# Patient Record
Sex: FEMALE | Race: WHITE | NOT HISPANIC OR LATINO | Employment: OTHER | URBAN - METROPOLITAN AREA
[De-identification: names, ages, dates, MRNs, and addresses within clinical notes are randomized per-mention and may not be internally consistent; named-entity substitution may affect disease eponyms.]

---

## 2017-02-06 ENCOUNTER — TELEPHONE (OUTPATIENT)
Dept: FAMILY MEDICINE | Facility: CLINIC | Age: 72
End: 2017-02-06

## 2017-02-06 NOTE — TELEPHONE ENCOUNTER
Reason for call:  Patient reporting a symptom    Symptom or request: Pt fell on her right side in [place] 3 days ago. She says it hurts and has chest pain when she takes a deep breath. This has been present since the fall.  I offered UC tonight but she wants to wait until tomorrow. Apt schedule for 12:40 tomorrow with Michelle Minor    Duration (how long have symptoms been present): 3 days    Additional comments: I told her I will have the RN call her to see if she has any concerns for waiting.    Phone Number patient can be reached at:  Home number on file [phone] (home)    Best Time:  anytime    Can we leave a detailed message on this number:  YES    Call taken on 2/6/2017 at 3:53 PM by Fiona Baird

## 2017-02-06 NOTE — TELEPHONE ENCOUNTER
When I called Ingrid was taking a shower and her  asked her if she thought OK to wait until tomorrow to be seen.    Harsha said the she said she is OK to wait  Irma Ivey RN

## 2017-02-07 ENCOUNTER — RADIANT APPOINTMENT (OUTPATIENT)
Dept: GENERAL RADIOLOGY | Facility: CLINIC | Age: 72
End: 2017-02-07
Attending: NURSE PRACTITIONER
Payer: COMMERCIAL

## 2017-02-07 ENCOUNTER — OFFICE VISIT (OUTPATIENT)
Dept: FAMILY MEDICINE | Facility: CLINIC | Age: 72
End: 2017-02-07
Payer: COMMERCIAL

## 2017-02-07 VITALS
BODY MASS INDEX: 22.86 KG/M2 | HEART RATE: 68 BPM | SYSTOLIC BLOOD PRESSURE: 138 MMHG | DIASTOLIC BLOOD PRESSURE: 80 MMHG | TEMPERATURE: 97.8 F | WEIGHT: 129 LBS

## 2017-02-07 DIAGNOSIS — R07.81 RIB PAIN ON RIGHT SIDE: ICD-10-CM

## 2017-02-07 DIAGNOSIS — R07.81 RIB PAIN ON RIGHT SIDE: Primary | ICD-10-CM

## 2017-02-07 PROCEDURE — 99213 OFFICE O/P EST LOW 20 MIN: CPT | Performed by: NURSE PRACTITIONER

## 2017-02-07 PROCEDURE — 71101 X-RAY EXAM UNILAT RIBS/CHEST: CPT | Mod: RT

## 2017-02-07 RX ORDER — FLUTICASONE PROPIONATE 50 MCG
1 SPRAY, SUSPENSION (ML) NASAL 2 TIMES DAILY
Qty: 16 G | Refills: 7 | Status: CANCELLED | OUTPATIENT
Start: 2017-02-07

## 2017-02-07 NOTE — PATIENT INSTRUCTIONS
Rib Contusion or Minor Fracture    A rib contusion is a bruise to one or more rib bones. It may cause pain, tenderness, swelling, and a purplish tint to the skin. There may be a sharp pain with each breath. A rib contusion takes anywhere from a few days to a few weeks to heal. A minor rib fracture or break may cause the same symptoms as a rib contusion. The small crack may not be seen on a regular chest X-ray. Treatment for both problems is the same.  Home care    You may use over-the-counter pain medicine to control pain, unless another pain medicine was prescribed. If you have chronic liver or kidney disease or ever had a stomach ulcer or GI bleeding, talk with your healthcare provider before using these medicines.    Rest. Do not lift anything heavy or do any activity that causes pain.    Apply an ice pack over the injured area for 15 to 20 minutes every 1 to 2 hours. You should do this for the first 24 to 48 hours. You can make an ice pack by filling a plastic bag that seals at the top with ice cubes and then wrapping it with a thin towel. Continue with ice packs as needed for the relief of pain and swelling.    The first 3 to 4 weeks of healing will be the most painful. If your pain is not under control with the treatment given, call your healthcare provider. Sometimes a stronger pain medicine may be needed. A nerve block can be done in case of severe pain. It will numb the nerve between the ribs.  Follow-up care  Follow up with your healthcare provider, or as advised.  If X-rays were taken, you will be told of any new findings that may affect your care.  Call 911   Call 911 if you have:    Dizziness, weakness or fainting    Shortness of breath with or without chest discomfort    New or worsening pain  When to seek medical advice  Call your healthcare provider right away if any of these occur:    Fever of 100.4 F (38 C) or above lasting for 24 to 48 hours    Stomach pain    5498-5329 [employer] [address] [place] [address]. All rights reserved. This information is not intended as a substitute for professional medical care. Always follow your healthcare professional's instructions.

## 2017-02-07 NOTE — MR AVS SNAPSHOT
After Visit Summary   2/7/2017    Ingrid Amaral    MRN: [id number]           Patient Information     Date Of Birth          1945        Visit Information        Provider Department      2/7/2017 12:40 PM Sheryl Minor APRN [facility]        Today's Diagnoses     Rib pain on right side    -  1       Care Instructions      Rib Contusion or Minor Fracture    A rib contusion is a bruise to one or more rib bones. It may cause pain, tenderness, swelling, and a purplish tint to the skin. There may be a sharp pain with each breath. A rib contusion takes anywhere from a few days to a few weeks to heal. A minor rib fracture or break may cause the same symptoms as a rib contusion. The small crack may not be seen on a regular chest X-ray. Treatment for both problems is the same.  Home care    You may use over-the-counter pain medicine to control pain, unless another pain medicine was prescribed. If you have chronic liver or kidney disease or ever had a stomach ulcer or GI bleeding, talk with your healthcare provider before using these medicines.    Rest. Do not lift anything heavy or do any activity that causes pain.    Apply an ice pack over the injured area for 15 to 20 minutes every 1 to 2 hours. You should do this for the first 24 to 48 hours. You can make an ice pack by filling a plastic bag that seals at the top with ice cubes and then wrapping it with a thin towel. Continue with ice packs as needed for the relief of pain and swelling.    The first 3 to 4 weeks of healing will be the most painful. If your pain is not under control with the treatment given, call your healthcare provider. Sometimes a stronger pain medicine may be needed. A nerve block can be done in case of severe pain. It will numb the nerve between the ribs.  Follow-up care  Follow up with your healthcare provider, or as advised.  If X-rays were taken, you will be told of any new findings that may  affect your care.  Call 911   Call 911 if you have:    Dizziness, weakness or fainting    Shortness of breath with or without chest discomfort    New or worsening pain  When to seek medical advice  Call your healthcare provider right away if any of these occur:    Fever of 100.4 F (38 C) or above lasting for 24 to 48 hours    Stomach pain    4466-5795 [employer]. [address] [place] [address]. All rights reserved. This information is not intended as a substitute for professional medical care. Always follow your healthcare professional's instructions.              Follow-ups after your visit        Who to contact     If you have questions or need follow up information about today's clinic visit or your schedule please contact [facility] directly at [phone].  Normal or non-critical lab and imaging results will be communicated to you by MyChart, letter or phone within 4 business days after the clinic has received the results. If you do not hear from us within 7 days, please contact the clinic through [employer] or phone. If you have a critical or abnormal lab result, we will notify you by phone as soon as possible.  Submit refill requests through [employer] or call your pharmacy and they will forward the refill request to us. Please allow 3 business days for your refill to be completed.          Additional Information About Your Visit        [employer] Information     [employer] gives you secure access to your electronic health record. If you see a primary care provider, you can also send messages to your care team and make appointments. If you have questions, please call your primary care clinic.  If you do not have a primary care provider, please call [phone] and they will assist you.        Care EveryWhere ID     This is your Care EveryWhere ID. This could be used by other organizations to access your [facility] medical records  [id number]        Your Vitals Were     Pulse  Temperature                68 97.8  F (36.6  C) (Tympanic)           Blood Pressure from Last 3 Encounters:   02/07/17 156/94   12/12/16 128/80   12/01/16 126/78    Weight from Last 3 Encounters:   02/07/17 129 lb (58.514 kg)   12/12/16 128 lb (58.06 kg)   12/01/16 130 lb 9.6 oz (59.24 kg)                 Today's Medication Changes          These changes are accurate as of: 2/7/17  1:43 PM.  If you have any questions, ask your nurse or doctor.               Stop taking these medicines if you haven't already. Please contact your care team if you have questions.     flunisolide 25 MCG/ACT (0.025%) Soln spray   Commonly known as:  NASALIDE   Stopped by:  Sheryl Minor APRN CNP                    Primary Care Provider Office Phone # Fax #    Leesa Muñiz PA-C [phone]       [address] [address]        Thank you!     Thank you for choosing [facility]  for your care. Our goal is always to provide you with excellent care. Hearing back from our patients is one way we can continue to improve our services. Please take a few minutes to complete the written survey that you may receive in the mail after your visit with us. Thank you!             Your Updated Medication List - Protect others around you: Learn how to safely use, store and throw away your medicines at www.disposemymeds.org.          This list is accurate as of: 2/7/17  1:43 PM.  Always use your most recent med list.                   Brand Name Dispense Instructions for use    * albuterol (5 MG/ML) 0.5% neb solution    PROVENTIL    30 vial    Take 0.5 mLs (2.5 mg) by nebulization every 4 hours as needed for wheezing or shortness of breath / dyspnea       * albuterol 108 (90 BASE) MCG/ACT Inhaler    PROAIR HFA/PROVENTIL HFA/VENTOLIN HFA    1 Inhaler    Inhale 2 puffs into the lungs every 4 hours as needed for shortness of breath / dyspnea       ASPIRIN PO      Take 81 mg by  mouth Takes every other day       BENEFIBER PO      2 tsp daily       calcipotriene 0.005 % Oint      Externally apply topically 2 times daily       calcium citrate + Tabs      250mg/400IU - take 2 tablets per day       cetirizine 10 MG tablet    zyrTEC    90 tablet    Take 1 tablet (10 mg) by mouth every evening       clobetasol 0.05 % ointment    TEMOVATE    30 g    Apply to area of irritation twice daily for 2 weeks       desonide 0.05 % lotion    DESOWEN    59 mL    Apply  topically 2 times daily.       estradiol 0.1 MG/GM cream    ESTRACE    85 g    Use 1 gm every day and spread a small amount around the clitoral dodd       FLAXSEED      on food as needed       fluticasone-salmeterol 250-50 MCG/DOSE diskus inhaler    ADVAIR    1 Inhaler    Inhale 1 puff into the lungs 2 times daily       guaiFENesin 600 MG 12 hr tablet    MUCINEX    40 tablet    Take 2 tablets (1,200 mg) by mouth 2 times daily as needed To see if helps phlegm.       hydrocortisone 1 % cream    CORTAID     Apply topically as needed       lactase 9000 UNITS Tabs tablet    LACTAID     Take 9,000 Units by mouth as needed       montelukast 10 MG tablet    SINGULAIR    30 tablet    Take 1 tablet (10 mg) by mouth daily       omeprazole 20 MG CR capsule    priLOSEC    180 capsule    Take 1 capsule (20 mg) by mouth 2 times daily For heartburn       order for DME     1 Device    Equipment being ordered: Nebulizer and tubing/filter       predniSONE 20 MG tablet    DELTASONE    10 tablet    Take 1 tablet (20 mg) by mouth 2 times daily For Yellow or Red zone on Asthma Action Plan.       PROBIOTIC DAILY PO      Take 1 chew tab by mouth 2 times daily       sertraline 100 MG tablet    ZOLOFT    90 tablet    Take 1.5 tablets (150 mg) by mouth daily       SKIN OILS EX      Lavender-headache, skin, peppermint-skin, upset stomach mix of oils, asthma mix of oils, eucalyptus. Tea tree oil-skin, vapor rub- chest tightness, sleepy time (vetiver, lavendaer, deborah,  mandarin, ylang)- sleep, constipation, eczema (sweet almond oil). Also mixes with carriers: Coconut oil, [place] oil, Extra virgin oil. Frankincense- cough. Digize- oil. Purification- oil, lemon- oil. Essentialyme- pill.  Inhales a mix with olive oil almond and coconut oil with peppermint, and eucalyptus- sinus, allergies. Updated 12/1/2015.  Updated 4/26/2016: Lemon, Cinnamon bark, panaway, Thieves, Orange, Wintergreen, Copaiba       sodium chloride 0.65 % nasal spray    OCEAN     Spray 1 spray in nostril daily as needed       tiZANidine 2 MG tablet    ZANAFLEX    90 tablet    Take 1 tablet (2 mg) by mouth 3 times daily as needed for muscle spasms       triamcinolone acetonide 0.05 % Oint      Externally apply  topically.       VITAMIN D (CHOLECALCIFEROL) PO      Take 2,400 Units by mouth daily       * Notice:  This list has 2 medication(s) that are the same as other medications prescribed for you. Read the directions carefully, and ask your doctor or other care provider to review them with you.

## 2017-02-07 NOTE — NURSING NOTE
"Chief Complaint   Patient presents with     Fall     2/02/2017 rt side pain and short of breath       Initial /94 mmHg  Pulse 68  Temp(Src) 97.8  F (36.6  C) (Tympanic)  Wt 129 lb (58.514 kg) Estimated body mass index is 22.86 kg/(m^2) as calculated from the following:    Height as of 11/7/16: 5' 3\" (1.6 m).    Weight as of this encounter: 129 lb (58.514 kg).  Medication Reconciliation: complete    "

## 2017-02-07 NOTE — PROGRESS NOTES
SUBJECTIVE:                                                    Ingrid Amaral is a 71 year old female who presents to clinic today for the following health issues:      Fall on 2/02 Rt side pain      Duration: 4 days    Description (location/character/radiation): under rt arms and wraps around rt breast causing breathing issues    Intensity:  moderate    Accompanying signs and symptoms: none    History (similar episodes/previous evaluation): None    Precipitating or alleviating factors: None    Therapies tried and outcome: None     [place] on right [place].  Pain now for 4 days without improvement.    Problem list and histories reviewed & adjusted, as indicated.  Additional history: as documented    Patient Active Problem List   Diagnosis     Diarrhea     Pure hypercholesterolemia     Allergic rhinitis     Panic disorder without agoraphobia     Advanced directives, counseling/discussion     Liver lesion     Generalized anxiety disorder     Mild major depression (H)     Vulvitis     Vaginal wall prolapse     Chronic constipation     Moderate persistent asthma     Dysphagia     RBD (REM behavioral disorder)     FH: colon cancer     Past Surgical History   Procedure Laterality Date     Hysterectomy, pap no longer indicated  1991     Tonsillectomy       Hernia repair       umbilcal     Ankle surgery       bilateral     Colonoscopy       Upper gi endoscopy       Hc esoph/gas reflux test w nasal imped >1 hr  4/19/2012     Procedure:ESOPHAGEAL IMPEDENCE FUNCTION TEST WITH 24 HOUR PH GREATER THAN 1 HOUR; Surgeon:VALDO UMANZOR; Location: GI     Colonoscopy N/A 3/20/2015     Procedure: COLONOSCOPY;  Surgeon: Britney Martinez MD;  Location: [facility]       Social History   Substance Use Topics     Smoking status: Former Smoker -- 0.50 packs/day for 3 years     Types: Cigarettes     Smokeless tobacco: Never Used      Comment: smoked for 3 years when she was around 19     Alcohol Use: Yes       Comment: RARELY     Family History   Problem Relation Age of Onset     HEART DISEASE Mother      valve problem     Hypertension Mother      DIABETES Mother      type 2     CEREBROVASCULAR DISEASE Mother      Allergies Mother      Eye Disorder Mother      Macular degeneration     OSTEOPOROSIS Mother      Respiratory Mother      Cardiovascular Father      MI at age 80     Cancer - colorectal Father      DIABETES Father      Hypertension Father      type 2     Eye Disorder Father      macular degeneration     Lipids Father      C.A.D. Maternal Grandmother      DIABETES Paternal Grandmother      type 2     Cardiovascular Paternal Grandmother      MI     DIABETES Sister      type 2     Allergies Sister      GASTROINTESTINAL DISEASE Sister      GASTROINTESTINAL DISEASE Daughter      GASTROINTESTINAL DISEASE Daughter          Current Outpatient Prescriptions   Medication Sig Dispense Refill     estradiol (ESTRACE) 0.1 MG/GM vaginal cream Use 1 gm every day and spread a small amount around the clitoral dodd 85 g 1     fluticasone-salmeterol (ADVAIR) 250-50 MCG/DOSE diskus inhaler Inhale 1 puff into the lungs 2 times daily 1 Inhaler 11     montelukast (SINGULAIR) 10 MG tablet Take 1 tablet (10 mg) by mouth daily 30 tablet 1     guaiFENesin (MUCINEX) 600 MG 12 hr tablet Take 2 tablets (1,200 mg) by mouth 2 times daily as needed To see if helps phlegm. 40 tablet 0     albuterol (PROAIR HFA, PROVENTIL HFA, VENTOLIN HFA) 108 (90 BASE) MCG/ACT inhaler Inhale 2 puffs into the lungs every 4 hours as needed for shortness of breath / dyspnea 1 Inhaler 2     tiZANidine (ZANAFLEX) 2 MG tablet Take 1 tablet (2 mg) by mouth 3 times daily as needed for muscle spasms 90 tablet 1     sertraline (ZOLOFT) 100 MG tablet Take 1.5 tablets (150 mg) by mouth daily 90 tablet 3     cetirizine (ZYRTEC) 10 MG tablet Take 1 tablet (10 mg) by mouth every evening 90 tablet 3     omeprazole (PRILOSEC) 20 MG capsule Take 1 capsule (20 mg) by mouth 2  times daily For heartburn 180 capsule 3     Multiple Minerals-Vitamins (CALCIUM CITRATE +) TABS 250mg/400IU - take 2 tablets per day       SKIN OILS EX Lavender-headache, skin, peppermint-skin, upset stomach mix of oils, asthma mix of oils, eucalyptus. Tea tree oil-skin, vapor rub- chest tightness, sleepy time (vetiver, lavendaer, mrjoram, mandarin, ylang)- sleep, constipation, eczema (sweet almond oil). Also mixes with carriers: Coconut oil, [place] oil, Extra virgin oil. Frankincense- cough. Digize- oil. Purification- oil, lemon- oil. Essentialyme- pill.  Inhales a mix with olive oil almond and coconut oil with peppermint, and eucalyptus- sinus, allergies. Updated 12/1/2015.   Updated 4/26/2016: Lemon, Cinnamon bark, panaway, Thieves, Orange, Wintergreen, Copaiba       ASPIRIN PO Take 81 mg by mouth Takes every other day       predniSONE (DELTASONE) 20 MG tablet Take 1 tablet (20 mg) by mouth 2 times daily For Yellow or Red zone on Asthma Action Plan. 10 tablet 0     ORDER FOR DME Equipment being ordered: Nebulizer and tubing/filter 1 Device 0     albuterol (PROVENTIL) (5 MG/ML) 0.5% nebulizer solution Take 0.5 mLs (2.5 mg) by nebulization every 4 hours as needed for wheezing or shortness of breath / dyspnea 30 vial 1     lactase (LACTAID) 9000 UNITS TABS Take 9,000 Units by mouth as needed       sodium chloride (SODIUM CHLORIDE) 0.65 % nasal spray Spray 1 spray in nostril daily as needed       VITAMIN D, CHOLECALCIFEROL, PO Take 2,400 Units by mouth daily       hydrocortisone 1 % cream Apply topically as needed       calcipotriene 0.005 % OINT Externally apply topically 2 times daily       Probiotic Product (PROBIOTIC DAILY PO) Take 1 chew tab by mouth 2 times daily        clobetasol (TEMOVATE) 0.05 % ointment Apply to area of irritation twice daily for 2 weeks 30 g 0     desonide (DESOWEN) 0.05 % lotion Apply  topically 2 times daily. 59 mL 0     Triamcinolone Acetonide 0.05 % OINT Externally apply  topically.        BENEFIBER OR 2 tsp daily       FLAXSEED on food as needed       Allergies   Allergen Reactions     Sulfa Drugs Other (See Comments)     Reaction unknown as a child     Problem list, Medication list, Allergies, and Medical/Social/Surgical histories reviewed in [facility] and updated as appropriate.    ROS:  Constitutional, HEENT, cardiovascular, pulmonary, gi and gu systems are negative, except as otherwise noted.    OBJECTIVE:                                                    /80 mmHg  Pulse 68  Temp(Src) 97.8  F (36.6  C) (Tympanic)  Wt 129 lb (58.514 kg)  Body mass index is 22.86 kg/(m^2).  GENERAL: healthy, alert and no distress  RESP: lungs clear to auscultation - no rales, rhonchi or wheezes  CV: regular rate and rhythm, normal S1 S2, no S3 or S4, no murmur, click or rub  MS: tenderness to palpation at right anterior lateral ribs 4-6  PSYCH: mentation appears normal, affect normal/bright    Diagnostic Test Results:  Xray -    XR RIBS & CHEST RT 3VW 2/7/2017 1:32 PM    COMPARISON: 12/23/2011    HISTORY: Pleurodynia                  Impression             IMPRESSION: Cardiac silhouette and pulmonary vasculature are within  normal limits. Small hiatal hernia is again seen. No focal airspace  disease, pleural effusion or pneumothorax. No displaced rib fractures  are seen.    RODGER ANSARI                  ASSESSMENT/PLAN:                                                      1. Rib pain on right side  No fracture on x ray.  No respiratory distress.  Symptomatic care and follow up discussed  - XR Ribs & Chest Right G/E 3 Views; Future    Home care instructions were reviewed with the patient. The risks, benefits and treatment options of prescribed medications or other treatments have been discussed with the patient. The patient verbalized their understanding and should call or follow up if no improvement or if they develop further problems.      Patient Instructions     Rib Contusion or Minor Fracture    A rib  contusion is a bruise to one or more rib bones. It may cause pain, tenderness, swelling, and a purplish tint to the skin. There may be a sharp pain with each breath. A rib contusion takes anywhere from a few days to a few weeks to heal. A minor rib fracture or break may cause the same symptoms as a rib contusion. The small crack may not be seen on a regular chest X-ray. Treatment for both problems is the same.  Home care    You may use over-the-counter pain medicine to control pain, unless another pain medicine was prescribed. If you have chronic liver or kidney disease or ever had a stomach ulcer or GI bleeding, talk with your healthcare provider before using these medicines.    Rest. Do not lift anything heavy or do any activity that causes pain.    Apply an ice pack over the injured area for 15 to 20 minutes every 1 to 2 hours. You should do this for the first 24 to 48 hours. You can make an ice pack by filling a plastic bag that seals at the top with ice cubes and then wrapping it with a thin towel. Continue with ice packs as needed for the relief of pain and swelling.    The first 3 to 4 weeks of healing will be the most painful. If your pain is not under control with the treatment given, call your healthcare provider. Sometimes a stronger pain medicine may be needed. A nerve block can be done in case of severe pain. It will numb the nerve between the ribs.  Follow-up care  Follow up with your healthcare provider, or as advised.  If X-rays were taken, you will be told of any new findings that may affect your care.  Call 911   Call 911 if you have:    Dizziness, weakness or fainting    Shortness of breath with or without chest discomfort    New or worsening pain  When to seek medical advice  Call your healthcare provider right away if any of these occur:    Fever of 100.4 F (38 C) or above lasting for 24 to 48 hours    Stomach pain    5258-1865 [employer]. [address] [place] [address], [place]  [address]. All rights reserved. This information is not intended as a substitute for professional medical care. Always follow your healthcare professional's instructions.              MARVIN Sesay [facility]

## 2017-03-01 DIAGNOSIS — M54.2 NECK PAIN: ICD-10-CM

## 2017-03-01 DIAGNOSIS — M79.622 PAIN OF LEFT UPPER ARM: ICD-10-CM

## 2017-03-01 DIAGNOSIS — M54.50 BILATERAL LOW BACK PAIN WITHOUT SCIATICA, UNSPECIFIED CHRONICITY: ICD-10-CM

## 2017-03-01 RX ORDER — TIZANIDINE 2 MG/1
2 TABLET ORAL 3 TIMES DAILY PRN
Qty: 90 TABLET | Refills: 0 | Status: SHIPPED | OUTPATIENT
Start: 2017-03-01 | End: 2017-03-09

## 2017-03-01 RX ORDER — TIZANIDINE 2 MG/1
2 TABLET ORAL 3 TIMES DAILY PRN
Qty: 90 TABLET | Refills: 0 | Status: SHIPPED | OUTPATIENT
Start: 2017-03-01 | End: 2017-03-01

## 2017-03-01 NOTE — TELEPHONE ENCOUNTER
Tizanidine 2      Last Written Prescription Date:  06/15/16  Last Fill Quantity: 90,   # refills: 1  Last Office Visit with [facility] prescribing provider: 02/07/17  Future Office visit:       Routing refill request to provider for review/approval because:  Drug not on the [facility] refill protocol or controlled substance    Thank You!  Candace Lott  [facility]  P: [phone] F:[phone]

## 2017-03-02 ENCOUNTER — TELEPHONE (OUTPATIENT)
Dept: PHARMACY | Facility: CLINIC | Age: 72
End: 2017-03-02

## 2017-03-02 NOTE — TELEPHONE ENCOUNTER
This patient is due for MTM follow-up. I called the patient to schedule an appointment. Appointment scheduled for 3/9/17.    Pepe Mo PharmD  Medication Therapy Management Provider  Pager (voicemail): [phone]

## 2017-03-09 ENCOUNTER — OFFICE VISIT (OUTPATIENT)
Dept: FAMILY MEDICINE | Facility: CLINIC | Age: 72
End: 2017-03-09
Payer: COMMERCIAL

## 2017-03-09 ENCOUNTER — OFFICE VISIT (OUTPATIENT)
Dept: PHARMACY | Facility: CLINIC | Age: 72
End: 2017-03-09
Payer: COMMERCIAL

## 2017-03-09 VITALS — BODY MASS INDEX: 23.38 KG/M2 | WEIGHT: 132 LBS

## 2017-03-09 VITALS
DIASTOLIC BLOOD PRESSURE: 98 MMHG | HEIGHT: 63 IN | WEIGHT: 132 LBS | SYSTOLIC BLOOD PRESSURE: 160 MMHG | TEMPERATURE: 97.7 F | HEART RATE: 66 BPM | BODY MASS INDEX: 23.39 KG/M2

## 2017-03-09 DIAGNOSIS — M54.42 BILATERAL LOW BACK PAIN WITH LEFT-SIDED SCIATICA, UNSPECIFIED CHRONICITY: ICD-10-CM

## 2017-03-09 DIAGNOSIS — R21 RASH: ICD-10-CM

## 2017-03-09 DIAGNOSIS — K59.09 CHRONIC CONSTIPATION: ICD-10-CM

## 2017-03-09 DIAGNOSIS — Z13.6 CARDIOVASCULAR SCREENING; LDL GOAL LESS THAN 130: ICD-10-CM

## 2017-03-09 DIAGNOSIS — G47.52 RBD (REM BEHAVIORAL DISORDER): ICD-10-CM

## 2017-03-09 DIAGNOSIS — M54.41 CHRONIC RIGHT-SIDED LOW BACK PAIN WITH RIGHT-SIDED SCIATICA: Primary | ICD-10-CM

## 2017-03-09 DIAGNOSIS — Z79.82 LONG TERM (CURRENT) USE OF ASPIRIN: ICD-10-CM

## 2017-03-09 DIAGNOSIS — G89.29 CHRONIC RIGHT-SIDED LOW BACK PAIN WITH RIGHT-SIDED SCIATICA: Primary | ICD-10-CM

## 2017-03-09 DIAGNOSIS — F33.0 MAJOR DEPRESSIVE DISORDER, RECURRENT EPISODE, MILD (H): ICD-10-CM

## 2017-03-09 DIAGNOSIS — J45.40 MODERATE PERSISTENT ASTHMA WITHOUT COMPLICATION: Primary | ICD-10-CM

## 2017-03-09 DIAGNOSIS — J30.9 ALLERGIC RHINITIS, UNSPECIFIED ALLERGIC RHINITIS TRIGGER, UNSPECIFIED RHINITIS SEASONALITY: ICD-10-CM

## 2017-03-09 DIAGNOSIS — K21.9 GASTROESOPHAGEAL REFLUX DISEASE, ESOPHAGITIS PRESENCE NOT SPECIFIED: ICD-10-CM

## 2017-03-09 DIAGNOSIS — E63.9 NUTRITIONAL DEFICIENCY: ICD-10-CM

## 2017-03-09 LAB
CHOLEST SERPL-MCNC: 273 MG/DL
HDLC SERPL-MCNC: 71 MG/DL
LDLC SERPL CALC-MCNC: 153 MG/DL
NONHDLC SERPL-MCNC: 202 MG/DL
TRIGL SERPL-MCNC: 244 MG/DL

## 2017-03-09 PROCEDURE — 99214 OFFICE O/P EST MOD 30 MIN: CPT | Performed by: NURSE PRACTITIONER

## 2017-03-09 PROCEDURE — 36415 COLL VENOUS BLD VENIPUNCTURE: CPT | Performed by: NURSE PRACTITIONER

## 2017-03-09 PROCEDURE — 80061 LIPID PANEL: CPT | Performed by: NURSE PRACTITIONER

## 2017-03-09 PROCEDURE — 99605 MTMS BY PHARM NP 15 MIN: CPT | Performed by: PHARMACIST

## 2017-03-09 PROCEDURE — 99607 MTMS BY PHARM ADDL 15 MIN: CPT | Performed by: PHARMACIST

## 2017-03-09 RX ORDER — CYCLOBENZAPRINE HCL 10 MG
5-10 TABLET ORAL 3 TIMES DAILY PRN
Qty: 30 TABLET | Refills: 1 | Status: SHIPPED | OUTPATIENT
Start: 2017-03-09 | End: 2017-04-03

## 2017-03-09 RX ORDER — FLUTICASONE PROPIONATE 50 MCG
1 SPRAY, SUSPENSION (ML) NASAL DAILY
COMMUNITY
End: 2017-05-23

## 2017-03-09 NOTE — PATIENT INSTRUCTIONS
Low back/sciatic pain    Heat area on/off through out the day     Take no more than 600 mg of ibuprofen three times daily - can take an extra tablet at bedtime     Can take Flexeril 1-2 tabs up to 3 times daily     Sleeping with a pillow between knees may help    Start stretches and exercises for your low back     Make an appointment with physical therapy     Will get cholesterol level today and notify you of results    Blood pressure slightly elevated in clinic today - make a nurse only appointment in 2 weeks if you don't see me for a blood pressure check     Follow up in 2 weeks if symptoms are not improved       Exercises To Strengthen Your Lower Back  Strong lower-back and abdominal muscles work together to support your spine. The exercises below will help strengthen the muscles of the lower back. It is important that you begin exercising slowly and increase levels gradually.  Always begin any exercise program with stretching. If you feel pain while doing any of these exercises, stop and talk to your doctor about a more specific exercise program that better suits your condition.   Low back stretch  The point of stretching is to make you more flexible and increase your range of motion. Stretch only as much as you are able. Stretch slowly. Do not push your stretch to the limit. If at any point you feel pain while stretching, this is your (temporary) limit.    Lie on your back with your knees bent and both feet on the ground.    Slowly raise your left knee to your chest as you flatten your lower back against the floor. Hold for 5 seconds.    Relax and repeat the exercise with your right knee.    Do 10 of these exercises for each leg.    Repeat hugging both knees to your chest at the same time.  Building lower back strength  Start your exercise routine with 10 to 30 minutes a day, 1 to 3 times a day.  Initial exercises  Lying on your back:  1. Ankle pumps: Move your foot up and down, towards your head, and then  away. Repeat 10 times with each foot.  2. Heel slides: Slowly bend your knee, drawing the heel of your foot towards you. Then slide your heel/foot from you, straightening your knee. Do not lift your foot off the floor (this is not a leg lift).  3. Abdominal contraction: Bend your knees and put your hands on your stomach. Tighten your stomach muscles. Hold for 5 seconds, then relax. Repeat 10 times.  4. Straight leg raise: Bend one leg at the knee and keep the other leg straight. Tighten your stomach muscles. Slowly lift your straight leg 6 to 12 inches off the floor and hold for up to 5 seconds. Repeat 10 times on each side.  Standin. Wall squats: Stand with your back against the wall. Move your feet about 12 inches away from the wall. Tighten your stomach muscles, and slowly bend your knees until they are at about a 45 degree angle. Do not go down too far. Hold about 5 seconds. Then slowly return to your starting position. Repeat 10 times.  2. Heel raises: Stand facing the wall. Slowly raise the heels of your feet up and down, while keeping your toes on the floor. If you have trouble balancing, you can touch the wall with your hands. Repeat 10 times.  More advanced exercises  When you feel comfortable enough, try these exercises.  1. Kneeling lumbar extension: Begin on your hands and knees. At the same time, raise and straighten your right arm and left leg until they are parallel to the ground. Hold for 2 seconds and come back slowly to a starting position. Repeat with left arm and right leg, alternating 10 times.  2. Prone lumbar extension: Lie face down, arms extended overhead, palms on the floor. At the same time, raise your right arm and left leg as high as comfortably possible. Hold for 10 seconds and slowly return to start. Repeat with left arm and right leg, alternating 10 times. Gradually build up to 20 times. (Advanced: Repeat this exercise raising both arms and both legs a few inches off the floor at  the same time. Hold for 5 seconds and release.)  3. Pelvic tilt: Lie on the floor on your back with your knees bent at 90 degrees. Your feet should be flat on the floor. Inhale, exhale, then slowly contract your abdominal muscles bringing your navel toward your spine. Let your pelvis rock back until your lower back is flat on the floor. Hold for 10 seconds while breathing smoothly.  4. Abdominal crunch: Perform a pelvic tilt (above) flattening your lower back against the floor. Holding the tension in your abdominal muscles, take another breath and raise your shoulder blades off the ground (this is not a full sit-up). Keep your head in line with your body (don t bend your neck forward). Hold for 2 seconds, then slowly lower.    3446-7878 The [employer]. [address] [place] [address]. All rights reserved. This information is not intended as a substitute for professional medical care. Always follow your healthcare professional's instructions.        Back Exercises: Bridge  The bridge exercise strengthens your abdominal, buttock, and hamstring muscles. This helps keep your back stable and aligned when you walk.    Lie on the floor with your back and palms flat. Bend your knees. Keep your feet flat on the floor.    Contract your abdominal and buttock muscles. Slowly lift your buttocks off the floor until there is a straight line from your knees to your shoulders.    Hold for 5 to 15  seconds. Repeat 5 to10 times.      0355-4100 [employer]. [address] [place] [address]. All rights reserved. This information is not intended as a substitute for professional medical care. Always follow your healthcare professional's instructions.        Back Exercises: Back Release  Do this exercise on your hands and knees. Keep your knees under your hips and your hands under your shoulders.    Relax your abdominal and buttocks muscles, lift your head, and let your back sag. Be sure to keep  your weight evenly distributed. Don t sit back on your hips.     Hold for 5 seconds.    Return to starting position.    Tuck your head and lift (arch) your back.    Hold for 5 seconds    Return to starting position.    Repeat 5 times.    0782-6660 [employer]. [address] [place] [address]. All rights reserved. This information is not intended as a substitute for professional medical care. Always follow your healthcare professional's instructions.        Back Exercises: Lower Back Stretch                        To start, sit in a chair with your feet flat on the floor. Shift your weight slightly forward. Relax, and keep your ears, shoulders, and hips aligned.    Sit with your feet well apart.    Bend forward and touch the floor with the backs of your hands. Relax and let your body drop.    Hold for 20 seconds. Return to starting position.    Repeat 2 times.     5186-9922 [employer]. [address] [place] [address]. All rights reserved. This information is not intended as a substitute for professional medical care. Always follow your healthcare professional's instructions.

## 2017-03-09 NOTE — MR AVS SNAPSHOT
After Visit Summary   3/9/2017    Ingrid Amaral    MRN: [id number]           Patient Information     Date Of Birth          1945        Visit Information        Provider Department      3/9/2017 1:00 PM Jan Mo, [facility] Instructions    Recommendations from today's MTM visit:                                                    MTM (medication therapy management) is a service provided by a clinical pharmacist designed to help you get the most of out of your medicines.   Today we reviewed what your medicines are for, how to know if they are working, that your medicines are safe and how to make your medicine regimen as easy as possible.      1. Now that you are off the clonazepam you could potentially try a supplement called melatonin to see if this helps with you REM disorder.  Usually just recommended 1-3 mg about ONE HOUR before bedtime to start.      2. You may get some benefit from an acid reflux drug called ranitidine that may help with some of your skin reactions.  Could consider trying this in place of your omeprazole in the future.    3. Cut back your supplements to just the calcium-vitamin D3 twice daily.  The magnesium can sometimes contribute to diarrhea issues.    Next MTM visit: 6 month or sooner if needed    To schedule another MTM appointment, please call the clinic directly or you may call the MTM scheduling line at [phone] or toll-free at [phone].     My Clinical Pharmacist's contact information:                                                      It was a pleasure seeing you today!  Please feel free to contact me with any questions or concerns you have.      Pepe Mo, Sirisha  Medication Therapy Management Provider  Pager (voicemail): [phone]    You may receive a survey about the MTM services you received.  I would appreciate your feedback to help me serve you better in the future. Please fill it out and return it  when you can. Your comments will be anonymous.                Follow-ups after your visit        Your next 10 appointments already scheduled     Mar 09, 2017  2:20 PM CST   SHORT with MARVIN Diaz CNP   [facility] ([facility])    [address] [place] [id number]   [phone]              Who to contact     If you have questions or need follow up information about today's clinic visit or your schedule please contact [facility] directly at [phone].  Normal or non-critical lab and imaging results will be communicated to you by [employer], letter or phone within 4 business days after the clinic has received the results. If you do not hear from us within 7 days, please contact the clinic through [employer] or phone. If you have a critical or abnormal lab result, we will notify you by phone as soon as possible.  Submit refill requests through [employer] or call your pharmacy and they will forward the refill request to us. Please allow 3 business days for your refill to be completed.          Additional Information About Your Visit        [employer] Information     [employer] gives you secure access to your electronic health record. If you see a primary care provider, you can also send messages to your care team and make appointments. If you have questions, please call your primary care clinic.  If you do not have a primary care provider, please call [phone] and they will assist you.        Care EveryWhere ID     This is your Care EveryWhere ID. This could be used by other organizations to access your [facility] medical records  [id number]        Your Vitals Were     BMI (Body Mass Index)                   23.38 kg/m2            Blood Pressure from Last 3 Encounters:   02/07/17 138/80   12/12/16 128/80   12/01/16 126/78    Weight from Last 3 Encounters:   03/09/17 132 lb (59.9 kg)   02/07/17 129 lb (58.5 kg)   12/12/16 128 lb (58.1 kg)               Today, you had the following     No orders found for display       Primary Care Provider Office Phone # Fax #    Leesa Muñiz PA-C [phone]       [facility] [address] [facility] [address]        Thank you!     Thank you for choosing [facility]  for your care. Our goal is always to provide you with excellent care. Hearing back from our patients is one way we can continue to improve our services. Please take a few minutes to complete the written survey that you may receive in the mail after your visit with us. Thank you!             Your Updated Medication List - Protect others around you: Learn how to safely use, store and throw away your medicines at www.disposemymeds.org.          This list is accurate as of: 3/9/17  1:33 PM.  Always use your most recent med list.                   Brand Name Dispense Instructions for use    * albuterol (5 MG/ML) 0.5% neb solution    PROVENTIL    30 vial    Take 0.5 mLs (2.5 mg) by nebulization every 4 hours as needed for wheezing or shortness of breath / dyspnea       * albuterol 108 (90 BASE) MCG/ACT Inhaler    PROAIR HFA/PROVENTIL HFA/VENTOLIN HFA    1 Inhaler    Inhale 2 puffs into the lungs every 4 hours as needed for shortness of breath / dyspnea       ASPIRIN PO      Take 81 mg by mouth Takes every other day       BENEFIBER PO      2 tsp daily       calcipotriene 0.005 % Oint      Externally apply topically 2 times daily       calcium citrate + Tabs      250mg/400IU - take 2 tablets per day       cetirizine 10 MG tablet    zyrTEC    90 tablet    Take 1 tablet (10 mg) by mouth every evening       clobetasol 0.05 % ointment    TEMOVATE    30 g    Apply to area of irritation twice daily for 2 weeks       desonide 0.05 % lotion    DESOWEN    59 mL    Apply  topically 2 times daily.       estradiol 0.1 MG/GM cream    ESTRACE    85 g    Use 1 gm every day and spread a small amount around the clitoral dodd        FLAXSEED      on food as needed       fluticasone 50 MCG/ACT spray    FLONASE     Spray 1 spray into both nostrils daily       fluticasone-salmeterol 250-50 MCG/DOSE diskus inhaler    ADVAIR    1 Inhaler    Inhale 1 puff into the lungs 2 times daily       guaiFENesin 600 MG 12 hr tablet    MUCINEX    40 tablet    Take 2 tablets (1,200 mg) by mouth 2 times daily as needed To see if helps phlegm.       hydrocortisone 1 % cream    CORTAID     Apply topically as needed       lactase 9000 UNITS Tabs tablet    LACTAID     Take 9,000 Units by mouth as needed       montelukast 10 MG tablet    SINGULAIR    30 tablet    Take 1 tablet (10 mg) by mouth daily       omeprazole 20 MG CR capsule    priLOSEC    180 capsule    Take 1 capsule (20 mg) by mouth 2 times daily For heartburn       order for DME     1 Device    Equipment being ordered: Nebulizer and tubing/filter       predniSONE 20 MG tablet    DELTASONE    10 tablet    Take 1 tablet (20 mg) by mouth 2 times daily For Yellow or Red zone on Asthma Action Plan.       PROBIOTIC DAILY PO      Take 1 chew tab by mouth 2 times daily       sertraline 100 MG tablet    ZOLOFT    90 tablet    Take 1.5 tablets (150 mg) by mouth daily       SKIN OILS EX      Lavender-headache, skin, peppermint-skin, upset stomach mix of oils, asthma mix of oils, eucalyptus. Tea tree oil-skin, vapor rub- chest tightness, sleepy time (vetiver, lavendaer, mrjoram, mandarin, ylang)- sleep, constipation, eczema (sweet almond oil). Also mixes with carriers: Coconut oil, [place] oil, Extra virgin oil. Frankincense- cough. Digize- oil. Purification- oil, lemon- oil. Essentialyme- pill.  Inhales a mix with olive oil almond and coconut oil with peppermint, and eucalyptus- sinus, allergies. Updated 12/1/2015.  Updated 4/26/2016: Lemon, Cinnamon bark, panaway, Thieves, Orange, Wintergreen, Copaiba       sodium chloride 0.65 % nasal spray    OCEAN     Spray 1 spray in nostril daily as needed       tiZANidine 2 MG  tablet    ZANAFLEX    90 tablet    Take 1 tablet (2 mg) by mouth 3 times daily as needed for muscle spasms       triamcinolone acetonide 0.05 % Oint      Externally apply  topically.       VITAMIN D (CHOLECALCIFEROL) PO      Take 2,400 Units by mouth daily       * Notice:  This list has 2 medication(s) that are the same as other medications prescribed for you. Read the directions carefully, and ask your doctor or other care provider to review them with you.

## 2017-03-09 NOTE — PROGRESS NOTES
SUBJECTIVE:                                                    Ingrid Amaral is a 71 year old female who presents to clinic today for the following health issues:      Musculoskeletal problem/pain      Duration: 1.5 weeks    Description  Location: right low back into leg    Intensity:  moderate    Accompanying signs and symptoms: radiation of pain to leg    History  Previous similar problem: YES- has had pain from her sciatic nerve before  Previous evaluation:  none    Precipitating or alleviating factors:  Trauma or overuse: no   Aggravating factors include: sitting, standing and walking    Therapies tried and outcome: ibuprofen, heat, ice     Right low back into leg to knee and a little beyond the knee at times  Has had on and off for years  Heat helps the most    Last took ibuprofen at noon   Has been taking over the amount prescribed of the Tizanidine   No precipitating injuries  Denies any numbness or tingling     Flatfooted  - had surgery - seemed to make things worse        Problem list and histories reviewed & adjusted, as indicated.  Additional history: as documented    Patient Active Problem List   Diagnosis     Diarrhea     Pure hypercholesterolemia     Allergic rhinitis     Panic disorder without agoraphobia     Advanced directives, counseling/discussion     Liver lesion     Generalized anxiety disorder     Mild major depression (H)     Vulvitis     Vaginal wall prolapse     Chronic constipation     Moderate persistent asthma     Dysphagia     RBD (REM behavioral disorder)     FH: colon cancer     Past Surgical History   Procedure Laterality Date     Hysterectomy, pap no longer indicated  1991     Tonsillectomy       Hernia repair       umbilcal     Ankle surgery       bilateral     Colonoscopy       Upper gi endoscopy       Hc esoph/gas reflux test w nasal imped >1 hr  4/19/2012     Procedure:ESOPHAGEAL IMPEDENCE FUNCTION TEST WITH 24 HOUR PH GREATER THAN 1 HOUR; Surgeon:VALDO UMANZOR;  Location: GI     Colonoscopy N/A 3/20/2015     Procedure: COLONOSCOPY;  Surgeon: Britney Martinez MD;  Location: [facility]       Social History   Substance Use Topics     Smoking status: Former Smoker     Packs/day: 0.50     Years: 3.00     Types: Cigarettes     Smokeless tobacco: Never Used      Comment: smoked for 3 years when she was around 19     Alcohol use Yes      Comment: RARELY     Family History   Problem Relation Age of Onset     HEART DISEASE Mother      valve problem     Hypertension Mother      DIABETES Mother      type 2     CEREBROVASCULAR DISEASE Mother      Allergies Mother      Eye Disorder Mother      Macular degeneration     OSTEOPOROSIS Mother      Respiratory Mother      Cardiovascular Father      MI at age 80     Cancer - colorectal Father      DIABETES Father      Hypertension Father      type 2     Eye Disorder Father      macular degeneration     Lipids Father      C.A.D. Maternal Grandmother      DIABETES Paternal Grandmother      type 2     Cardiovascular Paternal Grandmother      MI     DIABETES Sister      type 2     Allergies Sister      GASTROINTESTINAL DISEASE Sister      GASTROINTESTINAL DISEASE Daughter      GASTROINTESTINAL DISEASE Daughter          Current Outpatient Prescriptions   Medication Sig Dispense Refill     fluticasone (FLONASE) 50 MCG/ACT spray Spray 1 spray into both nostrils daily       cyclobenzaprine (FLEXERIL) 10 MG tablet Take 0.5-1 tablets (5-10 mg) by mouth 3 times daily as needed for muscle spasms 30 tablet 1     estradiol (ESTRACE) 0.1 MG/GM vaginal cream Use 1 gm every day and spread a small amount around the clitoral dodd 85 g 1     fluticasone-salmeterol (ADVAIR) 250-50 MCG/DOSE diskus inhaler Inhale 1 puff into the lungs 2 times daily 1 Inhaler 11     montelukast (SINGULAIR) 10 MG tablet Take 1 tablet (10 mg) by mouth daily 30 tablet 1     guaiFENesin (MUCINEX) 600 MG 12 hr tablet Take 2 tablets (1,200 mg) by mouth 2 times daily as needed To see  if helps phlegm. 40 tablet 0     albuterol (PROAIR HFA, PROVENTIL HFA, VENTOLIN HFA) 108 (90 BASE) MCG/ACT inhaler Inhale 2 puffs into the lungs every 4 hours as needed for shortness of breath / dyspnea 1 Inhaler 2     sertraline (ZOLOFT) 100 MG tablet Take 1.5 tablets (150 mg) by mouth daily 90 tablet 3     cetirizine (ZYRTEC) 10 MG tablet Take 1 tablet (10 mg) by mouth every evening 90 tablet 3     omeprazole (PRILOSEC) 20 MG capsule Take 1 capsule (20 mg) by mouth 2 times daily For heartburn 180 capsule 3     Multiple Minerals-Vitamins (CALCIUM CITRATE +) TABS 250mg/400IU - take 2 tablets per day       SKIN OILS EX Lavender-headache, skin, peppermint-skin, upset stomach mix of oils, asthma mix of oils, eucalyptus. Tea tree oil-skin, vapor rub- chest tightness, sleepy time (vetiver, lavendaer, mrjoram, mandarin, ylang)- sleep, constipation, eczema (sweet almond oil). Also mixes with carriers: Coconut oil, [place] oil, Extra virgin oil. Frankincense- cough. Digize- oil. Purification- oil, lemon- oil. Essentialyme- pill.  Inhales a mix with olive oil almond and coconut oil with peppermint, and eucalyptus- sinus, allergies. Updated 12/1/2015.   Updated 4/26/2016: Lemon, Cinnamon bark, panaway, Thieves, Orange, Wintergreen, Copaiba       ASPIRIN PO Take 81 mg by mouth Takes every other day       ORDER FOR DME Equipment being ordered: Nebulizer and tubing/filter 1 Device 0     albuterol (PROVENTIL) (5 MG/ML) 0.5% nebulizer solution Take 0.5 mLs (2.5 mg) by nebulization every 4 hours as needed for wheezing or shortness of breath / dyspnea 30 vial 1     lactase (LACTAID) 9000 UNITS TABS Take 9,000 Units by mouth as needed       sodium chloride (SODIUM CHLORIDE) 0.65 % nasal spray Spray 1 spray in nostril daily as needed       VITAMIN D, CHOLECALCIFEROL, PO Take 2,400 Units by mouth daily       hydrocortisone 1 % cream Apply topically as needed       calcipotriene 0.005 % OINT Externally apply topically 2 times daily        "Probiotic Product (PROBIOTIC DAILY PO) Take 1 chew tab by mouth 2 times daily        clobetasol (TEMOVATE) 0.05 % ointment Apply to area of irritation twice daily for 2 weeks 30 g 0     desonide (DESOWEN) 0.05 % lotion Apply  topically 2 times daily. 59 mL 0     Triamcinolone Acetonide 0.05 % OINT Externally apply  topically.       BENEFIBER OR 2 tsp daily       FLAXSEED on food as needed       predniSONE (DELTASONE) 20 MG tablet Take 1 tablet (20 mg) by mouth 2 times daily For Yellow or Red zone on Asthma Action Plan. (Patient not taking: Reported on 3/9/2017) 10 tablet 0     Allergies   Allergen Reactions     Sulfa Drugs Other (See Comments)     Reaction unknown as a child       Reviewed and updated as needed this visit by clinical staff  Tobacco  Allergies  Meds  Problems  Med Hx  Surg Hx  Fam Hx  Soc Hx        Reviewed and updated as needed this visit by Provider  Allergies  Meds  Problems         ROS:  Constitutional, HEENT, cardiovascular, pulmonary, gi and gu systems are negative, except as otherwise noted.    OBJECTIVE:                                                    BP (!) 160/98  Pulse 66  Temp 97.7  F (36.5  C) (Tympanic)  Ht 5' 3\" (1.6 m)  Wt 132 lb (59.9 kg)  BMI 23.38 kg/m2  Body mass index is 23.38 kg/(m^2).  GENERAL APPEARANCE: healthy, alert and mild distress  MS: extremities normal- no gross deformities noted, peripheral pulses normal and range of motion through out lumbar normal with pain with right lateral bending and coming up from forward flexion, right sciatic notch tenderness     Diagnostic Test Results:  none      ASSESSMENT/PLAN:                                                      1. Chronic right-sided low back pain with right-sided sciatica  Has had on/off for years. Recent flare without any precipitating factors. Taking Zanaflex and Tylenol without relief. No parasthesias  - cyclobenzaprine (FLEXERIL) 10 MG tablet; Take 0.5-1 tablets (5-10 mg) by mouth 3 times daily as " needed for muscle spasms  Dispense: 30 tablet; Refill: 1  - PHYSICAL THERAPY REFERRAL    2. CARDIOVASCULAR SCREENING; LDL GOAL LESS THAN 130  Patient reports elevated lipids this last summer, otherwise not done since 2006 per our records will do a repeat   - Lipid Profile with reflex to direct LDL    See Patient Instructions    MARVIN Mas [facility]

## 2017-03-09 NOTE — LETTER
"     [facility]     Date: 3/10/2017    Ingrid RUEDA Amaral  [address] [address]    Dear Ms. Amaral,    Thank you for talking with me on 3/9/2017 about your health and medications. Medicare s MTM (Medication Therapy Management) program helps you understand your medications and use them safely.      This letter includes an action plan (Medication Action Plan) and medication list (Personal Medication List). The action plan has steps you should take to help you get the best results from your medications. The medication list will help you keep track of your medications and how to use them the right way.       Have your action plan and medication list with you when you talk with your doctors, pharmacists, and other healthcare providers in your care team.     Ask your doctors, pharmacists, and other healthcare providers to update the action plan and medication list at every visit.     Take your medication list with you if you go to the hospital or emergency room.     Give a copy of the action plan and medication list to your family or caregivers.     If you want to talk about this letter or any of the papers with it, please call   [phone].   We look forward to working with you, your doctors, and other healthcare providers to help you stay healthy.     Sincerely,    Jan Mo      Enclosed: Medication Action Plan and Personal Medication List    MEDICATION ACTION PLAN FOR Ingrid Amaral,  1945     This action plan will help you get the best results from your medications if you:   1. Read \"What we talked about.\"   2. Take the steps listed in the \"What I need to do\" boxes.   3. Fill in \"What I did and when I did it.\"   4. Fill in \"My follow-up plan\" and \"Questions I want to ask.\"     Have this action plan with you when you talk with your doctors, pharmacists, and other healthcare providers in your care team. Share this with your family or caregivers too.  DATE " PREPARED: 3/10/2017  What we talked about: You are currently not taking anything for your REM behavior disorder.                                                  What I need to do: You may benefit from trying melatonin 5 mg about ONE HOUR before bedtime.   What I did and when I did it:                                              What we talked about: The magnesium in your calcium supplement may contribute to some of your diarrhea.                                                  What I need to do: You should try to take a calcium with vitamin D supplement that does not have magnesium.   What I did and when I did it:                                               My follow-up plan:                 Questions I want to ask:              If you have any questions about your action plan, call Jan Alcantaranattyella, Phone: [phone] , Monday-Friday 8-4:30pm.           MEDICATION LIST FOR Ingrid AmaralJAIME 1945     This medication list was made for you after we talked. We also used information from your doctor's chart.      Use blank rows to add new medications. Then fill in the dates you started using them.    Cross out medications when you no longer use them. Then write the date and why you stopped using them.    Ask your doctors, pharmacists, and other healthcare providers to update this list at every visit. Keep this list up-to-date with:       Prescription medications    Over the counter drugs     Herbals    Vitamins    Minerals      If you go to the hospital or emergency room, take this list with you. Share this with your family or caregivers too.     DATE PREPARED: 3/10/2017  Allergies or side effects: Sulfa drugs     Medication:  ALBUTEROL SULFATE (5 MG/ML) 0.5% IN NEBU      How I use it:  Take 0.5 mLs (2.5 mg) by nebulization every 4 hours as needed for wheezing or shortness of breath / dyspnea      Why I use it: Moderate persistent asthma    Prescriber:  MARVIN Gaming CNP      Date I  started using it:       Date I stopped using it:         Why I stopped using it:            Medication:  ALBUTEROL SULFATE  (90 BASE) MCG/ACT IN AERS      How I use it:  Inhale 2 puffs into the lungs every 4 hours as needed for shortness of breath / dyspnea      Why I use it: Moderate persistent asthma     Prescriber:  Leesa Muñiz PA-C      Date I started using it:       Date I stopped using it:         Why I stopped using it:            Medication:  ASPIRIN PO      How I use it:  Take 81 mg by mouth Takes every other day      Why I use it:  Stroke prevention    Prescriber:  Patient Reported      Date I started using it:       Date I stopped using it:         Why I stopped using it:            Medication:  BENEFIBER OR      How I use it:  Take 2 teaspoons daily      Why I use it:  Constipation    Prescriber:  Patient Reported      Date I started using it:       Date I stopped using it:         Why I stopped using it:            Medication:  CALCIPOTRIENE 0.005 % EX OINT      How I use it:  Externally apply topically 2 times daily      Why I use it:  Rash    Prescriber:  Patient Reported      Date I started using it:       Date I stopped using it:         Why I stopped using it:            Medication:  CALCIUM CITRATE + PO TABS      How I use it:  250mg/400IU - take 2 tablets per day      Why I use it:  Bone Health    Prescriber:  Patient Reported      Date I started using it:       Date I stopped using it:         Why I stopped using it:            Medication:  CETIRIZINE HCL 10 MG PO TABS      How I use it:  Take 1 tablet (10 mg) by mouth every evening      Why I use it: Chronic rhinitis    Prescriber:  Enid Ugarte MD      Date I started using it:       Date I stopped using it:         Why I stopped using it:            Medication:  CLOBETASOL PROPIONATE 0.05 % EX OINT      How I use it:  Apply to area of irritation twice daily for 2 weeks      Why I use it: Vulvitis    Prescriber:  Sheryl  Jules Whitaker MD      Date I started using it:       Date I stopped using it:         Why I stopped using it:            Medication:  CYCLOBENZAPRINE HCL 10 MG PO TABS      How I use it:  Take 0.5-1 tablets (5-10 mg) by mouth 3 times daily as needed for muscle spasms      Why I use it: Chronic right-sided low back pain    Prescriber:  MARVIN Diaz CNP      Date I started using it:       Date I stopped using it:         Why I stopped using it:            Medication:  DESONIDE 0.05 % EX LOTN      How I use it:  Apply  topically 2 times daily.      Why I use it:  Rash    Prescriber:  Enid Ugarte MD      Date I started using it:       Date I stopped using it:         Why I stopped using it:            Medication:  ESTRADIOL 0.1 MG/GM VA CREA      How I use it:  Use 1 gm every day and spread a small amount around the clitoral dodd      Why I use it: Vaginal atrophy    Prescriber:  Leesa Muñiz PA-C      Date I started using it:       Date I stopped using it:         Why I stopped using it:            Medication:  FLAXSEED      How I use it:  on food as needed      Why I use it: Nutritional deficiency    Prescriber:  Patient Reported      Date I started using it:       Date I stopped using it:         Why I stopped using it:            Medication:  FLUTICASONE PROPIONATE 50 MCG/ACT NA SUSP      How I use it:  Spray 1 spray into both nostrils daily      Why I use it: Allergic Rhinitis    Prescriber:  Patient Reported      Date I started using it:       Date I stopped using it:         Why I stopped using it:            Medication:  FLUTICASONE-SALMETEROL 250-50 MCG/DOSE IN AEPB      How I use it:  Inhale 1 puff into the lungs 2 times daily      Why I use it: Moderate persistent asthma     Prescriber:  Leesa Muñiz PA-C      Date I started using it:       Date I stopped using it:         Why I stopped using it:            Medication:  GUAIFENESIN  MG PO TB12      How I use  it:  Take 2 tablets (1,200 mg) by mouth 2 times daily as needed To see if helps phlegm.      Why I use it: Phlegm in throat    Prescriber:  Leesa Muñiz PA-C      Date I started using it:       Date I stopped using it:         Why I stopped using it:            Medication:  HYDROCORTISONE 1 % EX CREA      How I use it:  Apply topically as needed      Why I use it:  Rash    Prescriber:  Patient Reported      Date I started using it:       Date I stopped using it:         Why I stopped using it:            Medication:  LACTASE 9000 UNITS PO TABS      How I use it:  Take 9,000 Units by mouth as needed      Why I use it:  Lactose intolerance    Prescriber:  Patient Reported      Date I started using it:       Date I stopped using it:         Why I stopped using it:            Medication:  MONTELUKAST SODIUM 10 MG PO TABS      How I use it:  Take 1 tablet (10 mg) by mouth daily      Why I use it: Moderate persistent asthma     Prescriber:  Leesa Muñiz PA-C      Date I started using it:       Date I stopped using it:         Why I stopped using it:            Medication:  OMEPRAZOLE 20 MG PO CPDR      How I use it:  Take 1 capsule (20 mg) by mouth 2 times daily For heartburn      Why I use it: Gastroesophageal reflux disease    Prescriber:  Enid Ugarte MD      Date I started using it:       Date I stopped using it:         Why I stopped using it:            Medication:  PREDNISONE 20 MG PO TABS      How I use it:  Take 1 tablet (20 mg) by mouth 2 times daily For Yellow or Red zone on Asthma Action Plan.      Why I use it: Moderate persistent asthma    Prescriber:  MARVIN Gaming CNP      Date I started using it:       Date I stopped using it:         Why I stopped using it:            Medication:  PROBIOTIC DAILY PO      How I use it:  Take 1 chew tab by mouth 2 times daily       Why I use it: Nutritional deficiency    Prescriber:  Patient Reported      Date I started using it:        Date I stopped using it:         Why I stopped using it:            Medication:  SALINE 0.65 % NA SOLN      How I use it:  Spray 1 spray in nostril daily as needed      Why I use it: Dry nose    Prescriber:  Patient Reported      Date I started using it:       Date I stopped using it:         Why I stopped using it:            Medication:  SERTRALINE  MG PO TABS      How I use it:  Take 1.5 tablets (150 mg) by mouth daily      Why I use it: Panic disorder without agoraphobia    Prescriber:  Enid Ugarte MD      Date I started using it:       Date I stopped using it:         Why I stopped using it:            Medication:  SIMVASTATIN 40 MG PO TABS      How I use it:  Take 1 tablet (40 mg) by mouth At Bedtime      Why I use it: Elevated cholesterol    Prescriber:  MARVIN Diaz CNP      Date I started using it:       Date I stopped using it:         Why I stopped using it:            Medication:  SKIN OILS EX      How I use it:  Lavender-headache, skin, peppermint-skin, upset stomach mix of oils, asthma mix of oils, eucalyptus. Tea tree oil-skin, vapor rub- chest tightness, sleepy time (vetiver, lavendaer, mrjoram, mandarin, ylang)- sleep, constipation, eczema (sweet almond oil). Also mixes with carriers: Coconut oil, [place] oil, Extra virgin oil. Frankincense- cough. Digize- oil. Purification- oil, lemon- oil. Essentialyme- pill.  Inhales a mix with olive oil almond and coconut oil with peppermint, and eucalyptus- sinus, allergies. Updated 12/1/2015.   Updated 4/26/2016: Lemon, Cinnamon bark, panaway, Thieves, Orange, [place], [place]      Why I use it:  General health    Prescriber:  Patient Reported      Date I started using it:       Date I stopped using it:         Why I stopped using it:            Medication:  TRIAMCINOLONE ACETONIDE 0.05 % EX OINT      How I use it:  Externally apply  topically.      Why I use it:  Rash    Prescriber:  Enid Ugarte MD      Date I started using  it:       Date I stopped using it:         Why I stopped using it:            Medication:  VITAMIN D (CHOLECALCIFEROL) PO      How I use it:  Take 2,400 Units by mouth daily      Why I use it:  Nutritional Deficiency    Prescriber:  Patient Reported      Date I started using it:       Date I stopped using it:         Why I stopped using it:            Medication:         How I use it:         Why I use it:      Prescriber:         Date I started using it:       Date I stopped using it:         Why I stopped using it:            Medication:         How I use it:         Why I use it:      Prescriber:         Date I started using it:       Date I stopped using it:         Why I stopped using it:            Medication:         How I use it:         Why I use it:      Prescriber:         Date I started using it:       Date I stopped using it:         Why I stopped using it:              Other Information:     If you have any questions about your action plan, call [phone].    According to the Paperwork Reduction Act of 1995, no persons are required to respond to a collection of information unless it displays a valid OMB control number. The valid OMB number for this information collection is [id number]. The time required to complete this information collection is estimated to average 40 minutes per response, including the time to review instructions, searching existing data resources, gather the data needed, and complete and review the information collection. If you have any comments concerning the accuracy of the time estimate(s) or suggestions for improving this form, please write to: CMS, Attn: LEILANI Reports Clearance Officer, [address] [place] [id number].

## 2017-03-09 NOTE — NURSING NOTE
"Chief Complaint   Patient presents with     Musculoskeletal Problem       Initial /81 (BP Location: Right arm, Patient Position: Chair, Cuff Size: Adult Large)  Pulse 66  Temp 97.7  F (36.5  C) (Tympanic)  Ht 5' 3\" (1.6 m)  Wt 132 lb (59.9 kg)  BMI 23.38 kg/m2 Estimated body mass index is 23.38 kg/(m^2) as calculated from the following:    Height as of this encounter: 5' 3\" (1.6 m).    Weight as of this encounter: 132 lb (59.9 kg).  Medication Reconciliation: complete    Health Maintenance that is potentially due pending provider review:  NONE    Hien LEWIS MA        "

## 2017-03-09 NOTE — PATIENT INSTRUCTIONS
Recommendations from today's MTM visit:                                                    MTM (medication therapy management) is a service provided by a clinical pharmacist designed to help you get the most of out of your medicines.   Today we reviewed what your medicines are for, how to know if they are working, that your medicines are safe and how to make your medicine regimen as easy as possible.      1. Now that you are off the clonazepam you could potentially try a supplement called melatonin to see if this helps with you REM disorder.  Usually just recommended 1-3 mg about ONE HOUR before bedtime to start.      2. You may get some benefit from an acid reflux drug called ranitidine that may help with some of your skin reactions.  Could consider trying this in place of your omeprazole in the future.    3. Cut back your supplements to just the calcium-vitamin D3 twice daily.  The magnesium can sometimes contribute to diarrhea issues.    Next MTM visit: 6 month or sooner if needed    To schedule another MTM appointment, please call the clinic directly or you may call the MTM scheduling line at [phone] or toll-free at [phone].     My Clinical Pharmacist's contact information:                                                      It was a pleasure seeing you today!  Please feel free to contact me with any questions or concerns you have.      Pepe Mo, Sirisha  Medication Therapy Management Provider  Pager (voicemail): [phone]    You may receive a survey about the MTM services you received.  I would appreciate your feedback to help me serve you better in the future. Please fill it out and return it when you can. Your comments will be anonymous.

## 2017-03-09 NOTE — PROGRESS NOTES
SUBJECTIVE/OBJECTIVE:                                                    Ingrid Amaral is a 71 year old female coming in for a follow-up visit for Medication Therapy Management.  She was referred to me from her [employer] insurance. Current PCP is Leesa Muñiz PA-C. Joined today by , Harsha.     Chief Complaint: Follow up from visit on 3/22/16 with Amaya Wylie, PharmD, BCACP.  Comprehensive Medication Review  Personal Healthcare Goals: get pain under control  Tobacco: History of tobacco dependence - quit 50-60 years ago   Alcohol: Less than 1 beverages / week    Medication Adherence: once or twice a week has to double check     Asthma:  Current asthma medications: Albuterol MDI and Nebs and Fluticasone+Salmeterol (Advair) twice daily. Does have prednisone if in Red Zone. Pt rinses their mouth after using steroid inhaler. Asthma triggers include: exercise or sports.  Pt reports the following symptoms: SOA with exertion (stairs, dancing)  Last ACT Score: 22 on 3/9/17    Allergic rhinitis: Current medications include cetirizine 10 mg once daily, fluticasone nasal spray  twice daily, saline nasal spray PRN - rarely and montelukast daily. Primary symptoms are nasal congestion and runny nose. Pt feels that current therapy is effective.     GERD: Current medications include: Prilosec (omeprazole) 20mg daily. Pt c/o no current symptoms. Pt states has history of hiatal hernia.   The patient does not have a history of GI bleed.  The patient does notice symptoms if they miss a dose.  Patient has not tried a trial off of therapy and is not interested in doing so. Has history of difficulty clearing throat - thinks this may have helped. Patient feels that current regimen is effective.    Rash: Pt is very reactive to different topical products/chemicals that dry out skin and cause rash.  She uses several oinments/creams including calcipotriene, triamcinolone, desonide, clobetasol, and hydrocortisone depending  on how severe it is. Denies any issues.    Depression/REM Behavior Disorder:  Current medications include: Sertraline 150mg once daily. Pt reports that depression symptoms are well controlled. She recently stopped clonazepam about 6 months ago and since then her nighttime movements (sometimes hits spouse in sleep), acting out of dreams, talking, and screams have been a little better.    PHQ-9 SCORE 10/27/2015 3/22/2016 11/7/2016   Total Score - - -   Total Score 4 8 8     Constipation: Pt is taking Benefiber PRN and finds it is working well.     Pain:  Struggling with sciatica - in lots of pain if seated for extended periods of time. She is scheduled to see provider immediately following today's visit. Pt has been taking tizanidine multiple times per day in addition to APAP PRN.  Feeling drowsy due to taking more than prescribed of tizanidine.  Not much relief.     CV prevention: Pt is taking aspirin 81mg every OTHER day. She states that she does not want to take daily since she has a family history of bleeding issues and she had problems with hemorrhage when she had her children. She denies current bleeding/bruising issues. She has possible past history of TIA per previous neurology note.    Supplements: Pt uses multiple essential oils, flaxseed, Lactaid PRN, a daily probiotic, calcium [id number]/Vit D 400IU/Mg40mg, and vitamin D3. Denies any issues.    Current labs include:  BP Readings from Last 3 Encounters:   03/09/17 (!) 160/98   02/07/17 138/80   12/12/16 128/80     Today's Vitals: Wt 132 lb (59.9 kg)  BMI 23.38 kg/m2 - vitals taken during provider visit  No results found for: A1C.  Lab Results   Component Value Date    CHOL 224 08/10/2011     Lab Results   Component Value Date    TRIG 172 08/10/2011     Lab Results   Component Value Date    HDL 54 08/10/2011     Lab Results   Component Value Date     08/10/2011       Liver Function Studies -   Recent Labs   Lab Test  10/27/15   1524   PROTTOTAL  7.0    ALBUMIN  3.9   BILITOTAL  0.2   ALKPHOS  73   AST  15   ALT  25       No results found for: UCRR, MICROL, UMALCR    Last Basic Metabolic Panel:  Lab Results   Component Value Date     10/27/2015      Lab Results   Component Value Date    POTASSIUM 3.9 10/27/2015     Lab Results   Component Value Date    CHLORIDE 107 10/27/2015     Lab Results   Component Value Date    BUN 12 10/27/2015     Lab Results   Component Value Date    CR 0.69 10/27/2015     GFR Estimate   Date Value Ref Range Status   04/04/2016 82 >60 mL/min/1.7m2 Final   10/27/2015 84 >60 mL/min/1.7m2 Final     Comment:     Non  GFR Calc   09/29/2015 89 >60 mL/min/1.7m2 Final     Comment:     Non  GFR Calc     TSH   Date Value Ref Range Status   09/29/2015 3.15 0.40 - 4.00 mU/L Final     Most Recent Immunizations   Administered Date(s) Administered     Influenza (H1N1) 01/19/2010     Influenza (High Dose) 3 valent vaccine 11/07/2016     Influenza (IIV3) 10/22/2012     Pneumococcal (PCV 13) 04/26/2016     Pneumococcal 23 valent 01/19/2010     TD (ADULT, 7+) 01/01/2002     TDAP (ADACEL AGES 11-64) 12/23/2011     Zoster vaccine, live 11/04/2014     ASSESSMENT:                                                    Current medications were reviewed today as discussed above.  Medicare Part D topics discussed:OTC products and Medication changes    Medication Adherence: no issues identified    Asthma:  Stable. Pt is meeting ACT goal >20.     Allergic rhinitis: Stable.     GERD: Stable. Pt may benefit from histamine blocking effect of ranitidine given skin issues if they do not improve.     Rash: Needs Improvement. See 'GERD' above.     Depression/REM Behavior Disorder:  Stable. Mood stable per patient. Given that REM behavior disorder continue, may benefit from trying melatonin to see if this reduces issue - may be safer than clonazepam as well.     Constipation: Stable.     Pain:  Needs Improvement. Plan in place to be seen  by provider. May benefit from therapy for further evaluation.    CV prevention: Stable.    Supplements: Needs Improvement. Pt may benefit from switching to formulation of calcium without magnesium to help reduce potential contribution to diarrhea.    PLAN:                                                      1. Pt to start melatonin 5 mg daily an hour before bedtime.  2. Pt to switch to calcium/vitamin D formulation twice daily.     I spent 45 minutes with this patient today (an extra 15 minutes was spent creating the Medication Action Plan). A copy of the visit note was provided to the patient's primary care provider.     Will follow up in 6 months or sooner if needed.    The patient was given a summary of these recommendations as an after visit summary.    Pepe Mo, StuartD  Medication Therapy Management Provider  Pager (voicemail): [phone]

## 2017-03-09 NOTE — MR AVS SNAPSHOT
After Visit Summary   3/9/2017    Ingrid Amaral    MRN: [id number]           Patient Information     Date Of Birth          1945        Visit Information        Provider Department      3/9/2017 2:20 PM Soha Sanchez APRN [facility]        Today's Diagnoses     Chronic right-sided low back pain with right-sided sciatica    -  1    CARDIOVASCULAR SCREENING; LDL GOAL LESS THAN 130          Care Instructions    Low back/sciatic pain    Heat area on/off through out the day     Take no more than 600 mg of ibuprofen three times daily - can take an extra tablet at bedtime     Can take Flexeril 1-2 tabs up to 3 times daily     Sleeping with a pillow between knees may help    Start stretches and exercises for your low back     Make an appointment with physical therapy     Will get cholesterol level today and notify you of results    Blood pressure slightly elevated in clinic today - make a nurse only appointment in 2 weeks if you don't see me for a blood pressure check     Follow up in 2 weeks if symptoms are not improved       Exercises To Strengthen Your Lower Back  Strong lower-back and abdominal muscles work together to support your spine. The exercises below will help strengthen the muscles of the lower back. It is important that you begin exercising slowly and increase levels gradually.  Always begin any exercise program with stretching. If you feel pain while doing any of these exercises, stop and talk to your doctor about a more specific exercise program that better suits your condition.   Low back stretch  The point of stretching is to make you more flexible and increase your range of motion. Stretch only as much as you are able. Stretch slowly. Do not push your stretch to the limit. If at any point you feel pain while stretching, this is your (temporary) limit.    Lie on your back with your knees bent and both feet on the ground.    Slowly raise your left knee to  your chest as you flatten your lower back against the floor. Hold for 5 seconds.    Relax and repeat the exercise with your right knee.    Do 10 of these exercises for each leg.    Repeat hugging both knees to your chest at the same time.  Building lower back strength  Start your exercise routine with 10 to 30 minutes a day, 1 to 3 times a day.  Initial exercises  Lying on your back:  1. Ankle pumps: Move your foot up and down, towards your head, and then away. Repeat 10 times with each foot.  2. Heel slides: Slowly bend your knee, drawing the heel of your foot towards you. Then slide your heel/foot from you, straightening your knee. Do not lift your foot off the floor (this is not a leg lift).  3. Abdominal contraction: Bend your knees and put your hands on your stomach. Tighten your stomach muscles. Hold for 5 seconds, then relax. Repeat 10 times.  4. Straight leg raise: Bend one leg at the knee and keep the other leg straight. Tighten your stomach muscles. Slowly lift your straight leg 6 to 12 inches off the floor and hold for up to 5 seconds. Repeat 10 times on each side.  Standin. Wall squats: Stand with your back against the wall. Move your feet about 12 inches away from the wall. Tighten your stomach muscles, and slowly bend your knees until they are at about a 45 degree angle. Do not go down too far. Hold about 5 seconds. Then slowly return to your starting position. Repeat 10 times.  2. Heel raises: Stand facing the wall. Slowly raise the heels of your feet up and down, while keeping your toes on the floor. If you have trouble balancing, you can touch the wall with your hands. Repeat 10 times.  More advanced exercises  When you feel comfortable enough, try these exercises.  1. Kneeling lumbar extension: Begin on your hands and knees. At the same time, raise and straighten your right arm and left leg until they are parallel to the ground. Hold for 2 seconds and come back slowly to a starting position.  Repeat with left arm and right leg, alternating 10 times.  2. Prone lumbar extension: Lie face down, arms extended overhead, palms on the floor. At the same time, raise your right arm and left leg as high as comfortably possible. Hold for 10 seconds and slowly return to start. Repeat with left arm and right leg, alternating 10 times. Gradually build up to 20 times. (Advanced: Repeat this exercise raising both arms and both legs a few inches off the floor at the same time. Hold for 5 seconds and release.)  3. Pelvic tilt: Lie on the floor on your back with your knees bent at 90 degrees. Your feet should be flat on the floor. Inhale, exhale, then slowly contract your abdominal muscles bringing your navel toward your spine. Let your pelvis rock back until your lower back is flat on the floor. Hold for 10 seconds while breathing smoothly.  4. Abdominal crunch: Perform a pelvic tilt (above) flattening your lower back against the floor. Holding the tension in your abdominal muscles, take another breath and raise your shoulder blades off the ground (this is not a full sit-up). Keep your head in line with your body (don t bend your neck forward). Hold for 2 seconds, then slowly lower.    6411-1048 [employer]. [address] [place] [address], [place] [address]. All rights reserved. This information is not intended as a substitute for professional medical care. Always follow your healthcare professional's instructions.        Back Exercises: Bridge  The bridge exercise strengthens your abdominal, buttock, and hamstring muscles. This helps keep your back stable and aligned when you walk.    Lie on the floor with your back and palms flat. Bend your knees. Keep your feet flat on the floor.    Contract your abdominal and buttock muscles. Slowly lift your buttocks off the floor until there is a straight line from your knees to your shoulders.    Hold for 5 to 15  seconds. Repeat 5 to10 times.      3805-2814 [employer]  [employer]. [address] [place] [address]. All rights reserved. This information is not intended as a substitute for professional medical care. Always follow your healthcare professional's instructions.        Back Exercises: Back Release  Do this exercise on your hands and knees. Keep your knees under your hips and your hands under your shoulders.    Relax your abdominal and buttocks muscles, lift your head, and let your back sag. Be sure to keep your weight evenly distributed. Don t sit back on your hips.     Hold for 5 seconds.    Return to starting position.    Tuck your head and lift (arch) your back.    Hold for 5 seconds    Return to starting position.    Repeat 5 times.    7646-2402 [employer]. [address] [place] [address]. All rights reserved. This information is not intended as a substitute for professional medical care. Always follow your healthcare professional's instructions.        Back Exercises: Lower Back Stretch                        To start, sit in a chair with your feet flat on the floor. Shift your weight slightly forward. Relax, and keep your ears, shoulders, and hips aligned.    Sit with your feet well apart.    Bend forward and touch the floor with the backs of your hands. Relax and let your body drop.    Hold for 20 seconds. Return to starting position.    Repeat 2 times.     4259-0056 [employer]. [address] [place] [address]. All rights reserved. This information is not intended as a substitute for professional medical care. Always follow your healthcare professional's instructions.              Follow-ups after your visit        Additional Services     PHYSICAL THERAPY REFERRAL       *This therapy referral will be filtered to a centralized scheduling office at [facility] and the patient will receive a call to schedule an appointment at a [facility] location most convenient for them. *     [facility] provides Physical Therapy evaluation and treatment and many specialty services across the [facility] system.  If requesting a specialty program, please choose from the list below.    If you have not heard from the scheduling office within 2 business days, please call [phone] for all locations, with the exception of [place], please call [phone].  Treatment: Evaluation & Treatment  Special Instructions/Modalities: Sciatic pain   Special Programs: None    Please be aware that coverage of these services is subject to the terms and limitations of your health insurance plan.  Call member services at your health plan with any benefit or coverage questions.      **Note to Provider:  If you are referring outside of [facility] for the therapy appointment, please list the name of the location in the  special instructions  above, print the referral and give to the patient to schedule the appointment.                  Future tests that were ordered for you today     Open Future Orders        Priority Expected Expires Ordered    Lipid Profile with reflex to direct LDL Routine  3/9/2018 3/9/2017            Who to contact     If you have questions or need follow up information about today's clinic visit or your schedule please contact [facility] directly at [phone].  Normal or non-critical lab and imaging results will be communicated to you by [employer], letter or phone within 4 business days after the clinic has received the results. If you do not hear from us within 7 days, please contact the clinic through [employer] or phone. If you have a critical or abnormal lab result, we will notify you by phone as soon as possible.  Submit refill requests through [employer] or call your pharmacy and they will forward the refill request to us. Please allow 3 business days for your refill to be completed.          Additional Information About Your Visit        [employer] Information     [employer] gives  "you secure access to your electronic health record. If you see a primary care provider, you can also send messages to your care team and make appointments. If you have questions, please call your primary care clinic.  If you do not have a primary care provider, please call [phone] and they will assist you.        Care EveryWhere ID     This is your Care EveryWhere ID. This could be used by other organizations to access your [facility] medical records  [id number]        Your Vitals Were     Pulse Temperature Height BMI (Body Mass Index)          66 97.7  F (36.5  C) (Tympanic) 5' 3\" (1.6 m) 23.38 kg/m2         Blood Pressure from Last 3 Encounters:   03/09/17 (!) 160/98   02/07/17 138/80   12/12/16 128/80    Weight from Last 3 Encounters:   03/09/17 132 lb (59.9 kg)   03/09/17 132 lb (59.9 kg)   02/07/17 129 lb (58.5 kg)              We Performed the Following     PHYSICAL THERAPY REFERRAL          Today's Medication Changes          These changes are accurate as of: 3/9/17  3:03 PM.  If you have any questions, ask your nurse or doctor.               Start taking these medicines.        Dose/Directions    cyclobenzaprine 10 MG tablet   Commonly known as:  FLEXERIL   Used for:  Chronic right-sided low back pain with right-sided sciatica   Started by:  Soha Sanchez APRN CNP        Dose:  5-10 mg   Take 0.5-1 tablets (5-10 mg) by mouth 3 times daily as needed for muscle spasms   Quantity:  30 tablet   Refills:  1         Stop taking these medicines if you haven't already. Please contact your care team if you have questions.     tiZANidine 2 MG tablet   Commonly known as:  ZANAFLEX   Stopped by:  Soha Sanchez APRN CNP                Where to get your medicines      Call your pharmacy to confirm that your medication is ready for pickup. It may take up to 24 hours for them to receive the prescription. If the prescription is not ready within 3 business days, please contact your clinic or your provider.  "    We will let you know when these medications are ready. If you don't hear back within 3 business days, please contact us.     cyclobenzaprine 10 MG tablet                Primary Care Provider Office Phone # Fax #    Leesa Muñiz PA-C [phone]       [facility] [address] [facility] [address]        Thank you!     Thank you for choosing [facility]  for your care. Our goal is always to provide you with excellent care. Hearing back from our patients is one way we can continue to improve our services. Please take a few minutes to complete the written survey that you may receive in the mail after your visit with us. Thank you!             Your Updated Medication List - Protect others around you: Learn how to safely use, store and throw away your medicines at www.disposemymeds.org.          This list is accurate as of: 3/9/17  3:03 PM.  Always use your most recent med list.                   Brand Name Dispense Instructions for use    * albuterol (5 MG/ML) 0.5% neb solution    PROVENTIL    30 vial    Take 0.5 mLs (2.5 mg) by nebulization every 4 hours as needed for wheezing or shortness of breath / dyspnea       * albuterol 108 (90 BASE) MCG/ACT Inhaler    PROAIR HFA/PROVENTIL HFA/VENTOLIN HFA    1 Inhaler    Inhale 2 puffs into the lungs every 4 hours as needed for shortness of breath / dyspnea       ASPIRIN PO      Take 81 mg by mouth Takes every other day       BENEFIBER PO      2 tsp daily       calcipotriene 0.005 % Oint      Externally apply topically 2 times daily       calcium citrate + Tabs      250mg/400IU - take 2 tablets per day       cetirizine 10 MG tablet    zyrTEC    90 tablet    Take 1 tablet (10 mg) by mouth every evening       clobetasol 0.05 % ointment    TEMOVATE    30 g    Apply to area of irritation twice daily for 2 weeks       cyclobenzaprine 10 MG tablet    FLEXERIL    30 tablet    Take 0.5-1 tablets (5-10 mg) by mouth 3 times daily  as needed for muscle spasms       desonide 0.05 % lotion    DESOWEN    59 mL    Apply  topically 2 times daily.       estradiol 0.1 MG/GM cream    ESTRACE    85 g    Use 1 gm every day and spread a small amount around the clitoral dodd       FLAXSEED      on food as needed       fluticasone 50 MCG/ACT spray    FLONASE     Spray 1 spray into both nostrils daily       fluticasone-salmeterol 250-50 MCG/DOSE diskus inhaler    ADVAIR    1 Inhaler    Inhale 1 puff into the lungs 2 times daily       guaiFENesin 600 MG 12 hr tablet    MUCINEX    40 tablet    Take 2 tablets (1,200 mg) by mouth 2 times daily as needed To see if helps phlegm.       hydrocortisone 1 % cream    CORTAID     Apply topically as needed       lactase 9000 UNITS Tabs tablet    LACTAID     Take 9,000 Units by mouth as needed       montelukast 10 MG tablet    SINGULAIR    30 tablet    Take 1 tablet (10 mg) by mouth daily       omeprazole 20 MG CR capsule    priLOSEC    180 capsule    Take 1 capsule (20 mg) by mouth 2 times daily For heartburn       order for DME     1 Device    Equipment being ordered: Nebulizer and tubing/filter       predniSONE 20 MG tablet    DELTASONE    10 tablet    Take 1 tablet (20 mg) by mouth 2 times daily For Yellow or Red zone on Asthma Action Plan.       PROBIOTIC DAILY PO      Take 1 chew tab by mouth 2 times daily       sertraline 100 MG tablet    ZOLOFT    90 tablet    Take 1.5 tablets (150 mg) by mouth daily       SKIN OILS EX      Lavender-headache, skin, peppermint-skin, upset stomach mix of oils, asthma mix of oils, eucalyptus. Tea tree oil-skin, vapor rub- chest tightness, sleepy time (vetiver, lavendaer, mrjoram, mandarin, ylang)- sleep, constipation, eczema (sweet almond oil). Also mixes with carriers: Coconut oil, [place] oil, Extra virgin oil. Frankincense- cough. Digize- oil. Purification- oil, lemon- oil. Essentialyme- pill.  Inhales a mix with olive oil almond and coconut oil with peppermint, and eucalyptus-  sinus, allergies. Updated 12/1/2015.  Updated 4/26/2016: Lemon, Cinnamon bark, panaway, Thieves, Orange, [place], Copaiba       sodium chloride 0.65 % nasal spray    OCEAN     Spray 1 spray in nostril daily as needed       triamcinolone acetonide 0.05 % Oint      Externally apply  topically.       VITAMIN D (CHOLECALCIFEROL) PO      Take 2,400 Units by mouth daily       * Notice:  This list has 2 medication(s) that are the same as other medications prescribed for you. Read the directions carefully, and ask your doctor or other care provider to review them with you.

## 2017-03-10 ENCOUNTER — HOSPITAL ENCOUNTER (OUTPATIENT)
Dept: PHYSICAL THERAPY | Facility: CLINIC | Age: 72
Setting detail: THERAPIES SERIES
End: 2017-03-10
Attending: NURSE PRACTITIONER
Payer: MEDICARE

## 2017-03-10 DIAGNOSIS — E78.2 ELEVATED CHOLESTEROL WITH ELEVATED TRIGLYCERIDES: Primary | ICD-10-CM

## 2017-03-10 PROCEDURE — 97161 PT EVAL LOW COMPLEX 20 MIN: CPT | Mod: GP | Performed by: PHYSICAL THERAPIST

## 2017-03-10 PROCEDURE — 40000718 ZZHC STATISTIC PT DEPARTMENT ORTHO VISIT: Performed by: PHYSICAL THERAPIST

## 2017-03-10 PROCEDURE — 97140 MANUAL THERAPY 1/> REGIONS: CPT | Mod: GP | Performed by: PHYSICAL THERAPIST

## 2017-03-10 PROCEDURE — G8979 MOBILITY GOAL STATUS: HCPCS | Mod: GP,CI | Performed by: PHYSICAL THERAPIST

## 2017-03-10 PROCEDURE — 97110 THERAPEUTIC EXERCISES: CPT | Mod: GP | Performed by: PHYSICAL THERAPIST

## 2017-03-10 PROCEDURE — G8978 MOBILITY CURRENT STATUS: HCPCS | Mod: GP,CJ | Performed by: PHYSICAL THERAPIST

## 2017-03-10 RX ORDER — SIMVASTATIN 40 MG
40 TABLET ORAL AT BEDTIME
Qty: 90 TABLET | Refills: 1 | Status: SHIPPED | OUTPATIENT
Start: 2017-03-10 | End: 2017-08-15

## 2017-03-10 ASSESSMENT — ASTHMA QUESTIONNAIRES: ACT_TOTALSCORE: 22

## 2017-03-10 NOTE — PROGRESS NOTES
[facility]          OUTPATIENT PHYSICAL THERAPY ORTHOPEDIC EVALUATION  PLAN OF TREATMENT FOR OUTPATIENT REHABILITATION  (COMPLETE FOR INITIAL CLAIMS ONLY)  Patient's Last Name, First Name, M.I.  YOB: 1945  Ingrid Amaral    Paulo s Name:  [facility]   Medical Record No.  [id number]   Start of Care Date:  03/10/17   Onset Date:      Type:     _X__PT   ___OT   ___SLP Medical Diagnosis:  Chronic right-sided low back pain with right-sided sciatica M54.41, G89.29  - Primary     PT Diagnosis:  decreased ROM and pain in LBP and R leg associated with mechanical dysfunction   Visits from SOC:  1      _________________________________________________________________________________  Plan of Treatment/Functional Goals:  balance training, joint mobilization, manual therapy, neuromuscular re-education, ROM, strengthening, stretching     Cryotherapy, Electrical stimulation, Hot packs, TENS, Traction     Goals  Goal Identifier: ROM  Goal Description: Pt will be able to demonstrate full lumbar ROM in order to be able to  object off of the ground without pain or radicular symptoms.  Target Date: 03/31/17    Goal Identifier: hip abd  Goal Description: Pt will demonstrate 5/5 bi hip abd strength in order to demonstrate improved strength to stabilize pelvis and improve gait mechanics   Target Date: 04/21/17    Goal Identifier: KAELA  Goal Description: Pt will report <10% disability on KAELA to demonstrate improved ability to complete daily activities and demonstrate significant clinical improvement.   Target Date: 04/21/17    Goal Identifier: sitting  Goal Description: Pt will be able to sit for 30+ min with less than 1/10 back pain in order to be able to sit and eat dinner with family  Target Date: 03/31/17                                                Therapy Frequency:  2 times/Week  Predicted Duration of Therapy Intervention:  6 weeks    Lainey Navas, PT                  I CERTIFY THE NEED FOR THESE SERVICES FURNISHED UNDER        THIS PLAN OF TREATMENT AND WHILE UNDER MY CARE     (Physician co-signature of this document indicates review and certification of the therapy plan).                       Certification Date From:  03/10/17   Certification Date To:  04/21/17    Referring Provider:  Soha Sanchez APRN CNP     Initial Assessment        See Epic Evaluation Start of Care Date: 03/10/17

## 2017-03-10 NOTE — TELEPHONE ENCOUNTER
"Per recent lab results Soha Foley CNP would like her to start Simvastatin 40 mg daily with a blood test recheck in 6 months.  Lab futured.  Medication pended and pharmacy selected ([facility]).   Dillon Posadas M.A.      Lab note (3/9/2017):     \"WOULD RECOMMEND starting a medication to help normalize these levels. I would recommend starting Simvastatin 40 mg daily. Also, please follow the recommendations below and will recheck the levels again in 6 months. \"    Soha Foley CNP note to patient.       "

## 2017-03-10 NOTE — PROGRESS NOTES
03/10/17 1400   General Information   Type of Visit Initial OP Ortho PT Evaluation   Start of Care Date 03/10/17   Referring Physician Soha Sanchez APRN CNP    Patient/Family Goals Statement reduce LBP    Orders Evaluate and Treat   Orders Comment Special Instructions/Modalities: Sciatic pain   Date of Order 03/09/17   Insurance Type Medicare   Medical Diagnosis Chronic right-sided low back pain with right-sided sciatica M54.41, G89.29  - Primary   Surgical/Medical history reviewed Yes   Precautions/Limitations no known precautions/limitations   Body Part(s)   Body Part(s) Lumbar Spine/SI   Presentation and Etiology   Pertinent history of current problem (include personal factors and/or comorbidities that impact the POC) Pt reports she has insidious onset on sciatic pain for about 1-2 weeks. Pt reports she is getting massages to help. Pt reports pain is always down the R leg. Overall she thinks she is doing better. Pt also c/o of chronic neck pain and will f/u with MD in 2 weeks. Pt reports she has IBS however denies incontinence. PMH: denies back surgery and cortisone injection, anemia, asthma, depression, osteoporosis, hx of sciatic nerve pain (was in PT last summer)   Impairments A. Pain;D. Decreased ROM;E. Decreased flexibility;F. Decreased strength and endurance;H. Impaired gait;G. Impaired balance;I. Impaired skin integrity;P. Bowel or bladder problems;Q. Dizziness   Functional Limitations perform activities of daily living   Symptom Location LBP   How/Where did it occur From insidious onset   Chronicity Chronic   Pain rating (0-10 point scale) Best (/10);Worst (/10)   Best (/10) 4   Worst (/10) 10   Pain quality A. Sharp;E. Shooting;F. Stabbing   Frequency of pain/symptoms A. Constant   Pain/symptoms exacerbated by A. Sitting;B. Walking;D. Carrying;C. Lifting;G. Certain positions;H. Overhead reach;I. Bending;J. ADL;K. Home tasks   Pain/symptoms eased by A. Sitting;B. Walking;F. Certain positions;H.  Cold;K. Other   Pain eased by comment ibprofin   Progression of symptoms since onset: Improved   Current Level of Function   Current Community Support Family/friend caregiver   Patient role/employment history F. Retired   [facility]   Fall Risk Screen   Fall screen completed by PT   Per patient - Fall 2 or more times in past year? Yes   Per patient - Fall with injury in past year? No   Timed Up and Go score (seconds) 10.53 secs    Is patient a fall risk? No   System Outcome Measures   Outcome Measures Low Back Pain (see Oswestry and Aj)   Lumbar Spine/SI Objective Findings   Gait/Locomotion R foot turned out to side, lacking TKE   Balance/Proprioception (Single Leg Stance) R:2 secs L: 4 secs   Flexion ROM 6 in from floor   Extension %    Right Side Bending ROM to knee   Left Side Bending ROM to knees   Pelvic Screen neutral pelvis   Hip Flexion (L2) Strength 5/5   Hip Abduction Strength R: 3/5 w pain  L:3/5    Knee Flexion Strength 5/5   Knee Extension (L3) Strength 5/5   Ankle Dorsiflexion (L4) Strength 5/5   Great Toe Extension (L5) Strength 5/5   Ankle Plantar Flexion (S1) Strength 5/5   SLR R:60  L: 60 w pain    Slump Test neg   Palpation TTP R piriformis, R TFL    Planned Therapy Interventions   Planned Therapy Interventions balance training;joint mobilization;manual therapy;neuromuscular re-education;ROM;strengthening;stretching   Planned Modality Interventions   Planned Modality Interventions Cryotherapy;Electrical stimulation;Hot packs;TENS;Traction   Clinical Impression   Criteria for Skilled Therapeutic Interventions Met yes, treatment indicated   PT Diagnosis decreased ROM and pain in LBP and R leg associated with mechanical dysfunction   Influenced by the following impairments decreased ROM, weakness and pain   Functional limitations due to impairments standing, walking, sitting, lifting   Clinical Presentation Stable/Uncomplicated   Clinical Presentation Rationale Pt is  gloria 71 year old woman in good health. She has very min pain down her leg today however hx of chronic scitic problems does complicate prognosis. Due to these facts pt is considered low complexity    Clinical Decision Making (Complexity) Low complexity   Therapy Frequency 2 times/Week   Predicted Duration of Therapy Intervention (days/wks) 6 weeks   Risk & Benefits of therapy have been explained Yes   Patient, Family & other staff in agreement with plan of care Yes   Education Assessment   Preferred Learning Style Listening;Reading;Demonstration;Pictures/video   Barriers to Learning No barriers   ORTHO GOALS   PT Ortho Eval Goals 1;2;3;4   Ortho Goal 1   Goal Identifier ROM   Goal Description Pt will be able to demonstrate full lumbar ROM in order to be able to  object off of the ground without pain or radicular symptoms.   Target Date 03/31/17   Ortho Goal 2   Goal Identifier hip abd   Goal Description Pt will demonstrate 5/5 bi hip abd strength in order to demonstrate improved strength to stabilize pelvis and improve gait mechanics    Target Date 04/21/17   Ortho Goal 3   Goal Identifier KAELA   Goal Description Pt will report <10% disability on KAELA to demonstrate improved ability to complete daily activities and demonstrate significant clinical improvement.    Target Date 04/21/17   Ortho Goal 4   Goal Identifier sitting   Goal Description Pt will be able to sit for 30+ min with less than 1/10 back pain in order to be able to sit and eat dinner with family   Target Date 03/31/17   Total Evaluation Time   Total Evaluation Time 45 (20 eval, 15 TE, 10 MT)    Therapy Certification   Certification date from 03/10/17   Certification date to 04/21/17   Medical Diagnosis Chronic right-sided low back pain with right-sided sciatica M54.41, G89.29  - Primary     Lainey Navas  Physical Therapist  [address] [address]  [email or web address]   [email or web address]   Office:  [phone] Fax: [phone]

## 2017-03-15 ENCOUNTER — HOSPITAL ENCOUNTER (OUTPATIENT)
Dept: PHYSICAL THERAPY | Facility: CLINIC | Age: 72
Setting detail: THERAPIES SERIES
End: 2017-03-15
Attending: NURSE PRACTITIONER
Payer: MEDICARE

## 2017-03-15 PROCEDURE — 97110 THERAPEUTIC EXERCISES: CPT | Mod: GP | Performed by: PHYSICAL THERAPIST

## 2017-03-15 PROCEDURE — 97140 MANUAL THERAPY 1/> REGIONS: CPT | Mod: GP | Performed by: PHYSICAL THERAPIST

## 2017-03-15 PROCEDURE — 40000718 ZZHC STATISTIC PT DEPARTMENT ORTHO VISIT: Performed by: PHYSICAL THERAPIST

## 2017-03-17 ENCOUNTER — HOSPITAL ENCOUNTER (OUTPATIENT)
Dept: PHYSICAL THERAPY | Facility: CLINIC | Age: 72
Setting detail: THERAPIES SERIES
End: 2017-03-17
Attending: NURSE PRACTITIONER
Payer: MEDICARE

## 2017-03-17 PROCEDURE — 97140 MANUAL THERAPY 1/> REGIONS: CPT | Mod: GP | Performed by: PHYSICAL THERAPIST

## 2017-03-17 PROCEDURE — 97110 THERAPEUTIC EXERCISES: CPT | Mod: GP | Performed by: PHYSICAL THERAPIST

## 2017-03-17 PROCEDURE — 40000718 ZZHC STATISTIC PT DEPARTMENT ORTHO VISIT: Performed by: PHYSICAL THERAPIST

## 2017-03-22 ENCOUNTER — HOSPITAL ENCOUNTER (OUTPATIENT)
Dept: PHYSICAL THERAPY | Facility: CLINIC | Age: 72
Setting detail: THERAPIES SERIES
End: 2017-03-22
Attending: NURSE PRACTITIONER
Payer: MEDICARE

## 2017-03-22 PROCEDURE — 97140 MANUAL THERAPY 1/> REGIONS: CPT | Mod: GP | Performed by: PHYSICAL THERAPIST

## 2017-03-22 PROCEDURE — 97110 THERAPEUTIC EXERCISES: CPT | Mod: GP | Performed by: PHYSICAL THERAPIST

## 2017-03-22 PROCEDURE — 40000718 ZZHC STATISTIC PT DEPARTMENT ORTHO VISIT: Performed by: PHYSICAL THERAPIST

## 2017-03-29 ENCOUNTER — HOSPITAL ENCOUNTER (OUTPATIENT)
Dept: PHYSICAL THERAPY | Facility: CLINIC | Age: 72
Setting detail: THERAPIES SERIES
End: 2017-03-29
Attending: NURSE PRACTITIONER
Payer: MEDICARE

## 2017-03-29 PROCEDURE — 97140 MANUAL THERAPY 1/> REGIONS: CPT | Mod: GP | Performed by: PHYSICAL THERAPIST

## 2017-03-29 PROCEDURE — 40000718 ZZHC STATISTIC PT DEPARTMENT ORTHO VISIT: Performed by: PHYSICAL THERAPIST

## 2017-03-29 PROCEDURE — 97110 THERAPEUTIC EXERCISES: CPT | Mod: GP | Performed by: PHYSICAL THERAPIST

## 2017-04-03 ENCOUNTER — HOSPITAL ENCOUNTER (OUTPATIENT)
Dept: PHYSICAL THERAPY | Facility: CLINIC | Age: 72
Setting detail: THERAPIES SERIES
End: 2017-04-03
Attending: NURSE PRACTITIONER
Payer: MEDICARE

## 2017-04-03 DIAGNOSIS — M54.41 CHRONIC RIGHT-SIDED LOW BACK PAIN WITH RIGHT-SIDED SCIATICA: ICD-10-CM

## 2017-04-03 DIAGNOSIS — G89.29 CHRONIC RIGHT-SIDED LOW BACK PAIN WITH RIGHT-SIDED SCIATICA: ICD-10-CM

## 2017-04-03 PROCEDURE — 97110 THERAPEUTIC EXERCISES: CPT | Mod: GP | Performed by: PHYSICAL THERAPIST

## 2017-04-03 PROCEDURE — 40000718 ZZHC STATISTIC PT DEPARTMENT ORTHO VISIT: Performed by: PHYSICAL THERAPIST

## 2017-04-03 PROCEDURE — 97140 MANUAL THERAPY 1/> REGIONS: CPT | Mod: GP | Performed by: PHYSICAL THERAPIST

## 2017-04-03 RX ORDER — CYCLOBENZAPRINE HCL 10 MG
5-10 TABLET ORAL 3 TIMES DAILY PRN
Qty: 30 TABLET | Refills: 1 | Status: SHIPPED | OUTPATIENT
Start: 2017-04-03 | End: 2017-05-17

## 2017-04-03 NOTE — TELEPHONE ENCOUNTER
Cyclobenzaprine      Last Written Prescription Date:  03/09/17  Last Fill Quantity: 30,   # refills: 1  Last Office Visit with [facility] or [facility] prescribing provider: 03/09/17 (Labine)  Future Office visit:       Routing refill request to provider for review/approval because:  Drug not on the [facility], [facility] or [facility] refill protocol

## 2017-04-03 NOTE — TELEPHONE ENCOUNTER
Patient seeing Physical Therapy.  Refilled today and 1 further refill on prescription.  Likely needs appt before further refills.

## 2017-04-05 ENCOUNTER — ALLIED HEALTH/NURSE VISIT (OUTPATIENT)
Dept: FAMILY MEDICINE | Facility: CLINIC | Age: 72
End: 2017-04-05
Payer: COMMERCIAL

## 2017-04-05 VITALS — RESPIRATION RATE: 14 BRPM | SYSTOLIC BLOOD PRESSURE: 144 MMHG | HEART RATE: 76 BPM | DIASTOLIC BLOOD PRESSURE: 88 MMHG

## 2017-04-05 DIAGNOSIS — E78.2 ELEVATED CHOLESTEROL WITH ELEVATED TRIGLYCERIDES: ICD-10-CM

## 2017-04-05 DIAGNOSIS — E78.00 PURE HYPERCHOLESTEROLEMIA: Primary | ICD-10-CM

## 2017-04-05 DIAGNOSIS — Z01.30 BLOOD PRESSURE CHECK: ICD-10-CM

## 2017-04-05 PROCEDURE — 99207 ZZC NO CHARGE NURSE ONLY: CPT

## 2017-04-05 NOTE — PROGRESS NOTES
S-(situation): patient is here for a blood pressure check    B-(background): had fasting lab done as well.  Says she checks BP at home and thinks it was high.  Did not know what it was.  She appears to be somewhat vague today, not sure of medications and what they are for.  C/O right hip pain.      A-(assessment): 144/88 today ( two readings the same)    R-(recommendations): see AVS.  Irma Ivey RN

## 2017-04-05 NOTE — MR AVS SNAPSHOT
After Visit Summary   4/5/2017    Ingrid Amaral    MRN: [id number]           Patient Information     Date Of Birth          1945        Visit Information        Provider Department      4/5/2017 8:30 AM FL NB RN [facility]        Care Instructions    Get fasting lab done in July/August   Get blood pressure checked in another 2-4 weeks        Follow-ups after your visit        Your next 10 appointments already scheduled     Apr 19, 2017 11:00 AM CDT   Treatment with Lainey Navas PT   [facility] ([facility])    [address] [place] [id number] [phone]              Who to contact     If you have questions or need follow up information about today's clinic visit or your schedule please contact [facility] directly at [phone].  Normal or non-critical lab and imaging results will be communicated to you by MyChart, letter or phone within 4 business days after the clinic has received the results. If you do not hear from us within 7 days, please contact the clinic through MyChart or phone. If you have a critical or abnormal lab result, we will notify you by phone as soon as possible.  Submit refill requests through [employer] or call your pharmacy and they will forward the refill request to us. Please allow 3 business days for your refill to be completed.          Additional Information About Your Visit        MyChart Information     [employer] gives you secure access to your electronic health record. If you see a primary care provider, you can also send messages to your care team and make appointments. If you have questions, please call your primary care clinic.  If you do not have a primary care provider, please call [phone] and they will assist you.        Care EveryWhere ID     This is your Care EveryWhere ID. This could be used by other organizations to access your [facility]  records  [id number]        Your Vitals Were     Pulse Respirations                76 14           Blood Pressure from Last 3 Encounters:   04/05/17 144/88   03/09/17 (!) 160/98   02/07/17 138/80    Weight from Last 3 Encounters:   03/09/17 132 lb (59.9 kg)   03/09/17 132 lb (59.9 kg)   02/07/17 129 lb (58.5 kg)              Today, you had the following     No orders found for display       Primary Care Provider Office Phone # Fax #    Leesa Muñiz PA-C [phone]       [facility] [address] [facility] [address]        Thank you!     Thank you for choosing [facility]  for your care. Our goal is always to provide you with excellent care. Hearing back from our patients is one way we can continue to improve our services. Please take a few minutes to complete the written survey that you may receive in the mail after your visit with us. Thank you!             Your Updated Medication List - Protect others around you: Learn how to safely use, store and throw away your medicines at www.disposemymeds.org.          This list is accurate as of: 4/5/17  8:38 AM.  Always use your most recent med list.                   Brand Name Dispense Instructions for use    * albuterol (5 MG/ML) 0.5% neb solution    PROVENTIL    30 vial    Take 0.5 mLs (2.5 mg) by nebulization every 4 hours as needed for wheezing or shortness of breath / dyspnea       * albuterol 108 (90 BASE) MCG/ACT Inhaler    PROAIR HFA/PROVENTIL HFA/VENTOLIN HFA    1 Inhaler    Inhale 2 puffs into the lungs every 4 hours as needed for shortness of breath / dyspnea       ASPIRIN PO      Take 81 mg by mouth Takes every other day       BENEFIBER PO      2 tsp daily       calcipotriene 0.005 % Oint      Externally apply topically 2 times daily       calcium citrate + Tabs      250mg/400IU - take 2 tablets per day       cetirizine 10 MG tablet    zyrTEC    90 tablet    Take 1 tablet (10 mg) by mouth every evening        clobetasol 0.05 % ointment    TEMOVATE    30 g    Apply to area of irritation twice daily for 2 weeks       cyclobenzaprine 10 MG tablet    FLEXERIL    30 tablet    Take 0.5-1 tablets (5-10 mg) by mouth 3 times daily as needed for muscle spasms       desonide 0.05 % lotion    DESOWEN    59 mL    Apply  topically 2 times daily.       estradiol 0.1 MG/GM cream    ESTRACE    85 g    Use 1 gm every day and spread a small amount around the clitoral dodd       FLAXSEED      on food as needed       fluticasone 50 MCG/ACT spray    FLONASE     Spray 1 spray into both nostrils daily       fluticasone-salmeterol 250-50 MCG/DOSE diskus inhaler    ADVAIR    1 Inhaler    Inhale 1 puff into the lungs 2 times daily       guaiFENesin 600 MG 12 hr tablet    MUCINEX    40 tablet    Take 2 tablets (1,200 mg) by mouth 2 times daily as needed To see if helps phlegm.       hydrocortisone 1 % cream    CORTAID     Apply topically as needed       lactase 9000 UNITS Tabs tablet    LACTAID     Take 9,000 Units by mouth as needed       montelukast 10 MG tablet    SINGULAIR    30 tablet    Take 1 tablet (10 mg) by mouth daily       omeprazole 20 MG CR capsule    priLOSEC    180 capsule    Take 1 capsule (20 mg) by mouth 2 times daily For heartburn       order for DME     1 Device    Equipment being ordered: Nebulizer and tubing/filter       PROBIOTIC DAILY PO      Take 1 chew tab by mouth 2 times daily       sertraline 100 MG tablet    ZOLOFT    90 tablet    Take 1.5 tablets (150 mg) by mouth daily       simvastatin 40 MG tablet    ZOCOR    90 tablet    Take 1 tablet (40 mg) by mouth At Bedtime       SKIN OILS EX      Lavender-headache, skin, peppermint-skin, upset stomach mix of oils, asthma mix of oils, eucalyptus. Tea tree oil-skin, vapor rub- chest tightness, sleepy time (vetiver, lavendaer, mrjoram, mandarin, ylang)- sleep, constipation, eczema (sweet almond oil). Also mixes with carriers: Coconut oil, [place] oil, Extra virgin oil.  Frankincense- cough. Digize- oil. Purification- oil, lemon- oil. Essentialyme- pill.  Inhales a mix with olive oil almond and coconut oil with peppermint, and eucalyptus- sinus, allergies. Updated 12/1/2015.  Updated 4/26/2016: Lemon, Cinnamon bark, panaway, Thieves, Orange, Wintergreen, Copaiba       sodium chloride 0.65 % nasal spray    OCEAN     Spray 1 spray in nostril daily as needed       triamcinolone acetonide 0.05 % Oint      Externally apply  topically.       VITAMIN D (CHOLECALCIFEROL) PO      Take 2,400 Units by mouth daily       * Notice:  This list has 2 medication(s) that are the same as other medications prescribed for you. Read the directions carefully, and ask your doctor or other care provider to review them with you.

## 2017-04-19 ENCOUNTER — HOSPITAL ENCOUNTER (OUTPATIENT)
Dept: PHYSICAL THERAPY | Facility: CLINIC | Age: 72
Setting detail: THERAPIES SERIES
End: 2017-04-19
Attending: NURSE PRACTITIONER
Payer: MEDICARE

## 2017-04-19 PROCEDURE — 97140 MANUAL THERAPY 1/> REGIONS: CPT | Mod: GP | Performed by: PHYSICAL THERAPIST

## 2017-04-19 PROCEDURE — 97110 THERAPEUTIC EXERCISES: CPT | Mod: GP | Performed by: PHYSICAL THERAPIST

## 2017-04-19 PROCEDURE — 40000718 ZZHC STATISTIC PT DEPARTMENT ORTHO VISIT: Performed by: PHYSICAL THERAPIST

## 2017-04-26 ENCOUNTER — HOSPITAL ENCOUNTER (OUTPATIENT)
Dept: PHYSICAL THERAPY | Facility: CLINIC | Age: 72
Setting detail: THERAPIES SERIES
End: 2017-04-26
Attending: NURSE PRACTITIONER
Payer: MEDICARE

## 2017-04-26 PROCEDURE — 97140 MANUAL THERAPY 1/> REGIONS: CPT | Mod: GP | Performed by: PHYSICAL THERAPIST

## 2017-04-26 PROCEDURE — G8978 MOBILITY CURRENT STATUS: HCPCS | Mod: GP,CK | Performed by: PHYSICAL THERAPIST

## 2017-04-26 PROCEDURE — G8979 MOBILITY GOAL STATUS: HCPCS | Mod: GP,CI | Performed by: PHYSICAL THERAPIST

## 2017-04-26 PROCEDURE — 40000718 ZZHC STATISTIC PT DEPARTMENT ORTHO VISIT: Performed by: PHYSICAL THERAPIST

## 2017-04-27 ENCOUNTER — OFFICE VISIT (OUTPATIENT)
Dept: FAMILY MEDICINE | Facility: CLINIC | Age: 72
End: 2017-04-27
Payer: COMMERCIAL

## 2017-04-27 VITALS
DIASTOLIC BLOOD PRESSURE: 96 MMHG | BODY MASS INDEX: 22.92 KG/M2 | HEART RATE: 80 BPM | OXYGEN SATURATION: 100 % | WEIGHT: 129.4 LBS | SYSTOLIC BLOOD PRESSURE: 154 MMHG | TEMPERATURE: 97.9 F

## 2017-04-27 DIAGNOSIS — T16.2XXA FB EAR, LEFT, INITIAL ENCOUNTER: Primary | ICD-10-CM

## 2017-04-27 PROCEDURE — 10120 INC&RMVL FB SUBQ TISS SMPL: CPT | Performed by: NURSE PRACTITIONER

## 2017-04-27 NOTE — NURSING NOTE
"Chief Complaint   Patient presents with     Ear Problem     BP (!) 154/96  Pulse 80  Temp 97.9  F (36.6  C) (Tympanic)  Wt 129 lb 6.4 oz (58.7 kg)  SpO2 100%  BMI 22.92 kg/m2 Estimated body mass index is 22.92 kg/(m^2) as calculated from the following:    Height as of 3/9/17: 5' 3\" (1.6 m).    Weight as of this encounter: 129 lb 6.4 oz (58.7 kg).  bp completed using cuff size: regular      Health Maintenance that is potentially due pending provider review:  Asthma Action Plan         "

## 2017-04-27 NOTE — PROGRESS NOTES
SUBJECTIVE:                                                    Ingrdi Amaral is a 71 year old female who presents to clinic today for the following health issues:      Ear Problem/Back of earring imbedded       Duration: yesterday     Description (location/character/radiation): Left ear, back of earring embedded into back of ear lobe     Intensity:  mild    Accompanying signs and symptoms: redness, swelling, bleeding     History (similar episodes/previous evaluation): None    Precipitating or alleviating factors: None    Therapies tried and outcome: Tried to get it out, witch hazel          Problem list and histories reviewed & adjusted, as indicated.  Additional history: as documented    Patient Active Problem List   Diagnosis     Diarrhea     Pure hypercholesterolemia     Allergic rhinitis     Panic disorder without agoraphobia     Advanced directives, counseling/discussion     Liver lesion     Generalized anxiety disorder     Mild major depression (H)     Vulvitis     Vaginal wall prolapse     Chronic constipation     Moderate persistent asthma     Dysphagia     RBD (REM behavioral disorder)     FH: colon cancer     Past Surgical History:   Procedure Laterality Date     ANKLE SURGERY      bilateral     COLONOSCOPY       COLONOSCOPY N/A 3/20/2015    Procedure: COLONOSCOPY;  Surgeon: Britney Martinez MD;  Location: [facility]     HC ESOPH/GAS REFLUX TEST W NASAL IMPED >1 HR  4/19/2012    Procedure:ESOPHAGEAL IMPEDENCE FUNCTION TEST WITH 24 HOUR PH GREATER THAN 1 HOUR; Surgeon:VALDO UMANZOR; Location:[facility]     HERNIA REPAIR      umbilcal     HYSTERECTOMY, PAP NO LONGER INDICATED  1991     TONSILLECTOMY       UPPER GI ENDOSCOPY         Social History   Substance Use Topics     Smoking status: Former Smoker     Packs/day: 0.50     Years: 3.00     Types: Cigarettes     Smokeless tobacco: Never Used      Comment: smoked for 3 years when she was around 19     Alcohol use Yes      Comment: RARELY      Family History   Problem Relation Age of Onset     HEART DISEASE Mother      valve problem     Hypertension Mother      DIABETES Mother      type 2     CEREBROVASCULAR DISEASE Mother      Allergies Mother      Eye Disorder Mother      Macular degeneration     OSTEOPOROSIS Mother      Respiratory Mother      Cardiovascular Father      MI at age 80     Cancer - colorectal Father      DIABETES Father      Hypertension Father      type 2     Eye Disorder Father      macular degeneration     Lipids Father      C.A.D. Maternal Grandmother      DIABETES Paternal Grandmother      type 2     Cardiovascular Paternal Grandmother      MI     DIABETES Sister      type 2     Allergies Sister      GASTROINTESTINAL DISEASE Sister      GASTROINTESTINAL DISEASE Daughter      GASTROINTESTINAL DISEASE Daughter          Current Outpatient Prescriptions   Medication Sig Dispense Refill     cyclobenzaprine (FLEXERIL) 10 MG tablet Take 0.5-1 tablets (5-10 mg) by mouth 3 times daily as needed for muscle spasms 30 tablet 1     simvastatin (ZOCOR) 40 MG tablet Take 1 tablet (40 mg) by mouth At Bedtime 90 tablet 1     fluticasone (FLONASE) 50 MCG/ACT spray Spray 1 spray into both nostrils daily       estradiol (ESTRACE) 0.1 MG/GM vaginal cream Use 1 gm every day and spread a small amount around the clitoral dodd 85 g 1     fluticasone-salmeterol (ADVAIR) 250-50 MCG/DOSE diskus inhaler Inhale 1 puff into the lungs 2 times daily 1 Inhaler 11     montelukast (SINGULAIR) 10 MG tablet Take 1 tablet (10 mg) by mouth daily 30 tablet 1     guaiFENesin (MUCINEX) 600 MG 12 hr tablet Take 2 tablets (1,200 mg) by mouth 2 times daily as needed To see if helps phlegm. 40 tablet 0     albuterol (PROAIR HFA, PROVENTIL HFA, VENTOLIN HFA) 108 (90 BASE) MCG/ACT inhaler Inhale 2 puffs into the lungs every 4 hours as needed for shortness of breath / dyspnea 1 Inhaler 2     sertraline (ZOLOFT) 100 MG tablet Take 1.5 tablets (150 mg) by mouth daily 90 tablet 3      cetirizine (ZYRTEC) 10 MG tablet Take 1 tablet (10 mg) by mouth every evening 90 tablet 3     omeprazole (PRILOSEC) 20 MG capsule Take 1 capsule (20 mg) by mouth 2 times daily For heartburn 180 capsule 3     Multiple Minerals-Vitamins (CALCIUM CITRATE +) TABS 250mg/400IU - take 2 tablets per day       SKIN OILS EX Lavender-headache, skin, peppermint-skin, upset stomach mix of oils, asthma mix of oils, eucalyptus. Tea tree oil-skin, vapor rub- chest tightness, sleepy time (vetiver, lavendaer, mrjoram, mandarin, ylang)- sleep, constipation, eczema (sweet almond oil). Also mixes with carriers: Coconut oil, Pickton oil, Extra virgin oil. Frankincense- cough. Digize- oil. Purification- oil, lemon- oil. Essentialyme- pill.  Inhales a mix with olive oil almond and coconut oil with peppermint, and eucalyptus- sinus, allergies. Updated 12/1/2015.   Updated 4/26/2016: Lemon, Cinnamon bark, panaway, Thieves, Orange, Wintergreen, Copaiba       ASPIRIN PO Take 81 mg by mouth Takes every other day       ORDER FOR DME Equipment being ordered: Nebulizer and tubing/filter 1 Device 0     albuterol (PROVENTIL) (5 MG/ML) 0.5% nebulizer solution Take 0.5 mLs (2.5 mg) by nebulization every 4 hours as needed for wheezing or shortness of breath / dyspnea 30 vial 1     lactase (LACTAID) 9000 UNITS TABS Take 9,000 Units by mouth as needed       sodium chloride (SODIUM CHLORIDE) 0.65 % nasal spray Spray 1 spray in nostril daily as needed       VITAMIN D, CHOLECALCIFEROL, PO Take 2,400 Units by mouth daily       hydrocortisone 1 % cream Apply topically as needed       calcipotriene 0.005 % OINT Externally apply topically 2 times daily       Probiotic Product (PROBIOTIC DAILY PO) Take 1 chew tab by mouth 2 times daily        clobetasol (TEMOVATE) 0.05 % ointment Apply to area of irritation twice daily for 2 weeks 30 g 0     desonide (DESOWEN) 0.05 % lotion Apply  topically 2 times daily. 59 mL 0     Triamcinolone Acetonide 0.05 % OINT Externally  apply  topically.       BENEFIBER OR 2 tsp daily       FLAXSEED on food as needed       Allergies   Allergen Reactions     Sulfa Drugs Other (See Comments)     Reaction unknown as a child       Reviewed and updated as needed this visit by clinical staff  Tobacco  Allergies  Meds  Med Hx  Surg Hx  Fam Hx  Soc Hx      Reviewed and updated as needed this visit by Provider         ROS:  Constitutional, HEENT, cardiovascular, pulmonary, gi and gu systems are negative, except as otherwise noted.    OBJECTIVE:                                                    BP (!) 154/96  Pulse 80  Temp 97.9  F (36.6  C) (Tympanic)  Wt 129 lb 6.4 oz (58.7 kg)  SpO2 100%  BMI 22.92 kg/m2  Body mass index is 22.92 kg/(m^2).   GENERAL: healthy, alert and no distress  EYES: Eyes grossly normal to inspection, PERRL and conjunctivae and sclerae normal  HENT: ear canals and TM's normal, nose and mouth without ulcers or lesions  HENT:  Left lobe earring back embedded in the ear lobe   NECK: no adenopathy, no asymmetry, masses, or scars and thyroid normal to palpation  RESP: lungs clear to auscultation - no rales, rhonchi or wheezes  CV: regular rate and rhythm, normal S1 S2, no S3 or S4, no murmur, click or rub, no peripheral edema and peripheral pulses strong  MS: no gross musculoskeletal defects noted, no edema  SKIN: no suspicious lesions or rashes    PROCEDURE: the area was prepped with betadine and using an 11 blade, an incision was placed. The earring back was removed.   2: 5-0 interrupted sutures were place and dressed/covered. Pt tolerated procedure well without complications. EBL was minimal.       ASSESSMENT:                                                        ICD-10-CM    1. FB ear, left, initial encounter T16.2XXA          PLAN:                                                      Patient Instructions   After care instructions:  Keep wound clean and dry for the next 24-48 hours  Sutures out in 10 days  Signs of  infection discussed today  Discussed the probability of scarring    Follow-up with your primary care provider next week and as needed.    Indications for emergent return to emergency department discussed with patient, who verbalized good understanding and agreement.  Patient understands the limitations of today's evaluation.         Foreign Object Under The Skin, Not Removed  Very small particles that remain under the skin usually cause no problem and need no further treatment. Occasionally they can cause an infection. Sometimes they work their way to the surface on their own without any problems. If you see this happening, you can remove any particles with a tweezers, but be careful not to dig up the skin and make things worse.  Home care  Wound care    Keep the wound clean and dry.    If there is a dressing or bandage, change it if it gets wet or dirty. Otherwise, leave it on for the first 24 hours, then change it once a day or as often as you were instructed.    If stiches or staples were used, clean the wound every day:    After taking off the dressing, wash the area gently with soap and water    Apply a thin layer of antibiotic ointment to the cut, not a lot. This will keep the wound clean and make it easier to remove the stitches. If it is oozing a lot, you can put a nonstick dressing over it. Then reapply the bandage or dressing as your were instructed.    You can get it wet, just like when you clean it. This means you can shower as usual after the first 24 hours, but do not soak the area in water (no bathing or swimming) until the stitches or staples are taken out.    If a surgical tape or strips were used, keep the area clean and dry. If it becomes wet, blot it dry with a towel.  Medication    You can take acetaminophen or ibuprofen for pain, unless you were given a different pain medicine to use. If you have chronic liver or kidney disease or ever had a stomach ulcer or gastrointestinal bleeding, or are  taking blood thinner medications, talk with your doctor before using these medications.    If you were given antibiotics, take them until they are all gone. It is important to finish the antibiotics even if the wound looks better to make sure the infection clears.  Follow-up care  Follow up with your doctor or clinic as advised.    Watch for any signs of infection, such as increasing pain, redness, swelling or pus drainage. If this happens, do not wait for your scheduled visit, rather see a doctor sooner.    Stiches or staples are usually taken out within 5 to 14 days. This varies depending on what part of your body they are on, and the type of wound. The doctor will tell you how long they should be left in.    If surgical tape or strips were used, it is usually left on for 7 to 10 days. You can remove them after that unless you were told otherwise. If you try to remove it, and it is too difficult, soaking can help. If the edges of the cut pull apart, then stop removing the tape and follow up with the doctor.    If any X-rays were done, a radiologist will review them. You will be notified if there is any change that affects your care.  When to seek medical care  Get prompt medical attention if any of the following occur:    Increasing pain in the wound    Redness, swelling or pus coming from the wound    Fever of 100.4 F (38 C) or higher, or as directed by your health care provider    3376-8176 [employer]. [address] [place] [address]. All rights reserved. This information is not intended as a substitute for professional medical care. Always follow your healthcare professional's instructions.         * LACERATION (All Closures)  A laceration is a cut through the skin. This will usually require stitches (sutures) or staples if it is deep. Minor cuts may be treated with a tape closure ( Steri-Strips ) or Dermabond skin glue.       HOME CARE:  PAIN MEDICINE: You may use acetaminophen  (Tylenol) 650-1000 mg every 6 hours or ibuprofen (Motrin, Advil) 600 mg every 6-8 hours with food to control pain, if you are able to take these medicines. [NOTE: If you have chronic liver or kidney disease or ever had a stomach ulcer or GI bleeding, talk with your doctor before using these medicines.]  EXTREMITY, FACE or TRUNK WOUNDS:    Keep the wound clean and dry. If a bandage was applied and it becomes wet or dirty, replace it. Otherwise, leave it in place for the first 24 hours.    If stitches or staples were used, clean the wound daily. Protect the wound from sunlight and tanning lamps.    After removing the bandage, wash the area with soap and water. Use a wet cotton swab (Q tip) to loosen and remove any blood or crust that forms.    After cleaning, apply a thin layer of Polysporin or Bacitracin ointment. This will keep the wound clean and make it easier to remove the stitches or staples. Reapply a fresh bandage.    You may remove the bandage to shower as usual after the first 24 hours, but do not soak the area in water (no swimming) until the stitches or staples are removed.    If Steri-Strips were used, keep the area clean and dry. If it becomes wet, blot it dry with a towel. It is okay to take a brief shower, but avoid scrubbing the area.    If Dermabond skin adhesive was used, do not scratch, rub or pick at the adhesive film. Do not place tape directly over the film. Do not apply liquid, ointment or creams to the wound while the film is in place. Do not clean the wound with peroxide and do not apply ointments. Avoid activities that cause heavy sweating until the film has fallen off. Protect the wound from prolonged exposure to sunlight or tanning lamps. You may shower as usual but do not soak the wound in water (no baths or swimming). The film will fall off by itself in 5-10 days.  SCALP WOUNDS: During the first two days, you may carefully rinse your hair in the shower to remove blood, glass or dirt  particles. After two days, you may shower and shampoo your hair normally. Do not soak your scalp in the tub or go swimming until the stitches or staples have been removed.  MOUTH WOUNDS: Eat soft foods to reduce pain. If the cut is inside of your mouth, clean by rinsing after each meal and at bedtime with a mixture of equal parts water and Hydrogen Peroxide (do not swallow!). Or, you can use a cotton swab to directly apply Hydrogen Peroxide onto the cut.  After the wound is done healing, use sunscreen over the area whenever exposed for the next 6 minths to avoid a darker scar.     FOLLOW UP: Most skin wounds heal within ten days. Mouth and facial wounds heal within five days. However, even with proper treatment, a wound infection may sometimes occur. Therefore, you should check the wound daily for signs of infection listed below.  Stitches should be removed from the face within five days; stitches and staples should be removed from other parts of the body within 7-10 days. Unless you are told to come back to the emergency room, you may have your doctor or urgent care remove the stitches. If dissolving stitches were used in the mouth, these will fall out or dissolve without the need for removal. If tape closures ( Steri-Strips ) were used, remove them yourself if they have not fallen off after 7 days. If Dermabond skin glue was used, the film will fall off by itself in 5-10 days.      GET PROMPT MEDICAL ATTENTION  if any of the following occur:    Increasing pain in the wound    Redness, swelling or pus coming from the wound    Fever over 101 F (38.3 C) oral    If stitches or staples come apart or fall out or if Steri-Strips fall off before seven days    If the wound edges re-open    Bleeding not controlled by direct pressure    [id number] [facility], [address] [place] [address], [place] [address]. All rights reserved. This information is not intended as a substitute for professional medical care. Always follow your  healthcare professional's instructions.      MARVIN Gonzalez CNP  [facility]

## 2017-04-27 NOTE — MR AVS SNAPSHOT
After Visit Summary   4/27/2017    Ingrid Amaral    MRN: [id number]           Patient Information     Date Of Birth          1945        Visit Information        Provider Department      4/27/2017 1:20 PM Brooklynn Santana APRN [facility]        Today's Diagnoses     FB ear, left, initial encounter    -  1      Care Instructions    After care instructions:  Keep wound clean and dry for the next 24-48 hours  Sutures out in 10 days  Signs of infection discussed today  Discussed the probability of scarring    Follow-up with your primary care provider next week and as needed.    Indications for emergent return to emergency department discussed with patient, who verbalized good understanding and agreement.  Patient understands the limitations of today's evaluation.         Foreign Object Under The Skin, Not Removed  Very small particles that remain under the skin usually cause no problem and need no further treatment. Occasionally they can cause an infection. Sometimes they work their way to the surface on their own without any problems. If you see this happening, you can remove any particles with a tweezers, but be careful not to dig up the skin and make things worse.  Home care  Wound care    Keep the wound clean and dry.    If there is a dressing or bandage, change it if it gets wet or dirty. Otherwise, leave it on for the first 24 hours, then change it once a day or as often as you were instructed.    If stiches or staples were used, clean the wound every day:    After taking off the dressing, wash the area gently with soap and water    Apply a thin layer of antibiotic ointment to the cut, not a lot. This will keep the wound clean and make it easier to remove the stitches. If it is oozing a lot, you can put a nonstick dressing over it. Then reapply the bandage or dressing as your were instructed.    You can get it wet, just like when you clean it. This means you can shower  as usual after the first 24 hours, but do not soak the area in water (no bathing or swimming) until the stitches or staples are taken out.    If a surgical tape or strips were used, keep the area clean and dry. If it becomes wet, blot it dry with a towel.  Medication    You can take acetaminophen or ibuprofen for pain, unless you were given a different pain medicine to use. If you have chronic liver or kidney disease or ever had a stomach ulcer or gastrointestinal bleeding, or are taking blood thinner medications, talk with your doctor before using these medications.    If you were given antibiotics, take them until they are all gone. It is important to finish the antibiotics even if the wound looks better to make sure the infection clears.  Follow-up care  Follow up with your doctor or clinic as advised.    Watch for any signs of infection, such as increasing pain, redness, swelling or pus drainage. If this happens, do not wait for your scheduled visit, rather see a doctor sooner.    Stiches or staples are usually taken out within 5 to 14 days. This varies depending on what part of your body they are on, and the type of wound. The doctor will tell you how long they should be left in.    If surgical tape or strips were used, it is usually left on for 7 to 10 days. You can remove them after that unless you were told otherwise. If you try to remove it, and it is too difficult, soaking can help. If the edges of the cut pull apart, then stop removing the tape and follow up with the doctor.    If any X-rays were done, a radiologist will review them. You will be notified if there is any change that affects your care.  When to seek medical care  Get prompt medical attention if any of the following occur:    Increasing pain in the wound    Redness, swelling or pus coming from the wound    Fever of 100.4 F (38 C) or higher, or as directed by your health care provider    0113-4921 [employer]. [address] [place] [address]  Road, [place] [address]. All rights reserved. This information is not intended as a substitute for professional medical care. Always follow your healthcare professional's instructions.         * LACERATION (All Closures)  A laceration is a cut through the skin. This will usually require stitches (sutures) or staples if it is deep. Minor cuts may be treated with a tape closure ( Steri-Strips ) or Dermabond skin glue.       HOME CARE:  PAIN MEDICINE: You may use acetaminophen (Tylenol) 650-1000 mg every 6 hours or ibuprofen (Motrin, Advil) 600 mg every 6-8 hours with food to control pain, if you are able to take these medicines. [NOTE: If you have chronic liver or kidney disease or ever had a stomach ulcer or GI bleeding, talk with your doctor before using these medicines.]  EXTREMITY, FACE or TRUNK WOUNDS:    Keep the wound clean and dry. If a bandage was applied and it becomes wet or dirty, replace it. Otherwise, leave it in place for the first 24 hours.    If stitches or staples were used, clean the wound daily. Protect the wound from sunlight and tanning lamps.    After removing the bandage, wash the area with soap and water. Use a wet cotton swab (Q tip) to loosen and remove any blood or crust that forms.    After cleaning, apply a thin layer of Polysporin or Bacitracin ointment. This will keep the wound clean and make it easier to remove the stitches or staples. Reapply a fresh bandage.    You may remove the bandage to shower as usual after the first 24 hours, but do not soak the area in water (no swimming) until the stitches or staples are removed.    If Steri-Strips were used, keep the area clean and dry. If it becomes wet, blot it dry with a towel. It is okay to take a brief shower, but avoid scrubbing the area.    If Dermabond skin adhesive was used, do not scratch, rub or pick at the adhesive film. Do not place tape directly over the film. Do not apply liquid, ointment or creams to the wound while the film  is in place. Do not clean the wound with peroxide and do not apply ointments. Avoid activities that cause heavy sweating until the film has fallen off. Protect the wound from prolonged exposure to sunlight or tanning lamps. You may shower as usual but do not soak the wound in water (no baths or swimming). The film will fall off by itself in 5-10 days.  SCALP WOUNDS: During the first two days, you may carefully rinse your hair in the shower to remove blood, glass or dirt particles. After two days, you may shower and shampoo your hair normally. Do not soak your scalp in the tub or go swimming until the stitches or staples have been removed.  MOUTH WOUNDS: Eat soft foods to reduce pain. If the cut is inside of your mouth, clean by rinsing after each meal and at bedtime with a mixture of equal parts water and Hydrogen Peroxide (do not swallow!). Or, you can use a cotton swab to directly apply Hydrogen Peroxide onto the cut.  After the wound is done healing, use sunscreen over the area whenever exposed for the next 6 minths to avoid a darker scar.     FOLLOW UP: Most skin wounds heal within ten days. Mouth and facial wounds heal within five days. However, even with proper treatment, a wound infection may sometimes occur. Therefore, you should check the wound daily for signs of infection listed below.  Stitches should be removed from the face within five days; stitches and staples should be removed from other parts of the body within 7-10 days. Unless you are told to come back to the emergency room, you may have your doctor or urgent care remove the stitches. If dissolving stitches were used in the mouth, these will fall out or dissolve without the need for removal. If tape closures ( Steri-Strips ) were used, remove them yourself if they have not fallen off after 7 days. If Dermabond skin glue was used, the film will fall off by itself in 5-10 days.      GET PROMPT MEDICAL ATTENTION  if any of the following  occur:    Increasing pain in the wound    Redness, swelling or pus coming from the wound    Fever over 101 F (38.3 C) oral    If stitches or staples come apart or fall out or if Steri-Strips fall off before seven days    If the wound edges re-open    Bleeding not controlled by direct pressure    7344-6705 [facility], [address] [place] [address]. All rights reserved. This information is not intended as a substitute for professional medical care. Always follow your healthcare professional's instructions.          Follow-ups after your visit        Follow-up notes from your care team     Return in about 1 week (around 5/4/2017) for RN Blood Pressure Check.      Your next 10 appointments already scheduled     May 02, 2017 12:40 PM CDT   SHORT with MARVIN Diaz CNP   [facility] ([facility])    [address] [place] [id number] [phone]            May 03, 2017 12:30 PM CDT   Treatment with Lainey Navas, PT   [facility] ([facility])    [address] [place] [id number] [phone]              Who to contact     If you have questions or need follow up information about today's clinic visit or your schedule please contact [facility] directly at [phone].  Normal or non-critical lab and imaging results will be communicated to you by MyChart, letter or phone within 4 business days after the clinic has received the results. If you do not hear from us within 7 days, please contact the clinic through MyChart or phone. If you have a critical or abnormal lab result, we will notify you by phone as soon as possible.  Submit refill requests through [employer] or call your pharmacy and they will forward the refill request to us. Please allow 3 business days for your refill to be completed.          Additional Information About Your Visit        MyChart Information      [employer] gives you secure access to your electronic health record. If you see a primary care provider, you can also send messages to your care team and make appointments. If you have questions, please call your primary care clinic.  If you do not have a primary care provider, please call [phone] and they will assist you.        Care EveryWhere ID     This is your Care EveryWhere ID. This could be used by other organizations to access your [facility] medical records  [id number]        Your Vitals Were     Pulse Temperature Pulse Oximetry BMI (Body Mass Index)          80 97.9  F (36.6  C) (Tympanic) 100% 22.92 kg/m2         Blood Pressure from Last 3 Encounters:   04/27/17 (!) 154/96   04/05/17 144/88   03/09/17 (!) 160/98    Weight from Last 3 Encounters:   04/27/17 129 lb 6.4 oz (58.7 kg)   03/09/17 132 lb (59.9 kg)   03/09/17 132 lb (59.9 kg)              Today, you had the following     No orders found for display       Primary Care Provider Office Phone # Fax #    Leesa Muñiz PA-C [phone]       [facility] [address] [place] [id number]        Thank you!     Thank you for choosing [facility]  for your care. Our goal is always to provide you with excellent care. Hearing back from our patients is one way we can continue to improve our services. Please take a few minutes to complete the written survey that you may receive in the mail after your visit with us. Thank you!             Your Updated Medication List - Protect others around you: Learn how to safely use, store and throw away your medicines at www.disposemymeds.org.          This list is accurate as of: 4/27/17  2:02 PM.  Always use your most recent med list.                   Brand Name Dispense Instructions for use    * albuterol (5 MG/ML) 0.5% neb solution    PROVENTIL    30 vial    Take 0.5 mLs (2.5 mg) by nebulization every 4 hours as needed for wheezing or shortness of breath /  dyspnea       * albuterol 108 (90 BASE) MCG/ACT Inhaler    PROAIR HFA/PROVENTIL HFA/VENTOLIN HFA    1 Inhaler    Inhale 2 puffs into the lungs every 4 hours as needed for shortness of breath / dyspnea       ASPIRIN PO      Take 81 mg by mouth Takes every other day       BENEFIBER PO      2 tsp daily       calcipotriene 0.005 % Oint      Externally apply topically 2 times daily       calcium citrate + Tabs      250mg/400IU - take 2 tablets per day       cetirizine 10 MG tablet    zyrTEC    90 tablet    Take 1 tablet (10 mg) by mouth every evening       clobetasol 0.05 % ointment    TEMOVATE    30 g    Apply to area of irritation twice daily for 2 weeks       cyclobenzaprine 10 MG tablet    FLEXERIL    30 tablet    Take 0.5-1 tablets (5-10 mg) by mouth 3 times daily as needed for muscle spasms       desonide 0.05 % lotion    DESOWEN    59 mL    Apply  topically 2 times daily.       estradiol 0.1 MG/GM cream    ESTRACE    85 g    Use 1 gm every day and spread a small amount around the clitoral dodd       FLAXSEED      on food as needed       fluticasone 50 MCG/ACT spray    FLONASE     Spray 1 spray into both nostrils daily       fluticasone-salmeterol 250-50 MCG/DOSE diskus inhaler    ADVAIR    1 Inhaler    Inhale 1 puff into the lungs 2 times daily       guaiFENesin 600 MG 12 hr tablet    MUCINEX    40 tablet    Take 2 tablets (1,200 mg) by mouth 2 times daily as needed To see if helps phlegm.       hydrocortisone 1 % cream    CORTAID     Apply topically as needed       lactase 9000 UNITS Tabs tablet    LACTAID     Take 9,000 Units by mouth as needed       montelukast 10 MG tablet    SINGULAIR    30 tablet    Take 1 tablet (10 mg) by mouth daily       omeprazole 20 MG CR capsule    priLOSEC    180 capsule    Take 1 capsule (20 mg) by mouth 2 times daily For heartburn       order for DME     1 Device    Equipment being ordered: Nebulizer and tubing/filter       PROBIOTIC DAILY PO      Take 1 chew tab by mouth 2 times  daily       sertraline 100 MG tablet    ZOLOFT    90 tablet    Take 1.5 tablets (150 mg) by mouth daily       simvastatin 40 MG tablet    ZOCOR    90 tablet    Take 1 tablet (40 mg) by mouth At Bedtime       SKIN OILS EX      Lavender-headache, skin, peppermint-skin, upset stomach mix of oils, asthma mix of oils, eucalyptus. Tea tree oil-skin, vapor rub- chest tightness, sleepy time (vetiver, lavendaer, mrjoram, mandarin, ylang)- sleep, constipation, eczema (sweet almond oil). Also mixes with carriers: Coconut oil, [place] oil, Extra virgin oil. Frankincense- cough. Digize- oil. Purification- oil, lemon- oil. Essentialyme- pill.  Inhales a mix with olive oil almond and coconut oil with peppermint, and eucalyptus- sinus, allergies. Updated 12/1/2015.  Updated 4/26/2016: Lemon, Cinnamon bark, panaway, Thieves, Orange, Wintergreen, Copaiba       sodium chloride 0.65 % nasal spray    OCEAN     Spray 1 spray in nostril daily as needed       triamcinolone acetonide 0.05 % Oint      Externally apply  topically.       VITAMIN D (CHOLECALCIFEROL) PO      Take 2,400 Units by mouth daily       * Notice:  This list has 2 medication(s) that are the same as other medications prescribed for you. Read the directions carefully, and ask your doctor or other care provider to review them with you.

## 2017-04-27 NOTE — PROGRESS NOTES
RECERTIFICATION    Ingrid Quarlesters  1945    Session Number: 8/10 [facility] since start of care.    Reasons for Continuing Treatment:   Pain down LE, LBP, difficulties with mobility      Frequency/Duration  1 times per week for 6 weeks for a total of 6 visits.    Recertification Period  4/22/17 - 6/8/17    Physician Signature:    Date:    X_______________________________________________________    Physician Name: Soha Sanchez, MARVIN GRUBBS     I certify the need for these services furnished under this plan of treatment and while under my care. Physician co-signature of this document indicates review and certification of the therapy plan.  This signature may be written on paper, or electronically signed within EPIC.

## 2017-04-27 NOTE — PATIENT INSTRUCTIONS
After care instructions:  Keep wound clean and dry for the next 24-48 hours  Sutures out in 10 days  Signs of infection discussed today  Discussed the probability of scarring    Follow-up with your primary care provider next week and as needed.    Indications for emergent return to emergency department discussed with patient, who verbalized good understanding and agreement.  Patient understands the limitations of today's evaluation.         Foreign Object Under The Skin, Not Removed  Very small particles that remain under the skin usually cause no problem and need no further treatment. Occasionally they can cause an infection. Sometimes they work their way to the surface on their own without any problems. If you see this happening, you can remove any particles with a tweezers, but be careful not to dig up the skin and make things worse.  Home care  Wound care    Keep the wound clean and dry.    If there is a dressing or bandage, change it if it gets wet or dirty. Otherwise, leave it on for the first 24 hours, then change it once a day or as often as you were instructed.    If stiches or staples were used, clean the wound every day:    After taking off the dressing, wash the area gently with soap and water    Apply a thin layer of antibiotic ointment to the cut, not a lot. This will keep the wound clean and make it easier to remove the stitches. If it is oozing a lot, you can put a nonstick dressing over it. Then reapply the bandage or dressing as your were instructed.    You can get it wet, just like when you clean it. This means you can shower as usual after the first 24 hours, but do not soak the area in water (no bathing or swimming) until the stitches or staples are taken out.    If a surgical tape or strips were used, keep the area clean and dry. If it becomes wet, blot it dry with a towel.  Medication    You can take acetaminophen or ibuprofen for pain, unless you were given a different pain medicine to use.  If you have chronic liver or kidney disease or ever had a stomach ulcer or gastrointestinal bleeding, or are taking blood thinner medications, talk with your doctor before using these medications.    If you were given antibiotics, take them until they are all gone. It is important to finish the antibiotics even if the wound looks better to make sure the infection clears.  Follow-up care  Follow up with your doctor or clinic as advised.    Watch for any signs of infection, such as increasing pain, redness, swelling or pus drainage. If this happens, do not wait for your scheduled visit, rather see a doctor sooner.    Stiches or staples are usually taken out within 5 to 14 days. This varies depending on what part of your body they are on, and the type of wound. The doctor will tell you how long they should be left in.    If surgical tape or strips were used, it is usually left on for 7 to 10 days. You can remove them after that unless you were told otherwise. If you try to remove it, and it is too difficult, soaking can help. If the edges of the cut pull apart, then stop removing the tape and follow up with the doctor.    If any X-rays were done, a radiologist will review them. You will be notified if there is any change that affects your care.  When to seek medical care  Get prompt medical attention if any of the following occur:    Increasing pain in the wound    Redness, swelling or pus coming from the wound    Fever of 100.4 F (38 C) or higher, or as directed by your health care provider    5393-2548 [employer]. [address] [place] [address], [place] [address]. All rights reserved. This information is not intended as a substitute for professional medical care. Always follow your healthcare professional's instructions.         * LACERATION (All Closures)  A laceration is a cut through the skin. This will usually require stitches (sutures) or staples if it is deep. Minor cuts may be treated with a tape  closure ( Steri-Strips ) or Dermabond skin glue.       HOME CARE:  PAIN MEDICINE: You may use acetaminophen (Tylenol) 650-1000 mg every 6 hours or ibuprofen (Motrin, Advil) 600 mg every 6-8 hours with food to control pain, if you are able to take these medicines. [NOTE: If you have chronic liver or kidney disease or ever had a stomach ulcer or GI bleeding, talk with your doctor before using these medicines.]  EXTREMITY, FACE or TRUNK WOUNDS:    Keep the wound clean and dry. If a bandage was applied and it becomes wet or dirty, replace it. Otherwise, leave it in place for the first 24 hours.    If stitches or staples were used, clean the wound daily. Protect the wound from sunlight and tanning lamps.    After removing the bandage, wash the area with soap and water. Use a wet cotton swab (Q tip) to loosen and remove any blood or crust that forms.    After cleaning, apply a thin layer of Polysporin or Bacitracin ointment. This will keep the wound clean and make it easier to remove the stitches or staples. Reapply a fresh bandage.    You may remove the bandage to shower as usual after the first 24 hours, but do not soak the area in water (no swimming) until the stitches or staples are removed.    If Steri-Strips were used, keep the area clean and dry. If it becomes wet, blot it dry with a towel. It is okay to take a brief shower, but avoid scrubbing the area.    If Dermabond skin adhesive was used, do not scratch, rub or pick at the adhesive film. Do not place tape directly over the film. Do not apply liquid, ointment or creams to the wound while the film is in place. Do not clean the wound with peroxide and do not apply ointments. Avoid activities that cause heavy sweating until the film has fallen off. Protect the wound from prolonged exposure to sunlight or tanning lamps. You may shower as usual but do not soak the wound in water (no baths or swimming). The film will fall off by itself in 5-10 days.  SCALP WOUNDS:  During the first two days, you may carefully rinse your hair in the shower to remove blood, glass or dirt particles. After two days, you may shower and shampoo your hair normally. Do not soak your scalp in the tub or go swimming until the stitches or staples have been removed.  MOUTH WOUNDS: Eat soft foods to reduce pain. If the cut is inside of your mouth, clean by rinsing after each meal and at bedtime with a mixture of equal parts water and Hydrogen Peroxide (do not swallow!). Or, you can use a cotton swab to directly apply Hydrogen Peroxide onto the cut.  After the wound is done healing, use sunscreen over the area whenever exposed for the next 6 minths to avoid a darker scar.     FOLLOW UP: Most skin wounds heal within ten days. Mouth and facial wounds heal within five days. However, even with proper treatment, a wound infection may sometimes occur. Therefore, you should check the wound daily for signs of infection listed below.  Stitches should be removed from the face within five days; stitches and staples should be removed from other parts of the body within 7-10 days. Unless you are told to come back to the emergency room, you may have your doctor or urgent care remove the stitches. If dissolving stitches were used in the mouth, these will fall out or dissolve without the need for removal. If tape closures ( Steri-Strips ) were used, remove them yourself if they have not fallen off after 7 days. If Dermabond skin glue was used, the film will fall off by itself in 5-10 days.      GET PROMPT MEDICAL ATTENTION  if any of the following occur:    Increasing pain in the wound    Redness, swelling or pus coming from the wound    Fever over 101 F (38.3 C) oral    If stitches or staples come apart or fall out or if Steri-Strips fall off before seven days    If the wound edges re-open    Bleeding not controlled by direct pressure    [id number] [employer], [address] [place] [address], [place] [address]. All rights  reserved. This information is not intended as a substitute for professional medical care. Always follow your healthcare professional's instructions.

## 2017-04-27 NOTE — PROGRESS NOTES
Outpatient Physical Therapy Progress Note     Patient: Ingrid Amaral  : 1945    Beginning/End Dates of Reporting Period:  3/10/17 to 2017    Referring Provider: Soha Sanchez APRN CNP     Therapy Diagnosis: decreased ROM and pain in LBP and R leg associated with mechanical dysfunction     Client Self Report: Pt reports overall she is doing better however she continues to have pain down her leg. Pt reports getting in and out of the car is challenging. She also reports she was a little better about completeing HEP this week    Objective Measurements:  Objective Measure: ROM  Details: flex:to floor , ext: 100% w min increased pain     Objective Measure: balance  Details: R:  must use UE L: 3+ secs    Objective Measure: Hip strength   Details: R:3/5 L: 4/5      Objective Measure: MMT  Details: hip flex: R 4/5, L 4/5, knee flex/ext 5/5 bi., ankle PF 5/5,, ankle DF R 5/5, L 4/5           Outcome Measures (most recent score):  Aj STarT Sub-Score (Q5-9): 2  Aj STarT Total Score (all 9): 6  Oswestry Score (%): 52 %    Goals:  Goal Identifier ROM   Goal Description Pt will be able to demonstrate full lumbar ROM in order to be able to  object off of the ground without pain or radicular symptoms.   Target Date 17   Date Met   17   Progress:100% w min increased pain      Goal Identifier hip abd   Goal Description Pt will demonstrate 5/5 bi hip abd strength in order to demonstrate improved strength to stabilize pelvis and improve gait mechanics    Target Date 17   Date Met      Progress:not met     Goal Identifier KAELA   Goal Description Pt will report <10% disability on KAELA to demonstrate improved ability to complete daily activities and demonstrate significant clinical improvement.    Target Date 17   Date Met      Progress:not met     Goal Identifier sitting   Goal Description Pt will be able to sit for 30+ min with less than 1/10 back pain in order to be able to sit and  eat dinner with family   Target Date 03/31/17   Date Met      Progress:not met        Progress Toward Goals:   Progress this reporting period: Pt has been seen for 8 visits thus far in physical therapy. Pt initially was doing well thus reduced to 1x/week and to every other week.  After no therapy for 1 week symptoms increased and no pt is doing worse. Traction does help relieve symptoms however pain comes back a few hours after therapy. Pt is being referred back to MD as well as planning ot continue therapy on a weekly basis to help improve ROM, mobility and allow her to return to [facility].       Plan:  Continue therapy per current plan of care.    Discharge:  No    Lainey Navas  Physical Therapist  [address] [address]  [email or web address]   [email or web address]   Office: [phone] Fax: [phone]

## 2017-05-02 ENCOUNTER — RADIANT APPOINTMENT (OUTPATIENT)
Dept: GENERAL RADIOLOGY | Facility: CLINIC | Age: 72
End: 2017-05-02
Attending: NURSE PRACTITIONER
Payer: COMMERCIAL

## 2017-05-02 ENCOUNTER — OFFICE VISIT (OUTPATIENT)
Dept: FAMILY MEDICINE | Facility: CLINIC | Age: 72
End: 2017-05-02
Payer: COMMERCIAL

## 2017-05-02 VITALS
HEART RATE: 88 BPM | DIASTOLIC BLOOD PRESSURE: 62 MMHG | BODY MASS INDEX: 23.31 KG/M2 | WEIGHT: 131.6 LBS | TEMPERATURE: 98.6 F | SYSTOLIC BLOOD PRESSURE: 148 MMHG

## 2017-05-02 DIAGNOSIS — G89.29 CHRONIC RIGHT-SIDED LOW BACK PAIN WITH RIGHT-SIDED SCIATICA: ICD-10-CM

## 2017-05-02 DIAGNOSIS — R41.3 MEMORY IMPAIRMENT OF GRADUAL ONSET: ICD-10-CM

## 2017-05-02 DIAGNOSIS — M54.41 CHRONIC RIGHT-SIDED LOW BACK PAIN WITH RIGHT-SIDED SCIATICA: ICD-10-CM

## 2017-05-02 DIAGNOSIS — J31.0 CHRONIC RHINITIS: ICD-10-CM

## 2017-05-02 DIAGNOSIS — I10 BENIGN ESSENTIAL HYPERTENSION: Primary | ICD-10-CM

## 2017-05-02 DIAGNOSIS — K21.9 GASTROESOPHAGEAL REFLUX DISEASE WITHOUT ESOPHAGITIS: ICD-10-CM

## 2017-05-02 DIAGNOSIS — J45.40 MODERATE PERSISTENT ASTHMA WITHOUT COMPLICATION: ICD-10-CM

## 2017-05-02 DIAGNOSIS — R19.5 DARK RED STOOL: ICD-10-CM

## 2017-05-02 DIAGNOSIS — F41.0 PANIC DISORDER WITHOUT AGORAPHOBIA: ICD-10-CM

## 2017-05-02 LAB
ALBUMIN SERPL-MCNC: 4.3 G/DL (ref 3.4–5)
ALP SERPL-CCNC: 74 U/L (ref 40–150)
ALT SERPL W P-5'-P-CCNC: 29 U/L (ref 0–50)
ANION GAP SERPL CALCULATED.3IONS-SCNC: 9 MMOL/L (ref 3–14)
AST SERPL W P-5'-P-CCNC: 23 U/L (ref 0–45)
BILIRUB SERPL-MCNC: 0.2 MG/DL (ref 0.2–1.3)
BUN SERPL-MCNC: 12 MG/DL (ref 7–30)
CALCIUM SERPL-MCNC: 9.1 MG/DL (ref 8.5–10.1)
CHLORIDE SERPL-SCNC: 103 MMOL/L (ref 94–109)
CO2 SERPL-SCNC: 27 MMOL/L (ref 20–32)
CREAT SERPL-MCNC: 0.69 MG/DL (ref 0.52–1.04)
GFR SERPL CREATININE-BSD FRML MDRD: 83 ML/MIN/1.7M2
GLUCOSE SERPL-MCNC: 91 MG/DL (ref 70–99)
POTASSIUM SERPL-SCNC: 3.8 MMOL/L (ref 3.4–5.3)
PROT SERPL-MCNC: 7.4 G/DL (ref 6.8–8.8)
SODIUM SERPL-SCNC: 139 MMOL/L (ref 133–144)

## 2017-05-02 PROCEDURE — 80053 COMPREHEN METABOLIC PANEL: CPT | Performed by: NURSE PRACTITIONER

## 2017-05-02 PROCEDURE — 36415 COLL VENOUS BLD VENIPUNCTURE: CPT | Performed by: NURSE PRACTITIONER

## 2017-05-02 PROCEDURE — 99214 OFFICE O/P EST MOD 30 MIN: CPT | Performed by: NURSE PRACTITIONER

## 2017-05-02 PROCEDURE — 72100 X-RAY EXAM L-S SPINE 2/3 VWS: CPT

## 2017-05-02 RX ORDER — AMLODIPINE BESYLATE 5 MG/1
5 TABLET ORAL DAILY
Qty: 30 TABLET | Refills: 1 | Status: SHIPPED | OUTPATIENT
Start: 2017-05-02 | End: 2017-07-12

## 2017-05-02 RX ORDER — ALBUTEROL SULFATE 90 UG/1
2 AEROSOL, METERED RESPIRATORY (INHALATION) EVERY 4 HOURS PRN
Qty: 1 INHALER | Refills: 2 | Status: SHIPPED | OUTPATIENT
Start: 2017-05-02 | End: 2019-09-19

## 2017-05-02 RX ORDER — CETIRIZINE HYDROCHLORIDE 10 MG/1
10 TABLET ORAL EVERY EVENING
Qty: 90 TABLET | Refills: 3 | Status: SHIPPED | OUTPATIENT
Start: 2017-05-02 | End: 2018-05-14

## 2017-05-02 RX ORDER — SERTRALINE HYDROCHLORIDE 100 MG/1
150 TABLET, FILM COATED ORAL DAILY
Qty: 90 TABLET | Refills: 3 | Status: SHIPPED | OUTPATIENT
Start: 2017-05-02 | End: 2018-05-14

## 2017-05-02 RX ORDER — METHYLPREDNISOLONE 4 MG
TABLET, DOSE PACK ORAL
Qty: 21 TABLET | Refills: 0 | Status: SHIPPED | OUTPATIENT
Start: 2017-05-02 | End: 2017-05-17

## 2017-05-02 RX ORDER — MONTELUKAST SODIUM 10 MG/1
10 TABLET ORAL DAILY
Qty: 30 TABLET | Refills: 1 | Status: SHIPPED | OUTPATIENT
Start: 2017-05-02 | End: 2017-08-15

## 2017-05-02 ASSESSMENT — ANXIETY QUESTIONNAIRES
5. BEING SO RESTLESS THAT IT IS HARD TO SIT STILL: MORE THAN HALF THE DAYS
1. FEELING NERVOUS, ANXIOUS, OR ON EDGE: MORE THAN HALF THE DAYS
6. BECOMING EASILY ANNOYED OR IRRITABLE: SEVERAL DAYS
7. FEELING AFRAID AS IF SOMETHING AWFUL MIGHT HAPPEN: NOT AT ALL
2. NOT BEING ABLE TO STOP OR CONTROL WORRYING: SEVERAL DAYS
3. WORRYING TOO MUCH ABOUT DIFFERENT THINGS: SEVERAL DAYS
GAD7 TOTAL SCORE: 8

## 2017-05-02 ASSESSMENT — PATIENT HEALTH QUESTIONNAIRE - PHQ9: 5. POOR APPETITE OR OVEREATING: SEVERAL DAYS

## 2017-05-02 NOTE — PATIENT INSTRUCTIONS
Start a new medication called Norvasc for your blood pressure     Start stool softeners right away when you haven't had a bowel movement     Increase water intake     Continue with as much activity as you tolerate     Have your  or daughter set up medications in the pill boxes for a week    Make appointment with me in 2 weeks for a routine physical  - and will check blood pressure

## 2017-05-02 NOTE — PROGRESS NOTES
SUBJECTIVE:                                                    Ingrid Amaral is a 71 year old female who presents to clinic today by herself and is does not appear to be a very good historian, initially scheduled for follow up on back pain, but didn't remember that is what she was in for.     We discussed the following health issues:    Hypertension Follow-up      Outpatient blood pressures are not being checked.    Low Salt Diet: no added salt     Has had several elevated blood pressure's over the past year  Seen in early March by this provider, patient was currently in pain, was to follow up with a nurse only visit for a blood pressure check and didn't follow through  Blood pressure elevated in office today x 2 with recheck   Patient still having some back discomfort  Denies dizziness, lightheadedness  No headaches  Denies any chest pain, pressure or shortness of breath         Amount of exercise or physical activity: 6-7 days/week for an average of 30-45 minutes    Problems taking medications regularly: No    Medication side effects: none    Diet: regular (no restrictions)    Constipation     Onset: 3 days     Description:   Frequency of bowel movements: 3 days. Varies though, can be daily.   Stool consistency: hard, small caliber. Almost black in color    Progression of Symptoms:  same    Accompanying Signs & Symptoms:  Abdominal pain (cramping?): YES- some when plugged up  Blood in stool: YES- just when wipes.   Rectal pain: YES- with bm  Nausea/vomiting: no   Weight loss or gain: no    History:   History of abdominal surgery: small hernia repair    Precipitating factors:   Recent use of narcotics, anticholinergics, calcium channel blockers, antacids, or iron supplements: no   Chronic Laxative Use: no          Therapies Tried and outcome: stool softeners (hasn't used for a while) and prune juice. Benefiber.     History of IBS  On/off diarrhea/constipation   Stools almost pure black - for approximately 2  weeks  Not sure if she has hemorrhoids   Had bright red blood with wiping, none in stool  Abdominal pain when having a bowel movement, but not otherwise   No fevers or chills   History of GERD - pretty well controlled, takes Prilosec  Good bowel movement this morning         Anxiety Follow-Up    Status since last visit: Worsened, panics and goes right into a panic attack    Other associated symptoms:None    Complicating factors:   Significant life event: No   Current substance abuse: None  Depression symptoms: No  DELMIS-7 SCORE 3/22/2016 11/7/2016 5/2/2017   Total Score - - -   Total Score 7 9 8        GAD7     History of panic attacks   Been going on for years - feels like getting worse   Anything brings it on - when having to find keys, will panic  Happening approximately 3 times a week   Panics when having to do things in front of people  Expressed concern of having no self-confidence  Mother was always so overprotective while she was growing up, she was very sheltered and did not get to do a lot of things  Now as a  adult has to do things her 's way and doesn't feel she gets to do anything her way  This gets her down  Is also getting more forgetful of things and feels this also compounds her panic episodes and anxiety  Forgetful of medications at times, sometimes can't remember when and if she took them, does use pill box  Difficulty concentrating - lose track of what she's talking about    Back pain   Seen early March for low back/sciatic pain that had been going on for years   Patient referred to physical therapy and was initially doing well and then started getting worse again with some increased sciatic pain  No numbness or tingling  Has good range of motion   No saddle paraesthesias       Problem list and histories reviewed & adjusted, as indicated.  Additional history: as documented    Patient Active Problem List   Diagnosis     Diarrhea     Pure hypercholesterolemia     Allergic rhinitis      Panic disorder without agoraphobia     Advanced directives, counseling/discussion     Liver lesion     Generalized anxiety disorder     Mild major depression (H)     Vulvitis     Vaginal wall prolapse     Chronic constipation     Moderate persistent asthma     Dysphagia     RBD (REM behavioral disorder)     FH: colon cancer     Past Surgical History:   Procedure Laterality Date     ANKLE SURGERY      bilateral     COLONOSCOPY       COLONOSCOPY N/A 3/20/2015    Procedure: COLONOSCOPY;  Surgeon: Britney Martinez MD;  Location: [facility]     HC ESOPH/GAS REFLUX TEST W NASAL IMPED >1 HR  4/19/2012    Procedure:ESOPHAGEAL IMPEDENCE FUNCTION TEST WITH 24 HOUR PH GREATER THAN 1 HOUR; Surgeon:VALDO UMANZOR; Location: GI     HERNIA REPAIR      umbilcal     HYSTERECTOMY, PAP NO LONGER INDICATED  1991     TONSILLECTOMY       UPPER GI ENDOSCOPY         Social History   Substance Use Topics     Smoking status: Former Smoker     Packs/day: 0.50     Years: 3.00     Types: Cigarettes     Smokeless tobacco: Never Used      Comment: smoked for 3 years when she was around 19     Alcohol use Yes      Comment: RARELY     Family History   Problem Relation Age of Onset     HEART DISEASE Mother      valve problem     Hypertension Mother      DIABETES Mother      type 2     CEREBROVASCULAR DISEASE Mother      Allergies Mother      Eye Disorder Mother      Macular degeneration     OSTEOPOROSIS Mother      Respiratory Mother      Cardiovascular Father      MI at age 80     Cancer - colorectal Father      DIABETES Father      Hypertension Father      type 2     Eye Disorder Father      macular degeneration     Lipids Father      C.A.D. Maternal Grandmother      DIABETES Paternal Grandmother      type 2     Cardiovascular Paternal Grandmother      MI     DIABETES Sister      type 2     Allergies Sister      GASTROINTESTINAL DISEASE Sister      GASTROINTESTINAL DISEASE Daughter      GASTROINTESTINAL DISEASE Daughter           Current Outpatient Prescriptions   Medication Sig Dispense Refill     montelukast (SINGULAIR) 10 MG tablet Take 1 tablet (10 mg) by mouth daily 30 tablet 1     sertraline (ZOLOFT) 100 MG tablet Take 1.5 tablets (150 mg) by mouth daily 90 tablet 3     cetirizine (ZYRTEC) 10 MG tablet Take 1 tablet (10 mg) by mouth every evening 90 tablet 3     omeprazole (PRILOSEC) 20 MG CR capsule Take 1 capsule (20 mg) by mouth 2 times daily For heartburn 180 capsule 3     albuterol (PROAIR HFA/PROVENTIL HFA/VENTOLIN HFA) 108 (90 BASE) MCG/ACT Inhaler Inhale 2 puffs into the lungs every 4 hours as needed for shortness of breath / dyspnea 1 Inhaler 2     amLODIPine (NORVASC) 5 MG tablet Take 1 tablet (5 mg) by mouth daily 30 tablet 1     methylPREDNISolone (MEDROL DOSEPAK) 4 MG tablet Follow package instructions 21 tablet 0     cyclobenzaprine (FLEXERIL) 10 MG tablet Take 0.5-1 tablets (5-10 mg) by mouth 3 times daily as needed for muscle spasms 30 tablet 1     simvastatin (ZOCOR) 40 MG tablet Take 1 tablet (40 mg) by mouth At Bedtime 90 tablet 1     fluticasone (FLONASE) 50 MCG/ACT spray Spray 1 spray into both nostrils daily       estradiol (ESTRACE) 0.1 MG/GM vaginal cream Use 1 gm every day and spread a small amount around the clitoral dodd 85 g 1     fluticasone-salmeterol (ADVAIR) 250-50 MCG/DOSE diskus inhaler Inhale 1 puff into the lungs 2 times daily 1 Inhaler 11     guaiFENesin (MUCINEX) 600 MG 12 hr tablet Take 2 tablets (1,200 mg) by mouth 2 times daily as needed To see if helps phlegm. 40 tablet 0     Multiple Minerals-Vitamins (CALCIUM CITRATE +) TABS 250mg/400IU - take 2 tablets per day       SKIN OILS EX Lavender-headache, skin, peppermint-skin, upset stomach mix of oils, asthma mix of oils, eucalyptus. Tea tree oil-skin, vapor rub- chest tightness, sleepy time (vetiver, lavendaer, mrjoram, mandarin, ylang)- sleep, constipation, eczema (sweet almond oil). Also mixes with carriers: Coconut oil, Crane oil, Extra  virgin oil. Frankincense- cough. Digize- oil. Purification- oil, lemon- oil. Essentialyme- pill.  Inhales a mix with olive oil almond and coconut oil with peppermint, and eucalyptus- sinus, allergies. Updated 12/1/2015.   Updated 4/26/2016: Lemon, Cinnamon bark, panaway, Thieves, Orange, Wintergreen, Copaiba       ASPIRIN PO Take 81 mg by mouth Takes every other day       ORDER FOR DME Equipment being ordered: Nebulizer and tubing/filter 1 Device 0     albuterol (PROVENTIL) (5 MG/ML) 0.5% nebulizer solution Take 0.5 mLs (2.5 mg) by nebulization every 4 hours as needed for wheezing or shortness of breath / dyspnea 30 vial 1     lactase (LACTAID) 9000 UNITS TABS Take 9,000 Units by mouth as needed       VITAMIN D, CHOLECALCIFEROL, PO Take 2,400 Units by mouth daily       hydrocortisone 1 % cream Apply topically as needed       calcipotriene 0.005 % OINT Externally apply topically 2 times daily       Probiotic Product (PROBIOTIC DAILY PO) Take 1 chew tab by mouth 2 times daily        clobetasol (TEMOVATE) 0.05 % ointment Apply to area of irritation twice daily for 2 weeks 30 g 0     Triamcinolone Acetonide 0.05 % OINT Externally apply  topically.       BENEFIBER OR 2 tsp daily       [DISCONTINUED] montelukast (SINGULAIR) 10 MG tablet Take 1 tablet (10 mg) by mouth daily 30 tablet 1     [DISCONTINUED] albuterol (PROAIR HFA, PROVENTIL HFA, VENTOLIN HFA) 108 (90 BASE) MCG/ACT inhaler Inhale 2 puffs into the lungs every 4 hours as needed for shortness of breath / dyspnea 1 Inhaler 2     [DISCONTINUED] sertraline (ZOLOFT) 100 MG tablet Take 1.5 tablets (150 mg) by mouth daily 90 tablet 3     sodium chloride (SODIUM CHLORIDE) 0.65 % nasal spray Spray 1 spray in nostril daily as needed Reported on 5/2/2017       desonide (DESOWEN) 0.05 % lotion Apply topically 2 times daily Reported on 5/2/2017 59 mL 0     FLAXSEED Reported on 5/2/2017       Allergies   Allergen Reactions     Sulfa Drugs Other (See Comments)     Reaction  unknown as a child       Reviewed and updated as needed this visit by clinical staff  Tobacco  Allergies  Meds  Problems  Med Hx  Surg Hx  Fam Hx  Soc Hx        Reviewed and updated as needed this visit by Provider  Allergies  Meds  Problems         ROS:  Constitutional, HEENT, cardiovascular, pulmonary, gi and gu systems are negative, except as otherwise noted.    OBJECTIVE:                                                    BP (!) (P) 158/98  Pulse 88  Temp 98.6  F (37  C) (Tympanic)  Wt 131 lb 9.6 oz (59.7 kg)  BMI 23.31 kg/m2  Body mass index is 23.31 kg/(m^2).  GENERAL APPEARANCE: healthy, alert and nervous looking with intermittent tearing  RESP: lungs clear to auscultation - no rales, rhonchi or wheezes  CV: regular rates and rhythm, normal S1 S2, no S3 or S4 and no murmur, click or rub  ABDOMEN: soft, nontender, without hepatosplenomegaly or masses and bowel sounds normal  MS: Full range of motion through out lumbar, 5/5 strength through out lower extremities with good peripheral pulses and no edema. DTR's intact in lower extremities, negative straight leg raises. Gait normal.  PSYCH: mentation appears normal, affect normal/bright, slightly anxious and appears worried. Intermittent forgetfulness in conversation.     Diagnostic Test Results:  Comprehensive and lumbar x-ray in process     ASSESSMENT/PLAN:                                                      1. Benign essential hypertension  Blood pressure continues to be elevated despite several readings, will start Norvasc and get labs. Patient scheduled to follow up with me in 2 weeks, appointment made   - amLODIPine (NORVASC) 5 MG tablet; Take 1 tablet (5 mg) by mouth daily  Dispense: 30 tablet; Refill: 1  - Comprehensive metabolic panel (BMP + Alb, Alk Phos, ALT, AST, Total. Bili, TP)    2. Dark red stool  Patient has history of IBS, has on/off diarrhea/constipation. Has been constipated most recently. In the last 2 weeks patient states her  stools have been almost black and some bright red blood with wiping only. Colonoscopy in 2015 negative, father  of colon cancer. In view of abcense of any abdominal pain, nausea/vomiting or other signs and symptoms of acute infection, would like to get a repeat colonoscopy to evaluate further. May get an upper endoscopy at a future date depending on findings.   - GASTROENTEROLOGY ADULT REF PROCEDURE ONLY    3. Gastroesophageal reflux disease without esophagitis  Under good control per patient. Has no current symptoms. Takes Prilosec as ordered.   - omeprazole (PRILOSEC) 20 MG CR capsule; Take 1 capsule (20 mg) by mouth 2 times daily For heartburn  Dispense: 180 capsule; Refill: 3    4. Panic disorder without agoraphobia  Starting to get slightly worse, however feels this might have a lot to do with her forgetfulness and her feelings of inadequate self-confidence. Discussed counseling with patient to help her through some of this, patient hesitant and will think on this.   - sertraline (ZOLOFT) 100 MG tablet; Take 1.5 tablets (150 mg) by mouth daily  Dispense: 90 tablet; Refill: 3    5. Chronic right-sided low back pain with right-sided sciatica  Not improved, feeling in right low back and having sharp pain in through buttock on right to knee most of the time, sometimes going down into lower leg. Had been improving for a short time in physical therapy then more painful. Lumbar x-ray ordered and medrol dose pack started. Patient denies any numbness or tingling.   - methylPREDNISolone (MEDROL DOSEPAK) 4 MG tablet; Follow package instructions  Dispense: 21 tablet; Refill: 0  - XR Lumbar Spine 2/3 Views; Future    6. Memory impairment of gradual onset  Patient has noticed over time getting more forgetful. Is fearful in otherwise normal situations. Verbalizes forgetting to take her medications sometimes or not being able to remember if she took them. Does verbalize she has difficulty with her medications. States her   or daughter could help set up her medications. She does use a twice daily medication pill box. She is forgetful of what the medications are for. Discussed in depth with patient that we need to do further workup on her memory impairment and I would like to see her back in 2 weeks for a complete physical and blood pressure check and will talk more then. Patient is agreeable and CMA helped set up appointment.     7. Moderate persistent asthma without complication  Stable, no concerns. No changes   - montelukast (SINGULAIR) 10 MG tablet; Take 1 tablet (10 mg) by mouth daily  Dispense: 30 tablet; Refill: 1  - albuterol (PROAIR HFA/PROVENTIL HFA/VENTOLIN HFA) 108 (90 BASE) MCG/ACT Inhaler; Inhale 2 puffs into the lungs every 4 hours as needed for shortness of breath / dyspnea  Dispense: 1 Inhaler; Refill: 2    8. Chronic rhinitis  Stable, no concerns. No changes  - cetirizine (ZYRTEC) 10 MG tablet; Take 1 tablet (10 mg) by mouth every evening  Dispense: 90 tablet; Refill: 3        Patient Instructions   Start a new medication called Norvasc for your blood pressure     Start stool softeners right away when you haven't had a bowel movement     Increase water intake     Continue with as much activity as you tolerate     Have your  or daughter set up medications in the pill boxes for a week    Make appointment with me in 2 weeks for a routine physical  - and will check blood pressure         MARVIN Mas [facility]

## 2017-05-02 NOTE — MR AVS SNAPSHOT
After Visit Summary   5/2/2017    Ingrid Amaral    MRN: [id number]           Patient Information     Date Of Birth          1945        Visit Information        Provider Department      5/2/2017 12:40 PM Soha Sanchez APRN [facility]        Today's Diagnoses     Benign essential hypertension    -  1    Moderate persistent asthma without complication        Panic disorder without agoraphobia        Chronic rhinitis        Gastroesophageal reflux disease without esophagitis        Dark red stool        Chronic right-sided low back pain with right-sided sciatica          Care Instructions    Start a new medication called Norvasc for your blood pressure     Start stool softeners right away when you haven't had a bowel movement     Increase water intake     Continue with as much activity as you tolerate     Have your  or daughter set up medications in the pill boxes for a week    Make appointment with me in 2 weeks for a routine physical  - and will check blood pressure           Follow-ups after your visit        Additional Services     GASTROENTEROLOGY ADULT REF PROCEDURE ONLY       Last Lab Result: Creatinine (mg/dL)       Date                     Value                 10/27/2015               0.69             ----------  Body mass index is 23.31 kg/(m^2).     Needed:  No  Language:  English    Patient will be contacted to schedule procedure.     Please be aware that coverage of these services is subject to the terms and limitations of your health insurance plan.  Call member services at your health plan with any benefit or coverage questions.  Any procedures must be performed at a [place] facility OR coordinated by your clinic's referral office.    Please bring the following with you to your appointment:    (1) Any X-Rays, CTs or MRIs which have been performed.  Contact the facility where they were done to arrange for  prior to your  scheduled appointment.    (2) List of current medications   (3) This referral request   (4) Any documents/labs given to you for this referral                  Your next 10 appointments already scheduled     May 03, 2017 12:30 PM CDT   Treatment with Lainey Navas PT   [facility] ([facility])    [address] [place] [id number] [phone]            May 08, 2017 11:00 AM CDT   Nurse Only with TRENT COTTER RN   [facility]    [address] [place] [id number] [phone]              Future tests that were ordered for you today     Open Future Orders        Priority Expected Expires Ordered    XR Lumbar Spine 2/3 Views Routine 5/2/2017 5/2/2018 5/2/2017            Who to contact     If you have questions or need follow up information about today's clinic visit or your schedule please contact [facility] directly at [phone].  Normal or non-critical lab and imaging results will be communicated to you by Embedlyhart, letter or phone within 4 business days after the clinic has received the results. If you do not hear from us within 7 days, please contact the clinic through [employer] or phone. If you have a critical or abnormal lab result, we will notify you by phone as soon as possible.  Submit refill requests through [employer] or call your pharmacy and they will forward the refill request to us. Please allow 3 business days for your refill to be completed.          Additional Information About Your Visit        [employer] Information     [employer] gives you secure access to your electronic health record. If you see a primary care provider, you can also send messages to your care team and make appointments. If you have questions, please call your primary care clinic.  If you do not have a primary care provider, please call [phone] and they will assist you.        Care EveryWhere ID     This  is your Care EveryWhere ID. This could be used by other organizations to access your [place] medical records  [id number]        Your Vitals Were     Pulse Temperature BMI (Body Mass Index)             88 98.6  F (37  C) (Tympanic) 23.31 kg/m2          Blood Pressure from Last 3 Encounters:   05/02/17 148/62   04/27/17 (!) 154/96   04/05/17 144/88    Weight from Last 3 Encounters:   05/02/17 131 lb 9.6 oz (59.7 kg)   04/27/17 129 lb 6.4 oz (58.7 kg)   03/09/17 132 lb (59.9 kg)              We Performed the Following     Comprehensive metabolic panel (BMP + Alb, Alk Phos, ALT, AST, Total. Bili, TP)     GASTROENTEROLOGY ADULT REF PROCEDURE ONLY          Today's Medication Changes          These changes are accurate as of: 5/2/17  1:39 PM.  If you have any questions, ask your nurse or doctor.               Start taking these medicines.        Dose/Directions    amLODIPine 5 MG tablet   Commonly known as:  NORVASC   Used for:  Benign essential hypertension   Started by:  Soha Sanchez APRN CNP        Dose:  5 mg   Take 1 tablet (5 mg) by mouth daily   Quantity:  30 tablet   Refills:  1       methylPREDNISolone 4 MG tablet   Commonly known as:  MEDROL DOSEPAK   Used for:  Chronic right-sided low back pain with right-sided sciatica   Started by:  Soha Sanchez APRN CNP        Follow package instructions   Quantity:  21 tablet   Refills:  0            Where to get your medicines      These medications were sent to [facility] [address] [address]     Phone:  [phone]     albuterol 108 (90 BASE) MCG/ACT Inhaler    amLODIPine 5 MG tablet    cetirizine 10 MG tablet    methylPREDNISolone 4 MG tablet    montelukast 10 MG tablet    omeprazole 20 MG CR capsule    sertraline 100 MG tablet                Primary Care Provider Office Phone # Fax #    Leesa Muñiz PA-C [phone]       [place] [address]  [address] [facility] [address]        Thank you!     Thank you for choosing [facility]  for your care. Our goal is always to provide you with excellent care. Hearing back from our patients is one way we can continue to improve our services. Please take a few minutes to complete the written survey that you may receive in the mail after your visit with us. Thank you!             Your Updated Medication List - Protect others around you: Learn how to safely use, store and throw away your medicines at www.disposemymeds.org.          This list is accurate as of: 5/2/17  1:39 PM.  Always use your most recent med list.                   Brand Name Dispense Instructions for use    * albuterol (5 MG/ML) 0.5% neb solution    PROVENTIL    30 vial    Take 0.5 mLs (2.5 mg) by nebulization every 4 hours as needed for wheezing or shortness of breath / dyspnea       * albuterol 108 (90 BASE) MCG/ACT Inhaler    PROAIR HFA/PROVENTIL HFA/VENTOLIN HFA    1 Inhaler    Inhale 2 puffs into the lungs every 4 hours as needed for shortness of breath / dyspnea       amLODIPine 5 MG tablet    NORVASC    30 tablet    Take 1 tablet (5 mg) by mouth daily       ASPIRIN PO      Take 81 mg by mouth Takes every other day       BENEFIBER PO      2 tsp daily       calcipotriene 0.005 % Oint      Externally apply topically 2 times daily       calcium citrate + Tabs      250mg/400IU - take 2 tablets per day       cetirizine 10 MG tablet    zyrTEC    90 tablet    Take 1 tablet (10 mg) by mouth every evening       clobetasol 0.05 % ointment    TEMOVATE    30 g    Apply to area of irritation twice daily for 2 weeks       cyclobenzaprine 10 MG tablet    FLEXERIL    30 tablet    Take 0.5-1 tablets (5-10 mg) by mouth 3 times daily as needed for muscle spasms       desonide 0.05 % lotion    DESOWEN    59 mL    Apply topically 2 times daily Reported on 5/2/2017       estradiol 0.1 MG/GM cream    ESTRACE    85 g    Use 1 gm every day and spread  a small amount around the clitoral dodd       FLAXSEED      Reported on 5/2/2017       fluticasone 50 MCG/ACT spray    FLONASE     Spray 1 spray into both nostrils daily       fluticasone-salmeterol 250-50 MCG/DOSE diskus inhaler    ADVAIR    1 Inhaler    Inhale 1 puff into the lungs 2 times daily       guaiFENesin 600 MG 12 hr tablet    MUCINEX    40 tablet    Take 2 tablets (1,200 mg) by mouth 2 times daily as needed To see if helps phlegm.       hydrocortisone 1 % cream    CORTAID     Apply topically as needed       lactase 9000 UNITS Tabs tablet    LACTAID     Take 9,000 Units by mouth as needed       methylPREDNISolone 4 MG tablet    MEDROL DOSEPAK    21 tablet    Follow package instructions       montelukast 10 MG tablet    SINGULAIR    30 tablet    Take 1 tablet (10 mg) by mouth daily       omeprazole 20 MG CR capsule    priLOSEC    180 capsule    Take 1 capsule (20 mg) by mouth 2 times daily For heartburn       order for DME     1 Device    Equipment being ordered: Nebulizer and tubing/filter       PROBIOTIC DAILY PO      Take 1 chew tab by mouth 2 times daily       sertraline 100 MG tablet    ZOLOFT    90 tablet    Take 1.5 tablets (150 mg) by mouth daily       simvastatin 40 MG tablet    ZOCOR    90 tablet    Take 1 tablet (40 mg) by mouth At Bedtime       SKIN OILS EX      Lavender-headache, skin, peppermint-skin, upset stomach mix of oils, asthma mix of oils, eucalyptus. Tea tree oil-skin, vapor rub- chest tightness, sleepy time (vetiver, lavendaer, mrjoram, mandarin, ylang)- sleep, constipation, eczema (sweet almond oil). Also mixes with carriers: Coconut oil, [place] oil, Extra virgin oil. Frankincense- cough. Digize- oil. Purification- oil, lemon- oil. Essentialyme- pill.  Inhales a mix with olive oil almond and coconut oil with peppermint, and eucalyptus- sinus, allergies. Updated 12/1/2015.  Updated 4/26/2016: Lemon, Cinnamon bark, panaway, Thieves, Orange, Wintergreen, Copaiba       sodium chloride  0.65 % nasal spray    OCEAN     Spray 1 spray in nostril daily as needed Reported on 5/2/2017       triamcinolone acetonide 0.05 % Oint      Externally apply  topically.       VITAMIN D (CHOLECALCIFEROL) PO      Take 2,400 Units by mouth daily       * Notice:  This list has 2 medication(s) that are the same as other medications prescribed for you. Read the directions carefully, and ask your doctor or other care provider to review them with you.

## 2017-05-02 NOTE — NURSING NOTE
"Chief Complaint   Patient presents with     Hypertension     Abdominal Pain       Initial /62 (BP Location: Right arm, Cuff Size: Adult Regular)  Pulse 88  Temp 98.6  F (37  C) (Tympanic)  Wt 131 lb 9.6 oz (59.7 kg)  BMI 23.31 kg/m2 Estimated body mass index is 23.31 kg/(m^2) as calculated from the following:    Height as of 3/9/17: 5' 3\" (1.6 m).    Weight as of this encounter: 131 lb 9.6 oz (59.7 kg).  Medication Reconciliation: complete    Health Maintenance that is potentially due pending provider review:  PHQ9 and GAD7    Possibly completing today per provider review.  Dillon Posadas M.A.        "

## 2017-05-03 ENCOUNTER — HOSPITAL ENCOUNTER (OUTPATIENT)
Dept: PHYSICAL THERAPY | Facility: CLINIC | Age: 72
Setting detail: THERAPIES SERIES
End: 2017-05-03
Attending: NURSE PRACTITIONER
Payer: MEDICARE

## 2017-05-03 PROCEDURE — 97140 MANUAL THERAPY 1/> REGIONS: CPT | Mod: GP | Performed by: PHYSICAL THERAPIST

## 2017-05-03 PROCEDURE — 40000718 ZZHC STATISTIC PT DEPARTMENT ORTHO VISIT: Performed by: PHYSICAL THERAPIST

## 2017-05-03 ASSESSMENT — ANXIETY QUESTIONNAIRES: GAD7 TOTAL SCORE: 8

## 2017-05-03 ASSESSMENT — PATIENT HEALTH QUESTIONNAIRE - PHQ9: SUM OF ALL RESPONSES TO PHQ QUESTIONS 1-9: 9

## 2017-05-08 ENCOUNTER — ALLIED HEALTH/NURSE VISIT (OUTPATIENT)
Dept: FAMILY MEDICINE | Facility: CLINIC | Age: 72
End: 2017-05-08
Payer: COMMERCIAL

## 2017-05-08 DIAGNOSIS — J45.40 MODERATE PERSISTENT ASTHMA: Primary | ICD-10-CM

## 2017-05-08 DIAGNOSIS — Z48.02 ENCOUNTER FOR REMOVAL OF SUTURES: Primary | ICD-10-CM

## 2017-05-08 PROBLEM — I10 BENIGN ESSENTIAL HYPERTENSION: Status: ACTIVE | Noted: 2017-05-02

## 2017-05-08 PROCEDURE — 99207 ZZC NO CHARGE NURSE ONLY: CPT

## 2017-05-08 NOTE — MR AVS SNAPSHOT
After Visit Summary   5/8/2017    Ingrid Amaral    MRN: [id number]           Patient Information     Date Of Birth          1945        Visit Information        Provider Department      5/8/2017 11:00 AM [place] NB RN [facility]        Today's Diagnoses     Encounter for removal of sutures    -  1       Follow-ups after your visit        Your next 10 appointments already scheduled     May 08, 2017 11:00 AM CDT   Nurse Only with [place] NB RN   [facility]    [address] [place] [id number]   [phone]            May 10, 2017 12:30 PM CDT   Ortho Treatment with Lainey Navas PT   [facility] ([facility])    [address] [place] [address]   [phone]            May 17, 2017  9:00 AM CDT   PHYSICAL with MARVIN Diaz CNP   [facility] ([facility])    [address] [place] [address]   [phone]              Who to contact     If you have questions or need follow up information about today's clinic visit or your schedule please contact [facility] directly at [phone].  Normal or non-critical lab and imaging results will be communicated to you by MyChart, letter or phone within 4 business days after the clinic has received the results. If you do not hear from us within 7 days, please contact the clinic through MyChart or phone. If you have a critical or abnormal lab result, we will notify you by phone as soon as possible.  Submit refill requests through [employer] or call your pharmacy and they will forward the refill request to us. Please allow 3 business days for your refill to be completed.          Additional Information About Your Visit        Mir [employer] Information     [employer] lets you send messages to your doctor, view your test results, renew your prescriptions, schedule  "appointments and more. To sign up, go to [email or web address] . Click on \"Log in\" on the left side of the screen, which will take you to the Welcome page. Then click on \"Sign up Now\" on the right side of the page.     You will be asked to enter the access code listed below, as well as some personal information. Please follow the directions to create your username and password.     Your access code is: [email or web address]  Expires: 2017  9:52 AM     Your access code will  in 90 days. If you need help or a new code, please call your [place] clinic or [phone].        Care EveryWhere ID     This is your Care EveryWhere ID. This could be used by other organizations to access your [place] medical records  [id number]         Blood Pressure from Last 3 Encounters:   17 (!) (P) 158/98   17 (!) 154/96   17 144/88    Weight from Last 3 Encounters:   17 131 lb 9.6 oz (59.7 kg)   17 129 lb 6.4 oz (58.7 kg)   17 132 lb (59.9 kg)              Today, you had the following     No orders found for display       Primary Care Provider Office Phone # Fax #    MARVIN Diaz [facility] [phone]       [place] [id number]        Thank you!     Thank you for choosing [facility]  for your care. Our goal is always to provide you with excellent care. Hearing back from our patients is one way we can continue to improve our services. Please take a few minutes to complete the written survey that you may receive in the mail after your visit with us. Thank you!             Your Updated Medication List - Protect others around you: Learn how to safely use, store and throw away your medicines at www.disposemymeds.org.          This list is accurate as of: 17  9:52 AM.  Always use your most recent med list.                   Brand Name Dispense Instructions for use    * albuterol (5 MG/ML) 0.5% neb solution    " PROVENTIL    30 vial    Take 0.5 mLs (2.5 mg) by nebulization every 4 hours as needed for wheezing or shortness of breath / dyspnea       * albuterol 108 (90 BASE) MCG/ACT Inhaler    PROAIR HFA/PROVENTIL HFA/VENTOLIN HFA    1 Inhaler    Inhale 2 puffs into the lungs every 4 hours as needed for shortness of breath / dyspnea       amLODIPine 5 MG tablet    NORVASC    30 tablet    Take 1 tablet (5 mg) by mouth daily       ASPIRIN PO      Take 81 mg by mouth Takes every other day       BENEFIBER PO      2 tsp daily       calcipotriene 0.005 % Oint      Externally apply topically 2 times daily       calcium citrate + Tabs      250mg/400IU - take 2 tablets per day       cetirizine 10 MG tablet    zyrTEC    90 tablet    Take 1 tablet (10 mg) by mouth every evening       clobetasol 0.05 % ointment    TEMOVATE    30 g    Apply to area of irritation twice daily for 2 weeks       cyclobenzaprine 10 MG tablet    FLEXERIL    30 tablet    Take 0.5-1 tablets (5-10 mg) by mouth 3 times daily as needed for muscle spasms       desonide 0.05 % lotion    DESOWEN    59 mL    Apply topically 2 times daily Reported on 5/2/2017       estradiol 0.1 MG/GM cream    ESTRACE    85 g    Use 1 gm every day and spread a small amount around the clitoral dodd       FLAXSEED      Reported on 5/2/2017       fluticasone 50 MCG/ACT spray    FLONASE     Spray 1 spray into both nostrils daily       fluticasone-salmeterol 250-50 MCG/DOSE diskus inhaler    ADVAIR    1 Inhaler    Inhale 1 puff into the lungs 2 times daily       guaiFENesin 600 MG 12 hr tablet    MUCINEX    40 tablet    Take 2 tablets (1,200 mg) by mouth 2 times daily as needed To see if helps phlegm.       hydrocortisone 1 % cream    CORTAID     Apply topically as needed       lactase 9000 UNITS Tabs tablet    LACTAID     Take 9,000 Units by mouth as needed       methylPREDNISolone 4 MG tablet    MEDROL DOSEPAK    21 tablet    Follow package instructions       montelukast 10 MG tablet     SINGULAIR    30 tablet    Take 1 tablet (10 mg) by mouth daily       omeprazole 20 MG CR capsule    priLOSEC    180 capsule    Take 1 capsule (20 mg) by mouth 2 times daily For heartburn       order for DME     1 Device    Equipment being ordered: Nebulizer and tubing/filter       PROBIOTIC DAILY PO      Take 1 chew tab by mouth 2 times daily       sertraline 100 MG tablet    ZOLOFT    90 tablet    Take 1.5 tablets (150 mg) by mouth daily       simvastatin 40 MG tablet    ZOCOR    90 tablet    Take 1 tablet (40 mg) by mouth At Bedtime       SKIN OILS EX      Lavender-headache, skin, peppermint-skin, upset stomach mix of oils, asthma mix of oils, eucalyptus. Tea tree oil-skin, vapor rub- chest tightness, sleepy time (vetiver, lavendaer, mrjoram, mandarin, ylang)- sleep, constipation, eczema (sweet almond oil). Also mixes with carriers: Coconut oil, [place] oil, Extra virgin oil. Frankincense- cough. Digize- oil. Purification- oil, lemon- oil. Essentialyme- pill.  Inhales a mix with olive oil almond and coconut oil with peppermint, and eucalyptus- sinus, allergies. Updated 12/1/2015.  Updated 4/26/2016: Lemon, Cinnamon bark, panaway, Thieves, Orange, Wintergreen, Copaiba       sodium chloride 0.65 % nasal spray    OCEAN     Spray 1 spray in nostril daily as needed Reported on 5/2/2017       triamcinolone acetonide 0.05 % Oint      Externally apply  topically.       VITAMIN D (CHOLECALCIFEROL) PO      Take 2,400 Units by mouth daily       * Notice:  This list has 2 medication(s) that are the same as other medications prescribed for you. Read the directions carefully, and ask your doctor or other care provider to review them with you.

## 2017-05-08 NOTE — NURSING NOTE
Ingrid presents to the clinic today for  removal of sutures.  The patient has had the sutures in place for 11 days.    There has been no history of infection or drainage.    O: 1 suture are seen located on the left ear lobe.  The wound is healing well with no signs of infection.    Tetanus status is up to date.    A: Suture removal.    P:  All sutures were easily removed today.  Routine wound care discussed.  The patient will follow up as needed.

## 2017-05-10 ENCOUNTER — HOSPITAL ENCOUNTER (OUTPATIENT)
Dept: PHYSICAL THERAPY | Facility: CLINIC | Age: 72
Setting detail: THERAPIES SERIES
End: 2017-05-10
Attending: NURSE PRACTITIONER
Payer: MEDICARE

## 2017-05-10 PROCEDURE — G8979 MOBILITY GOAL STATUS: HCPCS | Mod: GP,CI | Performed by: PHYSICAL THERAPIST

## 2017-05-10 PROCEDURE — 40000718 ZZHC STATISTIC PT DEPARTMENT ORTHO VISIT: Performed by: PHYSICAL THERAPIST

## 2017-05-10 PROCEDURE — G8980 MOBILITY D/C STATUS: HCPCS | Mod: GP,CK | Performed by: PHYSICAL THERAPIST

## 2017-05-10 PROCEDURE — 97140 MANUAL THERAPY 1/> REGIONS: CPT | Mod: GP | Performed by: PHYSICAL THERAPIST

## 2017-05-17 ENCOUNTER — TELEPHONE (OUTPATIENT)
Dept: FAMILY MEDICINE | Facility: CLINIC | Age: 72
End: 2017-05-17

## 2017-05-17 ENCOUNTER — OFFICE VISIT (OUTPATIENT)
Dept: FAMILY MEDICINE | Facility: CLINIC | Age: 72
End: 2017-05-17
Payer: COMMERCIAL

## 2017-05-17 VITALS
SYSTOLIC BLOOD PRESSURE: 132 MMHG | WEIGHT: 128 LBS | TEMPERATURE: 97.9 F | DIASTOLIC BLOOD PRESSURE: 70 MMHG | HEART RATE: 80 BPM | BODY MASS INDEX: 22.67 KG/M2

## 2017-05-17 DIAGNOSIS — I10 HYPERTENSION GOAL BP (BLOOD PRESSURE) < 140/90: ICD-10-CM

## 2017-05-17 DIAGNOSIS — R41.3 MEMORY DIFFICULTIES: ICD-10-CM

## 2017-05-17 DIAGNOSIS — M54.16 LUMBAR RADICULOPATHY: ICD-10-CM

## 2017-05-17 DIAGNOSIS — M54.41 CHRONIC RIGHT-SIDED LOW BACK PAIN WITH RIGHT-SIDED SCIATICA: ICD-10-CM

## 2017-05-17 DIAGNOSIS — Z00.01 ENCOUNTER FOR ROUTINE ADULT HEALTH EXAMINATION WITH ABNORMAL FINDINGS: Primary | ICD-10-CM

## 2017-05-17 DIAGNOSIS — N95.2 VAGINAL ATROPHY: ICD-10-CM

## 2017-05-17 DIAGNOSIS — J45.40 MODERATE PERSISTENT ASTHMA WITHOUT COMPLICATION: ICD-10-CM

## 2017-05-17 DIAGNOSIS — Z71.89 ADVANCED DIRECTIVES, COUNSELING/DISCUSSION: ICD-10-CM

## 2017-05-17 DIAGNOSIS — E78.2 ELEVATED CHOLESTEROL WITH ELEVATED TRIGLYCERIDES: ICD-10-CM

## 2017-05-17 DIAGNOSIS — N90.89 LABIAL AND CLITORAL ADHESIONS, ACQUIRED: ICD-10-CM

## 2017-05-17 DIAGNOSIS — G89.29 CHRONIC RIGHT-SIDED LOW BACK PAIN WITH RIGHT-SIDED SCIATICA: ICD-10-CM

## 2017-05-17 DIAGNOSIS — R07.9 CHEST PAIN, UNSPECIFIED TYPE: ICD-10-CM

## 2017-05-17 LAB — VIT B12 SERPL-MCNC: 610 PG/ML (ref 193–986)

## 2017-05-17 PROCEDURE — 83921 ORGANIC ACID SINGLE QUANT: CPT | Mod: 90 | Performed by: NURSE PRACTITIONER

## 2017-05-17 PROCEDURE — 36415 COLL VENOUS BLD VENIPUNCTURE: CPT | Performed by: NURSE PRACTITIONER

## 2017-05-17 PROCEDURE — 99213 OFFICE O/P EST LOW 20 MIN: CPT | Mod: 25 | Performed by: NURSE PRACTITIONER

## 2017-05-17 PROCEDURE — 99397 PER PM REEVAL EST PAT 65+ YR: CPT | Performed by: NURSE PRACTITIONER

## 2017-05-17 PROCEDURE — 99000 SPECIMEN HANDLING OFFICE-LAB: CPT | Performed by: NURSE PRACTITIONER

## 2017-05-17 PROCEDURE — 93000 ELECTROCARDIOGRAM COMPLETE: CPT | Performed by: NURSE PRACTITIONER

## 2017-05-17 PROCEDURE — 82607 VITAMIN B-12: CPT | Performed by: NURSE PRACTITIONER

## 2017-05-17 RX ORDER — SIMVASTATIN 40 MG
40 TABLET ORAL AT BEDTIME
Qty: 90 TABLET | Refills: 1 | Status: CANCELLED | OUTPATIENT
Start: 2017-05-17

## 2017-05-17 RX ORDER — METHYLPREDNISOLONE 4 MG
TABLET, DOSE PACK ORAL
Qty: 21 TABLET | Refills: 0 | Status: SHIPPED | OUTPATIENT
Start: 2017-05-17 | End: 2017-05-17

## 2017-05-17 RX ORDER — MONTELUKAST SODIUM 10 MG/1
10 TABLET ORAL DAILY
Qty: 30 TABLET | Refills: 1 | Status: CANCELLED | OUTPATIENT
Start: 2017-05-17

## 2017-05-17 RX ORDER — ESTRADIOL 0.1 MG/G
CREAM VAGINAL
Qty: 85 G | Refills: 1 | Status: SHIPPED | OUTPATIENT
Start: 2017-05-17 | End: 2017-10-20

## 2017-05-17 RX ORDER — CYCLOBENZAPRINE HCL 10 MG
5-10 TABLET ORAL 3 TIMES DAILY PRN
Qty: 30 TABLET | Refills: 1 | Status: SHIPPED | OUTPATIENT
Start: 2017-05-17 | End: 2017-06-16

## 2017-05-17 NOTE — NURSING NOTE
"Chief Complaint   Patient presents with     Wellness Visit       Initial /70 (BP Location: Right arm, Cuff Size: Adult Regular)  Pulse 80  Temp 97.9  F (36.6  C) (Tympanic)  Wt 128 lb (58.1 kg)  BMI 22.67 kg/m2 Estimated body mass index is 22.67 kg/(m^2) as calculated from the following:    Height as of 3/9/17: 5' 3\" (1.6 m).    Weight as of this encounter: 128 lb (58.1 kg).  Medication Reconciliation: complete    Health Maintenance that is potentially due pending provider review:  NONE    N/a  Dillon Posadas M.A.          "

## 2017-05-17 NOTE — LETTER
My Depression Action Plan  Name: Ingrid Amaral   Date of Birth 1945  Date: 5/17/2017    My doctor: Soha Sanchez   My clinic: [address] [id number] [phone]          GREEN    ZONE   Good Control    What it looks like:     Things are going generally well. You have normal up s and down s. You may even feel depressed from time to time, but bad moods usually last less than a day.   What you need to do:  1. Continue to care for yourself (see self care plan)  2. Check your depression survival kit and update it as needed  3. Follow your physician s recommendations including any medication.  4. Do not stop taking medication unless you consult with your physician first.           YELLOW         ZONE Getting Worse    What it looks like:     Depression is starting to interfere with your life.     It may be hard to get out of bed; you may be starting to isolate yourself from others.    Symptoms of depression are starting to last most all day and this has happened for several days.     You may have suicidal thoughts but they are not constant.   What you need to do:     1. Call your care team, your response to treatment will improve if you keep your care team informed of your progress. Yellow periods are signs an adjustment may need to be made.     2. Continue your self-care, even if you have to fake it!    3. Talk to someone in your support network    4. Open up your depression survival kit           RED    ZONE Medical Alert - Get Help    What it looks like:     Depression is seriously interfering with your life.     You may experience these or other symptoms: You can t get out of bed most days, can t work or engage in other necessary activities, you have trouble taking care of basic hygiene, or basic responsibilities, thoughts of suicide or death that will not go away, self-injurious behavior.     What you need to do:  1. Call your care  team and request a same-day appointment. If they are not available (weekends or after hours) call your local crisis line, emergency room or 911.      Electronically signed by: Soha Sanchez, May 17, 2017    Depression Self Care Plan / Survival Kit    Self-Care for Depression  Here s the deal. Your body and mind are really not as separate as most people think.  What you do and think affects how you feel and how you feel influences what you do and think. This means if you do things that people who feel good do, it will help you feel better.  Sometimes this is all it takes.  There is also a place for medication and therapy depending on how severe your depression is, so be sure to consult with your medical provider and/ or Behavioral Health Consultant if your symptoms are worsening or not improving.     In order to better manage my stress, I will:    Exercise  Get some form of exercise, every day. This will help reduce pain and release endorphins, the  feel good  chemicals in your brain. This is almost as good as taking antidepressants!  This is not the same as joining a gym and then never going! (they count on that by the way ) It can be as simple as just going for a walk or doing some gardening, anything that will get you moving.      Hygiene   Maintain good hygiene (Get out of bed in the morning, Make your bed, Brush your teeth, Take a shower, and Get dressed like you were going to work, even if you are unemployed).  If your clothes don't fit try to get ones that do.    Diet  I will strive to eat foods that are good for me, drink plenty of water, and avoid excessive sugar, caffeine, alcohol, and other mood-altering substances.  Some foods that are helpful in depression are: complex carbohydrates, B vitamins, flaxseed, fish or fish oil, fresh fruits and vegetables.    Psychotherapy  I agree to participate in Individual Therapy (if recommended).    Medication  If prescribed medications, I agree to take them.  Missing  doses can result in serious side effects.  I understand that drinking alcohol, or other illicit drug use, may cause potential side effects.  I will not stop my medication abruptly without first discussing it with my provider.    Staying Connected With Others  I will stay in touch with my friends, family members, and my primary care provider/team.    Use your imagination  Be creative.  We all have a creative side; it doesn t matter if it s oil painting, sand castles, or mud pies! This will also kick up the endorphins.    Witness Beauty  (AKA stop and smell the roses) Take a look outside, even in mid-winter. Notice colors, textures. Watch the squirrels and birds.     Service to others  Be of service to others.  There is always someone else in need.  By helping others we can  get out of ourselves  and remember the really important things.  This also provides opportunities for practicing all the other parts of the program.    Humor  Laugh and be silly!  Adjust your TV habits for less news and crime-drama and more comedy.    Control your stress  Try breathing deep, massage therapy, biofeedback, and meditation. Find time to relax each day.     My support system    Clinic Contact:  Phone number:    Contact 1:  Phone number:    Contact 2:  Phone number:    Mandaeism/:  Phone number:    Therapist:  Phone number:    Local crisis center:    Phone number:    Other community support:  Phone number:

## 2017-05-17 NOTE — TELEPHONE ENCOUNTER
Reason for Call:  Other     Detailed comments: Patient is wanting a order for a colonoscopy - Patient is wanting info on prep mailed to her    Phone Number Patient can be reached at: Home number on file [phone] (home)    Best Time:     Can we leave a detailed message on this number? YES    Call taken on 5/17/2017 at 1:53 PM by Marilou Martinez

## 2017-05-17 NOTE — PROGRESS NOTES
SUBJECTIVE:                                                            Ingrid Amaral is a 71 year old female who presents for Preventive Visit.    Are you in the first 12 months of your Medicare Part B coverage?  No    Healthy Habits:    Do you get at least three servings of calcium containing foods daily (dairy, green leafy vegetables, etc.)? yes    Amount of exercise or daily activities, outside of work: stretching more with back pain     Problems taking medications regularly No, though sometimes questions later on.     Medication side effects: No    Have you had an eye exam in the past two years? yes    Do you see a dentist twice per year? yes    Do you have sleep apnea, excessive snoring or daytime drowsiness?yes    Cognitive screening not completed as did another questionnaire form as some memory loss is a concern.     Constipation issues  Constipation frequently with intermittent diarrhea   Drinks prune juice and stool softeners - helpful  Still having dark red stools - has not made colonoscopy appointment yet      Back Pain  Still in right low back with sharp pain down leg into foot  Ibuprofen 600 mg two times daily for right sided low back pain with sciatica with tingling in feet  Medrol dose pack did not seem to help   Xray from prior visit negative with the exception of some scoliosis   No falls   Is active      Memory Issues  Still having some difficulty with memory around pills  Answers questions inappropriately - not associated with question   Will lose train of thought mid conversation  No speech difficulty or word finding difficulty   No difficulty swallowing or chewing   Feeling memory is improved since last office visit       Reviewed and updated as needed this visit by clinical staff  Tobacco  Allergies  Meds  Problems  Med Hx  Surg Hx  Fam Hx  Soc Hx          Reviewed and updated as needed this visit by Provider  Allergies  Meds  Problems        Social History   Substance Use Topics      Smoking status: Former Smoker     Packs/day: 0.50     Years: 3.00     Types: Cigarettes     Smokeless tobacco: Never Used      Comment: smoked for 3 years when she was around 19     Alcohol use Yes      Comment: RARELY       The patient does not drink >3 drinks per day nor >7 drinks per week.    Today's PHQ-2 Score:   PHQ-2 ( 1999 [employer]) 5/17/2017 4/15/2016   Q1: Little interest or pleasure in doing things 0 0   Q2: Feeling down, depressed or hopeless 0 0   PHQ-2 Score 0 0       Do you feel safe in your environment - Yes    Do you have a Health Care Directive?: No: Advance care planning reviewed with patient; information given to patient to review.    Current providers sharing in care for this patient include:   Patient Care Team:  Soha Sanchez APRN CNP as PCP - General (Nurse Practitioner - Family)  Ria Izquierdo LSW as Clinic Care Coordinator      Hearing impairment: No    Ability to successfully perform activities of daily living: Yes, no assistance needed     Fall risk:  Fallen 2 or more times in the past year?: No  Any fall with injury in the past year?: No    Home safety:  none identified    The following health maintenance items are reviewed in Epic and correct as of today:  Health Maintenance   Topic Date Due     FALL RISK ASSESSMENT  04/15/2017     MAMMO Q1 YR INBASKET MESSAGE  03/23/2017     INFLUENZA VACCINE (SYSTEM ASSIGNED)  09/01/2017     ASTHMA CONTROL TEST Q6 MOS (NO INBASKET)  09/09/2017     PHQ-9 Q6 MONTHS (NO INBASKET)  11/02/2017     ASTHMA ACTION PLAN Q1 YR (NO INBASKET)  05/08/2018     DEPRESSION ACTION PLAN Q1 YR (NO INBASKET)  05/17/2018     COLONOSCOPY Q5 YR INBASKET MESSAGE  03/20/2020     TETANUS IMMUNIZATION (SYSTEM ASSIGNED)  12/23/2021     LIPID SCREEN Q5 YR FEMALE (SYSTEM ASSIGNED)  03/09/2022     ADVANCE DIRECTIVE PLANNING Q5 YRS (NO INBASKET)  05/17/2022     DEXA SCAN SCREENING (SYSTEM ASSIGNED)  Completed     PNEUMOCOCCAL  Completed         Mammogram Screening:  "Patient over age 50, mutual decision to screen reflected in health maintenance.     ROS:  C: NEGATIVE for fever, chills, change in weight  I: NEGATIVE for worrisome rashes, moles or lesions  E: NEGATIVE for vision changes or irritation  E/M: NEGATIVE for ear, mouth and throat problems  R: NEGATIVE for significant cough or SOB  B: NEGATIVE for masses, tenderness or discharge  CV: NEGATIVE for palpitations or peripheral edema; POSITIVE for intermittent chest pain/chest pressure 2 x/month  GI: NEGATIVE for nausea, abdominal pain, heartburn, or change in bowel habits  : NEGATIVE for frequency, dysuria, or hematuria  MUSCULOSKELETAL: POSITIVE  For right back pain, paresthesias and radicular pain down to foot on right   N: NEGATIVE for weakness or dizziness; POSITIVE for memory problems, paresthesias right foot and radicular pain right leg  E: NEGATIVE for temperature intolerance, skin/hair changes  H: NEGATIVE for bleeding problems  P: NEGATIVE for changes in mood or affect; POSITIVE forimpaired memory    Problem list, Medication list, Allergies, and Medical/Social/Surgical histories reviewed in [facility] and updated as appropriate.  Labs reviewed in EPIC  OBJECTIVE:                                                            /70 (BP Location: Right arm, Cuff Size: Adult Regular)  Pulse 80  Temp 97.9  F (36.6  C) (Tympanic)  Wt 128 lb (58.1 kg)  BMI 22.67 kg/m2 Estimated body mass index is 22.67 kg/(m^2) as calculated from the following:    Height as of 3/9/17: 5' 3\" (1.6 m).    Weight as of this encounter: 128 lb (58.1 kg).  EXAM:   GENERAL APPEARANCE: healthy, alert and no distress  EYES: Eyes grossly normal to inspection, PERRL and conjunctivae and sclerae normal  HENT: ear canals and TM's normal, nose and mouth without ulcers or lesions, oropharynx clear and oral mucous membranes moist  NECK: no adenopathy, no asymmetry, masses, or scars and thyroid normal to palpation  RESP: lungs clear to auscultation - no " rales, rhonchi or wheezes  BREAST: normal without masses, tenderness or nipple discharge and no palpable axillary masses or adenopathy  CV: regular rate and rhythm, normal S1 S2, no S3 or S4, no murmur, click or rub, no peripheral edema and peripheral pulses strong  ABDOMEN: soft, nontender, no hepatosplenomegaly, no masses and bowel sounds normal  MS: no musculoskeletal defects are noted and gait is age appropriate without ataxia, range of motion normal in all extremities, lumbar range of motion normal without pain, peripheral pulses normal, negative straight leg raises  SKIN: no suspicious lesions or rashes  NEURO: Normal strength and tone, sensory exam grossly normal, mentation intact and speech normal, cranial nerves 2-12 intact. Mini mental exam done in office, patient scored 30/30.   PSYCH: mentation appears normal and affect normal/bright and tangential    ASSESSMENT / PLAN:                                                            1. Encounter for routine adult health examination with abnormal findings  Healthy appearing adult exam without any acute complaints. Has been somewhat concerned with memory recently. Is active and healthy otherwise     2. Hypertension goal BP (blood pressure) < 140/90  Started on Amlodipine early May, blood pressure stable in office today. No changes.     3. Chest pain, unspecified type  Intermittent sharp chest pain lasting seconds, not associated with any other symptoms of dizziness, lightheadedness, or shortness of breath. Chest sore after. Gets approximately 2 x/month. No cardiac history, recently started on Zocor. Had a short episode chest pain at the end of the office visit, not associated with any other symptoms. Denied any shortness of breath, dizziness or lightheadedness. EKG normal, pain dissipated by end of visit. Patient to update me if having more episodes and may do a holter monitor to evaluate further.   - EKG 12-lead complete w/read - Clinics  -Stop Ibuprofen    4.  Memory difficulties  Having forgetful episodes more often. Admits to forgetting if she takes her pills or not and losing thoughts mid conversation. Also not always responding to questions with appropriate answers. Fears she may have some Alzheimer's, mother had Alzheimer's. Will get labs, set up care coordination to ensure patient has a good setup at home with medications and if needed may refer to neurology for further assessment. Does seem to be improved since last office visit   - Vitamin B12  - Methylmalonic acid  - CARE COORDINATION REFERRAL    5. Chronic right-sided low back pain with right-sided sciatica  Ongoing, recently treated with Medrol dose pack, patient states didn't help much. Shooting pains down to lower leg. Tingling in foot occassionally. Normal gait and range of motion. Will get MRI   - cyclobenzaprine (FLEXERIL) 10 MG tablet; Take 0.5-1 tablets (5-10 mg) by mouth 3 times daily as needed for muscle spasms  Dispense: 30 tablet; Refill: 1  - MR Lumbar Spine w/o Contrast; Future  - gabapentin (NEURONTIN) 300 MG capsule; Take 1 tablet (300 mg) every night  Dispense: 60 capsule; Refill: 0  - ORTHO  REFERRAL  - MRI shows significant stenosis through out with disc extrusion compression nerve root at S1 - patient needs to see spine surgeon and start Neurontin    6. Lumbar radiculopathy  - MR Lumbar Spine w/o Contrast; Future  - gabapentin (NEURONTIN) 300 MG capsule; Take 1 tablet (300 mg) every night  Dispense: 60 capsule; Refill: 0  - ORTHO  REFERRAL    7. Moderate persistent asthma without complication  Stable, no change in plan of care     8. Elevated cholesterol with elevated triglycerides  Started on Zocor in March, will recheck lipids in 2 months     9. Vaginal atrophy  Stable, uses estrace. No change   - estradiol (ESTRACE) 0.1 MG/GM cream; Use 1 gm every day and spread a small amount around the clitoral dodd  Dispense: 85 g; Refill: 1    10. Labial and clitoral adhesions,  "acquired  Stable, no change in treatment plan   - estradiol (ESTRACE) 0.1 MG/GM cream; Use 1 gm every day and spread a small amount around the clitoral dodd  Dispense: 85 g; Refill: 1    11. Advanced directives, counseling/discussion  Patient and  have documents at home and have not finished filling them out. Encouraged her to finish these with  and let the clinic know if they need any assistance.       End of Life Planning:  Patient currently has an advanced directive: No.  I have verified the patient's ablity to prepare an advanced directive/make health care decisions.Patient has information at home for preparing an advanced directive.    COUNSELING:  Reviewed preventive health counseling, as reflected in patient instructions       Regular exercise       Healthy diet/nutrition       Colon cancer screening       Advanced Planning         Estimated body mass index is 22.67 kg/(m^2) as calculated from the following:    Height as of 3/9/17: 5' 3\" (1.6 m).    Weight as of this encounter: 128 lb (58.1 kg).     reports that she has quit smoking. Her smoking use included Cigarettes. She has a 1.50 pack-year smoking history. She has never used smokeless tobacco.      Appropriate preventive services were discussed with this patient, including applicable screening as appropriate for cardiovascular disease, diabetes, osteopenia/osteoporosis, and glaucoma.  As appropriate for age/gender, discussed screening for colorectal cancer, prostate cancer, breast cancer, and cervical cancer. Checklist reviewing preventive services available has been given to the patient.    Reviewed patients plan of care and provided an AVS. The Basic Care Plan (routine screening as documented in Health Maintenance) for Ingrid meets the Care Plan requirement. This Care Plan has been established and reviewed with the Patient.    Counseling Resources:  ATP IV Guidelines  Pooled Cohorts Equation Calculator  Breast Cancer Risk Calculator  FRAX " Risk Assessment  ICSI Preventive Guidelines  Dietary Guidelines for Americans, 2010  USDA's MyPlate  ASA Prophylaxis  Lung CA Screening    MARVIN Mas CNP  [facility]

## 2017-05-17 NOTE — PATIENT INSTRUCTIONS
Increase water intake and fiber in diet to help with constipation    Constipation can increase back pain - keep stools soft      Care Coordination should be contacting you to set up a time for someone to come out and evaluate your medication set up and ensure you have a good set up for taking these    Schedule MRI of back -try to get in before you go on your trip - will call you with results     START Steroid pack for your sciatic pain - follow package direction exactly on how to take    Lab work today and will notify you of results     Depending on lab work - may refer to neurology for an in depth evaluation of memory    Schedule Colonoscopy for when you get back from your trip    Schedule a mammogram for when you get back as well     EKG today looks normal - I want you to make sure that you're taking your Omeprazole and return to ER if having chest pain     Return to clinic in 2 months       Preventive Health Recommendations    Female Ages 65 +    Yearly exam:     See your health care provider every year in order to  o Review health changes.   o Discuss preventive care.    o Review your medicines if your doctor has prescribed any.      You no longer need a yearly Pap test unless you've had an abnormal Pap test in the past 10 years. If you have vaginal symptoms, such as bleeding or discharge, be sure to talk with your provider about a Pap test.      Every 1 to 2 years, have a mammogram.  If you are over 69, talk with your health care provider about whether or not you want to continue having screening mammograms.      Every 10 years, have a colonoscopy. Or, have a yearly FIT test (stool test). These exams will check for colon cancer.       Have a cholesterol test every 5 years, or more often if your doctor advises it.       Have a diabetes test (fasting glucose) every three years. If you are at risk for diabetes, you should have this test more often.       At age 65, have a bone density scan (DEXA) to check for  osteoporosis (brittle bone disease).    Shots:    Get a flu shot each year.    Get a tetanus shot every 10 years.    Talk to your doctor about your pneumonia vaccines. There are now two you should receive - Pneumovax (PPSV 23) and Prevnar (PCV 13).    Talk to your doctor about the shingles vaccine.    Talk to your doctor about the hepatitis B vaccine.    Nutrition:     Eat at least 5 servings of fruits and vegetables each day.      Eat whole-grain bread, whole-wheat pasta and brown rice instead of white grains and rice.      Talk to your provider about Calcium and Vitamin D.     Lifestyle    Exercise at least 150 minutes a week (30 minutes a day, 5 days a week). This will help you control your weight and prevent disease.      Limit alcohol to one drink per day.      No smoking.       Wear sunscreen to prevent skin cancer.       See your dentist twice a year for an exam and cleaning.      See your eye doctor every 1 to 2 years to screen for conditions such as glaucoma, macular degeneration and cataracts.      [facility] Mammo Schedule  [facility] ~ [phone]  One Day Weekly- Alternating Days    [place] ~ [phone]  Every other Monday or Wednesday   & one Saturday morning a month    [place] ~ [phone]  Every Other Monday Morning    [place] ~ [phone]  Every Other Monday Afternoon    [place] ~ [phone]  Every Monday morning  Every Tuesday afternoon       Wed, Thurs, Friday morning & afternoon    High Fiber Diet  Fiber is present in fruits, vegetables, cereals, and grains. Fiber passes through the body undigested. A high fiber diet helps food move through the intestinal tract. The added bulk is helpful in preventing constipation. In people with diverticulosis it helps clean out the pouches along the colon wall and prevents new ones from forming. A high fiber diet also reduces the risk of colon cancer, decreases blood cholesterol, and prevents high blood sugar in people with diabetes.    The  foods listed below are high in fiber and should be included in your diet. If you are not used to high fiber foods, start with 1 or 2 foods from this list. Every 3 to 4 days add a new one to your diet until you are eating 4 high fiber foods per day. This should give you 20 to 35 grams of fiber/day. It is also important to drink a lot of water when you are on this diet (6 to 8 glasses a day). Water causes the fiber to swell and increases the benefit.  Foods high in dietary fiber  The following foods are high in dietary fiber:    Breads. Breads made with 100% whole wheat flour; tahira, wheat, or rye crackers; whole grain tortillas, bran muffins.    Cereals. Whole grain and bran cereals with bran (shredded wheat, wheat flakes, raisin bran, corn bran), oatmeal, rolled oats, granola, and brown rice.    Nuts. Any nuts and seeds.    Fruits. Fresh fruits along with edible skins (bananas, citrus fruit, mangoes, pears, prunes, raisins, berries, apples, pineapple, and apricots) and prune juice.    Vegetables. All types, preferably raw or lightly cooked: especially green peas, celery, eggplant, potatoes, spinach, broccoli, brussels sprouts, winter squash, carrots, cauliflower, soybeans, lentils, fresh and dried beans of all kinds.    Other. Popcorn, any spices.    1479-2221 [employer]. [address] [place] [address], [place] [address]. All rights reserved. This information is not intended as a substitute for professional medical care. Always follow your healthcare professional's instructions.

## 2017-05-17 NOTE — MR AVS SNAPSHOT
After Visit Summary   5/17/2017    Ingrid Amaral    MRN: [id number]           Patient Information     Date Of Birth          1945        Visit Information        Provider Department      5/17/2017 9:00 AM Soha Sanchez APRN [facility]        Today's Diagnoses     Memory difficulties    -  1    Chronic right-sided low back pain with right-sided sciatica        Moderate persistent asthma without complication        Elevated cholesterol with elevated triglycerides        Vaginal atrophy        Labial and clitoral adhesions, acquired        Lumbar radiculopathy        Chest pain, unspecified type          Care Instructions    Increase water intake and fiber in diet to help with constipation    Constipation can increase back pain - keep stools soft      Care Coordination should be contacting you to set up a time for someone to come out and evaluate your medication set up and ensure you have a good set up for taking these    Schedule MRI of back -try to get in before you go on your trip - will call you with results     START Steroid pack for your sciatic pain - follow package direction exactly on how to take    Lab work today and will notify you of results     Depending on lab work - may refer to neurology for an in depth evaluation of memory    Schedule Colonoscopy for when you get back from your trip    Schedule a mammogram for when you get back as well     EKG today looks normal - I want you to make sure that you're taking your Omeprazole and return to ER if having chest pain     Return to clinic in 2 months       Preventive Health Recommendations    Female Ages 65 +    Yearly exam:     See your health care provider every year in order to  o Review health changes.   o Discuss preventive care.    o Review your medicines if your doctor has prescribed any.      You no longer need a yearly Pap test unless you've had an abnormal Pap test in the past 10 years. If you have  vaginal symptoms, such as bleeding or discharge, be sure to talk with your provider about a Pap test.      Every 1 to 2 years, have a mammogram.  If you are over 69, talk with your health care provider about whether or not you want to continue having screening mammograms.      Every 10 years, have a colonoscopy. Or, have a yearly FIT test (stool test). These exams will check for colon cancer.       Have a cholesterol test every 5 years, or more often if your doctor advises it.       Have a diabetes test (fasting glucose) every three years. If you are at risk for diabetes, you should have this test more often.       At age 65, have a bone density scan (DEXA) to check for osteoporosis (brittle bone disease).    Shots:    Get a flu shot each year.    Get a tetanus shot every 10 years.    Talk to your doctor about your pneumonia vaccines. There are now two you should receive - Pneumovax (PPSV 23) and Prevnar (PCV 13).    Talk to your doctor about the shingles vaccine.    Talk to your doctor about the hepatitis B vaccine.    Nutrition:     Eat at least 5 servings of fruits and vegetables each day.      Eat whole-grain bread, whole-wheat pasta and brown rice instead of white grains and rice.      Talk to your provider about Calcium and Vitamin D.     Lifestyle    Exercise at least 150 minutes a week (30 minutes a day, 5 days a week). This will help you control your weight and prevent disease.      Limit alcohol to one drink per day.      No smoking.       Wear sunscreen to prevent skin cancer.       See your dentist twice a year for an exam and cleaning.      See your eye doctor every 1 to 2 years to screen for conditions such as glaucoma, macular degeneration and cataracts.      [facility] Mammo Schedule  [facility] ~ [phone]  One Day Weekly- Alternating Days    [place] ~ [phone]  Every other Monday or Wednesday   & one Saturday morning a month    [place] ~ [phone]  Every Other Monday  Morning    [place] ~ [phone]  Every Other Monday Afternoon    [place] ~ [phone]  Every Monday morning  Every Tuesday afternoon       Wed, Thurs, Friday morning & afternoon    High Fiber Diet  Fiber is present in fruits, vegetables, cereals, and grains. Fiber passes through the body undigested. A high fiber diet helps food move through the intestinal tract. The added bulk is helpful in preventing constipation. In people with diverticulosis it helps clean out the pouches along the colon wall and prevents new ones from forming. A high fiber diet also reduces the risk of colon cancer, decreases blood cholesterol, and prevents high blood sugar in people with diabetes.    The foods listed below are high in fiber and should be included in your diet. If you are not used to high fiber foods, start with 1 or 2 foods from this list. Every 3 to 4 days add a new one to your diet until you are eating 4 high fiber foods per day. This should give you 20 to 35 grams of fiber/day. It is also important to drink a lot of water when you are on this diet (6 to 8 glasses a day). Water causes the fiber to swell and increases the benefit.  Foods high in dietary fiber  The following foods are high in dietary fiber:    Breads. Breads made with 100% whole wheat flour; tahira, wheat, or rye crackers; whole grain tortillas, bran muffins.    Cereals. Whole grain and bran cereals with bran (shredded wheat, wheat flakes, raisin bran, corn bran), oatmeal, rolled oats, granola, and brown rice.    Nuts. Any nuts and seeds.    Fruits. Fresh fruits along with edible skins (bananas, citrus fruit, mangoes, pears, prunes, raisins, berries, apples, pineapple, and apricots) and prune juice.    Vegetables. All types, preferably raw or lightly cooked: especially green peas, celery, eggplant, potatoes, spinach, broccoli, brussels sprouts, winter squash, carrots, cauliflower, soybeans, lentils, fresh and dried beans of all kinds.    Other. Popcorn,  any spices.    6386-9959 [employer]. [address] [place] [address], [place] [address]. All rights reserved. This information is not intended as a substitute for professional medical care. Always follow your healthcare professional's instructions.              Follow-ups after your visit        Additional Services     CARE COORDINATION REFERRAL       Services are provided by a Care Coordinator for people with complex needs such as: medical, social, or financial troubles.  The Care Coordinator works with the patient and their Primary Care Provider to determine health goals, obtain resources, achieve outcomes, and develop care plans that help coordinate the patient's care.     Reason for Referral: Medication/Treatment Adherence Issues    Provide additional details for Care Coordination to best meet the patient's current needs: Patient is having some memory difficulties and shows some signs of confusion with medications. I want to make sure that patient has a good setup or someone to set up meds at home to ensure she is taking correctly.    Clinical Staff have discussed the Care Coordination Referral with the patient and/or caregiver: yes                  Future tests that were ordered for you today     Open Future Orders        Priority Expected Expires Ordered    MR Lumbar Spine w/o Contrast Routine  5/17/2018 5/17/2017            Who to contact     If you have questions or need follow up information about today's clinic visit or your schedule please contact [facility] directly at [phone].  Normal or non-critical lab and imaging results will be communicated to you by MyChart, letter or phone within 4 business days after the clinic has received the results. If you do not hear from us within 7 days, please contact the clinic through [employer] or phone. If you have a critical or abnormal lab result, we will notify you by phone as soon as possible.  Submit refill requests through "[employer]t or  "call your pharmacy and they will forward the refill request to us. Please allow 3 business days for your refill to be completed.          Additional Information About Your Visit        MyChart Information     [employer] lets you send messages to your doctor, view your test results, renew your prescriptions, schedule appointments and more. To sign up, go to [email or web address] . Click on \"Log in\" on the left side of the screen, which will take you to the Welcome page. Then click on \"Sign up Now\" on the right side of the page.     You will be asked to enter the access code listed below, as well as some personal information. Please follow the directions to create your username and password.     Your access code is: [email or web address]  Expires: 2017  9:52 AM     Your access code will  in 90 days. If you need help or a new code, please call your [facility] clinic or [phone].        Care EveryWhere ID     This is your Care EveryWhere ID. This could be used by other organizations to access your [facility] medical records  [id number]        Your Vitals Were     Pulse Temperature BMI (Body Mass Index)             80 97.9  F (36.6  C) (Tympanic) 22.67 kg/m2          Blood Pressure from Last 3 Encounters:   17 132/70   17 (!) (P) 158/98   17 (!) 154/96    Weight from Last 3 Encounters:   17 128 lb (58.1 kg)   17 131 lb 9.6 oz (59.7 kg)   17 129 lb 6.4 oz (58.7 kg)              We Performed the Following     CARE COORDINATION REFERRAL     DEPRESSION ACTION PLAN (DAP)     EKG 12-lead complete w/read - Clinics     Methylmalonic acid     Vitamin B12          Today's Medication Changes          These changes are accurate as of: 17 10:25 AM.  If you have any questions, ask your nurse or doctor.               Start taking these medicines.        Dose/Directions    methylPREDNISolone 4 MG tablet   Commonly known as:  MEDROL DOSEPAK   Used for:  Chronic right-sided low back pain with " right-sided sciatica, Lumbar radiculopathy   Started by:  Soha Sanchez APRN CNP        Follow package instructions   Quantity:  21 tablet   Refills:  0            Where to get your medicines      These medications were sent to [facility] [address] [address]     Phone:  [phone]     cyclobenzaprine 10 MG tablet    estradiol 0.1 MG/GM cream    methylPREDNISolone 4 MG tablet                Primary Care Provider Office Phone # Fax #    MARVIN Diaz [phone]       [facility] [address] [place] [address]        Thank you!     Thank you for choosing [facility]  for your care. Our goal is always to provide you with excellent care. Hearing back from our patients is one way we can continue to improve our services. Please take a few minutes to complete the written survey that you may receive in the mail after your visit with us. Thank you!             Your Updated Medication List - Protect others around you: Learn how to safely use, store and throw away your medicines at www.disposemymeds.org.          This list is accurate as of: 5/17/17 10:25 AM.  Always use your most recent med list.                   Brand Name Dispense Instructions for use    * albuterol (5 MG/ML) 0.5% neb solution    PROVENTIL    30 vial    Take 0.5 mLs (2.5 mg) by nebulization every 4 hours as needed for wheezing or shortness of breath / dyspnea       * albuterol 108 (90 BASE) MCG/ACT Inhaler    PROAIR HFA/PROVENTIL HFA/VENTOLIN HFA    1 Inhaler    Inhale 2 puffs into the lungs every 4 hours as needed for shortness of breath / dyspnea       amLODIPine 5 MG tablet    NORVASC    30 tablet    Take 1 tablet (5 mg) by mouth daily       ASPIRIN PO      Take 81 mg by mouth Takes every other day       BENEFIBER PO      2 tsp daily       calcipotriene 0.005 % Oint      Externally apply topically  2 times daily       calcium citrate + Tabs      Reported on 5/17/2017       cetirizine 10 MG tablet    zyrTEC    90 tablet    Take 1 tablet (10 mg) by mouth every evening       clobetasol 0.05 % ointment    TEMOVATE    30 g    Apply to area of irritation twice daily for 2 weeks       cyclobenzaprine 10 MG tablet    FLEXERIL    30 tablet    Take 0.5-1 tablets (5-10 mg) by mouth 3 times daily as needed for muscle spasms       desonide 0.05 % lotion    DESOWEN    59 mL    Apply topically 2 times daily Reported on 5/2/2017       estradiol 0.1 MG/GM cream    ESTRACE    85 g    Use 1 gm every day and spread a small amount around the clitoral dodd       FLAXSEED      Reported on 5/2/2017       fluticasone 50 MCG/ACT spray    FLONASE     Spray 1 spray into both nostrils daily       fluticasone-salmeterol 250-50 MCG/DOSE diskus inhaler    ADVAIR    1 Inhaler    Inhale 1 puff into the lungs 2 times daily       guaiFENesin 600 MG 12 hr tablet    MUCINEX    40 tablet    Take 2 tablets (1,200 mg) by mouth 2 times daily as needed To see if helps phlegm.       hydrocortisone 1 % cream    CORTAID     Apply topically as needed       lactase 9000 UNITS Tabs tablet    LACTAID     Take 9,000 Units by mouth as needed       methylPREDNISolone 4 MG tablet    MEDROL DOSEPAK    21 tablet    Follow package instructions       montelukast 10 MG tablet    SINGULAIR    30 tablet    Take 1 tablet (10 mg) by mouth daily       omeprazole 20 MG CR capsule    priLOSEC    180 capsule    Take 1 capsule (20 mg) by mouth 2 times daily For heartburn       order for DME     1 Device    Equipment being ordered: Nebulizer and tubing/filter       PROBIOTIC DAILY PO      Take 1 chew tab by mouth 2 times daily       sertraline 100 MG tablet    ZOLOFT    90 tablet    Take 1.5 tablets (150 mg) by mouth daily       simvastatin 40 MG tablet    ZOCOR    90 tablet    Take 1 tablet (40 mg) by mouth At Bedtime       SKIN OILS EX      Lavender-headache, skin,  peppermint-skin, upset stomach mix of oils, asthma mix of oils, eucalyptus. Tea tree oil-skin, vapor rub- chest tightness, sleepy time (vetiver, lavendaer, mrjoram, mandarin, ylang)- sleep, constipation, eczema (sweet almond oil). Also mixes with carriers: Coconut oil, [place] oil, Extra virgin oil. Frankincense- cough. Digize- oil. Purification- oil, lemon- oil. Essentialyme- pill.  Inhales a mix with olive oil almond and coconut oil with peppermint, and eucalyptus- sinus, allergies. Updated 12/1/2015.  Updated 4/26/2016: Lemon, Cinnamon bark, panaway, Thieves, Orange, Wintergreen, Copaiba       sodium chloride 0.65 % nasal spray    OCEAN     Spray 1 spray in nostril daily as needed Reported on 5/17/2017       triamcinolone acetonide 0.05 % Oint      Externally apply  topically.       VITAMIN D (CHOLECALCIFEROL) PO      Take 2,400 Units by mouth daily       * Notice:  This list has 2 medication(s) that are the same as other medications prescribed for you. Read the directions carefully, and ask your doctor or other care provider to review them with you.

## 2017-05-18 ENCOUNTER — CARE COORDINATION (OUTPATIENT)
Dept: CARE COORDINATION | Facility: CLINIC | Age: 72
End: 2017-05-18

## 2017-05-18 ENCOUNTER — HOSPITAL ENCOUNTER (OUTPATIENT)
Dept: MRI IMAGING | Facility: CLINIC | Age: 72
Discharge: HOME | End: 2017-05-18
Attending: NURSE PRACTITIONER | Admitting: NURSE PRACTITIONER
Payer: MEDICARE

## 2017-05-18 DIAGNOSIS — M54.16 LUMBAR RADICULOPATHY: ICD-10-CM

## 2017-05-18 DIAGNOSIS — M54.41 CHRONIC RIGHT-SIDED LOW BACK PAIN WITH RIGHT-SIDED SCIATICA: ICD-10-CM

## 2017-05-18 DIAGNOSIS — G89.29 CHRONIC RIGHT-SIDED LOW BACK PAIN WITH RIGHT-SIDED SCIATICA: ICD-10-CM

## 2017-05-18 PROBLEM — I10 HYPERTENSION GOAL BP (BLOOD PRESSURE) < 140/90: Status: ACTIVE | Noted: 2017-05-18

## 2017-05-18 PROBLEM — I10 BENIGN ESSENTIAL HYPERTENSION: Status: RESOLVED | Noted: 2017-05-02 | Resolved: 2017-05-18

## 2017-05-18 LAB — METHYLMALONATE SERPL-SCNC: 0.1

## 2017-05-18 PROCEDURE — 72148 MRI LUMBAR SPINE W/O DYE: CPT

## 2017-05-18 RX ORDER — GABAPENTIN 300 MG/1
CAPSULE ORAL
Qty: 60 CAPSULE | Refills: 0 | Status: SHIPPED | OUTPATIENT
Start: 2017-05-18 | End: 2017-06-16

## 2017-05-18 NOTE — PROGRESS NOTES
Clinic Care Coordination Contact  Care Team Conversations    Initial outreach made to pt.  Pt is planning to be going out of town for a month and will be returning on 6-14-17.  She did feel that she is managing her medications ok and sets them up herself.  She is not opposed to getting someone support to make sure she is doing this right yet wants to wait until after she returns to further discuss.     Her spouse was on the call yet was not asked if he had concerns.  She did agree to sign an authorization to discuss protected health information form and one will be sent.    Plan:  Sw to send consent to communicate form and call in about 4-5 weeks when return to [place].      EM Mcfarlane, Clinic Care Coordinator 5/18/2017   10:13 AM  [phone]

## 2017-05-18 NOTE — LETTER
[address] [address]  [phone]      May 18, 2017      Ingrid Amaral  [address] [facility] [address]        Dear Ingrid,    I am the Clinic Care Coordinator that works with your primary care provider's clinic. I wanted to thank you for spending the time to talk with me.  Below is a description of what Clinic Care Coordination is and how I can further assist you.     The Clinic Care Coordinator role is a Registered Nurse and/or  who understands the health care system. The goal of Clinic Care Coordination is to help you manage your health and improve access to the [facility] in the most efficient manner.  The Registered Nurse can assist you in meeting your health care goals by providing education, coordinating services, and strengthening the communication among your providers. The  can assist you with financial, behavioral, psychosocial, and chemical dependency and counseling/psychiatric resources.    Please feel free to keep this letter and contact information to contact me at [phone] with any further questions or concerns that may arise. We at [facility] are focused on providing you with the highest-quality healthcare experience possible and that all starts with you.       Sincerely,       Ria Izquierdo, \[facility]\""  Clinic Care Coordination  [phone]      Enclosed: I have enclosed a copy of a 24 Hour Access Plan. This has helpful phone numbers for you to call when needed. Please keep this in an easy to access place to use as needed. and I have enclosed an Authorization to Discuss Protected Health Information form. Please review, fill out, sign and send back to me so I can make sure we have this on file to be able to talk to family/friends if you would like us to be able to.

## 2017-05-18 NOTE — LETTER
Health Care Home - Access Care Plan    About Me  Patient Name:  Ingrid Yancey    YOB: 1945  Age:                            71 year old   Cassandra MRN:         [id number] Telephone Information:     Home Phone [phone]   Mobile Not on file.       Address:    [address] [place] [address] Email address:  [email or web address]      Emergency Contact(s)  Name Relationship Lgl Grd Work Phone Home Phone Mobile Phone   1. BINDU YANCEY Spouse  none [phone] none   2. KORINA SILVESTRE Daughter  none [phone]             Health Maintenance: Routine Health maintenance Reviewed: Not assessed    My Access Plan  Medical Emergency 911   Questions or concerns during clinic hours Primary Clinic Line, I will call the clinic directly: Primary Clinic: [facility]- [phone]   24 Hour Appointment Line [phone] or  [phone] ([phone])  (toll free)   24 Hour Nurse Line [phone] (toll free)   Questions or concerns outside clinic hours 24 Hour Appointment Line, I will call the after-hours on-call line:   [facility] [phone] or [phone] ([phone]) (toll-free)   Preferred Urgent Care Preferred Urgent Care: [facility], [phone]   Preferred Hospital Preferred Hospital: [place], [place]  [phone]   Preferred Pharmacy [facility] [address] Behavioral Health Crisis Line [facility], [phone] or 911     My Care Team Members  Patient Care Team       Relationship Specialty Notifications Start End    Soha Sanchez APRN CNP PCP - General Nurse Practitioner - Family  5/2/17     Phone: [phone] Fax: [phone]         [facility] [address] [facility] [address]    Ria Izquierdo LSW Clinic Care Coordinator  Admissions 5/18/17     Phone: [phone] Fax: [phone]            My Medical and Care  Information  Problem List   Patient Active Problem List   Diagnosis     Diarrhea     Pure hypercholesterolemia     Allergic rhinitis     Panic disorder without agoraphobia     Advanced directives, counseling/discussion     Liver lesion     Generalized anxiety disorder     Mild major depression (H)     Vulvitis     Vaginal wall prolapse     Chronic constipation     Moderate persistent asthma     Dysphagia     RBD (REM behavioral disorder)     FH: colon cancer     Benign essential hypertension      Current Medications and Allergies:  See printed Medication Report

## 2017-05-19 ENCOUNTER — ANESTHESIA EVENT (OUTPATIENT)
Dept: GASTROENTEROLOGY | Facility: CLINIC | Age: 72
End: 2017-05-19
Payer: MEDICARE

## 2017-05-19 ASSESSMENT — LIFESTYLE VARIABLES: TOBACCO_USE: 1

## 2017-05-19 NOTE — ANESTHESIA PREPROCEDURE EVALUATION
Anesthesia Evaluation     . Pt has had prior anesthetic. Type: General and MAC           ROS/MED HX    ENT/Pulmonary:     (+)allergic rhinitis, tobacco use, Past use Mild Persistent asthma Treatment: Inhaler prn and Inhaled steroids,  , . .    Neurologic:  - neg neurologic ROS     Cardiovascular:     (+) Dyslipidemia, hypertension----. : . . . :. . Previous cardiac testing date:results:date: results:ECG reviewed date:5-2017 results:Sinus Rhythm   -Right bundle branch block.     ABNORMAL date: results:          METS/Exercise Tolerance:  >4 METS   Hematologic:  - neg hematologic  ROS       Musculoskeletal:  - neg musculoskeletal ROS       GI/Hepatic:     (+) GERD Asymptomatic on medication, Other GI/Hepatic dark red stool, diarrhea, liver lesion      Renal/Genitourinary:  - ROS Renal section negative       Endo:  - neg endo ROS       Psychiatric: Comment:   Panic disorder without agoraphobia     (+) psychiatric history anxiety, depression and other (comment)      Infectious Disease:  - neg infectious disease ROS       Malignancy:      - no malignancy   Other:    - neg other ROS                 Physical Exam  Normal systems: cardiovascular, pulmonary and dental    Airway   Mallampati: I  TM distance: >3 FB  Neck ROM: full    Dental     Cardiovascular       Pulmonary                     Anesthesia Plan      History & Physical Review  History and physical reviewed and following examination; no interval change.    ASA Status:  3 .    NPO Status:  > 4 hours    Plan for MAC Reason for MAC:  Deep or markedly invasive procedure (G8)         Postoperative Care      Consents  Anesthetic plan, risks, benefits and alternatives discussed with:  Patient..                          .

## 2017-05-20 ENCOUNTER — RADIANT APPOINTMENT (OUTPATIENT)
Dept: MAMMOGRAPHY | Facility: CLINIC | Age: 72
End: 2017-05-20
Attending: NURSE PRACTITIONER
Payer: COMMERCIAL

## 2017-05-20 DIAGNOSIS — Z12.31 VISIT FOR SCREENING MAMMOGRAM: ICD-10-CM

## 2017-05-20 PROCEDURE — G0202 SCR MAMMO BI INCL CAD: HCPCS | Mod: TC

## 2017-05-22 ENCOUNTER — ANESTHESIA (OUTPATIENT)
Dept: GASTROENTEROLOGY | Facility: CLINIC | Age: 72
End: 2017-05-22
Payer: MEDICARE

## 2017-05-22 ENCOUNTER — SURGERY (OUTPATIENT)
Age: 72
End: 2017-05-22

## 2017-05-22 ENCOUNTER — HOSPITAL ENCOUNTER (OUTPATIENT)
Facility: CLINIC | Age: 72
Discharge: HOME | End: 2017-05-22
Attending: SURGERY | Admitting: SURGERY
Payer: MEDICARE

## 2017-05-22 VITALS
RESPIRATION RATE: 16 BRPM | HEART RATE: 93 BPM | TEMPERATURE: 97.4 F | BODY MASS INDEX: 22.68 KG/M2 | OXYGEN SATURATION: 100 % | WEIGHT: 128 LBS | DIASTOLIC BLOOD PRESSURE: 79 MMHG | SYSTOLIC BLOOD PRESSURE: 147 MMHG | HEIGHT: 63 IN

## 2017-05-22 LAB — COLONOSCOPY: NORMAL

## 2017-05-22 PROCEDURE — 25000128 H RX IP 250 OP 636: Performed by: SURGERY

## 2017-05-22 PROCEDURE — 25000125 ZZHC RX 250: Performed by: SURGERY

## 2017-05-22 PROCEDURE — G0121 COLON CA SCRN NOT HI RSK IND: HCPCS | Performed by: SURGERY

## 2017-05-22 PROCEDURE — 45378 DIAGNOSTIC COLONOSCOPY: CPT | Performed by: SURGERY

## 2017-05-22 PROCEDURE — 25000125 ZZHC RX 250: Performed by: NURSE ANESTHETIST, CERTIFIED REGISTERED

## 2017-05-22 PROCEDURE — 37000008 ZZH ANESTHESIA TECHNICAL FEE, 1ST 30 MIN: Performed by: SURGERY

## 2017-05-22 RX ORDER — GLYCOPYRROLATE 0.2 MG/ML
INJECTION, SOLUTION INTRAMUSCULAR; INTRAVENOUS PRN
Status: DISCONTINUED | OUTPATIENT
Start: 2017-05-22 | End: 2017-05-22

## 2017-05-22 RX ORDER — LIDOCAINE HYDROCHLORIDE 10 MG/ML
INJECTION, SOLUTION INFILTRATION; PERINEURAL PRN
Status: DISCONTINUED | OUTPATIENT
Start: 2017-05-22 | End: 2017-05-22

## 2017-05-22 RX ORDER — ONDANSETRON 2 MG/ML
4 INJECTION INTRAMUSCULAR; INTRAVENOUS
Status: DISCONTINUED | OUTPATIENT
Start: 2017-05-22 | End: 2017-05-22 | Stop reason: HOSPADM

## 2017-05-22 RX ORDER — SODIUM CHLORIDE, SODIUM LACTATE, POTASSIUM CHLORIDE, CALCIUM CHLORIDE 600; 310; 30; 20 MG/100ML; MG/100ML; MG/100ML; MG/100ML
INJECTION, SOLUTION INTRAVENOUS CONTINUOUS
Status: DISCONTINUED | OUTPATIENT
Start: 2017-05-22 | End: 2017-05-22 | Stop reason: HOSPADM

## 2017-05-22 RX ORDER — LIDOCAINE 40 MG/G
CREAM TOPICAL
Status: DISCONTINUED | OUTPATIENT
Start: 2017-05-22 | End: 2017-05-22 | Stop reason: HOSPADM

## 2017-05-22 RX ORDER — PROPOFOL 10 MG/ML
INJECTION, EMULSION INTRAVENOUS CONTINUOUS PRN
Status: DISCONTINUED | OUTPATIENT
Start: 2017-05-22 | End: 2017-05-22

## 2017-05-22 RX ORDER — PROPOFOL 10 MG/ML
INJECTION, EMULSION INTRAVENOUS PRN
Status: DISCONTINUED | OUTPATIENT
Start: 2017-05-22 | End: 2017-05-22

## 2017-05-22 RX ADMIN — SODIUM CHLORIDE, POTASSIUM CHLORIDE, SODIUM LACTATE AND CALCIUM CHLORIDE: 600; 310; 30; 20 INJECTION, SOLUTION INTRAVENOUS at 10:35

## 2017-05-22 RX ADMIN — PROPOFOL 50 MG: 10 INJECTION, EMULSION INTRAVENOUS at 10:44

## 2017-05-22 RX ADMIN — LIDOCAINE HYDROCHLORIDE 1 ML: 10 INJECTION, SOLUTION INFILTRATION; PERINEURAL at 10:35

## 2017-05-22 RX ADMIN — LIDOCAINE HYDROCHLORIDE 100 MG: 10 INJECTION, SOLUTION INFILTRATION; PERINEURAL at 10:44

## 2017-05-22 RX ADMIN — GLYCOPYRROLATE 0.1 MG: 0.2 INJECTION, SOLUTION INTRAMUSCULAR; INTRAVENOUS at 10:44

## 2017-05-22 RX ADMIN — GLYCOPYRROLATE 0.1 MG: 0.2 INJECTION, SOLUTION INTRAMUSCULAR; INTRAVENOUS at 10:43

## 2017-05-22 RX ADMIN — PROPOFOL 200 MCG/KG/MIN: 10 INJECTION, EMULSION INTRAVENOUS at 10:44

## 2017-05-22 RX ADMIN — PROPOFOL 30 MG: 10 INJECTION, EMULSION INTRAVENOUS at 10:50

## 2017-05-22 NOTE — ANESTHESIA POSTPROCEDURE EVALUATION
Patient: Ingrid Amaral    Procedure(s):  Colonoscopy - Wound Class: II-Clean Contaminated    Diagnosis:dark red stool  Diagnosis Additional Information: No value filed.    Anesthesia Type:  No value filed.    Note:  Anesthesia Post Evaluation    Patient location during evaluation: Phase 2  Patient participation: Able to fully participate in evaluation  Level of consciousness: awake  Pain management: adequate  Airway patency: patent  Cardiovascular status: acceptable and hemodynamically stable  Respiratory status: acceptable, room air and spontaneous ventilation  Hydration status: acceptable  PONV: none     Anesthetic complications: None          Last vitals:  Vitals:    05/22/17 1009   BP: (!) 144/95   Pulse: 93   Resp: 16   Temp: 36.3  C (97.4  F)   SpO2: 100%         Electronically Signed By: MARVIN Florez CRNA  May 22, 2017  10:59 AM

## 2017-05-22 NOTE — H&P
71 year old year old female here for colonoscopy for blood in stool.    Patient Active Problem List   Diagnosis     Diarrhea     Pure hypercholesterolemia     Allergic rhinitis     Panic disorder without agoraphobia     Advanced directives, counseling/discussion     Liver lesion     Generalized anxiety disorder     Mild major depression (H)     Vulvitis     Vaginal wall prolapse     Chronic constipation     Moderate persistent asthma     Dysphagia     RBD (REM behavioral disorder)     FH: colon cancer     Hypertension goal BP (blood pressure) < 140/90       Past Medical History:   Diagnosis Date     Asthma      GERD (gastroesophageal reflux disease)      Radial head fracture 3/18/2010       Past Surgical History:   Procedure Laterality Date     ANKLE SURGERY      bilateral     COLONOSCOPY       COLONOSCOPY N/A 3/20/2015    Procedure: COLONOSCOPY;  Surgeon: Britney Martinez MD;  Location: [facility]     HC ESOPH/GAS REFLUX TEST W NASAL IMPED >1 HR  4/19/2012    Procedure:ESOPHAGEAL IMPEDENCE FUNCTION TEST WITH 24 HOUR PH GREATER THAN 1 HOUR; Surgeon:VALDO UMANZOR; Location: GI     HERNIA REPAIR      umbilcal     HYSTERECTOMY, PAP NO LONGER INDICATED  1991     TONSILLECTOMY       UPPER GI ENDOSCOPY         @FMX@    No current outpatient prescriptions on file.       Allergies   Allergen Reactions     Sulfa Drugs Other (See Comments)     Reaction unknown as a child       Pt reports that she has quit smoking. Her smoking use included Cigarettes. She has a 1.50 pack-year smoking history. She has never used smokeless tobacco. She reports that she drinks alcohol. She reports that she does not use illicit drugs.    Exam:  There were no vitals taken for this visit.    Awake, Alert OX3  Lungs - CTA bilaterally  CV - RRR, no murmurs, distal pulses intact  Abd - soft, non-distended, non-tender, +BS  Extr - No cyanosis or edema    A/P 71 year old year old female in need of colonoscopy for blood in stool. Risks,  benefits, alternatives, and complications were discussed including the possibility of perforation and the patient agreed to proceed.    Tristen Rangel MD

## 2017-05-22 NOTE — ANESTHESIA CARE TRANSFER NOTE
Patient: Ingrid Amaral    Procedure(s):  Colonoscopy - Wound Class: II-Clean Contaminated    Diagnosis: dark red stool  Diagnosis Additional Information: No value filed.    Anesthesia Type:   No value filed.     Note:  Airway :Room Air  Patient transferred to:Phase II        Vitals: (Last set prior to Anesthesia Care Transfer)    CRNA VITALS  5/22/2017 1028 - 5/22/2017 1059      5/22/2017             Ht Rate: 77                Electronically Signed By: MARVIN Florez CRNA  May 22, 2017  10:59 AM

## 2017-05-23 DIAGNOSIS — J30.9 ALLERGIC RHINITIS, UNSPECIFIED ALLERGIC RHINITIS TRIGGER, UNSPECIFIED RHINITIS SEASONALITY: Primary | ICD-10-CM

## 2017-05-23 NOTE — TELEPHONE ENCOUNTER
Fluticasone      Last Written Prescription Date: pt reported  Last Fill Quantity: 1,  # refills:     Last Office Visit with [facility], [facility] or [facility] prescribing provider: 05/17/17    Camila Castañeda CPhT  [facility] [phone]  [phone]  [email or web address]

## 2017-05-24 RX ORDER — FLUTICASONE PROPIONATE 50 MCG
1 SPRAY, SUSPENSION (ML) NASAL DAILY
Qty: 1 BOTTLE | Refills: 11 | Status: SHIPPED | OUTPATIENT
Start: 2017-05-24 | End: 2018-08-24

## 2017-06-16 DIAGNOSIS — M54.41 CHRONIC RIGHT-SIDED LOW BACK PAIN WITH RIGHT-SIDED SCIATICA: ICD-10-CM

## 2017-06-16 DIAGNOSIS — G89.29 CHRONIC RIGHT-SIDED LOW BACK PAIN WITH RIGHT-SIDED SCIATICA: ICD-10-CM

## 2017-06-16 DIAGNOSIS — M54.16 LUMBAR RADICULOPATHY: ICD-10-CM

## 2017-06-16 NOTE — TELEPHONE ENCOUNTER
gabapentin      Last Written Prescription Date: 5/18/2017  Last Quantity: 60, # refills: 0  Last Office Visit with [facility], [facility] or [facility] prescribing provider: 5/17/2017  Next 5 appointments (look out 90 days)     Jun 20, 2017 10:40 AM CDT   [facility] with MARVIN Diaz CNP   [facility]    [address] [place] [id number]   [phone]                   Creatinine   Date Value Ref Range Status   05/02/2017 0.69 0.52 - 1.04 mg/dL Final     Lab Results   Component Value Date    AST 23 05/02/2017     Lab Results   Component Value Date    ALT 29 05/02/2017     BP Readings from Last 3 Encounters:   05/22/17 147/79   05/17/17 132/70   05/02/17 (!) (P) 158/98     cyclobenzaprine (FLEXERIL) 10 MG tablet      Last Written Prescription Date:  5/17/2017  Last Fill Quantity: 30,   # refills: 1  Last Office Visit with [facility], [facility] or [facility] prescribing provider: 5/17/2017  Future Office visit:    Next 5 appointments (look out 90 days)     Jun 20, 2017 10:40 AM CDT   SHORT with MARVIN Diaz CNP   [facility]    [address] [place] [phone]   [phone]                   Routing refill request to provider for review/approval because:  Drug not on the [facility], [facility] or [facility] refill protocol or controlled substance

## 2017-06-19 RX ORDER — GABAPENTIN 300 MG/1
CAPSULE ORAL
Qty: 60 CAPSULE | Refills: 0 | Status: SHIPPED | OUTPATIENT
Start: 2017-06-19 | End: 2017-08-15

## 2017-06-19 RX ORDER — CYCLOBENZAPRINE HCL 10 MG
TABLET ORAL
Qty: 30 TABLET | Refills: 1 | Status: SHIPPED | OUTPATIENT
Start: 2017-06-19 | End: 2017-08-15

## 2017-06-20 ENCOUNTER — OFFICE VISIT (OUTPATIENT)
Dept: FAMILY MEDICINE | Facility: CLINIC | Age: 72
End: 2017-06-20
Payer: COMMERCIAL

## 2017-06-20 VITALS
TEMPERATURE: 97.5 F | BODY MASS INDEX: 22.53 KG/M2 | RESPIRATION RATE: 18 BRPM | SYSTOLIC BLOOD PRESSURE: 118 MMHG | DIASTOLIC BLOOD PRESSURE: 80 MMHG | HEART RATE: 80 BPM | WEIGHT: 127.2 LBS

## 2017-06-20 DIAGNOSIS — M54.41 CHRONIC RIGHT-SIDED LOW BACK PAIN WITH RIGHT-SIDED SCIATICA: Primary | ICD-10-CM

## 2017-06-20 DIAGNOSIS — M54.16 LUMBAR RADICULOPATHY: ICD-10-CM

## 2017-06-20 DIAGNOSIS — N28.9 RENAL LESION: ICD-10-CM

## 2017-06-20 DIAGNOSIS — G89.29 CHRONIC RIGHT-SIDED LOW BACK PAIN WITH RIGHT-SIDED SCIATICA: Primary | ICD-10-CM

## 2017-06-20 PROCEDURE — 99214 OFFICE O/P EST MOD 30 MIN: CPT | Performed by: NURSE PRACTITIONER

## 2017-06-20 ASSESSMENT — PAIN SCALES - GENERAL: PAINLEVEL: EXTREME PAIN (9)

## 2017-06-20 NOTE — MR AVS SNAPSHOT
After Visit Summary   6/20/2017    Ingrid Amaral    MRN: [id number]           Patient Information     Date Of Birth          1945        Visit Information        Provider Department      6/20/2017 10:40 AM Shoa Sanchez APRN CNP [facility]        Today's Diagnoses     Chronic right-sided low back pain with right-sided sciatica    -  1    Lumbar radiculopathy        Renal lesion          Care Instructions    Make appointment with orthopedic spine to evaluate continued back pain and sciatica     Continue to take Gabapentin nightly - may take 1 during the day to see if it helps symptoms - IF this makes you too sleepy stop during the day and just take at night    May take 1 Tylenol #3 for pain a day - use sparingly     For Constipation  - For now stop your benefiber  - Start a capful of Miralax over the counter nightly for 5-6 days or until stools are cleared out  - Once stools are clear and you feel empty - RESTART your benfiber  - DRINK WATER !!!!!   - Follow a high fiber diet   - Try to be as active as your back will allow    Return to clinic to see me if things with bowels are not improved after 3-4 weeks and will re-evaluate         Constipation (Adult)  Constipation means that you have bowel movements that are less frequent than usual. Stools often become very hard and difficult to pass.  Constipation is very common. At some point in life it affects almost everyone. Since everyone's bowel habits are different, what is constipation to one person may not be to another. Your healthcare provider may do tests to diagnose constipation. It depends on what he or she finds when evaluating you.    Symptoms of constipation include:    Abdominal pain    Bloating    Vomiting    Painful bowel movements    Itching, swelling, bleeding, or pain around the anus  Causes  Constipation can have many causes. These include:    Diet low in fiber    Too much dairy    Not drinking enough  liquids    Lack of exercise or physical activity. This is especially true for older adults.    Changes in lifestyle or daily routine, including pregnancy, aging, work, and travel    Frequent use or misuse of laxatives    Ignoring the urge to have a bowel movement or delaying it until later    Medicines, such as certain prescription pain medicines, iron supplements, antacids, certain antidepressants, and calcium supplements    Diseases like irritable bowel syndrome, bowel obstructions, stroke, diabetes, thyroid disease, Parkinson disease, hemorrhoids, and colon cancer  Complications  Potential complications of constipation can include:    Hemorrhoids    Rectal bleeding from hemorrhoids or anal fissures (skin tears)    Hernias    Dependency on laxatives    Chronic constipation    Fecal impaction    Bowel obstruction or perforation  Home care  All treatment should be done after talking with your healthcare provider. This is especially true if you have another medical problems, are taking prescription medicines, or are an older adult. Treatment most often involves lifestyle changes. You may also need medicines. Your healthcare provider will tell you which will work best for you. Follow the advice below to help avoid this problem in the future.  Lifestyle changes  These lifestyle changes can help prevent constipation:    Diet. Eat a high-fiber diet, with fresh fruit and vegetables, and reduce dairy intake, meats, and processed foods    Fluids. It's important to get enough fluids each day. Drink plenty of water when you eat more fiber. If you are on diet that limits the amount of fluid you can have, talk about this with your healthcare provider.    Regular exercise. Check with your healthcare provider first.  Medications  Take any medicines as directed. Some laxatives are safe to use only every now and then. Others can be taken on a regular basis. Talk with your doctor or pharmacist if you have questions.  Prescription  pain medicines can cause constipation. If you are taking this kind of medicine, ask your healthcare provider if you should also take a stool softener.  Medicines you may take to treat constipation include:    Fiber supplements    Stool softeners    Laxatives    Enemas    Rectal suppositories  Follow-up care  Follow up with your healthcare provider if symptoms don't get better in the next few days. You may need to have more tests or see a specialist.  Call 911  Call 911 if any of these occur:    Trouble breathing    Stiff, rigid abdomen that is severely painful to touch    Confusion    Fainting or loss of consciousness    Rapid heart rate    Chest pain  When to seek medical advice  Call your healthcare provider right away if any of these occur:    Fever over 100.4 F (38 C)    Failure to resume normal bowel movements    Pain in your abdomen or back gets worse    Nausea or vomiting    Swelling in your abdomen    Blood in the stool    Black, tarry stool    Involuntary weight loss    Weakness  Date Last Reviewed: 12/30/2015 2000-2017 [employer]. [address] [place] [address]. All rights reserved. This information is not intended as a substitute for professional medical care. Always follow your healthcare professional's instructions.        Eating a High-Fiber Diet  Fiber is what gives strength and structure to plants. Most grains, beans, vegetables, and fruits contain fiber. Foods rich in fiber are often low in calories and fat, and they fill you up more. They may also reduce your risks for certain health problems. To find out the amount of fiber in canned, packaged, or frozen foods, read the Nutrition Facts label. It tells you how much fiber is in a serving.    Types of fiber and their benefits  There are two types of fiber: insoluble and soluble. They both aid digestion and help you maintain a healthy weight.    Insoluble fiber. This is found in whole grains, cereals, certain fruits and  vegetables such as apple skin, corn, and carrots. Insoluble fiber may prevent constipation and reduce the risk for certain types of cancer.    Soluble fiber. This type of fiber is in oats, beans, and certain fruits and vegetables such as strawberries and peas. Soluble fiber can reduce cholesterol, which may help lower the risk for heart disease. It also helps control blood sugar levels.  Look for high-fiber foods  Try these foods to add fiber to your diet:    Whole-grain breads and cereals. Try to eat 6 to 8 ounces a day. Include wheat and oat bran cereals, whole-wheat muffins or toast, and corn tortillas in your meals.    Fruits. Try to eat 2 cups a day. Apples, oranges, strawberries, pears, and bananas are good sources. (Note: Fruit juice is low in fiber.)    Vegetables. Try to eat at least 2.5 cups a day. Add asparagus, carrots, broccoli, peas, and corn to your meals.    Beans. One cup of cooked lentils gives you over 15 grams of fiber. Try navy beans, lentils, and chickpeas.    Seeds. A small handful of seeds gives you about 3 grams of fiber. Try sunflower seeds.  Keep track of your fiber  Keep track of how much fiber you eat. Start by reading food labels. Then eat a variety of foods high in fiber. As you begin to eat more fiber, ask your healthcare provider how much water you should be drinking to keep your digestive system working smoothly.  You should aim for a certain amount of fiber in your diet each day. If you are a woman, that amount is between 25 and 28 grams per day. Men should aim for 30 to 33 grams per day. After age 50, your daily fiber needs drop to 22 grams for women and 28 grams for men.  Before you reach for the fiber supplements, think about this. Fiber is found naturally in healthy whole foods. It gives you that feeling of fullness after you eat. Taking fiber supplements or eating fiber-enriched foods will not give you this full feeling.  Your fiber intake is a good measure for the quality of  your overall diet. If you are missing out on your daily amount of fiber, you may be lacking other important nutrients as well.  Date Last Reviewed: 5/11/2015 2000-2017 [employer]. [address] [place] [address], [place] [address]. All rights reserved. This information is not intended as a substitute for professional medical care. Always follow your healthcare professional's instructions.                Follow-ups after your visit        Additional Services     ORTHO  REFERRAL       [facility] is referring you to the Orthopedic  Services at [facility].       The  Representative will assist you in the coordination of your Orthopedic and Musculoskeletal Care as prescribed by your physician.    The  Representative will call you within 1 business day to help schedule your appointment, or you may contact the [facility] Representative at:    All areas ~ [phone]     Type of Referral : Spine: Lumbar  **Choose Medical Spine Specialist (unless patient was seen by a Medical Spine Specialist within the past 6 months).**  Surgical Evaluation is advised if the patient presents with one or more of the following red flags: Evidence of Spinal Tumor, Infection or Fracture, Cauda Equina Syndrome, Sudden or Progressive Weakness, Loss of Bowel or Bladder Control, or any other documented emergent neurological condition resulting from a Lumbar Spinal Condition. Medical Spine Specialist        Timeframe requested: Routine    Coverage of these services is subject to the terms and limitations of your health insurance plan.  Please call member services at your health plan with any benefit or coverage questions.      If X-rays, CT or MRI's have been performed, please contact the facility where they were done to arrange for , prior to your scheduled appointment.  Please bring this referral request to your appointment and present it to your  "specialist.                  Future tests that were ordered for you today     Open Future Orders        Priority Expected Expires Ordered    US Renal Complete Routine  2018            Who to contact     If you have questions or need follow up information about today's clinic visit or your schedule please contact [facility] directly at [phone].  Normal or non-critical lab and imaging results will be communicated to you by MyChart, letter or phone within 4 business days after the clinic has received the results. If you do not hear from us within 7 days, please contact the clinic through [employer] or phone. If you have a critical or abnormal lab result, we will notify you by phone as soon as possible.  Submit refill requests through [email or web address] or call your pharmacy and they will forward the refill request to us. Please allow 3 business days for your refill to be completed.          Additional Information About Your Visit        MyChart Information     [email or web address] lets you send messages to your doctor, view your test results, renew your prescriptions, schedule appointments and more. To sign up, go to [email or web address] . Click on \"Log in\" on the left side of the screen, which will take you to the Welcome page. Then click on \"Sign up Now\" on the right side of the page.     You will be asked to enter the access code listed below, as well as some personal information. Please follow the directions to create your username and password.     Your access code is: [email or web address]  Expires: 2017  9:52 AM     Your access code will  in 90 days. If you need help or a new code, please call your [facility] or [phone].        Care EveryWhere ID     This is your Care EveryWhere ID. This could be used by other organizations to access your [place] medical records  [id number]        Your Vitals Were     Pulse Temperature Respirations BMI (Body Mass Index)          80 97.5  F (36.4  C) " (Tympanic) 18 22.53 kg/m2         Blood Pressure from Last 3 Encounters:   06/20/17 118/80   05/22/17 147/79   05/17/17 132/70    Weight from Last 3 Encounters:   06/20/17 127 lb 3.2 oz (57.7 kg)   05/22/17 128 lb (58.1 kg)   05/17/17 128 lb (58.1 kg)              We Performed the Following     ORTHO  REFERRAL          Today's Medication Changes          These changes are accurate as of: 6/20/17 11:30 AM.  If you have any questions, ask your nurse or doctor.               Start taking these medicines.        Dose/Directions    acetaminophen-codeine 300-30 MG per tablet   Commonly known as:  TYLENOL #3   Used for:  Lumbar radiculopathy, Chronic right-sided low back pain with right-sided sciatica   Started by:  Soha Sanchez APRN CNP        Dose:  1 tablet   Take 1 tablet by mouth daily as needed for pain maximum 1 tablet(s) per day   Quantity:  30 tablet   Refills:  0         Stop taking these medicines if you haven't already. Please contact your care team if you have questions.     sodium chloride 0.65 % nasal spray   Commonly known as:  OCEAN   Stopped by:  Soha Sanchez APRN CNP                Where to get your medicines      Some of these will need a paper prescription and others can be bought over the counter.  Ask your nurse if you have questions.     Bring a paper prescription for each of these medications     acetaminophen-codeine 300-30 MG per tablet                Primary Care Provider Office Phone # Fax #    MARVIN Diaz [phone]       [place] [id number] [place] [address]        Thank you!     Thank you for choosing [facility]  for your care. Our goal is always to provide you with excellent care. Hearing back from our patients is one way we can continue to improve our services. Please take a few minutes to complete the written survey that you may receive in the mail after your visit with us. Thank you!              Your Updated Medication List - Protect others around you: Learn how to safely use, store and throw away your medicines at www.disposemymeds.org.          This list is accurate as of: 6/20/17 11:30 AM.  Always use your most recent med list.                   Brand Name Dispense Instructions for use    acetaminophen-codeine 300-30 MG per tablet    TYLENOL #3    30 tablet    Take 1 tablet by mouth daily as needed for pain maximum 1 tablet(s) per day       * albuterol (5 MG/ML) 0.5% neb solution    PROVENTIL    30 vial    Take 0.5 mLs (2.5 mg) by nebulization every 4 hours as needed for wheezing or shortness of breath / dyspnea       * albuterol 108 (90 BASE) MCG/ACT Inhaler    PROAIR HFA/PROVENTIL HFA/VENTOLIN HFA    1 Inhaler    Inhale 2 puffs into the lungs every 4 hours as needed for shortness of breath / dyspnea       amLODIPine 5 MG tablet    NORVASC    30 tablet    Take 1 tablet (5 mg) by mouth daily       ASPIRIN PO      Take 81 mg by mouth Takes every other day       BENEFIBER PO      2 tsp daily       calcipotriene 0.005 % Oint      Externally apply topically 2 times daily       calcium citrate + Tabs      Reported on 5/17/2017       cetirizine 10 MG tablet    zyrTEC    90 tablet    Take 1 tablet (10 mg) by mouth every evening       clobetasol 0.05 % ointment    TEMOVATE    30 g    Apply to area of irritation twice daily for 2 weeks       cyclobenzaprine 10 MG tablet    FLEXERIL    30 tablet    TAKE ONE-HALF TO ONE TABLET BY MOUTH THREE TIMES A DAY AS NEEDED FOR MUSCLE SPASMS       desonide 0.05 % lotion    DESOWEN    59 mL    Apply topically 2 times daily Reported on 5/2/2017       estradiol 0.1 MG/GM cream    ESTRACE    85 g    Use 1 gm every day and spread a small amount around the clitoral dodd       FLAXSEED      Reported on 5/2/2017       fluticasone 50 MCG/ACT spray    FLONASE    1 Bottle    Spray 1 spray into both nostrils daily       fluticasone-salmeterol 250-50 MCG/DOSE diskus inhaler     ADVAIR    1 Inhaler    Inhale 1 puff into the lungs 2 times daily       gabapentin 300 MG capsule    NEURONTIN    60 capsule    TAKE ONE CAPSULE BY MOUTH EVERY NIGHT.       guaiFENesin 600 MG 12 hr tablet    MUCINEX    40 tablet    Take 2 tablets (1,200 mg) by mouth 2 times daily as needed To see if helps phlegm.       hydrocortisone 1 % cream    CORTAID     Apply topically as needed       lactase 9000 UNITS Tabs tablet    LACTAID     Take 9,000 Units by mouth as needed       montelukast 10 MG tablet    SINGULAIR    30 tablet    Take 1 tablet (10 mg) by mouth daily       omeprazole 20 MG CR capsule    priLOSEC    180 capsule    Take 1 capsule (20 mg) by mouth 2 times daily For heartburn       order for DME     1 Device    Equipment being ordered: Nebulizer and tubing/filter       PROBIOTIC DAILY PO      Take 1 chew tab by mouth 2 times daily       sertraline 100 MG tablet    ZOLOFT    90 tablet    Take 1.5 tablets (150 mg) by mouth daily       simvastatin 40 MG tablet    ZOCOR    90 tablet    Take 1 tablet (40 mg) by mouth At Bedtime       SKIN OILS EX      Lavender-headache, skin, peppermint-skin, upset stomach mix of oils, asthma mix of oils, eucalyptus. Tea tree oil-skin, vapor rub- chest tightness, sleepy time (vetiver, lavendaer, mrjoram, mandarin, ylang)- sleep, constipation, eczema (sweet almond oil). Also mixes with carriers: Coconut oil, [place] oil, Extra virgin oil. Frankincense- cough. Digize- oil. Purification- oil, lemon- oil. Essentialyme- pill.  Inhales a mix with olive oil almond and coconut oil with peppermint, and eucalyptus- sinus, allergies. Updated 12/1/2015.  Updated 4/26/2016: Lemon, Cinnamon bark, panaway, Thieves, Orange, [place], Copaiba       triamcinolone acetonide 0.05 % Oint      Externally apply  topically.       VITAMIN D (CHOLECALCIFEROL) PO      Take 2,400 Units by mouth daily       * Notice:  This list has 2 medication(s) that are the same as other medications prescribed for  you. Read the directions carefully, and ask your doctor or other care provider to review them with you.

## 2017-06-20 NOTE — PROGRESS NOTES
SUBJECTIVE:                                                    Ingrid Amraal is a 71 year old female who presents to clinic today for the following health issues:    1.) Having bleeding still from Colonoscopy on 5/22/17. Is having a hard time having a BM    Bleeding bright red blood when wiping - with every stool  Bowel movements   Doesn't feel like she's emptying   Takes a stool softener two times daily and causes diarrhea   Also takes prune juice   Takes benefiber two times daily and eats a good amount of foods with fiber  Has not been drinking water the best lately       Chronic Pain Follow-Up       Type / Location of Pain: Right Sided low back pain with right-sided sciatica  Analgesia/pain control:       Recent changes:  same      Overall control: Tolerable with discomfort  Activity level/function:      Daily activities:  Able to do light housework, cooking, can not do much lifting or vaccuming    Work:  Unable to work  Adverse effects:  No  Adherance    Taking medication as directed?  Yes    Participating in other treatments: yes, physical therapy, chiropractor, massage, looking into acupuncture and yoga,   Risk Factors:    Sleep:  Sometimes good, usually fair    Mood/anxiety:  Frustrated due to pain    Recent family or social stressors:  none noted    Other aggravating factors: prolonged sitting, prolonged standing and Walking and Laying for prolonged times  PHQ-9 SCORE 3/22/2016 11/7/2016 5/2/2017   Total Score - - -   Total Score 8 8 9     DELMIS-7 SCORE 3/22/2016 11/7/2016 5/2/2017   Total Score - - -   Total Score 7 9 8     Encounter-Level CSA:     There are no encounter-level csa.           Been doing massage, physical therapy and chiropractor   Chiropractor helps for about an hour or so and that's it   Massage several times, helps a while also, but not long-term  Been over a month since seeing physical therapy   Maybe a little worse since on vacation the last month    Slept last night better than she  has - took gabapentin  States has been out though, so took husbands    Is constant   Has tried medrol dose packs twice without relief     Recent MRI results:  IMPRESSION:  1. Multilevel degenerative disc and facet disease. Scoliosis.  2. Small indeterminate right renal lesion. Imaging follow-up  recommended.  3. L5-S1: Moderate-large right disc extrusion with mass effect on the  S1 nerve root. Borderline central stenosis.  4. L1-L2: Borderline central stenosis.  5. L2-L3: Borderline central stenosis.  6. L3-L4: Mild central stenosis.  7. L4-L5: Borderline central stenosis. Moderate-severe right foraminal  stenosis.     Problem list and histories reviewed & adjusted, as indicated.  Additional history: as documented    Patient Active Problem List   Diagnosis     Diarrhea     Pure hypercholesterolemia     Allergic rhinitis     Panic disorder without agoraphobia     Advanced directives, counseling/discussion     Liver lesion     Generalized anxiety disorder     Mild major depression (H)     Vulvitis     Vaginal wall prolapse     Chronic constipation     Moderate persistent asthma     Dysphagia     RBD (REM behavioral disorder)     FH: colon cancer     Hypertension goal BP (blood pressure) < 140/90     Past Surgical History:   Procedure Laterality Date     ANKLE SURGERY      bilateral     COLONOSCOPY       COLONOSCOPY N/A 3/20/2015    Procedure: COLONOSCOPY;  Surgeon: Britney Martinez MD;  Location: [facility]     COLONOSCOPY N/A 5/22/2017    Procedure: COLONOSCOPY;  Colonoscopy;  Surgeon: Tristen Rangel MD;  Location: [facility] ESOPH/GAS REFLUX TEST W NASAL IMPED >1 HR  4/19/2012    Procedure:ESOPHAGEAL IMPEDENCE FUNCTION TEST WITH 24 HOUR PH GREATER THAN 1 HOUR; Surgeon:VALDO UMANZOR; Location: GI     HERNIA REPAIR      umbilcal     HYSTERECTOMY, PAP NO LONGER INDICATED  1991     TONSILLECTOMY       UPPER GI ENDOSCOPY         Social History   Substance Use Topics     Smoking status: Former Smoker      Packs/day: 0.50     Years: 3.00     Types: Cigarettes     Smokeless tobacco: Never Used      Comment: smoked for 3 years when she was around 19     Alcohol use Yes      Comment: RARELY     Family History   Problem Relation Age of Onset     HEART DISEASE Mother      valve problem     Hypertension Mother      DIABETES Mother      type 2     CEREBROVASCULAR DISEASE Mother      Allergies Mother      Eye Disorder Mother      Macular degeneration     OSTEOPOROSIS Mother      Respiratory Mother      Cardiovascular Father      MI at age 80     Cancer - colorectal Father      DIABETES Father      Hypertension Father      type 2     Eye Disorder Father      macular degeneration     Lipids Father      C.A.D. Maternal Grandmother      DIABETES Paternal Grandmother      type 2     Cardiovascular Paternal Grandmother      MI     DIABETES Sister      type 2     Allergies Sister      GASTROINTESTINAL DISEASE Sister      GASTROINTESTINAL DISEASE Daughter      GASTROINTESTINAL DISEASE Daughter          Current Outpatient Prescriptions   Medication Sig Dispense Refill     acetaminophen-codeine (TYLENOL #3) 300-30 MG per tablet Take 1 tablet by mouth daily as needed for pain maximum 1 tablet(s) per day 30 tablet 0     gabapentin (NEURONTIN) 300 MG capsule TAKE ONE CAPSULE BY MOUTH EVERY NIGHT. 60 capsule 0     cyclobenzaprine (FLEXERIL) 10 MG tablet TAKE ONE-HALF TO ONE TABLET BY MOUTH THREE TIMES A DAY AS NEEDED FOR MUSCLE SPASMS 30 tablet 1     fluticasone (FLONASE) 50 MCG/ACT spray Spray 1 spray into both nostrils daily 1 Bottle 11     estradiol (ESTRACE) 0.1 MG/GM cream Use 1 gm every day and spread a small amount around the clitoral dodd 85 g 1     montelukast (SINGULAIR) 10 MG tablet Take 1 tablet (10 mg) by mouth daily 30 tablet 1     sertraline (ZOLOFT) 100 MG tablet Take 1.5 tablets (150 mg) by mouth daily 90 tablet 3     cetirizine (ZYRTEC) 10 MG tablet Take 1 tablet (10 mg) by mouth every evening 90 tablet 3     omeprazole  (PRILOSEC) 20 MG CR capsule Take 1 capsule (20 mg) by mouth 2 times daily For heartburn 180 capsule 3     albuterol (PROAIR HFA/PROVENTIL HFA/VENTOLIN HFA) 108 (90 BASE) MCG/ACT Inhaler Inhale 2 puffs into the lungs every 4 hours as needed for shortness of breath / dyspnea 1 Inhaler 2     amLODIPine (NORVASC) 5 MG tablet Take 1 tablet (5 mg) by mouth daily 30 tablet 1     simvastatin (ZOCOR) 40 MG tablet Take 1 tablet (40 mg) by mouth At Bedtime 90 tablet 1     fluticasone-salmeterol (ADVAIR) 250-50 MCG/DOSE diskus inhaler Inhale 1 puff into the lungs 2 times daily 1 Inhaler 11     Multiple Minerals-Vitamins (CALCIUM CITRATE +) TABS Reported on 5/17/2017       SKIN OILS EX Lavender-headache, skin, peppermint-skin, upset stomach mix of oils, asthma mix of oils, eucalyptus. Tea tree oil-skin, vapor rub- chest tightness, sleepy time (vetiver, lavendaer, mrjoram, mandarin, ylang)- sleep, constipation, eczema (sweet almond oil). Also mixes with carriers: Coconut oil, [place] oil, Extra virgin oil. Frankincense- cough. Digize- oil. Purification- oil, lemon- oil. Essentialyme- pill.  Inhales a mix with olive oil almond and coconut oil with peppermint, and eucalyptus- sinus, allergies. Updated 12/1/2015.   Updated 4/26/2016: Lemon, Cinnamon bark, panaway, Thieves, Orange, [place]green, Copaiba       ASPIRIN PO Take 81 mg by mouth Takes every other day       lactase (LACTAID) 9000 UNITS TABS Take 9,000 Units by mouth as needed       VITAMIN D, CHOLECALCIFEROL, PO Take 2,400 Units by mouth daily       hydrocortisone 1 % cream Apply topically as needed       Probiotic Product (PROBIOTIC DAILY PO) Take 1 chew tab by mouth 2 times daily        clobetasol (TEMOVATE) 0.05 % ointment Apply to area of irritation twice daily for 2 weeks 30 g 0     desonide (DESOWEN) 0.05 % lotion Apply topically 2 times daily Reported on 5/2/2017 59 mL 0     Triamcinolone Acetonide 0.05 % OINT Externally apply  topically.       BENEFIBER OR 2 tsp daily        guaiFENesin (MUCINEX) 600 MG 12 hr tablet Take 2 tablets (1,200 mg) by mouth 2 times daily as needed To see if helps phlegm. (Patient not taking: Reported on 6/20/2017) 40 tablet 0     ORDER FOR DME Equipment being ordered: Nebulizer and tubing/filter (Patient not taking: Reported on 6/20/2017) 1 Device 0     albuterol (PROVENTIL) (5 MG/ML) 0.5% nebulizer solution Take 0.5 mLs (2.5 mg) by nebulization every 4 hours as needed for wheezing or shortness of breath / dyspnea (Patient not taking: Reported on 6/20/2017) 30 vial 1     calcipotriene 0.005 % OINT Externally apply topically 2 times daily       FLAXSEED Reported on 5/2/2017       Allergies   Allergen Reactions     Sulfa Drugs Other (See Comments)     Reaction unknown as a child       Reviewed and updated as needed this visit by clinical staff  Tobacco  Allergies  Meds  Problems  Med Hx  Surg Hx  Fam Hx  Soc Hx        Reviewed and updated as needed this visit by Provider  Allergies  Meds  Problems         ROS:  Constitutional, HEENT, cardiovascular, pulmonary, gi and gu systems are negative, except as otherwise noted.    OBJECTIVE:                                                    /80 (BP Location: Right arm, Patient Position: Chair, Cuff Size: Adult Regular)  Pulse 80  Temp 97.5  F (36.4  C) (Tympanic)  Resp 18  Wt 127 lb 3.2 oz (57.7 kg)  BMI 22.53 kg/m2  Body mass index is 22.53 kg/(m^2).  GENERAL APPEARANCE: healthy, alert and no distress  EYES: Eyes grossly normal to inspection, PERRL and conjunctivae and sclerae normal  HENT: ear canals and TM's normal and nose and mouth without ulcers or lesions  NECK: no adenopathy and no asymmetry, masses, or scars  RESP: lungs clear to auscultation - no rales, rhonchi or wheezes  CV: regular rates and rhythm, normal S1 S2, no S3 or S4 and no murmur, click or rub  ABDOMEN: soft, nontender, without hepatosplenomegaly or masses and bowel sounds normal  MS: extremities normal- no gross  deformities noted, peripheral pulses normal, normal range of motion of lumbar region and pain with flexion, and right side bending, good strength through out lower extremities   NEURO: Normal strength and tone, mentation intact, speech normal, DTR symmetrically normal in lower extremities and gait normal including heel/toe/tandem walking  PSYCH: mentation appears normal and affect normal/bright    Diagnostic Test Results:  none      ASSESSMENT/PLAN:                                                      1. Chronic right-sided low back pain with right-sided sciatica  Continued pain in low back > on right with right sided sciatica to knee despite solumedrol dose packs, physical therapy, gabapentin, massage and chiropractor. MRI remarkable for moderate-large disc extrusion with mass effect on S1 nerve root and some lumbar stenosis. Patient willing to try Tylenol 3 for pain as needed. Continue gabapentin, hesitant to increase this with patient's memory issues and potential for sleepiness during the day; and see spine surgeon for evaluation.   - ORTHO  REFERRAL  - acetaminophen-codeine (TYLENOL #3) 300-30 MG per tablet; Take 1 tablet by mouth daily as needed for pain maximum 1 tablet(s) per day  Dispense: 30 tablet; Refill: 0    2. Lumbar radiculopathy  Continued radiculopathy, see above. See Ortho  - ORTHO  REFERRAL  - acetaminophen-codeine (TYLENOL #3) 300-30 MG per tablet; Take 1 tablet by mouth daily as needed for pain maximum 1 tablet(s) per day  Dispense: 30 tablet; Refill: 0    3. Renal lesion  Small right renal lesion noted on MRI in May. Follow up ultrasound for further determination of lesion.   - US Renal Complete; Future    Patient Instructions   Make appointment with orthopedic spine to evaluate continued back pain and sciatica     Continue to take Gabapentin nightly - may take 1 during the day to see if it helps symptoms - IF this makes you too sleepy stop during the day and just take at  night    May take 1 Tylenol #3 for pain a day - use sparingly     For Constipation  - For now stop your benefiber  - Start a capful of Miralax over the counter nightly for 5-6 days or until stools are cleared out  - Once stools are clear and you feel empty - RESTART your benfiber  - DRINK WATER !!!!!   - Follow a high fiber diet   - Try to be as active as your back will allow    Return to clinic to see me if things with bowels are not improved after 3-4 weeks and will re-evaluate         Constipation (Adult)  Constipation means that you have bowel movements that are less frequent than usual. Stools often become very hard and difficult to pass.  Constipation is very common. At some point in life it affects almost everyone. Since everyone's bowel habits are different, what is constipation to one person may not be to another. Your healthcare provider may do tests to diagnose constipation. It depends on what he or she finds when evaluating you.    Symptoms of constipation include:    Abdominal pain    Bloating    Vomiting    Painful bowel movements    Itching, swelling, bleeding, or pain around the anus  Causes  Constipation can have many causes. These include:    Diet low in fiber    Too much dairy    Not drinking enough liquids    Lack of exercise or physical activity. This is especially true for older adults.    Changes in lifestyle or daily routine, including pregnancy, aging, work, and travel    Frequent use or misuse of laxatives    Ignoring the urge to have a bowel movement or delaying it until later    Medicines, such as certain prescription pain medicines, iron supplements, antacids, certain antidepressants, and calcium supplements    Diseases like irritable bowel syndrome, bowel obstructions, stroke, diabetes, thyroid disease, Parkinson disease, hemorrhoids, and colon cancer  Complications  Potential complications of constipation can include:    Hemorrhoids    Rectal bleeding from hemorrhoids or anal  fissures (skin tears)    Hernias    Dependency on laxatives    Chronic constipation    Fecal impaction    Bowel obstruction or perforation  Home care  All treatment should be done after talking with your healthcare provider. This is especially true if you have another medical problems, are taking prescription medicines, or are an older adult. Treatment most often involves lifestyle changes. You may also need medicines. Your healthcare provider will tell you which will work best for you. Follow the advice below to help avoid this problem in the future.  Lifestyle changes  These lifestyle changes can help prevent constipation:    Diet. Eat a high-fiber diet, with fresh fruit and vegetables, and reduce dairy intake, meats, and processed foods    Fluids. It's important to get enough fluids each day. Drink plenty of water when you eat more fiber. If you are on diet that limits the amount of fluid you can have, talk about this with your healthcare provider.    Regular exercise. Check with your healthcare provider first.  Medications  Take any medicines as directed. Some laxatives are safe to use only every now and then. Others can be taken on a regular basis. Talk with your doctor or pharmacist if you have questions.  Prescription pain medicines can cause constipation. If you are taking this kind of medicine, ask your healthcare provider if you should also take a stool softener.  Medicines you may take to treat constipation include:    Fiber supplements    Stool softeners    Laxatives    Enemas    Rectal suppositories  Follow-up care  Follow up with your healthcare provider if symptoms don't get better in the next few days. You may need to have more tests or see a specialist.  Call 911  Call 911 if any of these occur:    Trouble breathing    Stiff, rigid abdomen that is severely painful to touch    Confusion    Fainting or loss of consciousness    Rapid heart rate    Chest pain  When to seek medical advice  Call your  healthcare provider right away if any of these occur:    Fever over 100.4 F (38 C)    Failure to resume normal bowel movements    Pain in your abdomen or back gets worse    Nausea or vomiting    Swelling in your abdomen    Blood in the stool    Black, tarry stool    Involuntary weight loss    Weakness  Date Last Reviewed: 12/30/2015 2000-2017 [employer]. [address] [place] [address], [place] [address]. All rights reserved. This information is not intended as a substitute for professional medical care. Always follow your healthcare professional's instructions.        Eating a High-Fiber Diet  Fiber is what gives strength and structure to plants. Most grains, beans, vegetables, and fruits contain fiber. Foods rich in fiber are often low in calories and fat, and they fill you up more. They may also reduce your risks for certain health problems. To find out the amount of fiber in canned, packaged, or frozen foods, read the Nutrition Facts label. It tells you how much fiber is in a serving.    Types of fiber and their benefits  There are two types of fiber: insoluble and soluble. They both aid digestion and help you maintain a healthy weight.    Insoluble fiber. This is found in whole grains, cereals, certain fruits and vegetables such as apple skin, corn, and carrots. Insoluble fiber may prevent constipation and reduce the risk for certain types of cancer.    Soluble fiber. This type of fiber is in oats, beans, and certain fruits and vegetables such as strawberries and peas. Soluble fiber can reduce cholesterol, which may help lower the risk for heart disease. It also helps control blood sugar levels.  Look for high-fiber foods  Try these foods to add fiber to your diet:    Whole-grain breads and cereals. Try to eat 6 to 8 ounces a day. Include wheat and oat bran cereals, whole-wheat muffins or toast, and corn tortillas in your meals.    Fruits. Try to eat 2 cups a day. Apples, oranges, strawberries, pears,  and bananas are good sources. (Note: Fruit juice is low in fiber.)    Vegetables. Try to eat at least 2.5 cups a day. Add asparagus, carrots, broccoli, peas, and corn to your meals.    Beans. One cup of cooked lentils gives you over 15 grams of fiber. Try navy beans, lentils, and chickpeas.    Seeds. A small handful of seeds gives you about 3 grams of fiber. Try sunflower seeds.  Keep track of your fiber  Keep track of how much fiber you eat. Start by reading food labels. Then eat a variety of foods high in fiber. As you begin to eat more fiber, ask your healthcare provider how much water you should be drinking to keep your digestive system working smoothly.  You should aim for a certain amount of fiber in your diet each day. If you are a woman, that amount is between 25 and 28 grams per day. Men should aim for 30 to 33 grams per day. After age 50, your daily fiber needs drop to 22 grams for women and 28 grams for men.  Before you reach for the fiber supplements, think about this. Fiber is found naturally in healthy whole foods. It gives you that feeling of fullness after you eat. Taking fiber supplements or eating fiber-enriched foods will not give you this full feeling.  Your fiber intake is a good measure for the quality of your overall diet. If you are missing out on your daily amount of fiber, you may be lacking other important nutrients as well.  Date Last Reviewed: 5/11/2015 2000-2017 [employer]. [address] [place] [address], [place] [address]. All rights reserved. This information is not intended as a substitute for professional medical care. Always follow your healthcare professional's instructions.            MARVIN Mas [facility]

## 2017-06-20 NOTE — PATIENT INSTRUCTIONS
Make appointment with orthopedic spine to evaluate continued back pain and sciatica     Continue to take Gabapentin nightly - may take 1 during the day to see if it helps symptoms - IF this makes you too sleepy stop during the day and just take at night    May take 1 Tylenol #3 for pain a day - use sparingly     For Constipation  - For now stop your benefiber  - Start a capful of Miralax over the counter nightly for 5-6 days or until stools are cleared out  - Once stools are clear and you feel empty - RESTART your benfiber  - DRINK WATER !!!!!   - Follow a high fiber diet   - Try to be as active as your back will allow    Return to clinic to see me if things with bowels are not improved after 3-4 weeks and will re-evaluate         Constipation (Adult)  Constipation means that you have bowel movements that are less frequent than usual. Stools often become very hard and difficult to pass.  Constipation is very common. At some point in life it affects almost everyone. Since everyone's bowel habits are different, what is constipation to one person may not be to another. Your healthcare provider may do tests to diagnose constipation. It depends on what he or she finds when evaluating you.    Symptoms of constipation include:    Abdominal pain    Bloating    Vomiting    Painful bowel movements    Itching, swelling, bleeding, or pain around the anus  Causes  Constipation can have many causes. These include:    Diet low in fiber    Too much dairy    Not drinking enough liquids    Lack of exercise or physical activity. This is especially true for older adults.    Changes in lifestyle or daily routine, including pregnancy, aging, work, and travel    Frequent use or misuse of laxatives    Ignoring the urge to have a bowel movement or delaying it until later    Medicines, such as certain prescription pain medicines, iron supplements, antacids, certain antidepressants, and calcium supplements    Diseases like irritable bowel  syndrome, bowel obstructions, stroke, diabetes, thyroid disease, Parkinson disease, hemorrhoids, and colon cancer  Complications  Potential complications of constipation can include:    Hemorrhoids    Rectal bleeding from hemorrhoids or anal fissures (skin tears)    Hernias    Dependency on laxatives    Chronic constipation    Fecal impaction    Bowel obstruction or perforation  Home care  All treatment should be done after talking with your healthcare provider. This is especially true if you have another medical problems, are taking prescription medicines, or are an older adult. Treatment most often involves lifestyle changes. You may also need medicines. Your healthcare provider will tell you which will work best for you. Follow the advice below to help avoid this problem in the future.  Lifestyle changes  These lifestyle changes can help prevent constipation:    Diet. Eat a high-fiber diet, with fresh fruit and vegetables, and reduce dairy intake, meats, and processed foods    Fluids. It's important to get enough fluids each day. Drink plenty of water when you eat more fiber. If you are on diet that limits the amount of fluid you can have, talk about this with your healthcare provider.    Regular exercise. Check with your healthcare provider first.  Medications  Take any medicines as directed. Some laxatives are safe to use only every now and then. Others can be taken on a regular basis. Talk with your doctor or pharmacist if you have questions.  Prescription pain medicines can cause constipation. If you are taking this kind of medicine, ask your healthcare provider if you should also take a stool softener.  Medicines you may take to treat constipation include:    Fiber supplements    Stool softeners    Laxatives    Enemas    Rectal suppositories  Follow-up care  Follow up with your healthcare provider if symptoms don't get better in the next few days. You may need to have more tests or see a specialist.  Call  911  Call 911 if any of these occur:    Trouble breathing    Stiff, rigid abdomen that is severely painful to touch    Confusion    Fainting or loss of consciousness    Rapid heart rate    Chest pain  When to seek medical advice  Call your healthcare provider right away if any of these occur:    Fever over 100.4 F (38 C)    Failure to resume normal bowel movements    Pain in your abdomen or back gets worse    Nausea or vomiting    Swelling in your abdomen    Blood in the stool    Black, tarry stool    Involuntary weight loss    Weakness  Date Last Reviewed: 12/30/2015 2000-2017 [employer]. [address] [place] [address]. All rights reserved. This information is not intended as a substitute for professional medical care. Always follow your healthcare professional's instructions.        Eating a High-Fiber Diet  Fiber is what gives strength and structure to plants. Most grains, beans, vegetables, and fruits contain fiber. Foods rich in fiber are often low in calories and fat, and they fill you up more. They may also reduce your risks for certain health problems. To find out the amount of fiber in canned, packaged, or frozen foods, read the Nutrition Facts label. It tells you how much fiber is in a serving.    Types of fiber and their benefits  There are two types of fiber: insoluble and soluble. They both aid digestion and help you maintain a healthy weight.    Insoluble fiber. This is found in whole grains, cereals, certain fruits and vegetables such as apple skin, corn, and carrots. Insoluble fiber may prevent constipation and reduce the risk for certain types of cancer.    Soluble fiber. This type of fiber is in oats, beans, and certain fruits and vegetables such as strawberries and peas. Soluble fiber can reduce cholesterol, which may help lower the risk for heart disease. It also helps control blood sugar levels.  Look for high-fiber foods  Try these foods to add fiber to your  diet:    Whole-grain breads and cereals. Try to eat 6 to 8 ounces a day. Include wheat and oat bran cereals, whole-wheat muffins or toast, and corn tortillas in your meals.    Fruits. Try to eat 2 cups a day. Apples, oranges, strawberries, pears, and bananas are good sources. (Note: Fruit juice is low in fiber.)    Vegetables. Try to eat at least 2.5 cups a day. Add asparagus, carrots, broccoli, peas, and corn to your meals.    Beans. One cup of cooked lentils gives you over 15 grams of fiber. Try navy beans, lentils, and chickpeas.    Seeds. A small handful of seeds gives you about 3 grams of fiber. Try sunflower seeds.  Keep track of your fiber  Keep track of how much fiber you eat. Start by reading food labels. Then eat a variety of foods high in fiber. As you begin to eat more fiber, ask your healthcare provider how much water you should be drinking to keep your digestive system working smoothly.  You should aim for a certain amount of fiber in your diet each day. If you are a woman, that amount is between 25 and 28 grams per day. Men should aim for 30 to 33 grams per day. After age 50, your daily fiber needs drop to 22 grams for women and 28 grams for men.  Before you reach for the fiber supplements, think about this. Fiber is found naturally in healthy whole foods. It gives you that feeling of fullness after you eat. Taking fiber supplements or eating fiber-enriched foods will not give you this full feeling.  Your fiber intake is a good measure for the quality of your overall diet. If you are missing out on your daily amount of fiber, you may be lacking other important nutrients as well.  Date Last Reviewed: 5/11/2015 2000-2017 [employer]. [address] [address], [place] [address]. All rights reserved. This information is not intended as a substitute for professional medical care. Always follow your healthcare professional's instructions.

## 2017-06-20 NOTE — NURSING NOTE
"Chief Complaint   Patient presents with     RECHECK     Sciatic Nerve       Initial /80 (BP Location: Right arm, Patient Position: Chair, Cuff Size: Adult Regular)  Pulse 80  Temp 97.5  F (36.4  C) (Tympanic)  Resp 18  Wt 127 lb 3.2 oz (57.7 kg)  BMI 22.53 kg/m2 Estimated body mass index is 22.53 kg/(m^2) as calculated from the following:    Height as of 5/22/17: 5' 3\" (1.6 m).    Weight as of this encounter: 127 lb 3.2 oz (57.7 kg).  Medication Reconciliation: complete    Health Maintenance that is potentially due pending provider review:  NONE    Radha Quintero MA  10:48 AM 6/20/2017  .    "

## 2017-06-26 ENCOUNTER — HOSPITAL ENCOUNTER (OUTPATIENT)
Dept: ULTRASOUND IMAGING | Facility: CLINIC | Age: 72
Discharge: HOME | End: 2017-06-26
Attending: NURSE PRACTITIONER | Admitting: NURSE PRACTITIONER
Payer: MEDICARE

## 2017-06-26 DIAGNOSIS — N28.9 RENAL LESION: ICD-10-CM

## 2017-06-26 PROCEDURE — 76770 US EXAM ABDO BACK WALL COMP: CPT

## 2017-06-27 ENCOUNTER — CARE COORDINATION (OUTPATIENT)
Dept: CARE COORDINATION | Facility: CLINIC | Age: 72
End: 2017-06-27

## 2017-06-27 NOTE — PROGRESS NOTES
Clinic Care Coordination Contact  OUTREACH    Referral Information:  Referral Source: PCP  Reason for Contact: follow up        Universal Utilization:   ED Visits in last year: 0  Hospital visits in last year: 0  Last PCP appointment: 05/17/17  Missed Appointments: 0  Concerns: no  Multiple Providers or Specialists: no    Clinical Concerns:  Current Medical Concerns: pt follows up with providers regarding her chronic constipation and her low back pain.  She will be seeing the [facility] orthopedic provider in the next week.  She said she is trying to drink water but gets busy and forgets.  Encouraged her to continue to try and drink fluids and that warm fluids may also help.  Pt said she will continue to try.      Current Behavioral Concerns: pt said her anxiety was doing ok.  She noticed it is a little increased with the pain she has been experiencing.  She felt she was able to manage it on her own. Pt denied any thoughts of self harm or SI with her depression.  She denied any needs at this time.       Education Provided to patient: attend her appointments as scheduled.       Clinical Pathway: None    Medication Management:  Pt feels she is doing ok with her med management.  She did not want to pursue any supports at this time.     Functional Status:  Mobility Status: Independent     Transportation: spouse     Pt does note the challenges with sitting in the car due to the low back pain.  On their trip she ended up laying in the back on the trip.      Psychosocial:  Current living arrangement:: I live in a private home with spouse  Financial/Insurance: no noted concerns  Denied any concerns.      Resources and Interventions:  Current Resources: n/a     Goals:      Barriers: n/a  Strengths: n/a  Patient/Caregiver understanding: n/a  Frequency of Care Coordination: monthly  Upcoming appointment: 05/18/17     Plan: pt to attend appointments as scheduled.  Sw to call in about one month.     EM Mcfarlane, Clinic  Care Coordinator 6/27/2017   9:37 AM  [phone]

## 2017-06-30 NOTE — PROGRESS NOTES
Outpatient Physical Therapy Discharge Note     Patient: Ingrid Amaral  : 1945    Beginning/End Dates of Reporting Period:  17 to 2017    Referring Provider: Soha Sanchez APRN CNP      Therapy Diagnosis: decreased ROM and pain in LBP and R leg associated with mechanical dysfunction     Client Self Report: Pt reprots she is is getting more pins and needles in her legs this time.  Pt reports back is pretty good. Pt will see MD on 5/15/17.  Pt was able to cut back on pain pills.  Pt will leave for 1  vacation  Pt did see the chiropractor which she felt helped    Objective Measurements:  Objective Measure: ROM  Details: flex:to floor , ext: 100% w min increased pain     Objective Measure: balance  Details: R:  must use UE L: 3+ secs    Objective Measure: Hip strength   Details: R:3/5 L: 4/5      Objective Measure: MMT  Details: hip flex: R 4/5, L 4/5, knee flex/ext 5/5 bi., ankle PF 5/5,, ankle DF R 5/5, L 4/5                  Outcome Measures (most recent score):  Aj STarT    Aj STarT         Goals:  Goal Identifier ROM   Goal Description Pt will be able to demonstrate full lumbar ROM in order to be able to  object off of the ground without pain or radicular symptoms.   Target Date 17   Date Met   5/10/17   Progress:goal met     Goal Identifier hip abd   Goal Description Pt will demonstrate 5/5 bi hip abd strength in order to demonstrate improved strength to stabilize pelvis and improve gait mechanics    Target Date 17   Date Met      Progress:not met     Goal Identifier KAELA   Goal Description Pt will report <10% disability on KAELA to demonstrate improved ability to complete daily activities and demonstrate significant clinical improvement.    Target Date 17   Date Met      Progress:not met     Goal Identifier sitting   Goal Description Pt will be able to sit for 30+ min with less than 1/10 back pain in order to be able to sit and eat dinner with family    Target Date 03/31/17   Date Met      Progress:not met       Progress Toward Goals:   Progress this reporting period: Pt has been seen for 10 visits over this POC. In that time, pt has made min change in that time. While ROM has improved, pt continued to have radicular symptoms down her legs. Pt was to f/u with the MD and per note she started prednisone. Pt has failed to schedule f/u visits within 30 days from last visit thus is being d/c from therapy at this time.           Plan:  Discharge from therapy.    Discharge:    Reason for Discharge: Patient chooses to discontinue therapy.    Equipment Issued: NA    Discharge Plan: Patient to continue home program.    Lainey Navas  Physical Therapist  [facility]  [address] [place] [id number]  [email or web address]   [email or web address]   Office: [phone] Fax: [phone]

## 2017-07-12 DIAGNOSIS — I10 BENIGN ESSENTIAL HYPERTENSION: ICD-10-CM

## 2017-07-12 RX ORDER — AMLODIPINE BESYLATE 5 MG/1
TABLET ORAL
Qty: 90 TABLET | Refills: 1 | Status: SHIPPED | OUTPATIENT
Start: 2017-07-12 | End: 2018-01-09

## 2017-08-02 ENCOUNTER — CARE COORDINATION (OUTPATIENT)
Dept: CARE COORDINATION | Facility: CLINIC | Age: 72
End: 2017-08-02

## 2017-08-02 NOTE — PROGRESS NOTES
Clinic Care Coordination Contact  OUTREACH    Referral Information:  Referral Source: PCP  Reason for Contact: follow up        Universal Utilization:   ED Visits in last year: 0  Hospital visits in last year: 0  Last PCP appointment: 05/17/17  Missed Appointments: 0  Concerns: no  Multiple Providers or Specialists: no    Clinical Concerns:  Current Medical Concerns: pt reporting that she is having some blood on toilet paper when wiping after a BM.  She did have a colonoscopy about a month ago and all was fine but some internal hemorroids  were found.  She is aware that she should make an appointment with primary to get this checked out.  She other wise said she was feeling good.      Current Behavioral Concerns: pt is noticing some anxiety and panic attacks but not sure what is causing them.  She did indicate that she is able to work through them.  Discussed keeping track of when they occur and what is going on just before.        Education Provided to patient: keep track of anxiety and panic attacks and what is occurring and what may be causing the anxiety/panic     Clinical Pathway: None    Medication Management:  No concerns per pt     Functional Status:  Mobility Status: Independent      Transportation: no concerns        Psychosocial:  Current living arrangement:: I live in a private home with spouse  Financial/Insurance: no concerns noted  Pt declined needs     Resources and Interventions:  Current Resources: n/a        Goals:   Goal 1 Statement: I will track my anxiety/panic attacks and what is occuring just before them for the next month  Goal 1 Progression Percent: 0%  Goal 1 Progression Date: 08/02/17     Barriers: poor memory  Strengths: willing ness to work on it  Patient/Caregiver understanding: track mood  Frequency of Care Coordination: monthly  Upcoming appointment: 05/18/17     Plan: pt to track when having anxiety and panic attacks and what is occurring.  Pt to schedule with provider.  Sw to call  in about one month.     EM Mcfarlane, Clinic Care Coordinator 8/2/2017   9:44 AM  [phone]

## 2017-08-15 DIAGNOSIS — E78.2 ELEVATED CHOLESTEROL WITH ELEVATED TRIGLYCERIDES: ICD-10-CM

## 2017-08-15 DIAGNOSIS — M54.16 LUMBAR RADICULOPATHY: ICD-10-CM

## 2017-08-15 DIAGNOSIS — M54.41 CHRONIC RIGHT-SIDED LOW BACK PAIN WITH RIGHT-SIDED SCIATICA: ICD-10-CM

## 2017-08-15 DIAGNOSIS — G89.29 CHRONIC RIGHT-SIDED LOW BACK PAIN WITH RIGHT-SIDED SCIATICA: ICD-10-CM

## 2017-08-15 DIAGNOSIS — J45.40 MODERATE PERSISTENT ASTHMA WITHOUT COMPLICATION: ICD-10-CM

## 2017-08-15 RX ORDER — CYCLOBENZAPRINE HCL 10 MG
TABLET ORAL
Qty: 30 TABLET | Refills: 0 | Status: SHIPPED | OUTPATIENT
Start: 2017-08-15 | End: 2017-08-16

## 2017-08-15 RX ORDER — MONTELUKAST SODIUM 10 MG/1
TABLET ORAL
Qty: 30 TABLET | Refills: 0 | Status: SHIPPED | OUTPATIENT
Start: 2017-08-15 | End: 2017-08-16

## 2017-08-15 RX ORDER — GABAPENTIN 300 MG/1
CAPSULE ORAL
Qty: 60 CAPSULE | Refills: 0 | Status: SHIPPED | OUTPATIENT
Start: 2017-08-15 | End: 2017-08-16

## 2017-08-15 RX ORDER — SIMVASTATIN 40 MG
TABLET ORAL
Qty: 90 TABLET | Refills: 0 | Status: SHIPPED | OUTPATIENT
Start: 2017-08-15 | End: 2017-08-16

## 2017-08-15 NOTE — TELEPHONE ENCOUNTER
Simavastatin     Last Written Prescription Date: 03/10/17  Last Fill Quantity: 90, # refills: 1  Last Office Visit with [facility], [facility] or [facility] prescribing provider: 06/20/17  Next 5 appointments (look out 90 days)     Aug 16, 2017 10:40 AM CDT   SHORT with MARVIN Diaz CNP   [facility]    [address] [place] [id number]   [phone]                   Lab Results   Component Value Date    CHOL 273 03/09/2017     Lab Results   Component Value Date    HDL 71 03/09/2017     Lab Results   Component Value Date     03/09/2017     Lab Results   Component Value Date    TRIG 244 03/09/2017     Lab Results   Component Value Date    CHOLHDLRATIO 4.0 08/10/2011     Montelukast      Last Written Prescription Date: 05/02/17  Last Fill Quantity: 30,  # refills: 1   Last Office Visit with [facility], [facility] or [facility] prescribing provider: 06/20/17                                         Next 5 appointments (look out 90 days)     Aug 16, 2017 10:40 AM CDT   SHORT with MARVIN Diaz CNP   [facility]    [address] [place] [id number]   [phone]                  Gabapentin      Last Written Prescription Date: 06/19/17  Last Fill Quantity: 60,  # refills: 0   Last Office Visit with [facility], [facility] or [facility] prescribing provider: 06/20/17                                         Next 5 appointments (look out 90 days)     Aug 16, 2017 10:40 AM CDT   SHORT with MARVIN Diaz CNP   [facility]    [address] [place] [phone]   [phone]                 Cyclobenzaprine      Last Written Prescription Date: 06/19/17  Last Fill Quantity: 30,  # refills: 1   Last Office Visit with [facility], [facility] or [facility] prescribing provider: 06/20/17                                         Next 5 appointments (look out 90 days)     Aug 16,  2017 10:40 AM CDT   SHORT with MARVIN Diaz CNP   [facility]    [address] [place] [id number] [phone]

## 2017-08-15 NOTE — TELEPHONE ENCOUNTER
simvastatin     Last Written Prescription Date: 3/10/2017  Last Fill Quantity: 90, # refills: 1  Last Office Visit with [facility], [facility] or  Health prescribing provider: 6/20/2017  Next 5 appointments (look out 90 days)     Aug 16, 2017 10:40 AM CDT   SHORT with MARVIN Diaz CNP   [facility]    [address] [place] [id number]   [phone]                   Lab Results   Component Value Date    CHOL 273 03/09/2017     Lab Results   Component Value Date    HDL 71 03/09/2017     Lab Results   Component Value Date     03/09/2017     Lab Results   Component Value Date    TRIG 244 03/09/2017     Lab Results   Component Value Date    CHOLHDLRATIO 4.0 08/10/2011     montelukast       Last Written Prescription Date: 5/2/2017  Last Fill Quantity: 30, # refills: 1    Last Office Visit with [facility], [facility] or  Health prescribing provider:  6/20/2017   Future Office Visit:    Next 5 appointments (look out 90 days)     Aug 16, 2017 10:40 AM CDT   SHORT with MARVIN Diaz CNP   [facility]    [address] [place] [phone]   [phone]                   Date of Last Asthma Action Plan Letter:   Asthma Action Plan Q1 Year    Topic Date Due     Asthma Action Plan - yearly  05/08/2018      Asthma Control Test:   ACT Total Scores 3/9/2017   ACT TOTAL SCORE -   ASTHMA ER VISITS -   ASTHMA HOSPITALIZATIONS -   ACT TOTAL SCORE (Goal Greater than or Equal to 20) 22   In the past 12 months, how many times did you visit the emergency room for your asthma without being admitted to the hospital? 0   In the past 12 months, how many times were you hospitalized overnight because of your asthma? 0       Date of Last Spirometry Test:   No results found for this or any previous visit.    gabapentin    Last Written Prescription Date: 6/19/2017  Last Fill Quantity: 60, # refills: 0  Last Office Visit  with [facility], [facility] or [facility] prescribing provider: 6/20/2017   Next 5 appointments (look out 90 days)     Aug 16, 2017 10:40 AM CDT   SHORT with MARVIN Diaz CNP   [facility]    [address] [place] [phone]   [phone]                   BP Readings from Last 3 Encounters:   06/20/17 118/80   05/22/17 147/79   05/17/17 132/70     Pulse: (for Fetzima)  Creatinine   Date Value Ref Range Status   05/02/2017 0.69 0.52 - 1.04 mg/dL Final   ]    Last PHQ-9 score on record=   PHQ-9 SCORE 5/2/2017   Total Score -   Total Score 9       cyclobenzaprine (FLEXERIL) 10 MG tablet      Last Written Prescription Date:  6/19/2017  Last Fill Quantity: 30,   # refills: 1  Last Office Visit with [facility], [facility] or [facility] prescribing provider: 6/20/2017  Future Office visit:    Next 5 appointments (look out 90 days)     Aug 16, 2017 10:40 AM CDT   SHORT with MARVIN Diaz CNP   [facility]    [address] [place] [phone]   [phone]                   Routing refill request to provider for review/approval because:  Drug not on the [facility], [facility] or [facility] refill protocol or controlled substance

## 2017-08-16 ENCOUNTER — OFFICE VISIT (OUTPATIENT)
Dept: FAMILY MEDICINE | Facility: CLINIC | Age: 72
End: 2017-08-16
Payer: COMMERCIAL

## 2017-08-16 VITALS
BODY MASS INDEX: 22.53 KG/M2 | DIASTOLIC BLOOD PRESSURE: 80 MMHG | WEIGHT: 127.2 LBS | HEART RATE: 64 BPM | TEMPERATURE: 98.1 F | RESPIRATION RATE: 18 BRPM | SYSTOLIC BLOOD PRESSURE: 134 MMHG

## 2017-08-16 DIAGNOSIS — L40.9 PSORIASIS: ICD-10-CM

## 2017-08-16 DIAGNOSIS — M54.16 LUMBAR RADICULOPATHY: ICD-10-CM

## 2017-08-16 DIAGNOSIS — G89.29 CHRONIC RIGHT-SIDED LOW BACK PAIN WITH RIGHT-SIDED SCIATICA: Primary | ICD-10-CM

## 2017-08-16 DIAGNOSIS — J45.40 MODERATE PERSISTENT ASTHMA WITHOUT COMPLICATION: ICD-10-CM

## 2017-08-16 DIAGNOSIS — K62.5 RECTAL BLEEDING: ICD-10-CM

## 2017-08-16 DIAGNOSIS — M54.41 CHRONIC RIGHT-SIDED LOW BACK PAIN WITH RIGHT-SIDED SCIATICA: Primary | ICD-10-CM

## 2017-08-16 DIAGNOSIS — E78.2 ELEVATED CHOLESTEROL WITH ELEVATED TRIGLYCERIDES: ICD-10-CM

## 2017-08-16 LAB
CHOLEST SERPL-MCNC: 191 MG/DL
HDLC SERPL-MCNC: 85 MG/DL
LDLC SERPL CALC-MCNC: 82 MG/DL
NONHDLC SERPL-MCNC: 106 MG/DL
TRIGL SERPL-MCNC: 122 MG/DL

## 2017-08-16 PROCEDURE — 99213 OFFICE O/P EST LOW 20 MIN: CPT | Performed by: NURSE PRACTITIONER

## 2017-08-16 PROCEDURE — 80061 LIPID PANEL: CPT | Performed by: NURSE PRACTITIONER

## 2017-08-16 PROCEDURE — 36415 COLL VENOUS BLD VENIPUNCTURE: CPT | Performed by: NURSE PRACTITIONER

## 2017-08-16 RX ORDER — CYCLOBENZAPRINE HCL 10 MG
5-10 TABLET ORAL 3 TIMES DAILY PRN
Qty: 30 TABLET | Refills: 3 | Status: SHIPPED | OUTPATIENT
Start: 2017-08-16 | End: 2017-11-01

## 2017-08-16 RX ORDER — GABAPENTIN 300 MG/1
300 CAPSULE ORAL
Qty: 60 CAPSULE | Refills: 0 | Status: SHIPPED | OUTPATIENT
Start: 2017-08-16 | End: 2018-01-09

## 2017-08-16 RX ORDER — CALCIPOTRIENE 50 UG/G
OINTMENT TOPICAL 2 TIMES DAILY
Qty: 60 G | Refills: 0 | Status: SHIPPED | OUTPATIENT
Start: 2017-08-16 | End: 2018-09-11

## 2017-08-16 RX ORDER — SIMVASTATIN 40 MG
40 TABLET ORAL AT BEDTIME
Qty: 90 TABLET | Refills: 3 | Status: SHIPPED | OUTPATIENT
Start: 2017-08-16 | End: 2018-08-24

## 2017-08-16 RX ORDER — MONTELUKAST SODIUM 10 MG/1
1 TABLET ORAL DAILY
Qty: 90 TABLET | Refills: 3 | Status: SHIPPED | OUTPATIENT
Start: 2017-08-16 | End: 2017-11-01

## 2017-08-16 ASSESSMENT — PAIN SCALES - GENERAL: PAINLEVEL: MILD PAIN (2)

## 2017-08-16 NOTE — PATIENT INSTRUCTIONS
Make sure you are getting in at least 21 grams of the benefiber    Follow a high fiber diet - see below for instructions     Increase water intake to 60 oz/day     If still having problems return to clinic      Change Gabapentin (nerve pain medication) to as needed at bedtime - do not need to take every night         Eating a High-Fiber Diet  Fiber is what gives strength and structure to plants. Most grains, beans, vegetables, and fruits contain fiber. Foods rich in fiber are often low in calories and fat, and they fill you up more. They may also reduce your risks for certain health problems. To find out the amount of fiber in canned, packaged, or frozen foods, read the Nutrition Facts label. It tells you how much fiber is in a serving.    Types of fiber and their benefits  There are two types of fiber: insoluble and soluble. They both aid digestion and help you maintain a healthy weight.    Insoluble fiber. This is found in whole grains, cereals, certain fruits and vegetables such as apple skin, corn, and carrots. Insoluble fiber may prevent constipation and reduce the risk for certain types of cancer.    Soluble fiber. This type of fiber is in oats, beans, and certain fruits and vegetables such as strawberries and peas. Soluble fiber can reduce cholesterol, which may help lower the risk for heart disease. It also helps control blood sugar levels.  Look for high-fiber foods  Try these foods to add fiber to your diet:    Whole-grain breads and cereals. Try to eat 6 to 8 ounces a day. Include wheat and oat bran cereals, whole-wheat muffins or toast, and corn tortillas in your meals.    Fruits. Try to eat 2 cups a day. Apples, oranges, strawberries, pears, and bananas are good sources. (Note: Fruit juice is low in fiber.)    Vegetables. Try to eat at least 2.5 cups a day. Add asparagus, carrots, broccoli, peas, and corn to your meals.    Beans. One cup of cooked lentils gives you over 15 grams of fiber. Try navy  beans, lentils, and chickpeas.    Seeds. A small handful of seeds gives you about 3 grams of fiber. Try sunflower seeds.  Keep track of your fiber  Keep track of how much fiber you eat. Start by reading food labels. Then eat a variety of foods high in fiber. As you begin to eat more fiber, ask your healthcare provider how much water you should be drinking to keep your digestive system working smoothly.  You should aim for a certain amount of fiber in your diet each day. If you are a woman, that amount is between 25 and 28 grams per day. Men should aim for 30 to 33 grams per day. After age 50, your daily fiber needs drop to 22 grams for women and 28 grams for men.  Before you reach for the fiber supplements, think about this. Fiber is found naturally in healthy whole foods. It gives you that feeling of fullness after you eat. Taking fiber supplements or eating fiber-enriched foods will not give you this full feeling.  Your fiber intake is a good measure for the quality of your overall diet. If you are missing out on your daily amount of fiber, you may be lacking other important nutrients as well.  Date Last Reviewed: 5/11/2015 2000-2017 [employer]. [address] [place] [address], [place] [address]. All rights reserved. This information is not intended as a substitute for professional medical care. Always follow your healthcare professional's instructions.

## 2017-08-16 NOTE — MR AVS SNAPSHOT
After Visit Summary   8/16/2017    Ingrid Amaral    MRN: [id number]           Patient Information     Date Of Birth          1945        Visit Information        Provider Department      8/16/2017 10:40 AM Soha Sanchez APRN [facility]        Today's Diagnoses     Psoriasis    -  1    Elevated cholesterol with elevated triglycerides        Lumbar radiculopathy        Chronic right-sided low back pain with right-sided sciatica        Moderate persistent asthma without complication          Care Instructions    Make sure you are getting in at least 21 grams of the benefiber    Follow a high fiber diet - see below for instructions     Increase water intake to 60 oz/day     If still having problems return to clinic      Change Gabapentin (nerve pain medication) to as needed at bedtime - do not need to take every night         Eating a High-Fiber Diet  Fiber is what gives strength and structure to plants. Most grains, beans, vegetables, and fruits contain fiber. Foods rich in fiber are often low in calories and fat, and they fill you up more. They may also reduce your risks for certain health problems. To find out the amount of fiber in canned, packaged, or frozen foods, read the Nutrition Facts label. It tells you how much fiber is in a serving.    Types of fiber and their benefits  There are two types of fiber: insoluble and soluble. They both aid digestion and help you maintain a healthy weight.    Insoluble fiber. This is found in whole grains, cereals, certain fruits and vegetables such as apple skin, corn, and carrots. Insoluble fiber may prevent constipation and reduce the risk for certain types of cancer.    Soluble fiber. This type of fiber is in oats, beans, and certain fruits and vegetables such as strawberries and peas. Soluble fiber can reduce cholesterol, which may help lower the risk for heart disease. It also helps control blood sugar levels.  Look for  high-fiber foods  Try these foods to add fiber to your diet:    Whole-grain breads and cereals. Try to eat 6 to 8 ounces a day. Include wheat and oat bran cereals, whole-wheat muffins or toast, and corn tortillas in your meals.    Fruits. Try to eat 2 cups a day. Apples, oranges, strawberries, pears, and bananas are good sources. (Note: Fruit juice is low in fiber.)    Vegetables. Try to eat at least 2.5 cups a day. Add asparagus, carrots, broccoli, peas, and corn to your meals.    Beans. One cup of cooked lentils gives you over 15 grams of fiber. Try navy beans, lentils, and chickpeas.    Seeds. A small handful of seeds gives you about 3 grams of fiber. Try sunflower seeds.  Keep track of your fiber  Keep track of how much fiber you eat. Start by reading food labels. Then eat a variety of foods high in fiber. As you begin to eat more fiber, ask your healthcare provider how much water you should be drinking to keep your digestive system working smoothly.  You should aim for a certain amount of fiber in your diet each day. If you are a woman, that amount is between 25 and 28 grams per day. Men should aim for 30 to 33 grams per day. After age 50, your daily fiber needs drop to 22 grams for women and 28 grams for men.  Before you reach for the fiber supplements, think about this. Fiber is found naturally in healthy whole foods. It gives you that feeling of fullness after you eat. Taking fiber supplements or eating fiber-enriched foods will not give you this full feeling.  Your fiber intake is a good measure for the quality of your overall diet. If you are missing out on your daily amount of fiber, you may be lacking other important nutrients as well.  Date Last Reviewed: 5/11/2015 2000-2017 [employer]. [address] [place] [address], [place] [address]. All rights reserved. This information is not intended as a substitute for professional medical care. Always follow your healthcare professional's  "instructions.                Follow-ups after your visit        Who to contact     If you have questions or need follow up information about today's clinic visit or your schedule please contact [facility] directly at [phone].  Normal or non-critical lab and imaging results will be communicated to you by MyChart, letter or phone within 4 business days after the clinic has received the results. If you do not hear from us within 7 days, please contact the clinic through MyChart or phone. If you have a critical or abnormal lab result, we will notify you by phone as soon as possible.  Submit refill requests through [employer] or call your pharmacy and they will forward the refill request to us. Please allow 3 business days for your refill to be completed.          Additional Information About Your Visit        [employer] Information     [employer] lets you send messages to your doctor, view your test results, renew your prescriptions, schedule appointments and more. To sign up, go to [email or web address] . Click on \"Log in\" on the left side of the screen, which will take you to the Welcome page. Then click on \"Sign up Now\" on the right side of the page.     You will be asked to enter the access code listed below, as well as some personal information. Please follow the directions to create your username and password.     Your access code is: [email or web address]  Expires: 2017 11:22 AM     Your access code will  in 90 days. If you need help or a new code, please call your [facility] clinic or [phone].        Care EveryWhere ID     This is your Care EveryWhere ID. This could be used by other organizations to access your [facility] medical records  [id number]        Your Vitals Were     Pulse Temperature Respirations BMI (Body Mass Index)          64 98.1  F (36.7  C) (Tympanic) 18 22.53 kg/m2         Blood Pressure from Last 3 Encounters:   17 134/80   17 118/80   17 147/79    " Weight from Last 3 Encounters:   08/16/17 127 lb 3.2 oz (57.7 kg)   06/20/17 127 lb 3.2 oz (57.7 kg)   05/22/17 128 lb (58.1 kg)              Today, you had the following     No orders found for display         Today's Medication Changes          These changes are accurate as of: 8/16/17 11:22 AM.  If you have any questions, ask your nurse or doctor.               These medicines have changed or have updated prescriptions.        Dose/Directions    cyclobenzaprine 10 MG tablet   Commonly known as:  FLEXERIL   This may have changed:  See the new instructions.   Used for:  Chronic right-sided low back pain with right-sided sciatica   Changed by:  Soha Sanchez APRN CNP        Dose:  5-10 mg   Take 0.5-1 tablets (5-10 mg) by mouth 3 times daily as needed for muscle spasms   Quantity:  30 tablet   Refills:  3       gabapentin 300 MG capsule   Commonly known as:  NEURONTIN   This may have changed:  See the new instructions.   Used for:  Lumbar radiculopathy, Chronic right-sided low back pain with right-sided sciatica   Changed by:  Soha Sanchez APRN CNP        Dose:  300 mg   Take 1 capsule (300 mg) by mouth nightly as needed   Quantity:  60 capsule   Refills:  0       montelukast 10 MG tablet   Commonly known as:  SINGULAIR   This may have changed:  See the new instructions.   Used for:  Moderate persistent asthma without complication   Changed by:  Soha Sanchez APRN CNP        Dose:  1 tablet   Take 1 tablet (10 mg) by mouth daily   Quantity:  90 tablet   Refills:  3       simvastatin 40 MG tablet   Commonly known as:  ZOCOR   This may have changed:  See the new instructions.   Used for:  Elevated cholesterol with elevated triglycerides   Changed by:  Soha Sanchez APRN CNP        Dose:  40 mg   Take 1 tablet (40 mg) by mouth At Bedtime   Quantity:  90 tablet   Refills:  3            Where to get your medicines      These medications were sent to [facility],  [place] - [address] [place] [address]     Phone:  [phone]     calcipotriene 0.005 % Oint    cyclobenzaprine 10 MG tablet    gabapentin 300 MG capsule    montelukast 10 MG tablet    simvastatin 40 MG tablet                Primary Care Provider Office Phone # Fax #    MARVIN Diaz [phone]       [address] [address]        Equal Access to Services     JORGE A BYRD : Hadii aad ku hadasho Soomaali, waaxda luqadaha, qaybta kaalmada adeegyada, waxay idiin hayaan adeeg meri suarez. So [facility] [phone].    ATENCIÓN: Si habla español, tiene a gauthier disposición servicios gratuitos de asistencia lingüística. [facility] [phone].    We comply with applicable federal civil rights laws and [place] laws. We do not discriminate on the basis of race, color, national origin, age, disability sex, sexual orientation or gender identity.            Thank you!     Thank you for choosing [facility]  for your care. Our goal is always to provide you with excellent care. Hearing back from our patients is one way we can continue to improve our services. Please take a few minutes to complete the written survey that you may receive in the mail after your visit with us. Thank you!             Your Updated Medication List - Protect others around you: Learn how to safely use, store and throw away your medicines at www.disposemymeds.org.          This list is accurate as of: 8/16/17 11:22 AM.  Always use your most recent med list.                   Brand Name Dispense Instructions for use Diagnosis    acetaminophen-codeine 300-30 MG per tablet    TYLENOL #3    30 tablet    Take 1 tablet by mouth daily as needed for pain maximum 1 tablet(s) per day    Lumbar radiculopathy, Chronic right-sided low back pain with right-sided sciatica       * albuterol (5 MG/ML) 0.5% neb solution    PROVENTIL    30 vial    Take 0.5 mLs  (2.5 mg) by nebulization every 4 hours as needed for wheezing or shortness of breath / dyspnea    Moderate persistent asthma       * albuterol 108 (90 BASE) MCG/ACT Inhaler    PROAIR HFA/PROVENTIL HFA/VENTOLIN HFA    1 Inhaler    Inhale 2 puffs into the lungs every 4 hours as needed for shortness of breath / dyspnea    Moderate persistent asthma without complication       amLODIPine 5 MG tablet    NORVASC    90 tablet    TAKE ONE TABLET BY MOUTH EVERY DAY    Benign essential hypertension       ASPIRIN PO      Take 81 mg by mouth Takes every other day        BENEFIBER PO      2 tsp daily        calcipotriene 0.005 % Oint     60 g    Externally apply topically 2 times daily    Psoriasis       calcium citrate + Tabs      Reported on 5/17/2017        cetirizine 10 MG tablet    zyrTEC    90 tablet    Take 1 tablet (10 mg) by mouth every evening    Chronic rhinitis       clobetasol 0.05 % ointment    TEMOVATE    30 g    Apply to area of irritation twice daily for 2 weeks    Vulvitis       cyclobenzaprine 10 MG tablet    FLEXERIL    30 tablet    Take 0.5-1 tablets (5-10 mg) by mouth 3 times daily as needed for muscle spasms    Chronic right-sided low back pain with right-sided sciatica       desonide 0.05 % lotion    DESOWEN    59 mL    Apply topically 2 times daily Reported on 5/2/2017        estradiol 0.1 MG/GM cream    ESTRACE    85 g    Use 1 gm every day and spread a small amount around the clitoral dodd    Vaginal atrophy, Labial and clitoral adhesions, acquired       FLAXSEED      Reported on 5/2/2017        fluticasone 50 MCG/ACT spray    FLONASE    1 Bottle    Spray 1 spray into both nostrils daily    Allergic rhinitis, unspecified allergic rhinitis trigger, unspecified rhinitis seasonality       fluticasone-salmeterol 250-50 MCG/DOSE diskus inhaler    ADVAIR    1 Inhaler    Inhale 1 puff into the lungs 2 times daily    Moderate persistent asthma without complication       gabapentin 300 MG capsule    NEURONTIN     60 capsule    Take 1 capsule (300 mg) by mouth nightly as needed    Lumbar radiculopathy, Chronic right-sided low back pain with right-sided sciatica       guaiFENesin 600 MG 12 hr tablet    MUCINEX    40 tablet    Take 2 tablets (1,200 mg) by mouth 2 times daily as needed To see if helps phlegm.    Phlegm in throat       hydrocortisone 1 % cream    CORTAID     Apply topically as needed        lactase 9000 UNITS Tabs tablet    LACTAID     Take 9,000 Units by mouth as needed        montelukast 10 MG tablet    SINGULAIR    90 tablet    Take 1 tablet (10 mg) by mouth daily    Moderate persistent asthma without complication       omeprazole 20 MG CR capsule    priLOSEC    180 capsule    Take 1 capsule (20 mg) by mouth 2 times daily For heartburn    Gastroesophageal reflux disease without esophagitis       order for DME     1 Device    Equipment being ordered: Nebulizer and tubing/filter    Moderate persistent asthma       PROBIOTIC DAILY PO      Take 1 chew tab by mouth 2 times daily        sertraline 100 MG tablet    ZOLOFT    90 tablet    Take 1.5 tablets (150 mg) by mouth daily    Panic disorder without agoraphobia       simvastatin 40 MG tablet    ZOCOR    90 tablet    Take 1 tablet (40 mg) by mouth At Bedtime    Elevated cholesterol with elevated triglycerides       SKIN OILS EX      Lavender-headache, skin, peppermint-skin, upset stomach mix of oils, asthma mix of oils, eucalyptus. Tea tree oil-skin, vapor rub- chest tightness, sleepy time (vetiver, lavendaer, mrjoram, mandarin, ylang)- sleep, constipation, eczema (sweet almond oil). Also mixes with carriers: Coconut oil, [place] oil, Extra virgin oil. Frankincense- cough. Digize- oil. Purification- oil, lemon- oil. Essentialyme- pill.  Inhales a mix with olive oil almond and coconut oil with peppermint, and eucalyptus- sinus, allergies. Updated 12/1/2015.  Updated 4/26/2016: Lemon, Cinnamon bark, panaway, Thieves, Orange, Wintergreen, Copaiba        triamcinolone  acetonide 0.05 % Oint      Externally apply  topically.        VITAMIN D (CHOLECALCIFEROL) PO      Take 2,400 Units by mouth daily        * Notice:  This list has 2 medication(s) that are the same as other medications prescribed for you. Read the directions carefully, and ask your doctor or other care provider to review them with you.

## 2017-08-16 NOTE — PROGRESS NOTES
SUBJECTIVE:                                                    Ingrid Amaral is a 71 year old female who presents to clinic today for the following health issues:      1.)Hyperlipidemia Follow-Up      Rate your low fat/cholesterol diet?: not monitoring fat    Taking statin?  Yes, no muscle aches from statin    Other lipid medications/supplements?:  None    Continues to take statin without any side effects   Tries to follow a well balanced healthy diet   Does get activity in       2.)Hypertension Follow-up      Outpatient blood pressures are being checked at home.  Results are 140/70's.    Low Salt Diet: not monitoring salt    Blood pressure stable in office today   Taking Amlodipine daily without any swelling or other side effects   Denies any dizziness/lightheadedness     3.) Follow up Rectal Bleeding - Still having bleeding    Some blood in the stool and when wipes - is bright red   With every bowel movement  Works really hard to get them out  Recent Colonoscopy negative     Good diet - high in fiber  Drinking water, but probably not enough    Problem list and histories reviewed & adjusted, as indicated.  Additional history: as documented    Patient Active Problem List   Diagnosis     Diarrhea     Pure hypercholesterolemia     Allergic rhinitis     Panic disorder without agoraphobia     Advanced directives, counseling/discussion     Liver lesion     Generalized anxiety disorder     Mild major depression (H)     Vulvitis     Vaginal wall prolapse     Chronic constipation     Moderate persistent asthma     Dysphagia     RBD (REM behavioral disorder)     FH: colon cancer     Hypertension goal BP (blood pressure) < 140/90     Past Surgical History:   Procedure Laterality Date     ANKLE SURGERY      bilateral     COLONOSCOPY       COLONOSCOPY N/A 3/20/2015    Procedure: COLONOSCOPY;  Surgeon: Britney Martinez MD;  Location: [facility]     COLONOSCOPY N/A 5/22/2017    Procedure: COLONOSCOPY;  Colonoscopy;   Surgeon: Tristen Rangel MD;  Location: [facility]     HC ESOPH/GAS REFLUX TEST W NASAL IMPED >1 HR  4/19/2012    Procedure:ESOPHAGEAL IMPEDENCE FUNCTION TEST WITH 24 HOUR PH GREATER THAN 1 HOUR; Surgeon:VALDO UMANZOR; Location:[facility]     HERNIA REPAIR      umbilcal     HYSTERECTOMY, PAP NO LONGER INDICATED  1991     TONSILLECTOMY       UPPER GI ENDOSCOPY         Social History   Substance Use Topics     Smoking status: Former Smoker     Packs/day: 0.50     Years: 3.00     Types: Cigarettes     Smokeless tobacco: Never Used      Comment: smoked for 3 years when she was around 19     Alcohol use Yes      Comment: RARELY     Family History   Problem Relation Age of Onset     HEART DISEASE Mother      valve problem     Hypertension Mother      DIABETES Mother      type 2     CEREBROVASCULAR DISEASE Mother      Allergies Mother      Eye Disorder Mother      Macular degeneration     OSTEOPOROSIS Mother      Respiratory Mother      Cardiovascular Father      MI at age 80     Cancer - colorectal Father      DIABETES Father      Hypertension Father      type 2     Eye Disorder Father      macular degeneration     Lipids Father      C.A.D. Maternal Grandmother      DIABETES Paternal Grandmother      type 2     Cardiovascular Paternal Grandmother      MI     DIABETES Sister      type 2     Allergies Sister      GASTROINTESTINAL DISEASE Sister      GASTROINTESTINAL DISEASE Daughter      GASTROINTESTINAL DISEASE Daughter          Current Outpatient Prescriptions   Medication Sig Dispense Refill     calcipotriene 0.005 % OINT Externally apply topically 2 times daily 60 g 0     simvastatin (ZOCOR) 40 MG tablet Take 1 tablet (40 mg) by mouth At Bedtime 90 tablet 3     gabapentin (NEURONTIN) 300 MG capsule Take 1 capsule (300 mg) by mouth nightly as needed 60 capsule 0     montelukast (SINGULAIR) 10 MG tablet Take 1 tablet (10 mg) by mouth daily 90 tablet 3     cyclobenzaprine (FLEXERIL) 10 MG tablet Take 0.5-1 tablets (5-10 mg) by  mouth 3 times daily as needed for muscle spasms 30 tablet 3     amLODIPine (NORVASC) 5 MG tablet TAKE ONE TABLET BY MOUTH EVERY DAY 90 tablet 1     acetaminophen-codeine (TYLENOL #3) 300-30 MG per tablet Take 1 tablet by mouth daily as needed for pain maximum 1 tablet(s) per day 30 tablet 0     fluticasone (FLONASE) 50 MCG/ACT spray Spray 1 spray into both nostrils daily 1 Bottle 11     estradiol (ESTRACE) 0.1 MG/GM cream Use 1 gm every day and spread a small amount around the clitoral dodd 85 g 1     sertraline (ZOLOFT) 100 MG tablet Take 1.5 tablets (150 mg) by mouth daily 90 tablet 3     cetirizine (ZYRTEC) 10 MG tablet Take 1 tablet (10 mg) by mouth every evening 90 tablet 3     omeprazole (PRILOSEC) 20 MG CR capsule Take 1 capsule (20 mg) by mouth 2 times daily For heartburn 180 capsule 3     albuterol (PROAIR HFA/PROVENTIL HFA/VENTOLIN HFA) 108 (90 BASE) MCG/ACT Inhaler Inhale 2 puffs into the lungs every 4 hours as needed for shortness of breath / dyspnea 1 Inhaler 2     fluticasone-salmeterol (ADVAIR) 250-50 MCG/DOSE diskus inhaler Inhale 1 puff into the lungs 2 times daily 1 Inhaler 11     Multiple Minerals-Vitamins (CALCIUM CITRATE +) TABS Reported on 5/17/2017       SKIN OILS EX Lavender-headache, skin, peppermint-skin, upset stomach mix of oils, asthma mix of oils, eucalyptus. Tea tree oil-skin, vapor rub- chest tightness, sleepy time (vetiver, lavendaer, mrjoram, mandarin, ylang)- sleep, constipation, eczema (sweet almond oil). Also mixes with carriers: Coconut oil, [place] oil, Extra virgin oil. Frankincense- cough. Digize- oil. Purification- oil, lemon- oil. Essentialyme- pill.  Inhales a mix with olive oil almond and coconut oil with peppermint, and eucalyptus- sinus, allergies. Updated 12/1/2015.   Updated 4/26/2016: Lemon, Cinnamon bark, panaway, Thieves, Orange, [place], [place]opaiba       ASPIRIN PO Take 81 mg by mouth Takes every other day       lactase (LACTAID) 9000 UNITS TABS Take 9,000 Units  by mouth as needed       VITAMIN D, CHOLECALCIFEROL, PO Take 2,400 Units by mouth daily       clobetasol (TEMOVATE) 0.05 % ointment Apply to area of irritation twice daily for 2 weeks 30 g 0     BENEFIBER OR 2 tsp daily       [DISCONTINUED] simvastatin (ZOCOR) 40 MG tablet TAKE ONE TABLET BY MOUTH EVERY NIGHT AT BEDTIME 90 tablet 0     [DISCONTINUED] montelukast (SINGULAIR) 10 MG tablet TAKE ONE TABLET BY MOUTH EVERY DAY 30 tablet 0     [DISCONTINUED] gabapentin (NEURONTIN) 300 MG capsule TAKE ONE CAPSULE BY MOUTH EVERY NIGHT. 60 capsule 0     guaiFENesin (MUCINEX) 600 MG 12 hr tablet Take 2 tablets (1,200 mg) by mouth 2 times daily as needed To see if helps phlegm. (Patient not taking: Reported on 6/20/2017) 40 tablet 0     ORDER FOR DME Equipment being ordered: Nebulizer and tubing/filter (Patient not taking: Reported on 6/20/2017) 1 Device 0     albuterol (PROVENTIL) (5 MG/ML) 0.5% nebulizer solution Take 0.5 mLs (2.5 mg) by nebulization every 4 hours as needed for wheezing or shortness of breath / dyspnea (Patient not taking: Reported on 6/20/2017) 30 vial 1     hydrocortisone 1 % cream Apply topically as needed       Probiotic Product (PROBIOTIC DAILY PO) Take 1 chew tab by mouth 2 times daily        desonide (DESOWEN) 0.05 % lotion Apply topically 2 times daily Reported on 5/2/2017 59 mL 0     Triamcinolone Acetonide 0.05 % OINT Externally apply  topically.       FLAXSEED Reported on 5/2/2017       Allergies   Allergen Reactions     Sulfa Drugs Other (See Comments)     Reaction unknown as a child         Reviewed and updated as needed this visit by clinical staffTobacco  Allergies  Meds  Problems  Med Hx  Surg Hx  Fam Hx  Soc Hx        Reviewed and updated as needed this visit by Provider  Allergies  Meds  Problems         ROS:  Constitutional, HEENT, cardiovascular, pulmonary, gi and gu systems are negative, except as otherwise noted.      OBJECTIVE:   /80 (BP Location: Right arm, Patient  Position: Chair, Cuff Size: Adult Regular)  Pulse 64  Temp 98.1  F (36.7  C) (Tympanic)  Resp 18  Wt 127 lb 3.2 oz (57.7 kg)  BMI 22.53 kg/m2  Body mass index is 22.53 kg/(m^2).  GENERAL APPEARANCE: healthy, alert and no distress  RESP: lungs clear to auscultation - no rales, rhonchi or wheezes  CV: regular rates and rhythm, normal S1 S2, no S3 or S4 and no murmur, click or rub  ABDOMEN: soft, nontender, without hepatosplenomegaly or masses and bowel sounds normal   (female): rectal exam normal without masses, irritation, 1 external hemorrhoid without bleeding or irritation     Diagnostic Test Results:  none     ASSESSMENT/PLAN:     1. Chronic right-sided low back pain with right-sided sciatica  Much improved, had an injection and is feeling much better. Continue current treatment.   - gabapentin (NEURONTIN) 300 MG capsule; Take 1 capsule (300 mg) by mouth nightly as needed  Dispense: 60 capsule; Refill: 0  - cyclobenzaprine (FLEXERIL) 10 MG tablet; Take 0.5-1 tablets (5-10 mg) by mouth 3 times daily as needed for muscle spasms  Dispense: 30 tablet; Refill: 3    2. Rectal bleeding  Patient reports rectal bleeding with stools. Patient is having to strain with stools, but are not hard. Recent colonoscopy negative for rectal bleeding. No bleeding hemorrhoids or excoriation on exam. 1 external hemorrhoid noted. Patient to increase fluid intake, make sure she is getting in enough fiber.     3. Lumbar radiculopathy  Stable, no change in treatment   - gabapentin (NEURONTIN) 300 MG capsule; Take 1 capsule (300 mg) by mouth nightly as needed  Dispense: 60 capsule; Refill: 0    4. Elevated cholesterol with elevated triglycerides  Started on Zocor, will repeat labs   - simvastatin (ZOCOR) 40 MG tablet; Take 1 tablet (40 mg) by mouth At Bedtime  Dispense: 90 tablet; Refill: 3  - **Lipid panel reflex to direct LDL FUTURE 6mo    5. Psoriasis  Stable, no change   - calcipotriene 0.005 % OINT; Externally apply topically 2  times daily  Dispense: 60 g; Refill: 0    6. Moderate persistent asthma without complication  Stable, no change   - montelukast (SINGULAIR) 10 MG tablet; Take 1 tablet (10 mg) by mouth daily  Dispense: 90 tablet; Refill: 3    Patient Instructions   Make sure you are getting in at least 21 grams of the benefiber    Follow a high fiber diet - see below for instructions     Increase water intake to 60 oz/day     If still having problems return to clinic      Change Gabapentin (nerve pain medication) to as needed at bedtime - do not need to take every night         Eating a High-Fiber Diet  Fiber is what gives strength and structure to plants. Most grains, beans, vegetables, and fruits contain fiber. Foods rich in fiber are often low in calories and fat, and they fill you up more. They may also reduce your risks for certain health problems. To find out the amount of fiber in canned, packaged, or frozen foods, read the Nutrition Facts label. It tells you how much fiber is in a serving.    Types of fiber and their benefits  There are two types of fiber: insoluble and soluble. They both aid digestion and help you maintain a healthy weight.    Insoluble fiber. This is found in whole grains, cereals, certain fruits and vegetables such as apple skin, corn, and carrots. Insoluble fiber may prevent constipation and reduce the risk for certain types of cancer.    Soluble fiber. This type of fiber is in oats, beans, and certain fruits and vegetables such as strawberries and peas. Soluble fiber can reduce cholesterol, which may help lower the risk for heart disease. It also helps control blood sugar levels.  Look for high-fiber foods  Try these foods to add fiber to your diet:    Whole-grain breads and cereals. Try to eat 6 to 8 ounces a day. Include wheat and oat bran cereals, whole-wheat muffins or toast, and corn tortillas in your meals.    Fruits. Try to eat 2 cups a day. Apples, oranges, strawberries, pears, and bananas are  good sources. (Note: Fruit juice is low in fiber.)    Vegetables. Try to eat at least 2.5 cups a day. Add asparagus, carrots, broccoli, peas, and corn to your meals.    Beans. One cup of cooked lentils gives you over 15 grams of fiber. Try navy beans, lentils, and chickpeas.    Seeds. A small handful of seeds gives you about 3 grams of fiber. Try sunflower seeds.  Keep track of your fiber  Keep track of how much fiber you eat. Start by reading food labels. Then eat a variety of foods high in fiber. As you begin to eat more fiber, ask your healthcare provider how much water you should be drinking to keep your digestive system working smoothly.  You should aim for a certain amount of fiber in your diet each day. If you are a woman, that amount is between 25 and 28 grams per day. Men should aim for 30 to 33 grams per day. After age 50, your daily fiber needs drop to 22 grams for women and 28 grams for men.  Before you reach for the fiber supplements, think about this. Fiber is found naturally in healthy whole foods. It gives you that feeling of fullness after you eat. Taking fiber supplements or eating fiber-enriched foods will not give you this full feeling.  Your fiber intake is a good measure for the quality of your overall diet. If you are missing out on your daily amount of fiber, you may be lacking other important nutrients as well.  Date Last Reviewed: 5/11/2015 2000-2017 [employer]. [address] [place] [address], [place] [address]. All rights reserved. This information is not intended as a substitute for professional medical care. Always follow your healthcare professional's instructions.            MARVIN Mas CNP  [facility]

## 2017-08-16 NOTE — NURSING NOTE
"No chief complaint on file.      Initial /80 (BP Location: Right arm, Patient Position: Chair, Cuff Size: Adult Regular)  Pulse 64  Temp 98.1  F (36.7  C) (Tympanic)  Resp 18  Wt 127 lb 3.2 oz (57.7 kg)  BMI 22.53 kg/m2 Estimated body mass index is 22.53 kg/(m^2) as calculated from the following:    Height as of 5/22/17: 5' 3\" (1.6 m).    Weight as of this encounter: 127 lb 3.2 oz (57.7 kg).  Medication Reconciliation: complete    Health Maintenance that is potentially due pending provider review:  MACI Quintero MA  10:55 AM 8/16/2017  .    Is there anyone who you would like to be able to receive your results? on file  If yes have patient fill out BROOKE      "

## 2017-08-17 ASSESSMENT — ASTHMA QUESTIONNAIRES: ACT_TOTALSCORE: 21

## 2017-09-05 ENCOUNTER — CARE COORDINATION (OUTPATIENT)
Dept: CARE COORDINATION | Facility: CLINIC | Age: 72
End: 2017-09-05

## 2017-09-05 NOTE — PROGRESS NOTES
Clinic Care Coordination Contact  [facility]/Voicemail    Referral Source: PCP  Clinical Data: Care Coordinator Outreach  Outreach attempted x 1.  Left message with spouse and requested return call.  Plan:  Care Coordinator will try to reach patient again in 3-5 business days.  EM Mcfarlane, Clinic Care Coordinator 9/5/2017   11:07 AM  [phone]

## 2017-09-11 NOTE — PROGRESS NOTES
"Clinic Care Coordination Contact  Care Team Conversations    Pt reporting that her ortho appointment went well.  She got a shot which \"hurt like the dickens\" yet once it was done she has had good relief from the pain.  She said it was well worth the pain of the shot with the relief she has gotten.  She has not been tracking her anxiety stating she has been busy yet said it was better because things are \"calming down\".  Pt was not sure if she was managing things better so that she did not feel as overwhelmed or if not as busy.      She declined any needs right now stating she was feeling better with her anxiety.  Pt was ok with a call in 5-6 weeks.    EM Mcfarlane, Clinic Care Coordinator 9/11/2017   2:43 PM  [phone]        "

## 2017-09-16 DIAGNOSIS — J45.40 MODERATE PERSISTENT ASTHMA WITHOUT COMPLICATION: ICD-10-CM

## 2017-09-16 NOTE — TELEPHONE ENCOUNTER
montelukast (SINGULAIR) 10 MG tablet       Last Written Prescription Date: 8/16/17  Last Fill Quantity: 90, # refills: 3    Last Office Visit with [facility], [facility] or [facility] prescribing provider:  8/16/17   Future Office Visit:       Date of Last Asthma Action Plan Letter:   Asthma Action Plan Q1 Year    Topic Date Due     Asthma Action Plan - yearly  05/08/2018      Asthma Control Test:   ACT Total Scores 8/16/2017   ACT TOTAL SCORE -   ASTHMA ER VISITS -   ASTHMA HOSPITALIZATIONS -   ACT TOTAL SCORE (Goal Greater than or Equal to 20) 21   In the past 12 months, how many times did you visit the emergency room for your asthma without being admitted to the hospital? 0   In the past 12 months, how many times were you hospitalized overnight because of your asthma? 0       Date of Last Spirometry Test:   No results found for this or any previous visit.    Isa ATWOOD)

## 2017-09-18 RX ORDER — MONTELUKAST SODIUM 10 MG/1
TABLET ORAL
Qty: 90 TABLET | Refills: 1 | Status: SHIPPED | OUTPATIENT
Start: 2017-09-18 | End: 2018-03-12

## 2017-09-20 DIAGNOSIS — G89.29 CHRONIC RIGHT-SIDED LOW BACK PAIN WITH RIGHT-SIDED SCIATICA: ICD-10-CM

## 2017-09-20 DIAGNOSIS — M54.41 CHRONIC RIGHT-SIDED LOW BACK PAIN WITH RIGHT-SIDED SCIATICA: ICD-10-CM

## 2017-09-20 RX ORDER — CYCLOBENZAPRINE HCL 10 MG
TABLET ORAL
Qty: 30 TABLET | Refills: 0 | Status: SHIPPED | OUTPATIENT
Start: 2017-09-20 | End: 2018-01-09

## 2017-09-20 NOTE — TELEPHONE ENCOUNTER
cyclobenzaprine (FLEXERIL) 10 MG tablet      Last Written Prescription Date: 08/16/2017  Last Fill Quantity: 30 tablet,  # refills: 3   Last Office Visit with [facility], [facility] or [facility] prescribing provider: 08/16/2017

## 2017-09-27 ENCOUNTER — OFFICE VISIT (OUTPATIENT)
Dept: FAMILY MEDICINE | Facility: CLINIC | Age: 72
End: 2017-09-27
Payer: COMMERCIAL

## 2017-09-27 ENCOUNTER — RADIANT APPOINTMENT (OUTPATIENT)
Dept: GENERAL RADIOLOGY | Facility: CLINIC | Age: 72
End: 2017-09-27
Attending: NURSE PRACTITIONER
Payer: COMMERCIAL

## 2017-09-27 VITALS
DIASTOLIC BLOOD PRESSURE: 82 MMHG | RESPIRATION RATE: 18 BRPM | SYSTOLIC BLOOD PRESSURE: 120 MMHG | HEART RATE: 77 BPM | BODY MASS INDEX: 22.5 KG/M2 | TEMPERATURE: 98.1 F | OXYGEN SATURATION: 97 % | WEIGHT: 127 LBS

## 2017-09-27 DIAGNOSIS — R10.84 ABDOMINAL PAIN, GENERALIZED: Primary | ICD-10-CM

## 2017-09-27 DIAGNOSIS — K59.00 CONSTIPATION, UNSPECIFIED CONSTIPATION TYPE: ICD-10-CM

## 2017-09-27 DIAGNOSIS — D64.9 LOW HEMOGLOBIN: ICD-10-CM

## 2017-09-27 DIAGNOSIS — R09.89 PHLEGM IN THROAT: ICD-10-CM

## 2017-09-27 DIAGNOSIS — K21.9 GASTROESOPHAGEAL REFLUX DISEASE, ESOPHAGITIS PRESENCE NOT SPECIFIED: ICD-10-CM

## 2017-09-27 LAB
ERYTHROCYTE [DISTWIDTH] IN BLOOD BY AUTOMATED COUNT: 14 % (ref 10–15)
HCT VFR BLD AUTO: 33.9 % (ref 35–47)
HGB BLD-MCNC: 10.8 G/DL (ref 11.7–15.7)
MCH RBC QN AUTO: 29.8 PG (ref 26.5–33)
MCHC RBC AUTO-ENTMCNC: 31.9 G/DL (ref 31.5–36.5)
MCV RBC AUTO: 94 FL (ref 78–100)
PLATELET # BLD AUTO: 305 10E9/L (ref 150–450)
RBC # BLD AUTO: 3.62 10E12/L (ref 3.8–5.2)
WBC # BLD AUTO: 6 10E9/L (ref 4–11)

## 2017-09-27 PROCEDURE — 83690 ASSAY OF LIPASE: CPT | Performed by: NURSE PRACTITIONER

## 2017-09-27 PROCEDURE — 83550 IRON BINDING TEST: CPT | Performed by: NURSE PRACTITIONER

## 2017-09-27 PROCEDURE — 83540 ASSAY OF IRON: CPT | Performed by: NURSE PRACTITIONER

## 2017-09-27 PROCEDURE — 74020 XR ABDOMEN 2 VW: CPT

## 2017-09-27 PROCEDURE — 99214 OFFICE O/P EST MOD 30 MIN: CPT | Performed by: NURSE PRACTITIONER

## 2017-09-27 PROCEDURE — 85027 COMPLETE CBC AUTOMATED: CPT | Performed by: NURSE PRACTITIONER

## 2017-09-27 PROCEDURE — 36415 COLL VENOUS BLD VENIPUNCTURE: CPT | Performed by: NURSE PRACTITIONER

## 2017-09-27 PROCEDURE — 82728 ASSAY OF FERRITIN: CPT | Performed by: NURSE PRACTITIONER

## 2017-09-27 PROCEDURE — 80053 COMPREHEN METABOLIC PANEL: CPT | Performed by: NURSE PRACTITIONER

## 2017-09-27 ASSESSMENT — PAIN SCALES - GENERAL: PAINLEVEL: NO PAIN (0)

## 2017-09-27 NOTE — NURSING NOTE
"Chief Complaint   Patient presents with     Rectal Problem     Stool Issues       Initial /82 (BP Location: Right arm, Patient Position: Chair, Cuff Size: Adult Regular)  Pulse 77  Temp 98.1  F (36.7  C) (Tympanic)  Resp 18  Wt 127 lb (57.6 kg)  SpO2 97%  BMI 22.5 kg/m2 Estimated body mass index is 22.5 kg/(m^2) as calculated from the following:    Height as of 5/22/17: 5' 3\" (1.6 m).    Weight as of this encounter: 127 lb (57.6 kg).  Medication Reconciliation: complete    Health Maintenance that is potentially due pending provider review:  NONE    Radha Esqueda MA  1:38 PM 9/27/2017  .    Is there anyone who you would like to be able to receive your results? on file.   If yes have patient fill out BROOKE    "

## 2017-09-27 NOTE — PROGRESS NOTES
"  SUBJECTIVE:   Ingrid Amaral is a 72 year old female who presents to clinic today for the following health issues:      Concern - Having recurrent issues with stools.   Onset: ongoing    Description:   Having issues going, when she strains herself then she bleeds    Intensity: NA    Progression of Symptoms:  same    Accompanying Signs & Symptoms:  Yesterday had bright red blood in her stool    Previous history of similar problem:   Yes    Precipitating factors:   Worsened by: NA    Alleviating factors:  Improved by: NA    Therapies Tried and outcome: stool softener, - does not help takes 2 every night    Straining quite a bit yesterday - had just a little bit of blood in stool   Always have blood with wiping - always has to strain   May had normal Colonoscopy   Not taking Miralax  Takes a daily fiber supplement and follows a high fiber diet  Drinking fluid could be better, but does pretty well     Mostly every day bowel movement's - will go 2-3 days at times  Following a high fiber diet   Has been drinking about 4-5 16oz bottles of water a day   Voiding okay   2 stool softeners a day, one in the morning and one at night , was on the \"D\" one to begin with?   Always has had horrible gas   No nausea  Sometimes has abdominal pain right before bowel movement and during the bowel movement or with gas then goes away       Problem list and histories reviewed & adjusted, as indicated.  Additional history: as documented    Patient Active Problem List   Diagnosis     Diarrhea     Pure hypercholesterolemia     Allergic rhinitis     Panic disorder without agoraphobia     Advanced directives, counseling/discussion     Liver lesion     Generalized anxiety disorder     Mild major depression (H)     Vulvitis     Vaginal wall prolapse     Chronic constipation     Moderate persistent asthma     Dysphagia     RBD (REM behavioral disorder)     FH: colon cancer     Hypertension goal BP (blood pressure) < 140/90     Past Surgical " History:   Procedure Laterality Date     ANKLE SURGERY      bilateral     COLONOSCOPY       COLONOSCOPY N/A 3/20/2015    Procedure: COLONOSCOPY;  Surgeon: Britney Martinez MD;  Location: [facility]     COLONOSCOPY N/A 5/22/2017    Procedure: COLONOSCOPY;  Colonoscopy;  Surgeon: Tristen Rangel MD;  Location: [facility]     HC ESOPH/GAS REFLUX TEST W NASAL IMPED >1 HR  4/19/2012    Procedure:ESOPHAGEAL IMPEDENCE FUNCTION TEST WITH 24 HOUR PH GREATER THAN 1 HOUR; Surgeon:VALDO UMANZOR; Location: GI     HERNIA REPAIR      umbilcal     HYSTERECTOMY, PAP NO LONGER INDICATED  1991     TONSILLECTOMY       UPPER GI ENDOSCOPY         Social History   Substance Use Topics     Smoking status: Former Smoker     Packs/day: 0.50     Years: 3.00     Types: Cigarettes     Smokeless tobacco: Never Used      Comment: smoked for 3 years when she was around 19     Alcohol use Yes      Comment: RARELY     Family History   Problem Relation Age of Onset     HEART DISEASE Mother      valve problem     Hypertension Mother      DIABETES Mother      type 2     CEREBROVASCULAR DISEASE Mother      Allergies Mother      Eye Disorder Mother      Macular degeneration     OSTEOPOROSIS Mother      Respiratory Mother      Cardiovascular Father      MI at age 80     Cancer - colorectal Father      DIABETES Father      Hypertension Father      type 2     Eye Disorder Father      macular degeneration     Lipids Father      C.A.D. Maternal Grandmother      DIABETES Paternal Grandmother      type 2     Cardiovascular Paternal Grandmother      MI     DIABETES Sister      type 2     Allergies Sister      GASTROINTESTINAL DISEASE Sister      GASTROINTESTINAL DISEASE Daughter      GASTROINTESTINAL DISEASE Daughter          Current Outpatient Prescriptions   Medication Sig Dispense Refill     calcipotriene 0.005 % OINT Externally apply topically 2 times daily 60 g 0     simvastatin (ZOCOR) 40 MG tablet Take 1 tablet (40 mg) by mouth At Bedtime 90  tablet 3     gabapentin (NEURONTIN) 300 MG capsule Take 1 capsule (300 mg) by mouth nightly as needed 60 capsule 0     montelukast (SINGULAIR) 10 MG tablet Take 1 tablet (10 mg) by mouth daily 90 tablet 3     cyclobenzaprine (FLEXERIL) 10 MG tablet Take 0.5-1 tablets (5-10 mg) by mouth 3 times daily as needed for muscle spasms 30 tablet 3     amLODIPine (NORVASC) 5 MG tablet TAKE ONE TABLET BY MOUTH EVERY DAY 90 tablet 1     fluticasone (FLONASE) 50 MCG/ACT spray Spray 1 spray into both nostrils daily 1 Bottle 11     estradiol (ESTRACE) 0.1 MG/GM cream Use 1 gm every day and spread a small amount around the clitoral dodd 85 g 1     sertraline (ZOLOFT) 100 MG tablet Take 1.5 tablets (150 mg) by mouth daily 90 tablet 3     cetirizine (ZYRTEC) 10 MG tablet Take 1 tablet (10 mg) by mouth every evening 90 tablet 3     omeprazole (PRILOSEC) 20 MG CR capsule Take 1 capsule (20 mg) by mouth 2 times daily For heartburn 180 capsule 3     albuterol (PROAIR HFA/PROVENTIL HFA/VENTOLIN HFA) 108 (90 BASE) MCG/ACT Inhaler Inhale 2 puffs into the lungs every 4 hours as needed for shortness of breath / dyspnea 1 Inhaler 2     fluticasone-salmeterol (ADVAIR) 250-50 MCG/DOSE diskus inhaler Inhale 1 puff into the lungs 2 times daily 1 Inhaler 11     SKIN OILS EX Lavender-headache, skin, peppermint-skin, upset stomach mix of oils, asthma mix of oils, eucalyptus. Tea tree oil-skin, vapor rub- chest tightness, sleepy time (vetiver, lavendaer, mrjoram, mandarin, ylang)- sleep, constipation, eczema (sweet almond oil). Also mixes with carriers: Coconut oil, [place] oil, Extra virgin oil. Frankincense- cough. Digize- oil. Purification- oil, lemon- oil. Essentialyme- pill.  Inhales a mix with olive oil almond and coconut oil with peppermint, and eucalyptus- sinus, allergies. Updated 12/1/2015.   Updated 4/26/2016: Lemon, Cinnamon bark, panaway, Thieves, Orange, [place], Copaiba       ASPIRIN PO Take 81 mg by mouth Takes every other day        lactase (LACTAID) 9000 UNITS TABS Take 9,000 Units by mouth as needed       VITAMIN D, CHOLECALCIFEROL, PO Take 2,400 Units by mouth daily       BENEFIBER OR 2 tsp daily       cyclobenzaprine (FLEXERIL) 10 MG tablet TAKE ONE-HALF TO ONE TABLET BY MOUTH THREE TIMES A DAY AS NEEDED FOR MUSCLE SPASMS 30 tablet 0     montelukast (SINGULAIR) 10 MG tablet TAKE ONE TABLET BY MOUTH EVERY DAY 90 tablet 1     acetaminophen-codeine (TYLENOL #3) 300-30 MG per tablet Take 1 tablet by mouth daily as needed for pain maximum 1 tablet(s) per day (Patient not taking: Reported on 9/27/2017) 30 tablet 0     guaiFENesin (MUCINEX) 600 MG 12 hr tablet Take 2 tablets (1,200 mg) by mouth 2 times daily as needed To see if helps phlegm. (Patient not taking: Reported on 6/20/2017) 40 tablet 0     Multiple Minerals-Vitamins (CALCIUM CITRATE +) TABS Reported on 5/17/2017       ORDER FOR DME Equipment being ordered: Nebulizer and tubing/filter (Patient not taking: Reported on 6/20/2017) 1 Device 0     albuterol (PROVENTIL) (5 MG/ML) 0.5% nebulizer solution Take 0.5 mLs (2.5 mg) by nebulization every 4 hours as needed for wheezing or shortness of breath / dyspnea (Patient not taking: Reported on 6/20/2017) 30 vial 1     hydrocortisone 1 % cream Apply topically as needed       Probiotic Product (PROBIOTIC DAILY PO) Take 1 chew tab by mouth 2 times daily        clobetasol (TEMOVATE) 0.05 % ointment Apply to area of irritation twice daily for 2 weeks (Patient not taking: Reported on 9/27/2017) 30 g 0     desonide (DESOWEN) 0.05 % lotion Apply topically 2 times daily Reported on 5/2/2017 59 mL 0     Triamcinolone Acetonide 0.05 % OINT Externally apply  topically.       FLAXSEED Reported on 5/2/2017       Allergies   Allergen Reactions     Sulfa Drugs Other (See Comments)     Reaction unknown as a child         Reviewed and updated as needed this visit by clinical staff  Tobacco  Allergies  Meds  Problems  Med Hx  Surg Hx  Fam Hx  Soc Hx         Reviewed and updated as needed this visit by Provider  Allergies  Meds  Problems         ROS:  Constitutional, HEENT, cardiovascular, pulmonary, gi and gu systems are negative, except as otherwise noted.      OBJECTIVE:   /82 (BP Location: Right arm, Patient Position: Chair, Cuff Size: Adult Regular)  Pulse 77  Temp 98.1  F (36.7  C) (Tympanic)  Resp 18  Wt 127 lb (57.6 kg)  SpO2 97%  BMI 22.5 kg/m2  Body mass index is 22.5 kg/(m^2).  GENERAL APPEARANCE: healthy, alert and no distress  EYES: Eyes grossly normal to inspection, PERRL and conjunctivae and sclerae normal  HENT: ear canals and TM's normal and nose and mouth without ulcers or lesions  NECK: no adenopathy, no asymmetry, masses, or scars and thyroid normal to palpation  RESP: lungs clear to auscultation - no rales, rhonchi or wheezes  CV: regular rates and rhythm, normal S1 S2, no S3 or S4 and no murmur, click or rub  ABDOMEN: soft, tender epigastric/right upper quadrant region without guarding, without hepatosplenomegaly or masses and bowel sounds normal  MS: extremities normal- no gross deformities noted  SKIN: no suspicious lesions or rashes  NEURO: Normal strength and tone, mentation intact and speech normal  PSYCH: mentation appears normal and affect normal/bright    Diagnostic Test Results:  Results for orders placed or performed in visit on 09/27/17   XR Abdomen 2 Views    Narrative    ABDOMEN TWO-THREE VIEW  9/27/2017 2:50 PM     HISTORY: Generalized abdominal pain, Constipation, unspecified    COMPARISON: None.    FINDINGS: Small amount of stool.  No free air. There are no air filled  distended loops of small bowel. The colon is not distended. The lung  bases are unremarkable.      Impression    IMPRESSION: Nonobstructed bowel gas pattern.    CIELO DAVIS MD   Lipase   Result Value Ref Range    Lipase 179 73 - 393 U/L   CBC with platelets   Result Value Ref Range    WBC 6.0 4.0 - 11.0 10e9/L    RBC Count 3.62 (L) 3.8 - 5.2 10e12/L     Hemoglobin 10.8 (L) 11.7 - 15.7 g/dL    Hematocrit 33.9 (L) 35.0 - 47.0 %    MCV 94 78 - 100 fl    MCH 29.8 26.5 - 33.0 pg    MCHC 31.9 31.5 - 36.5 g/dL    RDW 14.0 10.0 - 15.0 %    Platelet Count 305 150 - 450 10e9/L   Comprehensive metabolic panel (BMP + Alb, Alk Phos, ALT, AST, Total. Bili, TP)   Result Value Ref Range    Sodium 138 133 - 144 mmol/L    Potassium 3.6 3.4 - 5.3 mmol/L    Chloride 106 94 - 109 mmol/L    Carbon Dioxide 28 20 - 32 mmol/L    Anion Gap 4 3 - 14 mmol/L    Glucose 98 70 - 99 mg/dL    Urea Nitrogen 15 7 - 30 mg/dL    Creatinine 0.73 0.52 - 1.04 mg/dL    GFR Estimate 79 >60 mL/min/1.7m2    GFR Estimate If Black >90 >60 mL/min/1.7m2    Calcium 8.9 8.5 - 10.1 mg/dL    Bilirubin Total 0.2 0.2 - 1.3 mg/dL    Albumin 4.0 3.4 - 5.0 g/dL    Protein Total 6.9 6.8 - 8.8 g/dL    Alkaline Phosphatase 72 40 - 150 U/L    ALT 28 0 - 50 U/L    AST 25 0 - 45 U/L   Ferritin   Result Value Ref Range    Ferritin 7 (L) 8 - 252 ng/mL   Iron and iron binding capacity   Result Value Ref Range    Iron 64 35 - 180 ug/dL    Iron Binding Cap 429 240 - 430 ug/dL    Iron Saturation Index 15 15 - 46 %       ASSESSMENT/PLAN:     1. Abdominal pain, generalized  Patient has had generalized abdominal pain for several several years, has seen GI in 2014 and advised of strict FODMAP diet and daily Miralax. Patient follows a high fiber diet and takes fiber supplement, gets in a fair amount of water. Question as to whether patient is getting in too much fiber and not enough fluid causing increased constipation. Patient does not feel like she empties out completely of stool. Patient also not taking Miralax, taking a daily stool softener? Patient should stop fiber supplement, continue high fiber diet and clear current stool out with an enema and Mag citrate. Patient to then take Miralax daily to keep stools regular and increase water intake. Patient should also see GI again for follow up.   - XR Abdomen 2 Views  - Lipase  - CBC  with platelets  - Comprehensive metabolic panel (BMP + Alb, Alk Phos, ALT, AST, Total. Bili, TP)  - GASTROENTEROLOGY ADULT REF CONSULT ONLY    2. Constipation, unspecified constipation type  See above   - XR Abdomen 2 Views  - GASTROENTEROLOGY ADULT REF CONSULT ONLY    3. Gastroesophageal reflux disease, esophagitis presence not specified  Stable on Prilosec two times daily, no changes   - GASTROENTEROLOGY ADULT REF CONSULT ONLY    4. Phlegm in throat    - GASTROENTEROLOGY ADULT REF CONSULT ONLY    5. Low hemoglobin  Slightly low hemoglobin with a just below normal Ferritin. Borderline iron deficiency anemia, will monitor without supplementation at this time due to increasing patient's constipation.   - Ferritin  - Iron and iron binding capacity    Patient Instructions   Abdominal xray shows a lot of stool - need to get you cleared out then balance fiber and water to keep from constipation    Go to the pharmacy and get a bottle of Magnesium Citrate  - it is a clear liquid and get a fleets enema   - Drink a half bottle with the enema when it is convenient for you to do    If no results within 4 hours or if minimal results take the other half of bottle of magnesium citrate    Drink a lot of water during this time     Stop the Benefiber     Stop stool softener for now  - bring in the bottle at your next visit     Continue a high fiber diet and PLENTY of fluids     If you are still not having regular bowel movement's and are hard/straining do a capful of Miralax at night     Make appointment with Gastroenterology    Follow up with me in 1 month to re-evaluate       MARVIN Mas [facility]

## 2017-09-27 NOTE — PATIENT INSTRUCTIONS
Abdominal xray shows a lot of stool - need to get you cleared out then balance fiber and water to keep from constipation    Go to the pharmacy and get a bottle of Magnesium Citrate  - it is a clear liquid and get a fleets enema   - Drink a half bottle with the enema when it is convenient for you to do    If no results within 4 hours or if minimal results take the other half of bottle of magnesium citrate    Drink a lot of water during this time     Stop the Benefiber     Stop stool softener for now  - bring in the bottle at your next visit     Continue a high fiber diet and PLENTY of fluids     If you are still not having regular bowel movement's and are hard/straining do a capful of Miralax at night     Make appointment with Gastroenterology    Follow up with me in 1 month to re-evaluate

## 2017-09-27 NOTE — MR AVS SNAPSHOT
After Visit Summary   9/27/2017    Ingrid Amaral    MRN: [id number]           Patient Information     Date Of Birth          1945        Visit Information        Provider Department      9/27/2017 2:00 PM Soha Sanchze APRN CNP [facility]        Today's Diagnoses     Abdominal pain, generalized    -  1    Constipation, unspecified constipation type        Phlegm in throat        Gastroesophageal reflux disease, esophagitis presence not specified          Care Instructions    Abdominal xray shows a lot of stool - need to get you cleared out then balance fiber and water to keep from constipation    Go to the pharmacy and get a bottle of Magnesium Citrate  - it is a clear liquid and get a fleets enema   - Drink a half bottle with the enema when it is convenient for you to do    If no results within 4 hours or if minimal results take the other half of bottle of magnesium citrate    Drink a lot of water during this time     Stop the Benefiber     Stop stool softener for now  - bring in the bottle at your next visit     Continue a high fiber diet and PLENTY of fluids     If you are still not having regular bowel movement's and are hard/straining do a capful of Miralax at night     Make appointment with Gastroenterology    Follow up with me in 1 month to re-evaluate           Follow-ups after your visit        Additional Services     GASTROENTEROLOGY ADULT REF CONSULT ONLY       Preferred Location: [facility] [phone]      Please be aware that coverage of these services is subject to the terms and limitations of your health insurance plan.  Call member services at your health plan with any benefit or coverage questions.  Any procedures must be performed at a [place] facility OR coordinated by your clinic's referral office.    Please bring the following with you to your appointment:    (1) Any X-Rays, CTs or MRIs which have been performed.  Contact the facility where they  "were done to arrange for  prior to your scheduled appointment.    (2) List of current medications   (3) This referral request   (4) Any documents/labs given to you for this referral                  Who to contact     If you have questions or need follow up information about today's clinic visit or your schedule please contact [facility] directly at [phone].  Normal or non-critical lab and imaging results will be communicated to you by MyChart, letter or phone within 4 business days after the clinic has received the results. If you do not hear from us within 7 days, please contact the clinic through [employer] or phone. If you have a critical or abnormal lab result, we will notify you by phone as soon as possible.  Submit refill requests through [employer] or call your pharmacy and they will forward the refill request to us. Please allow 3 business days for your refill to be completed.          Additional Information About Your Visit        [employer] Information     [employer] lets you send messages to your doctor, view your test results, renew your prescriptions, schedule appointments and more. To sign up, go to [email or web address] . Click on \"Log in\" on the left side of the screen, which will take you to the Welcome page. Then click on \"Sign up Now\" on the right side of the page.     You will be asked to enter the access code listed below, as well as some personal information. Please follow the directions to create your username and password.     Your access code is: [email or web address]  Expires: 2017 11:22 AM     Your access code will  in 90 days. If you need help or a new code, please call your [facility] or [phone].        Care EveryWhere ID     This is your Care EveryWhere ID. This could be used by other organizations to access your [place] medical records  [id number]        Your Vitals Were     Pulse Temperature Respirations Pulse Oximetry BMI (Body Mass Index)       " 77 98.1  F (36.7  C) (Tympanic) 18 97% 22.5 kg/m2        Blood Pressure from Last 3 Encounters:   09/27/17 120/82   08/16/17 134/80   06/20/17 118/80    Weight from Last 3 Encounters:   09/27/17 127 lb (57.6 kg)   08/16/17 127 lb 3.2 oz (57.7 kg)   06/20/17 127 lb 3.2 oz (57.7 kg)              We Performed the Following     CBC with platelets     Comprehensive metabolic panel (BMP + Alb, Alk Phos, ALT, AST, Total. Bili, TP)     GASTROENTEROLOGY ADULT REF CONSULT ONLY     Lipase     XR Abdomen 2 Views        Primary Care Provider Office Phone # Fax #    Soha Blackcate Sanchez, MARVIN [phone]       [facility] [address] [facility] [address]        Equal Access to Services     JORGE A BYRD : Hadii aad ku hadasho Sogladis, waaxda luqadaha, qaybta kaalmada adeskipyada, ollie gray hayprince rangel . So [facility] [phone].    ATENCIÓN: Si habla español, tiene a gauthier disposición servicios gratuitos de asistencia lingüística. Llame al [phone].    We comply with applicable federal civil rights laws and [place] laws. We do not discriminate on the basis of race, color, national origin, age, disability sex, sexual orientation or gender identity.            Thank you!     Thank you for choosing [facility]  for your care. Our goal is always to provide you with excellent care. Hearing back from our patients is one way we can continue to improve our services. Please take a few minutes to complete the written survey that you may receive in the mail after your visit with us. Thank you!             Your Updated Medication List - Protect others around you: Learn how to safely use, store and throw away your medicines at www.disposemymeds.org.          This list is accurate as of: 9/27/17  3:13 PM.  Always use your most recent med list.                   Brand Name Dispense Instructions for use Diagnosis    acetaminophen-codeine 300-30 MG per tablet    TYLENOL #3     30 tablet    Take 1 tablet by mouth daily as needed for pain maximum 1 tablet(s) per day    Lumbar radiculopathy, Chronic right-sided low back pain with right-sided sciatica       * albuterol (5 MG/ML) 0.5% neb solution    PROVENTIL    30 vial    Take 0.5 mLs (2.5 mg) by nebulization every 4 hours as needed for wheezing or shortness of breath / dyspnea    Moderate persistent asthma       * albuterol 108 (90 BASE) MCG/ACT Inhaler    PROAIR HFA/PROVENTIL HFA/VENTOLIN HFA    1 Inhaler    Inhale 2 puffs into the lungs every 4 hours as needed for shortness of breath / dyspnea    Moderate persistent asthma without complication       amLODIPine 5 MG tablet    NORVASC    90 tablet    TAKE ONE TABLET BY MOUTH EVERY DAY    Benign essential hypertension       ASPIRIN PO      Take 81 mg by mouth Takes every other day        BENEFIBER PO      2 tsp daily        calcipotriene 0.005 % Oint     60 g    Externally apply topically 2 times daily    Psoriasis       calcium citrate + Tabs      Reported on 5/17/2017        cetirizine 10 MG tablet    zyrTEC    90 tablet    Take 1 tablet (10 mg) by mouth every evening    Chronic rhinitis       clobetasol 0.05 % ointment    TEMOVATE    30 g    Apply to area of irritation twice daily for 2 weeks    Vulvitis       * cyclobenzaprine 10 MG tablet    FLEXERIL    30 tablet    Take 0.5-1 tablets (5-10 mg) by mouth 3 times daily as needed for muscle spasms    Chronic right-sided low back pain with right-sided sciatica       * cyclobenzaprine 10 MG tablet    FLEXERIL    30 tablet    TAKE ONE-HALF TO ONE TABLET BY MOUTH THREE TIMES A DAY AS NEEDED FOR MUSCLE SPASMS    Chronic right-sided low back pain with right-sided sciatica       desonide 0.05 % lotion    DESOWEN    59 mL    Apply topically 2 times daily Reported on 5/2/2017        estradiol 0.1 MG/GM cream    ESTRACE    85 g    Use 1 gm every day and spread a small amount around the clitoral dodd    Vaginal atrophy, Labial and clitoral adhesions,  acquired       FLAXSEED      Reported on 5/2/2017        fluticasone 50 MCG/ACT spray    FLONASE    1 Bottle    Spray 1 spray into both nostrils daily    Allergic rhinitis, unspecified allergic rhinitis trigger, unspecified rhinitis seasonality       fluticasone-salmeterol 250-50 MCG/DOSE diskus inhaler    ADVAIR    1 Inhaler    Inhale 1 puff into the lungs 2 times daily    Moderate persistent asthma without complication       gabapentin 300 MG capsule    NEURONTIN    60 capsule    Take 1 capsule (300 mg) by mouth nightly as needed    Lumbar radiculopathy, Chronic right-sided low back pain with right-sided sciatica       guaiFENesin 600 MG 12 hr tablet    MUCINEX    40 tablet    Take 2 tablets (1,200 mg) by mouth 2 times daily as needed To see if helps phlegm.    Phlegm in throat       hydrocortisone 1 % cream    CORTAID     Apply topically as needed        lactase 9000 UNITS Tabs tablet    LACTAID     Take 9,000 Units by mouth as needed        * montelukast 10 MG tablet    SINGULAIR    90 tablet    Take 1 tablet (10 mg) by mouth daily    Moderate persistent asthma without complication       * montelukast 10 MG tablet    SINGULAIR    90 tablet    TAKE ONE TABLET BY MOUTH EVERY DAY    Moderate persistent asthma without complication       omeprazole 20 MG CR capsule    priLOSEC    180 capsule    Take 1 capsule (20 mg) by mouth 2 times daily For heartburn    Gastroesophageal reflux disease without esophagitis       order for DME     1 Device    Equipment being ordered: Nebulizer and tubing/filter    Moderate persistent asthma       PROBIOTIC DAILY PO      Take 1 chew tab by mouth 2 times daily        sertraline 100 MG tablet    ZOLOFT    90 tablet    Take 1.5 tablets (150 mg) by mouth daily    Panic disorder without agoraphobia       simvastatin 40 MG tablet    ZOCOR    90 tablet    Take 1 tablet (40 mg) by mouth At Bedtime    Elevated cholesterol with elevated triglycerides       SKIN OILS EX      Lavender-headache,  skin, peppermint-skin, upset stomach mix of oils, asthma mix of oils, eucalyptus. Tea tree oil-skin, vapor rub- chest tightness, sleepy time (vetiver, lavendaer, mrjoram, mandarin, ylang)- sleep, constipation, eczema (sweet almond oil). Also mixes with carriers: Coconut oil, Oldsmar oil, Extra virgin oil. Frankincense- cough. Digize- oil. Purification- oil, lemon- oil. Essentialyme- pill.  Inhales a mix with olive oil almond and coconut oil with peppermint, and eucalyptus- sinus, allergies. Updated 12/1/2015.  Updated 4/26/2016: Lemon, Cinnamon bark, panaway, Thieves, Orange, Wintergreen, Copaiba        triamcinolone acetonide 0.05 % Oint      Externally apply  topically.        VITAMIN D (CHOLECALCIFEROL) PO      Take 2,400 Units by mouth daily        * Notice:  This list has 6 medication(s) that are the same as other medications prescribed for you. Read the directions carefully, and ask your doctor or other care provider to review them with you.

## 2017-09-28 LAB
ALBUMIN SERPL-MCNC: 4 G/DL (ref 3.4–5)
ALP SERPL-CCNC: 72 U/L (ref 40–150)
ALT SERPL W P-5'-P-CCNC: 28 U/L (ref 0–50)
ANION GAP SERPL CALCULATED.3IONS-SCNC: 4 MMOL/L (ref 3–14)
AST SERPL W P-5'-P-CCNC: 25 U/L (ref 0–45)
BILIRUB SERPL-MCNC: 0.2 MG/DL (ref 0.2–1.3)
BUN SERPL-MCNC: 15 MG/DL (ref 7–30)
CALCIUM SERPL-MCNC: 8.9 MG/DL (ref 8.5–10.1)
CHLORIDE SERPL-SCNC: 106 MMOL/L (ref 94–109)
CO2 SERPL-SCNC: 28 MMOL/L (ref 20–32)
CREAT SERPL-MCNC: 0.73 MG/DL (ref 0.52–1.04)
GFR SERPL CREATININE-BSD FRML MDRD: 79 ML/MIN/1.7M2
GLUCOSE SERPL-MCNC: 98 MG/DL (ref 70–99)
LIPASE SERPL-CCNC: 179 U/L (ref 73–393)
POTASSIUM SERPL-SCNC: 3.6 MMOL/L (ref 3.4–5.3)
PROT SERPL-MCNC: 6.9 G/DL (ref 6.8–8.8)
SODIUM SERPL-SCNC: 138 MMOL/L (ref 133–144)

## 2017-09-29 LAB
FERRITIN SERPL-MCNC: 7 NG/ML (ref 8–252)
IRON SATN MFR SERPL: 15 % (ref 15–46)
IRON SERPL-MCNC: 64 UG/DL (ref 35–180)
TIBC SERPL-MCNC: 429 UG/DL (ref 240–430)

## 2017-10-20 DIAGNOSIS — N90.89 LABIAL AND CLITORAL ADHESIONS, ACQUIRED: ICD-10-CM

## 2017-10-20 DIAGNOSIS — N95.2 VAGINAL ATROPHY: ICD-10-CM

## 2017-10-20 RX ORDER — ESTRADIOL 0.1 MG/G
CREAM VAGINAL
Qty: 85 G | Refills: 0 | Status: SHIPPED | OUTPATIENT
Start: 2017-10-20 | End: 2018-01-03

## 2017-10-23 ENCOUNTER — CARE COORDINATION (OUTPATIENT)
Dept: CARE COORDINATION | Facility: CLINIC | Age: 72
End: 2017-10-23

## 2017-10-23 NOTE — PROGRESS NOTES
Clinic Care Coordination Contact  Care Team Conversations    Follow up call made to pt.  She is still getting pain relief from the injection she received.    Pt said that her panic attacks appear to be hitting when she is not able to find what she is looking for.  She identified one time when she couldn't find her car keys and having anxiety/panic until she found the keys.  Discussed ways to work through the panic attacks such as deep breathing and positive self talk that it is all ok.  Pt said she also talks with God and this helps as well. Encouraged continued tracking of what is occurring for better plan to address anxiety and panic.     Pt had no other concerns.  Goals updated and care plan sent.     Plan:  Pt to continue to track her panic/anxiety and what is working.  Sw to call in about one month.    EM Mcfarlane, Clinic Care Coordinator 10/23/2017   9:01 AM  [phone]

## 2017-10-23 NOTE — LETTER
[facility] Home  Complex Care Plan  About Me  Patient Name:  Ingrid Yancey    YOB: 1945  Age:   72 year old   Cassandra MRN: [id number] Telephone Information:     Home Phone [phone]   Mobile Not on file.       Address:    [address] [place] [id number] Email address:  [email or web address]      Emergency Contact(s)  Name Relationship Lgl Grd Work Phone Home Phone Mobile Phone   1. BINDU YANCEY Spouse  none [phone] none   2. KORINA SILVESTRE Daughter  none [phone]           Primary language:  English     needed? No   [facility]:  [phone] op. 1  Other communication barriers: Yes, as documented  Preferred Method of Communication:  Letter, Phone  Current living arrangement: I live in a private home with spouse  Mobility Status/ Medical Equipment: Independent  Other information to know about me:    Health Maintenance  Health Maintenance Reviewed:      My Access Plan  Medical Emergency 911   Primary Clinic Line [facility]- [phone]   24 Hour Appointment Line [phone] or  [phone]) (toll-free)   24 Hour Nurse Line [phone] (toll-free)   Preferred Urgent Care [facility], [phone]   [facility] [place], [place]  [phone]   Preferred Pharmacy [facility] [address] Behavioral Health Crisis Line The National Suicide Prevention Lifeline at 1-730.592.7250 or 911     My Care Team Members  Patient Care Team       Relationship Specialty Notifications Start End    Soha Sanchez APRN CNP PCP - General Nurse Practitioner - Family  5/2/17     Phone: [phone] Fax: [phone]         [facility] [address] [facility] [address]    Ria Izquierdo LSW Clinic Care Coordinator  Admissions 5/18/17     Phone: [phone] Fax: [phone]             My Care  Plans  Self Management and Treatment Plan  Goals and (Comments)  Goal #1: I will track my anxiety/panic attacks and what is occuring just before them for the next month      20% of goal reached    Action Plans on File:    Advance Care Plans/Directives Type:        My Medical and Care Information  Problem List   Patient Active Problem List   Diagnosis     Diarrhea     Pure hypercholesterolemia     Allergic rhinitis     Panic disorder without agoraphobia     Advanced directives, counseling/discussion     Liver lesion     Generalized anxiety disorder     Mild major depression (H)     Vulvitis     Vaginal wall prolapse     Chronic constipation     Moderate persistent asthma     Dysphagia     RBD (REM behavioral disorder)     FH: colon cancer     Hypertension goal BP (blood pressure) < 140/90      Current Medications and Allergies:  See printed Medication Report.    Care Coordination Start Date: 05/18/17   Frequency of Care Coordination: monthly   Form Last Updated: 10/23/2017

## 2017-10-23 NOTE — LETTER
[address] [address]  [phone]      10/23/2017      Ingrid Amaral  [address] [facility] [address]      Dear  Ingrid,    It was a pleasure to speak with you.  I would like to provide you with the enclosed information for your records.  As part of care coordination, we are developing care plans to assist in accomplishing your health care goals.  When we speak next, please feel free to let me know if you want to add or change any information on your care plans.    As always, feel free to contact me if you have any questions or concerns.  I look forward to working with you in the effort to achieve your health care and wellness goals .        Sincerely,      Ria Izquierdo, hospitals  Clinic Care Coordination  [phone]

## 2017-10-27 ENCOUNTER — TRANSFERRED RECORDS (OUTPATIENT)
Dept: HEALTH INFORMATION MANAGEMENT | Facility: CLINIC | Age: 72
End: 2017-10-27

## 2017-11-01 ENCOUNTER — OFFICE VISIT (OUTPATIENT)
Dept: FAMILY MEDICINE | Facility: CLINIC | Age: 72
End: 2017-11-01
Payer: COMMERCIAL

## 2017-11-01 VITALS
SYSTOLIC BLOOD PRESSURE: 132 MMHG | DIASTOLIC BLOOD PRESSURE: 82 MMHG | WEIGHT: 125.6 LBS | RESPIRATION RATE: 16 BRPM | HEART RATE: 80 BPM | BODY MASS INDEX: 22.25 KG/M2 | TEMPERATURE: 98.1 F

## 2017-11-01 DIAGNOSIS — G89.29 CHRONIC RIGHT-SIDED LOW BACK PAIN WITH RIGHT-SIDED SCIATICA: ICD-10-CM

## 2017-11-01 DIAGNOSIS — M54.41 CHRONIC RIGHT-SIDED LOW BACK PAIN WITH RIGHT-SIDED SCIATICA: ICD-10-CM

## 2017-11-01 DIAGNOSIS — Z23 NEED FOR PROPHYLACTIC VACCINATION AND INOCULATION AGAINST INFLUENZA: ICD-10-CM

## 2017-11-01 DIAGNOSIS — K59.09 CHRONIC CONSTIPATION: Primary | ICD-10-CM

## 2017-11-01 DIAGNOSIS — M54.16 LUMBAR RADICULOPATHY: ICD-10-CM

## 2017-11-01 PROCEDURE — G0008 ADMIN INFLUENZA VIRUS VAC: HCPCS | Performed by: NURSE PRACTITIONER

## 2017-11-01 PROCEDURE — 90662 IIV NO PRSV INCREASED AG IM: CPT | Performed by: NURSE PRACTITIONER

## 2017-11-01 PROCEDURE — 99213 OFFICE O/P EST LOW 20 MIN: CPT | Mod: 25 | Performed by: NURSE PRACTITIONER

## 2017-11-01 ASSESSMENT — ANXIETY QUESTIONNAIRES
5. BEING SO RESTLESS THAT IT IS HARD TO SIT STILL: MORE THAN HALF THE DAYS
3. WORRYING TOO MUCH ABOUT DIFFERENT THINGS: SEVERAL DAYS
GAD7 TOTAL SCORE: 8
7. FEELING AFRAID AS IF SOMETHING AWFUL MIGHT HAPPEN: SEVERAL DAYS
2. NOT BEING ABLE TO STOP OR CONTROL WORRYING: SEVERAL DAYS
6. BECOMING EASILY ANNOYED OR IRRITABLE: SEVERAL DAYS
1. FEELING NERVOUS, ANXIOUS, OR ON EDGE: SEVERAL DAYS

## 2017-11-01 ASSESSMENT — PAIN SCALES - GENERAL: PAINLEVEL: NO PAIN (0)

## 2017-11-01 ASSESSMENT — PATIENT HEALTH QUESTIONNAIRE - PHQ9
SUM OF ALL RESPONSES TO PHQ QUESTIONS 1-9: 8
5. POOR APPETITE OR OVEREATING: SEVERAL DAYS

## 2017-11-01 NOTE — NURSING NOTE
"Chief Complaint   Patient presents with     RECHECK       Initial /82 (BP Location: Right arm, Patient Position: Chair, Cuff Size: Adult Regular)  Pulse 80  Temp 98.1  F (36.7  C) (Tympanic)  Resp 16  Wt 125 lb 9.6 oz (57 kg)  BMI 22.25 kg/m2 Estimated body mass index is 22.25 kg/(m^2) as calculated from the following:    Height as of 5/22/17: 5' 3\" (1.6 m).    Weight as of this encounter: 125 lb 9.6 oz (57 kg).  Medication Reconciliation: complete    Health Maintenance that is potentially due pending provider review:  PHQ9    Possibly completing today per provider review.    Radha Esqueda MA  2:06 PM 11/1/2017      "

## 2017-11-01 NOTE — PATIENT INSTRUCTIONS
Increase your miralax to 1 1/2 capful with the prune juice everyday     If stools still are tough to get out in 1 week, I want you to increase to 2 capfuls of miralax a day     Push fluids     Gastroenterology wants to see you back in 1-2 months - keep appointment if you have made one    See me back after gastroenterology or sooner if still having difficulties

## 2017-11-01 NOTE — PROGRESS NOTES

## 2017-11-01 NOTE — MR AVS SNAPSHOT
"              After Visit Summary   11/1/2017    Ingrid Amaral    MRN: [id number]           Patient Information     Date Of Birth          1945        Visit Information        Provider Department      11/1/2017 2:00 PM Soha Sanchez APRN CNP [facility]        Today's Diagnoses     Lumbar radiculopathy    -  1    Chronic right-sided low back pain with right-sided sciatica          Care Instructions    Increase your miralax to 1 1/2 capful with the prune juice everyday     If stools still are tough to get out in 1 week, I want you to increase to 2 capfuls of miralax a day     Push fluids     Gastroenterology wants to see you back in 1-2 months - keep appointment if you have made one    See me back after gastroenterology or sooner if still having difficulties           Follow-ups after your visit        Who to contact     If you have questions or need follow up information about today's clinic visit or your schedule please contact [facility] directly at [phone].  Normal or non-critical lab and imaging results will be communicated to you by [employer], letter or phone within 4 business days after the clinic has received the results. If you do not hear from us within 7 days, please contact the clinic through "[employer]t or phone. If you have a critical or abnormal lab result, we will notify you by phone as soon as possible.  Submit refill requests through [employer] or call your pharmacy and they will forward the refill request to us. Please allow 3 business days for your refill to be completed.          Additional Information About Your Visit        [employer] Information     [employer] lets you send messages to your doctor, view your test results, renew your prescriptions, schedule appointments and more. To sign up, go to [email or web address] . Click on \"Log in\" on the left side of the screen, which will take you to the Welcome page. Then click on \"Sign up Now\" on the right " side of the page.     You will be asked to enter the access code listed below, as well as some personal information. Please follow the directions to create your username and password.     Your access code is: [email or web address]  Expires: 2017 11:22 AM     Your access code will  in 90 days. If you need help or a new code, please call your [place] clinic or [phone].        Care EveryWhere ID     This is your Care EveryWhere ID. This could be used by other organizations to access your [place] medical records  [id number]        Your Vitals Were     Pulse Temperature Respirations BMI (Body Mass Index)          80 98.1  F (36.7  C) (Tympanic) 16 22.25 kg/m2         Blood Pressure from Last 3 Encounters:   17 132/82   17 120/82   17 134/80    Weight from Last 3 Encounters:   17 125 lb 9.6 oz (57 kg)   17 127 lb (57.6 kg)   17 127 lb 3.2 oz (57.7 kg)              Today, you had the following     No orders found for display         Today's Medication Changes          These changes are accurate as of: 17  2:34 PM.  If you have any questions, ask your nurse or doctor.               Start taking these medicines.        Dose/Directions    diclofenac 50 MG EC tablet   Commonly known as:  VOLTAREN   Used for:  Lumbar radiculopathy, Chronic right-sided low back pain with right-sided sciatica   Started by:  Soha Sanchez APRN CNP        Dose:  50 mg   Take 1 tablet (50 mg) by mouth 2 times daily as needed for moderate pain   Quantity:  30 tablet   Refills:  1            Where to get your medicines      These medications were sent to [facility] [address] [address]     Phone:  [phone]     diclofenac 50 MG EC tablet                Primary Care Provider Office Phone # Fax #    MARVIN Diaz [phone]       [address]  [place] [address]        Equal Access to Services     [facility] Center[facility]HAD : Hadii yan ku hadnuviao Sogladis, waaxda luqadaha, qaybta kaalmada josephhernestomaikol, ollie marina abreunehamarlene suarez. So [facility] [phone].    ATENCIÓN: Si habla español, tiene a gauthier disposición servicios gratuitos de asistencia lingüística. Llame al [phone].    We comply with applicable federal civil rights laws and [place] laws. We do not discriminate on the basis of race, color, national origin, age, disability, sex, sexual orientation, or gender identity.            Thank you!     Thank you for choosing [facility]  for your care. Our goal is always to provide you with excellent care. Hearing back from our patients is one way we can continue to improve our services. Please take a few minutes to complete the written survey that you may receive in the mail after your visit with us. Thank you!             Your Updated Medication List - Protect others around you: Learn how to safely use, store and throw away your medicines at www.disposemymeds.org.          This list is accurate as of: 11/1/17  2:34 PM.  Always use your most recent med list.                   Brand Name Dispense Instructions for use Diagnosis    acetaminophen-codeine 300-30 MG per tablet    TYLENOL #3    30 tablet    Take 1 tablet by mouth daily as needed for pain maximum 1 tablet(s) per day    Lumbar radiculopathy, Chronic right-sided low back pain with right-sided sciatica       * albuterol (5 MG/ML) 0.5% neb solution    PROVENTIL    30 vial    Take 0.5 mLs (2.5 mg) by nebulization every 4 hours as needed for wheezing or shortness of breath / dyspnea    Moderate persistent asthma       * albuterol 108 (90 BASE) MCG/ACT Inhaler    PROAIR HFA/PROVENTIL HFA/VENTOLIN HFA    1 Inhaler    Inhale 2 puffs into the lungs every 4 hours as needed for shortness of breath / dyspnea    Moderate persistent asthma without complication       amLODIPine 5 MG tablet    NORVASC    90  tablet    TAKE ONE TABLET BY MOUTH EVERY DAY    Benign essential hypertension       ASPIRIN PO      Take 81 mg by mouth Takes every other day        BENEFIBER PO      2 tsp daily        calcipotriene 0.005 % Oint     60 g    Externally apply topically 2 times daily    Psoriasis       calcium citrate + Tabs      Reported on 5/17/2017        cetirizine 10 MG tablet    zyrTEC    90 tablet    Take 1 tablet (10 mg) by mouth every evening    Chronic rhinitis       clobetasol 0.05 % ointment    TEMOVATE    30 g    Apply to area of irritation twice daily for 2 weeks    Vulvitis       cyclobenzaprine 10 MG tablet    FLEXERIL    30 tablet    TAKE ONE-HALF TO ONE TABLET BY MOUTH THREE TIMES A DAY AS NEEDED FOR MUSCLE SPASMS    Chronic right-sided low back pain with right-sided sciatica       desonide 0.05 % lotion    DESOWEN    59 mL    Apply topically 2 times daily Reported on 5/2/2017        diclofenac 50 MG EC tablet    VOLTAREN    30 tablet    Take 1 tablet (50 mg) by mouth 2 times daily as needed for moderate pain    Lumbar radiculopathy, Chronic right-sided low back pain with right-sided sciatica       ESTRACE VAGINAL 0.1 MG/GM cream   Generic drug:  estradiol     85 g    USE ONE GRAM VAGINALLY EVERY DAY AND SPREAD A SMALL AMOUNT AROUND THE CLITORAL REES    Vaginal atrophy, Labial and clitoral adhesions, acquired       FLAXSEED      Reported on 5/2/2017        fluticasone 50 MCG/ACT spray    FLONASE    1 Bottle    Spray 1 spray into both nostrils daily    Allergic rhinitis, unspecified allergic rhinitis trigger, unspecified rhinitis seasonality       fluticasone-salmeterol 250-50 MCG/DOSE diskus inhaler    ADVAIR    1 Inhaler    Inhale 1 puff into the lungs 2 times daily    Moderate persistent asthma without complication       gabapentin 300 MG capsule    NEURONTIN    60 capsule    Take 1 capsule (300 mg) by mouth nightly as needed    Lumbar radiculopathy, Chronic right-sided low back pain with right-sided sciatica        guaiFENesin 600 MG 12 hr tablet    MUCINEX    40 tablet    Take 2 tablets (1,200 mg) by mouth 2 times daily as needed To see if helps phlegm.    Phlegm in throat       hydrocortisone 1 % cream    CORTAID     Apply topically as needed        lactase 9000 UNITS Tabs tablet    LACTAID     Take 9,000 Units by mouth as needed        montelukast 10 MG tablet    SINGULAIR    90 tablet    TAKE ONE TABLET BY MOUTH EVERY DAY    Moderate persistent asthma without complication       omeprazole 20 MG CR capsule    priLOSEC    180 capsule    Take 1 capsule (20 mg) by mouth 2 times daily For heartburn    Gastroesophageal reflux disease without esophagitis       order for DME     1 Device    Equipment being ordered: Nebulizer and tubing/filter    Moderate persistent asthma       PROBIOTIC DAILY PO      Take 1 chew tab by mouth 2 times daily        sertraline 100 MG tablet    ZOLOFT    90 tablet    Take 1.5 tablets (150 mg) by mouth daily    Panic disorder without agoraphobia       simvastatin 40 MG tablet    ZOCOR    90 tablet    Take 1 tablet (40 mg) by mouth At Bedtime    Elevated cholesterol with elevated triglycerides       SKIN OILS EX      Lavender-headache, skin, peppermint-skin, upset stomach mix of oils, asthma mix of oils, eucalyptus. Tea tree oil-skin, vapor rub- chest tightness, sleepy time (vetiver, lavendaer, mrjoram, mandarin, ylang)- sleep, constipation, eczema (sweet almond oil). Also mixes with carriers: Coconut oil, [place] oil, Extra virgin oil. Frankincense- cough. Digize- oil. Purification- oil, lemon- oil. Essentialyme- pill.  Inhales a mix with olive oil almond and coconut oil with peppermint, and eucalyptus- sinus, allergies. Updated 12/1/2015.  Updated 4/26/2016: Lemon, Cinnamon bark, panaway, Thieves, Orange, Wintergreen, Copaiba        triamcinolone acetonide 0.05 % Oint      Externally apply  topically.        VITAMIN D (CHOLECALCIFEROL) PO      Take 2,400 Units by mouth daily        * Notice:  This list  has 2 medication(s) that are the same as other medications prescribed for you. Read the directions carefully, and ask your doctor or other care provider to review them with you.

## 2017-11-02 ASSESSMENT — ANXIETY QUESTIONNAIRES: GAD7 TOTAL SCORE: 8

## 2017-11-27 ENCOUNTER — CARE COORDINATION (OUTPATIENT)
Dept: CARE COORDINATION | Facility: CLINIC | Age: 72
End: 2017-11-27

## 2017-11-27 NOTE — PROGRESS NOTES
Clinic Care Coordination Contact  Care Team Conversations    Pt reported that the Thanksgiving holiday went well and she was able to manage her anxiety.  She felt she was doing well and had no concerns or questions.     She does have a couple of medications she would like feedback from the provider if they disagree with what she is doing/planning or have any advice.    1) Omeprazole (Prilosec) she has been taking twice a day and not having any heartburn - she is planning to go to just one a day.  She is not sure what is causing the heartburn but since she is not currently having any she would like to cut down and see how it goes.   2) Gabapentin (Neurontin) she has not taken for over a month and is not planning on taking any more   3)  Diclofenac (Voltaren) she has not been taking or planning to take this since she started the Cyclobenzaprine (Flexeril) that she takes PRN.      Plan:  Will route to PCP for input on the 3 medication changes above.  SW will call in about one month or sooner if PCP feels a call is needed regarding the above.     EM Mcfarlane, Clinic Care Coordinator 11/27/2017   1:50 PM  [phone]

## 2017-12-06 DIAGNOSIS — J45.40 MODERATE PERSISTENT ASTHMA WITHOUT COMPLICATION: ICD-10-CM

## 2017-12-18 ENCOUNTER — TRANSFERRED RECORDS (OUTPATIENT)
Dept: HEALTH INFORMATION MANAGEMENT | Facility: CLINIC | Age: 72
End: 2017-12-18

## 2017-12-26 ENCOUNTER — CARE COORDINATION (OUTPATIENT)
Dept: CARE COORDINATION | Facility: CLINIC | Age: 72
End: 2017-12-26

## 2018-01-03 ENCOUNTER — TELEPHONE (OUTPATIENT)
Dept: FAMILY MEDICINE | Facility: CLINIC | Age: 73
End: 2018-01-03

## 2018-01-03 DIAGNOSIS — N95.2 VAGINAL ATROPHY: ICD-10-CM

## 2018-01-03 DIAGNOSIS — N90.89 LABIAL AND CLITORAL ADHESIONS, ACQUIRED: ICD-10-CM

## 2018-01-03 RX ORDER — ESTRADIOL 0.1 MG/G
CREAM VAGINAL
Qty: 85 G | Refills: 1 | Status: SHIPPED | OUTPATIENT
Start: 2018-01-03 | End: 2018-01-09

## 2018-01-03 NOTE — TELEPHONE ENCOUNTER
Ingrid says she called to make routine mammogram appointment but when she told them she occasionally gets a pain under her breasts and it goes up. She isn't sure if it's a muscle or what it is. She was told she needed to call her provider. She is aware that Soha is out of the office until Friday. She may be gone so OK to leave message.

## 2018-01-04 NOTE — TELEPHONE ENCOUNTER
I would probably need to see the patient to determine if a screening versus diagnostic mammogram is appropriate. I don't think I've seen her for this.    Thanks  MARVIN Gonzales CNP

## 2018-01-09 ENCOUNTER — OFFICE VISIT (OUTPATIENT)
Dept: FAMILY MEDICINE | Facility: CLINIC | Age: 73
End: 2018-01-09
Payer: COMMERCIAL

## 2018-01-09 VITALS
BODY MASS INDEX: 22.5 KG/M2 | TEMPERATURE: 97.8 F | HEART RATE: 72 BPM | HEIGHT: 63 IN | DIASTOLIC BLOOD PRESSURE: 70 MMHG | SYSTOLIC BLOOD PRESSURE: 118 MMHG | WEIGHT: 127 LBS

## 2018-01-09 DIAGNOSIS — M54.41 CHRONIC RIGHT-SIDED LOW BACK PAIN WITH RIGHT-SIDED SCIATICA: ICD-10-CM

## 2018-01-09 DIAGNOSIS — N95.2 VAGINAL ATROPHY: ICD-10-CM

## 2018-01-09 DIAGNOSIS — I10 BENIGN ESSENTIAL HYPERTENSION: ICD-10-CM

## 2018-01-09 DIAGNOSIS — N90.89 LABIAL AND CLITORAL ADHESIONS, ACQUIRED: ICD-10-CM

## 2018-01-09 DIAGNOSIS — D64.9 LOW HEMOGLOBIN: ICD-10-CM

## 2018-01-09 DIAGNOSIS — M54.16 LUMBAR RADICULOPATHY: ICD-10-CM

## 2018-01-09 DIAGNOSIS — N64.4 BREAST PAIN: Primary | ICD-10-CM

## 2018-01-09 DIAGNOSIS — R59.1 LYMPHADENOPATHY: ICD-10-CM

## 2018-01-09 DIAGNOSIS — G89.29 CHRONIC RIGHT-SIDED LOW BACK PAIN WITH RIGHT-SIDED SCIATICA: ICD-10-CM

## 2018-01-09 LAB
BASOPHILS # BLD AUTO: 0 10E9/L (ref 0–0.2)
BASOPHILS NFR BLD AUTO: 0.4 %
DIFFERENTIAL METHOD BLD: ABNORMAL
EOSINOPHIL # BLD AUTO: 0.1 10E9/L (ref 0–0.7)
EOSINOPHIL NFR BLD AUTO: 1.8 %
ERYTHROCYTE [DISTWIDTH] IN BLOOD BY AUTOMATED COUNT: 14.2 % (ref 10–15)
HCT VFR BLD AUTO: 34.1 % (ref 35–47)
HGB BLD-MCNC: 10.5 G/DL (ref 11.7–15.7)
LYMPHOCYTES # BLD AUTO: 1 10E9/L (ref 0.8–5.3)
LYMPHOCYTES NFR BLD AUTO: 19.5 %
MCH RBC QN AUTO: 28.1 PG (ref 26.5–33)
MCHC RBC AUTO-ENTMCNC: 30.8 G/DL (ref 31.5–36.5)
MCV RBC AUTO: 91 FL (ref 78–100)
MONOCYTES # BLD AUTO: 0.6 10E9/L (ref 0–1.3)
MONOCYTES NFR BLD AUTO: 11.1 %
NEUTROPHILS # BLD AUTO: 3.4 10E9/L (ref 1.6–8.3)
NEUTROPHILS NFR BLD AUTO: 67.2 %
PLATELET # BLD AUTO: 292 10E9/L (ref 150–450)
RBC # BLD AUTO: 3.74 10E12/L (ref 3.8–5.2)
WBC # BLD AUTO: 5 10E9/L (ref 4–11)

## 2018-01-09 PROCEDURE — 85025 COMPLETE CBC W/AUTO DIFF WBC: CPT | Performed by: NURSE PRACTITIONER

## 2018-01-09 PROCEDURE — 99213 OFFICE O/P EST LOW 20 MIN: CPT | Performed by: NURSE PRACTITIONER

## 2018-01-09 PROCEDURE — 36415 COLL VENOUS BLD VENIPUNCTURE: CPT | Performed by: NURSE PRACTITIONER

## 2018-01-09 RX ORDER — ESTRADIOL 0.1 MG/G
CREAM VAGINAL
Qty: 85 G | Refills: 1 | Status: SHIPPED | OUTPATIENT
Start: 2018-01-09 | End: 2019-01-14

## 2018-01-09 RX ORDER — AMLODIPINE BESYLATE 5 MG/1
5 TABLET ORAL DAILY
Qty: 90 TABLET | Refills: 1 | Status: SHIPPED | OUTPATIENT
Start: 2018-01-09 | End: 2018-09-11

## 2018-01-09 RX ORDER — GABAPENTIN 300 MG/1
300 CAPSULE ORAL
Qty: 60 CAPSULE | Refills: 0 | Status: SHIPPED | OUTPATIENT
Start: 2018-01-09 | End: 2018-09-11

## 2018-01-09 NOTE — NURSING NOTE
"Chief Complaint   Patient presents with     Breast Pain       Initial /70 (BP Location: Right arm, Cuff Size: Adult Regular)  Pulse 72  Temp 97.8  F (36.6  C) (Tympanic)  Ht 5' 3\" (1.6 m)  Wt 127 lb (57.6 kg)  BMI 22.5 kg/m2 Estimated body mass index is 22.5 kg/(m^2) as calculated from the following:    Height as of this encounter: 5' 3\" (1.6 m).    Weight as of this encounter: 127 lb (57.6 kg).  Medication Reconciliation: complete    Health Maintenance that is potentially due pending provider review:  NONE      Is there anyone who you would like to be able to receive your results? No  If yes have patient fill out BROOKE      "

## 2018-01-09 NOTE — MR AVS SNAPSHOT
After Visit Summary   1/9/2018    Ingrid Amaral    MRN: [id number]           Patient Information     Date Of Birth          1945        Visit Information        Provider Department      1/9/2018 1:00 PM Soha Sanchez APRN CNP [facility]        Today's Diagnoses     Breast pain    -  1    Benign essential hypertension        Vaginal atrophy        Labial and clitoral adhesions, acquired        Lumbar radiculopathy        Chronic right-sided low back pain with right-sided sciatica        Lymphadenopathy          Care Instructions    Schedule mammogram and ultrasound at [phone] - will call with results     Blood work today - will update you on results     Return to clinic as needed          Follow-ups after your visit        Your next 10 appointments already scheduled     Feb 05, 2018  2:15 PM CST   [facility]    [address] [facility] [address] [phone]              Future tests that were ordered for you today     Open Future Orders        Priority Expected Expires Ordered    US Breast Bilateral Complete 4 Quadrants Routine  1/9/2019 1/9/2018    MA Diagnostic Digital Bilateral Routine  1/9/2019 1/9/2018            Who to contact     If you have questions or need follow up information about today's clinic visit or your schedule please contact [facility] directly at [phone].  Normal or non-critical lab and imaging results will be communicated to you by MyChart, letter or phone within 4 business days after the clinic has received the results. If you do not hear from us within 7 days, please contact the clinic through MyChart or phone. If you have a critical or abnormal lab result, we will notify you by phone as soon as possible.  Submit refill requests through [employer] or call your pharmacy and they will forward the refill request to us. Please  "allow 3 business days for your refill to be completed.          Additional Information About Your Visit        MyChart Information     [employer] lets you send messages to your doctor, view your test results, renew your prescriptions, schedule appointments and more. To sign up, go to [email or web address] . Click on \"Log in\" on the left side of the screen, which will take you to the Welcome page. Then click on \"Sign up Now\" on the right side of the page.     You will be asked to enter the access code listed below, as well as some personal information. Please follow the directions to create your username and password.     Your access code is: [email or web address]  Expires: 2018  1:32 PM     Your access code will  in 90 days. If you need help or a new code, please call your [place] clinic or [phone].        Care EveryWhere ID     This is your Care EveryWhere ID. This could be used by other organizations to access your [place] medical records  [id number]        Your Vitals Were     Pulse Temperature Height BMI (Body Mass Index)          72 97.8  F (36.6  C) (Tympanic) 5' 3\" (1.6 m) 22.5 kg/m2         Blood Pressure from Last 3 Encounters:   18 118/70   17 132/82   17 120/82    Weight from Last 3 Encounters:   18 127 lb (57.6 kg)   17 125 lb 9.6 oz (57 kg)   17 127 lb (57.6 kg)              We Performed the Following     CBC with platelets and differential          Today's Medication Changes          These changes are accurate as of: 18  1:32 PM.  If you have any questions, ask your nurse or doctor.               These medicines have changed or have updated prescriptions.        Dose/Directions    amLODIPine 5 MG tablet   Commonly known as:  NORVASC   This may have changed:  See the new instructions.   Used for:  Benign essential hypertension   Changed by:  Soha Sanchez APRN CNP        Dose:  5 mg   Take 1 tablet (5 mg) by mouth daily   Quantity:  90 tablet "   Refills:  1            Where to get your medicines      These medications were sent to [facility] [address] [place] [address]     Phone:  [phone]     amLODIPine 5 MG tablet    estradiol 0.1 MG/GM cream    gabapentin 300 MG capsule                Primary Care Provider Office Phone # Fax #    MARVIN Diaz [phone]       [address] [address]        Equal Access to Services     JORGE A BYRD : Hadii aad ku hadasho Soomaali, waaxda luqadaha, qaybta kaalmada adeegyada, waxay beanin hayyukon sathya rangel . So [facility] [phone].    ATENCIÓN: Si habla español, tiene a gauthier disposición servicios gratuitos de asistencia lingüística. Llame al [phone].    We comply with applicable federal civil rights laws and [place] laws. We do not discriminate on the basis of race, color, national origin, age, disability, sex, sexual orientation, or gender identity.            Thank you!     Thank you for choosing [facility]  for your care. Our goal is always to provide you with excellent care. Hearing back from our patients is one way we can continue to improve our services. Please take a few minutes to complete the written survey that you may receive in the mail after your visit with us. Thank you!             Your Updated Medication List - Protect others around you: Learn how to safely use, store and throw away your medicines at www.disposemymeds.org.          This list is accurate as of: 1/9/18  1:32 PM.  Always use your most recent med list.                   Brand Name Dispense Instructions for use Diagnosis    ADVAIR DISKUS 250-50 MCG/DOSE diskus inhaler   Generic drug:  fluticasone-salmeterol     1 Inhaler    INHALE ONE PUFF BY MOUTH TWICE A DAY    Moderate persistent asthma without complication       * albuterol (5 MG/ML) 0.5% neb solution     PROVENTIL    30 vial    Take 0.5 mLs (2.5 mg) by nebulization every 4 hours as needed for wheezing or shortness of breath / dyspnea    Moderate persistent asthma       * albuterol 108 (90 BASE) MCG/ACT Inhaler    PROAIR HFA/PROVENTIL HFA/VENTOLIN HFA    1 Inhaler    Inhale 2 puffs into the lungs every 4 hours as needed for shortness of breath / dyspnea    Moderate persistent asthma without complication       amLODIPine 5 MG tablet    NORVASC    90 tablet    Take 1 tablet (5 mg) by mouth daily    Benign essential hypertension       ASPIRIN 81 PO           BENEFIBER PO      2 tsp daily        calcipotriene 0.005 % Oint     60 g    Externally apply topically 2 times daily    Psoriasis       calcium citrate + Tabs      Reported on 5/17/2017        cetirizine 10 MG tablet    zyrTEC    90 tablet    Take 1 tablet (10 mg) by mouth every evening    Chronic rhinitis       clobetasol 0.05 % ointment    TEMOVATE    30 g    Apply to area of irritation twice daily for 2 weeks    Vulvitis       estradiol 0.1 MG/GM cream    ESTRACE VAGINAL    85 g    USE ONE GRAM VAGINALLY EVERY DAY AND SPREAD A SMALL AMOUNT AROUND THE CLITORAL REES    Vaginal atrophy, Labial and clitoral adhesions, acquired       FLAXSEED      Reported on 5/2/2017        fluticasone 50 MCG/ACT spray    FLONASE    1 Bottle    Spray 1 spray into both nostrils daily    Allergic rhinitis, unspecified allergic rhinitis trigger, unspecified rhinitis seasonality       gabapentin 300 MG capsule    NEURONTIN    60 capsule    Take 1 capsule (300 mg) by mouth nightly as needed    Lumbar radiculopathy, Chronic right-sided low back pain with right-sided sciatica       guaiFENesin 600 MG 12 hr tablet    MUCINEX    40 tablet    Take 2 tablets (1,200 mg) by mouth 2 times daily as needed To see if helps phlegm.    Phlegm in throat       hydrocortisone 1 % cream    CORTAID     Apply topically as needed        lactase 9000 UNITS Tabs tablet    LACTAID     Take 9,000 Units by mouth  as needed        montelukast 10 MG tablet    SINGULAIR    90 tablet    TAKE ONE TABLET BY MOUTH EVERY DAY    Moderate persistent asthma without complication       omeprazole 20 MG CR capsule    priLOSEC    180 capsule    Take 1 capsule (20 mg) by mouth 2 times daily For heartburn    Gastroesophageal reflux disease without esophagitis       order for DME     1 Device    Equipment being ordered: Nebulizer and tubing/filter    Moderate persistent asthma       PROBIOTIC DAILY PO      Take 1 chew tab by mouth 2 times daily        sertraline 100 MG tablet    ZOLOFT    90 tablet    Take 1.5 tablets (150 mg) by mouth daily    Panic disorder without agoraphobia       simvastatin 40 MG tablet    ZOCOR    90 tablet    Take 1 tablet (40 mg) by mouth At Bedtime    Elevated cholesterol with elevated triglycerides       SKIN OILS EX      Lavender-headache, skin, peppermint-skin, upset stomach mix of oils, asthma mix of oils, eucalyptus. Tea tree oil-skin, vapor rub- chest tightness, sleepy time (vetiver, lavendaer, mrjoram, mandarin, ylang)- sleep, constipation, eczema (sweet almond oil). Also mixes with carriers: Coconut oil, [place] oil, Extra virgin oil. Frankincense- cough. Digize- oil. Purification- oil, lemon- oil. Essentialyme- pill.  Inhales a mix with olive oil almond and coconut oil with peppermint, and eucalyptus- sinus, allergies. Updated 12/1/2015.  Updated 4/26/2016: Lemon, Cinnamon bark, panaway, Thieves, Orange, Wintergreen, Copaiba        VITAMIN D (CHOLECALCIFEROL) PO      Take 2,400 Units by mouth daily        * Notice:  This list has 2 medication(s) that are the same as other medications prescribed for you. Read the directions carefully, and ask your doctor or other care provider to review them with you.

## 2018-01-09 NOTE — PROGRESS NOTES
SUBJECTIVE:   Ingrid Amaral is a 72 year old female who presents to clinic today for the following health issues:    Concern - Breast pain  Onset: a few months    Description:   Patient has had pain in both of her breast on and off for several months. She thinks it feels like a pulled muscles.    Intensity: moderate    Progression of Symptoms:  worsening    Accompanying Signs & Symptoms:  Has a sore lump on the side of both breasts    Previous history of similar problem:   none    Precipitating factors:   Worsened by: pushing on the lump    Alleviating factors:  Improved by: none    Therapies Tried and outcome: none      Feels like there is a knot in the breast and when rubs its tender  Doesn't feel like it's there all the time   Lumps on both lateral sides  The other day hurt right under the nipple, went away   No swelling, warmth or erythema  No breast cancer history in the family     Notes swollen lymph node on left side of neck    Problem list and histories reviewed & adjusted, as indicated.  Additional history: as documented    Patient Active Problem List   Diagnosis     Diarrhea     Pure hypercholesterolemia     Allergic rhinitis     Panic disorder without agoraphobia     Advanced directives, counseling/discussion     Liver lesion     Generalized anxiety disorder     Mild major depression (H)     Vulvitis     Vaginal wall prolapse     Chronic constipation     Moderate persistent asthma     Dysphagia     RBD (REM behavioral disorder)     FH: colon cancer     Hypertension goal BP (blood pressure) < 140/90     Past Surgical History:   Procedure Laterality Date     ANKLE SURGERY      bilateral     COLONOSCOPY       COLONOSCOPY N/A 3/20/2015    Procedure: COLONOSCOPY;  Surgeon: Britney Martinez MD;  Location: [facility]     COLONOSCOPY N/A 5/22/2017    Procedure: COLONOSCOPY;  Colonoscopy;  Surgeon: Tristen Rangel MD;  Location: [facility]     HC ESOPH/GAS REFLUX TEST W NASAL IMPED >1 HR  4/19/2012     Procedure:ESOPHAGEAL IMPEDENCE FUNCTION TEST WITH 24 HOUR PH GREATER THAN 1 HOUR; Surgeon:VALDO UMANZOR; Location:[facility]     HERNIA REPAIR      umbilcal     HYSTERECTOMY, PAP NO LONGER INDICATED  1991     TONSILLECTOMY       UPPER GI ENDOSCOPY         Social History   Substance Use Topics     Smoking status: Former Smoker     Packs/day: 0.50     Years: 3.00     Types: Cigarettes     Smokeless tobacco: Never Used      Comment: smoked for 3 years when she was around 19     Alcohol use Yes      Comment: RARELY     Family History   Problem Relation Age of Onset     HEART DISEASE Mother      valve problem     Hypertension Mother      DIABETES Mother      type 2     CEREBROVASCULAR DISEASE Mother      Allergies Mother      Eye Disorder Mother      Macular degeneration     OSTEOPOROSIS Mother      Respiratory Mother      Cardiovascular Father      MI at age 80     Cancer - colorectal Father      DIABETES Father      Hypertension Father      type 2     Eye Disorder Father      macular degeneration     Lipids Father      C.A.D. Maternal Grandmother      DIABETES Paternal Grandmother      type 2     Cardiovascular Paternal Grandmother      MI     DIABETES Sister      type 2     Allergies Sister      GASTROINTESTINAL DISEASE Sister      GASTROINTESTINAL DISEASE Daughter      GASTROINTESTINAL DISEASE Daughter          Current Outpatient Prescriptions   Medication Sig Dispense Refill     amLODIPine (NORVASC) 5 MG tablet Take 1 tablet (5 mg) by mouth daily 90 tablet 1     estradiol (ESTRACE VAGINAL) 0.1 MG/GM cream USE ONE GRAM VAGINALLY EVERY DAY AND SPREAD A SMALL AMOUNT AROUND THE CLITORAL REES 85 g 1     gabapentin (NEURONTIN) 300 MG capsule Take 1 capsule (300 mg) by mouth nightly as needed 60 capsule 0     ASPIRIN 81 PO        ADVAIR DISKUS 250-50 MCG/DOSE diskus inhaler INHALE ONE PUFF BY MOUTH TWICE A DAY 1 Inhaler 1     montelukast (SINGULAIR) 10 MG tablet TAKE ONE TABLET BY MOUTH EVERY DAY 90 tablet 1      calcipotriene 0.005 % OINT Externally apply topically 2 times daily 60 g 0     simvastatin (ZOCOR) 40 MG tablet Take 1 tablet (40 mg) by mouth At Bedtime 90 tablet 3     fluticasone (FLONASE) 50 MCG/ACT spray Spray 1 spray into both nostrils daily 1 Bottle 11     cetirizine (ZYRTEC) 10 MG tablet Take 1 tablet (10 mg) by mouth every evening 90 tablet 3     omeprazole (PRILOSEC) 20 MG CR capsule Take 1 capsule (20 mg) by mouth 2 times daily For heartburn 180 capsule 3     Multiple Minerals-Vitamins (CALCIUM CITRATE +) TABS Reported on 5/17/2017       SKIN OILS EX Lavender-headache, skin, peppermint-skin, upset stomach mix of oils, asthma mix of oils, eucalyptus. Tea tree oil-skin, vapor rub- chest tightness, sleepy time (vetiver, lavendaer, mrjoram, mandarin, ylang)- sleep, constipation, eczema (sweet almond oil). Also mixes with carriers: Coconut oil, [place] oil, Extra virgin oil. Frankincense- cough. Digize- oil. Purification- oil, lemon- oil. Essentialyme- pill.  Inhales a mix with olive oil almond and coconut oil with peppermint, and eucalyptus- sinus, allergies. Updated 12/1/2015.   Updated 4/26/2016: Lemon, Cinnamon bark, panaway, Thieves, Orange, Wintergreen, Copaiba       lactase (LACTAID) 9000 UNITS TABS Take 9,000 Units by mouth as needed       VITAMIN D, CHOLECALCIFEROL, PO Take 2,400 Units by mouth daily       hydrocortisone 1 % cream Apply topically as needed       sertraline (ZOLOFT) 100 MG tablet Take 1.5 tablets (150 mg) by mouth daily (Patient not taking: Reported on 1/9/2018) 90 tablet 3     albuterol (PROAIR HFA/PROVENTIL HFA/VENTOLIN HFA) 108 (90 BASE) MCG/ACT Inhaler Inhale 2 puffs into the lungs every 4 hours as needed for shortness of breath / dyspnea (Patient not taking: Reported on 1/9/2018) 1 Inhaler 2     guaiFENesin (MUCINEX) 600 MG 12 hr tablet Take 2 tablets (1,200 mg) by mouth 2 times daily as needed To see if helps phlegm. (Patient not taking: Reported on 6/20/2017) 40 tablet 0      "ORDER FOR DME Equipment being ordered: Nebulizer and tubing/filter (Patient not taking: Reported on 6/20/2017) 1 Device 0     albuterol (PROVENTIL) (5 MG/ML) 0.5% nebulizer solution Take 0.5 mLs (2.5 mg) by nebulization every 4 hours as needed for wheezing or shortness of breath / dyspnea (Patient not taking: Reported on 6/20/2017) 30 vial 1     Probiotic Product (PROBIOTIC DAILY PO) Take 1 chew tab by mouth 2 times daily        clobetasol (TEMOVATE) 0.05 % ointment Apply to area of irritation twice daily for 2 weeks (Patient not taking: Reported on 9/27/2017) 30 g 0     BENEFIBER OR 2 tsp daily       FLAXSEED Reported on 5/2/2017       Allergies   Allergen Reactions     Sulfa Drugs Other (See Comments)     Reaction unknown as a child         Reviewed and updated as needed this visit by clinical staffTobacco  Allergies  Meds  Problems  Med Hx  Surg Hx  Fam Hx  Soc Hx        Reviewed and updated as needed this visit by Provider  Tobacco  Allergies  Meds  Problems  Med Hx  Surg Hx  Fam Hx  Soc Hx          ROS:  Constitutional, HEENT, cardiovascular, pulmonary, gi and gu systems are negative, except as otherwise noted.      OBJECTIVE:   /70 (BP Location: Right arm, Cuff Size: Adult Regular)  Pulse 72  Temp 97.8  F (36.6  C) (Tympanic)  Ht 5' 3\" (1.6 m)  Wt 127 lb (57.6 kg)  BMI 22.5 kg/m2  Body mass index is 22.5 kg/(m^2).  GENERAL APPEARANCE: healthy, alert and no distress  BREAST: small palpable lump on right lateral breast at approximately 9 o'clock with tenderness and tenderness of left lateral breast at approximately 3 o'clock  LYMPH: swollen left submandibular lymph node without tenderness      Diagnostic Test Results:  Diagnostic ultrasound and mammo - pending     ASSESSMENT/PLAN:     1. Breast pain  Patient has breast tenderness bilateral with lump noted on right lateral breast. Diagnostic ultrasound and mammo.   - US Breast Bilateral Complete 4 Quadrants; Future  - MA Diagnostic " Digital Bilateral; Future    2. Benign essential hypertension  Blood pressure stable, no concerns. No change in treatment   - amLODIPine (NORVASC) 5 MG tablet; Take 1 tablet (5 mg) by mouth daily  Dispense: 90 tablet; Refill: 1    3. Lumbar radiculopathy  Stable, no change. Continue current treatment.   - gabapentin (NEURONTIN) 300 MG capsule; Take 1 capsule (300 mg) by mouth nightly as needed  Dispense: 60 capsule; Refill: 0    4. Chronic right-sided low back pain with right-sided sciatica  See above.   - gabapentin (NEURONTIN) 300 MG capsule; Take 1 capsule (300 mg) by mouth nightly as needed  Dispense: 60 capsule; Refill: 0    5. Lymphadenopathy  Patient has single swollen submandibular lymph node on left, no recent illness or any fever, night sweats, weight loss. Will get blood count.   - CBC with platelets and differential    6. Low hemoglobin  Low hemoglobin, future TIBC and Ferritin. May consider peripheral smear.   - Iron and iron binding capacity; Future  - Ferritin; Future    7. Vaginal atrophy  Stable, continue estrace.   - estradiol (ESTRACE VAGINAL) 0.1 MG/GM cream; USE ONE GRAM VAGINALLY EVERY DAY AND SPREAD A SMALL AMOUNT AROUND THE CLITORAL REES  Dispense: 85 g; Refill: 1    8. Labial and clitoral adhesions, acquired  Stable, no change   - estradiol (ESTRACE VAGINAL) 0.1 MG/GM cream; USE ONE GRAM VAGINALLY EVERY DAY AND SPREAD A SMALL AMOUNT AROUND THE CLITORAL REES  Dispense: 85 g; Refill: 1    Patient Instructions   Schedule mammogram and ultrasound at [phone] - will call with results     Blood work today - will update you on results     Return to clinic as needed      MARVIN Mas [facility]

## 2018-01-16 DIAGNOSIS — D64.9 LOW HEMOGLOBIN: ICD-10-CM

## 2018-01-16 LAB
FERRITIN SERPL-MCNC: 6 NG/ML (ref 8–252)
IRON SATN MFR SERPL: 18 % (ref 15–46)
IRON SERPL-MCNC: 81 UG/DL (ref 35–180)
TIBC SERPL-MCNC: 455 UG/DL (ref 240–430)

## 2018-01-16 PROCEDURE — 82728 ASSAY OF FERRITIN: CPT | Performed by: NURSE PRACTITIONER

## 2018-01-16 PROCEDURE — 36415 COLL VENOUS BLD VENIPUNCTURE: CPT | Performed by: NURSE PRACTITIONER

## 2018-01-16 PROCEDURE — 83540 ASSAY OF IRON: CPT | Performed by: NURSE PRACTITIONER

## 2018-01-16 PROCEDURE — 83550 IRON BINDING TEST: CPT | Performed by: NURSE PRACTITIONER

## 2018-01-18 DIAGNOSIS — E61.1 LOW IRON: Primary | ICD-10-CM

## 2018-01-18 NOTE — PROGRESS NOTES
Patient called back.  Patient was notified of results and recommendations.  Letter was also mailed to patient.  Hien LEWIS MA

## 2018-01-22 ENCOUNTER — HOSPITAL ENCOUNTER (OUTPATIENT)
Dept: ULTRASOUND IMAGING | Facility: CLINIC | Age: 73
End: 2018-01-22
Attending: NURSE PRACTITIONER
Payer: MEDICARE

## 2018-01-22 ENCOUNTER — HOSPITAL ENCOUNTER (OUTPATIENT)
Dept: MAMMOGRAPHY | Facility: CLINIC | Age: 73
Discharge: HOME | End: 2018-01-22
Attending: NURSE PRACTITIONER | Admitting: NURSE PRACTITIONER
Payer: MEDICARE

## 2018-01-22 DIAGNOSIS — N64.4 BREAST PAIN: ICD-10-CM

## 2018-01-22 PROCEDURE — 77066 DX MAMMO INCL CAD BI: CPT

## 2018-01-22 PROCEDURE — 76642 ULTRASOUND BREAST LIMITED: CPT | Mod: 50

## 2018-02-05 ENCOUNTER — OFFICE VISIT (OUTPATIENT)
Dept: NEUROLOGY | Facility: CLINIC | Age: 73
End: 2018-02-05
Payer: COMMERCIAL

## 2018-02-05 VITALS
SYSTOLIC BLOOD PRESSURE: 122 MMHG | BODY MASS INDEX: 22.67 KG/M2 | RESPIRATION RATE: 12 BRPM | DIASTOLIC BLOOD PRESSURE: 72 MMHG | HEART RATE: 67 BPM | TEMPERATURE: 97.8 F | WEIGHT: 128 LBS

## 2018-02-05 DIAGNOSIS — D32.9 MENINGIOMA (H): ICD-10-CM

## 2018-02-05 DIAGNOSIS — R41.3 MEMORY PROBLEM: Primary | ICD-10-CM

## 2018-02-05 PROCEDURE — 99215 OFFICE O/P EST HI 40 MIN: CPT | Performed by: PSYCHIATRY & NEUROLOGY

## 2018-02-05 ASSESSMENT — MONTREAL COGNITIVE ASSESSMENT (MOCA)
WHAT IS THE TOTAL SCORE (OUT OF 30): 25
11. FOR EACH PAIR OF WORDS, WHAT CATEGORY DO THEY BELONG TO (OUT OF 2): 2
13. ORIENTATION SUBSCORE: 6
12. MEMORY INDEX SCORE: 4
8. SERIAL SUBTRACTION OF 7S: 2
9. REPEAT EACH SENTENCE: 2
7. [VIGILENCE] TAP WHEN HEARING DESIGNATED LETTER: 1
10. [FLUENCY] NAME WORDS STARTING WITH DESIGNATED LETTER: 1
6. READ LIST OF DIGITS [FORWARD/BACKWARD]: 2
WHAT LEVEL OF EDUCATION WAS ATTAINED: 0
4. NAME EACH OF THE THREE ANIMALS SHOWN: 2
VISUOSPATIAL/EXECUTIVE SUBSCORE: 3

## 2018-02-05 NOTE — LETTER
2/5/2018         RE: Ingrid Amaral  [address] [id number]        Dear Colleague,    Thank you for referring your patient, Ingrid Amaral, to the [facility]. Please see a copy of my visit note below.    ESTABLISHED PATIENT NEUROLOGY NOTE    DATE OF VISIT: 2/5/2018  MRN: [id number]  PATIENT NAME: Ingrid Amaral  YOB: 1945    Chief Complaint   Patient presents with     RECHECK     Meningioma     SUBJECTIVE:                                                      HISTORY OF PRESENT ILLNESS:  Ingrid is here for follow up regarding meningioma. The patient is here after a 19 month absence from clinic. She has history of stable meningiomas. Most recent brain MRI was in 4.2016. I planned to follow-up with the patient in 6 months for some memory testing for subjective memory concerns. She just returns today. When she was here last, she complained of some difficulties with swallow so I sent her to ENT. Dr. Kaminski felt that she has symptoms consistent with LPRD. She had only partial response to treatment so she was sent on to GI consult for this.     The patient says she is here to follow-up about the meningiomas, though at first she is unsure and mentions her liver lesion. She says that the memory is better than previously. She says this varies. She has panic attacks. She takes sertraline for this. She does not have headaches. She says she does act out her sleep. She says she had two different sleep studies done for this. I see sleep notes that note REM sleep behavior disorder for which she has been on clonazepam in the past.     She says that at times she has trouble with word-finding difficulties. She does misplace things. Does not get lost while driving. She is easily distracted.     She says her parents were over-protective when she was growing up. He  seems to be somewhat controlling as well - she says he always has a different way of doing things than  she does. As a result, she says, she always feels like she cannot do things as well as other people can. She did complete high school and some college.     No physical concerns. No changes in gait, weakness. She still has the globus sensation when she swallows. No visual changes or headaches.,    TSH and B12 have been normal in the past.     CURRENT MEDICATIONS:     Current Outpatient Prescriptions on File Prior to Visit:  amLODIPine (NORVASC) 5 MG tablet Take 1 tablet (5 mg) by mouth daily   estradiol (ESTRACE VAGINAL) 0.1 MG/GM cream USE ONE GRAM VAGINALLY EVERY DAY AND SPREAD A SMALL AMOUNT AROUND THE CLITORAL REES   gabapentin (NEURONTIN) 300 MG capsule Take 1 capsule (300 mg) by mouth nightly as needed   ASPIRIN 81 PO    ADVAIR DISKUS 250-50 MCG/DOSE diskus inhaler INHALE ONE PUFF BY MOUTH TWICE A DAY   montelukast (SINGULAIR) 10 MG tablet TAKE ONE TABLET BY MOUTH EVERY DAY   calcipotriene 0.005 % OINT Externally apply topically 2 times daily   simvastatin (ZOCOR) 40 MG tablet Take 1 tablet (40 mg) by mouth At Bedtime   fluticasone (FLONASE) 50 MCG/ACT spray Spray 1 spray into both nostrils daily   sertraline (ZOLOFT) 100 MG tablet Take 1.5 tablets (150 mg) by mouth daily   cetirizine (ZYRTEC) 10 MG tablet Take 1 tablet (10 mg) by mouth every evening   omeprazole (PRILOSEC) 20 MG CR capsule Take 1 capsule (20 mg) by mouth 2 times daily For heartburn   albuterol (PROAIR HFA/PROVENTIL HFA/VENTOLIN HFA) 108 (90 BASE) MCG/ACT Inhaler Inhale 2 puffs into the lungs every 4 hours as needed for shortness of breath / dyspnea   guaiFENesin (MUCINEX) 600 MG 12 hr tablet Take 2 tablets (1,200 mg) by mouth 2 times daily as needed To see if helps phlegm.   Multiple Minerals-Vitamins (CALCIUM CITRATE +) TABS Reported on 5/17/2017   SKIN OILS EX Lavender-headache, skin, peppermint-skin, upset stomach mix of oils, asthma mix of oils, eucalyptus. Tea tree oil-skin, vapor rub- chest tightness, sleepy time (vetiver, lavendaer,  deborah, mandarin, alexander)- sleep, constipation, eczema (sweet almond oil). Also mixes with carriers: Coconut oil, [place] oil, Extra virgin oil. Frankincense- cough. Digize- oil. Purification- oil, lemon- oil. Essentialyme- pill.  Inhales a mix with olive oil almond and coconut oil with peppermint, and eucalyptus- sinus, allergies. Updated 12/1/2015. Updated 4/26/2016: Lemon, Cinnamon bark, panaway, Thieves, Orange, Wintergreen, Copaiba   ORDER FOR DME Equipment being ordered: Nebulizer and tubing/filter   albuterol (PROVENTIL) (5 MG/ML) 0.5% nebulizer solution Take 0.5 mLs (2.5 mg) by nebulization every 4 hours as needed for wheezing or shortness of breath / dyspnea   lactase (LACTAID) 9000 UNITS TABS Take 9,000 Units by mouth as needed   VITAMIN D, CHOLECALCIFEROL, PO Take 2,400 Units by mouth daily   hydrocortisone 1 % cream Apply topically as needed   Probiotic Product (PROBIOTIC DAILY PO) Take 1 chew tab by mouth 2 times daily    clobetasol (TEMOVATE) 0.05 % ointment Apply to area of irritation twice daily for 2 weeks   BENEFIBER OR 2 tsp daily   FLAXSEED Reported on 5/2/2017     No current facility-administered medications on file prior to visit.     RECENT DIAGNOSTIC STUDIES:   Results for orders placed or performed during the hospital encounter of 01/22/18   US Breast Bilateral Limited 1-3 Quadrants    Narrative    Examination: Bilateral digital diagnostic mammography and digital  breast tomosynthesis with computer aided detection, and focused  ultrasound of both breasts, 1/22/2018.    Comparison: 5/20/2017, 3/23/2016, 3/18/2015 and 3/5/2014.    History: Palpable lumps in the upper outer quadrant of the right  breast and tenderness in the upper outer/lower outer left breast.    BREAST DENSITY: Heterogeneously dense    Findings: No significant mammographic change in either breast.    Focussed ultrasound radiologist and technologist of the upper outer  quadrant of the right breast in the upper outer/lower outer  quadrants  of the left breast was performed. No concerning findings identified.       Impression    IMPRESSION: BI-RADS CATEGORY: 1 -  NEGATIVE.    RECOMMENDED FOLLOW-UP: Annual Mammography.      The patient was given the results of the examination.      RODGER ANSARI MD       Imaging:   MRI Brain (4.4.16):  IMPRESSION: Two small stable presumed meningiomas without discernible  change since 11/6/2015.    Imaging reviewed by me. Agree with radiology read.     REVIEW OF SYSTEMS:                                                      10-point review of systems is negative except as mentioned above in HPI.     EXAM:                                                      Physical Exam:   Vitals: /72 (BP Location: Right arm, Patient Position: Chair, Cuff Size: Adult Regular)  Pulse 67  Temp 97.8  F (36.6  C) (Oral)  Resp 12  Wt 58.1 kg (128 lb)  BMI 22.67 kg/m2  BMI= Body mass index is 22.67 kg/(m^2).  GENERAL: NAD.   HEENT: NC/AT.   Neurologic:  MENTAL STATUS: Alert, attentive though spacy at times. Speech is fluent. Fair comprehension. MoCA: 25/30 (normal is 26 and above).   CRANIAL NERVES: Visual fields intact to confrontation. Pupils equally, round and reactive to light. Facial sensation and movement normal. EOM full. Hearing intact to conversation. Trapezius strength intact. Palate moves symmetrically. Tongue midline.  MOTOR: 5/5 in proximal and distal muscle groups of upper and lower extremities. Tone and bulk normal. Slight bilateral hand tremor, high frequency, with posture and action.  SENSATION: Normal light touch throughout.   COORDINATION: Normal finger nose finger. Finger tapping normal. Knee heel shin normal.  STATION AND GAIT: Romberg negative. Tandem minimally unsteady.  CV: RRR. S1, S2.   NECK: No bruits.    ASSESSMENT and PLAN:                                                      Assessment and Plan:    ICD-10-CM    1. Memory problem R41.3 MR Brain w/o & w Contrast     NEUROPSYCHOLOGY REFERRAL    2. Meningioma (H) D32.9 MR Brain w/o & w Contrast        Ms. Amaral is a pleasant 71 yo woman here to follow-up on her meningiomas. We will repeat the brain MRI to monitor these.     We spent most of today's visit discussing and testing her memory. She did score just below normal on the MoCA and does have enough complaints about memory deficits at home that I think further testing is warranted. She is open to this. I am concerned that her history of being held back by people in her life may have led to both the anxiety and the memory problems. No red flags in terms of safety at the moment.     I did tell the patient that I do not think that the memory concerns are at all related to the meningiomas.     The patient understands and agrees with the plan.     Patient Instructions:  MRI Brain to monitor the meningiomas.   Neuropsych testing (cognitive/memory).  Return  to clinic after the above tests are completed (2-3 months).     Total Time: 40 minutes were spent with the patient. More than 50% of the time spent on counseling (as described above in Assessment and Plan) /coordinating the care.    Pratibha Guerrero MD  Neurology                    Again, thank you for allowing me to participate in the care of your patient.        Sincerely,        Pratibha Guerrero MD

## 2018-02-05 NOTE — NURSING NOTE
"Chief Complaint   Patient presents with     RECHECK     Meningioma       Initial /72 (BP Location: Right arm, Patient Position: Chair, Cuff Size: Adult Regular)  Pulse 67  Temp 97.8  F (36.6  C) (Oral)  Wt 58.1 kg (128 lb)  BMI 22.67 kg/m2 Estimated body mass index is 22.67 kg/(m^2) as calculated from the following:    Height as of 1/9/18: 1.6 m (5' 3\").    Weight as of this encounter: 58.1 kg (128 lb).  Medication Reconciliation: complete    Patient prefers to be contacted: letter  Okay to leave detailed message on voicemail: n/a    Cynthia JENKINS-CMA    "

## 2018-02-05 NOTE — PROGRESS NOTES
ESTABLISHED PATIENT NEUROLOGY NOTE    DATE OF VISIT: 2/5/2018  MRN: [id number]  PATIENT NAME: Ingrid Amaral  YOB: 1945    Chief Complaint   Patient presents with     RECHECK     Meningioma     SUBJECTIVE:                                                      HISTORY OF PRESENT ILLNESS:  Ingrid is here for follow up regarding meningioma. The patient is here after a 19 month absence from clinic. She has history of stable meningiomas. Most recent brain MRI was in 4.2016. I planned to follow-up with the patient in 6 months for some memory testing for subjective memory concerns. She just returns today. When she was here last, she complained of some difficulties with swallow so I sent her to ENT. Dr. Kaminski felt that she has symptoms consistent with LPRD. She had only partial response to treatment so she was sent on to GI consult for this.     The patient says she is here to follow-up about the meningiomas, though at first she is unsure and mentions her liver lesion. She says that the memory is better than previously. She says this varies. She has panic attacks. She takes sertraline for this. She does not have headaches. She says she does act out her sleep. She says she had two different sleep studies done for this. I see sleep notes that note REM sleep behavior disorder for which she has been on clonazepam in the past.     She says that at times she has trouble with word-finding difficulties. She does misplace things. Does not get lost while driving. She is easily distracted.     She says her parents were over-protective when she was growing up. He  seems to be somewhat controlling as well - she says he always has a different way of doing things than she does. As a result, she says, she always feels like she cannot do things as well as other people can. She did complete high school and some college.     No physical concerns. No changes in gait, weakness. She still has the globus sensation when  she swallows. No visual changes or headaches.,    TSH and B12 have been normal in the past.     CURRENT MEDICATIONS:     Current Outpatient Prescriptions on File Prior to Visit:  amLODIPine (NORVASC) 5 MG tablet Take 1 tablet (5 mg) by mouth daily   estradiol (ESTRACE VAGINAL) 0.1 MG/GM cream USE ONE GRAM VAGINALLY EVERY DAY AND SPREAD A SMALL AMOUNT AROUND THE CLITORAL REES   gabapentin (NEURONTIN) 300 MG capsule Take 1 capsule (300 mg) by mouth nightly as needed   ASPIRIN 81 PO    ADVAIR DISKUS 250-50 MCG/DOSE diskus inhaler INHALE ONE PUFF BY MOUTH TWICE A DAY   montelukast (SINGULAIR) 10 MG tablet TAKE ONE TABLET BY MOUTH EVERY DAY   calcipotriene 0.005 % OINT Externally apply topically 2 times daily   simvastatin (ZOCOR) 40 MG tablet Take 1 tablet (40 mg) by mouth At Bedtime   fluticasone (FLONASE) 50 MCG/ACT spray Spray 1 spray into both nostrils daily   sertraline (ZOLOFT) 100 MG tablet Take 1.5 tablets (150 mg) by mouth daily   cetirizine (ZYRTEC) 10 MG tablet Take 1 tablet (10 mg) by mouth every evening   omeprazole (PRILOSEC) 20 MG CR capsule Take 1 capsule (20 mg) by mouth 2 times daily For heartburn   albuterol (PROAIR HFA/PROVENTIL HFA/VENTOLIN HFA) 108 (90 BASE) MCG/ACT Inhaler Inhale 2 puffs into the lungs every 4 hours as needed for shortness of breath / dyspnea   guaiFENesin (MUCINEX) 600 MG 12 hr tablet Take 2 tablets (1,200 mg) by mouth 2 times daily as needed To see if helps phlegm.   Multiple Minerals-Vitamins (CALCIUM CITRATE +) TABS Reported on 5/17/2017   SKIN OILS EX Lavender-headache, skin, peppermint-skin, upset stomach mix of oils, asthma mix of oils, eucalyptus. Tea tree oil-skin, vapor rub- chest tightness, sleepy time (vetiver, lavendaer, mrjoram, mandarin, ylang)- sleep, constipation, eczema (sweet almond oil). Also mixes with carriers: Coconut oil, [place] oil, Extra virgin oil. Frankincense- cough. Digize- oil. Purification- oil, lemon- oil. Essentialyme- pill.  Inhales a mix with  olive oil almond and coconut oil with peppermint, and eucalyptus- sinus, allergies. Updated 12/1/2015. Updated 4/26/2016: Lemon, Cinnamon bark, panaway, Thieves, Orange, Wintergreen, Copaiba   ORDER FOR DME Equipment being ordered: Nebulizer and tubing/filter   albuterol (PROVENTIL) (5 MG/ML) 0.5% nebulizer solution Take 0.5 mLs (2.5 mg) by nebulization every 4 hours as needed for wheezing or shortness of breath / dyspnea   lactase (LACTAID) 9000 UNITS TABS Take 9,000 Units by mouth as needed   VITAMIN D, CHOLECALCIFEROL, PO Take 2,400 Units by mouth daily   hydrocortisone 1 % cream Apply topically as needed   Probiotic Product (PROBIOTIC DAILY PO) Take 1 chew tab by mouth 2 times daily    clobetasol (TEMOVATE) 0.05 % ointment Apply to area of irritation twice daily for 2 weeks   BENEFIBER OR 2 tsp daily   FLAXSEED Reported on 5/2/2017     No current facility-administered medications on file prior to visit.     RECENT DIAGNOSTIC STUDIES:   Results for orders placed or performed during the hospital encounter of 01/22/18   US Breast Bilateral Limited 1-3 Quadrants    Narrative    Examination: Bilateral digital diagnostic mammography and digital  breast tomosynthesis with computer aided detection, and focused  ultrasound of both breasts, 1/22/2018.    Comparison: 5/20/2017, 3/23/2016, 3/18/2015 and 3/5/2014.    History: Palpable lumps in the upper outer quadrant of the right  breast and tenderness in the upper outer/lower outer left breast.    BREAST DENSITY: Heterogeneously dense    Findings: No significant mammographic change in either breast.    Focussed ultrasound radiologist and technologist of the upper outer  quadrant of the right breast in the upper outer/lower outer quadrants  of the left breast was performed. No concerning findings identified.       Impression    IMPRESSION: BI-RADS CATEGORY: 1 -  NEGATIVE.    RECOMMENDED FOLLOW-UP: Annual Mammography.      The patient was given the results of the  examination.      RODGER ANSARI MD       Imaging:   MRI Brain (4.4.16):  IMPRESSION: Two small stable presumed meningiomas without discernible  change since 11/6/2015.    Imaging reviewed by me. Agree with radiology read.     REVIEW OF SYSTEMS:                                                      10-point review of systems is negative except as mentioned above in HPI.     EXAM:                                                      Physical Exam:   Vitals: /72 (BP Location: Right arm, Patient Position: Chair, Cuff Size: Adult Regular)  Pulse 67  Temp 97.8  F (36.6  C) (Oral)  Resp 12  Wt 58.1 kg (128 lb)  BMI 22.67 kg/m2  BMI= Body mass index is 22.67 kg/(m^2).  GENERAL: NAD.   HEENT: NC/AT.   Neurologic:  MENTAL STATUS: Alert, attentive though spacy at times. Speech is fluent. Fair comprehension. MoCA: 25/30 (normal is 26 and above).   CRANIAL NERVES: Visual fields intact to confrontation. Pupils equally, round and reactive to light. Facial sensation and movement normal. EOM full. Hearing intact to conversation. Trapezius strength intact. Palate moves symmetrically. Tongue midline.  MOTOR: 5/5 in proximal and distal muscle groups of upper and lower extremities. Tone and bulk normal. Slight bilateral hand tremor, high frequency, with posture and action.  SENSATION: Normal light touch throughout.   COORDINATION: Normal finger nose finger. Finger tapping normal. Knee heel shin normal.  STATION AND GAIT: Romberg negative. Tandem minimally unsteady.  CV: RRR. S1, S2.   NECK: No bruits.    ASSESSMENT and PLAN:                                                      Assessment and Plan:    ICD-10-CM    1. Memory problem R41.3 MR Brain w/o & w Contrast     NEUROPSYCHOLOGY REFERRAL   2. Meningioma (H) D32.9 MR Brain w/o & w Contrast        Ms. Amaral is a pleasant 71 yo woman here to follow-up on her meningiomas. We will repeat the brain MRI to monitor these.     We spent most of today's visit discussing and testing  her memory. She did score just below normal on the MoCA and does have enough complaints about memory deficits at home that I think further testing is warranted. She is open to this. I am concerned that her history of being held back by people in her life may have led to both the anxiety and the memory problems. No red flags in terms of safety at the moment.     I did tell the patient that I do not think that the memory concerns are at all related to the meningiomas.     The patient understands and agrees with the plan.     Patient Instructions:  MRI Brain to monitor the meningiomas.   Neuropsych testing (cognitive/memory).  Return  to clinic after the above tests are completed (2-3 months).     Total Time: 40 minutes were spent with the patient. More than 50% of the time spent on counseling (as described above in Assessment and Plan) /coordinating the care.    Pratibha Guerrero MD  Neurology

## 2018-02-05 NOTE — PATIENT INSTRUCTIONS
Plan:    MRI Brain to monitor the meningiomas.   Neuropsych testing (cognitive/memory).  Return  to clinic after the above tests are completed (2-3 months).

## 2018-02-05 NOTE — NURSING NOTE
"Chief Complaint   Patient presents with     RECHECK     Meningioma       Initial /72 (BP Location: Right arm, Patient Position: Chair, Cuff Size: Adult Regular)  Pulse 67  Temp 97.8  F (36.6  C) (Oral)  Resp 12  Wt 58.1 kg (128 lb)  BMI 22.67 kg/m2 Estimated body mass index is 22.67 kg/(m^2) as calculated from the following:    Height as of 1/9/18: 1.6 m (5' 3\").    Weight as of this encounter: 58.1 kg (128 lb).  Medication Reconciliation: complete    Patient prefers to be contacted: letter  Okay to leave detailed message on voicemail: n/a    Cynthia JENKINS-CMA    "

## 2018-02-05 NOTE — MR AVS SNAPSHOT
After Visit Summary   2/5/2018    Ingrid Amaral    MRN: [id number]           Patient Information     Date Of Birth          1945        Visit Information        Provider Department      2/5/2018 2:15 PM Pratibha Guerrero MD [facility]        Today's Diagnoses     Memory problem    -  1    Meningioma (H)          Care Instructions    Plan:    MRI Brain to monitor the meningiomas.   Neuropsych testing (cognitive/memory).  Return  to clinic after the above tests are completed (2-3 months).           Follow-ups after your visit        Additional Services     NEUROPSYCHOLOGY REFERRAL       Your provider has referred you to:    [facility]: Adult Neuropsychology Clinic [facility] [phone] Preferred Provider: Armando Agrawal Ph.D., L.P., [facility]   [email or web address]    All scheduling is subject to the client's specific insurance plan & benefits, provider/location availability, and provider clinical specialities.  Please arrive 15 minutes early for your first appointment and bring your completed paperwork.    Please be aware that coverage of these services is subject to the terms and limitations of your health insurance plan.  Call member services at your health plan with any benefit or coverage questions.    Please bring the following to your appointment:  >>   Any x-rays, CTs or MRIs which have been performed.  Contact the facility where they were done to arrange for  prior to your scheduled appointment.  Any new CT, MRI or other procedures ordered by your specialist must be performed at a [place] facility or coordinated by your clinic's referral office.    >>   List of current medications   >>   This referral request   >>   Any documents/labs given to you for this referral                  Future tests that were ordered for you today     Open Future Orders        Priority Expected Expires Ordered    MR Brain w/o & w Contrast Routine  2/5/2019  "2018            Who to contact     If you have questions or need follow up information about today's clinic visit or your schedule please contact [facility] directly at [phone].  Normal or non-critical lab and imaging results will be communicated to you by MyChart, letter or phone within 4 business days after the clinic has received the results. If you do not hear from us within 7 days, please contact the clinic through MyChart or phone. If you have a critical or abnormal lab result, we will notify you by phone as soon as possible.  Submit refill requests through [employer] or call your pharmacy and they will forward the refill request to us. Please allow 3 business days for your refill to be completed.          Additional Information About Your Visit        [facility] Information     [employer] lets you send messages to your doctor, view your test results, renew your prescriptions, schedule appointments and more. To sign up, go to [email or web address] . Click on \"Log in\" on the left side of the screen, which will take you to the Welcome page. Then click on \"Sign up Now\" on the right side of the page.     You will be asked to enter the access code listed below, as well as some personal information. Please follow the directions to create your username and password.     Your access code is: [email or web address]  Expires: 2018  1:32 PM     Your access code will  in 90 days. If you need help or a new code, please call your [place] clinic or [phone].        Care EveryWhere ID     This is your Care EveryWhere ID. This could be used by other organizations to access your [place] medical records  [id number]        Your Vitals Were     Pulse Temperature Respirations BMI (Body Mass Index)          67 97.8  F (36.6  C) (Oral) 12 22.67 kg/m2         Blood Pressure from Last 3 Encounters:   18 122/72   18 118/70   17 132/82    Weight from Last 3 Encounters:   18 58.1 kg (128 lb) "   01/09/18 57.6 kg (127 lb)   11/01/17 57 kg (125 lb 9.6 oz)              We Performed the Following     NEUROPSYCHOLOGY REFERRAL        Primary Care Provider Office Phone # Fax #    Soha Sanchez MARVIN ROMIE [phone]       [facility] [address] [facility] [address]        Equal Access to Services     GUS BYRD : Hadii aad ku hadasho Soomaali, waaxda luqadaha, qaybta kaalmada adeegyada, waxay idiin hayaan adeeg khnehash lacyndien ah. So [facility] [phone].    ATENCIÓN: Si habla esppepe, tiene a gauthier disposición servicios gratuitos de asistencia lingüística. Renny al [phone].    We comply with applicable federal civil rights laws and [place] laws. We do not discriminate on the basis of race, color, national origin, age, disability, sex, sexual orientation, or gender identity.            Thank you!     Thank you for choosing [facility]  for your care. Our goal is always to provide you with excellent care. Hearing back from our patients is one way we can continue to improve our services. Please take a few minutes to complete the written survey that you may receive in the mail after your visit with us. Thank you!             Your Updated Medication List - Protect others around you: Learn how to safely use, store and throw away your medicines at www.disposemymeds.org.          This list is accurate as of 2/5/18  3:01 PM.  Always use your most recent med list.                   Brand Name Dispense Instructions for use Diagnosis    ADVAIR DISKUS 250-50 MCG/DOSE diskus inhaler   Generic drug:  fluticasone-salmeterol     1 Inhaler    INHALE ONE PUFF BY MOUTH TWICE A DAY    Moderate persistent asthma without complication       * albuterol (5 MG/ML) 0.5% neb solution    PROVENTIL    30 vial    Take 0.5 mLs (2.5 mg) by nebulization every 4 hours as needed for wheezing or shortness of breath / dyspnea    Moderate persistent asthma       * albuterol 108 (90 BASE) MCG/ACT  Inhaler    PROAIR HFA/PROVENTIL HFA/VENTOLIN HFA    1 Inhaler    Inhale 2 puffs into the lungs every 4 hours as needed for shortness of breath / dyspnea    Moderate persistent asthma without complication       amLODIPine 5 MG tablet    NORVASC    90 tablet    Take 1 tablet (5 mg) by mouth daily    Benign essential hypertension       ASPIRIN 81 PO           BENEFIBER PO      2 tsp daily        calcipotriene 0.005 % Oint     60 g    Externally apply topically 2 times daily    Psoriasis       calcium citrate + Tabs      Reported on 5/17/2017        cetirizine 10 MG tablet    zyrTEC    90 tablet    Take 1 tablet (10 mg) by mouth every evening    Chronic rhinitis       clobetasol 0.05 % ointment    TEMOVATE    30 g    Apply to area of irritation twice daily for 2 weeks    Vulvitis       estradiol 0.1 MG/GM cream    ESTRACE VAGINAL    85 g    USE ONE GRAM VAGINALLY EVERY DAY AND SPREAD A SMALL AMOUNT AROUND THE CLITORAL REES    Vaginal atrophy, Labial and clitoral adhesions, acquired       FLAXSEED      Reported on 5/2/2017        fluticasone 50 MCG/ACT spray    FLONASE    1 Bottle    Spray 1 spray into both nostrils daily    Allergic rhinitis, unspecified allergic rhinitis trigger, unspecified rhinitis seasonality       gabapentin 300 MG capsule    NEURONTIN    60 capsule    Take 1 capsule (300 mg) by mouth nightly as needed    Lumbar radiculopathy, Chronic right-sided low back pain with right-sided sciatica       guaiFENesin 600 MG 12 hr tablet    MUCINEX    40 tablet    Take 2 tablets (1,200 mg) by mouth 2 times daily as needed To see if helps phlegm.    Phlegm in throat       hydrocortisone 1 % cream    CORTAID     Apply topically as needed        lactase 9000 UNITS Tabs tablet    LACTAID     Take 9,000 Units by mouth as needed        montelukast 10 MG tablet    SINGULAIR    90 tablet    TAKE ONE TABLET BY MOUTH EVERY DAY    Moderate persistent asthma without complication       omeprazole 20 MG CR capsule    priLOSEC     180 capsule    Take 1 capsule (20 mg) by mouth 2 times daily For heartburn    Gastroesophageal reflux disease without esophagitis       order for DME     1 Device    Equipment being ordered: Nebulizer and tubing/filter    Moderate persistent asthma       PROBIOTIC DAILY PO      Take 1 chew tab by mouth 2 times daily        sertraline 100 MG tablet    ZOLOFT    90 tablet    Take 1.5 tablets (150 mg) by mouth daily    Panic disorder without agoraphobia       simvastatin 40 MG tablet    ZOCOR    90 tablet    Take 1 tablet (40 mg) by mouth At Bedtime    Elevated cholesterol with elevated triglycerides       SKIN OILS EX      Lavender-headache, skin, peppermint-skin, upset stomach mix of oils, asthma mix of oils, eucalyptus. Tea tree oil-skin, vapor rub- chest tightness, sleepy time (vetiver, lavendaer, mrjoram, mandarin, ylang)- sleep, constipation, eczema (sweet almond oil). Also mixes with carriers: Coconut oil, [place] oil, Extra virgin oil. Frankincense- cough. Digize- oil. Purification- oil, lemon- oil. Essentialyme- pill.  Inhales a mix with olive oil almond and coconut oil with peppermint, and eucalyptus- sinus, allergies. Updated 12/1/2015.  Updated 4/26/2016: Lemon, Cinnamon bark, panaway, Thieves, Orange, Wintergreen, Copaiba        VITAMIN D (CHOLECALCIFEROL) PO      Take 2,400 Units by mouth daily        * Notice:  This list has 2 medication(s) that are the same as other medications prescribed for you. Read the directions carefully, and ask your doctor or other care provider to review them with you.

## 2018-02-08 ENCOUNTER — OFFICE VISIT (OUTPATIENT)
Dept: PHARMACY | Facility: CLINIC | Age: 73
End: 2018-02-08
Payer: COMMERCIAL

## 2018-02-08 VITALS — DIASTOLIC BLOOD PRESSURE: 74 MMHG | SYSTOLIC BLOOD PRESSURE: 126 MMHG

## 2018-02-08 DIAGNOSIS — J45.40 MODERATE PERSISTENT ASTHMA WITHOUT COMPLICATION: Primary | ICD-10-CM

## 2018-02-08 DIAGNOSIS — J30.9 ALLERGIC RHINITIS, UNSPECIFIED CHRONICITY, UNSPECIFIED SEASONALITY, UNSPECIFIED TRIGGER: ICD-10-CM

## 2018-02-08 DIAGNOSIS — G47.52 RBD (REM BEHAVIORAL DISORDER): ICD-10-CM

## 2018-02-08 DIAGNOSIS — K59.09 CHRONIC CONSTIPATION: ICD-10-CM

## 2018-02-08 DIAGNOSIS — F33.0 MAJOR DEPRESSIVE DISORDER, RECURRENT EPISODE, MILD (H): ICD-10-CM

## 2018-02-08 DIAGNOSIS — E78.5 HYPERLIPIDEMIA LDL GOAL <100: ICD-10-CM

## 2018-02-08 DIAGNOSIS — K21.9 GASTROESOPHAGEAL REFLUX DISEASE, ESOPHAGITIS PRESENCE NOT SPECIFIED: ICD-10-CM

## 2018-02-08 DIAGNOSIS — N95.2 VAGINAL ATROPHY: ICD-10-CM

## 2018-02-08 DIAGNOSIS — M54.42 BILATERAL LOW BACK PAIN WITH LEFT-SIDED SCIATICA, UNSPECIFIED CHRONICITY: ICD-10-CM

## 2018-02-08 DIAGNOSIS — I10 HYPERTENSION GOAL BP (BLOOD PRESSURE) < 140/90: ICD-10-CM

## 2018-02-08 DIAGNOSIS — R21 RASH: ICD-10-CM

## 2018-02-08 DIAGNOSIS — E63.9 NUTRITIONAL DEFICIENCY: ICD-10-CM

## 2018-02-08 DIAGNOSIS — Z79.82 LONG TERM (CURRENT) USE OF ASPIRIN: ICD-10-CM

## 2018-02-08 PROCEDURE — 99605 MTMS BY PHARM NP 15 MIN: CPT | Performed by: PHARMACIST

## 2018-02-08 PROCEDURE — 99607 MTMS BY PHARM ADDL 15 MIN: CPT | Performed by: PHARMACIST

## 2018-02-08 RX ORDER — ASCORBIC ACID 500 MG
1 TABLET ORAL DAILY
COMMUNITY
End: 2019-04-25

## 2018-02-08 RX ORDER — GLUCOSAMINE/D3/BOSWELLIA SERRA 1500MG-400
1 TABLET ORAL 2 TIMES DAILY
COMMUNITY
End: 2019-04-25

## 2018-02-08 NOTE — LETTER
"     [facility]     Date: 2018    Ingrid Amaral  [address] [address]    Dear Ms. Quarlesters,    Thank you for talking with me on 18 about your health and medications. Medicare s MTM (Medication Therapy Management) program helps you understand your medications and use them safely.      This letter includes an action plan (Medication Action Plan) and medication list (Personal Medication List). The action plan has steps you should take to help you get the best results from your medications. The medication list will help you keep track of your medications and how to use them the right way.       Have your action plan and medication list with you when you talk with your doctors, pharmacists, and other healthcare providers in your care team.     Ask your doctors, pharmacists, and other healthcare providers to update the action plan and medication list at every visit.     Take your medication list with you if you go to the hospital or emergency room.     Give a copy of the action plan and medication list to your family or caregivers.     If you want to talk about this letter or any of the papers with it, please call   [phone].   We look forward to working with you, your doctors, and other healthcare providers to help you stay healthy.     Sincerely,    Jan Mo      Enclosed: Medication Action Plan and Personal Medication List    MEDICATION ACTION PLAN FOR Ingrid Amaral,  1945     This action plan will help you get the best results from your medications if you:   1. Read \"What we talked about.\"   2. Take the steps listed in the \"What I need to do\" boxes.   3. Fill in \"What I did and when I did it.\"   4. Fill in \"My follow-up plan\" and \"Questions I want to ask.\"     Have this action plan with you when you talk with your doctors, pharmacists, and other healthcare providers in your care team. Share this with your family or caregivers too.  DATE " PREPARED: 2018  What we talked about: You were still having issues with constipation despite using Benefiber.                                                  What I need to do: You would benefit from starting Miralax daily. Typically the recommended dose is one capful (17 grams) in 8 ounces of fluid.   What I did and when I did it:                                              What we talked about: You were starting to use essential oils for the rash on your left leg.                                                  What I need to do: If the essential oils do not help, I would restart the clobetasol ointment as it is most likely the best option for the type of rash you showed me. What I did and when I did it:                                               My follow-up plan:                 Questions I want to ask:              If you have any questions about your action plan, call Jan Campuzano Chepe, Phone: [phone] , Monday-Friday 8-4:30pm.           MEDICATION LIST FOR Ingrid Amaral,  1945     This medication list was made for you after we talked. We also used information from your doctor's chart.      Use blank rows to add new medications. Then fill in the dates you started using them.    Cross out medications when you no longer use them. Then write the date and why you stopped using them.    Ask your doctors, pharmacists, and other healthcare providers to update this list at every visit. Keep this list up-to-date with:       Prescription medications    Over the counter drugs     Herbals    Vitamins    Minerals      If you go to the hospital or emergency room, take this list with you. Share this with your family or caregivers too.     DATE PREPARED: 2018  Allergies or side effects: Sulfa drugs     Medication:  ADVAIR DISKUS 250-50 MCG/DOSE IN AEPB      How I use it:  INHALE ONE PUFF BY MOUTH TWICE A DAY      Why I use it: Asthma    Prescriber:  Leesa Muñiz PA-C      Date I started  using it:       Date I stopped using it:         Why I stopped using it:            Medication:  ALBUTEROL SULFATE (5 MG/ML) 0.5% IN NEBU      How I use it:  Take 0.5 mLs (2.5 mg) by nebulization every 4 hours as needed for wheezing or shortness of breath / dyspnea      Why I use it: Asthma    Prescriber:  MARVIN Gaming CNP      Date I started using it:       Date I stopped using it:         Why I stopped using it:            Medication:  ALBUTEROL SULFATE  (90 BASE) MCG/ACT IN AERS      How I use it:  Inhale 2 puffs into the lungs every 4 hours as needed for shortness of breath / dyspnea      Why I use it: Asthma    Prescriber:  MARVIN Diaz CNP      Date I started using it:       Date I stopped using it:         Why I stopped using it:            Medication:  AMLODIPINE BESYLATE 5 MG PO TABS      How I use it:  Take 1 tablet (5 mg) by mouth daily      Why I use it: Blood Pressure    Prescriber:  MARVIN Diaz CNP      Date I started using it:       Date I stopped using it:         Why I stopped using it:            Medication:  ASCORBIC ACID 500 MG PO TABS      How I use it:  Take 1 tablet by mouth daily as needed      Why I use it:  Vitamin deficiency    Prescriber:  Patient Reported      Date I started using it:       Date I stopped using it:         Why I stopped using it:            Medication:  ASPIRIN 81 PO      How I use it:  Take 1 tablet by mouth every other day       Why I use it: Heart disease/stroke prevention    Prescriber:  Patient Reported      Date I started using it:       Date I stopped using it:         Why I stopped using it:            Medication:  BENEFIBER OR      How I use it:  Take two teaspoons daily as needed      Why I use it:  Constipation    Prescriber:  Patient Reported      Date I started using it:       Date I stopped using it:         Why I stopped using it:            Medication:  BIOTIN 13144 MCG PO TABS      How I use it:  Take 1  tablet by mouth 2 times daily      Why I use it:  Hair loss    Prescriber:  Patient Reported      Date I started using it:       Date I stopped using it:         Why I stopped using it:            Medication:  CALCIPOTRIENE 0.005 % EX OINT      How I use it:  Externally apply topically 2 times daily      Why I use it: Psoriasis    Prescriber:  MARVIN Diaz CNP      Date I started using it:       Date I stopped using it:         Why I stopped using it:            Medication:  CALCIUM-MAGNESIUM-ZINC 333..33-5 MG PO TABS      How I use it:  Take 1 tablet by mouth daily      Why I use it:  Vitamin deficiency    Prescriber:  Patient Reported      Date I started using it:       Date I stopped using it:         Why I stopped using it:            Medication:  CETIRIZINE HCL 10 MG PO TABS      How I use it:  Take 1 tablet (10 mg) by mouth every evening      Why I use it: Allergies    Prescriber:  MARVIN Diaz CNP      Date I started using it:       Date I stopped using it:         Why I stopped using it:            Medication:  CLOBETASOL PROPIONATE 0.05 % EX OINT      How I use it:  Apply to area of irritation twice daily for 2 weeks      Why I use it: Rash    Prescriber:  Sheryl Whitaker MD      Date I started using it:       Date I stopped using it:         Why I stopped using it:            Medication:  ESTRADIOL 0.1 MG/GM VA CREA      How I use it:  USE ONE GRAM VAGINALLY EVERY DAY AND SPREAD A SMALL AMOUNT AROUND THE CLITORAL REES      Why I use it: Vaginal atrophy    Prescriber:  MARVIN Diaz CNP      Date I started using it:       Date I stopped using it:         Why I stopped using it:            Medication:  FLAXSEED      How I use it:  Take one teaspoon daily      Why I use it: Nutritional deficiency    Prescriber:  Patient Reported      Date I started using it:       Date I stopped using it:         Why I stopped using it:            Medication:  FLUTICASONE  PROPIONATE 50 MCG/ACT NA SUSP      How I use it:  Spray 1 spray into both nostrils daily      Why I use it: Allergies    Prescriber:  MARVIN Diaz CNP      Date I started using it:       Date I stopped using it:         Why I stopped using it:            Medication:  GABAPENTIN 300 MG PO CAPS      How I use it:  Take 1 capsule (300 mg) by mouth nightly as needed      Why I use it: Back pain    Prescriber:  MARVIN Diaz CNP      Date I started using it:       Date I stopped using it:         Why I stopped using it:            Medication:  GUAIFENESIN  MG PO TB12      How I use it:  Take 2 tablets (1,200 mg) by mouth 2 times daily as needed To see if helps phlegm.      Why I use it: Phlegm in throat    Prescriber:  Leesa Muñiz PA-C      Date I started using it:       Date I stopped using it:         Why I stopped using it:            Medication:  HYDROCORTISONE 1 % EX CREA      How I use it:  Apply topically as needed      Why I use it:  Rash    Prescriber:  Patient Reported      Date I started using it:       Date I stopped using it:         Why I stopped using it:            Medication:  LACTASE 9000 UNITS PO TABS      How I use it:  Take 9,000 Units by mouth as needed      Why I use it:  Lactose intolerance    Prescriber:  Patient Reported      Date I started using it:       Date I stopped using it:         Why I stopped using it:            Medication:  MONTELUKAST SODIUM 10 MG PO TABS      How I use it:  TAKE ONE TABLET BY MOUTH EVERY DAY      Why I use it: Asthma/Allergies    Prescriber:  MARVIN Diaz CNP      Date I started using it:       Date I stopped using it:         Why I stopped using it:            Medication:  OMEPRAZOLE 20 MG PO CPDR      How I use it:  Take 1 capsule (20 mg) by mouth 2 times daily For heartburn      Why I use it: Heart burn    Prescriber:  MARVIN Diaz CNP      Date I started using it:       Date I stopped using it:          Why I stopped using it:            Medication:  PROBIOTIC DAILY PO      How I use it:  Take 1 chew tab by mouth 2 times daily       Why I use it:  Digestive health    Prescriber:  Patient Reported      Date I started using it:       Date I stopped using it:         Why I stopped using it:            Medication:  SERTRALINE  MG PO TABS      How I use it:  Take 1.5 tablets (150 mg) by mouth daily      Why I use it: Panic attacks, Anxiety, Depression    Prescriber:  MARVIN Diaz CNP      Date I started using it:       Date I stopped using it:         Why I stopped using it:            Medication:  SIMVASTATIN 40 MG PO TABS      How I use it:  Take 1 tablet (40 mg) by mouth At Bedtime      Why I use it: Cholesterol    Prescriber:  MARVIN Diaz CNP      Date I started using it:       Date I stopped using it:         Why I stopped using it:            Medication:  SKIN OILS EX      How I use it:  Lavender-headache, skin, peppermint-skin, upset stomach mix of oils, asthma mix of oils, eucalyptus. Tea tree oil-skin, vapor rub- chest tightness, sleepy time (vetiver, lavendaer, mrjoram, mandarin, ylang)- sleep, constipation, eczema (sweet almond oil). Also mixes with carriers: Coconut oil, Nedrow oil, Extra virgin oil. Frankincense- cough. Digize- oil. Purification- oil, lemon- oil. Essentialyme- pill.  Inhales a mix with olive oil almond and coconut oil with peppermint, and eucalyptus- sinus, allergies. Updated 12/1/2015.   Updated 4/26/2016: Lemon, Cinnamon bark, panaway, Thieves, Orange, Wintergreen, Copaiba      Why I use it:  General health    Prescriber:  Patient Reported      Date I started using it:       Date I stopped using it:         Why I stopped using it:            Medication:  VITAMIN D (CHOLECALCIFEROL) PO      How I use it:  Take 2,400 Units by mouth daily      Why I use it: Vitamin Deficiency    Prescriber:  Patient Reported      Date I started using it:       Date I stopped  using it:         Why I stopped using it:            Medication:         How I use it:         Why I use it:      Prescriber:         Date I started using it:       Date I stopped using it:         Why I stopped using it:            Medication:         How I use it:         Why I use it:      Prescriber:         Date I started using it:       Date I stopped using it:         Why I stopped using it:            Medication:         How I use it:         Why I use it:      Prescriber:         Date I started using it:       Date I stopped using it:         Why I stopped using it:              Other Information:     If you have any questions about your action plan, call [phone].    According to the Paperwork Reduction Act of 1995, no persons are required to respond to a collection of information unless it displays a valid OMB control number. The valid OMB number for this information collection is [id number]. The time required to complete this information collection is estimated to average 40 minutes per response, including the time to review instructions, searching existing data resources, gather the data needed, and complete and review the information collection. If you have any comments concerning the accuracy of the time estimate(s) or suggestions for improving this form, please write to: CMS, Attn: LEILANI Reports Clearance Officer, [address] [place] [phone].

## 2018-02-08 NOTE — MR AVS SNAPSHOT
After Visit Summary   2/8/2018    Ingrid Amaral    MRN: [id number]           Patient Information     Date Of Birth          1945        Visit Information        Provider Department      2/8/2018 1:30 PM Jan Mo, [facility] Instructions    Recommendations from today's MTM visit:                                                    MTM (medication therapy management) is a service provided by a clinical pharmacist designed to help you get the most of out of your medicines.   Today we reviewed what your medicines are for, how to know if they are working, that your medicines are safe and how to make your medicine regimen as easy as possible.     1. You would benefit from restarting Miralax daily - one capful each day. If this is not enough to help with constipation you could consider a stimulant laxative (Smooth Move Tea).       2. You could consider using the guaifenesin (Mucinex) on your list as needed for when you are having trouble coughing up phlegm.     3. After giving your essential oils a try with your rash, go back to giving your clobetasol ointment a try.      Next MTM visit: 1 year or sooner if needed    To schedule another MTM appointment, please call the clinic directly or you may call the MTM scheduling line at [phone] or toll-free at [phone].     My Clinical Pharmacist's contact information:                                                      It was a pleasure seeing you today!  Please feel free to contact me with any questions or concerns you have.      Pepe Mo, PharmD, [facility]  Medication Therapy Management Provider  Pager (voicemail): [phone]    You may receive a survey about the MTM services you received.  I would appreciate your feedback to help me serve you better in the future. Please fill it out and return it when you can. Your comments will be anonymous.                  Follow-ups after your visit        Your  next 10 appointments already scheduled     Feb 14, 2018 12:45 PM CST   (Arrive by 12:30 PM)   MR BRAIN W/O & W CONTRAST with WYMR2   [facility] MRI ([facility])    [address]  [facility] [address]   [phone]           Take your medicines as usual, unless your doctor tells you not to. Bring a list of your current medicines to your exam (including vitamins, minerals and over-the-counter drugs).  You may or may not receive intravenous (IV) contrast for this exam pending the discretion of the Radiologist.  You do not need to do anything special to prepare.  The MRI machine uses a strong magnet. Please wear clothes without metal (snaps, zippers). A sweatsuit works well, or we may give you a hospital gown.  Please remove any body piercings and hair extensions before you arrive. You will also remove watches, jewelry, hairpins, wallets, dentures, partial dental plates and hearing aids. You may wear contact lenses, and you may be able to wear your rings. We have a safe place to keep your personal items, but it is safer to leave them at home.  **IMPORTANT** THE INSTRUCTIONS BELOW ARE ONLY FOR THOSE PATIENTS WHO HAVE BEEN PRESCRIBED SEDATION OR GENERAL ANESTHESIA DURING THEIR MRI PROCEDURE:  IF YOUR DOCTOR PRESCRIBED ORAL SEDATION (take medicine to help you relax during your exam):   You must get the medicine from your doctor (oral medication) before you arrive. Bring the medicine to the exam. Do not take it at home. You ll be told when to take it upon arriving for your exam.   Arrive one hour early. Bring someone who can take you home after the test. Your medicine will make you sleepy. After the exam, you may not drive, take a bus or take a taxi by yourself.  IF YOUR DOCTOR PRESCRIBED IV SEDATION:   Arrive one hour early. Bring someone who can take you home after the test. Your medicine will make you sleepy. After the exam, you may not drive, take a bus or take a taxi by yourself.   No  eating 6 hours before your exam. You may have clear liquids up until 4 hours before your exam. (Clear liquids include water, clear tea, black coffee and fruit juice without pulp.)  IF YOUR DOCTOR PRESCRIBED ANESTHESIA (be asleep for your exam):   Arrive 1 1/2 hours early. Bring someone who can take you home after the test. You may not drive, take a bus or take a taxi by yourself.   No eating 8 hours before your exam. You may have clear liquids up until 4 hours before your exam. (Clear liquids include water, clear tea, black coffee and fruit juice without pulp.)   You will spend four to five hours in the recovery room.  Please call the Imaging Department at your exam site with any questions.            Feb 20, 2018 11:30 AM CST   LAB with NB LAB   [facility]    [address] [place] [id number] [phone]           Please do not eat 10-12 hours before your appointment if you are coming in fasting for labs on lipids, cholesterol, or glucose (sugar). This does not apply to pregnant women. Water, hot tea and black coffee (with nothing added) are okay. Do not drink other fluids, diet soda or chew gum.            Mar 23, 2018 12:30 PM CDT   (Arrive by 12:15 PM)   Neuropsych Eval with Armando Agrawal, PhD [facility] Neuropsychology ([facility])    [address] [address] [phone]            Apr 16, 2018  2:15 PM CDT   Return Visit with Pratibha Guerrero MD   [facility]    [address] [facility] [address] [phone]              Who to contact     If you have questions or need follow up information about today's clinic visit or your schedule please contact [facility] directly at [phone].  Normal or non-critical lab and imaging results will be communicated to you by MyChart, letter or phone within 4 business  "days after the clinic has received the results. If you do not hear from us within 7 days, please contact the clinic through Frequency or phone. If you have a critical or abnormal lab result, we will notify you by phone as soon as possible.  Submit refill requests through Frequency or call your pharmacy and they will forward the refill request to us. Please allow 3 business days for your refill to be completed.          Additional Information About Your Visit        Frequency Information     Frequency lets you send messages to your doctor, view your test results, renew your prescriptions, schedule appointments and more. To sign up, go to [email or web address] . Click on \"Log in\" on the left side of the screen, which will take you to the Welcome page. Then click on \"Sign up Now\" on the right side of the page.     You will be asked to enter the access code listed below, as well as some personal information. Please follow the directions to create your username and password.     Your access code is: [email or web address]  Expires: 2018  1:32 PM     Your access code will  in 90 days. If you need help or a new code, please call your [place] clinic or [phone].        Care EveryWhere ID     This is your Care EveryWhere ID. This could be used by other organizations to access your [place] medical records  [id number]         Blood Pressure from Last 3 Encounters:   18 122/72   18 118/70   17 132/82    Weight from Last 3 Encounters:   18 128 lb (58.1 kg)   18 127 lb (57.6 kg)   17 125 lb 9.6 oz (57 kg)              Today, you had the following     No orders found for display       Primary Care Provider Office Phone # Fax #    MARVIN Diaz [facility] [phone]       [address] [address]        Equal Access to Services     JORGE A BYRD AH: Windy England, wareeseda luqadaha, qaybta kaaldaniele raza, ollie gray " micah rangel ah. So [facility] [phone].    ATENCIÓN: Si habla lalita, tiene a gauthier disposición servicios gratuitos de asistencia lingüística. Renny al [phone].    We comply with applicable federal civil rights laws and [place] laws. We do not discriminate on the basis of race, color, national origin, age, disability, sex, sexual orientation, or gender identity.            Thank you!     Thank you for choosing [facility]  for your care. Our goal is always to provide you with excellent care. Hearing back from our patients is one way we can continue to improve our services. Please take a few minutes to complete the written survey that you may receive in the mail after your visit with us. Thank you!             Your Updated Medication List - Protect others around you: Learn how to safely use, store and throw away your medicines at www.disposemymeds.org.          This list is accurate as of 2/8/18  2:05 PM.  Always use your most recent med list.                   Brand Name Dispense Instructions for use Diagnosis    ADVAIR DISKUS 250-50 MCG/DOSE diskus inhaler   Generic drug:  fluticasone-salmeterol     1 Inhaler    INHALE ONE PUFF BY MOUTH TWICE A DAY    Moderate persistent asthma without complication       * albuterol (5 MG/ML) 0.5% neb solution    PROVENTIL    30 vial    Take 0.5 mLs (2.5 mg) by nebulization every 4 hours as needed for wheezing or shortness of breath / dyspnea    Moderate persistent asthma       * albuterol 108 (90 BASE) MCG/ACT Inhaler    PROAIR HFA/PROVENTIL HFA/VENTOLIN HFA    1 Inhaler    Inhale 2 puffs into the lungs every 4 hours as needed for shortness of breath / dyspnea    Moderate persistent asthma without complication       amLODIPine 5 MG tablet    NORVASC    90 tablet    Take 1 tablet (5 mg) by mouth daily    Benign essential hypertension       ascorbic acid 500 MG Tabs      Take 1 tablet by mouth daily        ASPIRIN 81 PO      Take 0.5 tablets by  mouth every other day        BENEFIBER PO      2 tsp daily        Biotin 43122 MCG Tabs      Take 1 tablet by mouth 2 times daily        calcipotriene 0.005 % Oint     60 g    Externally apply topically 2 times daily    Psoriasis       Calcium-Magnesium-Zinc 333..33-5 MG Tabs      Take 1 tablet by mouth daily        cetirizine 10 MG tablet    zyrTEC    90 tablet    Take 1 tablet (10 mg) by mouth every evening    Chronic rhinitis       clobetasol 0.05 % ointment    TEMOVATE    30 g    Apply to area of irritation twice daily for 2 weeks    Vulvitis       estradiol 0.1 MG/GM cream    ESTRACE VAGINAL    85 g    USE ONE GRAM VAGINALLY EVERY DAY AND SPREAD A SMALL AMOUNT AROUND THE CLITORAL REES    Vaginal atrophy, Labial and clitoral adhesions, acquired       FLAXSEED      Reported on 5/2/2017        fluticasone 50 MCG/ACT spray    FLONASE    1 Bottle    Spray 1 spray into both nostrils daily    Allergic rhinitis, unspecified allergic rhinitis trigger, unspecified rhinitis seasonality       gabapentin 300 MG capsule    NEURONTIN    60 capsule    Take 1 capsule (300 mg) by mouth nightly as needed    Lumbar radiculopathy, Chronic right-sided low back pain with right-sided sciatica       guaiFENesin 600 MG 12 hr tablet    MUCINEX    40 tablet    Take 2 tablets (1,200 mg) by mouth 2 times daily as needed To see if helps phlegm.    Phlegm in throat       hydrocortisone 1 % cream    CORTAID     Apply topically as needed        lactase 9000 UNITS Tabs tablet    LACTAID     Take 9,000 Units by mouth as needed        montelukast 10 MG tablet    SINGULAIR    90 tablet    TAKE ONE TABLET BY MOUTH EVERY DAY    Moderate persistent asthma without complication       omeprazole 20 MG CR capsule    priLOSEC    180 capsule    Take 1 capsule (20 mg) by mouth 2 times daily For heartburn    Gastroesophageal reflux disease without esophagitis       order for DME     1 Device    Equipment being ordered: Nebulizer and tubing/filter     Moderate persistent asthma       PROBIOTIC DAILY PO      Take 1 chew tab by mouth 2 times daily        sertraline 100 MG tablet    ZOLOFT    90 tablet    Take 1.5 tablets (150 mg) by mouth daily    Panic disorder without agoraphobia       simvastatin 40 MG tablet    ZOCOR    90 tablet    Take 1 tablet (40 mg) by mouth At Bedtime    Elevated cholesterol with elevated triglycerides       SKIN OILS EX      Lavender-headache, skin, peppermint-skin, upset stomach mix of oils, asthma mix of oils, eucalyptus. Tea tree oil-skin, vapor rub- chest tightness, sleepy time (vetiver, lavendaer, mrjoram, mandarin, ylang)- sleep, constipation, eczema (sweet almond oil). Also mixes with carriers: Coconut oil, [place] oil, Extra virgin oil. Frankincense- cough. Digize- oil. Purification- oil, lemon- oil. Essentialyme- pill.  Inhales a mix with olive oil almond and coconut oil with peppermint, and eucalyptus- sinus, allergies. Updated 12/1/2015.  Updated 4/26/2016: Lemon, Cinnamon bark, panaway, Thieves, Orange, Wintergreen, Copaiba        VITAMIN D (CHOLECALCIFEROL) PO      Take 2,400 Units by mouth daily        * Notice:  This list has 2 medication(s) that are the same as other medications prescribed for you. Read the directions carefully, and ask your doctor or other care provider to review them with you.

## 2018-02-08 NOTE — PROGRESS NOTES
SUBJECTIVE/OBJECTIVE:                Ingrid Amaral is a 72 year old female coming in for a follow-up visit for Medication Therapy Management.  She was referred to me from her [employer] insurance. Current PCP is MARVIN Bolton, CNP. Joined today by , Harsha.     Chief Complaint: Follow up from our visit on 3/09/17.  Comprehensive medication review  Tobacco: History of tobacco dependence - quit 50-60 years ago   Alcohol: Less than 1 beverages / week    Medication Adherence: once or twice a week has to double check     Asthma:  Current asthma medications: Albuterol MDI and Nebs, montelukast 10 mg daily, and Fluticasone+Salmeterol (Advair) twice daily. Does have prednisone if in [place]. Does have trouble clearing her throat, but does not always use guaifenesin. Pt rinses their mouth after using steroid inhaler. Asthma triggers include: exercise or sports.  Pt reports the following symptoms: SOA with exertion (stairs, dancing)  ACT Total Scores 9/15/2016 3/9/2017 8/16/2017   ACT TOTAL SCORE - - -   ASTHMA ER VISITS - - -   ASTHMA HOSPITALIZATIONS - - -   ACT TOTAL SCORE (Goal Greater than or Equal to 20) 23 22 21   In the past 12 months, how many times did you visit the emergency room for your asthma without being admitted to the hospital? 0 0 0   In the past 12 months, how many times were you hospitalized overnight because of your asthma? 0 0 0     Allergic rhinitis: Current medications include cetirizine 10 mg once daily, fluticasone nasal spray  twice daily, saline nasal spray PRN - rarely and montelukast daily. Primary symptoms are nasal congestion and runny nose. Pt feels that current therapy is effective.     GERD: Current medications include: Prilosec (omeprazole) 20mg daily. Pt c/o no current symptoms. Pt states has history of hiatal hernia.   The patient does not have a history of GI bleed.  The patient does notice symptoms if they miss a dose.  Patient has not tried a trial off of therapy and  is not interested in doing so. Has history of difficulty clearing throat - thinks this may have helped. Patient feels that current regimen is effective.    Rash: Pt is very reactive to different topical products/chemicals that dry out skin and cause rash.  She uses several oinments/creams including calcipotriene, clobetasol, and hydrocortisone depending on how severe it is. They have not been helping with a spot on her skin so has been using essential oils. Denies any issues.    Depression/REM Behavior Disorder:  Current medications include: Sertraline 150mg once daily. Pt reports that depression symptoms are well controlled. She does have occasional panic attacks, but feels that doing different activities - such as caring for her rabbits/chickens help.   PHQ-9 SCORE 11/7/2016 5/2/2017 11/1/2017   Total Score - - -   Total Score 8 9 8     Constipation: Pt is taking Benefiber PRN, but hs been dealing with ongoing constipation.  Has Miralax at home and wonders if this would help.     Pain:  Pt still gets back pain on occasion.  Pt is taking gabapentin 300 mg at bedtime PRN, usually a few times a week.  Feels that this is effective. Denies any issues.      CV prevention: Pt is taking aspirin 81 mg every OTHER day. She states that she does not want to take daily since she has a family history of bleeding issues and she had problems with hemorrhage when she had her children. She denies current bleeding/bruising issues. She has possible past history of TIA per previous neurology note.    Hyperlipidemia: Current therapy includes simvastatin 40 mg once daily.  Pt reports no significant myalgias or other side effects.   The 10-year ASCVD risk score (Fermin AVERY Jr, et al., 2013) is: 15.2%    Values used to calculate the score:      Age: 72 years      Sex: Female      Is Non- : No      Diabetic: No      Tobacco smoker: No      Systolic Blood Pressure: 122 mmHg      Is BP treated: Yes      HDL Cholesterol:  85 mg/dL      Total Cholesterol: 191 mg/dL    Vaginal atrophy: Pt is using Estrace cream daily.  Pt states this has been effective. Denies any issues.    Hypertension: Current medications include amlodipine 5 mg daily.  Patient does not self-monitor BP.  Patient reports no current medication side effects.    Supplements: Pt uses multiple essential oils, vitamin C 500 mg daily PRN (taking during flu season), biotin 10,000 mcg daily (hair loss), flaxseed (when she remembers), Lactaid PRN, a daily probiotic, calcium [id number]/Vit D 400IU/Mg40mg/Zinc, and vitamin D3 daily. Prefers to continue all of these. Denies any issues.    Current labs include:  BP Readings from Last 3 Encounters:   02/05/18 122/72   01/09/18 118/70   11/01/17 132/82     Today's Vitals: /74  Lab Results   Component Value Date    CHOL 191 08/16/2017     Lab Results   Component Value Date    TRIG 122 08/16/2017     Lab Results   Component Value Date    HDL 85 08/16/2017     Lab Results   Component Value Date    LDL 82 08/16/2017       Liver Function Studies -   Recent Labs   Lab Test  09/27/17   1515   PROTTOTAL  6.9   ALBUMIN  4.0   BILITOTAL  0.2   ALKPHOS  72   AST  25   ALT  28     Last Basic Metabolic Panel:  Lab Results   Component Value Date     09/27/2017      Lab Results   Component Value Date    POTASSIUM 3.6 09/27/2017     Lab Results   Component Value Date    CHLORIDE 106 09/27/2017     Lab Results   Component Value Date    BUN 15 09/27/2017     Lab Results   Component Value Date    CR 0.73 09/27/2017     GFR Estimate   Date Value Ref Range Status   09/27/2017 79 >60 mL/min/1.7m2 Final     Comment:     Non  GFR Calc   05/02/2017 83 >60 mL/min/1.7m2 Final     Comment:     Non  GFR Calc   04/04/2016 82 >60 mL/min/1.7m2 Final     TSH   Date Value Ref Range Status   09/29/2015 3.15 0.40 - 4.00 mU/L Final     Most Recent Immunizations   Administered Date(s) Administered     Influenza (H1N1)  01/19/2010     Influenza (High Dose) 3 valent vaccine 11/01/2017     Influenza (IIV3) PF 10/22/2012     Pneumo Conj 13-V (2010&after) 04/26/2016     Pneumococcal 23 valent 01/19/2010     TD (ADULT, 7+) 01/01/2002     TDAP Vaccine (Adacel) 12/23/2011     Zoster vaccine, live 11/04/2014       ASSESSMENT:              Current medications were reviewed today as discussed above.  Medicare Part D topics discussed:OTC products and Importance of taking medications as prescribed    Medication Adherence: no issues identified    Asthma:  Stable.     Allergic rhinitis: Stable.     GERD: Stable.     Rash: Needs Improvement. Pt would likely benefit from regular use of corticosteroid ointment versus essential oils that likely should not be used on inflammed/irritated skin.     Depression/REM Behavior Disorder:  Stable per patient. She is having panic attacks and maybe would benefit from increase in SSRI dose, but prefers to continue to address without changes at this time.     Constipation: Needs Improvement. Pt would benefit from taking Miralax daily to help prevent/treat constipation.     Pain:  Stable.     CV prevention: Stable.     Hyperlipidemia: Stable. Pt is on moderate intensity statin which is indicated based on 2013 ACC/AHA guidelines for lipid management.      Hypertension: Stable. Patient is meeting BP goal of < 140/90mmHg.     Supplements: Stable.     PLAN:                  1. Pt to start Miralax daily.  2. Pt to start using clobetasol in place of essential oils for rash.     I spent 45 minutes with this patient today (an extra 15 minutes was spent creating the Medication Action Plan). A copy of the visit note was provided to the patient's primary care provider.     Will follow up in 6-12 months or sooner if needed.    The patient was given a summary of these recommendations as an after visit summary.    Pepe Mo, StuartD, BCACP  Medication Therapy Management Provider  Pager (voicemail): [phone]

## 2018-02-08 NOTE — PATIENT INSTRUCTIONS
Recommendations from today's MTM visit:                                                    MTM (medication therapy management) is a service provided by a clinical pharmacist designed to help you get the most of out of your medicines.   Today we reviewed what your medicines are for, how to know if they are working, that your medicines are safe and how to make your medicine regimen as easy as possible.     1. You would benefit from restarting Miralax daily - one capful each day. If this is not enough to help with constipation you could consider a stimulant laxative (Smooth Move Tea).       2. You could consider using the guaifenesin (Mucinex) on your list as needed for when you are having trouble coughing up phlegm.     3. After giving your essential oils a try with your rash, go back to giving your clobetasol ointment a try.      Next MTM visit: 1 year or sooner if needed    To schedule another MTM appointment, please call the clinic directly or you may call the MTM scheduling line at [phone] or toll-free at [phone].     My Clinical Pharmacist's contact information:                                                      It was a pleasure seeing you today!  Please feel free to contact me with any questions or concerns you have.      Pepe Mo, Sirisha, [facility]  Medication Therapy Management Provider  Pager (voicemail): [phone]    You may receive a survey about the MTM services you received.  I would appreciate your feedback to help me serve you better in the future. Please fill it out and return it when you can. Your comments will be anonymous.

## 2018-02-09 ENCOUNTER — CARE COORDINATION (OUTPATIENT)
Dept: CARE COORDINATION | Facility: CLINIC | Age: 73
End: 2018-02-09

## 2018-02-09 NOTE — PROGRESS NOTES
Clinic Care Coordination Contact    Situation: Patient chart reviewed by care coordinator.    Background: CC had been involved as pt had concerns about medication management, memory and anxeity    Assessment: review of chart notes that pt has been to neurology and will have neuropsych assessment on 2-14-18    Plan/Recommendations: Sw will call after neuropsych assessment to discuss her concerns and goal setting.    EM Mcfarlane, Clinic Care Coordinator 2/9/2018   2:49 PM  [phone]

## 2018-02-12 DIAGNOSIS — J45.40 MODERATE PERSISTENT ASTHMA WITHOUT COMPLICATION: ICD-10-CM

## 2018-02-12 NOTE — TELEPHONE ENCOUNTER
"Requested Prescriptions   Pending Prescriptions Disp Refills     ADVAIR DISKUS 250-50 MCG/DOSE diskus inhaler [Pharmacy Med Name: ADVAIR DISKUS 250-50MCG/DOSE AEPB]  1     Sig: INHALE ONE PUFF BY MOUTH TWICE A DAY    Inhaled Steroids Protocol Passed    2/12/2018 10:23 AM       Passed - Patient is age 12 or older       Passed - Asthma control test 20 or greater in last 6 months    Please review ACT score.          Passed - Recent (6 mo) or future visit with authorizing provider's specialty    Patient had office visit in the last 6 months or has a visit in the next 30 days with authorizing provider.  See \"Patient Info\" tab in inbasket, or \"Choose Columns\" in Meds & Orders section of the refill encounter.            ADVAIR DISKUS 250-50 MCG/DOSE diskus inhaler  Last Written Prescription Date:  12/06/2017  Last Fill Quantity: 1 inhaler,  # refills: 1   Last office visit: 1/9/2018 with prescribing provider:  01/09/2018 with MOHIT Magaña   Future Office Visit:   Next 5 appointments (look out 90 days)     Apr 16, 2018  2:15 PM CDT   Return Visit with Pratibha Guerrero MD   [facility]    [address] [place] [address] [phone]                 ACT Total Scores 9/15/2016 3/9/2017 8/16/2017   ACT TOTAL SCORE - - -   ASTHMA ER VISITS - - -   ASTHMA HOSPITALIZATIONS - - -   ACT TOTAL SCORE (Goal Greater than or Equal to 20) 23 22 21   In the past 12 months, how many times did you visit the emergency room for your asthma without being admitted to the hospital? 0 0 0   In the past 12 months, how many times were you hospitalized overnight because of your asthma? 0 0 0     Vidhya Spencer RT (FAHAD) (M)    "

## 2018-02-15 ENCOUNTER — HOSPITAL ENCOUNTER (OUTPATIENT)
Dept: MRI IMAGING | Facility: CLINIC | Age: 73
Discharge: HOME | End: 2018-02-15
Attending: PSYCHIATRY & NEUROLOGY | Admitting: PSYCHIATRY & NEUROLOGY
Payer: MEDICARE

## 2018-02-15 DIAGNOSIS — R41.3 MEMORY PROBLEM: ICD-10-CM

## 2018-02-15 DIAGNOSIS — D32.9 MENINGIOMA (H): ICD-10-CM

## 2018-02-15 LAB
CREAT BLD-MCNC: 0.8 MG/DL (ref 0.52–1.04)
GFR SERPL CREATININE-BSD FRML MDRD: 70 ML/MIN/1.7M2

## 2018-02-15 PROCEDURE — 70553 MRI BRAIN STEM W/O & W/DYE: CPT

## 2018-02-15 PROCEDURE — 82565 ASSAY OF CREATININE: CPT

## 2018-02-15 PROCEDURE — 25000128 H RX IP 250 OP 636: Performed by: PSYCHIATRY & NEUROLOGY

## 2018-02-15 PROCEDURE — A9585 GADOBUTROL INJECTION: HCPCS | Performed by: PSYCHIATRY & NEUROLOGY

## 2018-02-15 RX ORDER — GADOBUTROL 604.72 MG/ML
5 INJECTION INTRAVENOUS ONCE
Status: COMPLETED | OUTPATIENT
Start: 2018-02-15 | End: 2018-02-15

## 2018-02-15 RX ADMIN — GADOBUTROL 5 ML: 604.72 INJECTION INTRAVENOUS at 19:37

## 2018-02-16 NOTE — PROGRESS NOTES
Please advise Ingrid Amaral,  1945, that her brain MRI shows a slight increase in the size of the two meningiomas. We can discuss further when she follows up in clinic or I can send her for consultation with neurosurgery for another opinion. Let me know if she wants a referral.   [phone] (home)     Thank you,  Pratibha Guerrero

## 2018-02-20 DIAGNOSIS — E61.1 LOW IRON: ICD-10-CM

## 2018-02-20 LAB — FERRITIN SERPL-MCNC: 19 NG/ML (ref 8–252)

## 2018-02-20 PROCEDURE — 36415 COLL VENOUS BLD VENIPUNCTURE: CPT | Performed by: NURSE PRACTITIONER

## 2018-02-20 PROCEDURE — 82728 ASSAY OF FERRITIN: CPT | Performed by: NURSE PRACTITIONER

## 2018-02-21 ENCOUNTER — OFFICE VISIT (OUTPATIENT)
Dept: FAMILY MEDICINE | Facility: CLINIC | Age: 73
End: 2018-02-21
Payer: COMMERCIAL

## 2018-02-21 VITALS
TEMPERATURE: 98.1 F | RESPIRATION RATE: 16 BRPM | DIASTOLIC BLOOD PRESSURE: 78 MMHG | HEART RATE: 68 BPM | WEIGHT: 128.4 LBS | BODY MASS INDEX: 22.75 KG/M2 | SYSTOLIC BLOOD PRESSURE: 126 MMHG

## 2018-02-21 DIAGNOSIS — D32.9 MENINGIOMA (H): Primary | ICD-10-CM

## 2018-02-21 PROCEDURE — 99212 OFFICE O/P EST SF 10 MIN: CPT | Performed by: NURSE PRACTITIONER

## 2018-02-21 ASSESSMENT — PAIN SCALES - GENERAL: PAINLEVEL: NO PAIN (0)

## 2018-02-21 NOTE — PROGRESS NOTES
"  SUBJECTIVE:   Ingrid Amaral is a 72 year old female who presents to clinic today for the following health issues:      1.) MRI findings  Would like to discuss recent MRI results and get your opinion.   Had MRI through Neurology to follow up on her meningiomas, per discussion from Neurology clinic, \"Please advise Ingrid Amaral,  1945, that her brain MRI shows a slight increase in the size of the two meningiomas. We can discuss further when she follows up in clinic or I can send her for consultation with neurosurgery for another opinion. Let me know if she wants a referral.   [phone] (home)\" Patient was under the impression from this that she needed to have surgery and really wasn't sure about this so wanted to talk about.   Has appointment set up with neurology on 2018      Problem list and histories reviewed & adjusted, as indicated.  Additional history: as documented    Patient Active Problem List   Diagnosis     Diarrhea     Pure hypercholesterolemia     Allergic rhinitis     Panic disorder without agoraphobia     Advanced directives, counseling/discussion     Liver lesion     Generalized anxiety disorder     Mild major depression (H)     Vulvitis     Vaginal wall prolapse     Chronic constipation     Moderate persistent asthma     Dysphagia     RBD (REM behavioral disorder)     FH: colon cancer     Hypertension goal BP (blood pressure) < 140/90     Meningioma (H)     Past Surgical History:   Procedure Laterality Date     ANKLE SURGERY      bilateral     COLONOSCOPY       COLONOSCOPY N/A 3/20/2015    Procedure: COLONOSCOPY;  Surgeon: Britney Martinez MD;  Location: [facility]     COLONOSCOPY N/A 2017    Procedure: COLONOSCOPY;  Colonoscopy;  Surgeon: Tristen Rangel MD;  Location: [facility]     HC ESOPH/GAS REFLUX TEST W NASAL IMPED >1 HR  2012    Procedure:ESOPHAGEAL IMPEDENCE FUNCTION TEST WITH 24 HOUR PH GREATER THAN 1 HOUR; Surgeon:VALDO UMANZOR; Location:[facility]     " HERNIA REPAIR      umbilcal     HYSTERECTOMY, PAP NO LONGER INDICATED  1991     TONSILLECTOMY       UPPER GI ENDOSCOPY         Social History   Substance Use Topics     Smoking status: Former Smoker     Packs/day: 0.50     Years: 3.00     Types: Cigarettes     Smokeless tobacco: Never Used      Comment: smoked for 3 years when she was around 19     Alcohol use Yes      Comment: RARELY     Family History   Problem Relation Age of Onset     HEART DISEASE Mother      valve problem     Hypertension Mother      DIABETES Mother      type 2     CEREBROVASCULAR DISEASE Mother      Allergies Mother      Eye Disorder Mother      Macular degeneration     OSTEOPOROSIS Mother      Respiratory Mother      Cardiovascular Father      MI at age 80     Cancer - colorectal Father      DIABETES Father      Hypertension Father      type 2     Eye Disorder Father      macular degeneration     Lipids Father      C.A.D. Maternal Grandmother      DIABETES Paternal Grandmother      type 2     Cardiovascular Paternal Grandmother      MI     DIABETES Sister      type 2     Allergies Sister      GASTROINTESTINAL DISEASE Sister      GASTROINTESTINAL DISEASE Daughter      GASTROINTESTINAL DISEASE Daughter          Current Outpatient Prescriptions   Medication Sig Dispense Refill     ADVAIR DISKUS 250-50 MCG/DOSE diskus inhaler INHALE ONE PUFF BY MOUTH TWICE A DAY 1 Inhaler 1     ascorbic acid 500 MG TABS Take 1 tablet by mouth daily       Biotin 61241 MCG TABS Take 1 tablet by mouth 2 times daily       amLODIPine (NORVASC) 5 MG tablet Take 1 tablet (5 mg) by mouth daily 90 tablet 1     estradiol (ESTRACE VAGINAL) 0.1 MG/GM cream USE ONE GRAM VAGINALLY EVERY DAY AND SPREAD A SMALL AMOUNT AROUND THE CLITORAL REES 85 g 1     gabapentin (NEURONTIN) 300 MG capsule Take 1 capsule (300 mg) by mouth nightly as needed 60 capsule 0     ASPIRIN 81 PO Take 1 tablet by mouth every other day        montelukast (SINGULAIR) 10 MG tablet TAKE ONE TABLET BY MOUTH  EVERY DAY 90 tablet 1     calcipotriene 0.005 % OINT Externally apply topically 2 times daily 60 g 0     simvastatin (ZOCOR) 40 MG tablet Take 1 tablet (40 mg) by mouth At Bedtime 90 tablet 3     fluticasone (FLONASE) 50 MCG/ACT spray Spray 1 spray into both nostrils daily 1 Bottle 11     sertraline (ZOLOFT) 100 MG tablet Take 1.5 tablets (150 mg) by mouth daily 90 tablet 3     cetirizine (ZYRTEC) 10 MG tablet Take 1 tablet (10 mg) by mouth every evening 90 tablet 3     omeprazole (PRILOSEC) 20 MG CR capsule Take 1 capsule (20 mg) by mouth 2 times daily For heartburn 180 capsule 3     albuterol (PROAIR HFA/PROVENTIL HFA/VENTOLIN HFA) 108 (90 BASE) MCG/ACT Inhaler Inhale 2 puffs into the lungs every 4 hours as needed for shortness of breath / dyspnea 1 Inhaler 2     guaiFENesin (MUCINEX) 600 MG 12 hr tablet Take 2 tablets (1,200 mg) by mouth 2 times daily as needed To see if helps phlegm. 40 tablet 0     SKIN OILS EX Lavender-headache, skin, peppermint-skin, upset stomach mix of oils, asthma mix of oils, eucalyptus. Tea tree oil-skin, vapor rub- chest tightness, sleepy time (vetiver, lavendaer, mrjoram, mandarin, ylang)- sleep, constipation, eczema (sweet almond oil). Also mixes with carriers: Coconut oil, [place] oil, Extra virgin oil. Frankincense- cough. Digize- oil. Purification- oil, lemon- oil. Essentialyme- pill.  Inhales a mix with olive oil almond and coconut oil with peppermint, and eucalyptus- sinus, allergies. Updated 12/1/2015.   Updated 4/26/2016: Lemon, Cinnamon bark, panaway, Thieves, Orange, Wintergreen, Copaiba       ORDER FOR DME Equipment being ordered: Nebulizer and tubing/filter 1 Device 0     lactase (LACTAID) 9000 UNITS TABS Take 9,000 Units by mouth as needed       VITAMIN D, CHOLECALCIFEROL, PO Take 2,400 Units by mouth daily       hydrocortisone 1 % cream Apply topically as needed       clobetasol (TEMOVATE) 0.05 % ointment Apply to area of irritation twice daily for 2 weeks 30 g 0      BENEFIBER OR 2 tsp daily       FLAXSEED Reported on 5/2/2017       Calcium-Magnesium-Zinc 333..33-5 MG TABS Take 1 tablet by mouth daily       albuterol (PROVENTIL) (5 MG/ML) 0.5% nebulizer solution Take 0.5 mLs (2.5 mg) by nebulization every 4 hours as needed for wheezing or shortness of breath / dyspnea (Patient not taking: Reported on 2/21/2018) 30 vial 1     Probiotic Product (PROBIOTIC DAILY PO) Take 1 chew tab by mouth 2 times daily        Allergies   Allergen Reactions     Sulfa Drugs Other (See Comments)     Reaction unknown as a child       Reviewed and updated as needed this visit by clinical staff  Tobacco  Allergies  Meds  Problems  Med Hx  Surg Hx  Fam Hx  Soc Hx        Reviewed and updated as needed this visit by Provider  Tobacco  Allergies  Meds  Problems  Med Hx  Surg Hx  Fam Hx  Soc Hx          ROS:  Constitutional, HEENT, cardiovascular, pulmonary, gi and gu systems are negative, except as otherwise noted.    OBJECTIVE:     /78 (BP Location: Right arm, Patient Position: Chair, Cuff Size: Adult Regular)  Pulse 68  Temp 98.1  F (36.7  C) (Tympanic)  Resp 16  Wt 128 lb 6.4 oz (58.2 kg)  BMI 22.75 kg/m2  Body mass index is 22.75 kg/(m^2).  GENERAL APPEARANCE: healthy, alert and no distress  PSYCH: mentation appears normal and affect normal/bright    Diagnostic Test Results:  none     ASSESSMENT/PLAN:     1. Meningioma (H)  Patient has meningiomas increasing in size, seeing neurology. Wanted to discuss options. Discussed Dr. Guerrero's recommendations and clarified that this would just be a consult with a neurosurgeon and not for surgery. Patient was happy to hear this and thinks she will just follow up with Dr. Guerrero as scheduled.        Patient Instructions   Keep appointment with Dr. Guerrero in April to discuss meningiomas and plan of care     Continue taking your iron and we'll recheck iron at next visit        MARVIN Mas [facility]

## 2018-02-21 NOTE — NURSING NOTE
"Chief Complaint   Patient presents with     Discuss     MRI findings       Initial /78 (BP Location: Right arm, Patient Position: Chair, Cuff Size: Adult Regular)  Pulse 68  Temp 98.1  F (36.7  C) (Tympanic)  Resp 16  Wt 128 lb 6.4 oz (58.2 kg)  BMI 22.75 kg/m2 Estimated body mass index is 22.75 kg/(m^2) as calculated from the following:    Height as of 1/9/18: 5' 3\" (1.6 m).    Weight as of this encounter: 128 lb 6.4 oz (58.2 kg).      Health Maintenance that is potentially due pending provider review:  NONE    Radha Esqueda MA  1:15 PM 2/21/2018  .        "

## 2018-02-21 NOTE — PATIENT INSTRUCTIONS
Keep appointment with Dr. Guerrero in April to discuss meningiomas and plan of care     Continue taking your iron and we'll recheck iron at next visit

## 2018-02-21 NOTE — MR AVS SNAPSHOT
After Visit Summary   2/21/2018    Ingrid Amaral    MRN: [id number]           Patient Information     Date Of Birth          1945        Visit Information        Provider Department      2/21/2018 1:00 PM Soha Sanchez APRN CNP [facility]        Care Instructions    Keep appointment with Dr. Guerrero in April to discuss meningiomas and plan of care     Continue taking your iron and we'll recheck iron at next visit            Follow-ups after your visit        Your next 10 appointments already scheduled     Mar 23, 2018 12:30 PM CDT   (Arrive by 12:15 PM)   Neuropsych Eval with Armando Agrawal, PhD [facility] ([facility] [place])    [address]  [address]  [place] [address] [phone]            Apr 16, 2018  2:15 PM CDT   Return Visit with Pratibha Guerrero MD   [facility]    [address] [address] [phone]              Who to contact     If you have questions or need follow up information about today's clinic visit or your schedule please contact [facility] directly at [phone].  Normal or non-critical lab and imaging results will be communicated to you by MyChart, letter or phone within 4 business days after the clinic has received the results. If you do not hear from us within 7 days, please contact the clinic through MyChart or phone. If you have a critical or abnormal lab result, we will notify you by phone as soon as possible.  Submit refill requests through [employer] or call your pharmacy and they will forward the refill request to us. Please allow 3 business days for your refill to be completed.          Additional Information About Your Visit        MyChart Information     [employer] lets you send messages to your doctor, view your test results, renew your prescriptions, schedule appointments and more. To sign up, go to  [email or web address] . Click on \"Log in\" on the left side of the screen, which will take you to the Welcome page. Then click on \"Sign up Now\" on the right side of the page.     You will be asked to enter the access code listed below, as well as some personal information. Please follow the directions to create your username and password.     Your access code is: [email or web address]  Expires: 2018  1:32 PM     Your access code will  in 90 days. If you need help or a new code, please call your [place] clinic or [phone].        Care EveryWhere ID     This is your Care EveryWhere ID. This could be used by other organizations to access your [place] medical records  [id number]        Your Vitals Were     Pulse Temperature Respirations BMI (Body Mass Index)          68 98.1  F (36.7  C) (Tympanic) 16 22.75 kg/m2         Blood Pressure from Last 3 Encounters:   18 126/78   18 126/74   18 122/72    Weight from Last 3 Encounters:   18 128 lb 6.4 oz (58.2 kg)   18 128 lb (58.1 kg)   18 127 lb (57.6 kg)              Today, you had the following     No orders found for display       Primary Care Provider Office Phone # Fax #    Soha MARVIN Velázquez [facility] [phone]       [address] [address]        Equal Access to Services     JORGE A BYRD : Hadii yan bhattio Soemeritaali, waaxda luqadaha, qaybta kaalmada adeegyada, ollie suarez. So [facility] [phone].    ATENCIÓN: Si habla español, tiene a gauthier disposición servicios gratuitos de asistencia lingüística. Llame al [phone].    We comply with applicable federal civil rights laws and [place] laws. We do not discriminate on the basis of race, color, national origin, age, disability, sex, sexual orientation, or gender identity.            Thank you!     Thank you for choosing [facility]  for your care. Our goal is always to " provide you with excellent care. Hearing back from our patients is one way we can continue to improve our services. Please take a few minutes to complete the written survey that you may receive in the mail after your visit with us. Thank you!             Your Updated Medication List - Protect others around you: Learn how to safely use, store and throw away your medicines at www.disposemymeds.org.          This list is accurate as of 2/21/18  1:42 PM.  Always use your most recent med list.                   Brand Name Dispense Instructions for use Diagnosis    ADVAIR DISKUS 250-50 MCG/DOSE diskus inhaler   Generic drug:  fluticasone-salmeterol     1 Inhaler    INHALE ONE PUFF BY MOUTH TWICE A DAY    Moderate persistent asthma without complication       * albuterol (5 MG/ML) 0.5% neb solution    PROVENTIL    30 vial    Take 0.5 mLs (2.5 mg) by nebulization every 4 hours as needed for wheezing or shortness of breath / dyspnea    Moderate persistent asthma       * albuterol 108 (90 BASE) MCG/ACT Inhaler    PROAIR HFA/PROVENTIL HFA/VENTOLIN HFA    1 Inhaler    Inhale 2 puffs into the lungs every 4 hours as needed for shortness of breath / dyspnea    Moderate persistent asthma without complication       amLODIPine 5 MG tablet    NORVASC    90 tablet    Take 1 tablet (5 mg) by mouth daily    Benign essential hypertension       ascorbic acid 500 MG Tabs      Take 1 tablet by mouth daily        ASPIRIN 81 PO      Take 1 tablet by mouth every other day        BENEFIBER PO      2 tsp daily        Biotin 75524 MCG Tabs      Take 1 tablet by mouth 2 times daily        calcipotriene 0.005 % Oint     60 g    Externally apply topically 2 times daily    Psoriasis       Calcium-Magnesium-Zinc 333..33-5 MG Tabs      Take 1 tablet by mouth daily        cetirizine 10 MG tablet    zyrTEC    90 tablet    Take 1 tablet (10 mg) by mouth every evening    Chronic rhinitis       clobetasol 0.05 % ointment    TEMOVATE    30 g    Apply to  area of irritation twice daily for 2 weeks    Vulvitis       estradiol 0.1 MG/GM cream    ESTRACE VAGINAL    85 g    USE ONE GRAM VAGINALLY EVERY DAY AND SPREAD A SMALL AMOUNT AROUND THE CLITORAL REES    Vaginal atrophy, Labial and clitoral adhesions, acquired       FLAXSEED      Reported on 5/2/2017        fluticasone 50 MCG/ACT spray    FLONASE    1 Bottle    Spray 1 spray into both nostrils daily    Allergic rhinitis, unspecified allergic rhinitis trigger, unspecified rhinitis seasonality       gabapentin 300 MG capsule    NEURONTIN    60 capsule    Take 1 capsule (300 mg) by mouth nightly as needed    Lumbar radiculopathy, Chronic right-sided low back pain with right-sided sciatica       guaiFENesin 600 MG 12 hr tablet    MUCINEX    40 tablet    Take 2 tablets (1,200 mg) by mouth 2 times daily as needed To see if helps phlegm.    Phlegm in throat       hydrocortisone 1 % cream    CORTAID     Apply topically as needed        lactase 9000 UNITS Tabs tablet    LACTAID     Take 9,000 Units by mouth as needed        montelukast 10 MG tablet    SINGULAIR    90 tablet    TAKE ONE TABLET BY MOUTH EVERY DAY    Moderate persistent asthma without complication       omeprazole 20 MG CR capsule    priLOSEC    180 capsule    Take 1 capsule (20 mg) by mouth 2 times daily For heartburn    Gastroesophageal reflux disease without esophagitis       order for DME     1 Device    Equipment being ordered: Nebulizer and tubing/filter    Moderate persistent asthma       PROBIOTIC DAILY PO      Take 1 chew tab by mouth 2 times daily        sertraline 100 MG tablet    ZOLOFT    90 tablet    Take 1.5 tablets (150 mg) by mouth daily    Panic disorder without agoraphobia       simvastatin 40 MG tablet    ZOCOR    90 tablet    Take 1 tablet (40 mg) by mouth At Bedtime    Elevated cholesterol with elevated triglycerides       SKIN OILS EX      Lavender-headache, skin, peppermint-skin, upset stomach mix of oils, asthma mix of oils, eucalyptus.  Tea tree oil-skin, vapor rub- chest tightness, sleepy time (vetiver, lavendaer, mrjoram, mandarin, ylang)- sleep, constipation, eczema (sweet almond oil). Also mixes with carriers: Coconut oil, [place] oil, Extra virgin oil. Frankincense- cough. Digize- oil. Purification- oil, lemon- oil. Essentialyme- pill.  Inhales a mix with olive oil almond and coconut oil with peppermint, and eucalyptus- sinus, allergies. Updated 12/1/2015.  Updated 4/26/2016: Lemon, Cinnamon bark, panaway, Thieves, Orange, Wintergreen, Copaiba        VITAMIN D (CHOLECALCIFEROL) PO      Take 2,400 Units by mouth daily        * Notice:  This list has 2 medication(s) that are the same as other medications prescribed for you. Read the directions carefully, and ask your doctor or other care provider to review them with you.

## 2018-02-22 ENCOUNTER — CARE COORDINATION (OUTPATIENT)
Dept: CARE COORDINATION | Facility: CLINIC | Age: 73
End: 2018-02-22

## 2018-02-22 ASSESSMENT — ASTHMA QUESTIONNAIRES: ACT_TOTALSCORE: 23

## 2018-02-22 NOTE — PROGRESS NOTES
Clinic Care Coordination Contact  Care Team Conversations    Pt call attempted.  Pt said she was doing well and not having any anxiety issues.  She has dropped down to one Prilosec and not experiencing any heart burn.      Pt has neuropsych appointment in about one month.  Pt denied any financial, medication, or daily routine questions or concerns.      Plan;  Pt to attend appointments as scheduled.  Sw to call after next neuropsych appointment and discuss results and needs.     EM Mcfarlane, Clinic Care Coordinator 2/22/2018   9:42 AM  [phone]

## 2018-02-27 PROBLEM — D32.9 MENINGIOMA (H): Status: ACTIVE | Noted: 2018-02-27

## 2018-03-12 DIAGNOSIS — J45.40 MODERATE PERSISTENT ASTHMA WITHOUT COMPLICATION: ICD-10-CM

## 2018-03-13 RX ORDER — MONTELUKAST SODIUM 10 MG/1
TABLET ORAL
Qty: 90 TABLET | Refills: 1 | Status: SHIPPED | OUTPATIENT
Start: 2018-03-13 | End: 2018-10-06

## 2018-03-23 ENCOUNTER — OFFICE VISIT (OUTPATIENT)
Dept: NEUROPSYCHOLOGY | Facility: CLINIC | Age: 73
End: 2018-03-23
Payer: COMMERCIAL

## 2018-03-23 DIAGNOSIS — R41.842 VISUOSPATIAL DEFICIT: Primary | ICD-10-CM

## 2018-03-23 DIAGNOSIS — F03.90 SENILE DEMENTIA, UNCOMPLICATED (H): ICD-10-CM

## 2018-03-23 DIAGNOSIS — F41.9 ANXIETY: ICD-10-CM

## 2018-03-23 DIAGNOSIS — R41.844 FRONTAL LOBE AND EXECUTIVE FUNCTION DEFICIT: ICD-10-CM

## 2018-03-23 NOTE — PROGRESS NOTES
The patient was seen for neuropsychological evaluation at the request of Pratibha Guerrero for the purposes of diagnostic clarification and treatment planning.  Two hours of face-to-face testing were provided by this writer.  Please see Dr. Armando Agrawal's report for a full interpretation of the findings.

## 2018-03-23 NOTE — MR AVS SNAPSHOT
After Visit Summary   3/23/2018    Ingrid Amaral    MRN: [id number]           Patient Information     Date Of Birth          1945        Visit Information        Provider Department      3/23/2018 12:30 PM Armando Agrawal, PhD [facility] Neuropsychology        Today's Diagnoses     Visuospatial deficit    -  1    Frontal lobe and executive function deficit        Senile dementia, uncomplicated        Anxiety           Follow-ups after your visit        Your next 10 appointments already scheduled     2018  2:15 PM CDT   Return Visit with Pratibha Guerrero MD   [facility] ([facility]    [address] [address]   [phone]              Who to contact     Please call your clinic at [phone] to:    Ask questions about your health    Make or cancel appointments    Discuss your medicines    Learn about your test results    Speak to your doctor            Additional Information About Your Visit        MyChart Information     Aframe is an electronic gateway that provides easy, online access to your medical records. With Aframe, you can request a clinic appointment, read your test results, renew a prescription or communicate with your care team.     To sign up for Beamlyt visit the website at [email or web address]   You will be asked to enter the access code listed below, as well as some personal information. Please follow the directions to create your username and password.     Your access code is: [email or web address]  Expires: 2018  2:32 PM     Your access code will  in 90 days. If you need help or a new code, please contact your [facility] or call [phone] for assistance.        Care EveryWhere ID     This is your Care EveryWhere ID. This could be used by other organizations to access your [place] medical records  [id number]         Blood Pressure from Last 3 Encounters:   18  126/78   02/08/18 126/74   02/05/18 122/72    Weight from Last 3 Encounters:   02/21/18 58.2 kg (128 lb 6.4 oz)   02/05/18 58.1 kg (128 lb)   01/09/18 57.6 kg (127 lb)              We Performed the Following     [id number] TESTING, PER HR/PSYCHOLOGIST     NEUROPSYCH TESTING BY [facility]        Primary Care Provider Office Phone # Fax #    Soha Sanchez MARVIN [facility] [phone]       [facility] [address] [place] [address]        Equal Access to Services     JORGE A BYRD : Hadii yan England, waolga calle, jessica raza, ollie rangel . So [facility] [phone].    ATENCIÓN: Si habla español, tiene a gauthier disposición servicios gratuitos de asistencia lingüística. [facility] [phone].    We comply with applicable federal civil rights laws and [place] laws. We do not discriminate on the basis of race, color, national origin, age, disability, sex, sexual orientation, or gender identity.            Thank you!     Thank you for choosing [facility] NEUROPSYCHOLOGY  for your care. Our goal is always to provide you with excellent care. Hearing back from our patients is one way we can continue to improve our services. Please take a few minutes to complete the written survey that you may receive in the mail after your visit with us. Thank you!             Your Updated Medication List - Protect others around you: Learn how to safely use, store and throw away your medicines at www.disposemymeds.org.          This list is accurate as of 3/23/18 11:59 PM.  Always use your most recent med list.                   Brand Name Dispense Instructions for use Diagnosis    ADVAIR DISKUS 250-50 MCG/DOSE diskus inhaler   Generic drug:  fluticasone-salmeterol     1 Inhaler    INHALE ONE PUFF BY MOUTH TWICE A DAY    Moderate persistent asthma without complication       * albuterol (5 MG/ML) 0.5% neb solution    PROVENTIL    30 vial    Take 0.5 mLs (2.5  mg) by nebulization every 4 hours as needed for wheezing or shortness of breath / dyspnea    Moderate persistent asthma       * albuterol 108 (90 BASE) MCG/ACT Inhaler    PROAIR HFA/PROVENTIL HFA/VENTOLIN HFA    1 Inhaler    Inhale 2 puffs into the lungs every 4 hours as needed for shortness of breath / dyspnea    Moderate persistent asthma without complication       amLODIPine 5 MG tablet    NORVASC    90 tablet    Take 1 tablet (5 mg) by mouth daily    Benign essential hypertension       ascorbic acid 500 MG Tabs      Take 1 tablet by mouth daily        ASPIRIN 81 PO      Take 1 tablet by mouth every other day        BENEFIBER PO      2 tsp daily        Biotin 52553 MCG Tabs      Take 1 tablet by mouth 2 times daily        calcipotriene 0.005 % Oint     60 g    Externally apply topically 2 times daily    Psoriasis       Calcium-Magnesium-Zinc 333..33-5 MG Tabs      Take 1 tablet by mouth daily        cetirizine 10 MG tablet    zyrTEC    90 tablet    Take 1 tablet (10 mg) by mouth every evening    Chronic rhinitis       estradiol 0.1 MG/GM cream    ESTRACE VAGINAL    85 g    USE ONE GRAM VAGINALLY EVERY DAY AND SPREAD A SMALL AMOUNT AROUND THE CLITORAL REES    Vaginal atrophy, Labial and clitoral adhesions, acquired       FLAXSEED      Reported on 5/2/2017        fluticasone 50 MCG/ACT spray    FLONASE    1 Bottle    Spray 1 spray into both nostrils daily    Allergic rhinitis, unspecified allergic rhinitis trigger, unspecified rhinitis seasonality       gabapentin 300 MG capsule    NEURONTIN    60 capsule    Take 1 capsule (300 mg) by mouth nightly as needed    Lumbar radiculopathy, Chronic right-sided low back pain with right-sided sciatica       guaiFENesin 600 MG 12 hr tablet    MUCINEX    40 tablet    Take 2 tablets (1,200 mg) by mouth 2 times daily as needed To see if helps phlegm.    Phlegm in throat       hydrocortisone 1 % cream    CORTAID     Apply topically as needed        lactase 9000 UNITS Tabs  tablet    LACTAID     Take 9,000 Units by mouth as needed        montelukast 10 MG tablet    SINGULAIR    90 tablet    TAKE ONE TABLET BY MOUTH EVERY DAY    Moderate persistent asthma without complication       omeprazole 20 MG CR capsule    priLOSEC    180 capsule    Take 1 capsule (20 mg) by mouth 2 times daily For heartburn    Gastroesophageal reflux disease without esophagitis       order for DME     1 Device    Equipment being ordered: Nebulizer and tubing/filter    Moderate persistent asthma       PROBIOTIC DAILY PO      Take 1 chew tab by mouth 2 times daily        sertraline 100 MG tablet    ZOLOFT    90 tablet    Take 1.5 tablets (150 mg) by mouth daily    Panic disorder without agoraphobia       simvastatin 40 MG tablet    ZOCOR    90 tablet    Take 1 tablet (40 mg) by mouth At Bedtime    Elevated cholesterol with elevated triglycerides       SKIN OILS EX      Lavender-headache, skin, peppermint-skin, upset stomach mix of oils, asthma mix of oils, eucalyptus. Tea tree oil-skin, vapor rub- chest tightness, sleepy time (vetiver, lavendaer, mrjoram, mandarin, ylang)- sleep, constipation, eczema (sweet almond oil). Also mixes with carriers: Coconut oil, [place] oil, Extra virgin oil. Frankincense- cough. Digize- oil. Purification- oil, lemon- oil. Essentialyme- pill.  Inhales a mix with olive oil almond and coconut oil with peppermint, and eucalyptus- sinus, allergies. Updated 12/1/2015.  Updated 4/26/2016: Lemon, Cinnamon bark, panaway, Thieves, Orange, Wintergreen, Copaiba        VITAMIN D (CHOLECALCIFEROL) PO      Take 2,400 Units by mouth daily        * Notice:  This list has 2 medication(s) that are the same as other medications prescribed for you. Read the directions carefully, and ask your doctor or other care provider to review them with you.

## 2018-03-24 DIAGNOSIS — N76.2 VULVITIS: ICD-10-CM

## 2018-03-26 RX ORDER — CLOBETASOL PROPIONATE 0.5 MG/G
OINTMENT TOPICAL
Qty: 30 G | Refills: 0 | Status: SHIPPED | OUTPATIENT
Start: 2018-03-26 | End: 2018-06-19

## 2018-03-26 NOTE — TELEPHONE ENCOUNTER
Clobestasol      Last Written Prescription Date:  08/16/17  Last Fill Quantity: 60g,   # refills: 0  Last Office Visit: 02/21/18  Future Office visit:    Next 5 appointments (look out 90 days)     Apr 16, 2018  2:15 PM CDT   Return Visit with Pratibha Guerrero MD   [facility] ([facility]    [address] [facility] [address]   [phone]                   Routing refill request to provider for review/approval because:

## 2018-03-27 NOTE — PROGRESS NOTES
__ Orientation            Time          __-0____        Place        ____2____        Personal Info.     ____4____    WAIS-IV   FSIQ____VCI_____PRI_____ WMI__80__PSI____       Raw  Age SS  __Similarities  __15___ __6___  __Vocabulary  _______ _______  __Information  _______          _______  __Comprehension _______ _______  __Block Design  __11__  __4___  __Matrix Reas.  _______ _______  __Visual Puzzles _______ _______  __Picture Comp. _______ _______  __Figure Weights _______ _______  __Digit Span  __18___ ___7___  __Arithmetic  __ 8___               ___6__  __L-N Sequencing _______ _______  __Symbol Search _______ _______  __Coding  __40__  __8___  __Cancellation  _______ _______          HVLT-R  Trial   1 2    3     Total                  4             7             8                19                                                 Raw  T  Immediate Recall            19                         42      Delayed Recall                  6                          42      Retention (%)                    75                            44                          Rec/Dis Inc.  # Hits      9      #FPs     3                 26                BVMT-R  Trial   1   2     3       Total                  1               0             2                 3                                                   Raw       T  Immediate Recall            3                             <20       Delayed Recall                   2                            25       Retention (%)                    100                          >16                        Rec/Dis Inc.  # Hits       6     #FPs     2              11-16           __Lex-Andre/Kell Complex Figure Test    Raw  T-score   %ile  Copy   _14__         -                ?1  Recognition __DC@600 __           -  ?1__    __WRAT-4   Reading SS = 81     %ile = 10    GE = 6.0    __COWAT   Raw__41___ Tscore__50___   __Semantic Fluency/Animals   Raw = 20  Tscore = 54  __BNT   Raw =  41/60 %ile = 7  __Complex Ideational Material   Raw = 8/12 Tscore = 15    __Trail Making Test   A =   52         Errors = 2    %ile = 21-29   B = 223         Errors = 4    %ile = 30  __Stroop                       Raw         %ile        Word = _91_      _70-80__        Color =  _66_      100__        C/W   =  _8_         _<10__    __JoLO   d/c  __Benton Facial Recognition   Raw = 45     Interp= Normal  __Line Bisection   Omission errors = 1     __Grooved Pegboard   RH = 139         Tscore = 29      Drops = 0   LH = 238           TScore = 22      Drops = 4    __BDI-II   Raw__13___ Interp.__Minimal___   __BAI     Raw__16___ Interp. __Moderate___    __WMS-III   LM1 = 24 SS = 6   LM2 = 14  SS = 8   LM2R = 23 Zscore = -0.03

## 2018-03-27 NOTE — PROGRESS NOTES
Name: Ingrid Amaral  MR#: [id number]  YOB: 1945  Date of Exam: 03/23/2018    Neuropsychology Laboratory  [address], [facility] [address]  [place]  [address] [phone]  NEUROPSYCHOLOGICAL EVALUATION    IDENTIFYING INFORMATION  Ingrid Amaral is a 72 year old, right handed, homemaker, with 12+ years of formal education. She was accompanied to the evaluation by her , Harsha.     BACKGROUND INFORMATION / INTERVIEW FINDINGS    Records indicate that Ms. Amaral has known meningiomas in her right posterior temporal and left occipital regions. Her other medical history includes generalized anxiety disorder, depression, panic disorder, hypertension, REM behavior disorder, dysphagia, asthma, liver lesion, allergic rhinitis, and hypercholesterolemia. The most recent MRI of her brain on February 15, 2018 documented  1. Slight interval increase in the size of the two meningiomas lateral to the right posterior temporal lobe and the left occipital lobe. 2. Brain atrophy and white matter changes consistent with sequelae of small vessel ischemic disease. She has expressed concerns about difficulties with her memory, although some discussion has been held about the extent to which anxiety may be contributing to these complaints. The current evaluation was requested by Dr. Pratibha Guerrero, in this context.    On interview, Ms. Amaral confirmed the above history. She reported that the meningiomas were first diagnosed of two years ago. She indicated that her mother had dementia, so she has some concerns about troubles with her own memory. The patient s  indicated that he began noticing changes in her thinking approximately five years ago, and indicated that these thinking difficulties have gradually worsened. He denied having identified a trigger for onset of symptoms. He reported that she forgets conversations, and become sidetracked more easily than she did in the past.  The patient s  indicated that she is less motivated to complete housework than she used to be. The patient reported that she forgets her intentions and she is slower to retrieve information than she used to be. She stated that she has a difficult time with mathematics, and indicated that these math problems are worse in the used to be. She noted problems with concentration. Regarding mental health, she has been prescribed sertraline for about one year by her primary care doctor. In total, she has had medication for mental health the last for five years. She reported that she has panic attacks. She stated that she is anxious and general, and denied feeling depressed. She has never seen a psychiatrist and never seen a therapist. She denied suicidal ideation. She reported that her parents were overprotective, and she has always felt that others could do things that she could not.    With respect to other medical background, she reported that she fell on ice once in the past, and  saw stars.  She denied loss of consciousness with this injury. She also described one instance in which she was unable to lift her arm in a transient manner. She reported that she has occasional jerking movements of her arms, more on the right, and has had these events for 25 years. She reported that these events occur more frequently now than they used to. She stated that she has REM behavior disorder, and has had these symptoms for 20 years. She stated that her balance is not good. She denied a tremor. She reported that she has had visual hallucinations for about one year. Per records, her current medications include Advair, albuterol, amlodipine, ascorbic acid, aspirin, Benefiber, biotin, calcipotriene, calcium-magnesium-zinc, cetirizine, clobetasol, estradiol, flaxseed, fluticasone, gabapentin, guaifenesin, hydrocortisone cream, lactase, montelukast, omeprazole, probiotic, sertraline, simvastatin, and cholecalciferol. She reported  that she consumes one or two alcoholic drinks per month, but denied other substance use. She denied past substance abuse.    Ms. Amaral lives at home with her . They live in a duplex that they own, and their son is currently living in the other half of the duplex. She manages her own basic and instrumental daily activities. Her  manages their finances. She still drives. By way of background, the patient and her  have been  for 50 years. They have five children, three of whom live nearby. Regarding educational background, she graduated from high school with average grades. She completed a nine-month program in [employer] school. Professionally, she was a homemaker, but also worked in a variety of positions after her sons were grown. 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    BEHAVIORAL OBSERVATIONS  Ms. Amaral was pleasant and cooperative with the exam. She ambulated slowly and seemed to drag one of her feet. Her speech was notable for mild difficulties with word finding, and mild dysarticulation, but was otherwise normal. Comprehension was normal. Her thought processes were notable for difficulties with visually mediated tasks. Her mood was mildly anxious with congruent affect. Her effort was rated as good. The current results are felt to be an accurate depiction of her cognitive functioning.     RESULTS OF EXAM  Her performances on measures of neuropsychological functioning were as follows:      She was fully oriented to time, place, and various aspects of personal information. Performance on a measure of single word reading was low average. Auditory attention for digits was low average. Mental calculations were low average. Learning of words in a list format was low average. Delayed recall of list words was low average. Percent retention of list words was average. Delayed recognition of list words was impaired, with three false  positive errors. Learning of story information was low average. Delayed recall of story information was average. Delayed recognition of story information was average. Learning of simple geometric shapes and their spatial locations was impaired. Delayed recall of the shapes and their locations was impaired. Percent retention of the shapes was normal. Delayed recognition of the shapes was low average. Visuospatial judgments for variably oriented lines were impaired and discontinued at the test s simplified practice items. Visuoperceptual matching of faces was performed within normal limits. Visual problem solving with blocks was borderline impaired. Line bisection was notable for one omission error of a line on the left side of the page. Her bisections were reasonably well placed. Her drawing of a complicated geometric figure was severely impaired and was notable for a disorganized and piecemeal approach with poor appreciation of elements on the left side of the figure. Comprehension of phrases and short stories was impaired. Verbal associative fluency was average. Semantic verbal fluency was average. Naming to confrontation was borderline impaired, and notable for some misperception errors. Verbal abstract reasoning was low average. Speeded visual sequencing under focused attention was performed in the low average to average range, with two errors. A similar measure with a divided attention component was average, with four errors. Speeded word reading was performed in the average to high average range. Speeded color naming was superior. Speeded inhibition of an overlearned response was impaired. Speeded visuomotor coding was average. Speeded fine motor dexterity was impaired, bilaterally, with a markedly slower performance for the left hand. The left hand also had four peg drop errors.    She endorsed items consistent with minimal symptoms of depression, and moderate symptoms of anxiety on self-report  measures.    IMPRESSIONS  In light of her history of RBD and tremor, Ms. Amaral demonstrated a pattern of deficits that raises the question of the early stages of dementia with Lewy bodies (she also has episodes of confusion, and has had visual hallucinations for the past year). I do not think that her symptoms can be fully attributed to her meningiomas. Her pattern of deficits is not consistent with an Alzheimer's disease syndrome. In this exam, her most pronounced impairments were in visually mediated processing (with particular deficits in visual-spatial processing as well as some evidence of left-sided inattention/neglect), as well as less pronounced weaknesses in executive functioning, naming, nonverbal learning and memory, and bilateral fine motor dexterity (more pronounced on the left side). Other cognitive abilities, including other aspects of expressive language, verbal memory, and basic visuoperception were normal and performed in keeping with her low average range cognitive baseline. She is moderately anxious. That said, I do not think that psychological factors are responsible for the above-noted cognitive deficits and weaknesses.    RECOMMENDATIONS  Preliminary results and recommendations were provided to the patient and her  over the telephone on March 27, 2018, and all questions were answered.    1. To the extent not already completed, Ms. Amaral could benefit from investigation of factors that may be contributing to her neurologic and cognitive changes. Along these lines, an FDG-PET scan could aid in diagnostic clarification, if medically indicated. Additionally, she described jerking movements of her arms. An EEG could also be considered.     2. She will require increased support and supervision of her daily activities. In particular, I am concerned that she is managing her own medications. It may well be the case that her support needs increase in the future.    3. She should not drive.      4. Follow-up neuropsychological evaluation it is strongly recommended in one year in order to assess and update recommendations as appropriate. The current results can be used as a baseline at that time. However, I had be pleased to evaluate sooner or changes are noted or clinically indicated.    Armando Agrawal, Ph.D., L.P., ABPP-CN   / Licensed Psychologist [id number]  Department of Rehabilitation Medicine  Division of Adult Neuropsychology  [facility]    Time spent:  four hours professional time, including interview, record review, data integration, and report writing (CPT 06508); two hours of testing administered by a psychometrist (practicum student) and interpreted by a neuropsychologist (CPT 16520). Diagnoses: R41.842, R41.844, F03.90, F41.9.

## 2018-03-29 ENCOUNTER — MYC MEDICAL ADVICE (OUTPATIENT)
Dept: FAMILY MEDICINE | Facility: CLINIC | Age: 73
End: 2018-03-29

## 2018-04-02 ENCOUNTER — PATIENT OUTREACH (OUTPATIENT)
Dept: CARE COORDINATION | Facility: CLINIC | Age: 73
End: 2018-04-02

## 2018-04-02 ASSESSMENT — ACTIVITIES OF DAILY LIVING (ADL): DEPENDENT_IADLS:: MEDICATION MANAGEMENT

## 2018-04-02 NOTE — LETTER
[address] [address]  [phone]      4/2/2018      Ingrid Amaral  [address] [facility] [address]      Dear  Ingrid,    It was a pleasure to speak with you.  I would like to provide you with the enclosed information for your records.  As part of care coordination, we are developing care plans to assist in accomplishing your health care goals.  When we speak next, please feel free to let me know if you want to add or change any information on your care plans.    The resources we discussed on the phone are:    Alzheimer s Association - 24 hour help line [phone]    Senior Linkage Line     [phone]    As always, feel free to contact me if you have any questions or concerns.  I look forward to working with you in the effort to achieve your health care and wellness goals .        Sincerely,      Ria Izquierdo, [facility]  Clinic Care Coordination  [phone]

## 2018-04-02 NOTE — PROGRESS NOTES
Clinic Care Coordination Contact    OUTREACH    Referral Information:  Referral Source: PCP    Primary Diagnosis: Psychosocial    Chief Complaint   Patient presents with     Clinic Care Coordination - Follow-up             Universal Utilization: no concerns  Utilization    Last refreshed: 4/2/2018  9:51 AM:  No Show Count (past year) 0       Last refreshed: 4/2/2018  9:51 AM:  ED visits 0       Last refreshed: 4/2/2018  9:51 AM:  Hospital admissions 0          Current as of: 4/2/2018  9:51 AM             Clinical Concerns:  Current Medical Concerns:  Pt had recent MRI and neuropsych assessment that is showing some changes.  Recommendations from neuropsych was to get some additional testing if other providers agreed.      Current Behavioral Concerns: not discussed this call as focus was on results of neuropsych and function.       Education Provided to patient: pt/spouse to talk with Neurology at their appointment on the 16th about the recommendations.  Long discussion about dementia and changes.  Discussed that there are no set paths that someone takes yet there is benefit of knowing what to watch for.  They have talked to each of their 5 kids to update them on the results.  Discussed getting input from the family about changes they notice as pt and spouse may not notice them as quickly since they may be slow changes.  Pt/spouse agreed that they may not notice changes and that understanding what to watch for would be beneficial.      Clinical Pathway Name: None  Health Maintenance Reviewed: Up to date    Medication Management:  Pt does manage her own medications and spouse has increased his oversight.  He is allowing pt to continue to set them up and giving her ideas on how to manage.  An example is to stop filling in the next 2 week med strip when a med is out so she is not missing one.  She has not been consistent with going back to fill in the missed one after it was re-ordered.      Functional  Status:  Mobility Status: Independent  Equipment Currently Used at Home: none  Transportation: pt is driving locally.  Spouse is providing education on possible scenarios and how to keep herself safe.  Discussed some options to pursue, such as driving assessment, if needed in the future.   Transportation means:: Accessible car, Family     Psychosocial:  Current living arrangement:: I live in a private home with spouse  Type of residence:: Private home - stairs  Financial/Insurance: no concern  The have looked at their long term care insurance and are in understanding of it's benefits.     Pt's spouse is supportive and assists pt as needed.  Family is also supportive.      Discussed getting education on dementia as well as talking with Neurology about the neuropsych assessment recommendations for more complete diagnosis.      Resources and Interventions:  Current Resources: see below   Advanced Care Plans/Directives on file:: In process they have some documentation on a Trust and will send them honoring choices forms for review and completion.   Referrals Placed: Alzheimer's Association, Senior Linkage Line - numbers will be sent     Goals:   Goals        General    Medication 1 (pt-stated)     Notes - Note created  4/2/2018 11:15 AM by Ria Izquierdo LSW    Goal Statement: I will allow my spouse to assist me with medications when issues arise,  for consistent administration.   Measure of Success: I am taking my medications at the right time and every day  Supportive Steps to Achieve: actively involved spouse with supportive ideas  Barriers: memory loss  Strengths: supportive family  Date to Achieve By: 5-2-18                  Patient/Caregiver understanding: talk with spouse if having issues, allow spouse to assist if missing medications  Outreach Frequency: monthly  Future Appointments              In 2 weeks Pratibha Guerrero MD [facility]           Plan: pt/spouse to educate themselves on  dementia and signs to look for.  Pt attend appointment with neurology and discuss recommendations and results. SW to send complex care plan and honoring choices forms. Sw to call in about one month.     EM Mcfarlane, Clinic Care Coordinator 4/2/2018   11:27 AM  [phone]

## 2018-04-02 NOTE — LETTER
[facility]  Complex Care Plan  About Me  Patient Name:  Ingrid Yancey    YOB: 1945  Age:     72 year old   Cassandra MRN:   [id number] Telephone Information:    Home Phone [phone]   Mobile Not on file.       Address:    [address] [place] [id number] Email address:  [email or web address]      Emergency Contact(s)  Name Relationship Lgl Grd Work Phone Home Phone Mobile Phone   1. BINDU YANCEY Spouse  none [phone] none   2. KORINA SILVESTRE Daughter  none [phone]           Primary language:  English     needed? No   [facility]:  [phone] op. 1  Other communication barriers: Cognitive impairment (starting)  Preferred Method of Communication:  Phone  Current living arrangement: I live in a private home with spouse  Mobility Status/ Medical Equipment: Independent    Health Maintenance  Health Maintenance Reviewed: Up to date    My Access Plan  Medical Emergency 911   Primary Clinic Line [facility] - [phone]   24 Hour Appointment Line [phone] or  [phone] ([phone]) (toll-free)   24 Hour Nurse Line [phone] (toll-free)   Preferred Urgent Care [facility], [phone]   [facility] [place], [place]  [phone]   Preferred Pharmacy [facility] [place], [place] - [address] Behavioral Health Crisis Line The National Suicide Prevention Lifeline at 1-217.393.3181 or 911     My Care Team Members  Patient Care Team       Relationship Specialty Notifications Start End    Soha Sanchez APRN CNP PCP - General Nurse Practitioner - Family  5/2/17     Phone: [phone] Fax: [phone]         [facility] [address] [facility] [address]    Ria Izquierdo LSW Clinic Care Coordinator  Admissions 5/18/17     Phone: [phone] Fax: [phone]             My Care Plans  Self  Management and Treatment Plan  Goals and (Comments)  Goals        General    Medication 1 (pt-stated)     Notes - Note created  4/2/2018 11:15 AM by Ria Izquierdo LSW    Goal Statement: I will allow my spouse to assist me with medications when issues arise,  for consistent administration.   Measure of Success: I am taking my medications at the right time and every day  Supportive Steps to Achieve: actively involved spouse with supportive ideas  Barriers: memory loss  Strengths: supportive family  Date to Achieve By: 5-2-18                Action Plans on File:   Asthma        Depression          Advance Care Plans/Directives Type:        My Medical and Care Information  Problem List   Patient Active Problem List   Diagnosis     Diarrhea     Pure hypercholesterolemia     Allergic rhinitis     Panic disorder without agoraphobia     Advanced directives, counseling/discussion     Liver lesion     Generalized anxiety disorder     Mild major depression (H)     Vulvitis     Vaginal wall prolapse     Chronic constipation     Moderate persistent asthma     Dysphagia     RBD (REM behavioral disorder)     FH: colon cancer     Hypertension goal BP (blood pressure) < 140/90     Meningioma (H)      Current Medications and Allergies:  See printed Medication Report.    Care Coordination Start Date: 5/18/2017   Frequency of Care Coordination: monthly   Form Last Updated: 04/02/2018

## 2018-04-16 ENCOUNTER — OFFICE VISIT (OUTPATIENT)
Dept: NEUROLOGY | Facility: CLINIC | Age: 73
End: 2018-04-16
Payer: COMMERCIAL

## 2018-04-16 ENCOUNTER — TELEPHONE (OUTPATIENT)
Dept: NEUROLOGY | Facility: CLINIC | Age: 73
End: 2018-04-16

## 2018-04-16 VITALS
HEART RATE: 67 BPM | WEIGHT: 128.2 LBS | SYSTOLIC BLOOD PRESSURE: 128 MMHG | RESPIRATION RATE: 20 BRPM | BODY MASS INDEX: 22.71 KG/M2 | DIASTOLIC BLOOD PRESSURE: 70 MMHG | TEMPERATURE: 97.9 F

## 2018-04-16 DIAGNOSIS — D42.9 MENINGIOMAS, MULTIPLE (H): ICD-10-CM

## 2018-04-16 DIAGNOSIS — J45.40 MODERATE PERSISTENT ASTHMA WITHOUT COMPLICATION: ICD-10-CM

## 2018-04-16 DIAGNOSIS — R25.3 MUSCLE TWITCH: ICD-10-CM

## 2018-04-16 DIAGNOSIS — R41.89 COGNITIVE IMPAIRMENT: Primary | ICD-10-CM

## 2018-04-16 PROCEDURE — 99215 OFFICE O/P EST HI 40 MIN: CPT | Performed by: PSYCHIATRY & NEUROLOGY

## 2018-04-16 RX ORDER — DONEPEZIL HYDROCHLORIDE 5 MG/1
5 TABLET, FILM COATED ORAL AT BEDTIME
Qty: 30 TABLET | Refills: 3 | Status: SHIPPED | OUTPATIENT
Start: 2018-04-16 | End: 2018-08-24

## 2018-04-16 NOTE — NURSING NOTE
"Chief Complaint   Patient presents with     RECHECK     meningioma.         Initial /70 (BP Location: Right arm, Patient Position: Sitting, Cuff Size: Adult Regular)  Pulse 67  Temp 97.9  F (36.6  C) (Oral)  Resp 20  Wt 58.2 kg (128 lb 3.2 oz)  BMI 22.71 kg/m2 Estimated body mass index is 22.71 kg/(m^2) as calculated from the following:    Height as of 1/9/18: 1.6 m (5' 3\").    Weight as of this encounter: 58.2 kg (128 lb 3.2 oz).  Medication Reconciliation: complete    Patient prefers to be contacted: [phone]  Okay to leave detailed message on voicemail: [email or web address]-CMA    "

## 2018-04-16 NOTE — PATIENT INSTRUCTIONS
Plan:    PET scan.   Sleep-deprived electroencephalogram.   Neurosurgery referral to discuss the meningiomas.   Aricept: 5mg at bedtime (for memory). *Information provided.   Before starting the new medication, I would like you to talk to Dr. Sanchez about the continued chest pain. I want to make sure she is ok with starting the Aricept in light of this.   Continue to monitor driving. *Information for formal driving evaluation provided.   Consider melatonin for the nighttime behaviors/violent dreams: 2-3 mg about 2 hours before bedtime.   Return to clinic after the above tests/consultations (2-3 months).

## 2018-04-16 NOTE — MR AVS SNAPSHOT
After Visit Summary   4/16/2018    Ingrid Amaral    MRN: [id number]           Patient Information     Date Of Birth          1945        Visit Information        Provider Department      4/16/2018 2:15 PM Pratibha Guerrero MD [facility]        Today's Diagnoses     Cognitive impairment    -  1    Muscle twitch          Care Instructions    Plan:    PET scan.   Sleep-deprived electroencephalogram.   Neurosurgery referral to discuss the meningiomas.   Aricept: 5mg at bedtime (for memory). *Information provided.   Before starting the new medication, I would like you to talk to Dr. Sanchez about the continued chest pain. I want to make sure she is ok with starting the Aricept in light of this.   Continue to monitor driving. *Information for formal driving evaluation provided.   Consider melatonin for the nighttime behaviors/violent dreams: 2-3 mg about 2 hours before bedtime.   Return to clinic after the above tests/consultations (2-3 months).               Follow-ups after your visit        Future tests that were ordered for you today     Open Future Orders        Priority Expected Expires Ordered    PET Brain Routine  4/16/2019 4/16/2018    EEG sleep deprived Routine  6/16/2018 4/16/2018            Who to contact     If you have questions or need follow up information about today's clinic visit or your schedule please contact [facility] directly at [phone].  Normal or non-critical lab and imaging results will be communicated to you by MyChart, letter or phone within 4 business days after the clinic has received the results. If you do not hear from us within 7 days, please contact the clinic through MyChart or phone. If you have a critical or abnormal lab result, we will notify you by phone as soon as possible.  Submit refill requests through [employer] or call your pharmacy and they will forward the refill request to us. Please allow 3 business days for your refill to  be completed.          Additional Information About Your Visit        [employer] Information     [employer] gives you secure access to your electronic health record. If you see a primary care provider, you can also send messages to your care team and make appointments. If you have questions, please call your primary care clinic.  If you do not have a primary care provider, please call [phone] and they will assist you.        Care EveryWhere ID     This is your Care EveryWhere ID. This could be used by other organizations to access your [facility] medical records  [id number]        Your Vitals Were     Pulse Temperature Respirations BMI (Body Mass Index)          67 97.9  F (36.6  C) (Oral) 20 22.71 kg/m2         Blood Pressure from Last 3 Encounters:   04/16/18 128/70   02/21/18 126/78   02/08/18 126/74    Weight from Last 3 Encounters:   04/16/18 58.2 kg (128 lb 3.2 oz)   02/21/18 58.2 kg (128 lb 6.4 oz)   02/05/18 58.1 kg (128 lb)                 Today's Medication Changes          These changes are accurate as of 4/16/18  3:05 PM.  If you have any questions, ask your nurse or doctor.               Start taking these medicines.        Dose/Directions    donepezil 5 MG tablet   Commonly known as:  ARICEPT   Used for:  Cognitive impairment   Started by:  Pratibha Guerrero MD        Dose:  5 mg   Take 1 tablet (5 mg) by mouth At Bedtime   Quantity:  30 tablet   Refills:  3         These medicines have changed or have updated prescriptions.        Dose/Directions    calcipotriene 0.005 % Oint   This may have changed:    - when to take this  - reasons to take this   Used for:  Psoriasis        Externally apply topically 2 times daily   Quantity:  60 g   Refills:  0         Stop taking these medicines if you haven't already. Please contact your care team if you have questions.     VITAMIN D (CHOLECALCIFEROL) PO   Stopped by:  Pratibha Guerrero MD                Where to get your medicines      These medications were sent  to [facility] [address] [address]     Phone:  [phone]     donepezil 5 MG tablet                Primary Care Provider Office Phone # Fax #    MARVIN Diaz [phone]       [address] [address]        Goals        General    Medication 1 (pt-stated)     Notes - Note created  4/2/2018 11:15 AM by Ria Izquierdo LSW    Goal Statement: I will allow my spouse to assist me with medications when issues arise,  for consistent administration.   Measure of Success: I am taking my medications at the right time and every day  Supportive Steps to Achieve: actively involved spouse with supportive ideas  Barriers: memory loss  Strengths: supportive family  Date to Achieve By: 5-2-18            Equal Access to Services     JORGE A BYRD : Hadii aad ku hadashdakotah Sogladis, waaxda luqadaha, qaybta kaalmada ry, ollie rangel . So [facility] [place] [facility] [phone].    ATENCIÓN: Si habla español, tiene a gauthier disposición servicios gratuitos de asistencia lingüística. Renny al [phone].    We comply with applicable federal civil rights laws and [place] laws. We do not discriminate on the basis of race, color, national origin, age, disability, sex, sexual orientation, or gender identity.            Thank you!     Thank you for choosing [facility]  for your care. Our goal is always to provide you with excellent care. Hearing back from our patients is one way we can continue to improve our services. Please take a few minutes to complete the written survey that you may receive in the mail after your visit with us. Thank you!             Your Updated Medication List - Protect others around you: Learn how to safely use, store and throw away your medicines at www.disposemymeds.org.          This list is accurate as of 4/16/18  3:05 PM.  Always use your  most recent med list.                   Brand Name Dispense Instructions for use Diagnosis    ADVAIR DISKUS 250-50 MCG/DOSE diskus inhaler   Generic drug:  fluticasone-salmeterol     1 Inhaler    INHALE ONE PUFF BY MOUTH TWICE A DAY    Moderate persistent asthma without complication       * albuterol (5 MG/ML) 0.5% neb solution    PROVENTIL    30 vial    Take 0.5 mLs (2.5 mg) by nebulization every 4 hours as needed for wheezing or shortness of breath / dyspnea    Moderate persistent asthma       * albuterol 108 (90 Base) MCG/ACT Inhaler    PROAIR HFA/PROVENTIL HFA/VENTOLIN HFA    1 Inhaler    Inhale 2 puffs into the lungs every 4 hours as needed for shortness of breath / dyspnea    Moderate persistent asthma without complication       amLODIPine 5 MG tablet    NORVASC    90 tablet    Take 1 tablet (5 mg) by mouth daily    Benign essential hypertension       ascorbic acid 500 MG Tabs      Take 1 tablet by mouth daily        ASPIRIN 81 PO      Take 1 tablet by mouth every other day        BENEFIBER PO      2 tsp daily        Biotin 69168 MCG Tabs      Take 1 tablet by mouth 2 times daily        calcipotriene 0.005 % Oint     60 g    Externally apply topically 2 times daily    Psoriasis       Calcium-Magnesium-Zinc 333..33-5 MG Tabs      Take 1 tablet by mouth daily        cetirizine 10 MG tablet    zyrTEC    90 tablet    Take 1 tablet (10 mg) by mouth every evening    Chronic rhinitis       clobetasol 0.05 % ointment    TEMOVATE    30 g    Apply to area of irritation twice daily for 2 weeks    Vulvitis       donepezil 5 MG tablet    ARICEPT    30 tablet    Take 1 tablet (5 mg) by mouth At Bedtime    Cognitive impairment       estradiol 0.1 MG/GM cream    ESTRACE VAGINAL    85 g    USE ONE GRAM VAGINALLY EVERY DAY AND SPREAD A SMALL AMOUNT AROUND THE CLITORAL REES    Vaginal atrophy, Labial and clitoral adhesions, acquired       FLAXSEED      Reported on 5/2/2017        fluticasone 50 MCG/ACT spray    FLONASE     1 Bottle    Spray 1 spray into both nostrils daily    Allergic rhinitis, unspecified allergic rhinitis trigger, unspecified rhinitis seasonality       gabapentin 300 MG capsule    NEURONTIN    60 capsule    Take 1 capsule (300 mg) by mouth nightly as needed    Lumbar radiculopathy, Chronic right-sided low back pain with right-sided sciatica       guaiFENesin 600 MG 12 hr tablet    MUCINEX    40 tablet    Take 2 tablets (1,200 mg) by mouth 2 times daily as needed To see if helps phlegm.    Phlegm in throat       hydrocortisone 1 % cream    CORTAID     Apply topically as needed        lactase 9000 units Tabs tablet    LACTAID     Take 9,000 Units by mouth as needed        montelukast 10 MG tablet    SINGULAIR    90 tablet    TAKE ONE TABLET BY MOUTH EVERY DAY    Moderate persistent asthma without complication       omeprazole 20 MG CR capsule    priLOSEC    180 capsule    Take 1 capsule (20 mg) by mouth 2 times daily For heartburn    Gastroesophageal reflux disease without esophagitis       order for DME     1 Device    Equipment being ordered: Nebulizer and tubing/filter    Moderate persistent asthma       PROBIOTIC DAILY PO      Take 1 chew tab by mouth 2 times daily        sertraline 100 MG tablet    ZOLOFT    90 tablet    Take 1.5 tablets (150 mg) by mouth daily    Panic disorder without agoraphobia       simvastatin 40 MG tablet    ZOCOR    90 tablet    Take 1 tablet (40 mg) by mouth At Bedtime    Elevated cholesterol with elevated triglycerides       SKIN OILS EX      Lavender-headache, skin, peppermint-skin, upset stomach mix of oils, asthma mix of oils, eucalyptus. Tea tree oil-skin, vapor rub- chest tightness, sleepy time (vetiver, lavendaer, mrjoram, mandarin, ylang)- sleep, constipation, eczema (sweet almond oil). Also mixes with carriers: Coconut oil, [place] oil, Extra virgin oil. Frankincense- cough. Digize- oil. Purification- oil, lemon- oil. Essentialyme- pill.  Inhales a mix with olive oil almond and  coconut oil with peppermint, and eucalyptus- sinus, allergies. Updated 12/1/2015.  Updated 4/26/2016: Lemon, Cinnamon bark, panaway, Thieves, Orange, Wintergreen, Copaiba        * Notice:  This list has 2 medication(s) that are the same as other medications prescribed for you. Read the directions carefully, and ask your doctor or other care provider to review them with you.

## 2018-04-16 NOTE — Clinical Note
patient complains of intermittent chest pain. Not sure if additional work-up is needed, but I do want to check with you before starting Aricept, in light of this.

## 2018-04-16 NOTE — PROGRESS NOTES
ESTABLISHED PATIENT NEUROLOGY NOTE    DATE OF VISIT: 4/16/2018  MRN: [id number]  PATIENT NAME: Ingrid Amaral  YOB: 1945    Chief Complaint   Patient presents with     RECHECK     meningioma.       SUBJECTIVE:                                                      HISTORY OF PRESENT ILLNESS:  Ingrid is here for follow up to discuss the results of her recent Neuropsych testing. The patient has additional history of meningiomas, with some size increase on February imaging. She has not seen neurosurgery about this, though I had suggested doing so in the past.     Per Dr. Agrawal's assessment last month:  IMPRESSIONS  In light of her history of RBD and tremor, Ms. Amaral demonstrated a pattern of deficits that raises the question of the early stages of dementia with Lewy bodies (she also has episodes of confusion, and has had visual hallucinations for the past year). I do not think that her symptoms can be fully attributed to her meningiomas. Her pattern of deficits is not consistent with an Alzheimer's disease syndrome. In this exam, her most pronounced impairments were in visually mediated processing (with particular deficits in visual-spatial processing as well as some evidence of left-sided inattention/neglect), as well as less pronounced weaknesses in executive functioning, naming, nonverbal learning and memory, and bilateral fine motor dexterity (more pronounced on the left side). Other cognitive abilities, including other aspects of expressive language, verbal memory, and basic visuoperception were normal and performed in keeping with her low average range cognitive baseline. She is moderately anxious. That said, I do not think that psychological factors are responsible for the above-noted cognitive deficits and weaknesses.     RECOMMENDATIONS  Preliminary results and recommendations were provided to the patient and her  over the telephone on March 27, 2018, and all questions were  answered.     1. To the extent not already completed, Ms. Amaral could benefit from investigation of factors that may be contributing to her neurologic and cognitive changes. Along these lines, an FDG-PET scan could aid in diagnostic clarification, if medically indicated. Additionally, she described jerking movements of her arms. An EEG could also be considered.      2. She will require increased support and supervision of her daily activities. In particular, I am concerned that she is managing her own medications. It may well be the case that her support needs increase in the future.     3. She should not drive.      4. Follow-up neuropsychological evaluation it is strongly recommended in one year in order to assess and update recommendations as appropriate. The current results can be used as a baseline at that time. However, I had be pleased to evaluate sooner or changes are noted or clinically indicated.     I note that I did previously document her tremor, but it appeared more consistent with ET than Parkinsonism. The history of jerking movements and hallucinations are news to me. I was aware of prior treatment for RSBD.     The patient tells me that the memory is about the same. Her  is here with her today and says that she tends to start sentences in the middle of a thought. She is usually able to backtrack and explain what she is thinking. She is forgetful of dates. She clarifies that yes she is having more jerking of her arms. She does continue to punch in her sleep. She does recall being on the clonazepam and her  says that she was too tired during the day. She has not tried melatonin for this. The problem is rare. She does have hallucinations of cats and curtains, often while resting in bed. The patient's  mentions again that she lived a very protected life. Mood is good now. She continues to take Zoloft.     She does continue to drive. He  rides along periodically and is not  concerned about her driving at this point. He says he has to give her pointers at times.      She has occasional chest pain which she says was brought up to her primary care provider in the past. There is an EKG from 5.2017 with Rt BBB. She mentions that she also has problems with a hiatal hernia and when she eats bread, it triggers the pain.     CURRENT MEDICATIONS:     Current Outpatient Prescriptions on File Prior to Visit:  clobetasol (TEMOVATE) 0.05 % ointment Apply to area of irritation twice daily for 2 weeks   montelukast (SINGULAIR) 10 MG tablet TAKE ONE TABLET BY MOUTH EVERY DAY   ADVAIR DISKUS 250-50 MCG/DOSE diskus inhaler INHALE ONE PUFF BY MOUTH TWICE A DAY   Calcium-Magnesium-Zinc 333..33-5 MG TABS Take 1 tablet by mouth daily   ascorbic acid 500 MG TABS Take 1 tablet by mouth daily   Biotin 53914 MCG TABS Take 1 tablet by mouth 2 times daily   amLODIPine (NORVASC) 5 MG tablet Take 1 tablet (5 mg) by mouth daily   estradiol (ESTRACE VAGINAL) 0.1 MG/GM cream USE ONE GRAM VAGINALLY EVERY DAY AND SPREAD A SMALL AMOUNT AROUND THE CLITORAL REES   gabapentin (NEURONTIN) 300 MG capsule Take 1 capsule (300 mg) by mouth nightly as needed   ASPIRIN 81 PO Take 1 tablet by mouth every other day    calcipotriene 0.005 % OINT Externally apply topically 2 times daily (Patient taking differently: Externally apply topically as needed )   simvastatin (ZOCOR) 40 MG tablet Take 1 tablet (40 mg) by mouth At Bedtime   fluticasone (FLONASE) 50 MCG/ACT spray Spray 1 spray into both nostrils daily   sertraline (ZOLOFT) 100 MG tablet Take 1.5 tablets (150 mg) by mouth daily   cetirizine (ZYRTEC) 10 MG tablet Take 1 tablet (10 mg) by mouth every evening   omeprazole (PRILOSEC) 20 MG CR capsule Take 1 capsule (20 mg) by mouth 2 times daily For heartburn   albuterol (PROAIR HFA/PROVENTIL HFA/VENTOLIN HFA) 108 (90 BASE) MCG/ACT Inhaler Inhale 2 puffs into the lungs every 4 hours as needed for shortness of breath / dyspnea    guaiFENesin (MUCINEX) 600 MG 12 hr tablet Take 2 tablets (1,200 mg) by mouth 2 times daily as needed To see if helps phlegm.   SKIN OILS EX Lavender-headache, skin, peppermint-skin, upset stomach mix of oils, asthma mix of oils, eucalyptus. Tea tree oil-skin, vapor rub- chest tightness, sleepy time (vetiver, lavendaer, mrjoram, mandarin, ylang)- sleep, constipation, eczema (sweet almond oil). Also mixes with carriers: Coconut oil, [place] oil, Extra virgin oil. Frankincense- cough. Digize- oil. Purification- oil, lemon- oil. Essentialyme- pill.  Inhales a mix with olive oil almond and coconut oil with peppermint, and eucalyptus- sinus, allergies. Updated 12/1/2015. Updated 4/26/2016: Lemon, Cinnamon bark, panaway, Thieves, Orange, [place], Copaiba   albuterol (PROVENTIL) (5 MG/ML) 0.5% nebulizer solution Take 0.5 mLs (2.5 mg) by nebulization every 4 hours as needed for wheezing or shortness of breath / dyspnea   lactase (LACTAID) 9000 UNITS TABS Take 9,000 Units by mouth as needed   hydrocortisone 1 % cream Apply topically as needed   BENEFIBER OR 2 tsp daily   ORDER FOR DME Equipment being ordered: Nebulizer and tubing/filter   Probiotic Product (PROBIOTIC DAILY PO) Take 1 chew tab by mouth 2 times daily    FLAXSEED Reported on 5/2/2017     No current facility-administered medications on file prior to visit.     RECENT DIAGNOSTIC STUDIES:   Labs:   Results for orders placed or performed in visit on 02/20/18   Ferritin   Result Value Ref Range    Ferritin 19 8 - 252 ng/mL       Imaging:   MRI Brain (2.15.18):  IMPRESSION:   1. Slight interval increase in the size of the two meningiomas lateral  to the right posterior temporal lobe and the left occipital lobe.  2. Brain atrophy and white matter changes consistent with sequelae of  small vessel ischemic disease.    Imaging reviewed by me. Agree with radiology read.     REVIEW OF SYSTEMS:                                                      10-point review of  systems is negative except as mentioned above in HPI.     EXAM:                                                      Physical Exam:   Vitals: /70 (BP Location: Right arm, Patient Position: Sitting, Cuff Size: Adult Regular)  Pulse 67  Temp 97.9  F (36.6  C) (Oral)  Resp 20  Wt 58.2 kg (128 lb 3.2 oz)  BMI 22.71 kg/m2  BMI= Body mass index is 22.71 kg/(m^2).  GENERAL: NAD.   HEENT: NC/AT.   Focused Neurologic:  MENTAL STATUS: Alert, attentive though spacy at times. Speech is fluent. Fair comprehension.   CRANIAL NERVES: Facial movement normal. EOM full. Hearing intact to conversation.   MOTOR: 5/5 in proximal and distal muscle groups of upper and lower extremities. Tone and bulk normal. No notable tremor in clinic today.  SENSATION: Normal light touch throughout.   COORDINATION: Normal fine motor movements.  STATION AND GAIT: Gait deferred.   CV: RRR. S1, S2.   NECK: No bruits.  Complete neurologic exam deferred for discussion time.    ASSESSMENT and PLAN:                                                      Assessment and Plan:    ICD-10-CM    1. Cognitive impairment R41.89 donepezil (ARICEPT) 5 MG tablet     PET Brain   2. Muscle twitch R25.3 EEG sleep deprived   3. Meningiomas, multiple (H) D42.9 NEUROSURGERY REFERRAL        Ms. Amaral is a pleasant 71 yo woman with history of multiple meningiomas. Recent size increase noted. Plan is for neurosurgery consultation.    We mainly discussed the neuropsych test results and recommendations in clinic today. I would like to move forward with the additional tests suggested by Dr. Agrawal: electroencephalogram, PET scan. I explained the reasoning for these. We also discussed treatment options. Since the cognitive issues are more prominent than the the motor issues for the patient, I would like to start with Aricept. We discussed potential side effects. One concern I have is the BBB on prior EKG and current intermittent chest pain. I would like Ingrid to  follow-up with her primary care provider regarding this prior to starting the new medication. We also discussed driving safety. I recommend that the  continue to monitor. He agrees to do so. I also suggested a formal driving evaluation in the future. We will follow-up when the above recommendations have been completed. The patient and her  understand and agree with the plan.    Patient Instructions:  PET scan.   Sleep-deprived electroencephalogram.   Neurosurgery referral to discuss the meningiomas.   Aricept: 5mg at bedtime (for memory). *Information provided.   Before starting the new medication, I would like you to talk to Soha Sanchez about the continued chest pain. I want to make sure she is ok with starting the Aricept in light of this.   Continue to monitor driving. *Information for formal driving evaluation provided.   Consider melatonin for the nighttime behaviors/violent dreams: 2-3 mg about 2 hours before bedtime.   Return to clinic after the above tests/consultations (2-3 months).     Total Time: >40 minutes were spent with the patient. More than 50% of the time spent on counseling (as described above in Assessment and Plan) /coordinating the care.    Pratibha Guerrero MD  Neurology

## 2018-04-16 NOTE — TELEPHONE ENCOUNTER
Ingrid needs to meet the Medicare criteria for PET of the brain.  Financial Services needs to know if you're willing to vouch that all these are met.      First, the differential diagnosis is dementia, alzheimers, fronto-temporal dementia, dementia of lewy bodies, or creutzfeld-south disease.  Second, all of the following criteria must be met and documented in the patient s EPIC chart.      1. The following additional conditions must be met before an FDG PET scan will be covered:  A. The patient s onset, clinical presentation, or course of cognitive impairment is such that FTD is suspected as an alternative neurodegenerative cause of the cognitive decline. Specifically, symptoms such as social disinhibition, awkwardness, difficulties with language, or loss of executive function are more prominent early in the course of FTD than the memory loss typical of AD;  B. The patient has had a comprehensive clinical evaluation (as defined by the American Academy of Neurology encompassing a medical history from the patient and a well-acquainted informant (including assessment of activities of daily living), physical and mental status examination (including formal documentation of cognitive decline occurring over at least 6 months) aided by cognitive scales or neuropsychological testing, laboratory tests, and structural imaging such as magnetic resonance imaging (MRI) or computed tomography (CT);  C. The evaluation of the patient has been conducted by a physician experienced in the diagnosis and assessment of dementia;  D. The evaluation of the patient did not clearly determine a specific neurodegenerative disease or other cause for the clinical symptoms, and information available through FDG PET is reasonably expected to help clarify the diagnosis between FTD and AD and help guide future treatment;  E. The FDG PET scan is performed in a facility that has all the accreditation necessary to operate nuclear medicine equipment.  The reading of the scan should be done by an expert in nuclear medicine, radiology, neurology, or psychiatry, with experience interpreting such scans in the presence of dementia;  F. A brain single photon emission computed tomography (SPECT) or FDG PET scan has not been obtained for the same indication. (The indication can be considered to be different in patients who exhibit important changes in scope or severity of cognitive decline, and meet all other qualifying criteria listed above and below (including the judgment that the likely diagnosis remains uncertain.) The results of a prior SPECT or FDG PET scan must have been inconclusive or, in the case of SPECT, difficult to interpret due to immature or inadequate technology. In these instances, an FDG PET scan may be covered after one year has passed from the time the first SPECT or FDG PET scan was performed.)  G. The referring and billing provider(s) have documented the appropriate evaluation of the Medicare beneficiary. Providers should establish the medical necessity of an FDG PET scan by ensuring that the following information has been collected and is maintained in the beneficiary medical record:    Date of onset of symptoms;    Diagnosis of clinical syndrome (normal aging; mild cognitive impairment (MCI); mild, moderate or severe dementia);    Mini mental status exam (MMSE) or similar test score;    Presumptive cause (possible, probable, uncertain AD);    Any neuropsychological testing performed;    Results of any structural imaging (MRI or CT) performed;    Relevant laboratory tests (B12, thyroid hormone); and,    Number and name of prescribed medications.

## 2018-04-16 NOTE — LETTER
4/16/2018         RE: Ingrid Amaral  [address] [facility] [address]        Dear Colleague,    Thank you for referring your patient, Ingrid Amaral, to the [facility]. Please see a copy of my visit note below.    ESTABLISHED PATIENT NEUROLOGY NOTE    DATE OF VISIT: 4/16/2018  MRN: [id number]  PATIENT NAME: Ingrid Amaral  YOB: 1945    Chief Complaint   Patient presents with     RECHECK     meningioma.       SUBJECTIVE:                                                      HISTORY OF PRESENT ILLNESS:  Ingrid is here for follow up to discuss the results of her recent Neuropsych testing. The patient has additional history of meningiomas, with some size increase on February imaging. She has not seen neurosurgery about this, though I had suggested doing so in the past.     Per Dr. Agrawal's assessment last month:  IMPRESSIONS  In light of her history of RBD and tremor, Ms. Amaral demonstrated a pattern of deficits that raises the question of the early stages of dementia with Lewy bodies (she also has episodes of confusion, and has had visual hallucinations for the past year). I do not think that her symptoms can be fully attributed to her meningiomas. Her pattern of deficits is not consistent with an Alzheimer's disease syndrome. In this exam, her most pronounced impairments were in visually mediated processing (with particular deficits in visual-spatial processing as well as some evidence of left-sided inattention/neglect), as well as less pronounced weaknesses in executive functioning, naming, nonverbal learning and memory, and bilateral fine motor dexterity (more pronounced on the left side). Other cognitive abilities, including other aspects of expressive language, verbal memory, and basic visuoperception were normal and performed in keeping with her low average range cognitive baseline. She is moderately anxious. That said, I do not think that psychological  factors are responsible for the above-noted cognitive deficits and weaknesses.     RECOMMENDATIONS  Preliminary results and recommendations were provided to the patient and her  over the telephone on March 27, 2018, and all questions were answered.     1. To the extent not already completed, Ms. Amaral could benefit from investigation of factors that may be contributing to her neurologic and cognitive changes. Along these lines, an FDG-PET scan could aid in diagnostic clarification, if medically indicated. Additionally, she described jerking movements of her arms. An EEG could also be considered.      2. She will require increased support and supervision of her daily activities. In particular, I am concerned that she is managing her own medications. It may well be the case that her support needs increase in the future.     3. She should not drive.      4. Follow-up neuropsychological evaluation it is strongly recommended in one year in order to assess and update recommendations as appropriate. The current results can be used as a baseline at that time. However, I had be pleased to evaluate sooner or changes are noted or clinically indicated.     I note that I did previously document her tremor, but it appeared more consistent with ET than Parkinsonism. The history of jerking movements and hallucinations are news to me. I was aware of prior treatment for RSBD.     The patient tells me that the memory is about the same. Her  is here with her today and says that she tends to start sentences in the middle of a thought. She is usually able to backtrack and explain what she is thinking. She is forgetful of dates. She clarifies that yes she is having more jerking of her arms. She does continue to punch in her sleep. She does recall being on the clonazepam and her  says that she was too tired during the day. She has not tried melatonin for this. The problem is rare. She does have hallucinations of cats  and curtains, often while resting in bed. The patient's  mentions again that she lived a very protected life. Mood is good now. She continues to take Zoloft.     She does continue to drive. He  rides along periodically and is not concerned about her driving at this point. He says he has to give her pointers at times.      She has occasional chest pain which she says was brought up to her primary care provider in the past. There is an EKG from 5.2017 with Rt BBB. She mentions that she also has problems with a hiatal hernia and when she eats bread, it triggers the pain.     CURRENT MEDICATIONS:     Current Outpatient Prescriptions on File Prior to Visit:  clobetasol (TEMOVATE) 0.05 % ointment Apply to area of irritation twice daily for 2 weeks   montelukast (SINGULAIR) 10 MG tablet TAKE ONE TABLET BY MOUTH EVERY DAY   ADVAIR DISKUS 250-50 MCG/DOSE diskus inhaler INHALE ONE PUFF BY MOUTH TWICE A DAY   Calcium-Magnesium-Zinc 333..33-5 MG TABS Take 1 tablet by mouth daily   ascorbic acid 500 MG TABS Take 1 tablet by mouth daily   Biotin 50447 MCG TABS Take 1 tablet by mouth 2 times daily   amLODIPine (NORVASC) 5 MG tablet Take 1 tablet (5 mg) by mouth daily   estradiol (ESTRACE VAGINAL) 0.1 MG/GM cream USE ONE GRAM VAGINALLY EVERY DAY AND SPREAD A SMALL AMOUNT AROUND THE CLITORAL REES   gabapentin (NEURONTIN) 300 MG capsule Take 1 capsule (300 mg) by mouth nightly as needed   ASPIRIN 81 PO Take 1 tablet by mouth every other day    calcipotriene 0.005 % OINT Externally apply topically 2 times daily (Patient taking differently: Externally apply topically as needed )   simvastatin (ZOCOR) 40 MG tablet Take 1 tablet (40 mg) by mouth At Bedtime   fluticasone (FLONASE) 50 MCG/ACT spray Spray 1 spray into both nostrils daily   sertraline (ZOLOFT) 100 MG tablet Take 1.5 tablets (150 mg) by mouth daily   cetirizine (ZYRTEC) 10 MG tablet Take 1 tablet (10 mg) by mouth every evening   omeprazole (PRILOSEC) 20 MG  CR capsule Take 1 capsule (20 mg) by mouth 2 times daily For heartburn   albuterol (PROAIR HFA/PROVENTIL HFA/VENTOLIN HFA) 108 (90 BASE) MCG/ACT Inhaler Inhale 2 puffs into the lungs every 4 hours as needed for shortness of breath / dyspnea   guaiFENesin (MUCINEX) 600 MG 12 hr tablet Take 2 tablets (1,200 mg) by mouth 2 times daily as needed To see if helps phlegm.   SKIN OILS EX Lavender-headache, skin, peppermint-skin, upset stomach mix of oils, asthma mix of oils, eucalyptus. Tea tree oil-skin, vapor rub- chest tightness, sleepy time (vetiver, lavendaer, mrjoram, mandarin, ylang)- sleep, constipation, eczema (sweet almond oil). Also mixes with carriers: Coconut oil, Concord oil, Extra virgin oil. Frankincense- cough. Digize- oil. Purification- oil, lemon- oil. Essentialyme- pill.  Inhales a mix with olive oil almond and coconut oil with peppermint, and eucalyptus- sinus, allergies. Updated 12/1/2015. Updated 4/26/2016: Lemon, Cinnamon bark, panaway, Thieves, Orange, Wintergreen, Copaiba   albuterol (PROVENTIL) (5 MG/ML) 0.5% nebulizer solution Take 0.5 mLs (2.5 mg) by nebulization every 4 hours as needed for wheezing or shortness of breath / dyspnea   lactase (LACTAID) 9000 UNITS TABS Take 9,000 Units by mouth as needed   hydrocortisone 1 % cream Apply topically as needed   BENEFIBER OR 2 tsp daily   ORDER FOR DME Equipment being ordered: Nebulizer and tubing/filter   Probiotic Product (PROBIOTIC DAILY PO) Take 1 chew tab by mouth 2 times daily    FLAXSEED Reported on 5/2/2017     No current facility-administered medications on file prior to visit.     RECENT DIAGNOSTIC STUDIES:   Labs:   Results for orders placed or performed in visit on 02/20/18   Ferritin   Result Value Ref Range    Ferritin 19 8 - 252 ng/mL       Imaging:   MRI Brain (2.15.18):  IMPRESSION:   1. Slight interval increase in the size of the two meningiomas lateral  to the right posterior temporal lobe and the left occipital lobe.  2. Brain  atrophy and white matter changes consistent with sequelae of  small vessel ischemic disease.    Imaging reviewed by me. Agree with radiology read.     REVIEW OF SYSTEMS:                                                      10-point review of systems is negative except as mentioned above in HPI.     EXAM:                                                      Physical Exam:   Vitals: /70 (BP Location: Right arm, Patient Position: Sitting, Cuff Size: Adult Regular)  Pulse 67  Temp 97.9  F (36.6  C) (Oral)  Resp 20  Wt 58.2 kg (128 lb 3.2 oz)  BMI 22.71 kg/m2  BMI= Body mass index is 22.71 kg/(m^2).  GENERAL: NAD.   HEENT: NC/AT.   Focused Neurologic:  MENTAL STATUS: Alert, attentive though spacy at times. Speech is fluent. Fair comprehension.   CRANIAL NERVES: Facial movement normal. EOM full. Hearing intact to conversation.   MOTOR: 5/5 in proximal and distal muscle groups of upper and lower extremities. Tone and bulk normal. No notable tremor in clinic today.  SENSATION: Normal light touch throughout.   COORDINATION: Normal fine motor movements.  STATION AND GAIT: Gait deferred.   CV: RRR. S1, S2.   NECK: No bruits.  Complete neurologic exam deferred for discussion time.    ASSESSMENT and PLAN:                                                      Assessment and Plan:    ICD-10-CM    1. Cognitive impairment R41.89 donepezil (ARICEPT) 5 MG tablet     PET Brain   2. Muscle twitch R25.3 EEG sleep deprived   3. Meningiomas, multiple (H) D42.9 NEUROSURGERY REFERRAL        Ms. Amaral is a pleasant 73 yo woman with history of multiple meningiomas. Recent size increase noted. Plan is for neurosurgery consultation.    We mainly discussed the neuropsych test results and recommendations in clinic today. I would like to move forward with the additional tests suggested by Dr. Agrawal: electroencephalogram, PET scan. I explained the reasoning for these. We also discussed treatment options. Since the cognitive issues are  more prominent than the the motor issues for the patient, I would like to start with Aricept. We discussed potential side effects. One concern I have is the BBB on prior EKG and current intermittent chest pain. I would like Ingrid to follow-up with her primary care provider regarding this prior to starting the new medication. We also discussed driving safety. I recommend that the  continue to monitor. He agrees to do so. I also suggested a formal driving evaluation in the future. We will follow-up when the above recommendations have been completed. The patient and her  understand and agree with the plan.    Patient Instructions:  PET scan.   Sleep-deprived electroencephalogram.   Neurosurgery referral to discuss the meningiomas.   Aricept: 5mg at bedtime (for memory). *Information provided.   Before starting the new medication, I would like you to talk to Soha Sanchez about the continued chest pain. I want to make sure she is ok with starting the Aricept in light of this.   Continue to monitor driving. *Information for formal driving evaluation provided.   Consider melatonin for the nighttime behaviors/violent dreams: 2-3 mg about 2 hours before bedtime.   Return to clinic after the above tests/consultations (2-3 months).     Total Time: >40 minutes were spent with the patient. More than 50% of the time spent on counseling (as described above in Assessment and Plan) /coordinating the care.    Pratibha Guerrero MD  Neurology                    Again, thank you for allowing me to participate in the care of your patient.        Sincerely,        Pratibha Guerrero MD

## 2018-04-17 ENCOUNTER — TELEPHONE (OUTPATIENT)
Dept: FAMILY MEDICINE | Facility: CLINIC | Age: 73
End: 2018-04-17

## 2018-04-17 NOTE — TELEPHONE ENCOUNTER
----- Message from MARVIN Diaz CNP sent at 4/17/2018  1:45 PM CDT -----  Could we call patient and have her follow up in clinic for chest pain she has been having that she discussed with neurology at their visit.     Thanks  MARVIN Gonzales CNP

## 2018-04-18 NOTE — TELEPHONE ENCOUNTER
Dr. Guerrero has stated she feels the patient meets criteria for Brain PET.  I've advised aRdha Boss (pre-approval) and she said we could go ahead and schedule.      I advised Ingrid and she'd like to make the appointment herself.  She was given the [facility] phone number.  I will watch for the scheduled date and time and relay those back to Radha.

## 2018-04-24 NOTE — TELEPHONE ENCOUNTER
I was advised today that prior authorization for brain PET scans needs to go through [facility].  I gave them Ms. Amaral' information, so they will do the prior authorization, if they have not already done so.    I spoke with Ingrid's  today.  [place] and is planning to schedule that PET in the next couple of days.  I gave him the [facility] phone number so they can call to schedule.

## 2018-05-03 ENCOUNTER — RADIANT APPOINTMENT (OUTPATIENT)
Dept: PET IMAGING | Facility: CLINIC | Age: 73
End: 2018-05-03
Attending: PSYCHIATRY & NEUROLOGY
Payer: COMMERCIAL

## 2018-05-03 DIAGNOSIS — R41.89 COGNITIVE IMPAIRMENT: ICD-10-CM

## 2018-05-03 LAB — GLUCOSE SERPL-MCNC: 97 MG/DL (ref 70–99)

## 2018-05-07 ENCOUNTER — PATIENT OUTREACH (OUTPATIENT)
Dept: CARE COORDINATION | Facility: CLINIC | Age: 73
End: 2018-05-07

## 2018-05-07 NOTE — PROGRESS NOTES
Clinic Care Coordination Contact  Care Team Conversations    Follow up call placed to pt and spouse.  Pt is allowing spouse to assist as needed with managing medications.  She is having headaches at times that she described as sharp pains by her temples.  She is not sure if it is from the new Aricept medication or from the PET test.  Sw will route to neurology for any follow up questions or needs.     Discussed the notations in the chart about her PCP wanting a follow up to discuss the chest pains.  Her/spouse will call and schedule an appointment.      Spouse did comment on pt's gardening and feels she overdoes it at times.  He is wondering if there is a certain amount of time that she should be doing the gardening before stopping for a break.  In the past she will stay out side and plant flowers without breaks even for lunch.  This then causes her to have a lot of back pain that interferes in her ability to sit up and function without intense pain.  She thinks the steroid shot she got will help.  Discussed the importance of finding the balance between activity and rest to limit her back pain.  Spouse said pt does not do this and he wonders if the PCP would give any limitations for pt/spouse to follow.  Encouraged her to talk with provider at next appointment.     Goal to continue for pt to allow spouse to assist as needed with medication set up/administration.     Plan:  Pt to schedule appointment with PCP to discuss headache, check pain, back ache/activity.  Sw to route message to neurology and PCP regarding headache. SW to call in about one month.     EM Mcfarlane, Clinic Care Coordinator 5/7/2018   10:37 AM  [phone]

## 2018-05-08 ENCOUNTER — TELEPHONE (OUTPATIENT)
Dept: NEUROLOGY | Facility: CLINIC | Age: 73
End: 2018-05-08

## 2018-05-08 NOTE — TELEPHONE ENCOUNTER
Please let the patient know that Aricept van cause headaches, but I do think it is a good idea to let the primary care provider about the head pain as well, because it is so brief and sharp. They may want to do additional work-up. The PET scan should not have caused headache.   The PET scan shows mainly decreased metabolism in the parietal lobes. This actually fits pretty well with the Neuropsych results (possible Lewy Body Disease).   Please let me know if they decide to stop the Aricept. We may want to consider alternatives - I recommend they return to clinic to discuss.     CAYLA (Routing comment)

## 2018-05-08 NOTE — TELEPHONE ENCOUNTER
Clinic Care Coordination Contact  Care Team Conversations     Follow up call placed to pt and spouse.  Pt is allowing spouse to assist as needed with managing medications.  She is having headaches at times that she described as sharp pains by her temples.  She is not sure if it is from the new Aricept medication or from the PET test.  Sw will route to neurology for any follow up questions or needs.      Discussed the notations in the chart about her PCP wanting a follow up to discuss the chest pains.  Her/spouse will call and schedule an appointment.       Spouse did comment on pt's gardening and feels she overdoes it at times.  He is wondering if there is a certain amount of time that she should be doing the gardening before stopping for a break.  In the past she will stay out side and plant flowers without breaks even for lunch.  This then causes her to have a lot of back pain that interferes in her ability to sit up and function without intense pain.  She thinks the steroid shot she got will help.  Discussed the importance of finding the balance between activity and rest to limit her back pain.  Spouse said pt does not do this and he wonders if the PCP would give any limitations for pt/spouse to follow.  Encouraged her to talk with provider at next appointment.      Goal to continue for pt to allow spouse to assist as needed with medication set up/administration.      Plan:  Pt to schedule appointment with PCP to discuss headache, check pain, back ache/activity.  Sw to route message to neurology and PCP regarding headache. SW to call in about one month.      EM Mcfarlane, Clinic Care Coordinator 5/7/2018   10:37 AM  [phone]

## 2018-05-08 NOTE — TELEPHONE ENCOUNTER
Called pt left message to return call.  See notes below.    Gay Bhakta  [facility] RN

## 2018-05-08 NOTE — TELEPHONE ENCOUNTER
Pt is having headache and not sure if from the Aricept or PET scan.  Sharp pain by temples at times.  She is going to schedule with PCP regarding chest pain notation and spouse would like any kind of suggestions as to how much activity she should do and then rest to limit back pain.   Thank you   Ria Izquierdo, \[facility]\""   Clinic Care Coordination   [phone]   (Routing comment)

## 2018-05-09 ENCOUNTER — OFFICE VISIT (OUTPATIENT)
Dept: FAMILY MEDICINE | Facility: CLINIC | Age: 73
End: 2018-05-09
Payer: COMMERCIAL

## 2018-05-09 VITALS
HEART RATE: 64 BPM | RESPIRATION RATE: 18 BRPM | TEMPERATURE: 98.2 F | DIASTOLIC BLOOD PRESSURE: 78 MMHG | SYSTOLIC BLOOD PRESSURE: 112 MMHG | BODY MASS INDEX: 23.06 KG/M2 | WEIGHT: 130.2 LBS

## 2018-05-09 DIAGNOSIS — G44.84 PRIMARY EXERTIONAL HEADACHE: ICD-10-CM

## 2018-05-09 DIAGNOSIS — R13.10 DIFFICULTY SWALLOWING SOLIDS: ICD-10-CM

## 2018-05-09 DIAGNOSIS — K21.9 GASTROESOPHAGEAL REFLUX DISEASE, ESOPHAGITIS PRESENCE NOT SPECIFIED: ICD-10-CM

## 2018-05-09 DIAGNOSIS — L98.9 SKIN LESION: ICD-10-CM

## 2018-05-09 DIAGNOSIS — K59.00 CONSTIPATION, UNSPECIFIED CONSTIPATION TYPE: ICD-10-CM

## 2018-05-09 DIAGNOSIS — D50.8 IRON DEFICIENCY ANEMIA SECONDARY TO INADEQUATE DIETARY IRON INTAKE: Primary | ICD-10-CM

## 2018-05-09 LAB
ERYTHROCYTE [DISTWIDTH] IN BLOOD BY AUTOMATED COUNT: 13.5 % (ref 10–15)
FERRITIN SERPL-MCNC: 24 NG/ML (ref 8–252)
HCT VFR BLD AUTO: 38.5 % (ref 35–47)
HGB BLD-MCNC: 12.5 G/DL (ref 11.7–15.7)
MCH RBC QN AUTO: 30.6 PG (ref 26.5–33)
MCHC RBC AUTO-ENTMCNC: 32.5 G/DL (ref 31.5–36.5)
MCV RBC AUTO: 94 FL (ref 78–100)
PLATELET # BLD AUTO: 284 10E9/L (ref 150–450)
RBC # BLD AUTO: 4.08 10E12/L (ref 3.8–5.2)
WBC # BLD AUTO: 5 10E9/L (ref 4–11)

## 2018-05-09 PROCEDURE — 99214 OFFICE O/P EST MOD 30 MIN: CPT | Performed by: NURSE PRACTITIONER

## 2018-05-09 PROCEDURE — 36415 COLL VENOUS BLD VENIPUNCTURE: CPT | Performed by: NURSE PRACTITIONER

## 2018-05-09 PROCEDURE — 82728 ASSAY OF FERRITIN: CPT | Performed by: NURSE PRACTITIONER

## 2018-05-09 PROCEDURE — 85027 COMPLETE CBC AUTOMATED: CPT | Performed by: NURSE PRACTITIONER

## 2018-05-09 ASSESSMENT — PATIENT HEALTH QUESTIONNAIRE - PHQ9
SUM OF ALL RESPONSES TO PHQ QUESTIONS 1-9: 3
10. IF YOU CHECKED OFF ANY PROBLEMS, HOW DIFFICULT HAVE THESE PROBLEMS MADE IT FOR YOU TO DO YOUR WORK, TAKE CARE OF THINGS AT HOME, OR GET ALONG WITH OTHER PEOPLE: SOMEWHAT DIFFICULT
SUM OF ALL RESPONSES TO PHQ QUESTIONS 1-9: 3

## 2018-05-09 ASSESSMENT — PAIN SCALES - GENERAL: PAINLEVEL: NO PAIN (0)

## 2018-05-09 NOTE — NURSING NOTE
"Chief Complaint   Patient presents with     Recheck Medication     Iron       Initial /78 (BP Location: Right arm, Patient Position: Chair, Cuff Size: Adult Regular)  Pulse 64  Temp 98.2  F (36.8  C) (Tympanic)  Resp 18  Wt 130 lb 3.2 oz (59.1 kg)  BMI 23.06 kg/m2 Estimated body mass index is 23.06 kg/(m^2) as calculated from the following:    Height as of 1/9/18: 5' 3\" (1.6 m).    Weight as of this encounter: 130 lb 3.2 oz (59.1 kg).      Health Maintenance that is potentially due pending provider review:  NONE    Radha Esqueda MA  10:41 AM 5/9/2018  .      "

## 2018-05-09 NOTE — PROGRESS NOTES
SUBJECTIVE:   Ingrid Amaral is a 72 year old female who presents to clinic today for the following health issues:      Medication Followup of Iron Supplement    Taking Medication as prescribed: yes    Side Effects:  None    Medication Helping Symptoms:  Yes    Takes iron 2 times daily  Has been on for a long time wondering if she is able to stop this at some point. Was iron deficient        Temple pain   Was intermittent, lasting for seconds and then gone. Just on the left side near the temple and above the ey. No vision changes, nausea/vomiting, dizziness or lightheadedness   Has not had any since 2 days ago   Was straining quite significantly for a bowel movement before pains started however and wonders if this did not cause   Last bowel movement last night - some straining - no blood   Taking Miralax - just started again about 1 week ago - taking one capful every other day or so  Drinking a good amount of water she feels       Still having chest pains - but feels more after she eats something - more the breads - feels up into neck  Feels like foods getting stuck    Has pains outside of eating, more middle of chest and up into neck  Not feeling much heartburn symptoms - takes omeprazole two times daily        Answers for HPI/ROS submitted by the patient on 5/9/2018   If you checked off any problems, how difficult have these problems made it for you to do your work, take care of things at home, or get along with other people?: Somewhat difficult  PHQ9 TOTAL SCORE: 3      Problem list and histories reviewed & adjusted, as indicated.  Additional history: as documented    Patient Active Problem List   Diagnosis     Diarrhea     Pure hypercholesterolemia     Allergic rhinitis     Panic disorder without agoraphobia     Advanced directives, counseling/discussion     Liver lesion     Generalized anxiety disorder     Mild major depression (H)     Vulvitis     Vaginal wall prolapse     Chronic constipation     Moderate  persistent asthma     Dysphagia     RBD (REM behavioral disorder)     FH: colon cancer     Hypertension goal BP (blood pressure) < 140/90     Meningioma (H)     Past Surgical History:   Procedure Laterality Date     ANKLE SURGERY      bilateral     COLONOSCOPY       COLONOSCOPY N/A 3/20/2015    Procedure: COLONOSCOPY;  Surgeon: Britney Martinez MD;  Location: [facility]     COLONOSCOPY N/A 5/22/2017    Procedure: COLONOSCOPY;  Colonoscopy;  Surgeon: Tristen Rangel MD;  Location: [facility]     HC ESOPH/GAS REFLUX TEST W NASAL IMPED >1 HR  4/19/2012    Procedure:ESOPHAGEAL IMPEDENCE FUNCTION TEST WITH 24 HOUR PH GREATER THAN 1 HOUR; Surgeon:VALDO UMANZOR; Location: GI     HERNIA REPAIR      umbilcal     HYSTERECTOMY, PAP NO LONGER INDICATED  1991     TONSILLECTOMY       UPPER GI ENDOSCOPY         Social History   Substance Use Topics     Smoking status: Former Smoker     Packs/day: 0.50     Years: 3.00     Types: Cigarettes     Smokeless tobacco: Never Used      Comment: smoked for 3 years when she was around 19     Alcohol use Yes      Comment: RARELY     Family History   Problem Relation Age of Onset     HEART DISEASE Mother      valve problem     Hypertension Mother      DIABETES Mother      type 2     CEREBROVASCULAR DISEASE Mother      Allergies Mother      Eye Disorder Mother      Macular degeneration     OSTEOPOROSIS Mother      Respiratory Mother      Cardiovascular Father      MI at age 80     Cancer - colorectal Father      DIABETES Father      Hypertension Father      type 2     Eye Disorder Father      macular degeneration     Lipids Father      C.A.D. Maternal Grandmother      DIABETES Paternal Grandmother      type 2     Cardiovascular Paternal Grandmother      MI     DIABETES Sister      type 2     Allergies Sister      GASTROINTESTINAL DISEASE Sister      GASTROINTESTINAL DISEASE Daughter      GASTROINTESTINAL DISEASE Daughter          Current Outpatient Prescriptions   Medication Sig  Dispense Refill     ADVAIR DISKUS 250-50 MCG/DOSE diskus inhaler INHALE ONE PUFF BY MOUTH TWICE A DAY 1 Inhaler 1     albuterol (PROAIR HFA/PROVENTIL HFA/VENTOLIN HFA) 108 (90 BASE) MCG/ACT Inhaler Inhale 2 puffs into the lungs every 4 hours as needed for shortness of breath / dyspnea 1 Inhaler 2     amLODIPine (NORVASC) 5 MG tablet Take 1 tablet (5 mg) by mouth daily 90 tablet 1     ascorbic acid 500 MG TABS Take 1 tablet by mouth daily       ASPIRIN 81 PO Take 1 tablet by mouth every other day        BENEFIBER OR 2 tsp daily       Biotin 53446 MCG TABS Take 1 tablet by mouth 2 times daily       calcipotriene 0.005 % OINT Externally apply topically 2 times daily (Patient taking differently: Externally apply topically as needed ) 60 g 0     Calcium-Magnesium-Zinc 333..33-5 MG TABS Take 1 tablet by mouth daily       clobetasol (TEMOVATE) 0.05 % ointment Apply to area of irritation twice daily for 2 weeks 30 g 0     donepezil (ARICEPT) 5 MG tablet Take 1 tablet (5 mg) by mouth At Bedtime 30 tablet 3     estradiol (ESTRACE VAGINAL) 0.1 MG/GM cream USE ONE GRAM VAGINALLY EVERY DAY AND SPREAD A SMALL AMOUNT AROUND THE CLITORAL REES 85 g 1     fluticasone (FLONASE) 50 MCG/ACT spray Spray 1 spray into both nostrils daily 1 Bottle 11     gabapentin (NEURONTIN) 300 MG capsule Take 1 capsule (300 mg) by mouth nightly as needed 60 capsule 0     hydrocortisone 1 % cream Apply topically as needed       lactase (LACTAID) 9000 UNITS TABS Take 9,000 Units by mouth as needed       montelukast (SINGULAIR) 10 MG tablet TAKE ONE TABLET BY MOUTH EVERY DAY 90 tablet 1     omeprazole (PRILOSEC) 20 MG CR capsule Take 1 capsule (20 mg) by mouth 2 times daily For heartburn 180 capsule 3     simvastatin (ZOCOR) 40 MG tablet Take 1 tablet (40 mg) by mouth At Bedtime 90 tablet 3     SKIN OILS EX Lavender-headache, skin, peppermint-skin, upset stomach mix of oils, asthma mix of oils, eucalyptus. Tea tree oil-skin, vapor rub- chest  tightness, sleepy time (vetiver, lavendaer, mrjoram, mandarin, ylang)- sleep, constipation, eczema (sweet almond oil). Also mixes with carriers: Coconut oil, [place] oil, Extra virgin oil. Frankincense- cough. Digize- oil. Purification- oil, lemon- oil. Essentialyme- pill.  Inhales a mix with olive oil almond and coconut oil with peppermint, and eucalyptus- sinus, allergies. Updated 12/1/2015.   Updated 4/26/2016: Lemon, Cinnamon bark, panaway, Thieves, Orange, Wintergreen, Copaiba       albuterol (PROVENTIL) (5 MG/ML) 0.5% nebulizer solution Take 0.5 mLs (2.5 mg) by nebulization every 4 hours as needed for wheezing or shortness of breath / dyspnea (Patient not taking: Reported on 5/9/2018) 30 vial 1     cetirizine (ZYRTEC) 10 MG tablet TAKE ONE TABLET BY MOUTH EVERY EVENING 90 tablet 3     FLAXSEED Reported on 5/2/2017       ORDER FOR DME Equipment being ordered: Nebulizer and tubing/filter (Patient not taking: Reported on 5/9/2018) 1 Device 0     Probiotic Product (PROBIOTIC DAILY PO) Take 1 chew tab by mouth 2 times daily        sertraline (ZOLOFT) 100 MG tablet TAKE ONE AND ONE-HALF TABLET BY MOUTH ONCE DAILY 135 tablet 1     Allergies   Allergen Reactions     Sulfa Drugs Other (See Comments)     Reaction unknown as a child       Reviewed and updated as needed this visit by clinical staff  Tobacco  Allergies  Meds  Problems  Med Hx  Surg Hx  Fam Hx  Soc Hx        Reviewed and updated as needed this visit by Provider  Tobacco  Allergies  Meds  Problems  Med Hx  Surg Hx  Fam Hx  Soc Hx          ROS:  Constitutional, HEENT, cardiovascular, pulmonary, GI, , musculoskeletal, neuro, skin, endocrine and psych systems are negative, except as otherwise noted.    OBJECTIVE:     /78 (BP Location: Right arm, Patient Position: Chair, Cuff Size: Adult Regular)  Pulse 64  Temp 98.2  F (36.8  C) (Tympanic)  Resp 18  Wt 130 lb 3.2 oz (59.1 kg)  BMI 23.06 kg/m2  Body mass index is 23.06  kg/(m^2).  GENERAL APPEARANCE: healthy, alert and no distress  EYES: Eyes grossly normal to inspection, PERRL and conjunctivae and sclerae normal  HENT: ear canals and TM's normal and nose and mouth without ulcers or lesions  NECK: no adenopathy, no asymmetry, masses, or scars and thyroid normal to palpation  RESP: lungs clear to auscultation - no rales, rhonchi or wheezes  CV: regular rates and rhythm, normal S1 S2, no S3 or S4 and no murmur, click or rub  ABDOMEN: soft, nontender, without hepatosplenomegaly or masses and bowel sounds normal  MS: extremities normal- no gross deformities noted, peripheral pulses normal and no edema  SKIN: multiple scattered nevi   NEURO: Normal strength and tone, mentation intact and speech normal  PSYCH: mentation appears normal and affect normal/bright    Diagnostic Test Results:  Results for orders placed or performed in visit on 05/09/18   Ferritin   Result Value Ref Range    Ferritin 24 8 - 252 ng/mL   CBC with platelets   Result Value Ref Range    WBC 5.0 4.0 - 11.0 10e9/L    RBC Count 4.08 3.8 - 5.2 10e12/L    Hemoglobin 12.5 11.7 - 15.7 g/dL    Hematocrit 38.5 35.0 - 47.0 %    MCV 94 78 - 100 fl    MCH 30.6 26.5 - 33.0 pg    MCHC 32.5 31.5 - 36.5 g/dL    RDW 13.5 10.0 - 15.0 %    Platelet Count 284 150 - 450 10e9/L       ASSESSMENT/PLAN:     1. Iron deficiency anemia secondary to inadequate dietary iron intake  Patient with history of iron deficiency anemia, is taking daily iron. Is having constipation issues. Recheck normal, okay to stop iron. Will recheck in 1 year or as needed.   - Ferritin  - CBC with platelets    2. Gastroesophageal reflux disease, esophagitis presence not specified  Patient has been on Prilosec for sometime, denies any heartburn symptoms with the exception of feeling like lump in throat, see below.   - GASTROENTEROLOGY ADULT REF CONSULT ONLY  - GASTROENTEROLOGY ADULT REF PROCEDURE ONLY [facility] [phone]; [place] General Surgeon    3.  Difficulty swallowing solids  Patient has had some complaints of lump in throat and having sternal chest pain mostly after she eats. More with solids and dry foods. Does have a history of hiatal hernia per EGD in 07'. Also had a Esophageal function test which showed evidence of abnormal bolus clearance of liquids and viscous. Would have patient repeat EGD as it has been 10 years. Do not think this is cardiac in nature. Will follow results and recommendations.    - GASTROENTEROLOGY ADULT REF CONSULT ONLY  - GASTROENTEROLOGY ADULT REF PROCEDURE ONLY [facility] [phone]; [place] General Surgeon    4. Skin lesion  Patient has multiple scattered nevi  - DERMATOLOGY REFERRAL    5. Constipation, unspecified constipation type  Patient continues to have constipation and gut issues, has been through multiple therapies. Stopping iron may help some, think patient would benefit from seeing GI for further evaluation.   - GASTROENTEROLOGY ADULT REF CONSULT ONLY    6. Primary exertional headache  Patient with recent couple days with temple pain. Has not had any for 2 days now, patient did report she was straining to have a bowel movement prior to pain starting. Denies any other symptoms with pain. Would continue to monitor and work on keeping stools soft, patient also to see GI.         Patient Instructions   See Dermatology for body check     Should be taking Prilosec two times daily as prescribed     Will get lab work for iron today - Stop iron supplement for now     Need to start using Miralax more consistently, use one capful daily for right now until stools are soft and you are not straining, then go to every 2 days if bowel movement's are harder     Schedule to see Gastroenterology and for Upper endoscopy     No changes in allergy medications continue to take the Singulair, Flonase and Zyrtec    Return to clinic if head pains come back          MARVIN Mas [facility]

## 2018-05-09 NOTE — MR AVS SNAPSHOT
After Visit Summary   5/9/2018    Ingrid Amaral    MRN: [id number]           Patient Information     Date Of Birth          1945        Visit Information        Provider Department      5/9/2018 10:20 AM Soha Sanchez APRN CNP [facility]        Today's Diagnoses     Skin lesion    -  1    Gastroesophageal reflux disease, esophagitis presence not specified        Difficulty swallowing solids        Iron deficiency anemia secondary to inadequate dietary iron intake          Care Instructions    See Dermatology for body check     Should be taking Prilosec two times daily as prescribed     Will get lab work for iron today - Stop iron supplement for now     Need to start using Miralax more consistently, use one capful daily for right now until stools are soft and you are not straining, then go to every 2 days if bowel movement's are harder     Schedule to see Gastroenterology and for Upper endoscopy     No changes in allergy medications continue to take the Singulair, Flonase and Zyrtec    Return to clinic if head pains come back              Follow-ups after your visit        Additional Services     DERMATOLOGY REFERRAL       Your provider has referred you to: [facility]: [facility] [phone]   [email or web address]    Please be aware that coverage of these services is subject to the terms and limitations of your health insurance plan.  Call member services at your health plan with any benefit or coverage questions.      Please bring the following with you to your appointment:    (1) Any X-Rays, CTs or MRIs which have been performed.  Contact the facility where they were done to arrange for  prior to your scheduled appointment.    (2) List of current medications  (3) This referral request   (4) Any documents/labs given to you for this referral            GASTROENTEROLOGY ADULT REF CONSULT ONLY       Preferred Location:  [facility] [phone] and [facility] [place], [place]: [phone]      Please be aware that coverage of these services is subject to the terms and limitations of your health insurance plan.  Call member services at your health plan with any benefit or coverage questions.  Any procedures must be performed at a [place] facility OR coordinated by your clinic's referral office.    Please bring the following with you to your appointment:    (1) Any X-Rays, CTs or MRIs which have been performed.  Contact the facility where they were done to arrange for  prior to your scheduled appointment.    (2) List of current medications   (3) This referral request   (4) Any documents/labs given to you for this referral            GASTROENTEROLOGY ADULT REF PROCEDURE ONLY [facility] [phone]; [place] General Surgeon       Last Lab Result: Creatinine (mg/dL)       Date                     Value                 09/27/2017               0.73             ----------  Body mass index is 23.06 kg/(m^2).     Needed:  No  Language:  English    Patient will be contacted to schedule procedure.     Please be aware that coverage of these services is subject to the terms and limitations of your health insurance plan.  Call member services at your health plan with any benefit or coverage questions.  Any procedures must be performed at a [place] facility OR coordinated by your clinic's referral office.    Please bring the following with you to your appointment:    (1) Any X-Rays, CTs or MRIs which have been performed.  Contact the facility where they were done to arrange for  prior to your scheduled appointment.    (2) List of current medications   (3) This referral request   (4) Any documents/labs given to you for this referral                  Who to contact     If you have questions or need follow up information about today's clinic visit or your schedule please contact [facility]  directly at [phone].  Normal or non-critical lab and imaging results will be communicated to you by Pol[employer], letter or phone within 4 business days after the clinic has received the results. If you do not hear from us within 7 days, please contact the clinic through Polwirehart or phone. If you have a critical or abnormal lab result, we will notify you by phone as soon as possible.  Submit refill requests through [employer] or call your pharmacy and they will forward the refill request to us. Please allow 3 business days for your refill to be completed.          Additional Information About Your Visit        Polwirehart Information     [employer] gives you secure access to your electronic health record. If you see a primary care provider, you can also send messages to your care team and make appointments. If you have questions, please call your primary care clinic.  If you do not have a primary care provider, please call [phone] and they will assist you.        Care EveryWhere ID     This is your Care EveryWhere ID. This could be used by other organizations to access your [place] medical records  [id number]        Your Vitals Were     Pulse Temperature Respirations BMI (Body Mass Index)          64 98.2  F (36.8  C) (Tympanic) 18 23.06 kg/m2         Blood Pressure from Last 3 Encounters:   05/09/18 112/78   04/16/18 128/70   02/21/18 126/78    Weight from Last 3 Encounters:   05/09/18 130 lb 3.2 oz (59.1 kg)   04/16/18 128 lb 3.2 oz (58.2 kg)   02/21/18 128 lb 6.4 oz (58.2 kg)              We Performed the Following     CBC with platelets     DERMATOLOGY REFERRAL     Ferritin     GASTROENTEROLOGY ADULT REF CONSULT ONLY     GASTROENTEROLOGY ADULT REF PROCEDURE ONLY [facility] [phone]; [place] General Surgeon          Today's Medication Changes          These changes are accurate as of 5/9/18 11:25 AM.  If you have any questions, ask your nurse or doctor.               These medicines have changed or have  updated prescriptions.        Dose/Directions    calcipotriene 0.005 % Oint   This may have changed:    - when to take this  - reasons to take this   Used for:  Psoriasis        Externally apply topically 2 times daily   Quantity:  60 g   Refills:  0                Primary Care Provider Office Phone # Fax #    MARVIN Diaz [facility] [phone]       [facility] [address] [facility] [address]        Goals        General    Medication 1 (pt-stated)     Notes - Note created  4/2/2018 11:15 AM by Ria Izquierdo LSW    Goal Statement: I will allow my spouse to assist me with medications when issues arise,  for consistent administration.   Measure of Success: I am taking my medications at the right time and every day  Supportive Steps to Achieve: actively involved spouse with supportive ideas  Barriers: memory loss  Strengths: supportive family  Date to Achieve By: 5-2-18            Equal Access to Services     JORGE A BYRD : Hadbrent walden Sogladis, waaxda luqadaha, qaybta kaalmada ry, ollie rangel . So [facility] [phone].    ATENCIÓN: Si habla español, tiene a gauthier disposición servicios gratuitos de asistencia lingüística. Llame al [phone].    We comply with applicable federal civil rights laws and [place] laws. We do not discriminate on the basis of race, color, national origin, age, disability, sex, sexual orientation, or gender identity.            Thank you!     Thank you for choosing [facility]  for your care. Our goal is always to provide you with excellent care. Hearing back from our patients is one way we can continue to improve our services. Please take a few minutes to complete the written survey that you may receive in the mail after your visit with us. Thank you!             Your Updated Medication List - Protect others around you: Learn how to safely use, store and throw away your medicines at  www.disposemymeds.org.          This list is accurate as of 5/9/18 11:25 AM.  Always use your most recent med list.                   Brand Name Dispense Instructions for use Diagnosis    ADVAIR DISKUS 250-50 MCG/DOSE diskus inhaler   Generic drug:  fluticasone-salmeterol     1 Inhaler    INHALE ONE PUFF BY MOUTH TWICE A DAY    Moderate persistent asthma without complication       * albuterol (5 MG/ML) 0.5% neb solution    PROVENTIL    30 vial    Take 0.5 mLs (2.5 mg) by nebulization every 4 hours as needed for wheezing or shortness of breath / dyspnea    Moderate persistent asthma       * albuterol 108 (90 Base) MCG/ACT Inhaler    PROAIR HFA/PROVENTIL HFA/VENTOLIN HFA    1 Inhaler    Inhale 2 puffs into the lungs every 4 hours as needed for shortness of breath / dyspnea    Moderate persistent asthma without complication       amLODIPine 5 MG tablet    NORVASC    90 tablet    Take 1 tablet (5 mg) by mouth daily    Benign essential hypertension       ascorbic acid 500 MG Tabs      Take 1 tablet by mouth daily        ASPIRIN 81 PO      Take 1 tablet by mouth every other day        BENEFIBER PO      2 tsp daily        Biotin 70474 MCG Tabs      Take 1 tablet by mouth 2 times daily        calcipotriene 0.005 % Oint     60 g    Externally apply topically 2 times daily    Psoriasis       Calcium-Magnesium-Zinc 333..33-5 MG Tabs      Take 1 tablet by mouth daily        cetirizine 10 MG tablet    zyrTEC    90 tablet    Take 1 tablet (10 mg) by mouth every evening    Chronic rhinitis       clobetasol 0.05 % ointment    TEMOVATE    30 g    Apply to area of irritation twice daily for 2 weeks    Vulvitis       donepezil 5 MG tablet    ARICEPT    30 tablet    Take 1 tablet (5 mg) by mouth At Bedtime    Cognitive impairment       estradiol 0.1 MG/GM cream    ESTRACE VAGINAL    85 g    USE ONE GRAM VAGINALLY EVERY DAY AND SPREAD A SMALL AMOUNT AROUND THE CLITORAL REES    Vaginal atrophy, Labial and clitoral adhesions,  acquired       FLAXSEED      Reported on 5/2/2017        fluticasone 50 MCG/ACT spray    FLONASE    1 Bottle    Spray 1 spray into both nostrils daily    Allergic rhinitis, unspecified allergic rhinitis trigger, unspecified rhinitis seasonality       gabapentin 300 MG capsule    NEURONTIN    60 capsule    Take 1 capsule (300 mg) by mouth nightly as needed    Lumbar radiculopathy, Chronic right-sided low back pain with right-sided sciatica       hydrocortisone 1 % cream    CORTAID     Apply topically as needed        lactase 9000 units Tabs tablet    LACTAID     Take 9,000 Units by mouth as needed        montelukast 10 MG tablet    SINGULAIR    90 tablet    TAKE ONE TABLET BY MOUTH EVERY DAY    Moderate persistent asthma without complication       omeprazole 20 MG CR capsule    priLOSEC    180 capsule    Take 1 capsule (20 mg) by mouth 2 times daily For heartburn    Gastroesophageal reflux disease without esophagitis       order for DME     1 Device    Equipment being ordered: Nebulizer and tubing/filter    Moderate persistent asthma       PROBIOTIC DAILY PO      Take 1 chew tab by mouth 2 times daily        sertraline 100 MG tablet    ZOLOFT    90 tablet    Take 1.5 tablets (150 mg) by mouth daily    Panic disorder without agoraphobia       simvastatin 40 MG tablet    ZOCOR    90 tablet    Take 1 tablet (40 mg) by mouth At Bedtime    Elevated cholesterol with elevated triglycerides       SKIN OILS EX      Lavender-headache, skin, peppermint-skin, upset stomach mix of oils, asthma mix of oils, eucalyptus. Tea tree oil-skin, vapor rub- chest tightness, sleepy time (vetiver, lavendaer, mrjoram, mandarin, ylang)- sleep, constipation, eczema (sweet almond oil). Also mixes with carriers: Coconut oil, [place] oil, Extra virgin oil. Frankincense- cough. Digize- oil. Purification- oil, lemon- oil. Essentialyme- pill.  Inhales a mix with olive oil almond and coconut oil with peppermint, and eucalyptus- sinus, allergies.  Updated 12/1/2015.  Updated 4/26/2016: Lemon, Cinnamon bark, panaway, Thieves, Orange, Wintergreen, Copaiba        * Notice:  This list has 2 medication(s) that are the same as other medications prescribed for you. Read the directions carefully, and ask your doctor or other care provider to review them with you.

## 2018-05-09 NOTE — TELEPHONE ENCOUNTER
Daughter returns call and was advised of Dr. Guerrero's recommendation and they will schedule an ov with her and with Ingrid's pcp.    Gay Bhakta  [facility] RN

## 2018-05-09 NOTE — PATIENT INSTRUCTIONS
See Dermatology for body check     Should be taking Prilosec two times daily as prescribed     Will get lab work for iron today - Stop iron supplement for now     Need to start using Miralax more consistently, use one capful daily for right now until stools are soft and you are not straining, then go to every 2 days if bowel movement's are harder     Schedule to see Gastroenterology and for Upper endoscopy     No changes in allergy medications continue to take the Singulair, Flonase and Zyrtec    Return to clinic if head pains come back

## 2018-05-10 ASSESSMENT — PATIENT HEALTH QUESTIONNAIRE - PHQ9: SUM OF ALL RESPONSES TO PHQ QUESTIONS 1-9: 3

## 2018-05-14 DIAGNOSIS — F41.0 PANIC DISORDER WITHOUT AGORAPHOBIA: ICD-10-CM

## 2018-05-14 DIAGNOSIS — J31.0 CHRONIC RHINITIS: ICD-10-CM

## 2018-05-14 NOTE — TELEPHONE ENCOUNTER
SERTRALINE HCL 100MG TABS  Last Written Prescription Date:  5/2/2017  Last Fill Quantity: 90,  # refills: 3   Last office visit: 5/9/2018 with prescribing provider:  JACKELYN   Future Office Visit:      CETIRIZINE HCL 10MG TABS  Last Written Prescription Date:  5/2/2017  Last Fill Quantity: 90,  # refills: 3   Last office visit: 5/9/2018 with prescribing provider:  JACKELYN   Future Office Visit:

## 2018-05-15 RX ORDER — SERTRALINE HYDROCHLORIDE 100 MG/1
TABLET, FILM COATED ORAL
Qty: 135 TABLET | Refills: 1 | Status: SHIPPED | OUTPATIENT
Start: 2018-05-15 | End: 2018-10-29

## 2018-05-15 RX ORDER — CETIRIZINE HYDROCHLORIDE 10 MG/1
TABLET ORAL
Qty: 90 TABLET | Refills: 3 | Status: SHIPPED | OUTPATIENT
Start: 2018-05-15 | End: 2019-03-08

## 2018-06-06 ENCOUNTER — ANESTHESIA EVENT (OUTPATIENT)
Dept: GASTROENTEROLOGY | Facility: CLINIC | Age: 73
End: 2018-06-06
Payer: MEDICARE

## 2018-06-06 ASSESSMENT — LIFESTYLE VARIABLES: TOBACCO_USE: 1

## 2018-06-06 NOTE — ANESTHESIA PREPROCEDURE EVALUATION
Anesthesia Evaluation     . Pt has had prior anesthetic. Type: General and MAC    No history of anesthetic complications          ROS/MED HX    ENT/Pulmonary:     (+)allergic rhinitis, tobacco use, Past use Mild Persistent asthma Treatment: Inhaler prn and Inhaled steroids,  , . .    Neurologic:  - neg neurologic ROS     Cardiovascular:     (+) Dyslipidemia, hypertension----. : . . . :. . Previous cardiac testing date:results:date: results:ECG reviewed date:5-2017 results:Sinus Rhythm   -Right bundle branch block.     ABNORMAL date: results:          METS/Exercise Tolerance:  >4 METS   Hematologic:  - neg hematologic  ROS       Musculoskeletal:  - neg musculoskeletal ROS       GI/Hepatic:     (+) GERD Asymptomatic on medication, Other GI/Hepatic dark red stool, diarrhea, liver lesion      Renal/Genitourinary:  - ROS Renal section negative       Endo:  - neg endo ROS       Psychiatric: Comment:   Panic disorder without agoraphobia     (+) psychiatric history anxiety, depression and other (comment)      Infectious Disease:  - neg infectious disease ROS       Malignancy:      - no malignancy   Other:    - neg other ROS                 Physical Exam  Normal systems: cardiovascular, pulmonary and dental    Airway   Mallampati: II  TM distance: >3 FB  Neck ROM: full    Dental     Cardiovascular   Rhythm and rate: regular and normal      Pulmonary    breath sounds clear to auscultation                    Anesthesia Plan      History & Physical Review  History and physical reviewed and following examination; no interval change.    ASA Status:  2 .    NPO Status:  > 8 hours    Plan for MAC Reason for MAC:  Deep or markedly invasive procedure (G8)         Postoperative Care      Consents  Anesthetic plan, risks, benefits and alternatives discussed with:  Patient..                          .

## 2018-06-07 ENCOUNTER — ANESTHESIA (OUTPATIENT)
Dept: GASTROENTEROLOGY | Facility: CLINIC | Age: 73
End: 2018-06-07
Payer: MEDICARE

## 2018-06-07 ENCOUNTER — HOSPITAL ENCOUNTER (OUTPATIENT)
Facility: CLINIC | Age: 73
Discharge: HOME | End: 2018-06-07
Attending: SURGERY | Admitting: SURGERY
Payer: MEDICARE

## 2018-06-07 VITALS
SYSTOLIC BLOOD PRESSURE: 139 MMHG | HEART RATE: 53 BPM | TEMPERATURE: 97.2 F | BODY MASS INDEX: 22.5 KG/M2 | HEIGHT: 63 IN | RESPIRATION RATE: 16 BRPM | DIASTOLIC BLOOD PRESSURE: 78 MMHG | OXYGEN SATURATION: 98 % | WEIGHT: 127 LBS

## 2018-06-07 LAB — UPPER GI ENDOSCOPY: NORMAL

## 2018-06-07 PROCEDURE — 37000008 ZZH ANESTHESIA TECHNICAL FEE, 1ST 30 MIN: Performed by: SURGERY

## 2018-06-07 PROCEDURE — 25000128 H RX IP 250 OP 636: Performed by: NURSE ANESTHETIST, CERTIFIED REGISTERED

## 2018-06-07 PROCEDURE — 43235 EGD DIAGNOSTIC BRUSH WASH: CPT | Performed by: SURGERY

## 2018-06-07 PROCEDURE — 25000132 ZZH RX MED GY IP 250 OP 250 PS 637: Mod: GY | Performed by: SURGERY

## 2018-06-07 PROCEDURE — 25000125 ZZHC RX 250: Performed by: NURSE ANESTHETIST, CERTIFIED REGISTERED

## 2018-06-07 PROCEDURE — A9270 NON-COVERED ITEM OR SERVICE: HCPCS | Mod: GY | Performed by: SURGERY

## 2018-06-07 PROCEDURE — 25000128 H RX IP 250 OP 636: Performed by: SURGERY

## 2018-06-07 PROCEDURE — 25000125 ZZHC RX 250: Performed by: SURGERY

## 2018-06-07 RX ORDER — SIMETHICONE 40MG/0.6ML
SUSPENSION, DROPS(FINAL DOSAGE FORM)(ML) ORAL PRN
Status: DISCONTINUED | OUTPATIENT
Start: 2018-06-07 | End: 2018-06-07 | Stop reason: HOSPADM

## 2018-06-07 RX ORDER — ONDANSETRON 2 MG/ML
4 INJECTION INTRAMUSCULAR; INTRAVENOUS
Status: DISCONTINUED | OUTPATIENT
Start: 2018-06-07 | End: 2018-06-07 | Stop reason: HOSPADM

## 2018-06-07 RX ORDER — LIDOCAINE 40 MG/G
CREAM TOPICAL
Status: DISCONTINUED | OUTPATIENT
Start: 2018-06-07 | End: 2018-06-07 | Stop reason: HOSPADM

## 2018-06-07 RX ORDER — SODIUM CHLORIDE, SODIUM LACTATE, POTASSIUM CHLORIDE, CALCIUM CHLORIDE 600; 310; 30; 20 MG/100ML; MG/100ML; MG/100ML; MG/100ML
INJECTION, SOLUTION INTRAVENOUS CONTINUOUS
Status: DISCONTINUED | OUTPATIENT
Start: 2018-06-07 | End: 2018-06-07 | Stop reason: HOSPADM

## 2018-06-07 RX ORDER — PROPOFOL 10 MG/ML
INJECTION, EMULSION INTRAVENOUS PRN
Status: DISCONTINUED | OUTPATIENT
Start: 2018-06-07 | End: 2018-06-07

## 2018-06-07 RX ADMIN — PROPOFOL 100 MG: 10 INJECTION, EMULSION INTRAVENOUS at 12:59

## 2018-06-07 RX ADMIN — SODIUM CHLORIDE, POTASSIUM CHLORIDE, SODIUM LACTATE AND CALCIUM CHLORIDE: 600; 310; 30; 20 INJECTION, SOLUTION INTRAVENOUS at 12:56

## 2018-06-07 RX ADMIN — SODIUM CHLORIDE, POTASSIUM CHLORIDE, SODIUM LACTATE AND CALCIUM CHLORIDE: 600; 310; 30; 20 INJECTION, SOLUTION INTRAVENOUS at 12:10

## 2018-06-07 RX ADMIN — LIDOCAINE HYDROCHLORIDE 5 ML: 10 INJECTION, SOLUTION EPIDURAL; INFILTRATION; INTRACAUDAL; PERINEURAL at 12:10

## 2018-06-07 RX ADMIN — LIDOCAINE HYDROCHLORIDE 40 MG: 10 INJECTION, SOLUTION EPIDURAL; INFILTRATION; INTRACAUDAL; PERINEURAL at 12:59

## 2018-06-07 NOTE — ANESTHESIA CARE TRANSFER NOTE
Patient: Ingrid Amaral    Procedure(s):  gastroscopy - Wound Class: II-Clean Contaminated    Diagnosis: gastroesophageal reflux disease, esophagitis presence not specified, difficulty swallowing solids,     diagnostic  Diagnosis Additional Information: No value filed.    Anesthesia Type:   MAC     Note:  Airway :Nasal Cannula  Patient transferred to:Phase II  Handoff Report: Identifed the Patient, Identified the Reponsible Provider, Reviewed the pertinent medical history, Discussed the surgical course, Reviewed Intra-OP anesthesia mangement and issues during anesthesia, Set expectations for post-procedure period and Allowed opportunity for questions and acknowledgement of understanding      Vitals: (Last set prior to Anesthesia Care Transfer)    CRNA VITALS  6/7/2018 1238 - 6/7/2018 1312      6/7/2018             Pulse: 54    SpO2: 98 %                Electronically Signed By: Fernando Paiz CRNA, APRN CRNA  June 7, 2018  1:12 PM

## 2018-06-07 NOTE — ANESTHESIA POSTPROCEDURE EVALUATION
Patient: Ingrid Amaral    Procedure(s):  gastroscopy - Wound Class: II-Clean Contaminated    Diagnosis:gastroesophageal reflux disease, esophagitis presence not specified, difficulty swallowing solids,     diagnostic  Diagnosis Additional Information: No value filed.    Anesthesia Type:  MAC    Note:  Anesthesia Post Evaluation    Patient location during evaluation: Phase 2 and Bedside  Patient participation: Able to fully participate in evaluation  Level of consciousness: awake and alert  Pain management: adequate  Airway patency: patent  Cardiovascular status: acceptable  Respiratory status: acceptable  Hydration status: acceptable  PONV: none     Anesthetic complications: None          Last vitals:  Vitals:    06/07/18 1146   BP: 141/78   Pulse: 53   Resp: 18   Temp: 36.2  C (97.2  F)   SpO2: 99%         Electronically Signed By: Fernando Paiz CRNA, APRN CRNA  June 7, 2018  1:11 PM

## 2018-06-07 NOTE — H&P
72 year old year old female here for upper endoscopy for GERD        Patient Active Problem List   Diagnosis     Diarrhea     Pure hypercholesterolemia     Allergic rhinitis     Panic disorder without agoraphobia     Advanced directives, counseling/discussion     Liver lesion     Generalized anxiety disorder     Mild major depression (H)     Vulvitis     Vaginal wall prolapse     Chronic constipation     Moderate persistent asthma     Dysphagia     RBD (REM behavioral disorder)     FH: colon cancer     Hypertension goal BP (blood pressure) < 140/90     Meningioma (H)       Past Medical History:   Diagnosis Date     Asthma      GERD (gastroesophageal reflux disease)      Radial head fracture 3/18/2010       Past Surgical History:   Procedure Laterality Date     ANKLE SURGERY      bilateral     COLONOSCOPY       COLONOSCOPY N/A 3/20/2015    Procedure: COLONOSCOPY;  Surgeon: Britney Martinez MD;  Location: [facility]     COLONOSCOPY N/A 5/22/2017    Procedure: COLONOSCOPY;  Colonoscopy;  Surgeon: Tristen Rangel MD;  Location: [facility]     HC ESOPH/GAS REFLUX TEST W NASAL IMPED >1 HR  4/19/2012    Procedure:ESOPHAGEAL IMPEDENCE FUNCTION TEST WITH 24 HOUR PH GREATER THAN 1 HOUR; Surgeon:VALDO UMANZOR; Location: GI     HERNIA REPAIR      umbilcal     HYSTERECTOMY, PAP NO LONGER INDICATED  1991     TONSILLECTOMY       UPPER GI ENDOSCOPY         Family History   Problem Relation Age of Onset     HEART DISEASE Mother      valve problem     Hypertension Mother      DIABETES Mother      type 2     CEREBROVASCULAR DISEASE Mother      Allergies Mother      Eye Disorder Mother      Macular degeneration     OSTEOPOROSIS Mother      Respiratory Mother      Cardiovascular Father      MI at age 80     Cancer - colorectal Father      DIABETES Father      Hypertension Father      type 2     Eye Disorder Father      macular degeneration     Lipids Father      C.A.D. Maternal Grandmother      DIABETES Paternal Grandmother       type 2     Cardiovascular Paternal Grandmother      MI     DIABETES Sister      type 2     Allergies Sister      GASTROINTESTINAL DISEASE Sister      GASTROINTESTINAL DISEASE Daughter      GASTROINTESTINAL DISEASE Daughter        No current outpatient prescriptions on file.       Allergies   Allergen Reactions     Sulfa Drugs Other (See Comments)     Reaction unknown as a child       Pt reports that she has quit smoking. Her smoking use included Cigarettes. She has a 1.50 pack-year smoking history. She has never used smokeless tobacco. She reports that she drinks alcohol. She reports that she does not use illicit drugs.    Exam:    Awake, Alert OX3  Lungs - CTA bilaterally  CV - RRR, no murmurs, distal pulses intact  Abd - soft, non-distended, non-tender, +BS  Extr - No cyanosis or edema    A/P 72 year old year old female in need of upper endoscopy for GERD. Risks, benefits, alternatives, and complications were discussed including the possibility of perforation and the patient agreed to proceed.    Tristen Rangel MD

## 2018-06-08 ENCOUNTER — PATIENT OUTREACH (OUTPATIENT)
Dept: CARE COORDINATION | Facility: CLINIC | Age: 73
End: 2018-06-08

## 2018-06-08 NOTE — PROGRESS NOTES
Clinic Care Coordination Contact  [facility]       Clinical Data: Care Coordinator Outreach  Outreach attempted x 1, busy and unable to leave a message.    Plan:  Care Coordinator will try to reach patient again in 3-5 business days.  EM Mcfarlane, Clinic Care Coordinator 6/8/2018   10:54 AM  [phone]

## 2018-06-14 ENCOUNTER — APPOINTMENT (OUTPATIENT)
Dept: GENERAL RADIOLOGY | Facility: CLINIC | Age: 73
End: 2018-06-14
Attending: NURSE PRACTITIONER
Payer: MEDICARE

## 2018-06-14 ENCOUNTER — NURSE TRIAGE (OUTPATIENT)
Dept: NURSING | Facility: CLINIC | Age: 73
End: 2018-06-14

## 2018-06-14 ENCOUNTER — HOSPITAL ENCOUNTER (EMERGENCY)
Facility: CLINIC | Age: 73
Discharge: HOME | End: 2018-06-14
Attending: NURSE PRACTITIONER | Admitting: NURSE PRACTITIONER
Payer: MEDICARE

## 2018-06-14 VITALS
SYSTOLIC BLOOD PRESSURE: 122 MMHG | RESPIRATION RATE: 16 BRPM | HEART RATE: 67 BPM | BODY MASS INDEX: 22.5 KG/M2 | WEIGHT: 127 LBS | TEMPERATURE: 97.1 F | DIASTOLIC BLOOD PRESSURE: 74 MMHG | OXYGEN SATURATION: 96 %

## 2018-06-14 DIAGNOSIS — M25.562 ACUTE PAIN OF LEFT KNEE: Primary | ICD-10-CM

## 2018-06-14 PROCEDURE — 99284 EMERGENCY DEPT VISIT MOD MDM: CPT | Mod: 25 | Performed by: NURSE PRACTITIONER

## 2018-06-14 PROCEDURE — 25000132 ZZH RX MED GY IP 250 OP 250 PS 637: Mod: GY | Performed by: NURSE PRACTITIONER

## 2018-06-14 PROCEDURE — 73562 X-RAY EXAM OF KNEE 3: CPT | Mod: LT

## 2018-06-14 PROCEDURE — A9270 NON-COVERED ITEM OR SERVICE: HCPCS | Mod: GY | Performed by: NURSE PRACTITIONER

## 2018-06-14 PROCEDURE — 99284 EMERGENCY DEPT VISIT MOD MDM: CPT | Mod: Z6 | Performed by: NURSE PRACTITIONER

## 2018-06-14 PROCEDURE — 29505 APPLICATION LONG LEG SPLINT: CPT | Mod: LT | Performed by: NURSE PRACTITIONER

## 2018-06-14 RX ORDER — HYDROCODONE BITARTRATE AND ACETAMINOPHEN 5; 325 MG/1; MG/1
1 TABLET ORAL EVERY 6 HOURS PRN
Qty: 18 TABLET | Refills: 0 | Status: SHIPPED | OUTPATIENT
Start: 2018-06-14 | End: 2018-09-11

## 2018-06-14 RX ORDER — HYDROCODONE BITARTRATE AND ACETAMINOPHEN 5; 325 MG/1; MG/1
1 TABLET ORAL ONCE
Status: COMPLETED | OUTPATIENT
Start: 2018-06-14 | End: 2018-06-14

## 2018-06-14 RX ADMIN — HYDROCODONE BITARTRATE AND ACETAMINOPHEN 1 TABLET: 5; 325 TABLET ORAL at 11:51

## 2018-06-14 ASSESSMENT — ENCOUNTER SYMPTOMS
CONSTIPATION: 1
JOINT SWELLING: 1
SHORTNESS OF BREATH: 0
VOMITING: 0
WOUND: 0
DIARRHEA: 1
FATIGUE: 0
SEIZURES: 0
FEVER: 0
ABDOMINAL PAIN: 0
DYSURIA: 0
CONFUSION: 0
DIAPHORESIS: 0
BLOOD IN STOOL: 0
CHILLS: 0
DIZZINESS: 0
EYE PAIN: 0
WHEEZING: 0
DIFFICULTY URINATING: 0
HEMATURIA: 0
NAUSEA: 0
COUGH: 0

## 2018-06-14 NOTE — ED PROVIDER NOTES
History     Chief Complaint   Patient presents with     Knee Pain     L knee pain, injured yesterday, partial WB     HPI  Ingrid Amaral is a 72 year old female who admits to a history of asthma, anxiety with depression, hypertension, moderate dementia who presents to the emergency department with left knee pain.  Patient reports yesterday she was getting out of the pickup and jumped down and felt immediate pain in the inside and posterior portion of her knee and subsequently fell to the ground.  Patient states that she is able to bear weight but there is pain with twisting motions and flexion and straightening of the knee.  Patient reports the pain is sharp and stabbing and mild without movement and 8 out of 10 with movement.  Patient has taken 2 extra strength Tylenol last evening and 2 extra strength Tylenol early this morning and states that it has been mildly helpful.  Patient denies any other concerns at this point in time.    Problem List:    Patient Active Problem List    Diagnosis Date Noted     Meningioma (H) 02/27/2018     Priority: Medium     Hypertension goal BP (blood pressure) < 140/90 05/18/2017     Priority: Medium     FH: colon cancer 03/03/2015     Priority: Medium     RBD (REM behavioral disorder) 04/04/2014     Priority: Medium     Started clonazepam with Dr Gilliam.  Helps a lot.  Self-decreased to usually 0.5 mg nightly.       Dysphagia 01/24/2014     Priority: Medium     She has had complaints of atypical chest pressure and has been evaluated with EGD 3/2012 that was normal as well as manometry and 24-hour pH study. Manometry 4/2012 revealed abnormal bolus clearance for both liquids and solids. She then had a video swallow study done that was recommended swallowing exercises per her speech pathologist. It has been noted that both the symptoms of chest pressure and IBS have a direct correlation with increased stress/anxiety. She notes that the swallowing exercises do help with any  symptoms of dysphagia.          Moderate persistent asthma 09/12/2013     Priority: Medium     Vaginal wall prolapse 06/13/2013     Priority: Medium     Fit with size 2 ring with support pessary       Chronic constipation 06/13/2013     Priority: Medium     Vulvitis 08/07/2012     Priority: Medium     Biopsy proven lichen sclerosis-see pathology  Clobetasol ointment prn  Oatmeal sitz baths  Yearly exams       Liver lesion 08/03/2012     Priority: Medium     On MRCP 4/6/12, per GI needs yearly MRCP       Generalized anxiety disorder 08/03/2012     Priority: Medium     Diagnosis updated by automated process. Provider to review and confirm.       Mild major depression (H) 08/03/2012     Priority: Medium     Allergic rhinitis 09/08/2005     Priority: Medium     Hay fever  Problem list name updated by automated process. Provider to review       Panic disorder without agoraphobia 09/08/2005     Priority: Medium     Anxiety; panic disorder       Diarrhea 03/07/2005     Priority: Medium     This has been longstanding and has responded to pancrease in the past.  Colonoscopy 2/05 showed some areas of redness in the ilium which were negative on biopsy .  At times can tend toward constipation.  Always has some bloating and increased gas, occasionally associated with some pain       Pure hypercholesterolemia 03/07/2005     Priority: Medium     Has mild elevated LDL at 141.  No other risk factors for CAD and HDL 56.  No meds for now but will continue to follow.       Advanced directives, counseling/discussion 04/03/2012     Priority: Low     Patient has information at home and is working on finishing             Past Medical History:    Past Medical History:   Diagnosis Date     Asthma      GERD (gastroesophageal reflux disease)      Radial head fracture 3/18/2010       Past Surgical History:    Past Surgical History:   Procedure Laterality Date     ANKLE SURGERY      bilateral     COLONOSCOPY       COLONOSCOPY N/A 3/20/2015     Procedure: COLONOSCOPY;  Surgeon: Britney Martinez MD;  Location: [facility]     COLONOSCOPY N/A 5/22/2017    Procedure: COLONOSCOPY;  Colonoscopy;  Surgeon: Tristen Rangel MD;  Location: [facility]     ESOPHAGOSCOPY, GASTROSCOPY, DUODENOSCOPY (EGD), COMBINED N/A 6/7/2018    Procedure: COMBINED ESOPHAGOSCOPY, GASTROSCOPY, DUODENOSCOPY (EGD);  gastroscopy;  Surgeon: Tristen Rangel MD;  Location: [facility]     HC ESOPH/GAS REFLUX TEST W NASAL IMPED >1 HR  4/19/2012    Procedure:ESOPHAGEAL IMPEDENCE FUNCTION TEST WITH 24 HOUR PH GREATER THAN 1 HOUR; Surgeon:VALDO UMANZOR; Location: GI     HERNIA REPAIR      umbilcal     HYSTERECTOMY, PAP NO LONGER INDICATED  1991     TONSILLECTOMY       UPPER GI ENDOSCOPY         Family History:    Family History   Problem Relation Age of Onset     HEART DISEASE Mother      valve problem     Hypertension Mother      DIABETES Mother      type 2     CEREBROVASCULAR DISEASE Mother      Allergies Mother      Eye Disorder Mother      Macular degeneration     OSTEOPOROSIS Mother      Respiratory Mother      Cardiovascular Father      MI at age 80     Cancer - colorectal Father      DIABETES Father      Hypertension Father      type 2     Eye Disorder Father      macular degeneration     Lipids Father      C.A.D. Maternal Grandmother      DIABETES Paternal Grandmother      type 2     Cardiovascular Paternal Grandmother      MI     DIABETES Sister      type 2     Allergies Sister      GASTROINTESTINAL DISEASE Sister      GASTROINTESTINAL DISEASE Daughter      GASTROINTESTINAL DISEASE Daughter        Social History:  Marital Status:   [2]  Social History   Substance Use Topics     Smoking status: Former Smoker     Packs/day: 0.50     Years: 3.00     Types: Cigarettes     Smokeless tobacco: Never Used      Comment: smoked for 3 years when she was around 19     Alcohol use Yes      Comment: RARELY        Medications:      HYDROcodone-acetaminophen (NORCO) 5-325 MG per tablet   ADVAIR  DISKUS 250-50 MCG/DOSE diskus inhaler   albuterol (PROAIR HFA/PROVENTIL HFA/VENTOLIN HFA) 108 (90 BASE) MCG/ACT Inhaler   albuterol (PROVENTIL) (5 MG/ML) 0.5% nebulizer solution   amLODIPine (NORVASC) 5 MG tablet   ascorbic acid 500 MG TABS   ASPIRIN 81 PO   BENEFIBER OR   Biotin 55895 MCG TABS   calcipotriene 0.005 % OINT   Calcium-Magnesium-Zinc 333..33-5 MG TABS   cetirizine (ZYRTEC) 10 MG tablet   clobetasol (TEMOVATE) 0.05 % ointment   donepezil (ARICEPT) 5 MG tablet   estradiol (ESTRACE VAGINAL) 0.1 MG/GM cream   FLAXSEED   fluticasone (FLONASE) 50 MCG/ACT spray   gabapentin (NEURONTIN) 300 MG capsule   hydrocortisone 1 % cream   lactase (LACTAID) 9000 UNITS TABS   montelukast (SINGULAIR) 10 MG tablet   omeprazole (PRILOSEC) 20 MG CR capsule   ORDER FOR DME   Probiotic Product (PROBIOTIC DAILY PO)   sertraline (ZOLOFT) 100 MG tablet   simvastatin (ZOCOR) 40 MG tablet   SKIN OILS EX         Review of Systems   Constitutional: Negative for chills, diaphoresis, fatigue and fever.   HENT: Negative for congestion and ear pain.    Eyes: Negative for pain.   Respiratory: Negative for cough, shortness of breath and wheezing.    Cardiovascular: Negative for chest pain.   Gastrointestinal: Positive for constipation (chronic with IBS) and diarrhea (chronic with ibs). Negative for abdominal pain, blood in stool, nausea and vomiting.   Genitourinary: Negative for difficulty urinating, dysuria and hematuria.   Musculoskeletal: Positive for joint swelling (left knee). Gait problem: limp secondary to knee pain.   Skin: Negative for pallor, rash and wound.   Neurological: Negative for dizziness and seizures.   Psychiatric/Behavioral: Negative for confusion.   All other systems reviewed and are negative.      Physical Exam   BP: 110/73  Pulse: 67  Temp: 97.1  F (36.2  C)  Resp: 16  Weight: 57.6 kg (127 lb)  SpO2: 96 %      Physical Exam   Constitutional: She appears well-developed and well-nourished. No distress.   HENT:    Head: Normocephalic and atraumatic.   Eyes: Conjunctivae are normal. Right eye exhibits no discharge. Left eye exhibits no discharge.   Neck: Neck supple. No tracheal deviation present. No thyromegaly present.   Cardiovascular: Normal rate, regular rhythm and normal heart sounds.  Exam reveals no gallop and no friction rub.    No murmur heard.  Pedal pulses intact bilaterally and equal   Pulmonary/Chest: Effort normal and breath sounds normal. No stridor. No respiratory distress. She has no wheezes. She has no rales. She exhibits no tenderness.   Abdominal: Soft. Bowel sounds are normal. She exhibits no distension. There is no tenderness.   Musculoskeletal:        Right knee: Normal.        Left knee: She exhibits swelling (generalized medial joint swelling ). She exhibits normal range of motion, no effusion, no ecchymosis, no deformity, no laceration, no erythema, normal alignment, no LCL laxity, no bony tenderness and normal meniscus. Tenderness found. Medial joint line tenderness noted.   Lymphadenopathy:     She has no cervical adenopathy.   Neurological: She is alert.   Skin: Skin is warm and dry. No rash noted. She is not diaphoretic. No erythema. No pallor.   Psychiatric: She has a normal mood and affect.   Nursing note and vitals reviewed.      ED Course     ED Course     Procedures    Results for orders placed or performed during the hospital encounter of 06/14/18 (from the past 24 hour(s))   XR Knee Left 3 Views    Narrative    XR KNEE LT 3 VW 6/14/2018 11:52 AM    HISTORY: Jumped out of truck. Knee pain.    COMPARISON: 7/7/2008.      Impression    IMPRESSION: 3 views of the left knee show no acute osseous finding.  Soft tissue fullness in the suprapatellar bursa region suggests a knee  joint effusion.     LUX GATICA MD       Medications   HYDROcodone-acetaminophen (NORCO) 5-325 MG per tablet 1 tablet (1 tablet Oral Given 6/14/18 1151)       Assessments & Plan (with Medical Decision Making)     I have  reviewed the nursing notes.    I have reviewed the findings, diagnosis, plan and need for follow up with the patient.  Ingrid Amaral is a 72 year old female who admits to a history of asthma, anxiety with depression, hypertension, moderate dementia who presents to the emergency department with left knee pain.  Patient reports yesterday she was getting out of the pickup and jumped down and felt immediate pain in the inside and posterior portion of her knee and subsequently fell to the ground.  Exam as noted above.  Concern for patellar fracture versus knee contusion versus meniscus injury versus knee sprain and unlikely to be MCL or ACL.  X-ray ordered to rule out fracture versus dislocation.  Vicodin given for pain management.  Patient responded nicely to the Vicodin for pain management.  X-ray completed and no obvious fracture or dislocation.  Patient placed in knee immobilizer and cane and recommend utilize until seen in follow-up for orthopedics for further evaluation.  Patient discharged with instructions to take Tylenol thousand milligrams every 6 hours as needed and if pain is severe then replaced 1 extra strength Tylenol with 1 Vicodin every 6 hours.  Instructed that Vicodin can be constipating and discussed use of foods and or stool softeners to prevent constipation.  Patient verbalizes understanding denies any questions.      New Prescriptions    HYDROCODONE-ACETAMINOPHEN (NORCO) 5-325 MG PER TABLET    Take 1 tablet by mouth every 6 hours as needed for pain       Final diagnoses:   Acute pain of left knee       6/14/2018   [facility] EMERGENCY DEPARTMENT     Génesis Antoine APRN CNP  06/14/18 9001

## 2018-06-14 NOTE — ED AVS SNAPSHOT
[facility] Emergency Department    [address], [place] [address]    Phone:  [phone]    Fax:  [phone]                                       Ingrid Amaral   MRN: [id number]    Department:  [facility] Emergency Department   Date of Visit:  6/14/2018           After Visit Summary Signature Page     I have received my discharge instructions, and my questions have been answered. I have discussed any challenges I see with this plan with the nurse or doctor.    ..........................................................................................................................................  Patient/Patient Representative Signature      ..........................................................................................................................................  Patient Representative Print Name and Relationship to Patient    ..................................................               ................................................  Date                                            Time    ..........................................................................................................................................  Reviewed by Signature/Title    ...................................................              ..............................................  Date                                                            Time

## 2018-06-14 NOTE — TELEPHONE ENCOUNTER
See [facility] Nurse Advisor Notes from 6-14-18 at 5:20 PM    Per , Harsha, patient does not have hard copy of prescription for Vicodin.  Pharmacy will not dispense without paper copy.   states patient is in a lot of pain and plain Tylenol is not helping.    Called [facility] ER, spoke with SHALINI Zhong, who states that the prescription is at the [facility] pharmacy.  They will fill it now.    Called patient back, advised that the prescription is at the [facility] pharmacy. Patient states they may not pick it up tonight. Encouraged patient to consider picking it up to help manage her pain throughout the night.    Protocol and care advice reviewed  Caller states understanding of the recommended disposition  Advised to call back if further questions or concerns    Additional Information    Caller has medication question only, adult not sick, and triager answers question    Protocols used: [facility]

## 2018-06-14 NOTE — ED NOTES
"Pt presents to the ER with c/o L knee pain, onset yesterday after \"jumping down\" from the passenger seat of their truck.  No other injuries.  Painful to bare weight.  No obvious deformity.  "

## 2018-06-14 NOTE — DISCHARGE INSTRUCTIONS
Use cane and knee wrap at all times until seen in follow up with orthopedics.  You do not need to wear the knee brace at bedtime.  Take Tylenol 1000 mg every 6 hours as needed for pain if pain is severe instead of 1 of the extra strength Tylenol instead take hydrocodone every 6 hours.  Hydrocodone can cause constipation.  I recommend using a stool softener when taking any narcotics.  Follow-up with orthopedics.

## 2018-06-14 NOTE — TELEPHONE ENCOUNTER
" Harsha calling (consent to communicate on file) to request Vicodin ordered today in ED, be called in to [facility].  Ingrid \"blew out her knee\" and treated in ED with script ordered for Vicodin.   states script not at pharmacy, [facility] pharmacy told him he needed a \"hard copy\".  Requesting PCP (Leesa Muñiz PA-C) order said med to pharmacy.  Did advise med is listed as 'local print', and to check discharge paperwork.  Currently not at home, where discharge paperwork is.  Will look at home, and if not there will call back.  Did advise if not in paperwork, a message can be left for PCP to possibly address when clinic open, but not able to get ordered per PCP while clinic is closed.      Additional Information    Caller has medication question, adult has minor symptoms, caller declines triage, and triager answers question    Protocols used: MEDICATION QUESTION CALL-ADULT-    "

## 2018-06-14 NOTE — ED AVS SNAPSHOT
" [facility] Emergency Department    [address] [address]    Phone:  [phone]    Fax:  [phone]                                       Ingrid Amaral   MRN: [id number]    Department:  [facility] Emergency Department   Date of Visit:  6/14/2018           Patient Information     Date Of Birth          1945        Your diagnoses for this visit were:     Acute pain of left knee        You were seen by Génesis Antoine APRN CNP.      Follow-up Information     Go to orthopedics.        Discharge Instructions       Use cane and knee wrap at all times until seen in follow up with orthopedics.  You do not need to wear the knee brace at bedtime.  Take Tylenol 1000 mg every 6 hours as needed for pain if pain is severe instead of 1 of the extra strength Tylenol instead take hydrocodone every 6 hours.  Hydrocodone can cause constipation.  I recommend using a stool softener when taking any narcotics.  Follow-up with orthopedics.    24 Hour Appointment Hotline       To make an appointment at any [facility] clinic, call [phone] ([phone]). If you don't have a family doctor or clinic, we will help you find one. [facility] clinics are conveniently located to serve the needs of you and your family.          ED Discharge Orders     CANE, QUAD OR THREE PRONG           Knee Immobilizer w/Strap 18\"           ORTHO  REFERRAL       [facility] is referring you to the Orthopedic  Services at [facility].       The  Representative will assist you in the coordination of your Orthopedic and Musculoskeletal Care as prescribed by your physician.    The  Representative will call you within 1 business day to help schedule your appointment, or you may contact the  Representative at:    All areas ~ [phone]     Type of Referral : Non Surgical       Timeframe requested: 3 - 5 days    Coverage of these " services is subject to the terms and limitations of your health insurance plan.  Please call member services at your health plan with any benefit or coverage questions.      If X-rays, CT or MRI's have been performed, please contact the facility where they were done to arrange for , prior to your scheduled appointment.  Please bring this referral request to your appointment and present it to your specialist.                     Review of your medicines      START taking        Dose / Directions Last dose taken    HYDROcodone-acetaminophen 5-325 MG per tablet   Commonly known as:  NORCO   Dose:  1 tablet   Quantity:  18 tablet        Take 1 tablet by mouth every 6 hours as needed for pain   Refills:  0          Our records show that you are taking the medicines listed below. If these are incorrect, please call your family doctor or clinic.        Dose / Directions Last dose taken    ADVAIR DISKUS 250-50 MCG/DOSE diskus inhaler   Quantity:  1 Inhaler   Generic drug:  fluticasone-salmeterol        INHALE ONE PUFF BY MOUTH TWICE A DAY   Refills:  1        * albuterol (5 MG/ML) 0.5% neb solution   Commonly known as:  PROVENTIL   Dose:  2.5 mg   Quantity:  30 vial        Take 0.5 mLs (2.5 mg) by nebulization every 4 hours as needed for wheezing or shortness of breath / dyspnea   Refills:  1        * albuterol 108 (90 Base) MCG/ACT Inhaler   Commonly known as:  PROAIR HFA/PROVENTIL HFA/VENTOLIN HFA   Dose:  2 puff   Quantity:  1 Inhaler        Inhale 2 puffs into the lungs every 4 hours as needed for shortness of breath / dyspnea   Refills:  2        amLODIPine 5 MG tablet   Commonly known as:  NORVASC   Dose:  5 mg   Quantity:  90 tablet        Take 1 tablet (5 mg) by mouth daily   Refills:  1        ascorbic acid 500 MG Tabs   Dose:  1 tablet        Take 1 tablet by mouth daily   Refills:  0        ASPIRIN 81 PO   Dose:  1 tablet        Take 1 tablet by mouth every other day   Refills:  0        BENEFIBER PO         2 tsp daily   Refills:  0        Biotin 38974 MCG Tabs   Dose:  1 tablet        Take 1 tablet by mouth 2 times daily   Refills:  0        calcipotriene 0.005 % Oint   Quantity:  60 g        Externally apply topically 2 times daily   Refills:  0        Calcium-Magnesium-Zinc 333..33-5 MG Tabs   Dose:  1 tablet        Take 1 tablet by mouth daily   Refills:  0        cetirizine 10 MG tablet   Commonly known as:  zyrTEC   Quantity:  90 tablet        TAKE ONE TABLET BY MOUTH EVERY EVENING   Refills:  3        clobetasol 0.05 % ointment   Commonly known as:  TEMOVATE   Quantity:  30 g        Apply to area of irritation twice daily for 2 weeks   Refills:  0        donepezil 5 MG tablet   Commonly known as:  ARICEPT   Dose:  5 mg   Quantity:  30 tablet        Take 1 tablet (5 mg) by mouth At Bedtime   Refills:  3        estradiol 0.1 MG/GM cream   Commonly known as:  ESTRACE VAGINAL   Quantity:  85 g        USE ONE GRAM VAGINALLY EVERY DAY AND SPREAD A SMALL AMOUNT AROUND THE CLITORAL REES   Refills:  1        FLAXSEED        Reported on 5/2/2017   Refills:  0        fluticasone 50 MCG/ACT spray   Commonly known as:  FLONASE   Dose:  1 spray   Quantity:  1 Bottle        Spray 1 spray into both nostrils daily   Refills:  11        gabapentin 300 MG capsule   Commonly known as:  NEURONTIN   Dose:  300 mg   Quantity:  60 capsule        Take 1 capsule (300 mg) by mouth nightly as needed   Refills:  0        hydrocortisone 1 % cream   Commonly known as:  CORTAID        Apply topically as needed   Refills:  0        lactase 9000 units Tabs tablet   Commonly known as:  LACTAID   Dose:  9000 Units        Take 9,000 Units by mouth as needed   Refills:  0        montelukast 10 MG tablet   Commonly known as:  SINGULAIR   Quantity:  90 tablet        TAKE ONE TABLET BY MOUTH EVERY DAY   Refills:  1        omeprazole 20 MG CR capsule   Commonly known as:  priLOSEC   Dose:  20 mg   Quantity:  180 capsule        Take 1 capsule  (20 mg) by mouth 2 times daily For heartburn   Refills:  3        order for DME   Quantity:  1 Device        Equipment being ordered: Nebulizer and tubing/filter   Refills:  0        PROBIOTIC DAILY PO   Dose:  1 chew tab        Take 1 chew tab by mouth 2 times daily   Refills:  0        sertraline 100 MG tablet   Commonly known as:  ZOLOFT   Quantity:  135 tablet        TAKE ONE AND ONE-HALF TABLET BY MOUTH ONCE DAILY   Refills:  1        simvastatin 40 MG tablet   Commonly known as:  ZOCOR   Dose:  40 mg   Quantity:  90 tablet        Take 1 tablet (40 mg) by mouth At Bedtime   Refills:  3        SKIN OILS EX        Lavender-headache, skin, peppermint-skin, upset stomach mix of oils, asthma mix of oils, eucalyptus. Tea tree oil-skin, vapor rub- chest tightness, sleepy time (vetiver, lavendaer, mrjoram, mandarin, ylang)- sleep, constipation, eczema (sweet almond oil). Also mixes with carriers: Coconut oil, [place] oil, Extra virgin oil. Frankincense- cough. Digize- oil. Purification- oil, lemon- oil. Essentialyme- pill.  Inhales a mix with olive oil almond and coconut oil with peppermint, and eucalyptus- sinus, allergies. Updated 12/1/2015.  Updated 4/26/2016: Lemon, Cinnamon bark, panaway, Thieves, Orange, [place], Copaiba   Refills:  0        * Notice:  This list has 2 medication(s) that are the same as other medications prescribed for you. Read the directions carefully, and ask your doctor or other care provider to review them with you.            Information about OPIOIDS     PRESCRIPTION OPIOIDS: WHAT YOU NEED TO KNOW   We gave you an opioid (narcotic) pain medicine. It is important to manage your pain, but opioids are not always the best choice. You should first try all the other options your care team gave you. Take this medicine for as short a time (and as few doses) as possible.     These medicines have risks:    DO NOT drive when on new or higher doses of pain medicine. These medicines can affect your  alertness and reaction times, and you could be arrested for driving under the influence (DUI). If you need to use opioids long-term, talk to your care team about driving.    DO NOT operate heave machinery    DO NOT do any other dangerous activities while taking these medicines.     DO NOT drink any alcohol while taking these medicines.      If the opioid prescribed includes acetaminophen, DO NOT take with any other medicines that contain acetaminophen. Read all labels carefully. Look for the word  acetaminophen  or  Tylenol.  Ask your pharmacist if you have questions or are unsure.    You can get addicted to pain medicines, especially if you have a history of addiction (chemical, alcohol or substance dependence). Talk to your care team about ways to reduce this risk.    Store your pills in a secure place, locked if possible. We will not replace any lost or stolen medicine. If you don t finish your medicine, please throw away (dispose) as directed by your pharmacist. The [place] Pollution Control Agency has more information about safe disposal: https://www.pca.[facility].[place].us/living-green/managing-unwanted-medications.     All opioids tend to cause constipation. Drink plenty of water and eat foods that have a lot of fiber, such as fruits, vegetables, prune juice, apple juice and high-fiber cereal. Take a laxative (Miralax, milk of magnesia, Colace, Senna) if you don t move your bowels at least every other day.         Prescriptions were sent or printed at these locations (1 Prescription)                   [facility] - [address] [address]    Telephone:  [phone]   Fax:  [phone]   Hours:                  Printed at Department/Unit printer (1 of 1)         HYDROcodone-acetaminophen (NORCO) 5-325 MG per tablet                Procedures and tests performed during your visit     XR Knee Left 3 Views      Orders Needing Specimen Collection     None       Pending Results     No orders found from 6/12/2018 to 6/15/2018.            Pending Culture Results     No orders found from 6/12/2018 to 6/15/2018.            Pending Results Instructions     If you had any lab results that were not finalized at the time of your Discharge, you can call the ED Lab Result RN at [phone]. You will be contacted by this team for any positive Lab results or changes in treatment. The nurses are available 7 days a week from 10A to 6:30P.  You can leave a message 24 hours per day and they will return your call.        Test Results From Your Hospital Stay        6/14/2018  1:33 PM      Narrative     XR KNEE LT 3 VW 6/14/2018 11:52 AM    HISTORY: Jumped out of truck. Knee pain.    COMPARISON: 7/7/2008.        Impression     IMPRESSION: 3 views of the left knee show no acute osseous finding.  Soft tissue fullness in the suprapatellar bursa region suggests a knee  joint effusion.     LUX GATICA MD                Thank you for choosing [facility]       Thank you for choosing [facility] for your care. Our goal is always to provide you with excellent care. Hearing back from our patients is one way we can continue to improve our services. Please take a few minutes to complete the written survey that you may receive in the mail after you visit with us. Thank you!        [facility] Information     [facility] gives you secure access to your electronic health record. If you see a primary care provider, you can also send messages to your care team and make appointments. If you have questions, please call your primary care clinic.  If you do not have a primary care provider, please call [phone] and they will assist you.        Care EveryWhere ID     This is your Care EveryWhere ID. This could be used by other organizations to access your [facility] medical records  [id number]        Equal Access to Services     JORGE A BYRD : gavino Esqueda qaybta kaalmada adeegyada,  ollie oropeza'aan ah. So [facility] [place] [phone].    ATENCIÓN: Si habla español, tiene a gauthier disposición servicios gratuitos de asistencia lingüística. Llame al [phone].    We comply with applicable federal civil rights laws and [place] laws. We do not discriminate on the basis of race, color, national origin, age, disability, sex, sexual orientation, or gender identity.            After Visit Summary       This is your record. Keep this with you and show to your community pharmacist(s) and doctor(s) at your next visit.

## 2018-06-15 NOTE — PROGRESS NOTES
Clinic Care Coordination Contact  Care Team Conversations    Post ED follow up call placed.  Pt is taking it easy with her leg.  Spouse got on the phone and said they are doing well.  They will go back in a week or so to get the knee checked out and if needed get an MRI.  Their hope is that it will heal enough that they do not need surgery.      They had no questions regarding the Ed stay or follow up.  Spouse continues to help manage medications.      Pt is resting and not going out on uneven ground.  She said she acted like she was 19 when she jumped off the truck bed.      They have 14 grand kids and a son who lives below them to help out.  They have bunnies, chickens, and eggs about to [place].  [place] also [place]      Plan;  Pt to rest her leg and follow up as directed.  Sw to call in about one month.     EM Mcfarlane, Clinic Care Coordinator 6/15/2018   1:46 PM  [phone]

## 2018-06-19 DIAGNOSIS — N76.3 CHRONIC VULVITIS: Primary | ICD-10-CM

## 2018-06-19 DIAGNOSIS — J45.40 MODERATE PERSISTENT ASTHMA WITHOUT COMPLICATION: ICD-10-CM

## 2018-06-19 RX ORDER — CLOBETASOL PROPIONATE 0.5 MG/G
OINTMENT TOPICAL
Qty: 30 G | Refills: 0 | Status: SHIPPED | OUTPATIENT
Start: 2018-06-19 | End: 2018-11-02

## 2018-06-19 NOTE — TELEPHONE ENCOUNTER
clobetasol (TEMOVATE) 0.05 % ointment      Last Written Prescription Date:  3/2618  Last Fill Quantity: 30g,   # refills: 0  Last Office Visit: 5/9/18  Future Office visit:       Routing refill request to provider for review/approval because:  Drug not on the [facility], P or [facility] refill protocol or controlled substance

## 2018-07-13 ENCOUNTER — PATIENT OUTREACH (OUTPATIENT)
Dept: CARE COORDINATION | Facility: CLINIC | Age: 73
End: 2018-07-13

## 2018-07-13 ASSESSMENT — ACTIVITIES OF DAILY LIVING (ADL): DEPENDENT_IADLS:: MEDICATION MANAGEMENT

## 2018-07-13 NOTE — PROGRESS NOTES
Clinic Care Coordination Contact    Clinic Care Coordination Contact  OUTREACH    Referral Information:  Referral Source: PCP    Primary Diagnosis: Psychosocial    Chief Complaint   Patient presents with     Clinic Care Coordination - Follow-up     sw        Universal Utilization: no concerns  Clinic Utilization  Difficulty keeping appointments:: No  Utilization    Last refreshed: 7/6/2018 10:40 PM:  No Show Count (past year) 0       Last refreshed: 7/6/2018 10:40 PM:  ED visits 1       Last refreshed: 7/6/2018 10:40 PM:  Hospital admissions 0          Current as of: 7/6/2018 10:40 PM             Clinical Concerns:  Current Medical Concerns:  Per spouse pt's knee is improving with only a slight favoring of that leg.  Her back is bothering a little due to being out in the garden again and possibly due to recent knee injury causing altered gait.     Current Behavioral Concerns: not discussed with spouse. He did feel she was doing well with depression and anxiety.       Education Provided to patient: spouse feels pt is doing pretty well with her knee.  She was out with the bunnies and not able to talk.  Spouse continues to monitor pt's meds and changes to assist.    Pain  Chronic pain (GOAL):: No  Health Maintenance Reviewed: Up to date  Clinical Pathway: None    Medication Management:  Spouse assists as needed for accurate administration.      Functional Status:  Dependent ADLs:: Independent  Dependent IADLs:: Medication Management (starting to have concerns)  Bed or wheelchair confined:: No  Mobility Status: Independent  Fallen 2 or more times in the past year?: No  Any fall with injury in the past year?: No    Living Situation:  Current living arrangement:: I live in a private home with spouse  Type of residence:: Private home - stairs    Diet/Exercise/Sleep:  Diet:: Regular  Inadequate nutrition (GOAL):: No  Food Insecurity: No  Tube Feeding: No    Transportation:  Transportation concerns (GOAL)::  No  Transportation means:: Accessible car, Family     Psychosocial:  Roman Catholic or spiritual beliefs that impact treatment:: No  Mental health DX:: Yes  Mental health DX how managed:: Medication  Mental health management concern (GOAL):: No  Informal Support system:: Family, Friends     Financial/Insurance:   Financial/Insurance concerns (GOAL):: No  Spouse felt that they were doing well and had no concerns.  They had been to the back to the 50[place] event at the [place] [employer] and managed well. He had no concerns at this time.      Resources and Interventions:  Current Resources:     Community Resources: None  Supplies used at home:: None  Equipment Currently Used at Home: none    Advance Care Plan/Directive  Advanced Care Plans/Directives on file:: In process  Advanced Care Plan/Directive Status: In Process    Referrals Placed: Alzheimer's Association, Senior Linkage Line     Goals:   Goals        General    Medication 1 (pt-stated)     Notes - Note created  4/2/2018 11:15 AM by Ria Izquierdo LSW    Goal Statement: I will allow my spouse to assist me with medications when issues arise,  for consistent administration.   Measure of Success: I am taking my medications at the right time and every day  Supportive Steps to Achieve: actively involved spouse with supportive ideas  Barriers: memory loss  Strengths: supportive family  Date to Achieve By: 5-2-18                  Patient/Caregiver understanding: goal updated to another three months (updated in plan and not reflected above).  Spouse to continue to assist.     Outreach Frequency: 6 weeks      Plan: spouse to continue to assist with oversight of medications.  Sw to call in about 6 weeks for check in and assessment of needs.     EM Mcfarlane, Clinic Care Coordinator 7/13/2018   1:03 PM  [phone]

## 2018-07-13 NOTE — LETTER
[facility]  Complex Care Plan  About Me  Patient Name:  Ingrid Yancey    YOB: 1945  Age:     72 year old   Cassandra MRN:   [id number] Telephone Information:    Home Phone [phone]   Mobile Not on file.       Address:    [address] [place] [id number] Email address:  [email or web address]      Emergency Contact(s)  Name Relationship Lgl Grd Work Phone Home Phone Mobile Phone   1. BINDU YANCEY Spouse  none [phone] none   2. KORINA SILVESTRE Daughter  none [phone]           Primary language:  English     needed? No   [employer]:  [phone] op. 1  Other communication barriers: Cognitive impairment (starting)  Preferred Method of Communication:  Phone  Current living arrangement: I live in a private home with spouse  Mobility Status/ Medical Equipment: Independent    Health Maintenance  Health Maintenance Reviewed: Up to date    My Access Plan  Medical Emergency 911   Primary Clinic Line [facility] - [phone]   24 Hour Appointment Line [phone] or  [phone]) (toll-free)   24 Hour Nurse Line [phone] (toll-free)   Preferred Urgent Care [facility], [phone]   [facility] [place], [place]  [phone]   Preferred Pharmacy [facility] [place], [place] - [address] Behavioral Health Crisis Line The National Suicide Prevention Lifeline at 1-952.319.2812 or 911     My Care Team Members    Patient Care Team       Relationship Specialty Notifications Start End    Soha Sanchez APRN CNP PCP - General Nurse Practitioner - Family  5/2/17     Phone: [phone] Fax: [phone]         [facility] [address] [facility] [address]    Ria Izquierdo LSW Clinic Care Coordinator  Admissions 5/18/17     Phone: [phone] Fax: [phone]                My Care Plans  Self  Management and Treatment Plan  Goals and (Comments)  Goals        General    Medication 1 (pt-stated)     Notes - Note created  2018 11:15 AM by Ria Izquierdo LSW    Goal Statement: I will allow my spouse to assist me with medications when issues arise,  for consistent administration.   Measure of Success: I am taking my medications at the right time and every day  Supportive Steps to Achieve: actively involved spouse with supportive ideas  Barriers: memory loss  Strengths: supportive family  Date to Achieve By: 18                 Action Plans on File:   Asthma        Depression          Advance Care Plans/Directives Type:        My Medical and Care Information  Problem List   Patient Active Problem List   Diagnosis     Diarrhea     Pure hypercholesterolemia     Allergic rhinitis     Panic disorder without agoraphobia     Advanced directives, counseling/discussion     Liver lesion     Generalized anxiety disorder     Mild major depression (H)     Vulvitis     Vaginal wall prolapse     Chronic constipation     Moderate persistent asthma     Dysphagia     RBD (REM behavioral disorder)     FH: colon cancer     Hypertension goal BP (blood pressure) < 140/90     Meningioma (H)      Current Medications and Allergies:     Medication List  PCP:  Soha Sanchez       Patient Information      Patient Name Sex Ingrid Harmon ([id number]) Female 1945       Allergies as of 2018  Reviewed On: 2018 By: Jhoanne Campa, RN        Severity Noted Reaction Type Reactions     Sulfa Drugs  2005    Other (See Comments)     Reaction unknown as a child       Medications - This is your record. Keep this with you and show to your community pharmacist(s) and physician(s) at each visit.         Instructions for use     ADVAIR DISKUS 250-50 MCG/DOSE diskus inhaler INHALE ONE PUFF BY MOUTH TWICE A DAY     albuterol (PROAIR HFA/PROVENTIL HFA/VENTOLIN HFA) 108 (90 BASE) MCG/ACT Inhaler Inhale 2 puffs  into the lungs every 4 hours as needed for shortness of breath / dyspnea     albuterol (PROVENTIL) (5 MG/ML) 0.5% nebulizer solution Take 0.5 mLs (2.5 mg) by nebulization every 4 hours as needed for wheezing or shortness of breath / dyspnea      Patient not taking: Reported on 5/9/2018     amLODIPine (NORVASC) 5 MG tablet Take 1 tablet (5 mg) by mouth daily     ascorbic acid 500 MG TABS Take 1 tablet by mouth daily     ASPIRIN 81 PO Take 1 tablet by mouth every other day      BENEFIBER OR 2 tsp daily     Biotin 46722 MCG TABS Take 1 tablet by mouth 2 times daily     calcipotriene 0.005 % OINT Externally apply topically 2 times daily      Patient taking differently: Externally apply topically as needed      Calcium-Magnesium-Zinc 333..33-5 MG TABS Take 1 tablet by mouth daily     cetirizine (ZYRTEC) 10 MG tablet TAKE ONE TABLET BY MOUTH EVERY EVENING     clobetasol (TEMOVATE) 0.05 % ointment APPLY TO AREA OF IRRITATION TWICE DAILY FOR 2 WEEKS     donepezil (ARICEPT) 5 MG tablet Take 1 tablet (5 mg) by mouth At Bedtime     estradiol (ESTRACE VAGINAL) 0.1 MG/GM cream USE ONE GRAM VAGINALLY EVERY DAY AND SPREAD A SMALL AMOUNT AROUND THE CLITORAL REES     FLAXSEED Reported on 5/2/2017     fluticasone (FLONASE) 50 MCG/ACT spray Spray 1 spray into both nostrils daily     gabapentin (NEURONTIN) 300 MG capsule Take 1 capsule (300 mg) by mouth nightly as needed     HYDROcodone-acetaminophen (NORCO) 5-325 MG per tablet Take 1 tablet by mouth every 6 hours as needed for pain     hydrocortisone 1 % cream Apply topically as needed     lactase (LACTAID) 9000 UNITS TABS Take 9,000 Units by mouth as needed     montelukast (SINGULAIR) 10 MG tablet TAKE ONE TABLET BY MOUTH EVERY DAY     omeprazole (PRILOSEC) 20 MG CR capsule Take 1 capsule (20 mg) by mouth 2 times daily For heartburn     ORDER FOR DME Equipment being ordered: Nebulizer and tubing/filter      Patient not taking: Reported on 5/9/2018     Probiotic Product  (PROBIOTIC DAILY PO) Take 1 chew tab by mouth 2 times daily      sertraline (ZOLOFT) 100 MG tablet TAKE ONE AND ONE-HALF TABLET BY MOUTH ONCE DAILY     simvastatin (ZOCOR) 40 MG tablet Take 1 tablet (40 mg) by mouth At Bedtime     SKIN OILS EX Lavender-headache, skin, peppermint-skin, upset stomach mix of oils, asthma mix of oils, eucalyptus. Tea tree oil-skin, vapor rub- chest tightness, sleepy time (vetiver, lavendaer, mrjoram, mandarin, ylang)- sleep, constipation, eczema (sweet almond oil). Also mixes with carriers: Coconut oil, [place] oil, Extra virgin oil. Frankincense- cough. Digize- oil. Purification- oil, lemon- oil. Essentialyme- pill.  Inhales a mix with olive oil almond and coconut oil with peppermint, and eucalyptus- sinus, allergies. Updated 12/1/2015.   Updated 4/26/2016: Lemon, Cinnamon bark, panaway, Thieves, Orange, [place], Copaiba             Care Coordination Start Date: 5/18/2017   Frequency of Care Coordination: 6 weeks   Form Last Updated: 07/13/2018

## 2018-07-24 DIAGNOSIS — K21.9 GASTROESOPHAGEAL REFLUX DISEASE WITHOUT ESOPHAGITIS: ICD-10-CM

## 2018-07-24 DIAGNOSIS — M54.41 CHRONIC RIGHT-SIDED LOW BACK PAIN WITH RIGHT-SIDED SCIATICA: ICD-10-CM

## 2018-07-24 DIAGNOSIS — G89.29 CHRONIC RIGHT-SIDED LOW BACK PAIN WITH RIGHT-SIDED SCIATICA: ICD-10-CM

## 2018-07-24 DIAGNOSIS — M54.16 LUMBAR RADICULOPATHY: ICD-10-CM

## 2018-07-25 NOTE — TELEPHONE ENCOUNTER
"Requested Prescriptions   Pending Prescriptions Disp Refills     omeprazole (PRILOSEC) 20 MG CR capsule [Pharmacy Med Name: OMEPRAZOLE 20MG CPDR] 180 capsule 3     Sig: TAKE ONE CAPSULE BY MOUTH TWICE A DAY FOR HEARTBURN    PPI Protocol Passed    7/24/2018  2:43 PM       Passed - Not on Clopidogrel (unless Pantoprazole ordered)       Passed - No diagnosis of osteoporosis on record       Passed - Recent (12 mo) or future (30 days) visit within the authorizing provider's specialty    Patient had office visit in the last 12 months or has a visit in the next 30 days with authorizing provider or within the authorizing provider's specialty.  See \"Patient Info\" tab in inbasket, or \"Choose Columns\" in Meds & Orders section of the refill encounter.     Last Written Prescription Date:  5/2/18  Last Fill Quantity: 180,  # refills: 3   Last office visit: 5/9/2018 with prescribing provider:     Future Office Visit:              Passed - Patient is age 18 or older       Passed - No active pregnacy on record       Passed - No positive pregnancy test in past 12 months          gabapentin (NEURONTIN) 300 MG capsule      Last Written Prescription Date:  1/9/18  Last Fill Quantity: 60,   # refills: 0  Last Office Visit: 5/9/18  Future Office visit:       Routing refill request to provider for review/approval because:  Drug not on the [facility] or [facility] refill protocol or controlled substance    "

## 2018-07-26 RX ORDER — GABAPENTIN 300 MG/1
CAPSULE ORAL
Qty: 60 CAPSULE | Refills: 0 | Status: SHIPPED | OUTPATIENT
Start: 2018-07-26 | End: 2018-11-30

## 2018-08-24 DIAGNOSIS — E78.2 ELEVATED CHOLESTEROL WITH ELEVATED TRIGLYCERIDES: ICD-10-CM

## 2018-08-24 DIAGNOSIS — R41.89 COGNITIVE IMPAIRMENT: ICD-10-CM

## 2018-08-24 DIAGNOSIS — J30.9 ALLERGIC RHINITIS: ICD-10-CM

## 2018-08-24 DIAGNOSIS — J45.40 MODERATE PERSISTENT ASTHMA WITHOUT COMPLICATION: ICD-10-CM

## 2018-08-24 RX ORDER — DONEPEZIL HYDROCHLORIDE 5 MG/1
5 TABLET, FILM COATED ORAL AT BEDTIME
Qty: 30 TABLET | Refills: 3 | Status: SHIPPED | OUTPATIENT
Start: 2018-08-24 | End: 2018-11-02

## 2018-08-24 NOTE — TELEPHONE ENCOUNTER
"Requested Prescriptions   Pending Prescriptions Disp Refills     ADVAIR DISKUS 250-50 MCG/DOSE diskus inhaler [Pharmacy Med Name: ADVAIR DISKUS 250-50MCG/DOSE AEPB]  1     Sig: INHALE ONE PUFF BY MOUTH TWICE A DAY    Inhaled Steroids Protocol Failed    8/24/2018 11:25 AM       Failed - Asthma control assessment score within normal limits in last 6 months    Please review ACT score.          Passed - Patient is age 12 or older       Passed - Recent (6 mo) or future (30 days) visit within the authorizing provider's specialty    Patient had office visit in the last 6 months or has a visit in the next 30 days with authorizing provider or within the authorizing provider's specialty.  See \"Patient Info\" tab in inbasket, or \"Choose Columns\" in Meds & Orders section of the refill encounter.            Last Written Prescription Date:  6/19/18  Last Fill Quantity: 1,  # refills: 1   Last office visit: 5/9/2018 with prescribing provider:  CHASE   Future Office Visit:      "

## 2018-08-24 NOTE — TELEPHONE ENCOUNTER
"Requested Prescriptions   Pending Prescriptions Disp Refills     donepezil (ARICEPT) 5 MG tablet 30 tablet 3     Sig: Take 1 tablet (5 mg) by mouth At Bedtime    Miscellaneous Dementia Agents Passed    8/24/2018 12:33 PM       Passed - Recent (12 mo) or future (30 days) visit within the authorizing provider's specialty    Patient had office visit in the last 12 months or has a visit in the next 30 days with authorizing provider or within the authorizing provider's specialty.  See \"Patient Info\" tab in inbasket, or \"Choose Columns\" in Meds & Orders section of the refill encounter.           Passed - Patient is 18 years of age or older        Rx is prescribed by Dr. Kimberlyn Hay (formerly Dr. Guerrero).     Charity SORIA RN    "

## 2018-08-24 NOTE — TELEPHONE ENCOUNTER
LOV 4/16/18:  Return to clinic after the above tests/consultations (2-3 months).   Aricept approved by PCP per last office visit note.   Refill granted per LOV note- patient to make follow-up appointment. Will advise patient to make appointment.     Sneha SALVADOR RN   Specialty Clinics

## 2018-08-27 RX ORDER — SIMVASTATIN 40 MG
TABLET ORAL
Qty: 90 TABLET | Refills: 0 | Status: SHIPPED | OUTPATIENT
Start: 2018-08-27 | End: 2018-10-29

## 2018-08-27 RX ORDER — FLUTICASONE PROPIONATE 50 MCG
SPRAY, SUSPENSION (ML) NASAL
Qty: 16 G | Refills: 1 | Status: SHIPPED | OUTPATIENT
Start: 2018-08-27 | End: 2019-03-01

## 2018-08-28 ENCOUNTER — PATIENT OUTREACH (OUTPATIENT)
Dept: CARE COORDINATION | Facility: CLINIC | Age: 73
End: 2018-08-28

## 2018-08-28 NOTE — PROGRESS NOTES
Clinic Care Coordination Contact  [facility]/Voicemail       Clinical Data: Care Coordinator Outreach  Outreach attempted x 1, unable to leave a voice mail as box is not set up yet.    Plan:  Care Coordinator will try to reach patient again in 3-5 business days.  EM Mcfarlane, Clinic Care Coordinator 8/28/2018   11:28 AM  [phone]

## 2018-08-29 ENCOUNTER — OFFICE VISIT (OUTPATIENT)
Dept: DERMATOLOGY | Facility: CLINIC | Age: 73
End: 2018-08-29
Payer: COMMERCIAL

## 2018-08-29 VITALS — SYSTOLIC BLOOD PRESSURE: 139 MMHG | DIASTOLIC BLOOD PRESSURE: 80 MMHG | OXYGEN SATURATION: 97 % | HEART RATE: 65 BPM

## 2018-08-29 DIAGNOSIS — D48.5 NEOPLASM OF UNCERTAIN BEHAVIOR OF SKIN: Primary | ICD-10-CM

## 2018-08-29 DIAGNOSIS — L82.1 SEBORRHEIC KERATOSIS: ICD-10-CM

## 2018-08-29 DIAGNOSIS — D18.01 CHERRY ANGIOMA: ICD-10-CM

## 2018-08-29 DIAGNOSIS — D22.9 MULTIPLE BENIGN NEVI: ICD-10-CM

## 2018-08-29 DIAGNOSIS — L81.4 LENTIGO: ICD-10-CM

## 2018-08-29 DIAGNOSIS — L40.9 PSORIASIS: ICD-10-CM

## 2018-08-29 PROCEDURE — 88305 TISSUE EXAM BY PATHOLOGIST: CPT | Mod: TC | Performed by: PHYSICIAN ASSISTANT

## 2018-08-29 PROCEDURE — 99214 OFFICE O/P EST MOD 30 MIN: CPT | Mod: 25 | Performed by: PHYSICIAN ASSISTANT

## 2018-08-29 PROCEDURE — 11100 HC BIOPSY SKIN/SUBQ/MUC MEM, SINGLE LESION: CPT | Performed by: PHYSICIAN ASSISTANT

## 2018-08-29 NOTE — PATIENT INSTRUCTIONS
**Apply Lidex solution to scalp twice daily as needed   **Apply Betamethasone ointment to legs twice daily as needed    -May occlude with saran wrap for 3-4 days          For 6 hours          Wound Care Instructions     FOR SUPERFICIAL WOUNDS     [facility] [phone]    [facility] [phone]      Right arm                 AFTER 24 HOURS YOU SHOULD REMOVE THE BANDAGE AND BEGIN DAILY DRESSING CHANGES AS FOLLOWS:     1) Remove Dressing.     2) Clean and dry the area with tap water using a Q-tip or sterile gauze pad.     3) Apply Vaseline, Aquaphor, Polysporin ointment or Bacitracin ointment over entire wound.  Do NOT use Neosporin ointment.     4) Cover the wound with a band-aid, or a sterile non-stick gauze pad and micropore paper tape      REPEAT THESE INSTRUCTIONS AT LEAST ONCE A DAY UNTIL THE WOUND HAS COMPLETELY HEALED.    It is an old wives tale that a wound heals better when it is exposed to air and allowed to dry out. The wound will heal faster with a better cosmetic result if it is kept moist with ointment and covered with a bandage.    **Do not let the wound dry out.**      Supplies Needed:      *Cotton tipped applicators (Q-tips)    *Polysporin Ointment or Bacitracin Ointment (NOT NEOSPORIN)    *Band-aids or non-stick gauze pads and micropore paper tape.      PATIENT INFORMATION:    During the healing process you will notice a number of changes. All wounds develop a small halo of redness surrounding the wound.  This means healing is occurring. Severe itching with extensive redness usually indicates sensitivity to the ointment or bandage tape used to dress the wound.  You should call our office if this develops.      Swelling  and/or discoloration around your surgical site is common, particularly when performed around the eye.    All wounds normally drain.  The larger the wound the more drainage there will be.  After 7-10 days, you will notice the wound beginning to shrink and  new skin will begin to grow.  The wound is healed when you can see skin has formed over the entire area.  A healed wound has a healthy, shiny look to the surface and is red to dark pink in color to normalize.  Wounds may take approximately 4-6 weeks to heal.  Larger wounds may take 6-8 weeks.  After the wound is healed you may discontinue dressing changes.    You may experience a sensation of tightness as your wound heals. This is normal and will gradually subside.    Your healed wound may be sensitive to temperature changes. This sensitivity improves with time, but if you re having a lot of discomfort, try to avoid temperature extremes.    Patients frequently experience itching after their wound appears to have healed because of the continue healing under the skin.  Plain Vaseline will help relieve the itching.        POSSIBLE COMPLICATIONS    BLEEDIN. Leave the bandage in place.  2. Use tightly rolled up gauze or a cloth to apply direct pressure over the bandage for 30  minutes.  3. Reapply pressure for an additional 30 minutes if necessary  4. Use additional gauze and tape to maintain pressure once the bleeding has stopped.

## 2018-08-29 NOTE — NURSING NOTE
Chief Complaint   Patient presents with     Skin Check       Vitals:    08/29/18 1130   BP: 139/80   Pulse: 65   SpO2: 97%     Wt Readings from Last 1 Encounters:   06/14/18 57.6 kg (127 lb)     Sandrita Rae LPN.................8/29/2018

## 2018-08-29 NOTE — MR AVS SNAPSHOT
After Visit Summary   8/29/2018    Ingrid Amaral    MRN: [id number]           Patient Information     Date Of Birth          1945        Visit Information        Provider Department      8/29/2018 11:20 AM Miguelina Schulte PA-C [facility]        Care Instructions    **Apply Lidex solution to scalp twice daily as needed   **Apply Betamethasone ointment to legs twice daily as needed    -May occlude with saran wrap for 3-4 days          For 6 hours          Wound Care Instructions     FOR SUPERFICIAL WOUNDS     [facility] [phone]    [facility] [phone]      Right arm                 AFTER 24 HOURS YOU SHOULD REMOVE THE BANDAGE AND BEGIN DAILY DRESSING CHANGES AS FOLLOWS:     1) Remove Dressing.     2) Clean and dry the area with tap water using a Q-tip or sterile gauze pad.     3) Apply Vaseline, Aquaphor, Polysporin ointment or Bacitracin ointment over entire wound.  Do NOT use Neosporin ointment.     4) Cover the wound with a band-aid, or a sterile non-stick gauze pad and micropore paper tape      REPEAT THESE INSTRUCTIONS AT LEAST ONCE A DAY UNTIL THE WOUND HAS COMPLETELY HEALED.    It is an old wives tale that a wound heals better when it is exposed to air and allowed to dry out. The wound will heal faster with a better cosmetic result if it is kept moist with ointment and covered with a bandage.    **Do not let the wound dry out.**      Supplies Needed:      *Cotton tipped applicators (Q-tips)    *Polysporin Ointment or Bacitracin Ointment (NOT NEOSPORIN)    *Band-aids or non-stick gauze pads and micropore paper tape.      PATIENT INFORMATION:    During the healing process you will notice a number of changes. All wounds develop a small halo of redness surrounding the wound.  This means healing is occurring. Severe itching with extensive redness usually indicates sensitivity to the ointment or bandage tape used to dress the wound.  You  should call our office if this develops.      Swelling  and/or discoloration around your surgical site is common, particularly when performed around the eye.    All wounds normally drain.  The larger the wound the more drainage there will be.  After 7-10 days, you will notice the wound beginning to shrink and new skin will begin to grow.  The wound is healed when you can see skin has formed over the entire area.  A healed wound has a healthy, shiny look to the surface and is red to dark pink in color to normalize.  Wounds may take approximately 4-6 weeks to heal.  Larger wounds may take 6-8 weeks.  After the wound is healed you may discontinue dressing changes.    You may experience a sensation of tightness as your wound heals. This is normal and will gradually subside.    Your healed wound may be sensitive to temperature changes. This sensitivity improves with time, but if you re having a lot of discomfort, try to avoid temperature extremes.    Patients frequently experience itching after their wound appears to have healed because of the continue healing under the skin.  Plain Vaseline will help relieve the itching.        POSSIBLE COMPLICATIONS    BLEEDIN. Leave the bandage in place.  2. Use tightly rolled up gauze or a cloth to apply direct pressure over the bandage for 30  minutes.  3. Reapply pressure for an additional 30 minutes if necessary  4. Use additional gauze and tape to maintain pressure once the bleeding has stopped.            Follow-ups after your visit        Who to contact     If you have questions or need follow up information about today's clinic visit or your schedule please contact [facility] directly at [phone].  Normal or non-critical lab and imaging results will be communicated to you by [employer], letter or phone within 4 business days after the clinic has received the results. If you do not hear from us within 7 days, please contact the clinic through [employer] or  phone. If you have a critical or abnormal lab result, we will notify you by phone as soon as possible.  Submit refill requests through [employer] or call your pharmacy and they will forward the refill request to us. Please allow 3 business days for your refill to be completed.          Additional Information About Your Visit        [employer] Information     [employer] gives you secure access to your electronic health record. If you see a primary care provider, you can also send messages to your care team and make appointments. If you have questions, please call your primary care clinic.  If you do not have a primary care provider, please call [phone] and they will assist you.        Care EveryWhere ID     This is your Care EveryWhere ID. This could be used by other organizations to access your [facility] medical records  [id number]        Your Vitals Were     Pulse Pulse Oximetry                65 97%           Blood Pressure from Last 3 Encounters:   08/29/18 139/80   06/14/18 122/74   06/07/18 139/78    Weight from Last 3 Encounters:   06/14/18 57.6 kg (127 lb)   06/07/18 57.6 kg (127 lb)   05/09/18 59.1 kg (130 lb 3.2 oz)              Today, you had the following     No orders found for display         Today's Medication Changes          These changes are accurate as of 8/29/18 11:59 AM.  If you have any questions, ask your nurse or doctor.               These medicines have changed or have updated prescriptions.        Dose/Directions    calcipotriene 0.005 % Oint   This may have changed:    - when to take this  - reasons to take this   Used for:  Psoriasis        Externally apply topically 2 times daily   Quantity:  60 g   Refills:  0                Primary Care Provider Office Phone # Fax #    [place] MARVIN Velázquez [facility] [phone]       [facility] [address] [place] [id number]        Goals        General    Medication 1 (pt-stated)     Notes - Note edited  7/13/2018  1:02  PM by Ria Izquierdo, EM    Goal Statement: I will allow my spouse to assist me with medications when issues arise,  for consistent administration.   Measure of Success: I am taking my medications at the right time and every day  Supportive Steps to Achieve: actively involved spouse with supportive ideas  Barriers: memory loss  Strengths: supportive family  Date to Achieve By: 10-31-18            Equal Access to Services     JORGE A BYRD AH: Hadii aad ku hadasho Soomaali, waaxda luqadaha, qaybta kaalmada adeegyada, waxay marina hayaan adeskip khnehamarlene rangel . So [facility] [phone].    ATENCIÓN: Si habla español, tiene a gauthier disposición servicios gratuitos de asistencia lingüística. Llame al [phone].    We comply with applicable federal civil rights laws and [place] laws. We do not discriminate on the basis of race, color, national origin, age, disability, sex, sexual orientation, or gender identity.            Thank you!     Thank you for choosing [facility]  for your care. Our goal is always to provide you with excellent care. Hearing back from our patients is one way we can continue to improve our services. Please take a few minutes to complete the written survey that you may receive in the mail after your visit with us. Thank you!             Your Updated Medication List - Protect others around you: Learn how to safely use, store and throw away your medicines at www.disposemymeds.org.          This list is accurate as of 8/29/18 11:59 AM.  Always use your most recent med list.                   Brand Name Dispense Instructions for use Diagnosis    * albuterol (5 MG/ML) 0.5% neb solution    PROVENTIL    30 vial    Take 0.5 mLs (2.5 mg) by nebulization every 4 hours as needed for wheezing or shortness of breath / dyspnea    Moderate persistent asthma       * albuterol 108 (90 Base) MCG/ACT inhaler    PROAIR HFA/PROVENTIL HFA/VENTOLIN HFA    1 Inhaler    Inhale 2 puffs into the lungs every 4 hours  as needed for shortness of breath / dyspnea    Moderate persistent asthma without complication       amLODIPine 5 MG tablet    NORVASC    90 tablet    Take 1 tablet (5 mg) by mouth daily    Benign essential hypertension       ascorbic acid 500 MG Tabs      Take 1 tablet by mouth daily        ASPIRIN 81 PO      Take 1 tablet by mouth every other day        BENEFIBER PO      2 tsp daily        Biotin 07079 MCG Tabs      Take 1 tablet by mouth 2 times daily        calcipotriene 0.005 % Oint     60 g    Externally apply topically 2 times daily    Psoriasis       Calcium-Magnesium-Zinc 333..33-5 MG Tabs      Take 1 tablet by mouth daily        cetirizine 10 MG tablet    zyrTEC    90 tablet    TAKE ONE TABLET BY MOUTH EVERY EVENING    Chronic rhinitis       clobetasol 0.05 % ointment    TEMOVATE    30 g    APPLY TO AREA OF IRRITATION TWICE DAILY FOR 2 WEEKS    Chronic vulvitis       donepezil 5 MG tablet    ARICEPT    30 tablet    Take 1 tablet (5 mg) by mouth At Bedtime    Cognitive impairment       estradiol 0.1 MG/GM cream    ESTRACE VAGINAL    85 g    USE ONE GRAM VAGINALLY EVERY DAY AND SPREAD A SMALL AMOUNT AROUND THE CLITORAL REES    Vaginal atrophy, Labial and clitoral adhesions, acquired       FLAXSEED      Reported on 5/2/2017        fluticasone 50 MCG/ACT spray    FLONASE    16 g    USE ONE SPRAY IN EACH NOSTRIL DAILY    Allergic rhinitis       fluticasone-salmeterol 250-50 MCG/DOSE diskus inhaler    ADVAIR DISKUS    1 Inhaler    Inhale 1 puff into the lungs 2 times daily Needs follow up appointment for asthma for further refills    Moderate persistent asthma without complication       * gabapentin 300 MG capsule    NEURONTIN    60 capsule    Take 1 capsule (300 mg) by mouth nightly as needed    Lumbar radiculopathy, Chronic right-sided low back pain with right-sided sciatica       * gabapentin 300 MG capsule    NEURONTIN    60 capsule    TAKE ONE CAPSULE BY MOUTH EVERY NIGHT AT BEDTIME AS NEEDED     Lumbar radiculopathy, Chronic right-sided low back pain with right-sided sciatica       HYDROcodone-acetaminophen 5-325 MG per tablet    NORCO    18 tablet    Take 1 tablet by mouth every 6 hours as needed for pain        hydrocortisone 1 % cream    CORTAID     Apply topically as needed        lactase 9000 units Tabs tablet    LACTAID     Take 9,000 Units by mouth as needed        montelukast 10 MG tablet    SINGULAIR    90 tablet    TAKE ONE TABLET BY MOUTH EVERY DAY    Moderate persistent asthma without complication       omeprazole 20 MG CR capsule    priLOSEC    180 capsule    TAKE ONE CAPSULE BY MOUTH TWICE A DAY FOR HEARTBURN    Gastroesophageal reflux disease without esophagitis       order for DME     1 Device    Equipment being ordered: Nebulizer and tubing/filter    Moderate persistent asthma       PROBIOTIC DAILY PO      Take 1 chew tab by mouth 2 times daily        sertraline 100 MG tablet    ZOLOFT    135 tablet    TAKE ONE AND ONE-HALF TABLET BY MOUTH ONCE DAILY    Panic disorder without agoraphobia       simvastatin 40 MG tablet    ZOCOR    90 tablet    TAKE ONE TABLET BY MOUTH EVERY NIGHT AT BEDTIME    Elevated cholesterol with elevated triglycerides       SKIN OILS EX      Lavender-headache, skin, peppermint-skin, upset stomach mix of oils, asthma mix of oils, eucalyptus. Tea tree oil-skin, vapor rub- chest tightness, sleepy time (vetiver, lavendaer, mrjoram, mandarin, ylang)- sleep, constipation, eczema (sweet almond oil). Also mixes with carriers: Coconut oil, [place] oil, Extra virgin oil. Frankincense- cough. Digize- oil. Purification- oil, lemon- oil. Essentialyme- pill.  Inhales a mix with olive oil almond and coconut oil with peppermint, and eucalyptus- sinus, allergies. Updated 12/1/2015.  Updated 4/26/2016: Lemon, Cinnamon bark, panaway, Thieves, Orange, Wintergreen, Copaiba        * Notice:  This list has 4 medication(s) that are the same as other medications prescribed for you. Read the  directions carefully, and ask your doctor or other care provider to review them with you.

## 2018-08-29 NOTE — LETTER
8/29/2018         RE: Ingrid Amaral  [address] [facility] [address]        Dear Colleague,    Thank you for referring your patient, Ingrid Amaral, to the [facility]. Please see a copy of my visit note below.    Ingrid Amaral is a 72 year old year old female patient here today for skin check.  Patient notes itchy rash on legs which she is currently topical steroid with little improvement. She reports that her scalp is also itchy. She has a spot on left upper arm that is not shayla. Patient has no other skin complaints today.  Remainder of the HPI, Meds, PMH, Allergies, FH, and SH was reviewed in chart.    Pertinent Hx:   No personal history of skin cancer.   Past Medical History:   Diagnosis Date     Asthma      GERD (gastroesophageal reflux disease)      Radial head fracture 3/18/2010       Past Surgical History:   Procedure Laterality Date     ANKLE SURGERY      bilateral     COLONOSCOPY       COLONOSCOPY N/A 3/20/2015    Procedure: COLONOSCOPY;  Surgeon: Britney Martinez MD;  Location: [facility]     COLONOSCOPY N/A 5/22/2017    Procedure: COLONOSCOPY;  Colonoscopy;  Surgeon: Tristen Rangel MD;  Location: [facility]     ESOPHAGOSCOPY, GASTROSCOPY, DUODENOSCOPY (EGD), COMBINED N/A 6/7/2018    Procedure: COMBINED ESOPHAGOSCOPY, GASTROSCOPY, DUODENOSCOPY (EGD);  gastroscopy;  Surgeon: Tristen Rangel MD;  Location: [facility]     HC ESOPH/GAS REFLUX TEST W NASAL IMPED >1 HR  4/19/2012    Procedure:ESOPHAGEAL IMPEDENCE FUNCTION TEST WITH 24 HOUR PH GREATER THAN 1 HOUR; Surgeon:VALDO UMANZOR; Location: GI     HERNIA REPAIR      umbilcal     HYSTERECTOMY, PAP NO LONGER INDICATED  1991     TONSILLECTOMY       UPPER GI ENDOSCOPY          Family History   Problem Relation Age of Onset     HEART DISEASE Mother      valve problem     Hypertension Mother      Diabetes Mother      type 2     Cerebrovascular Disease Mother      Allergies Mother      Eye Disorder Mother      Macular  degeneration     Osteoporosis Mother      Respiratory Mother      Cardiovascular Father      MI at age 80     Cancer - colorectal Father      Diabetes Father      Hypertension Father      type 2     Eye Disorder Father      macular degeneration     Lipids Father      C.A.D. Maternal Grandmother      Diabetes Paternal Grandmother      type 2     Cardiovascular Paternal Grandmother      MI     Diabetes Sister      type 2     Allergies Sister      GASTROINTESTINAL DISEASE Sister      GASTROINTESTINAL DISEASE Daughter      GASTROINTESTINAL DISEASE Daughter        Social History     Social History     Marital status:      Spouse name: N/A     Number of children: N/A     Years of education: N/A     Occupational History     Not on file.     Social History Main Topics     Smoking status: Former Smoker     Packs/day: 0.50     Years: 3.00     Types: Cigarettes     Smokeless tobacco: Never Used      Comment: smoked for 3 years when she was around 19     Alcohol use Yes      Comment: RARELY     Drug use: No     Sexual activity: Not on file     Other Topics Concern     Parent/Sibling W/ Cabg, Mi Or Angioplasty Before 65f 55m? Yes     mother- valve problem     Social History Narrative       Outpatient Encounter Prescriptions as of 8/29/2018   Medication Sig Dispense Refill     albuterol (PROAIR HFA/PROVENTIL HFA/VENTOLIN HFA) 108 (90 BASE) MCG/ACT Inhaler Inhale 2 puffs into the lungs every 4 hours as needed for shortness of breath / dyspnea 1 Inhaler 2     ascorbic acid 500 MG TABS Take 1 tablet by mouth daily       ASPIRIN 81 PO Take 1 tablet by mouth every other day        BENEFIBER OR 2 tsp daily       Biotin 76792 MCG TABS Take 1 tablet by mouth 2 times daily       calcipotriene 0.005 % OINT Externally apply topically 2 times daily (Patient taking differently: Externally apply topically as needed ) 60 g 0     Calcium-Magnesium-Zinc 333..33-5 MG TABS Take 1 tablet by mouth daily       cetirizine (ZYRTEC) 10 MG  tablet TAKE ONE TABLET BY MOUTH EVERY EVENING 90 tablet 3     clobetasol (TEMOVATE) 0.05 % ointment APPLY TO AREA OF IRRITATION TWICE DAILY FOR 2 WEEKS 30 g 0     donepezil (ARICEPT) 5 MG tablet Take 1 tablet (5 mg) by mouth At Bedtime 30 tablet 3     estradiol (ESTRACE VAGINAL) 0.1 MG/GM cream USE ONE GRAM VAGINALLY EVERY DAY AND SPREAD A SMALL AMOUNT AROUND THE CLITORAL REES 85 g 1     FLAXSEED Reported on 5/2/2017       fluticasone (FLONASE) 50 MCG/ACT spray USE ONE SPRAY IN EACH NOSTRIL DAILY 16 g 1     fluticasone-salmeterol (ADVAIR DISKUS) 250-50 MCG/DOSE diskus inhaler Inhale 1 puff into the lungs 2 times daily Needs follow up appointment for asthma for further refills 1 Inhaler 0     gabapentin (NEURONTIN) 300 MG capsule TAKE ONE CAPSULE BY MOUTH EVERY NIGHT AT BEDTIME AS NEEDED 60 capsule 0     gabapentin (NEURONTIN) 300 MG capsule Take 1 capsule (300 mg) by mouth nightly as needed 60 capsule 0     HYDROcodone-acetaminophen (NORCO) 5-325 MG per tablet Take 1 tablet by mouth every 6 hours as needed for pain 18 tablet 0     hydrocortisone 1 % cream Apply topically as needed       lactase (LACTAID) 9000 UNITS TABS Take 9,000 Units by mouth as needed       montelukast (SINGULAIR) 10 MG tablet TAKE ONE TABLET BY MOUTH EVERY DAY 90 tablet 1     omeprazole (PRILOSEC) 20 MG CR capsule TAKE ONE CAPSULE BY MOUTH TWICE A DAY FOR HEARTBURN 180 capsule 3     Probiotic Product (PROBIOTIC DAILY PO) Take 1 chew tab by mouth 2 times daily        sertraline (ZOLOFT) 100 MG tablet TAKE ONE AND ONE-HALF TABLET BY MOUTH ONCE DAILY 135 tablet 1     simvastatin (ZOCOR) 40 MG tablet TAKE ONE TABLET BY MOUTH EVERY NIGHT AT BEDTIME 90 tablet 0     SKIN OILS EX Lavender-headache, skin, peppermint-skin, upset stomach mix of oils, asthma mix of oils, eucalyptus. Tea tree oil-skin, vapor rub- chest tightness, sleepy time (vetiver, lavendaer, mrjoram, mandarin, ylang)- sleep, constipation, eczema (sweet almond oil). Also mixes with  carriers: Coconut oil, [place] oil, Extra virgin oil. Frankincense- cough. Digize- oil. Purification- oil, lemon- oil. Essentialyme- pill.  Inhales a mix with olive oil almond and coconut oil with peppermint, and eucalyptus- sinus, allergies. Updated 12/1/2015.   Updated 4/26/2016: Lemon, Cinnamon bark, panaway, Thieves, Orange, Wintergreen, Copaiba       albuterol (PROVENTIL) (5 MG/ML) 0.5% nebulizer solution Take 0.5 mLs (2.5 mg) by nebulization every 4 hours as needed for wheezing or shortness of breath / dyspnea (Patient not taking: Reported on 5/9/2018) 30 vial 1     amLODIPine (NORVASC) 5 MG tablet Take 1 tablet (5 mg) by mouth daily (Patient not taking: Reported on 8/29/2018) 90 tablet 1     ORDER FOR DME Equipment being ordered: Nebulizer and tubing/filter (Patient not taking: Reported on 5/9/2018) 1 Device 0     No facility-administered encounter medications on file as of 8/29/2018.              Review Of Systems  Skin: As above  Eyes: negative  Ears/Nose/Throat: negative  Respiratory: No shortness of breath, dyspnea on exertion, cough, or hemoptysis  Cardiovascular: negative  Gastrointestinal: negative  Genitourinary: negative  Musculoskeletal: negative  Neurologic: negative  Psychiatric: negative  Hematologic/Lymphatic/Immunologic: negative  Endocrine: negative      O:   NAD, WDWN, Alert & Oriented, Mood & Affect wnl, Vitals stable   Here today alone   /80  Pulse 65  SpO2 97%   General appearance normal   Vitals stable   Alert, oriented and in no acute distress      Thin pink scaly plaques on scalp   Thin pink scaly plaques on shins   0.7 cm pink pearly scaly papule on left upper arm   Brown stuck on papules, brown macules and red papules on torso and extremities   Brown papules and macule with regular pigment network and borders on torso and extremities         The remainder of the detailed exam was unremarkable; the following areas were examined:  scalp/hair, conjunctiva/lids, face, neck,  lips/teeth, oral mucosa/gingiva, chest, back, abdomen, buttocks, digits/nails, RUE, LUE, RLE, LLE.      Eyes: Conjunctivae/lids:Normal     ENT: Lips: normal    MSK:Normal    Cardiovascular: peripheral edema none    Pulm: Breathing Normal    Neuro/Psych: Orientation:Normal; Mood/Affect:Normal  A/P:  1. Psoriasis   Apply Lidex solution to scalp twice daily as needed   Apply Betamethasone ointment to legs twice daily as needed   May occlude with saran wrap for 3-4 days For 6 hours  Follow skin care.   If not improving consider intralesional steroid injections.   2. R/O BCC vs LK on left upper arm  TANGENTIAL BIOPSY SENT OUT:  After consent, anesthesia with LEC and prep, tangential excision performed and specimen sent out for permanent section histology.  No complications and routine wound care. Patient told to call our office in 1-2 weeks for result.      3. Seborrheic keratosis, lentigo, cherry angioma, benign nevi   BENIGN LESIONS DISCUSSED WITH PATIENT:  I discussed the specifics of tumor, prognosis, and genetics of benign lesions.  I explained that treatment of these lesions would be purely cosmetic and not medically neccessary.  I discussed with patient different removal options including excision, cautery and /or laser.      Nature and genetics of benign skin lesions dicussed with patient.  Signs and Symptoms of skin cancer discussed with patient.  ABCDEs of melanoma reviewed with patient.  Patient encouraged to perform monthly skin exams.  UV precautions reviewed with patient.  Skin care regimen reviewed with patient: Eliminate harsh soaps, i.e. Dial, zest, irsih spring; Mild soaps such as Cetaphil or Dove sensitive skin, avoid hot or cold showers, aggressive use of emollients including vanicream, cetaphil or cerave discussed with patient.    Risks of non-melanoma skin cancer discussed with patient   Return to clinic pending biopsy results.   Patient to follow up with Primary Care provider regarding elevated  blood pressure.          Again, thank you for allowing me to participate in the care of your patient.        Sincerely,        Miguelina Lion PA-C

## 2018-08-31 DIAGNOSIS — L40.9 PSORIASIS: Primary | ICD-10-CM

## 2018-08-31 RX ORDER — FLUOCINONIDE TOPICAL SOLUTION USP, 0.05% 0.5 MG/ML
SOLUTION TOPICAL
Qty: 60 ML | Refills: 3 | Status: SHIPPED | OUTPATIENT
Start: 2018-08-31 | End: 2019-04-08

## 2018-08-31 RX ORDER — BETAMETHASONE DIPROPIONATE 0.5 MG/G
CREAM TOPICAL
Qty: 50 G | Refills: 0 | Status: SHIPPED | OUTPATIENT
Start: 2018-08-31 | End: 2019-03-08

## 2018-08-31 ASSESSMENT — ACTIVITIES OF DAILY LIVING (ADL): DEPENDENT_IADLS:: MEDICATION MANAGEMENT

## 2018-08-31 NOTE — PROGRESS NOTES
Clinic Care Coordination Contact    Clinic Care Coordination Contact  OUTREACH    Referral Information:  Referral Source: PCP    Primary Diagnosis: Psychosocial    Chief Complaint   Patient presents with     Clinic Care Coordination - Follow-up     sw        [facility] Utilization:   Clinic Utilization  Difficulty keeping appointments:: No  Compliance Concerns: No  No-Show Concerns: No  No PCP office visit in Past Year: No  Utilization    Last refreshed: 8/29/2018  3:57 PM:  No Show Count (past year) 0       Last refreshed: 8/29/2018  3:57 PM:  ED visits 1       Last refreshed: 8/29/2018  3:57 PM:  Hospital admissions 0          Current as of: 8/29/2018  3:57 PM             Clinical Concerns:  Current Medical Concerns:  Pt asked about the med prescribed at the Derm appointment yesterday.  Nothing was noted in the chart and pt was directed to call the Derm office to inquire.     Current Behavioral Concerns: pt said her anxiety is better yet was not sure what was changing or had changed.     Education Provided to patient: pt to call the provider office to check on prescription.  Pt to continue to be aware of what is causing the anxiety for better control.    Pain  Pain (GOAL):: No  Health Maintenance Reviewed: Up to date  Clinical Pathway: no concerns    Medication Management:  Spouse oversees as needed.     Functional Status:  Dependent ADLs:: Independent  Dependent IADLs:: Medication Management (starting to have concerns)  Bed or wheelchair confined:: No  Mobility Status: Independent  Fallen 2 or more times in the past year?: No  Any fall with injury in the past year?: No    Living Situation:  Current living arrangement:: I live in a private home with spouse  Type of residence:: Private home - stairs    Diet/Exercise/Sleep:  Diet:: Regular  Inadequate nutrition (GOAL):: No  Food Insecurity: No  Tube Feeding: No  Exercise:: Yes  How intense was your typical exercise? : Moderate (like brisk walking)  Inadequate  activity/exercise (GOAL):: No  Significant changes in sleep pattern (GOAL): No    Transportation:  Transportation concerns (GOAL):: No  Transportation means:: Accessible car, Family     Psychosocial:  Gnosticist or spiritual beliefs that impact treatment:: No  Mental health DX:: Yes  Mental health DX how managed:: Medication  Mental health management concern (GOAL):: No  Informal Support system:: Family, Friends     Financial/Insurance:   Financial/Insurance concerns (GOAL):: No  Pt and spouse said they are doing well.  They had no concerns other then the derm appointment medication pt said was to be ordered.      Resources and Interventions:  Current Resources:    ;   Community Resources: None  Supplies used at home:: None  Equipment Currently Used at Home: none    Advance Care Plan/Directive  Advanced Care Plans/Directives on file:: In process  Advanced Care Plan/Directive Status: In Process    Referrals Placed: [employer], [facility] Linkage Line     Goals:   Goals        General    Medication 1 (pt-stated)     Notes - Note edited  7/13/2018  1:02 PM by Ria Izquierdo LSW    Goal Statement: I will allow my spouse to assist me with medications when issues arise,  for consistent administration.   Measure of Success: I am taking my medications at the right time and every day  Supportive Steps to Achieve: actively involved spouse with supportive ideas  Barriers: memory loss  Strengths: supportive family  Date to Achieve By: 10-31-18                  Patient/Caregiver understanding: pt to continue to allow spouse to assist with medications.     Outreach Frequency: 6 weeks      Plan: pt to call Derm office for medication.  sw to call in about 6 weeks.     EM Mcfarlane, Clinic Care Coordinator 8/31/2018   1:26 PM  [phone]

## 2018-09-03 NOTE — PROGRESS NOTES
Ingrid Amaral is a 72 year old year old female patient here today for skin check.  Patient notes itchy rash on legs which she is currently topical steroid with little improvement. She reports that her scalp is also itchy. She has a spot on left upper arm that is not shayla. Patient has no other skin complaints today.  Remainder of the HPI, Meds, PMH, Allergies, FH, and SH was reviewed in chart.    Pertinent Hx:   No personal history of skin cancer.   Past Medical History:   Diagnosis Date     Asthma      GERD (gastroesophageal reflux disease)      Radial head fracture 3/18/2010       Past Surgical History:   Procedure Laterality Date     ANKLE SURGERY      bilateral     COLONOSCOPY       COLONOSCOPY N/A 3/20/2015    Procedure: COLONOSCOPY;  Surgeon: Britney Martinez MD;  Location: [facility]     COLONOSCOPY N/A 5/22/2017    Procedure: COLONOSCOPY;  Colonoscopy;  Surgeon: Tristen Rangel MD;  Location: [facility]     ESOPHAGOSCOPY, GASTROSCOPY, DUODENOSCOPY (EGD), COMBINED N/A 6/7/2018    Procedure: COMBINED ESOPHAGOSCOPY, GASTROSCOPY, DUODENOSCOPY (EGD);  gastroscopy;  Surgeon: Tristen Rangel MD;  Location: [facility]     HC ESOPH/GAS REFLUX TEST W NASAL IMPED >1 HR  4/19/2012    Procedure:ESOPHAGEAL IMPEDENCE FUNCTION TEST WITH 24 HOUR PH GREATER THAN 1 HOUR; Surgeon:VALDO UMANZOR; Location: GI     HERNIA REPAIR      umbilcal     HYSTERECTOMY, PAP NO LONGER INDICATED  1991     TONSILLECTOMY       UPPER GI ENDOSCOPY          Family History   Problem Relation Age of Onset     HEART DISEASE Mother      valve problem     Hypertension Mother      Diabetes Mother      type 2     Cerebrovascular Disease Mother      Allergies Mother      Eye Disorder Mother      Macular degeneration     Osteoporosis Mother      Respiratory Mother      Cardiovascular Father      MI at age 80     Cancer - colorectal Father      Diabetes Father      Hypertension Father      type 2     Eye Disorder Father      macular degeneration      Lipids Father      C.A.D. Maternal Grandmother      Diabetes Paternal Grandmother      type 2     Cardiovascular Paternal Grandmother      MI     Diabetes Sister      type 2     Allergies Sister      GASTROINTESTINAL DISEASE Sister      GASTROINTESTINAL DISEASE Daughter      GASTROINTESTINAL DISEASE Daughter        Social History     Social History     Marital status:      Spouse name: N/A     Number of children: N/A     Years of education: N/A     Occupational History     Not on file.     Social History Main Topics     Smoking status: Former Smoker     Packs/day: 0.50     Years: 3.00     Types: Cigarettes     Smokeless tobacco: Never Used      Comment: smoked for 3 years when she was around 19     Alcohol use Yes      Comment: RARELY     Drug use: No     Sexual activity: Not on file     Other Topics Concern     Parent/Sibling W/ Cabg, Mi Or Angioplasty Before 65f 55m? Yes     mother- valve problem     Social History Narrative       Outpatient Encounter Prescriptions as of 8/29/2018   Medication Sig Dispense Refill     albuterol (PROAIR HFA/PROVENTIL HFA/VENTOLIN HFA) 108 (90 BASE) MCG/ACT Inhaler Inhale 2 puffs into the lungs every 4 hours as needed for shortness of breath / dyspnea 1 Inhaler 2     ascorbic acid 500 MG TABS Take 1 tablet by mouth daily       ASPIRIN 81 PO Take 1 tablet by mouth every other day        BENEFIBER OR 2 tsp daily       Biotin 61791 MCG TABS Take 1 tablet by mouth 2 times daily       calcipotriene 0.005 % OINT Externally apply topically 2 times daily (Patient taking differently: Externally apply topically as needed ) 60 g 0     Calcium-Magnesium-Zinc 333..33-5 MG TABS Take 1 tablet by mouth daily       cetirizine (ZYRTEC) 10 MG tablet TAKE ONE TABLET BY MOUTH EVERY EVENING 90 tablet 3     clobetasol (TEMOVATE) 0.05 % ointment APPLY TO AREA OF IRRITATION TWICE DAILY FOR 2 WEEKS 30 g 0     donepezil (ARICEPT) 5 MG tablet Take 1 tablet (5 mg) by mouth At Bedtime 30 tablet 3      estradiol (ESTRACE VAGINAL) 0.1 MG/GM cream USE ONE GRAM VAGINALLY EVERY DAY AND SPREAD A SMALL AMOUNT AROUND THE CLITORAL REES 85 g 1     FLAXSEED Reported on 5/2/2017       fluticasone (FLONASE) 50 MCG/ACT spray USE ONE SPRAY IN EACH NOSTRIL DAILY 16 g 1     fluticasone-salmeterol (ADVAIR DISKUS) 250-50 MCG/DOSE diskus inhaler Inhale 1 puff into the lungs 2 times daily Needs follow up appointment for asthma for further refills 1 Inhaler 0     gabapentin (NEURONTIN) 300 MG capsule TAKE ONE CAPSULE BY MOUTH EVERY NIGHT AT BEDTIME AS NEEDED 60 capsule 0     gabapentin (NEURONTIN) 300 MG capsule Take 1 capsule (300 mg) by mouth nightly as needed 60 capsule 0     HYDROcodone-acetaminophen (NORCO) 5-325 MG per tablet Take 1 tablet by mouth every 6 hours as needed for pain 18 tablet 0     hydrocortisone 1 % cream Apply topically as needed       lactase (LACTAID) 9000 UNITS TABS Take 9,000 Units by mouth as needed       montelukast (SINGULAIR) 10 MG tablet TAKE ONE TABLET BY MOUTH EVERY DAY 90 tablet 1     omeprazole (PRILOSEC) 20 MG CR capsule TAKE ONE CAPSULE BY MOUTH TWICE A DAY FOR HEARTBURN 180 capsule 3     Probiotic Product (PROBIOTIC DAILY PO) Take 1 chew tab by mouth 2 times daily        sertraline (ZOLOFT) 100 MG tablet TAKE ONE AND ONE-HALF TABLET BY MOUTH ONCE DAILY 135 tablet 1     simvastatin (ZOCOR) 40 MG tablet TAKE ONE TABLET BY MOUTH EVERY NIGHT AT BEDTIME 90 tablet 0     SKIN OILS EX Lavender-headache, skin, peppermint-skin, upset stomach mix of oils, asthma mix of oils, eucalyptus. Tea tree oil-skin, vapor rub- chest tightness, sleepy time (vetiver, lavendaer, mrjoram, mandarin, ylang)- sleep, constipation, eczema (sweet almond oil). Also mixes with carriers: Coconut oil, Concepcion oil, Extra virgin oil. Frankincense- cough. Digize- oil. Purification- oil, lemon- oil. Essentialyme- pill.  Inhales a mix with olive oil almond and coconut oil with peppermint, and eucalyptus- sinus, allergies. Updated  12/1/2015.   Updated 4/26/2016: Lemon, Cinnamon bark, panaway, Thieves, Orange, Wintergreen, Copaiba       albuterol (PROVENTIL) (5 MG/ML) 0.5% nebulizer solution Take 0.5 mLs (2.5 mg) by nebulization every 4 hours as needed for wheezing or shortness of breath / dyspnea (Patient not taking: Reported on 5/9/2018) 30 vial 1     amLODIPine (NORVASC) 5 MG tablet Take 1 tablet (5 mg) by mouth daily (Patient not taking: Reported on 8/29/2018) 90 tablet 1     ORDER FOR DME Equipment being ordered: Nebulizer and tubing/filter (Patient not taking: Reported on 5/9/2018) 1 Device 0     No facility-administered encounter medications on file as of 8/29/2018.              Review Of Systems  Skin: As above  Eyes: negative  Ears/Nose/Throat: negative  Respiratory: No shortness of breath, dyspnea on exertion, cough, or hemoptysis  Cardiovascular: negative  Gastrointestinal: negative  Genitourinary: negative  Musculoskeletal: negative  Neurologic: negative  Psychiatric: negative  Hematologic/Lymphatic/Immunologic: negative  Endocrine: negative      O:   NAD, WDWN, Alert & Oriented, Mood & Affect wnl, Vitals stable   Here today alone   /80  Pulse 65  SpO2 97%   General appearance normal   Vitals stable   Alert, oriented and in no acute distress      Thin pink scaly plaques on scalp   Thin pink scaly plaques on shins   0.7 cm pink pearly scaly papule on left upper arm   Brown stuck on papules, brown macules and red papules on torso and extremities   Brown papules and macule with regular pigment network and borders on torso and extremities         The remainder of the detailed exam was unremarkable; the following areas were examined:  scalp/hair, conjunctiva/lids, face, neck, lips/teeth, oral mucosa/gingiva, chest, back, abdomen, buttocks, digits/nails, RUE, LUE, RLE, LLE.      Eyes: Conjunctivae/lids:Normal     ENT: Lips: normal    MSK:Normal    Cardiovascular: peripheral edema none    Pulm: Breathing Normal    Neuro/Psych:  Orientation:Normal; Mood/Affect:Normal  A/P:  1. Psoriasis   Apply Lidex solution to scalp twice daily as needed   Apply Betamethasone ointment to legs twice daily as needed   May occlude with saran wrap for 3-4 days For 6 hours  Follow skin care.   If not improving consider intralesional steroid injections.   2. R/O BCC vs LK on left upper arm  TANGENTIAL BIOPSY SENT OUT:  After consent, anesthesia with LEC and prep, tangential excision performed and specimen sent out for permanent section histology.  No complications and routine wound care. Patient told to call our office in 1-2 weeks for result.      3. Seborrheic keratosis, lentigo, cherry angioma, benign nevi   BENIGN LESIONS DISCUSSED WITH PATIENT:  I discussed the specifics of tumor, prognosis, and genetics of benign lesions.  I explained that treatment of these lesions would be purely cosmetic and not medically neccessary.  I discussed with patient different removal options including excision, cautery and /or laser.      Nature and genetics of benign skin lesions dicussed with patient.  Signs and Symptoms of skin cancer discussed with patient.  ABCDEs of melanoma reviewed with patient.  Patient encouraged to perform monthly skin exams.  UV precautions reviewed with patient.  Skin care regimen reviewed with patient: Eliminate harsh soaps, i.e. Dial, zest, irsih spring; Mild soaps such as Cetaphil or Dove sensitive skin, avoid hot or cold showers, aggressive use of emollients including vanicream, cetaphil or cerave discussed with patient.    Risks of non-melanoma skin cancer discussed with patient   Return to clinic pending biopsy results.   Patient to follow up with Primary Care provider regarding elevated blood pressure.

## 2018-09-04 LAB — COPATH REPORT: NORMAL

## 2018-09-11 ENCOUNTER — OFFICE VISIT (OUTPATIENT)
Dept: FAMILY MEDICINE | Facility: CLINIC | Age: 73
End: 2018-09-11
Payer: COMMERCIAL

## 2018-09-11 VITALS
HEIGHT: 63 IN | SYSTOLIC BLOOD PRESSURE: 132 MMHG | DIASTOLIC BLOOD PRESSURE: 74 MMHG | TEMPERATURE: 98.1 F | RESPIRATION RATE: 16 BRPM | HEART RATE: 64 BPM | WEIGHT: 129 LBS | BODY MASS INDEX: 22.86 KG/M2

## 2018-09-11 DIAGNOSIS — D50.9 IRON DEFICIENCY ANEMIA, UNSPECIFIED IRON DEFICIENCY ANEMIA TYPE: ICD-10-CM

## 2018-09-11 DIAGNOSIS — K59.09 CHRONIC CONSTIPATION: ICD-10-CM

## 2018-09-11 DIAGNOSIS — I10 BENIGN ESSENTIAL HYPERTENSION: Primary | ICD-10-CM

## 2018-09-11 DIAGNOSIS — Z23 NEED FOR PROPHYLACTIC VACCINATION AND INOCULATION AGAINST INFLUENZA: ICD-10-CM

## 2018-09-11 LAB
ANION GAP SERPL CALCULATED.3IONS-SCNC: 8 MMOL/L (ref 3–14)
BUN SERPL-MCNC: 13 MG/DL (ref 7–30)
CALCIUM SERPL-MCNC: 8.4 MG/DL (ref 8.5–10.1)
CHLORIDE SERPL-SCNC: 106 MMOL/L (ref 94–109)
CO2 SERPL-SCNC: 27 MMOL/L (ref 20–32)
CREAT SERPL-MCNC: 0.71 MG/DL (ref 0.52–1.04)
FERRITIN SERPL-MCNC: 18 NG/ML (ref 8–252)
GFR SERPL CREATININE-BSD FRML MDRD: 80 ML/MIN/1.7M2
GLUCOSE SERPL-MCNC: 87 MG/DL (ref 70–99)
POTASSIUM SERPL-SCNC: 3.8 MMOL/L (ref 3.4–5.3)
SODIUM SERPL-SCNC: 141 MMOL/L (ref 133–144)

## 2018-09-11 PROCEDURE — 80048 BASIC METABOLIC PNL TOTAL CA: CPT | Performed by: NURSE PRACTITIONER

## 2018-09-11 PROCEDURE — 99214 OFFICE O/P EST MOD 30 MIN: CPT | Mod: 25 | Performed by: NURSE PRACTITIONER

## 2018-09-11 PROCEDURE — 90662 IIV NO PRSV INCREASED AG IM: CPT | Performed by: NURSE PRACTITIONER

## 2018-09-11 PROCEDURE — 36415 COLL VENOUS BLD VENIPUNCTURE: CPT | Performed by: NURSE PRACTITIONER

## 2018-09-11 PROCEDURE — 82728 ASSAY OF FERRITIN: CPT | Performed by: NURSE PRACTITIONER

## 2018-09-11 PROCEDURE — G0008 ADMIN INFLUENZA VIRUS VAC: HCPCS | Performed by: NURSE PRACTITIONER

## 2018-09-11 RX ORDER — AMLODIPINE BESYLATE 5 MG/1
5 TABLET ORAL DAILY
Qty: 90 TABLET | Refills: 1 | Status: SHIPPED | OUTPATIENT
Start: 2018-09-11 | End: 2019-03-01

## 2018-09-11 NOTE — PROGRESS NOTES
Injectable Influenza Immunization Documentation    1.  Is the person to be vaccinated sick today?   No    2. Does the person to be vaccinated have an allergy to a component   of the vaccine?   No  Egg Allergy Algorithm Link    3. Has the person to be vaccinated ever had a serious reaction   to influenza vaccine in the past?   No    4. Has the person to be vaccinated ever had Guillain-Barré syndrome?   No    Prior to injection verified patient identity using patient's name and date of birth.  Due to injection administration, patient instructed to remain in clinic for 15 minutes  afterwards, and to report any adverse reaction to me immediately.      Form completed by Génesis Kaur CMA

## 2018-09-11 NOTE — PATIENT INSTRUCTIONS
Labs today-we will notify you with these results    Norvasc refilled    Eat small frequent meals, chew food well

## 2018-09-11 NOTE — LETTER
My Asthma Action Plan  Name: Ingrid Amaral   YOB: 1945  Date: 9/11/2018   My doctor: MARVIN Sesay CNP   My clinic: [facility]        My Control Medicine: Fluticasone + salmeterol (Advair) -  Diskus 250/50 mcg .  My Rescue Medicine: Albuterol nebulizer solution .  Albuterol (Proair/Ventolin/Proventil) inhaler .   My Asthma Severity: moderate persistent  Avoid your asthma triggers: .               GREEN ZONE   Good Control    I feel good    No cough or wheeze    Can work, sleep and play without asthma symptoms       Take your asthma control medicine every day.     1. If exercise triggers your asthma, take your rescue medication    15 minutes before exercise or sports, and    During exercise if you have asthma symptoms  2. Spacer to use with inhaler: If you have a spacer, make sure to use it with your inhaler             YELLOW ZONE Getting Worse  I have ANY of these:    I do not feel good    Cough or wheeze    Chest feels tight    Wake up at night   1. Keep taking your Green Zone medications  2. Start taking your rescue medicine:    every 20 minutes for up to 1 hour. Then every 4 hours for 24-48 hours.  3. If you stay in the Yellow Zone for more than 12-24 hours, contact your doctor.  4. If you do not return to the Green Zone in 12-24 hours or you get worse, start taking your oral steroid medicine if prescribed by your provider.           RED ZONE Medical Alert - Get Help  I have ANY of these:    I feel awful    Medicine is not helping    Breathing getting harder    Trouble walking or talking    Nose opens wide to breathe       1. Take your rescue medicine NOW  2. If your provider has prescribed an oral steroid medicine, start taking it NOW  3. Call your doctor NOW  4. If you are still in the Red Zone after 20 minutes and you have not reached your doctor:    Take your rescue medicine again and    Call 911 or go to the emergency room right away    See your regular doctor  within 2 weeks of an Emergency Room or Urgent Care visit for follow-up treatment.          Annual Reminders:  Meet with Asthma Educator,  Flu Shot in the Fall, consider Pneumonia Vaccination for patients with asthma (aged 19 and older).    Pharmacy:    [facility] - [place], [place] - [address] [facility], [place] - [address] [place] [address] [facility] - [place], [place]                      Asthma Triggers  How To Control Things That Make Your Asthma Worse    Triggers are things that make your asthma worse.  Look at the list below to help you find your triggers and what you can do about them.  You can help prevent asthma flare-ups by staying away from your triggers.      Trigger                                                          What you can do   Cigarette Smoke  Tobacco smoke can make asthma worse. Do not allow smoking in your home, car or around you.  Be sure no one smokes at a child s day care or school.  If you smoke, ask your health care provider for ways to help you quit.  Ask family members to quit too.  Ask your health care provider for a referral to Quit Plan to help you quit smoking, or call 8-802-729-PLAN.     Colds, Flu, Bronchitis  These are common triggers of asthma. Wash your hands often.  Don t touch your eyes, nose or mouth.  Get a flu shot every year.     Dust Mites  These are tiny bugs that live in cloth or carpet. They are too small to see. Wash sheets and blankets in hot water every week.   Encase pillows and mattress in dust mite proof covers.  Avoid having carpet if you can. If you have carpet, vacuum weekly.   Use a dust mask and HEPA vacuum.   Pollen and Outdoor Mold  Some people are allergic to trees, grass, or weed pollen, or molds. Try to keep your windows closed.  Limit time out doors when pollen count is high.   Ask you health care provider about taking medicine during allergy season.     Animal Dander  Some people are  allergic to skin flakes, urine or saliva from pets with fur or feathers. Keep pets with fur or feathers out of your home.    If you can t keep the pet outdoors, then keep the pet out of your bedroom.  Keep the bedroom door closed.  Keep pets off cloth furniture and away from stuffed toys.     Mice, Rats, and Cockroaches  Some people are allergic to the waste from these pests.   Cover food and garbage.  Clean up spills and food crumbs.  Store grease in the refrigerator.   Keep food out of the bedroom.   Indoor Mold  This can be a trigger if your home has high moisture. Fix leaking faucets, pipes, or other sources of water.   Clean moldy surfaces.  Dehumidify basement if it is damp and smelly.   Smoke, Strong Odors, and Sprays  These can reduce air quality. Stay away from strong odors and sprays, such as perfume, powder, hair spray, paints, smoke incense, paint, cleaning products, candles and new carpet.   Exercise or Sports  Some people with asthma have this trigger. Be active!  Ask your doctor about taking medicine before sports or exercise to prevent symptoms.    Warm up for 5-10 minutes before and after sports or exercise.     Other Triggers of Asthma  Cold air:  Cover your nose and mouth with a scarf.  Sometimes laughing or crying can be a trigger.  Some medicines and food can trigger asthma.

## 2018-09-11 NOTE — PROGRESS NOTES
SUBJECTIVE:   Ingrid Amaral is a 72 year old female who presents to clinic today for the following health issues:    Allergy testing- would like to know if she is allergic to fructose. Pt states sister is allergic to this  A lot of bowel issues-primarily constipation  Sisters symptoms improving following fructose allergy diagnosis    Anemia  - pt would like iron lab work done.   - would like to go over results of EGD-has problems with breads getting stuck   Has had a swallow study in the past    Hypertension Follow-up      Outpatient blood pressures are not being checked.    Low Salt Diet: not monitoring salt    Pt like to stop medication for this is possible       Problem list and histories reviewed & adjusted, as indicated.  Additional history: as documented    Patient Active Problem List   Diagnosis     Diarrhea     Pure hypercholesterolemia     Allergic rhinitis     Panic disorder without agoraphobia     Advanced directives, counseling/discussion     Liver lesion     Generalized anxiety disorder     Mild major depression (H)     Vulvitis     Vaginal wall prolapse     Chronic constipation     Moderate persistent asthma     Dysphagia     RBD (REM behavioral disorder)     FH: colon cancer     Hypertension goal BP (blood pressure) < 140/90     Meningioma (H)     Past Surgical History:   Procedure Laterality Date     ANKLE SURGERY      bilateral     COLONOSCOPY       COLONOSCOPY N/A 3/20/2015    Procedure: COLONOSCOPY;  Surgeon: Britney Martinez MD;  Location: [facility]     COLONOSCOPY N/A 5/22/2017    Procedure: COLONOSCOPY;  Colonoscopy;  Surgeon: Tristen Rangel MD;  Location: [facility]     ESOPHAGOSCOPY, GASTROSCOPY, DUODENOSCOPY (EGD), COMBINED N/A 6/7/2018    Procedure: COMBINED ESOPHAGOSCOPY, GASTROSCOPY, DUODENOSCOPY (EGD);  gastroscopy;  Surgeon: Tristen Rangel MD;  Location: [facility]     HC ESOPH/GAS REFLUX TEST W NASAL IMPED >1 HR  4/19/2012    Procedure:ESOPHAGEAL IMPEDENCE FUNCTION TEST WITH 24 HOUR  PH GREATER THAN 1 HOUR; Surgeon:VALDO UMANZOR; Location:[facility]     HERNIA REPAIR      umbilcal     HYSTERECTOMY, PAP NO LONGER INDICATED  1991     TONSILLECTOMY       UPPER GI ENDOSCOPY         Social History   Substance Use Topics     Smoking status: Former Smoker     Packs/day: 0.50     Years: 3.00     Types: Cigarettes     Smokeless tobacco: Never Used      Comment: smoked for 3 years when she was around 19     Alcohol use Yes      Comment: RARELY     Family History   Problem Relation Age of Onset     HEART DISEASE Mother      valve problem     Hypertension Mother      Diabetes Mother      type 2     Cerebrovascular Disease Mother      Allergies Mother      Eye Disorder Mother      Macular degeneration     Osteoporosis Mother      Respiratory Mother      Cardiovascular Father      MI at age 80     Cancer - colorectal Father      Diabetes Father      Hypertension Father      type 2     Eye Disorder Father      macular degeneration     Lipids Father      C.A.D. Maternal Grandmother      Diabetes Paternal Grandmother      type 2     Cardiovascular Paternal Grandmother      MI     Diabetes Sister      type 2     Allergies Sister      GASTROINTESTINAL DISEASE Sister      GASTROINTESTINAL DISEASE Daughter      GASTROINTESTINAL DISEASE Daughter          Current Outpatient Prescriptions   Medication Sig Dispense Refill     albuterol (PROAIR HFA/PROVENTIL HFA/VENTOLIN HFA) 108 (90 BASE) MCG/ACT Inhaler Inhale 2 puffs into the lungs every 4 hours as needed for shortness of breath / dyspnea 1 Inhaler 2     albuterol (PROVENTIL) (5 MG/ML) 0.5% nebulizer solution Take 0.5 mLs (2.5 mg) by nebulization every 4 hours as needed for wheezing or shortness of breath / dyspnea 30 vial 1     amLODIPine (NORVASC) 5 MG tablet Take 1 tablet (5 mg) by mouth daily 90 tablet 1     ascorbic acid 500 MG TABS Take 1 tablet by mouth daily       ASPIRIN 81 PO Take 1 tablet by mouth every other day        augmented betamethasone  dipropionate (DIPROLENE-AF) 0.05 % cream Apply sparingly to legs twice daily as needed. Do not apply to face. May occlude with saran wrap for 3-4 days for 6 hours. 50 g 0     BENEFIBER OR 2 tsp daily       Biotin 06231 MCG TABS Take 1 tablet by mouth 2 times daily       Calcium-Magnesium-Zinc 333..33-5 MG TABS Take 1 tablet by mouth daily       cetirizine (ZYRTEC) 10 MG tablet TAKE ONE TABLET BY MOUTH EVERY EVENING 90 tablet 3     clobetasol (TEMOVATE) 0.05 % ointment APPLY TO AREA OF IRRITATION TWICE DAILY FOR 2 WEEKS 30 g 0     donepezil (ARICEPT) 5 MG tablet Take 1 tablet (5 mg) by mouth At Bedtime 30 tablet 3     estradiol (ESTRACE VAGINAL) 0.1 MG/GM cream USE ONE GRAM VAGINALLY EVERY DAY AND SPREAD A SMALL AMOUNT AROUND THE CLITORAL REES 85 g 1     fluocinonide (LIDEX) 0.05 % solution Apply twice daily as needed to scalp.  Do not apply to face. 60 mL 3     fluticasone (FLONASE) 50 MCG/ACT spray USE ONE SPRAY IN EACH NOSTRIL DAILY 16 g 1     fluticasone-salmeterol (ADVAIR DISKUS) 250-50 MCG/DOSE diskus inhaler Inhale 1 puff into the lungs 2 times daily Needs follow up appointment for asthma for further refills 1 Inhaler 0     gabapentin (NEURONTIN) 300 MG capsule TAKE ONE CAPSULE BY MOUTH EVERY NIGHT AT BEDTIME AS NEEDED 60 capsule 0     hydrocortisone 1 % cream Apply topically as needed       lactase (LACTAID) 9000 UNITS TABS Take 9,000 Units by mouth as needed       montelukast (SINGULAIR) 10 MG tablet TAKE ONE TABLET BY MOUTH EVERY DAY 90 tablet 1     omeprazole (PRILOSEC) 20 MG CR capsule TAKE ONE CAPSULE BY MOUTH TWICE A DAY FOR HEARTBURN 180 capsule 3     ORDER FOR DME Equipment being ordered: Nebulizer and tubing/filter 1 Device 0     sertraline (ZOLOFT) 100 MG tablet TAKE ONE AND ONE-HALF TABLET BY MOUTH ONCE DAILY 135 tablet 1     simvastatin (ZOCOR) 40 MG tablet TAKE ONE TABLET BY MOUTH EVERY NIGHT AT BEDTIME 90 tablet 0     SKIN OILS EX Lavender-headache, skin, peppermint-skin, upset stomach mix  "of oils, asthma mix of oils, eucalyptus. Tea tree oil-skin, vapor rub- chest tightness, sleepy time (vetiver, lavendaer, mrjoram, mandarin, ylang)- sleep, constipation, eczema (sweet almond oil). Also mixes with carriers: Coconut oil, [place] oil, Extra virgin oil. Frankincense- cough. Digize- oil. Purification- oil, lemon- oil. Essentialyme- pill.  Inhales a mix with olive oil almond and coconut oil with peppermint, and eucalyptus- sinus, allergies. Updated 12/1/2015.   Updated 4/26/2016: Lemon, Cinnamon bark, panaway, Thieves, Orange, Wintergreen, Copaiba       Allergies   Allergen Reactions     Sulfa Drugs Other (See Comments)     Reaction unknown as a child     Labs reviewed in EPIC    Reviewed and updated as needed this visit by clinical staff  Tobacco  Allergies  Meds  Med Hx  Surg Hx  Fam Hx  Soc Hx      Reviewed and updated as needed this visit by Provider         ROS:  Constitutional, HEENT, cardiovascular, pulmonary, gi and gu systems are negative, except as otherwise noted.    OBJECTIVE:     /74 (Cuff Size: Adult Regular)  Pulse 64  Temp 98.1  F (36.7  C) (Tympanic)  Resp 16  Ht 5' 3\" (1.6 m)  Wt 129 lb (58.5 kg)  BMI 22.85 kg/m2  Body mass index is 22.85 kg/(m^2).  GENERAL: healthy, alert and no distress  NECK: no adenopathy, no asymmetry, masses, or scars and thyroid normal to palpation  RESP: lungs clear to auscultation - no rales, rhonchi or wheezes  CV: regular rate and rhythm, normal S1 S2, no S3 or S4, no murmur, click or rub, no peripheral edema and peripheral pulses strong  ABDOMEN: soft, nontender, no hepatosplenomegaly, no masses and bowel sounds normal  PSYCH: mentation appears normal, affect normal/bright    Diagnostic Test Results:  Results for orders placed or performed in visit on 09/11/18   Ferritin   Result Value Ref Range    Ferritin 18 8 - 252 ng/mL   Basic metabolic panel   Result Value Ref Range    Sodium 141 133 - 144 mmol/L    Potassium 3.8 3.4 - 5.3 mmol/L    " Chloride 106 94 - 109 mmol/L    Carbon Dioxide 27 20 - 32 mmol/L    Anion Gap 8 3 - 14 mmol/L    Glucose 87 70 - 99 mg/dL    Urea Nitrogen 13 7 - 30 mg/dL    Creatinine 0.71 0.52 - 1.04 mg/dL    GFR Estimate 80 >60 mL/min/1.7m2    GFR Estimate If Black >90 >60 mL/min/1.7m2    Calcium 8.4 (L) 8.5 - 10.1 mg/dL       ASSESSMENT/PLAN:     1. Benign essential hypertension  Controlled.  Labs stable.  - amLODIPine (NORVASC) 5 MG tablet; Take 1 tablet (5 mg) by mouth daily  Dispense: 90 tablet; Refill: 1  - Basic metabolic panel    2. Iron deficiency anemia, unspecified iron deficiency anemia type  Ferritin at low end of normal. Continue with supplementation  - Ferritin    3. Chronic constipation  With chronic symptoms and desire to have fructose testing referral to GI placed. Symptomatic care and follow up discussed.  - GASTROENTEROLOGY ADULT REF CONSULT ONLY    4. Need for prophylactic vaccination and inoculation against influenza    - FLU VACCINE, INCREASED ANTIGEN, PRESV FREE, AGE 65+ [09595]  - Vaccine Administration, Initial [90498]    Home care instructions were reviewed with the patient. The risks, benefits and treatment options of prescribed medications or other treatments have been discussed with the patient. The patient verbalized their understanding and should call or follow up if no improvement or if they develop further problems.    Patient Instructions   Labs today-we will notify you with these results    Norvasc refilled    Eat small frequent meals, chew food well      MARVIN Sesay [facility]

## 2018-09-11 NOTE — LETTER
September 18, 2018      Ingrid Amaral  [address] [address]        Dear ,    We are writing to inform you of your test results. We tried to reach you by phone but were unsuccessful.       Hi Ingrid,     Your ferritin is down slightly to 18.  I would continue to supplement to increase your iron stores. Your other labs were good.  I did check with [facility] and they do not offer Fructose testing.  I did make a referral to [facility] as they have done this in the past.  The referral is below;     Preferred Location: [facility] [phone]     Please be aware that coverage of these services is subject to the terms and limitations of your health insurance plan.  Call member services at your health plan with any benefit or coverage questions.  Any procedures must be performed at a [place] facility OR coordinated by your clinic's referral office.     Please bring the following with you to your appointment:     (1) Any X-Rays, CTs or MRIs which have been performed.  Contact the facility where they were done to arrange for  prior to your scheduled appointment.     (2) List of current medications      Resulted Orders   Ferritin   Result Value Ref Range    Ferritin 18 8 - 252 ng/mL   Basic metabolic panel   Result Value Ref Range    Sodium 141 133 - 144 mmol/L    Potassium 3.8 3.4 - 5.3 mmol/L    Chloride 106 94 - 109 mmol/L    Carbon Dioxide 27 20 - 32 mmol/L    Anion Gap 8 3 - 14 mmol/L    Glucose 87 70 - 99 mg/dL    Urea Nitrogen 13 7 - 30 mg/dL    Creatinine 0.71 0.52 - 1.04 mg/dL    GFR Estimate 80 >60 mL/min/1.7m2      Comment:      Non  GFR Calc    GFR Estimate If Black >90 >60 mL/min/1.7m2      Comment:       GFR Calc    Calcium 8.4 (L) 8.5 - 10.1 mg/dL       If you have any questions or concerns, please call the clinic at the number listed above.       Sincerely,        MARVIN Sesay CNP

## 2018-09-11 NOTE — MR AVS SNAPSHOT
After Visit Summary   9/11/2018    Ingrid Amaral    MRN: [id number]           Patient Information     Date Of Birth          1945        Visit Information        Provider Department      9/11/2018 1:00 PM Sheryl Minor APRN CNP [facility]        Today's Diagnoses     Iron deficiency anemia, unspecified iron deficiency anemia type    -  1    Benign essential hypertension          Care Instructions    Labs today-we will notify you with these results    Norvasc refilled    Eat small frequent meals, chew food well          Follow-ups after your visit        Who to contact     If you have questions or need follow up information about today's clinic visit or your schedule please contact [facility] directly at [phone].  Normal or non-critical lab and imaging results will be communicated to you by MyChart, letter or phone within 4 business days after the clinic has received the results. If you do not hear from us within 7 days, please contact the clinic through [employer] or phone. If you have a critical or abnormal lab result, we will notify you by phone as soon as possible.  Submit refill requests through [employer] or call your pharmacy and they will forward the refill request to us. Please allow 3 business days for your refill to be completed.          Additional Information About Your Visit        MyChart Information     [employer] gives you secure access to your electronic health record. If you see a primary care provider, you can also send messages to your care team and make appointments. If you have questions, please call your primary care clinic.  If you do not have a primary care provider, please call [phone] and they will assist you.        Care EveryWhere ID     This is your Care EveryWhere ID. This could be used by other organizations to access your [place] medical records  [id number]        Your Vitals Were     Pulse Temperature  "Respirations Height BMI (Body Mass Index)       64 98.1  F (36.7  C) (Tympanic) 16 5' 3\" (1.6 m) 22.85 kg/m2        Blood Pressure from Last 3 Encounters:   09/11/18 132/74   08/29/18 139/80   06/14/18 122/74    Weight from Last 3 Encounters:   09/11/18 129 lb (58.5 kg)   06/14/18 127 lb (57.6 kg)   06/07/18 127 lb (57.6 kg)              We Performed the Following     Basic metabolic panel     Ferritin          Today's Medication Changes          These changes are accurate as of 9/11/18  1:46 PM.  If you have any questions, ask your nurse or doctor.               These medicines have changed or have updated prescriptions.        Dose/Directions    gabapentin 300 MG capsule   Commonly known as:  NEURONTIN   This may have changed:  Another medication with the same name was removed. Continue taking this medication, and follow the directions you see here.   Used for:  Lumbar radiculopathy, Chronic right-sided low back pain with right-sided sciatica   Changed by:  Sheryl Minor APRN CNP        TAKE ONE CAPSULE BY MOUTH EVERY NIGHT AT BEDTIME AS NEEDED   Quantity:  60 capsule   Refills:  0         Stop taking these medicines if you haven't already. Please contact your care team if you have questions.     calcipotriene 0.005 % Oint   Stopped by:  Sheryl Minor APRN CNP           FLAXSEED   Stopped by:  Sheryl Minor APRN CNP           HYDROcodone-acetaminophen 5-325 MG per tablet   Commonly known as:  NORCO   Stopped by:  Sheryl Minor APRN CNP           PROBIOTIC DAILY PO   Stopped by:  Sheryl Minor APRN CNP                Where to get your medicines      These medications were sent to [facility] [place] [id number] [place] [address]     Phone:  [phone]     amLODIPine 5 MG tablet                Primary Care Provider Office Phone # Fax #    MARVIN Diaz [phone]       [place] " [facility] [address] [facility] [address]        Goals        General    Medication 1 (pt-stated)     Notes - Note edited  7/13/2018  1:02 PM by Ria Izquierdo LSW    Goal Statement: I will allow my spouse to assist me with medications when issues arise,  for consistent administration.   Measure of Success: I am taking my medications at the right time and every day  Supportive Steps to Achieve: actively involved spouse with supportive ideas  Barriers: memory loss  Strengths: supportive family  Date to Achieve By: 10-31-18            Equal Access to Services     JORGE A BYRD AH: Hadii aad ku hadasho Soomaali, waaxda luqadaha, qaybta kaalmada adeegyada, waxay idiin hayaan adeeg kharamarlene laelian . So [facility] [phone].    ATENCIÓN: Si habla español, tiene a gauthier disposición servicios gratuitos de asistencia lingüística. Llame al [phone].    We comply with applicable federal civil rights laws and [place] laws. We do not discriminate on the basis of race, color, national origin, age, disability, sex, sexual orientation, or gender identity.            Thank you!     Thank you for choosing [facility]  for your care. Our goal is always to provide you with excellent care. Hearing back from our patients is one way we can continue to improve our services. Please take a few minutes to complete the written survey that you may receive in the mail after your visit with us. Thank you!             Your Updated Medication List - Protect others around you: Learn how to safely use, store and throw away your medicines at www.disposemymeds.org.          This list is accurate as of 9/11/18  1:46 PM.  Always use your most recent med list.                   Brand Name Dispense Instructions for use Diagnosis    * albuterol (5 MG/ML) 0.5% neb solution    PROVENTIL    30 vial    Take 0.5 mLs (2.5 mg) by nebulization every 4 hours as needed for wheezing or shortness of breath / dyspnea    Moderate  persistent asthma       * albuterol 108 (90 Base) MCG/ACT inhaler    PROAIR HFA/PROVENTIL HFA/VENTOLIN HFA    1 Inhaler    Inhale 2 puffs into the lungs every 4 hours as needed for shortness of breath / dyspnea    Moderate persistent asthma without complication       amLODIPine 5 MG tablet    NORVASC    90 tablet    Take 1 tablet (5 mg) by mouth daily    Benign essential hypertension       ascorbic acid 500 MG Tabs      Take 1 tablet by mouth daily        ASPIRIN 81 PO      Take 1 tablet by mouth every other day        augmented betamethasone dipropionate 0.05 % cream    DIPROLENE-AF    50 g    Apply sparingly to legs twice daily as needed. Do not apply to face. May occlude with saran wrap for 3-4 days for 6 hours.    Psoriasis       BENEFIBER PO      2 tsp daily        Biotin 64199 MCG Tabs      Take 1 tablet by mouth 2 times daily        Calcium-Magnesium-Zinc 333..33-5 MG Tabs      Take 1 tablet by mouth daily        cetirizine 10 MG tablet    zyrTEC    90 tablet    TAKE ONE TABLET BY MOUTH EVERY EVENING    Chronic rhinitis       clobetasol 0.05 % ointment    TEMOVATE    30 g    APPLY TO AREA OF IRRITATION TWICE DAILY FOR 2 WEEKS    Chronic vulvitis       donepezil 5 MG tablet    ARICEPT    30 tablet    Take 1 tablet (5 mg) by mouth At Bedtime    Cognitive impairment       estradiol 0.1 MG/GM cream    ESTRACE VAGINAL    85 g    USE ONE GRAM VAGINALLY EVERY DAY AND SPREAD A SMALL AMOUNT AROUND THE CLITORAL REES    Vaginal atrophy, Labial and clitoral adhesions, acquired       fluocinonide 0.05 % solution    LIDEX    60 mL    Apply twice daily as needed to scalp.  Do not apply to face.    Psoriasis       fluticasone 50 MCG/ACT spray    FLONASE    16 g    USE ONE SPRAY IN EACH NOSTRIL DAILY    Allergic rhinitis       fluticasone-salmeterol 250-50 MCG/DOSE diskus inhaler    ADVAIR DISKUS    1 Inhaler    Inhale 1 puff into the lungs 2 times daily Needs follow up appointment for asthma for further refills     Moderate persistent asthma without complication       gabapentin 300 MG capsule    NEURONTIN    60 capsule    TAKE ONE CAPSULE BY MOUTH EVERY NIGHT AT BEDTIME AS NEEDED    Lumbar radiculopathy, Chronic right-sided low back pain with right-sided sciatica       hydrocortisone 1 % cream    CORTAID     Apply topically as needed        lactase 9000 units Tabs tablet    LACTAID     Take 9,000 Units by mouth as needed        montelukast 10 MG tablet    SINGULAIR    90 tablet    TAKE ONE TABLET BY MOUTH EVERY DAY    Moderate persistent asthma without complication       omeprazole 20 MG CR capsule    priLOSEC    180 capsule    TAKE ONE CAPSULE BY MOUTH TWICE A DAY FOR HEARTBURN    Gastroesophageal reflux disease without esophagitis       order for DME     1 Device    Equipment being ordered: Nebulizer and tubing/filter    Moderate persistent asthma       sertraline 100 MG tablet    ZOLOFT    135 tablet    TAKE ONE AND ONE-HALF TABLET BY MOUTH ONCE DAILY    Panic disorder without agoraphobia       simvastatin 40 MG tablet    ZOCOR    90 tablet    TAKE ONE TABLET BY MOUTH EVERY NIGHT AT BEDTIME    Elevated cholesterol with elevated triglycerides       SKIN OILS EX      Lavender-headache, skin, peppermint-skin, upset stomach mix of oils, asthma mix of oils, eucalyptus. Tea tree oil-skin, vapor rub- chest tightness, sleepy time (vetiver, lavendaer, mrjoram, mandarin, ylang)- sleep, constipation, eczema (sweet almond oil). Also mixes with carriers: Coconut oil, [place] oil, Extra virgin oil. Frankincense- cough. Digize- oil. Purification- oil, lemon- oil. Essentialyme- pill.  Inhales a mix with olive oil almond and coconut oil with peppermint, and eucalyptus- sinus, allergies. Updated 12/1/2015.  Updated 4/26/2016: Lemon, Cinnamon bark, panaway, Thieves, Orange, Wintergreen, Copaiba        * Notice:  This list has 2 medication(s) that are the same as other medications prescribed for you. Read the directions carefully, and ask your  doctor or other care provider to review them with you.

## 2018-09-12 ASSESSMENT — ASTHMA QUESTIONNAIRES: ACT_TOTALSCORE: 22

## 2018-09-25 DIAGNOSIS — J45.40 MODERATE PERSISTENT ASTHMA WITHOUT COMPLICATION: ICD-10-CM

## 2018-09-25 NOTE — TELEPHONE ENCOUNTER
"Requested Prescriptions   Pending Prescriptions Disp Refills     ADVAIR DISKUS 250-50 MCG/DOSE diskus inhaler [Pharmacy Med Name: ADVAIR DISKUS 250-50MCG/DOSE AEPB]  0     Sig: INHALE ONE PUFF BY MOUTH TWICE A DAY NEEDS FOLLOW UP APPOINTMENT FOR ASTHMA FOR FURTHER REFILLS    Inhaled Steroids Protocol Passed    9/25/2018 11:20 AM       Passed - Patient is age 12 or older       Passed - Asthma control assessment score within normal limits in last 6 months    Please review ACT score.          Passed - Recent (6 mo) or future (30 days) visit within the authorizing provider's specialty    Patient had office visit in the last 6 months or has a visit in the next 30 days with authorizing provider or within the authorizing provider's specialty.  See \"Patient Info\" tab in inbasket, or \"Choose Columns\" in Meds & Orders section of the refill encounter.              "

## 2018-10-06 DIAGNOSIS — J45.40 MODERATE PERSISTENT ASTHMA WITHOUT COMPLICATION: ICD-10-CM

## 2018-10-07 NOTE — TELEPHONE ENCOUNTER
"Requested Prescriptions   Pending Prescriptions Disp Refills     montelukast (SINGULAIR) 10 MG tablet   Last Written Prescription Date:  3/13/18  Last Fill Quantity: 90,  # refills: 1   Last office visit: 5/9/2018 with prescribing provider:  WENCESLAO Sanchez   Future Office Visit:     90 tablet 1     Sig: TAKE ONE TABLET BY MOUTH EVERY DAY    Leukotriene Inhibitors Protocol Passed    10/6/2018  1:03 PM       Passed - Patient is age 12 or older    If patient is under 16, ok to refill using age based dosing.          Passed - Asthma control assessment score within normal limits in last 6 months    Please review ACT score.   ACT Total Scores 8/16/2017 2/21/2018 9/11/2018   ACT TOTAL SCORE - - -   ASTHMA ER VISITS - - -   ASTHMA HOSPITALIZATIONS - - -   ACT TOTAL SCORE (Goal Greater than or Equal to 20) 21 23 22   In the past 12 months, how many times did you visit the emergency room for your asthma without being admitted to the hospital? 0 0 0   In the past 12 months, how many times were you hospitalized overnight because of your asthma? 0 0 0              Passed - Recent (6 mo) or future (30 days) visit within the authorizing provider's specialty    Patient had office visit in the last 6 months or has a visit in the next 30 days with authorizing provider or within the authorizing provider's specialty.  See \"Patient Info\" tab in inbasket, or \"Choose Columns\" in Meds & Orders section of the refill encounter.              "

## 2018-10-08 RX ORDER — MONTELUKAST SODIUM 10 MG/1
TABLET ORAL
Qty: 90 TABLET | Refills: 1 | Status: SHIPPED | OUTPATIENT
Start: 2018-10-08 | End: 2019-03-08

## 2018-10-10 ENCOUNTER — PATIENT OUTREACH (OUTPATIENT)
Dept: CARE COORDINATION | Facility: CLINIC | Age: 73
End: 2018-10-10

## 2018-10-10 ASSESSMENT — ACTIVITIES OF DAILY LIVING (ADL): DEPENDENT_IADLS:: MEDICATION MANAGEMENT

## 2018-10-10 NOTE — PROGRESS NOTES
"Clinic Care Coordination Contact    Clinic Care Coordination Contact  OUTREACH    Referral Information:  Referral Source: PCP    Primary Diagnosis: Psychosocial    Chief Complaint   Patient presents with     Clinic Care Coordination - Follow-up     SABA        Universal Utilization: no concerns  Clinic Utilization  Difficulty keeping appointments:: No  Compliance Concerns: No  No-Show Concerns: No  No PCP office visit in Past Year: No  Utilization    Last refreshed: 10/10/2018 10:09 AM:  No Show Count (past year) 0       Last refreshed: 10/10/2018 10:09 AM:  ED visits 1       Last refreshed: 10/10/2018 10:09 AM:  Hospital admissions 0          Current as of: 10/10/2018 10:09 AM             Clinical Concerns:  Current Medical Concerns:  Pt reporting that her balance is a little off since she hurt her knee.      Current Behavioral Concerns: no concerns    Education Provided to patient: discussed the program \"a matter of balance\" and that the [facility] may have this class offered again soon.  Spouse said he will check into this as he would also like to attend.  If Sw finds out a class is being offered will send information.    Pain  Pain (GOAL):: No  Health Maintenance Reviewed: Up to date  Clinical Pathway: None    Medication Management:  No concerns.  Spouse assists as needed and with oversight     Functional Status:  Dependent ADLs:: Independent  Dependent IADLs:: Medication Management (starting to have concerns)  Bed or wheelchair confined:: No  Mobility Status: Independent  Fallen 2 or more times in the past year?: No  Any fall with injury in the past year?: No    Living Situation:  Current living arrangement:: I live in a private home with spouse  Type of residence:: Private home - stairs    Diet/Exercise/Sleep:  Diet:: Regular  Inadequate nutrition (GOAL):: No  Food Insecurity: No  Tube Feeding: No  Exercise:: Yes  Inadequate activity/exercise (GOAL):: No  Significant changes in sleep pattern (GOAL): " No    Transportation:  Transportation concerns (GOAL):: No  Transportation means:: Accessible car, Family     Psychosocial:  Faith or spiritual beliefs that impact treatment:: No  Mental health DX:: Yes  Mental health DX how managed:: Medication  Mental health management concern (GOAL):: No  Informal Support system:: Family, Friends     Financial/Insurance:   Financial/Insurance concerns (GOAL):: No  Pt and spouse both report that they are doing well.  They had no concerns or needs during this call.      Resources and Interventions:  Current Resources:      Community Resources: None  Supplies used at home:: None  Equipment Currently Used at Home: none    Advance Care Plan/Directive  Advanced Care Plans/Directives on file:: In process  Advanced Care Plan/Directive Status: In Process    Referrals Placed: [facility], [facility]     Goals:   Goals        General    Medication 1 (pt-stated)     Notes - Note edited  10/10/2018 10:21 AM by Ria Izquierdo LSW    Goal Statement: I will allow my spouse to assist me with medications when issues arise,  for consistent administration.   Measure of Success: I am taking my medications at the right time and every day  Supportive Steps to Achieve: actively involved spouse with supportive ideas  Barriers: memory loss  Strengths: supportive family  Date to Achieve By: 12-31-18                  Patient/Caregiver understanding: spouse to continue to oversee med administration and set up.    Outreach Frequency: 6 weeks      Plan: spouse to check at [facility] for matter of balance class.  Sw to call pt in about 6 weeks for check in and re-assessment of needs.     EM Mcfarlane, Clinic Care Coordinator 10/10/2018   10:31 AM  [phone]

## 2018-10-29 DIAGNOSIS — M54.16 LUMBAR RADICULOPATHY: ICD-10-CM

## 2018-10-29 DIAGNOSIS — M54.41 CHRONIC RIGHT-SIDED LOW BACK PAIN WITH RIGHT-SIDED SCIATICA: ICD-10-CM

## 2018-10-29 DIAGNOSIS — G89.29 CHRONIC RIGHT-SIDED LOW BACK PAIN WITH RIGHT-SIDED SCIATICA: ICD-10-CM

## 2018-10-29 RX ORDER — GABAPENTIN 300 MG/1
CAPSULE ORAL
Qty: 60 CAPSULE | Refills: 0 | Status: CANCELLED | OUTPATIENT
Start: 2018-10-29

## 2018-10-29 NOTE — TELEPHONE ENCOUNTER
gabapentin (NEURONTIN) 300 MG capsule      Last Written Prescription Date:  7/26/18  Last Fill Quantity: 60,   # refills: 0  Last Office Visit: 9/11/18  Future Office visit:    Next 5 appointments (look out 90 days)     Nov 02, 2018  1:00 PM CDT   [facility] Leesa Mu[facility]iz PA-C   [facility]    [address] [place] [id number]   [phone]                   Routing refill request to provider for review/approval because:  Drug not on the [facility], [facility] or [facility] refill protocol or controlled substance

## 2018-10-29 NOTE — TELEPHONE ENCOUNTER
Spoke with pt who states has not taken gabapentin regularly, just started taking at bedtime again.   States does not need refill prior to appt with Leesa Navas RN

## 2018-10-29 NOTE — TELEPHONE ENCOUNTER
Thank you  Brenda Urrutia CPht    [facility]  Phone: [phone]  Fax: [phone]

## 2018-11-02 ENCOUNTER — OFFICE VISIT (OUTPATIENT)
Dept: FAMILY MEDICINE | Facility: CLINIC | Age: 73
End: 2018-11-02
Payer: COMMERCIAL

## 2018-11-02 VITALS
RESPIRATION RATE: 16 BRPM | BODY MASS INDEX: 22.75 KG/M2 | OXYGEN SATURATION: 97 % | HEIGHT: 63 IN | WEIGHT: 128.4 LBS | SYSTOLIC BLOOD PRESSURE: 114 MMHG | HEART RATE: 63 BPM | DIASTOLIC BLOOD PRESSURE: 80 MMHG | TEMPERATURE: 98.3 F

## 2018-11-02 DIAGNOSIS — E78.2 ELEVATED CHOLESTEROL WITH ELEVATED TRIGLYCERIDES: ICD-10-CM

## 2018-11-02 DIAGNOSIS — R41.89 COGNITIVE IMPAIRMENT: ICD-10-CM

## 2018-11-02 DIAGNOSIS — K58.2 IRRITABLE BOWEL SYNDROME WITH BOTH CONSTIPATION AND DIARRHEA: ICD-10-CM

## 2018-11-02 DIAGNOSIS — D50.9 IRON DEFICIENCY ANEMIA, UNSPECIFIED IRON DEFICIENCY ANEMIA TYPE: ICD-10-CM

## 2018-11-02 DIAGNOSIS — L40.9 PSORIASIS: Primary | ICD-10-CM

## 2018-11-02 DIAGNOSIS — R26.89 POOR BALANCE: ICD-10-CM

## 2018-11-02 DIAGNOSIS — R13.10 DYSPHAGIA, UNSPECIFIED TYPE: ICD-10-CM

## 2018-11-02 DIAGNOSIS — E78.5 DYSLIPIDEMIA: ICD-10-CM

## 2018-11-02 DIAGNOSIS — R14.0 BLOATING: ICD-10-CM

## 2018-11-02 LAB
ALBUMIN SERPL-MCNC: 4 G/DL (ref 3.4–5)
ALP SERPL-CCNC: 97 U/L (ref 40–150)
ALT SERPL W P-5'-P-CCNC: 29 U/L (ref 0–50)
ANION GAP SERPL CALCULATED.3IONS-SCNC: 6 MMOL/L (ref 3–14)
AST SERPL W P-5'-P-CCNC: 24 U/L (ref 0–45)
BILIRUB SERPL-MCNC: 0.5 MG/DL (ref 0.2–1.3)
BUN SERPL-MCNC: 16 MG/DL (ref 7–30)
CALCIUM SERPL-MCNC: 8.9 MG/DL (ref 8.5–10.1)
CHLORIDE SERPL-SCNC: 104 MMOL/L (ref 94–109)
CHOLEST SERPL-MCNC: 185 MG/DL
CO2 SERPL-SCNC: 28 MMOL/L (ref 20–32)
CREAT SERPL-MCNC: 0.7 MG/DL (ref 0.52–1.04)
ERYTHROCYTE [DISTWIDTH] IN BLOOD BY AUTOMATED COUNT: 13 % (ref 10–15)
FERRITIN SERPL-MCNC: 18 NG/ML (ref 8–252)
GFR SERPL CREATININE-BSD FRML MDRD: 82 ML/MIN/1.7M2
GLUCOSE SERPL-MCNC: 90 MG/DL (ref 70–99)
HCT VFR BLD AUTO: 39.7 % (ref 35–47)
HDLC SERPL-MCNC: 71 MG/DL
HGB BLD-MCNC: 13 G/DL (ref 11.7–15.7)
IRON SATN MFR SERPL: 30 % (ref 15–46)
IRON SERPL-MCNC: 123 UG/DL (ref 35–180)
LDLC SERPL CALC-MCNC: 82 MG/DL
MCH RBC QN AUTO: 30.6 PG (ref 26.5–33)
MCHC RBC AUTO-ENTMCNC: 32.7 G/DL (ref 31.5–36.5)
MCV RBC AUTO: 93 FL (ref 78–100)
NONHDLC SERPL-MCNC: 114 MG/DL
PLATELET # BLD AUTO: 275 10E9/L (ref 150–450)
POTASSIUM SERPL-SCNC: 3.8 MMOL/L (ref 3.4–5.3)
PROT SERPL-MCNC: 7.2 G/DL (ref 6.8–8.8)
RBC # BLD AUTO: 4.25 10E12/L (ref 3.8–5.2)
SODIUM SERPL-SCNC: 138 MMOL/L (ref 133–144)
TIBC SERPL-MCNC: 409 UG/DL (ref 240–430)
TRIGL SERPL-MCNC: 159 MG/DL
TSH SERPL DL<=0.005 MIU/L-ACNC: 2.45 MU/L (ref 0.4–4)
WBC # BLD AUTO: 6 10E9/L (ref 4–11)

## 2018-11-02 PROCEDURE — 36415 COLL VENOUS BLD VENIPUNCTURE: CPT | Performed by: PHYSICIAN ASSISTANT

## 2018-11-02 PROCEDURE — 83550 IRON BINDING TEST: CPT | Performed by: PHYSICIAN ASSISTANT

## 2018-11-02 PROCEDURE — 99214 OFFICE O/P EST MOD 30 MIN: CPT | Performed by: PHYSICIAN ASSISTANT

## 2018-11-02 PROCEDURE — 80053 COMPREHEN METABOLIC PANEL: CPT | Performed by: PHYSICIAN ASSISTANT

## 2018-11-02 PROCEDURE — 80061 LIPID PANEL: CPT | Performed by: PHYSICIAN ASSISTANT

## 2018-11-02 PROCEDURE — 85027 COMPLETE CBC AUTOMATED: CPT | Performed by: PHYSICIAN ASSISTANT

## 2018-11-02 PROCEDURE — 82728 ASSAY OF FERRITIN: CPT | Performed by: PHYSICIAN ASSISTANT

## 2018-11-02 PROCEDURE — 84443 ASSAY THYROID STIM HORMONE: CPT | Performed by: PHYSICIAN ASSISTANT

## 2018-11-02 PROCEDURE — 83540 ASSAY OF IRON: CPT | Performed by: PHYSICIAN ASSISTANT

## 2018-11-02 RX ORDER — SIMVASTATIN 40 MG
TABLET ORAL
Qty: 90 TABLET | Refills: 3 | Status: SHIPPED | OUTPATIENT
Start: 2018-11-02 | End: 2019-04-24

## 2018-11-02 RX ORDER — DONEPEZIL HYDROCHLORIDE 5 MG/1
5 TABLET, FILM COATED ORAL AT BEDTIME
Qty: 30 TABLET | Refills: 3 | Status: SHIPPED | OUTPATIENT
Start: 2018-11-02 | End: 2018-12-19

## 2018-11-02 RX ORDER — CLOBETASOL PROPIONATE 0.5 MG/G
OINTMENT TOPICAL
Qty: 30 G | Refills: 0 | Status: SHIPPED | OUTPATIENT
Start: 2018-11-02 | End: 2019-01-14

## 2018-11-02 ASSESSMENT — ANXIETY QUESTIONNAIRES
GAD7 TOTAL SCORE: 8
5. BEING SO RESTLESS THAT IT IS HARD TO SIT STILL: MORE THAN HALF THE DAYS
6. BECOMING EASILY ANNOYED OR IRRITABLE: SEVERAL DAYS
GAD7 TOTAL SCORE: 8
GAD7 TOTAL SCORE: 8
7. FEELING AFRAID AS IF SOMETHING AWFUL MIGHT HAPPEN: SEVERAL DAYS
4. TROUBLE RELAXING: SEVERAL DAYS
7. FEELING AFRAID AS IF SOMETHING AWFUL MIGHT HAPPEN: SEVERAL DAYS
2. NOT BEING ABLE TO STOP OR CONTROL WORRYING: SEVERAL DAYS
1. FEELING NERVOUS, ANXIOUS, OR ON EDGE: SEVERAL DAYS
3. WORRYING TOO MUCH ABOUT DIFFERENT THINGS: SEVERAL DAYS

## 2018-11-02 ASSESSMENT — PATIENT HEALTH QUESTIONNAIRE - PHQ9
SUM OF ALL RESPONSES TO PHQ QUESTIONS 1-9: 4
10. IF YOU CHECKED OFF ANY PROBLEMS, HOW DIFFICULT HAVE THESE PROBLEMS MADE IT FOR YOU TO DO YOUR WORK, TAKE CARE OF THINGS AT HOME, OR GET ALONG WITH OTHER PEOPLE: SOMEWHAT DIFFICULT
SUM OF ALL RESPONSES TO PHQ QUESTIONS 1-9: 4

## 2018-11-02 NOTE — NURSING NOTE
"Chief Complaint   Patient presents with     Derm Problem     Gastric Problem       Initial /80 (BP Location: Right arm, Patient Position: Chair, Cuff Size: Adult Regular)  Pulse 63  Temp 98.3  F (36.8  C) (Tympanic)  Resp 16  Ht 5' 3\" (1.6 m)  Wt 128 lb 6.4 oz (58.2 kg)  SpO2 97%  BMI 22.75 kg/m2 Estimated body mass index is 22.75 kg/(m^2) as calculated from the following:    Height as of this encounter: 5' 3\" (1.6 m).    Weight as of this encounter: 128 lb 6.4 oz (58.2 kg).    Patient presents to the clinic using No DME    Health Maintenance that is potentially due pending provider review:  NONE        Is there anyone who you would like to be able to receive your results? No  If yes have patient fill out BROOKE      "

## 2018-11-02 NOTE — PATIENT INSTRUCTIONS
Psoriasis -  Use current clobetasol, still twice daily, and cover the clobetasol with something (non-stick gauze, saran wrap, etc) over night to increase medicine absorption    Scar from biopsy -  Will fade lighter with time    Bloating -  Likely related to IBS.  Avoid the foods listed on print out given today.    Swallowing issues -  Return to speech therapy to see if re-learning the swallowing exercises helps.  See me if does not help.    Balance -  Start Physical Therapy   Set up return visit to neurology, as balance can be part of possible parkinsons   See me again if neurology does not feel balance is park of parkinsons/lewy body dementia    Recheck after neurology (in 1-2 months) for balance, bloating, swallowing

## 2018-11-02 NOTE — PROGRESS NOTES
SUBJECTIVE:   Ingrid Amaral is a 73 year old female who presents to clinic today for the following health issues:      * Multiple health concerns    * Has psoriasis, none of the creams seem to be working.  Has been seeing Derm.  Last visit was 8/29/18. Bacitracin, Clobetasol, natural remedy shampoo/conditioner.    * Stomach Problem-  While she is eating and after she is eating, stomach starts hurting and can't breathe so she has to take her bra off and unbutton her pants.  Does have a hiatal hernia.  Has IBS also history of dysphagia and lactose intolerance.  Symptoms triggered a lot with bread, ice cream, meat.  Swallowing issue happens more often.  Not heartburn very often anymore.    * Balance is off.  Does have knee problems   A few falls since jumped off a truck in Sept?  No knee pain.    More with unlevel ground.    Goes up her stairs on all 4s.  Not lightheaded.  No freq tinnitus.  Not with first standing.      * Derm Problem-  Had a biopsy done on 8/29/18, pathology showed benign lichenoid keratosis.  Spot that was biopsied is still red.    States has dementia and has been concern for Parkinsonism, although Dr Guerrero impression appears to differ from neuropsych consult.    Problem list and histories reviewed & adjusted, as indicated.  Additional history: as documented    BP Readings from Last 3 Encounters:   11/02/18 114/80   09/11/18 132/74   08/29/18 139/80    Wt Readings from Last 3 Encounters:   11/02/18 128 lb 6.4 oz (58.2 kg)   09/11/18 129 lb (58.5 kg)   06/14/18 127 lb (57.6 kg)         Labs reviewed in EPIC    Reviewed and updated as needed this visit by clinical staff  Tobacco  Allergies  Meds  Problems  Med Hx  Surg Hx  Fam Hx  Soc Hx        Reviewed and updated as needed this visit by Provider  Tobacco  Allergies  Meds  Problems  Med Hx  Surg Hx  Fam Hx  Soc Hx          ROS:  Constitutional, cardiovascular, pulmonary, GI, musculoskeletal, neuro, skin, and psych systems are  "negative, except as otherwise noted.    OBJECTIVE:     /80 (BP Location: Right arm, Patient Position: Chair, Cuff Size: Adult Regular)  Pulse 63  Temp 98.3  F (36.8  C) (Tympanic)  Resp 16  Ht 5' 3\" (1.6 m)  Wt 128 lb 6.4 oz (58.2 kg)  SpO2 97%  BMI 22.75 kg/m2  Body mass index is 22.75 kg/(m^2).  GENERAL: healthy, alert and no distress  CV: regular rate and rhythm, normal S1 S2, no S3 or S4, no murmur, click or rub  MS/NEURO: Normal gait aside from feet pronation, mentation intact and speech normal  PSYCH: mentation appears normal, affect normal/bright  SKIN: psoriasis to R shin  ASSESSMENT/PLAN:       ICD-10-CM    1. Psoriasis L40.9    2. Irritable bowel syndrome with both constipation and diarrhea K58.2    3. Poor balance R26.89 Comprehensive metabolic panel     TSH with free T4 reflex   4. Elevated cholesterol with elevated triglycerides E78.2 simvastatin (ZOCOR) 40 MG tablet   5. Cognitive impairment R41.89 donepezil (ARICEPT) 5 MG tablet     SPEECH THERAPY REFERRAL   6. Bloating R14.0    7. Dysphagia, unspecified type R13.10 SPEECH THERAPY REFERRAL   8. Dyslipidemia E78.5 Lipid panel reflex to direct LDL Non-fasting   9. Iron deficiency anemia, unspecified iron deficiency anemia type D50.9 CBC with platelets     Iron and iron binding capacity     Ferritin       Patient Instructions   Psoriasis -  Use current clobetasol, still twice daily, and cover the clobetasol with something (non-stick gauze, saran wrap, etc) over night to increase medicine absorption    Scar from biopsy -  Will fade lighter with time    Bloating -  Likely related to IBS.  Avoid the foods listed on print out given today.    Swallowing issues -  Return to speech therapy to see if re-learning the swallowing exercises helps.  See me if does not help.    Balance -  Start Physical Therapy   Set up return visit to neurology, as balance can be part of possible parkinsons   See me again if neurology does not feel balance is park of " parkinsons/lewy body dementia    Recheck after neurology (in 1-2 months) for balance, bloating, swallowing      Leesa Muñiz PA-C  [facility]

## 2018-11-02 NOTE — MR AVS SNAPSHOT
After Visit Summary   11/2/2018    Ingrid Amaral    MRN: [id number]           Patient Information     Date Of Birth          1945        Visit Information        Provider Department      11/2/2018 1:00 PM Leesa Muñiz PA-C [facility]        Today's Diagnoses     Irritable bowel syndrome with both constipation and diarrhea    -  1    Elevated cholesterol with elevated triglycerides        Cognitive impairment        Chronic vulvitis        Bloating        Dysphagia, unspecified type        Iron deficiency anemia, unspecified iron deficiency anemia type        Poor balance        Dyslipidemia          Care Instructions    Psoriasis -  Use current clobetasol, still twice daily, and cover the clobetasol with something (non-stick gauze, saran wrap, etc) over night to increase medicine absorption    Scar from biopsy -  Will fade lighter with time    Bloating -  Likely related to IBS.  Avoid the foods listed on print out given today.    Swallowing issues -  Return to speech therapy to see if re-learning the swallowing exercises helps.  See me if does not help.    Balance -  Start Physical Therapy   Set up return visit to neurology, as balance can be part of possible parkinsons   See me again if neurology does not feel balance is park of parkinsons/lewy body dementia    Recheck after neurology (in 1-2 months) for balance, bloating, swallowing          Follow-ups after your visit        Additional Services     SPEECH THERAPY REFERRAL       If you have not heard from the scheduling office within 2 business days, please call [phone] for all locations, with the exception of [place], please call [phone] and [place], please call [phone].    Please be aware that coverage of these services is subject to the terms and limitations of your health insurance plan.  Call member services at your health plan with any benefit or coverage questions.                  Future  "tests that were ordered for you today     Open Future Orders        Priority Expected Expires Ordered    SPEECH THERAPY REFERRAL Routine  11/2/2019 11/2/2018            Who to contact     If you have questions or need follow up information about today's clinic visit or your schedule please contact [facility] directly at [phone].  Normal or non-critical lab and imaging results will be communicated to you by MyChart, letter or phone within 4 business days after the clinic has received the results. If you do not hear from us within 7 days, please contact the clinic through [facility] or phone. If you have a critical or abnormal lab result, we will notify you by phone as soon as possible.  Submit refill requests through [employer] or call your pharmacy and they will forward the refill request to us. Please allow 3 business days for your refill to be completed.          Additional Information About Your Visit        [facility] Information     [employer] gives you secure access to your electronic health record. If you see a primary care provider, you can also send messages to your care team and make appointments. If you have questions, please call your primary care clinic.  If you do not have a primary care provider, please call [phone] and they will assist you.        Care EveryWhere ID     This is your Care EveryWhere ID. This could be used by other organizations to access your [facility] medical records  [id number]        Your Vitals Were     Pulse Temperature Respirations Height Pulse Oximetry BMI (Body Mass Index)    63 98.3  F (36.8  C) (Tympanic) 16 5' 3\" (1.6 m) 97% 22.75 kg/m2       Blood Pressure from Last 3 Encounters:   11/02/18 114/80   09/11/18 132/74   08/29/18 139/80    Weight from Last 3 Encounters:   11/02/18 128 lb 6.4 oz (58.2 kg)   09/11/18 129 lb (58.5 kg)   06/14/18 127 lb (57.6 kg)              We Performed the Following     CBC with platelets     Comprehensive metabolic panel     " Ferritin     Iron and iron binding capacity     Lipid panel reflex to direct LDL Non-fasting     TSH with free T4 reflex          Today's Medication Changes          These changes are accurate as of 11/2/18  2:10 PM.  If you have any questions, ask your nurse or doctor.               These medicines have changed or have updated prescriptions.        Dose/Directions    clobetasol 0.05 % ointment   Commonly known as:  TEMOVATE   This may have changed:  See the new instructions.   Used for:  Chronic vulvitis   Changed by:  Leesa Muñiz PA-C        APPLY TO AREA OF IRRITATION TWICE DAILY FOR 2 WEEKS.  Cover loosely with gauze or saran wrap.   Quantity:  30 g   Refills:  0            Where to get your medicines      These medications were sent to [facility] [place] [address]     Phone:  [phone]     clobetasol 0.05 % ointment    donepezil 5 MG tablet    simvastatin 40 MG tablet                Primary Care Provider Office Phone # Fax #    Soha MARVIN Velázquez [facility] [phone]       [place] [id number]        Goals        General    Medication 1 (pt-stated)     Notes - Note edited  10/10/2018 10:21 AM by Ria Izquierdo LSW    Goal Statement: I will allow my spouse to assist me with medications when issues arise,  for consistent administration.   Measure of Success: I am taking my medications at the right time and every day  Supportive Steps to Achieve: actively involved spouse with supportive ideas  Barriers: memory loss  Strengths: supportive family  Date to Achieve By: 12-31-18            Equal Access to Services     [facility] Center[facility]AD : Windy [place], waaxda luqadaha, qaybta kaalmada ollie raza. So [facility] [phone].    ATENCIÓN: Si habla español, tiene a gauthier disposición servicios gratuitos de asistencia  lingüísticaLevy Lawson al [phone].    We comply with applicable federal civil rights laws and [place] laws. We do not discriminate on the basis of race, color, national origin, age, disability, sex, sexual orientation, or gender identity.            Thank you!     Thank you for choosing [facility]  for your care. Our goal is always to provide you with excellent care. Hearing back from our patients is one way we can continue to improve our services. Please take a few minutes to complete the written survey that you may receive in the mail after your visit with us. Thank you!             Your Updated Medication List - Protect others around you: Learn how to safely use, store and throw away your medicines at www.disposemymeds.org.          This list is accurate as of 11/2/18  2:10 PM.  Always use your most recent med list.                   Brand Name Dispense Instructions for use Diagnosis    * albuterol (5 MG/ML) 0.5% neb solution    PROVENTIL    30 vial    Take 0.5 mLs (2.5 mg) by nebulization every 4 hours as needed for wheezing or shortness of breath / dyspnea    Moderate persistent asthma       * albuterol 108 (90 Base) MCG/ACT inhaler    PROAIR HFA/PROVENTIL HFA/VENTOLIN HFA    1 Inhaler    Inhale 2 puffs into the lungs every 4 hours as needed for shortness of breath / dyspnea    Moderate persistent asthma without complication       amLODIPine 5 MG tablet    NORVASC    90 tablet    Take 1 tablet (5 mg) by mouth daily    Benign essential hypertension       ascorbic acid 500 MG Tabs      Take 1 tablet by mouth daily        ASPIRIN 81 PO      Take 1 tablet by mouth every other day        augmented betamethasone dipropionate 0.05 % cream    DIPROLENE-AF    50 g    Apply sparingly to legs twice daily as needed. Do not apply to face. May occlude with saran wrap for 3-4 days for 6 hours.    Psoriasis       BENEFIBER PO      2 tsp daily        Biotin 47272 MCG Tabs      Take 1 tablet by mouth 2  times daily        Calcium-Magnesium-Zinc 333..33-5 MG Tabs      Take 1 tablet by mouth daily        cetirizine 10 MG tablet    zyrTEC    90 tablet    TAKE ONE TABLET BY MOUTH EVERY EVENING    Chronic rhinitis       clobetasol 0.05 % ointment    TEMOVATE    30 g    APPLY TO AREA OF IRRITATION TWICE DAILY FOR 2 WEEKS.  Cover loosely with gauze or saran wrap.    Chronic vulvitis       donepezil 5 MG tablet    ARICEPT    30 tablet    Take 1 tablet (5 mg) by mouth At Bedtime    Cognitive impairment       estradiol 0.1 MG/GM cream    ESTRACE VAGINAL    85 g    USE ONE GRAM VAGINALLY EVERY DAY AND SPREAD A SMALL AMOUNT AROUND THE CLITORAL REES    Vaginal atrophy, Labial and clitoral adhesions, acquired       fluocinonide 0.05 % solution    LIDEX    60 mL    Apply twice daily as needed to scalp.  Do not apply to face.    Psoriasis       fluticasone 50 MCG/ACT spray    FLONASE    16 g    USE ONE SPRAY IN EACH NOSTRIL DAILY    Allergic rhinitis       fluticasone-salmeterol 250-50 MCG/DOSE diskus inhaler    ADVAIR DISKUS    1 Inhaler    Inhale 1 puff into the lungs 2 times daily    Moderate persistent asthma without complication       gabapentin 300 MG capsule    NEURONTIN    60 capsule    TAKE ONE CAPSULE BY MOUTH EVERY NIGHT AT BEDTIME AS NEEDED    Lumbar radiculopathy, Chronic right-sided low back pain with right-sided sciatica       hydrocortisone 1 % cream    CORTAID     Apply topically as needed        lactase 9000 units Tabs tablet    LACTAID     Take 9,000 Units by mouth as needed        montelukast 10 MG tablet    SINGULAIR    90 tablet    TAKE ONE TABLET BY MOUTH EVERY DAY    Moderate persistent asthma without complication       omeprazole 20 MG CR capsule    priLOSEC    180 capsule    TAKE ONE CAPSULE BY MOUTH TWICE A DAY FOR HEARTBURN    Gastroesophageal reflux disease without esophagitis       order for DME     1 Device    Equipment being ordered: Nebulizer and tubing/filter    Moderate persistent asthma        sertraline 100 MG tablet    ZOLOFT    135 tablet    TAKE ONE AND ONE-HALF TABLET BY MOUTH ONCE DAILY    Panic disorder without agoraphobia       simvastatin 40 MG tablet    ZOCOR    90 tablet    TAKE ONE TABLET BY MOUTH EVERY NIGHT AT BEDTIME    Elevated cholesterol with elevated triglycerides       SKIN OILS EX      Lavender-headache, skin, peppermint-skin, upset stomach mix of oils, asthma mix of oils, eucalyptus. Tea tree oil-skin, vapor rub- chest tightness, sleepy time (vetiver, lavendaer, mrjoram, mandarin, ylang)- sleep, constipation, eczema (sweet almond oil). Also mixes with carriers: Coconut oil, Eakly oil, Extra virgin oil. Frankincense- cough. Digize- oil. Purification- oil, lemon- oil. Essentialyme- pill.  Inhales a mix with olive oil almond and coconut oil with peppermint, and eucalyptus- sinus, allergies. Updated 12/1/2015.  Updated 4/26/2016: Lemon, Cinnamon bark, panaway, Thieves, Orange, Wintergreen, Copaiba        * Notice:  This list has 2 medication(s) that are the same as other medications prescribed for you. Read the directions carefully, and ask your doctor or other care provider to review them with you.

## 2018-11-02 NOTE — LETTER
November 5, 2018      Ingrid Amaral  [address] [address]        Dear ,    We are writing to inform you of your test results.    Your labs all look good.  Continue plan discussed in clinic    Resulted Orders   CBC with platelets   Result Value Ref Range    WBC 6.0 4.0 - 11.0 10e9/L    RBC Count 4.25 3.8 - 5.2 10e12/L    Hemoglobin 13.0 11.7 - 15.7 g/dL    Hematocrit 39.7 35.0 - 47.0 %    MCV 93 78 - 100 fl    MCH 30.6 26.5 - 33.0 pg    MCHC 32.7 31.5 - 36.5 g/dL    RDW 13.0 10.0 - 15.0 %    Platelet Count 275 150 - 450 10e9/L   Iron and iron binding capacity   Result Value Ref Range    Iron 123 35 - 180 ug/dL    Iron Binding Cap 409 240 - 430 ug/dL    Iron Saturation Index 30 15 - 46 %   Ferritin   Result Value Ref Range    Ferritin 18 8 - 252 ng/mL   Lipid panel reflex to direct LDL Non-fasting   Result Value Ref Range    Cholesterol 185 <200 mg/dL    Triglycerides 159 (H) <150 mg/dL      Comment:      Borderline high:  150-199 mg/dl  High:             200-499 mg/dl  Very high:       >499 mg/dl  Non Fasting      HDL Cholesterol 71 >49 mg/dL    LDL Cholesterol Calculated 82 <100 mg/dL      Comment:      Desirable:       <100 mg/dl    Non HDL Cholesterol 114 <130 mg/dL   Comprehensive metabolic panel   Result Value Ref Range    Sodium 138 133 - 144 mmol/L    Potassium 3.8 3.4 - 5.3 mmol/L    Chloride 104 94 - 109 mmol/L    Carbon Dioxide 28 20 - 32 mmol/L    Anion Gap 6 3 - 14 mmol/L    Glucose 90 70 - 99 mg/dL      Comment:      Non Fasting    Urea Nitrogen 16 7 - 30 mg/dL    Creatinine 0.70 0.52 - 1.04 mg/dL    GFR Estimate 82 >60 mL/min/1.7m2      Comment:      Non  GFR Calc    GFR Estimate If Black >90 >60 mL/min/1.7m2      Comment:       GFR Calc    Calcium 8.9 8.5 - 10.1 mg/dL    Bilirubin Total 0.5 0.2 - 1.3 mg/dL    Albumin 4.0 3.4 - 5.0 g/dL    Protein Total 7.2 6.8 - 8.8 g/dL    Alkaline Phosphatase 97 40 - 150 U/L    ALT 29 0 - 50 U/L     AST 24 0 - 45 U/L   TSH with free T4 reflex   Result Value Ref Range    TSH 2.45 0.40 - 4.00 mU/L       If you have any questions or concerns, please call the clinic at the number listed above.       Sincerely,        Leesa Muñiz PA-C/[facility]ida

## 2018-11-03 ASSESSMENT — PATIENT HEALTH QUESTIONNAIRE - PHQ9: SUM OF ALL RESPONSES TO PHQ QUESTIONS 1-9: 4

## 2018-11-03 ASSESSMENT — ANXIETY QUESTIONNAIRES: GAD7 TOTAL SCORE: 8

## 2018-11-05 NOTE — PROGRESS NOTES
[employer]od[employer] Quintero,    Your labs all look good.  Continue plan discussed in clinic.    Please contact me if you have questions.    Leesa Muñiz PA-C

## 2018-11-07 ENCOUNTER — HOSPITAL ENCOUNTER (OUTPATIENT)
Dept: SPEECH THERAPY | Facility: CLINIC | Age: 73
Setting detail: THERAPIES SERIES
End: 2018-11-07
Attending: PHYSICIAN ASSISTANT
Payer: MEDICARE

## 2018-11-07 DIAGNOSIS — R41.89 COGNITIVE IMPAIRMENT: ICD-10-CM

## 2018-11-07 DIAGNOSIS — R13.10 DYSPHAGIA, UNSPECIFIED TYPE: ICD-10-CM

## 2018-11-07 PROCEDURE — 40000211 ZZHC STATISTIC SLP  DEPARTMENT VISIT

## 2018-11-07 PROCEDURE — G8998 SWALLOW D/C STATUS: HCPCS | Mod: GN,CH

## 2018-11-07 PROCEDURE — G8997 SWALLOW GOAL STATUS: HCPCS | Mod: GN,CH

## 2018-11-07 PROCEDURE — G8996 SWALLOW CURRENT STATUS: HCPCS | Mod: GN,CH

## 2018-11-07 PROCEDURE — 92610 EVALUATE SWALLOWING FUNCTION: CPT | Mod: GN

## 2018-11-08 NOTE — PROGRESS NOTES
Impressions: The patient trialed thin, pudding, and solid consistencies. She demonstrated a safe swallow with no overt s/s of aspiration throughout trials. ST has no concerns at this time with swallow function. Pt reported sensation of globus at base of throat. Area identified is consistent with reflux. Pt also referenced discomfort in stomach frequently. Pt has established gastro orders in system. ST educated patient that symptoms may better be addressed at gastro, and no concerns with oral/ pharyngeal swallow are identified. No exercises. No treatment.      11/07/18  OP Swallow Evaluation        Present No   General Information   Type Of Visit Initial   Start Of Care Date 11/07/18   Referring Physician Leesa Muñiz PA-C   Orders Evaluate And Treat   Medical Diagnosis Dysphagia   Onset Of Illness/injury Or Date Of Surgery 11/07/18   Hearing WFL   Pertinent History of Current Problem/OT: Additional Occupational Profile Info Per MD note on 11/2/18: Stomach Problem-  While she is eating and after she is eating, stomach starts hurting and can't breathe so she has to take her bra off and unbutton her pants.  Does have a hiatal hernia.  Has IBS also history of dysphagia and lactose intolerance.  Symptoms triggered a lot with bread, ice cream, meat.  Swallowing issue happens more often.  Not heartburn very often anymore.   Respiratory Status Room air   Prior Level Of Function Swallowing   Prior Level Of Function Comment Past VFSS completed on 5/31/16. Results insignificant. No dysphagia.    Living Environment [facility]   General Observations The patient joined room independently.  waited in lobby.    Patient/family Goals Determine why choking is occurring.    Pain Assessment   Pain Reported No   Pain Location Stomach   Pain Comments Discomfort, not pain per patient.    Clinical Swallow Evaluation   Oral Musculature generally intact   Structural Abnormalities none present    Dentition present and adequate   Mucosal Quality good   Mandibular Strength and Mobility intact   Oral Labial Strength and Mobility WFL   Lingual Strength and Mobility WFL   Velar Elevation intact   Buccal Strength and Mobility intact   Laryngeal Function Swallow;Voicing initiated;Dry swallow palpated   Additional Documentation Yes   Swallow Eval   Feeding Assistance no assistance needed   Adaptive Eating Utensils None   Clinical Swallow Eval: Thin Liquid Texture Trial   Mode of Presentation, Thin Liquids cup;fed by clinician   Volume of Liquid or Food Presented 4 ounces   Oral Phase of Swallow WFL   Pharyngeal Phase of Swallow intact   Diagnostic Statement The patient trialed thin liquids first with single sip followed by 4 ounce water test. The patient demonstrated a safe swallow with no overt s/s of aspiration. No concerns at this time.    Clinical Swallow Eval: Pudding Thick Liquid Texture Trial   Mode of Presentation, Pudding spoon;self-fed   Volume of Pudding Presented 2 ounces   Oral Phase, Pudding WFL   Pharyngeal Phase, Pudding intact   Diagnostic Statement The patient trialed pudding thick via spoon. She reported 2-3 bites in that her stomach was starting to expand. No concerns with oral or pharyngeal swallow. Swallow is WNL.    Clinical Swallow Eval: Solid Food Texture Trial   Mode of Presentation, Solid self-fed   Volume of Solid Food Presented 1 cracker   Oral Phase, Solid WFL   Pharyngeal Phase, Solid intact   Diagnostic Statement 1 cracker trialed. No oral residue. Good bolus control. No overt s/s of aspiration. No concerns at this time.    VFSS Evaluation   VFSS Additional Documentation No   FEES Evaluation   Additional Documentation No   Swallow Compensations   Swallow Compensations No compensations were used   Educational Assessment   Barriers to Learning Cognitive  (hx of dementia per med chart. )   Esophageal Phase of Swallow   Patient reports or presents with symptoms of esophageal dysphagia  Yes   Esophageal sweep performed during today s videofluoroscopic exam  No   Esophageal comments Gastro order in place from 9/11/18. ST re-printed established order for patient and recommended she follow up with MD recommendation.    Swallow Eval: Clinical Impressions   Skilled Criteria for Therapy Intervention No problems identified which require skilled intervention   Functional Assessment Scale (FAS) 7   Dysphagia Outcome Severity Scale (DON) Level 7 - DON   Diet texture recommendations Regular diet;Thin liquids   Recommended Feeding/Eating Techniques maintain upright posture during/after eating for 30 mins   Rehab Potential good, to achieve stated therapy goals   Anticipated Discharge Disposition home   Risks and Benefits of Treatment have been explained. Yes   Patient, family and/or staff in agreement with Plan of Care Yes   Clinical Impression Comments The patient trialed thin, pudding, and solid consistencies. She demonstrated a safe swallow with no overt s/s of aspiration throughout trials. ST has no concerns at this time with swallow function. Pt reported sensation of globus at base of throat. Area identified is consistent with reflux. Pt also referenced discomfort in stomach frequently. Pt has established gastro orders in system. ST educated patient that symptoms may better be addressed at gastro, and no concerns with oral/ pharyngeal swallow are identified.    Total Session Time   Total Session Time 0   Total Evaluation Time 25   Therapy Certification   Certification date from 11/07/18   Certification date to 11/07/18   Medical Diagnosis Dysphagia   Certification I certify the need for these services furnished under this plan of treatment and while under my care.  (Physician co-signature of this document indicates review and certification of the therapy plan).   SLP Medicare Only G-code   G-code Swallowing   Swallowing   Swallowing:  Current Status , Goal , Discharge -Lbbb Only-Modifier  the same for all G-codes CH: 0% impairment   Swallowing: Current  & Discharge Modifier Rationale-Eval Only Clinical judgment.

## 2018-11-23 ENCOUNTER — PATIENT OUTREACH (OUTPATIENT)
Dept: CARE COORDINATION | Facility: CLINIC | Age: 73
End: 2018-11-23

## 2018-11-23 ASSESSMENT — ACTIVITIES OF DAILY LIVING (ADL): DEPENDENT_IADLS:: MEDICATION MANAGEMENT

## 2018-11-23 NOTE — LETTER
[facility]  [address] [place] [address]  [phone]      12/5/2018      Ingrid Amaral  [address] [facility] [address]      Dear  Ingrid,      I have been attempting to reach you since our last contact. I would like to continue to work with you on the goals from our last conversation and provide the support you may need. I would appreciate if you would give me a call at [phone] and let me know if you would like to continue working together. I know that there are many things that can affect our ability to communicate and hope we can plan better moving forward.     All of us at [facility] are invested in your health and are here to assist you in meeting your goals.     Sincerely,        Ria Izquierdo, [facility]  Clinic Care Coordination  [phone]

## 2018-11-23 NOTE — PROGRESS NOTES
Clinic Care Coordination Contact  [facility]/Voicemail    Referral Source: PCP  Clinical Data: Care Coordinator Outreach  Outreach attempted x 1.  Not able to leave a message  Plan:  Care Coordinator will try to reach patient again in 4-8 business days.  EM Mcfarlane, Clinic Care Coordinator 11/23/2018   9:50 AM  [phone]

## 2018-11-30 DIAGNOSIS — M54.16 LUMBAR RADICULOPATHY: ICD-10-CM

## 2018-11-30 DIAGNOSIS — G89.29 CHRONIC RIGHT-SIDED LOW BACK PAIN WITH RIGHT-SIDED SCIATICA: ICD-10-CM

## 2018-11-30 DIAGNOSIS — M54.41 CHRONIC RIGHT-SIDED LOW BACK PAIN WITH RIGHT-SIDED SCIATICA: ICD-10-CM

## 2018-11-30 RX ORDER — GABAPENTIN 300 MG/1
CAPSULE ORAL
Qty: 60 CAPSULE | Refills: 0 | Status: SHIPPED | OUTPATIENT
Start: 2018-11-30 | End: 2019-03-08

## 2018-11-30 NOTE — TELEPHONE ENCOUNTER
Requested Prescriptions   Pending Prescriptions Disp Refills     gabapentin (NEURONTIN) 300 MG capsule 60 capsule 0    There is no refill protocol information for this order        gabapentin (NEURONTIN) 300 MG capsule  Last Written Prescription Date:  07/26/2018  Last Fill Quantity: 60 capsule,  # refills: 0   Last office visit: 11/2/2018 with prescribing provider:  KENRICK Muñiz   Future Office Visit:   Next 5 appointments (look out 90 days)     Dec 19, 2018  2:15 PM CST   Return Visit with Pratibha Hay MD   [facility]    [address] [facility] [address]   [phone]                   Vidhya BILL (R) (M)

## 2018-12-05 ASSESSMENT — ACTIVITIES OF DAILY LIVING (ADL): DEPENDENT_IADLS:: MEDICATION MANAGEMENT

## 2018-12-05 NOTE — PROGRESS NOTES
Clinic Care Coordination Contact  [facility]/Voicemail    Referral Source: PCP  Clinical Data: Care Coordinator Outreach  Outreach attempted x 2.  Not able to leave a message  Plan: Care Coordinator will send unable to contact letter. Care Coordinator will try to reach patient again in about two weeks.  EM Mcfarlane, Clinic Care Coordinator 12/5/2018   1:49 PM  [phone]

## 2018-12-19 ENCOUNTER — OFFICE VISIT (OUTPATIENT)
Dept: NEUROLOGY | Facility: CLINIC | Age: 73
End: 2018-12-19
Payer: COMMERCIAL

## 2018-12-19 VITALS
BODY MASS INDEX: 23.24 KG/M2 | SYSTOLIC BLOOD PRESSURE: 109 MMHG | WEIGHT: 131.2 LBS | DIASTOLIC BLOOD PRESSURE: 73 MMHG | HEART RATE: 67 BPM | TEMPERATURE: 98.4 F | RESPIRATION RATE: 16 BRPM

## 2018-12-19 DIAGNOSIS — R41.89 COGNITIVE IMPAIRMENT: Primary | ICD-10-CM

## 2018-12-19 DIAGNOSIS — D32.9 MENINGIOMA (H): ICD-10-CM

## 2018-12-19 PROCEDURE — 99215 OFFICE O/P EST HI 40 MIN: CPT | Performed by: PSYCHIATRY & NEUROLOGY

## 2018-12-19 RX ORDER — DONEPEZIL HYDROCHLORIDE 5 MG/1
10 TABLET, FILM COATED ORAL AT BEDTIME
Qty: 60 TABLET | Refills: 5 | Status: SHIPPED | OUTPATIENT
Start: 2018-12-19 | End: 2019-04-24

## 2018-12-19 NOTE — LETTER
12/19/2018         RE: Ingrid Amaral  [address] [facility] [address]        Dear Colleague,    Thank you for referring your patient, Ingrid Amaral, to the [facility]. Please see a copy of my visit note below.    ESTABLISHED PATIENT NEUROLOGY NOTE    DATE OF VISIT: 12/19/2018  MRN: [id number]  PATIENT NAME: Ingrid Amaral  YOB: 1945    Chief Complaint   Patient presents with     RECHECK     Cognition     SUBJECTIVE:                                                      HISTORY OF PRESENT ILLNESS:  Ingrid is here for follow up regarding cognitive impairment.      When I met with the patient about 6 months ago, we reviewed her Neuropsych test results. There was concern for Lewy Body Dementia  , given the patient's additional history of RBD symptoms and tremor. I ordered a PET scan, which was consistent with possible early Lewy Body Dementia, but the occipital lobe metabolism was noted as normal. She has additional meningiomas which had increased in size on imaging so I referred Ingrid on to Neurosurgery. I also ordered an electroencephalogram. It does not appear that the latter 2 recommendations were completed. I asked her to try melatonin for sleep.        She is here alone today. The patient denies tremor. She says that balance is off. She also feels clumsy and drops things. She had one fall and hit her knee a while ago. She mentions that she has been to physical therapy several times. She admits she has not been doing the HEP. No weakness or numbness/tingling.     She says that the memory issues come and go. She feels that some memories have come back that she used to not be able to remember. She is still acting out her dreams. She does feel well-rested. She also has some visual hallucinations. She is taking the melatonin, and now feels that the sleep is better.      She says she has to stay in the kitchen when she cooks so that she doesn't leave things on the  stove too long. She is still driving and has gotten lost just once in the dark.    She is now on 5mg at bedtime of Aricept, with the approval of her primary care provider. She does not think the Aricept is causing any side effects and has not made her IBS symptoms worse.     Patient does not think that she did the electroencephalogram or Neurosurgery consult. She says it would be in our chart if she did.     CURRENT MEDICATIONS:     Current Outpatient Medications on File Prior to Visit:  albuterol (PROAIR HFA/PROVENTIL HFA/VENTOLIN HFA) 108 (90 BASE) MCG/ACT Inhaler Inhale 2 puffs into the lungs every 4 hours as needed for shortness of breath / dyspnea   albuterol (PROVENTIL) (5 MG/ML) 0.5% nebulizer solution Take 0.5 mLs (2.5 mg) by nebulization every 4 hours as needed for wheezing or shortness of breath / dyspnea   amLODIPine (NORVASC) 5 MG tablet Take 1 tablet (5 mg) by mouth daily   ascorbic acid 500 MG TABS Take 1 tablet by mouth daily   ASPIRIN 81 PO Take 1 tablet by mouth every other day    augmented betamethasone dipropionate (DIPROLENE-AF) 0.05 % cream Apply sparingly to legs twice daily as needed. Do not apply to face. May occlude with saran wrap for 3-4 days for 6 hours.   BENEFIBER OR 2 tsp daily   Biotin 89014 MCG TABS Take 1 tablet by mouth 2 times daily   Calcium-Magnesium-Zinc 333..33-5 MG TABS Take 1 tablet by mouth daily   cetirizine (ZYRTEC) 10 MG tablet TAKE ONE TABLET BY MOUTH EVERY EVENING   estradiol (ESTRACE VAGINAL) 0.1 MG/GM cream USE ONE GRAM VAGINALLY EVERY DAY AND SPREAD A SMALL AMOUNT AROUND THE CLITORAL REES   fluocinonide (LIDEX) 0.05 % solution Apply twice daily as needed to scalp.  Do not apply to face.   fluticasone (FLONASE) 50 MCG/ACT spray USE ONE SPRAY IN EACH NOSTRIL DAILY   fluticasone-salmeterol (ADVAIR DISKUS) 250-50 MCG/DOSE diskus inhaler Inhale 1 puff into the lungs 2 times daily   gabapentin (NEURONTIN) 300 MG capsule TAKE ONE CAPSULE BY MOUTH EVERY NIGHT AT  BEDTIME AS NEEDED   hydrocortisone 1 % cream Apply topically as needed   lactase (LACTAID) 9000 UNITS TABS Take 9,000 Units by mouth as needed   montelukast (SINGULAIR) 10 MG tablet TAKE ONE TABLET BY MOUTH EVERY DAY   omeprazole (PRILOSEC) 20 MG CR capsule TAKE ONE CAPSULE BY MOUTH TWICE A DAY FOR HEARTBURN   sertraline (ZOLOFT) 100 MG tablet TAKE ONE AND ONE-HALF TABLET BY MOUTH ONCE DAILY   simvastatin (ZOCOR) 40 MG tablet TAKE ONE TABLET BY MOUTH EVERY NIGHT AT BEDTIME   SKIN OILS EX Lavender-headache, skin, peppermint-skin, upset stomach mix of oils, asthma mix of oils, eucalyptus. Tea tree oil-skin, vapor rub- chest tightness, sleepy time (vetiver, lavendaer, mrjoram, mandarin, ylang)- sleep, constipation, eczema (sweet almond oil). Also mixes with carriers: Coconut oil, Media oil, Extra virgin oil. Frankincense- cough. Digize- oil. Purification- oil, lemon- oil. Essentialyme- pill.  Inhales a mix with olive oil almond and coconut oil with peppermint, and eucalyptus- sinus, allergies. Updated 12/1/2015. Updated 4/26/2016: Lemon, Cinnamon bark, panaway, Thieves, Orange, Wintergreen, Copaiba   clobetasol (TEMOVATE) 0.05 % ointment APPLY TO AREA OF IRRITATION TWICE DAILY FOR 2 WEEKS.  Cover loosely with gauze or saran wrap. (Patient not taking: Reported on 12/19/2018)   ORDER FOR DME Equipment being ordered: Nebulizer and tubing/filter     No current facility-administered medications on file prior to visit.     RECENT DIAGNOSTIC STUDIES:   Labs:   Results for orders placed or performed in visit on 11/02/18   CBC with platelets   Result Value Ref Range    WBC 6.0 4.0 - 11.0 10e9/L    RBC Count 4.25 3.8 - 5.2 10e12/L    Hemoglobin 13.0 11.7 - 15.7 g/dL    Hematocrit 39.7 35.0 - 47.0 %    MCV 93 78 - 100 fl    MCH 30.6 26.5 - 33.0 pg    MCHC 32.7 31.5 - 36.5 g/dL    RDW 13.0 10.0 - 15.0 %    Platelet Count 275 150 - 450 10e9/L   Iron and iron binding capacity   Result Value Ref Range    Iron 123 35 - 180 ug/dL     Iron Binding Cap 409 240 - 430 ug/dL    Iron Saturation Index 30 15 - 46 %   Ferritin   Result Value Ref Range    Ferritin 18 8 - 252 ng/mL   Lipid panel reflex to direct LDL Non-fasting   Result Value Ref Range    Cholesterol 185 <200 mg/dL    Triglycerides 159 (H) <150 mg/dL    HDL Cholesterol 71 >49 mg/dL    LDL Cholesterol Calculated 82 <100 mg/dL    Non HDL Cholesterol 114 <130 mg/dL   Comprehensive metabolic panel   Result Value Ref Range    Sodium 138 133 - 144 mmol/L    Potassium 3.8 3.4 - 5.3 mmol/L    Chloride 104 94 - 109 mmol/L    Carbon Dioxide 28 20 - 32 mmol/L    Anion Gap 6 3 - 14 mmol/L    Glucose 90 70 - 99 mg/dL    Urea Nitrogen 16 7 - 30 mg/dL    Creatinine 0.70 0.52 - 1.04 mg/dL    GFR Estimate 82 >60 mL/min/1.7m2    GFR Estimate If Black >90 >60 mL/min/1.7m2    Calcium 8.9 8.5 - 10.1 mg/dL    Bilirubin Total 0.5 0.2 - 1.3 mg/dL    Albumin 4.0 3.4 - 5.0 g/dL    Protein Total 7.2 6.8 - 8.8 g/dL    Alkaline Phosphatase 97 40 - 150 U/L    ALT 29 0 - 50 U/L    AST 24 0 - 45 U/L   TSH with free T4 reflex   Result Value Ref Range    TSH 2.45 0.40 - 4.00 mU/L       Imaging:   PET Brain (5.3.18):  IMPRESSION:    1. Mild hypometabolism in the parietal lobes bilaterally, possibly  representing early Lewy body dementia. Occipital lobe remains normal  however.  2. Grossly stable size of the bilateral meningiomas overlying the  right temporal lobe and left occipital lobe.    REVIEW OF SYSTEMS:                                                      10-point review of systems is negative except as mentioned above in HPI.     EXAM:                                                      Physical Exam:   Vitals: /73 (BP Location: Right arm, Patient Position: Sitting, Cuff Size: Adult Regular)   Pulse 67   Temp 98.4  F (36.9  C) (Tympanic)   Resp 16   Wt 59.5 kg (131 lb 3.2 oz)   BMI 23.24 kg/m     BMI= Body mass index is 23.24 kg/m .  GENERAL: NAD.   HEENT: NC/AT.   Neurologic:  MENTAL STATUS: Alert,  attentive though spacy at times. Speech is fluent. Fair comprehension. MoCA: 25/30 (normal is 26 and above).   CRANIAL NERVES: Visual fields intact to confrontation. Pupils equally, round and reactive to light. Facial sensation and movement normal. EOM full. Hearing intact to conversation. Trapezius strength intact. Palate moves symmetrically. Tongue midline.  MOTOR: 5/5 in proximal and distal muscle groups of upper and lower extremities. Tone and bulk normal. No tremor on today's exam.  SENSATION: Normal light touch throughout.   COORDINATION: Normal finger nose finger. Normal hand opening/closing speed and amplitude. Knee heel shin normal.  STATION AND GAIT: Romberg negative. Tandem minimally unsteady. Casual gait is normal except for valgus deformity at the knee.   CV: RRR. S1, S2.   NECK: No bruits.    ASSESSMENT and PLAN:                                                      Assessment and Plan:    ICD-10-CM    1. Cognitive impairment R41.89 donepezil (ARICEPT) 5 MG tablet   2. Meningioma (H) D32.9 NEUROSURGERY REFERRAL       Ms. Amaral is a pleasant 74 yo woman here for follow-up regarding possibly early stage Lewy Body Dementia. She really has no motor signs of Parkinson's on exam today. Her memory/cognition appears to be stable. We discussed increasing the nightly Aricept to 10mg since she is tolerating it well. We also discussed physical therapy for balance. She would like to work on doing her home exercises first. I expressed concern/caution about driving. It does not sound like there are any safety concerns at home or with the driving although the patient is alone in clinic today so I do not have corroborative information.     I would like for Ingrid to see a Neurosurgeon regarding the meningiomas so I referred her again.     We reviewed the PET scan results in clinic today. For simplicity sake, will hold off on the electroencephalogram for now.     Patient Instructions:  -- Try to resume your home  exercises. Let me know if you change your mind about a physical therapy referral to get going on this again.   -- Increase the Aricept to 10mg at bedtime.   -- Referral to Neurosurgery to review your imaging/meningiomas.  -- Return to Neurology clinic in 6 months, or sooner if there are new concerns.     Total Time: 40 minutes were spent with the patient. More than 50% of the time spent on counseling (as described above in Assessment and Plan) /coordinating the care.    Pratibha Hay MD  Neurology                    Again, thank you for allowing me to participate in the care of your patient.        Sincerely,        Pratibha Hay MD

## 2018-12-19 NOTE — PATIENT INSTRUCTIONS
Plan:    -- Try to resume your home exercises. Let me know if you change your mind about a physical therapy referral to get going on this again.   -- Increase the Aricept to 10mg at bedtime.   -- Referral to Neurosurgery to review your imaging/meningiomas.  -- Return to Neurology clinic in 6 months, or sooner if there are new concerns.

## 2018-12-26 ENCOUNTER — PATIENT OUTREACH (OUTPATIENT)
Dept: CARE COORDINATION | Facility: CLINIC | Age: 73
End: 2018-12-26

## 2018-12-26 ASSESSMENT — ACTIVITIES OF DAILY LIVING (ADL): DEPENDENT_IADLS:: MEDICATION MANAGEMENT

## 2018-12-26 NOTE — LETTER
[facility]  Complex Care Plan  About Me  Patient Name:  Ingrid Yancey    YOB: 1945  Age:     73 year old   Cassandra MRN:   [id number] Telephone Information:  Home Phone [phone]   Mobile Not on file.       Address:    [address] [place] [address] Email address:  [email or web address]      Emergency Contact(s)  Name Relationship Lgl Grd Work Phone Home Phone Mobile Phone   1. BINDU YANCEY Spouse  none [phone] none   2. KORINA SILVESTRE Daughter  none [phone]           Primary language:  English     needed? No   [facility]:  [phone] op. 1  Other communication barriers: Cognitive impairment(starting)  Preferred Method of Communication:  Phone  Current living arrangement: I live in a private home with spouse  Mobility Status/ Medical Equipment: Independent    Health Maintenance  Health Maintenance Reviewed: Due/Overdue   Health Maintenance Due   Topic Date Due     ZOSTER IMMUNIZATION (2 of 3) 12/30/2014     MAMMO Q1 YR  01/22/2019     Please talk with your provider about what is needed or can continue to wait.        My Access Plan  Medical Emergency 911   Primary Clinic Line [facility] - [phone]   24 Hour Appointment Line [phone] or  [phone]) (toll-free)   24 Hour Nurse Line [phone] (toll-free)   Preferred Urgent Care [facility], [phone]   [facility] [place], [place]  [phone]   Preferred Pharmacy [facility] [address] Behavioral Health Crisis Line The National Suicide Prevention Lifeline at 1-259.980.1590 or 911     My Care Team Members    Patient Care Team       Relationship Specialty Notifications Start End    Soha Sanchez APRN CNP PCP - General Nurse Practitioner - Family  5/2/17     Phone: [phone] Fax: [phone]         [facility]  [place] [address] [place] [address]    Soha Sanchez APRN CNP PCP - Assigned PCP   5/7/17     Phone: [phone] Fax: [phone]         [facility] [address] [place] [address]    Ria Izquierdo LSW Lead Care Coordinator  Admissions 5/18/17     Phone: [phone] Fax: [phone]                My Care Plans  Self Management and Treatment Plan  Goals and (Comments)  Goals        General    Medication 1 (pt-stated)     Notes - Note edited  12/26/2018  9:16 AM by Ria Izquierdo LSW    Goal Statement: I will allow my spouse to assist me with medications when issues arise,  for consistent administration.   Measure of Success: I am taking my medications at the right time and every day  Supportive Steps to Achieve: actively involved spouse with supportive ideas  Barriers: memory loss  Strengths: supportive family  Date to Achieve By: 12-31-19                 Action Plans on File:   Asthma        Depression          Advance Care Plans/Directives Type:        My Medical and Care Information  Problem List   Patient Active Problem List   Diagnosis     Diarrhea     Pure hypercholesterolemia     Allergic rhinitis     Panic disorder without agoraphobia     Advanced directives, counseling/discussion     Liver lesion     Generalized anxiety disorder     Mild major depression (H)     Vulvitis     Vaginal wall prolapse     Chronic constipation     Moderate persistent asthma     Dysphagia     RBD (REM behavioral disorder)     FH: colon cancer     Hypertension goal BP (blood pressure) < 140/90     Meningioma (H)      Current Medications and Allergies:  See printed Medication Report.    Care Coordination Start Date: 5/18/2017   Frequency of Care Coordination: 6 weeks   Form Last Updated: 01/04/2019

## 2018-12-26 NOTE — LETTER
[address] [address]  [phone]      1/4/2019      Ingrid Amaral  [address] [facility] [address]      Dear  Ingrid,    It was a pleasure to speak with you.  I would like to provide you with the enclosed information for your records.  As part of care coordination, we are developing care plans to assist in accomplishing your health care goals.  When we speak next, please feel free to let me know if you want to add or change any information on your care plans.    As always, feel free to contact me if you have any questions or concerns.  I look forward to working with you in the effort to achieve your health care and wellness goals .        Sincerely,      Ria Izquierdo, [facility]  Clinic Care Coordination  [phone]

## 2018-12-26 NOTE — PROGRESS NOTES
Clinic Care Coordination Contact    Clinic Care Coordination Contact  OUTREACH    Referral Information:  Referral Source: PCP    Primary Diagnosis: Psychosocial    Chief Complaint   Patient presents with     Clinic Care Coordination - Follow-up             Universal Utilization: no concerns  Clinic Utilization  Difficulty keeping appointments:: No  Compliance Concerns: No  No-Show Concerns: No  No PCP office visit in Past Year: No  Utilization    Last refreshed: 12/24/2018 10:05 AM:  Hospital Admissions 0           Last refreshed: 12/24/2018 10:05 AM:  ED Visits 1           Last refreshed: 12/24/2018 10:05 AM:  No Show Count (past year) 0              Current as of: 12/24/2018 10:05 AM              Clinical Concerns:  Current Medical Concerns:  Pt reporting that her back is sore from cleaning the bunny cage.  She said it is sharp and radiates to her butt and legs.  She did not fall and thinks it is from having to crawl into the bunny cage for cleaning.     Current Behavioral Concerns: no concerns noted during call.     Education Provided to patient: pt has been able to do her daily tasks and said she just works through the pain.  She has been using pain medications to manage the pain.  She has not tried heat or ice and will try this.  She was encouraged to do her HEP from therapy to also help with the pain and soreness.    Reviewed the Neurology recommendations with her.  Uncertain if she will follow up with recommendations.    Pain  Pain (GOAL):: Yes  Type: Acute (<3mo)  Location of chronic pain:: back  Radiating: Yes  Location pain radiates to: down legs and butt  Progression: Unchanged from initial soreness about 4 days ago.  Description of pain: Aching, Sharp  Chronic pain severity:: 8  Limitation of routine activities due to chronic pain:: Yes  Description: Able to do most things most days with some rest  Alleviating Factors: Pain Medication, Other(massager)  Aggravating Factors: Inactivity    Encouraged  follow up with provider if it does not start to improve.     Health Maintenance Reviewed:   Health Maintenance Due   Topic Date Due     ZOSTER IMMUNIZATION (2 of 3) 12/30/2014     MAMMO Q1 YR  01/22/2019       Clinical Pathway: None    Medication Management:  Pt said she is taking her meds as prescribed.  She indicated that if she takes them in the morning at the same time she is better with consistency.  Spouse continues to support pt.      Functional Status:  Dependent ADLs:: Independent  Dependent IADLs:: Medication Management(starting to have concerns)  Bed or wheelchair confined:: No  Mobility Status: Independent  Fallen 2 or more times in the past year?: No  Any fall with injury in the past year?: Yes    Living Situation:  Current living arrangement:: I live in a private home with spouse  Type of residence:: Private home - stairs    Diet/Exercise/Sleep:  Diet:: Regular  Inadequate nutrition (GOAL):: No  Food Insecurity: No  Tube Feeding: No  Exercise:: Yes  Days per week of moderate to strenuous exercise (like a brisk walk): 2  On average, minutes per day of exercise at this level: 50  How intense was your typical exercise? : Light (like stretching or slow walking)(inside)  Exercise Minutes per Week: 100  Inadequate activity/exercise (GOAL):: No  Significant changes in sleep pattern (GOAL): No    Transportation:  Transportation concerns (GOAL):: No  Transportation means:: Accessible car, Family     Psychosocial:  Advent or spiritual beliefs that impact treatment:: No  Mental health DX:: Yes  Mental health DX how managed:: Medication  Mental health management concern (GOAL):: No  Informal Support system:: Family, Friends     Financial/Insurance:   Financial/Insurance concerns (GOAL):: No  Pt had no concerns.  She continues to do what she can and works through the discomfort of her sore back.      Resources and Interventions:  Current Resources:    ;   Community Resources: None  Supplies used at home::  None  Equipment Currently Used at Home: none    Advance Care Plan/Directive  Advanced Care Plans/Directives on file:: In process  Advanced Care Plan/Directive Status: In Process    Referrals Placed: Alzheimer's Association, Senior Linkage Line     Goals:   Goals        General    Medication 1 (pt-stated)     Notes - Note edited  12/26/2018  9:16 AM by Ria Izquierdo LSW    Goal Statement: I will allow my spouse to assist me with medications when issues arise,  for consistent administration.   Measure of Success: I am taking my medications at the right time and every day  Supportive Steps to Achieve: actively involved spouse with supportive ideas  Barriers: memory loss  Strengths: supportive family  Date to Achieve By: 12-31-19                  Patient/Caregiver understanding: pt to continue to take her meds as prescribed and allow spouse to assist as needed.     Outreach Frequency: 6 weeks      Plan: pt to try alternative methods to relieve the back pain and if not improving to see provider.  Sw to call in about 6 weeks.     EM Mcfarlane, Clinic Care Coordinator 12/26/2018   9:25 AM  [phone]

## 2019-01-14 DIAGNOSIS — L40.9 PSORIASIS: Primary | ICD-10-CM

## 2019-01-14 DIAGNOSIS — N90.89 LABIAL AND CLITORAL ADHESIONS, ACQUIRED: ICD-10-CM

## 2019-01-14 DIAGNOSIS — N95.2 VAGINAL ATROPHY: ICD-10-CM

## 2019-01-14 RX ORDER — ESTRADIOL 0.1 MG/G
CREAM VAGINAL
Qty: 85 G | Refills: 0 | Status: SHIPPED | OUTPATIENT
Start: 2019-01-14 | End: 2019-04-24

## 2019-01-14 RX ORDER — CLOBETASOL PROPIONATE 0.5 MG/G
OINTMENT TOPICAL
Qty: 30 G | Refills: 0 | Status: SHIPPED | OUTPATIENT
Start: 2019-01-14 | End: 2019-03-11

## 2019-01-14 NOTE — TELEPHONE ENCOUNTER
"Prescription approved per [facility] Refill Protocol.    Requested Prescriptions   Pending Prescriptions Disp Refills     estradiol (ESTRACE) 0.1 MG/GM vaginal cream [Pharmacy Med Name: ESTRADIOL 0.1MG/GM CREA] 85 g 1     Sig: USE ONE GRAM VAGINALLY ONCE DAILY AND SPREAD A SMALL AMOUNT AROUND THE CLITORAL REES    Hormone Replacement Therapy Failed - 1/14/2019 11:49 AM       Failed - Recent (12 mo) or future (30 days) visit within the authorizing provider's specialty    Patient had office visit in the last 12 months or has a visit in the next 30 days with authorizing provider or within the authorizing provider's specialty.  See \"Patient Info\" tab in inbasket, or \"Choose Columns\" in Meds & Orders section of the refill encounter.             Passed - Blood pressure under 140/90 in past 12 months    BP Readings from Last 3 Encounters:   12/19/18 109/73   11/02/18 114/80   09/11/18 132/74                Passed - Patient has mammogram in past 2 years on file if age 50-75       Passed - Medication is active on med list       Passed - Patient is 18 years of age or older       Passed - No active pregnancy on record       Passed - No positive pregnancy test on record in past 12 months          Last Written Prescription Date:  1/9/18  Last Fill Quantity: 85 g,  # refills: 1   Last office visit: 11/2/2018 with Leesa Muñiz PA-C  Future Office Visit:      Cynthia GALO RN      "

## 2019-01-14 NOTE — TELEPHONE ENCOUNTER
Requested Prescriptions   Pending Prescriptions Disp Refills     clobetasol (TEMOVATE) 0.05 % external ointment [Pharmacy Med Name: CLOBETASOL PROPIONATE 0.05% OINT] 30 g 0     Sig: APPLY TO AREA OF IRRITATION TWICE DAILY FOR 2 WEEKS. COVER LOOSELY WITH GAUZE OR SARAN WRAP    There is no refill protocol information for this order

## 2019-02-07 ENCOUNTER — PATIENT OUTREACH (OUTPATIENT)
Dept: CARE COORDINATION | Facility: CLINIC | Age: 74
End: 2019-02-07

## 2019-02-07 ASSESSMENT — ACTIVITIES OF DAILY LIVING (ADL): DEPENDENT_IADLS:: MEDICATION MANAGEMENT

## 2019-02-07 NOTE — PROGRESS NOTES
Clinic Care Coordination Contact  [facility]/Voicemail    Referral Source: PCP  Clinical Data: Care Coordinator Outreach  Outreach attempted x 1.  No voice mail set up.  Plan:  Care Coordinator will try to reach patient again in 3-5 business days.  EM Mcfarlane, Clinic Care Coordinator 2/7/2019   1:20 PM  [phone]

## 2019-02-12 ASSESSMENT — ACTIVITIES OF DAILY LIVING (ADL): DEPENDENT_IADLS:: MEDICATION MANAGEMENT

## 2019-02-12 NOTE — PROGRESS NOTES
Clinic Care Coordination Contact    Clinic Care Coordination Contact  OUTREACH    Referral Information:  Referral Source: PCP    Primary Diagnosis: Psychosocial    Chief Complaint   Patient presents with     Clinic Care Coordination - Follow-up             Universal Utilization: no concerns  Clinic Utilization  Difficulty keeping appointments:: No  Compliance Concerns: No  No-Show Concerns: No  No PCP office visit in Past Year: No  Utilization    Last refreshed: 2/12/2019  8:59 AM:  Hospital Admissions 0           Last refreshed: 2/12/2019  8:59 AM:  ED Visits 1           Last refreshed: 2/12/2019  8:59 AM:  No Show Count (past year) 0              Current as of: 2/12/2019  8:59 AM              Clinical Concerns:  Current Medical Concerns:  Pt reporting that she is doing well.  She is trying to schedule appointments yet with the weather she has not wanted to schedule.      Current Behavioral Concerns: pt said she is feeling well.     Education Provided to patient: encouraged continued follow up as needed.      Her back pain has improved and no longer radiates down the butt and legs.    Pain  Pain (GOAL):: Yes  Type: Acute (<3mo)  Location of chronic pain:: back  Radiating: No  Location pain radiates to: none  Progression: Unchanged  Description of pain: Aching, Sharp  Chronic pain severity:: 3  Limitation of routine activities due to chronic pain:: Yes  Description: Able to do most things most days with some rest  Alleviating Factors: Pain Medication, Other(massager)  Aggravating Factors: Inactivity  Health Maintenance Reviewed: Due/Overdue   Health Maintenance Due   Topic Date Due     ZOSTER IMMUNIZATION (2 of 3) 12/30/2014     MAMMO Q1 YR  01/22/2019     ASTHMA CONTROL TEST Q6 MOS  03/11/2019       Clinical Pathway: None    Medication Management:  No questions or concerns.  Spouse assists as needed.      Functional Status:  Dependent ADLs:: Independent  Dependent IADLs:: Medication Management(starting to have  concerns)  Bed or wheelchair confined:: No  Mobility Status: Independent  Fallen 2 or more times in the past year?: No  Any fall with injury in the past year?: Yes    Living Situation:  Current living arrangement:: I live in a private home with spouse  Type of residence:: Private home - stairs    Diet/Exercise/Sleep:  Diet:: Regular  Inadequate nutrition (GOAL):: No  Food Insecurity: No  Tube Feeding: No  Exercise:: Currently not exercising  Days per week of moderate to strenuous exercise (like a brisk walk): 0  Inadequate activity/exercise (GOAL):: No  Significant changes in sleep pattern (GOAL): No    Transportation:  Transportation concerns (GOAL):: No  Transportation means:: Accessible car, Family     Psychosocial:  Sabianism or spiritual beliefs that impact treatment:: No  Mental health DX:: Yes  Mental health DX how managed:: Medication  Mental health management concern (GOAL):: No  Informal Support system:: Family, Friends     Financial/Insurance:   Financial/Insurance concerns (GOAL):: No  Pt denied any concerns.  She has continued to be active taking care of the animals.  She is not exercising yet said she needs to get back to dancing in the house.      Resources and Interventions:  Current Resources:      Community Resources: None  Supplies used at home:: None  Equipment Currently Used at Home: none    Advance Care Plan/Directive  Advanced Care Plans/Directives on file:: In process  Advanced Care Plan/Directive Status: In Process    Referrals Placed: Alzheimer's Association, [facility] Line     Goals:   Goals        General    Medication 1 (pt-stated)     Notes - Note edited  12/26/2018  9:16 AM by Ria Izquierdo LSW    Goal Statement: I will allow my spouse to assist me with medications when issues arise,  for consistent administration.   Measure of Success: I am taking my medications at the right time and every day  Supportive Steps to Achieve: actively involved spouse with supportive  ideas  Barriers: memory loss  Strengths: supportive family  Date to Achieve By: 12-31-19                  Patient/Caregiver understanding: pt to continue to take medications as scheduled with support of spouse as needed.     Outreach Frequency: 6 weeks      Plan: pt to schedule appointments that are needed.  Sw to call in about 6 weeks.     EM Mcfarlane, Clinic Care Coordinator 2/12/2019   9:17 AM  [phone]

## 2019-02-21 ENCOUNTER — PATIENT OUTREACH (OUTPATIENT)
Dept: CARE COORDINATION | Facility: CLINIC | Age: 74
End: 2019-02-21

## 2019-02-21 ENCOUNTER — TELEPHONE (OUTPATIENT)
Dept: FAMILY MEDICINE | Facility: CLINIC | Age: 74
End: 2019-02-21

## 2019-02-21 ASSESSMENT — ACTIVITIES OF DAILY LIVING (ADL): DEPENDENT_IADLS:: MEDICATION MANAGEMENT

## 2019-02-21 NOTE — PROGRESS NOTES
Patient has a question about her iron pills.  She has not been taking them for a couple weeks and wants to know she needs to restart them.  She is aware she may need to come in for lab work prior to this being answered.  Please call patient and address her question     Thank you   Ria Izquierdo     I talked with Ingrid and told her that I would ask Leesa Muñiz PA-C on 2-25-19 and then call her back  Irma Ivey RN

## 2019-02-21 NOTE — PROGRESS NOTES
Clinic Care Coordination Contact  Outreach    The patient had left message on voicemail of  yesterday questions.  Upon return phone call her questions were in regards to whether she could file some of her medications on her taxes.  Instructed to talk to  or similar to get that question answered.    Patient second question was whether she needed to restart the iron pills as she has not been using them for a couple weeks.  She is aware she may need to have blood work in order to get this question answered.   will route message to the care team for follow-up on this question.  Patient is aware that she will get a phone call from the clinic as to what she needs to do if anything.     will follow up as previously planned.   will route question to [facility] care team.    EM Mcfarlane, Clinic Care Coordinator 2/21/2019   9:03 AM  [phone]

## 2019-02-22 NOTE — TELEPHONE ENCOUNTER
"Requested Prescriptions   Pending Prescriptions Disp Refills     simvastatin (ZOCOR) 40 MG tablet [Pharmacy Med Name: SIMVASTATIN 40MG TABS] 90 tablet 3     Sig: TAKE ONE TABLET BY MOUTH EVERY NIGHT AT BEDTIME    Statins Protocol Failed    8/24/2018 11:25 AM       Failed - LDL on file in past 12 months    Recent Labs   Lab Test  08/16/17   1128   LDL  82            Passed - No abnormal creatine kinase in past 12 months    No lab results found.            Passed - Recent (12 mo) or future (30 days) visit within the authorizing provider's specialty    Patient had office visit in the last 12 months or has a visit in the next 30 days with authorizing provider or within the authorizing provider's specialty.  See \"Patient Info\" tab in inbasket, or \"Choose Columns\" in Meds & Orders section of the refill encounter.           Passed - Patient is age 18 or older       Passed - No active pregnancy on record       Passed - No positive pregnancy test in past 12 months        fluticasone (FLONASE) 50 MCG/ACT spray [Pharmacy Med Name: FLUTICASONE PROPIONATE 50 SUSP] 16 g 11     Sig: USE ONE SPRAY IN EACH NOSTRIL DAILY    Inhaled Steroids Protocol Failed    8/24/2018 11:25 AM       Failed - Asthma control assessment score within normal limits in last 6 months    Please review ACT score.          Passed - Patient is age 12 or older       Passed - Recent (6 mo) or future (30 days) visit within the authorizing provider's specialty    Patient had office visit in the last 6 months or has a visit in the next 30 days with authorizing provider or within the authorizing provider's specialty.  See \"Patient Info\" tab in inbasket, or \"Choose Columns\" in Meds & Orders section of the refill encounter.            Last Written Prescription Date:  8/16/17  Last Fill Quantity: 90,  # refills: 3   Last office visit: 5/9/2018 with prescribing provider:  CHASE   Future Office Visit:      "
"Requested Prescriptions   Pending Prescriptions Disp Refills     simvastatin (ZOCOR) 40 MG tablet [Pharmacy Med Name: SIMVASTATIN 40MG TABS] 90 tablet 3     Sig: TAKE ONE TABLET BY MOUTH EVERY NIGHT AT BEDTIME    Statins Protocol Failed    8/24/2018 11:31 AM       Failed - LDL on file in past 12 months    Recent Labs   Lab Test  08/16/17   1128   LDL  82            Passed - No abnormal creatine kinase in past 12 months    No lab results found.            Passed - Recent (12 mo) or future (30 days) visit within the authorizing provider's specialty    Patient had office visit in the last 12 months or has a visit in the next 30 days with authorizing provider or within the authorizing provider's specialty.  See \"Patient Info\" tab in inbasket, or \"Choose Columns\" in Meds & Orders section of the refill encounter.           Passed - Patient is age 18 or older       Passed - No active pregnancy on record       Passed - No positive pregnancy test in past 12 months        fluticasone (FLONASE) 50 MCG/ACT spray [Pharmacy Med Name: FLUTICASONE PROPIONATE 50 SUSP] 16 g 11     Sig: USE ONE SPRAY IN EACH NOSTRIL DAILY    Inhaled Steroids Protocol Failed    8/24/2018 11:31 AM       Failed - Asthma control assessment score within normal limits in last 6 months    Please review ACT score.          Passed - Patient is age 12 or older       Passed - Recent (6 mo) or future (30 days) visit within the authorizing provider's specialty    Patient had office visit in the last 6 months or has a visit in the next 30 days with authorizing provider or within the authorizing provider's specialty.  See \"Patient Info\" tab in inbasket, or \"Choose Columns\" in Meds & Orders section of the refill encounter.            Last Written Prescription Date:  6/19/18  Last Fill Quantity: 1,  # refills: 1   Last office visit: 5/9/2018 with prescribing provider:  CHASE   Future Office Visit:      "
Can you please call the pharmacy and add 2 refills.
Refilled.  NADIA Navas RN    
Stable

## 2019-02-27 NOTE — PROGRESS NOTES
Clinic Care Coordination Contact  Care Team Conversations    Follow up call placed.  Pt reported that her anxiety is going well and that she did not have any problems over the holiday.  She is not sure what was different when asked and then said she should track.  She is not tracking as we previously talked about.  Pt denied any needs right now.  She did not adjust her Prilosec to once a day yet and said she is going to try.     Goal updated to with 0.0% achieved.      Plan:  Pt to track her anxiety.  SW to check chart in one month and call in two.     EM Mcfarlane, Clinic Care Coordinator 12/26/2017   9:54 AM  [phone]         TUBE CARE INSTRUCTIONS    Care after your procedure:    Resume your normal diet  Small sips of flat soda will help with nausea  1  The properly functioning catheter should be forward flushed once (1x) daily with 10ml of normal saline using clean technique  You will be given a prescription for flushes  To flush the tube, clean both connections with alcohol swab  Twist off the drainage bag/ bulb  tubing and twist the saline syringe into the drainage tube and flush  Remove the syringe and twist the drainage bag / bulb tubing tubing back on     2  The drainage bag/bulb may be emptied as necessary  Keep a record of the amount of fluid you drain from your tube  This should be done with clean technique as well  3  A fresh dressing should be applied daily over the tube insertion site  4  As the tube is secured to the skin with only a suture,try not to pull on your tube  Tub baths are not permitted  Showers are permitted if the patient's skin entry site is prevented from getting wet  Similarly, washcloth "baths" are acceptable  Contact Interventional Radiology at [phone] [place] PATIENTS: Contact Interventional Radiology at [phone]) Giovanny Odonnell PATIENTS: Contact Interventional Radiology at [phone]) if:    1  Leakage or large amounts of liquid around the catheter  2  Fever of 101 degrees lasting several hours without other obvious cause (such as sore throat, flu, etc)  3  Persistent nausea or vomiting  4  Diminished drainage, which may be associated with pressure or pain  Or when the     drainage from your tube is less than 10mls for 48 hours  5  Catheter pulled back or falls out  The following pharmacies carry the flush syringes         [facility]                     [facility]  [address]                         [address] [facility]  Phone [phone]            Phone [phone]   [address]                                [phone]  [address] [facility] [address]                      [address] [place]  Phone [phone]            Phone [phone]                      [address]                                                                                                          [phone]  [facility]  [address]  [place] [facility]  Phone [phone]        Bariatric/Weight Loss Surgery  Hospital Discharge Instructions  1  ACTIVITY:  a  Progress as feels comfortable - a good rule is:  if you are doing something and it begins to hurt, stop doing the activity  Walk at least 3 times per day at home  b  Use your incentive spirometer 10 times per hour while awake for 2 additional weeks  c  No driving if you are still taking certain prescription pain medication  Examples of such medication are Percocet, Darvocet, Oxycodone, Tylenol #3, and Tylenol with Codeine  Follow your pharmacists orders  AND no driving until cleared in office by your surgeon      2  DIET  a  Bariatric soft diet  Make sure to stay hydrated by drinking 48-64 ounces of water daily  Add protein supplements  3  MEDICATIONS:  a  Start vitamins and minerals when you get home  b  Pain and Anti-acid Medication as per prescription  c  Other medications as indicated on the Physician Patient Discharge Instructions form given to you at the time of discharge  d  Take antibiotics as instructed  Continue to take Levsin as instructed  4  INCISION CARE  a   You may shower and get incisions wet   b  Drain care per interventional radiology     5  FOLLOW-UP APPOINTMENT should be made for one week after discharge  Call surgeons office at [phone] to schedule an appointment  You may see me this Friday for potential drain removal in the office      6  CALL YOUR DOCTOR FOR:  pain not controlled by pain medications, a temperature greater than 101 5° F, any increase or change in drainage or redness from any incision, any vomiting or inability to keep liquids down, shortness of breath, shoulder pain, or bleeding

## 2019-03-01 DIAGNOSIS — I10 BENIGN ESSENTIAL HYPERTENSION: ICD-10-CM

## 2019-03-01 DIAGNOSIS — J30.9 ALLERGIC RHINITIS: ICD-10-CM

## 2019-03-01 RX ORDER — FLUTICASONE PROPIONATE 50 MCG
SPRAY, SUSPENSION (ML) NASAL
Qty: 16 G | Refills: 1 | Status: SHIPPED | OUTPATIENT
Start: 2019-03-01 | End: 2019-04-24

## 2019-03-01 RX ORDER — AMLODIPINE BESYLATE 5 MG/1
TABLET ORAL
Qty: 90 TABLET | Refills: 1 | Status: SHIPPED | OUTPATIENT
Start: 2019-03-01 | End: 2019-04-24

## 2019-03-01 NOTE — TELEPHONE ENCOUNTER
"Requested Prescriptions   Pending Prescriptions Disp Refills     fluticasone (FLONASE) 50 MCG/ACT nasal spray [Pharmacy Med Name: FLUTICASONE 50MCG NASAL SPRAY] 16 g 1     Sig: INSTILL ONE SPRAY IN EACH NOSTRIL EVERY DAY    Inhaled Steroids Protocol Passed - 3/1/2019  2:56 PM       Passed - Patient is age 12 or older       Passed - Asthma control assessment score within normal limits in last 6 months    Please review ACT score.          Passed - Medication is active on med list       Passed - Recent (6 mo) or future (30 days) visit within the authorizing provider's specialty    Patient had office visit in the last 6 months or has a visit in the next 30 days with authorizing provider or within the authorizing provider's specialty.  See \"Patient Info\" tab in inbasket, or \"Choose Columns\" in Meds & Orders section of the refill encounter.            Last Written Prescription Date:  8/27/18  Last Fill Quantity: 16 g,  # refills: 1   Last office visit: 11/2/2018 with prescribing provider:  Antonino   Future Office Visit:   Next 5 appointments (look out 90 days)    Mar 08, 2019  2:00 PM CST  SHORT with Leesa Mu[facility]iz PA-C  [facility] [address] [place] [id number] [phone]           "

## 2019-03-01 NOTE — TELEPHONE ENCOUNTER
"Requested Prescriptions   Pending Prescriptions Disp Refills     amLODIPine (NORVASC) 5 MG tablet [Pharmacy Med Name: AMLODIPINE BESYLATE 5MG TABS] 90 tablet 1     Sig: TAKE ONE TABLET BY MOUTH EVERY DAY    Calcium Channel Blockers Protocol  Passed - 3/1/2019  2:56 PM       Passed - Blood pressure under 140/90 in past 12 months    BP Readings from Last 3 Encounters:   12/19/18 109/73   11/02/18 114/80   09/11/18 132/74                Passed - Recent (12 mo) or future (30 days) visit within the authorizing provider's specialty    Patient had office visit in the last 12 months or has a visit in the next 30 days with authorizing provider or within the authorizing provider's specialty.  See \"Patient Info\" tab in inbasket, or \"Choose Columns\" in Meds & Orders section of the refill encounter.             Passed - Medication is active on med list       Passed - Patient is age 18 or older       Passed - No active pregnancy on record       Passed - Normal serum creatinine on file in past 12 months    Recent Labs   Lab Test 11/02/18  1412  02/15/18  1923   CR 0.70   < >  --    CREAT  --   --  0.8    < > = values in this interval not displayed.            Passed - No positive pregnancy test in past 12 months        Last Written Prescription Date:  9/11/18  Last Fill Quantity: 90,  # refills: 1   Last office visit: 11/2/2018 with prescribing provider:  Leesa Muñiz   Future Office Visit:   Next 5 appointments (look out 90 days)    Mar 08, 2019  2:00 PM CST  SHORT with Leesa Muñiz PA-C  [facility] [address] [place] [id number] [phone]           "

## 2019-03-06 ENCOUNTER — ANCILLARY PROCEDURE (OUTPATIENT)
Dept: MAMMOGRAPHY | Facility: CLINIC | Age: 74
End: 2019-03-06
Payer: MEDICARE

## 2019-03-06 DIAGNOSIS — Z12.31 VISIT FOR SCREENING MAMMOGRAM: ICD-10-CM

## 2019-03-06 PROCEDURE — 77067 SCR MAMMO BI INCL CAD: CPT | Mod: TC

## 2019-03-08 ENCOUNTER — OFFICE VISIT (OUTPATIENT)
Dept: FAMILY MEDICINE | Facility: CLINIC | Age: 74
End: 2019-03-08
Payer: MEDICARE

## 2019-03-08 ENCOUNTER — TELEPHONE (OUTPATIENT)
Dept: FAMILY MEDICINE | Facility: CLINIC | Age: 74
End: 2019-03-08

## 2019-03-08 VITALS
TEMPERATURE: 98.3 F | RESPIRATION RATE: 16 BRPM | SYSTOLIC BLOOD PRESSURE: 124 MMHG | HEART RATE: 68 BPM | BODY MASS INDEX: 22.67 KG/M2 | WEIGHT: 128 LBS | DIASTOLIC BLOOD PRESSURE: 65 MMHG

## 2019-03-08 DIAGNOSIS — R41.3 MEMORY LOSS: ICD-10-CM

## 2019-03-08 DIAGNOSIS — K59.09 CHRONIC CONSTIPATION: ICD-10-CM

## 2019-03-08 DIAGNOSIS — K52.9 CHRONIC DIARRHEA: ICD-10-CM

## 2019-03-08 DIAGNOSIS — G47.00 INSOMNIA, UNSPECIFIED TYPE: ICD-10-CM

## 2019-03-08 DIAGNOSIS — G89.29 CHRONIC RIGHT-SIDED LOW BACK PAIN WITH RIGHT-SIDED SCIATICA: ICD-10-CM

## 2019-03-08 DIAGNOSIS — G47.52 RBD (REM BEHAVIORAL DISORDER): ICD-10-CM

## 2019-03-08 DIAGNOSIS — D32.9 MENINGIOMA (H): ICD-10-CM

## 2019-03-08 DIAGNOSIS — J31.0 CHRONIC RHINITIS: ICD-10-CM

## 2019-03-08 DIAGNOSIS — R13.10 DYSPHAGIA, UNSPECIFIED TYPE: Primary | ICD-10-CM

## 2019-03-08 DIAGNOSIS — J45.40 MODERATE PERSISTENT ASTHMA WITHOUT COMPLICATION: ICD-10-CM

## 2019-03-08 DIAGNOSIS — F41.0 PANIC DISORDER WITHOUT AGORAPHOBIA: ICD-10-CM

## 2019-03-08 DIAGNOSIS — N81.10 VAGINAL WALL PROLAPSE: ICD-10-CM

## 2019-03-08 DIAGNOSIS — L40.9 PSORIASIS: ICD-10-CM

## 2019-03-08 DIAGNOSIS — F33.0 MAJOR DEPRESSIVE DISORDER, RECURRENT EPISODE, MILD (H): ICD-10-CM

## 2019-03-08 DIAGNOSIS — M54.41 CHRONIC RIGHT-SIDED LOW BACK PAIN WITH RIGHT-SIDED SCIATICA: ICD-10-CM

## 2019-03-08 DIAGNOSIS — M54.16 LUMBAR RADICULOPATHY: ICD-10-CM

## 2019-03-08 PROCEDURE — 99215 OFFICE O/P EST HI 40 MIN: CPT | Performed by: PHYSICIAN ASSISTANT

## 2019-03-08 RX ORDER — BETAMETHASONE DIPROPIONATE 0.5 MG/G
CREAM TOPICAL
Qty: 50 G | Refills: 0 | Status: SHIPPED | OUTPATIENT
Start: 2019-03-08 | End: 2019-04-24

## 2019-03-08 RX ORDER — BETAMETHASONE DIPROPIONATE 0.5 MG/G
CREAM TOPICAL
Qty: 50 G | Refills: 0 | Status: CANCELLED | OUTPATIENT
Start: 2019-03-08

## 2019-03-08 RX ORDER — CETIRIZINE HYDROCHLORIDE 10 MG/1
10 TABLET ORAL EVERY MORNING
Qty: 90 TABLET | Refills: 3 | Status: SHIPPED | OUTPATIENT
Start: 2019-03-08 | End: 2019-04-24

## 2019-03-08 RX ORDER — POLYETHYLENE GLYCOL 3350 17 G/17G
1 POWDER, FOR SOLUTION ORAL PRN
Qty: 1 BOTTLE | Refills: 3 | Status: SHIPPED | OUTPATIENT
Start: 2019-03-08 | End: 2022-06-06

## 2019-03-08 RX ORDER — SERTRALINE HYDROCHLORIDE 100 MG/1
TABLET, FILM COATED ORAL
Qty: 135 TABLET | Refills: 3 | Status: SHIPPED | OUTPATIENT
Start: 2019-03-08 | End: 2019-04-24

## 2019-03-08 RX ORDER — MONTELUKAST SODIUM 10 MG/1
1 TABLET ORAL DAILY
Qty: 90 TABLET | Refills: 3 | Status: SHIPPED | OUTPATIENT
Start: 2019-03-08 | End: 2019-04-24

## 2019-03-08 RX ORDER — GABAPENTIN 300 MG/1
CAPSULE ORAL
Qty: 60 CAPSULE | Refills: 0 | Status: SHIPPED | OUTPATIENT
Start: 2019-03-08 | End: 2019-04-24

## 2019-03-08 RX ORDER — BETAMETHASONE DIPROPIONATE 0.5 MG/G
CREAM TOPICAL
Qty: 50 G | Refills: 0 | Status: SHIPPED | OUTPATIENT
Start: 2019-03-08 | End: 2019-03-08

## 2019-03-08 ASSESSMENT — PAIN SCALES - GENERAL: PAINLEVEL: NO PAIN (0)

## 2019-03-08 NOTE — TELEPHONE ENCOUNTER
Clobetasol (TEMOVATE) 0.05 % external ointment      Last Written Prescription Date:  1/14/19  Last Fill Quantity: 30g,   # refills: 0  Last Office Visit: 3/8/2019  Future Office visit:       Routing refill request to provider for review/approval because:  Drug not on the [facility], P or [facility] refill protocol or controlled substance

## 2019-03-08 NOTE — TELEPHONE ENCOUNTER
Call patient - I found in her chart that another med had worked for her diarrhea in the past, so it is worth a try, in addition to stool cultures, before she sees GI specialist.  Rx sent.     Called/notified pt. Of note sent to pool from FRANKIE Hendrix, CMA

## 2019-03-08 NOTE — PROGRESS NOTES
SUBJECTIVE:   Ingrid Amaral is a 73 year old female who presents to clinic today for the following health issues:    Diarrhea      Duration: Has had issues for years    Description:       Consistency of stool: watery, runny, loose and sometimes small amount of formed       Blood in stool: YES- Not sure   Stool is reddish brown       Number of loose stools past 24 hours: 5    Intensity:  severe    Accompanying signs and symptoms:       Fever: no        Nausea/vomitting: no        Abdominal pain: YES- gets bloated       Weight loss: no     History (recent antibiotics or travel/ill contacts/med changes/testing done): Has had some tests in the past    Precipitating or alleviating factors: Milk makes it worse    Therapies tried and outcome: Citracel/Metamucil and Tunde    Colonoscopy normal May 2017.  Used to also get constipation.    A lot of of gas and bloating symptoms, has to loosen pants.  Feels like incomplete emptying - sometimes massages vaginal wall to help stool come out.  History vag wall prolapse, saw Dr Ware 2013, but recommended GI before consideration of rectocele repair.  Diarrhea better somewhat if avoids milk.  Uses miralax, benefit.  Past documentation in chart stating creon has helped symptoms.  Past documentation of IBS.    Throat   Trouble Swallowing    Duration: Several years  Has gotten worse lately    Description (location/character/radiation): in the Throat feels like something is stuck and Chest and hurts in chest she can't breath, can't were anything tight.  When she drinks water she can't get it down    Intensity:  severe    Accompanying signs and symptoms: None    History (similar episodes/previous evaluation): Has had testing done.  Has a hernia    Precipitating or alleviating factors: Gets all of her clothes loose.      Therapies tried and outcome: Exercises for the throat   Endoscopy June 2018.  OT swallow eval normal Nov.  Has tried high dose PPI.    Back pain doing well overall,  needs parking permit.  Takes gabapentin for pain.    Allergies and asthma doing well.    Mood doing well.  REM behavior disorder noted on chart.  Uses melatonin for sleep.    Memory - recent aricept dose decrease by neurology in Dec, memory stable.  Referred to neurosurgeon for meningioma.    Psoriasis - unclear which med she has been using, per pharmacy last refill was for clobetasol.      Problem list and histories reviewed & adjusted, as indicated.  Additional history: as documented    BP Readings from Last 3 Encounters:   03/08/19 124/65   12/19/18 109/73   11/02/18 114/80    Wt Readings from Last 3 Encounters:   03/08/19 58.1 kg (128 lb)   12/19/18 59.5 kg (131 lb 3.2 oz)   11/02/18 58.2 kg (128 lb 6.4 oz)         Labs reviewed in EPIC    Reviewed and updated as needed this visit by clinical staff  Tobacco  Allergies  Med Hx  Surg Hx  Fam Hx  Soc Hx      Reviewed and updated as needed this visit by Provider         ROS:  Constitutional, HEENT, cardiovascular, pulmonary, GI, , musculoskeletal, neuro, skin, endocrine and psych systems are negative, except as otherwise noted.    OBJECTIVE:     /65 (BP Location: Right arm, Patient Position: Sitting, Cuff Size: Adult Regular)   Pulse 68   Temp 98.3  F (36.8  C) (Tympanic)   Resp 16   Wt 58.1 kg (128 lb)   BMI 22.67 kg/m    Body mass index is 22.67 kg/m .  GENERAL: healthy, alert and no distress  NEURO: memory loss apparent    ASSESSMENT/PLAN:       ICD-10-CM    1. Dysphagia, unspecified type R13.10 GASTROENTEROLOGY ADULT REF CONSULT ONLY   2. Chronic diarrhea K52.9 Clostridium difficile toxin B PCR     Enteric Bacteria and Virus Panel by RIAZ Stool     GASTROENTEROLOGY ADULT REF CONSULT ONLY     amylase-lipase-protease (CREON) 6000 units CPEP   3. Major depressive disorder, recurrent episode, mild (H) F33.0 Doing well   4. Meningioma (H) D32.9 To see neurosurgery   5. Psoriasis L40.9 augmented betamethasone dipropionate (DIPROLENE-AF) 0.05 %  external cream     DISCONTINUED: augmented betamethasone dipropionate (DIPROLENE-AF) 0.05 % external cream   6. Chronic rhinitis J31.0 cetirizine (ZYRTEC) 10 MG tablet   7. Moderate persistent asthma without complication J45.40 montelukast (SINGULAIR) 10 MG tablet   8. Panic disorder without agoraphobia F41.0 sertraline (ZOLOFT) 100 MG tablet   9. Lumbar radiculopathy M54.16 gabapentin (NEURONTIN) 300 MG capsule   10. Chronic right-sided low back pain with right-sided sciatica M54.41 gabapentin (NEURONTIN) 300 MG capsule    G89.29    11. Chronic constipation K59.09 polyethylene glycol (MIRALAX/GLYCOLAX) powder   12. Insomnia, unspecified type G47.00 melatonin 5 MG tablet   13. RBD (REM behavioral disorder) G47.52 melatonin 5 MG tablet   14. Vaginal wall prolapse N81.10    15. Memory loss R41.3      Due to her memory loss needs to simplify med list.  Will work on this, stop one of psoriasis meds today.    Try creon and do stool cultures then GI for diarrhea.  Given constipation history, likely IBS.  Complicated by rectocele.    Dysphagia - considered barium study, but to GI.      40 min spent with patient, over half in discussion of options for her conditions and decisions regarding her OTCs for which she wanted prescriptions.  I did not feel all were justifiable.  Patient Instructions   Diarrhea -  Stool tests today   Suspect irritable bowel syndrome, but set up GI specialist appointment since this is so bothersome    Troubles swallowing -  Recent testing in November normal  GI specialist to help further     Refilled meds  Simplified to only 1 psoriasis med    Do not take iron  Stop baby aspirin - no longer thought to benefit    Prescriptions written for miralax and melatonin to be tax deductible -others I don't see why you are on them, can discuss further at next appt  Unclear that benefiber is helping at all, would stop for now - defer to GI specialist    Handicap parking form completed    See me in 1 month to  work on health further      Leesa Muñiz PA-C  [facility]

## 2019-03-08 NOTE — NURSING NOTE
"Chief Complaint   Patient presents with     Diarrhea     Irritable Bowel     Pharyngitis     Trouble swallowing       Initial /65 (BP Location: Right arm, Patient Position: Sitting, Cuff Size: Adult Regular)   Pulse 68   Temp 98.3  F (36.8  C) (Tympanic)   Resp 16   Wt 58.1 kg (128 lb)   BMI 22.67 kg/m   Estimated body mass index is 22.67 kg/m  as calculated from the following:    Height as of 11/2/18: 1.6 m (5' 3\").    Weight as of this encounter: 58.1 kg (128 lb).    Patient presents to the clinic using No DME    Health Maintenance that is potentially due pending provider review:  NONE    n/a    Is there anyone who you would like to be able to receive your results? Yes  Family    If yes have patient fill out BROOKE    Piper Kimble CMA    "

## 2019-03-08 NOTE — PATIENT INSTRUCTIONS
Diarrhea -  Stool tests today   Suspect irritable bowel syndrome, but set up GI specialist appointment since this is so bothersome    Troubles swallowing -  Recent testing in November normal  GI specialist to help further     Refilled meds  Simplified to only 1 psoriasis med    Do not take iron  Stop baby aspirin - no longer thought to benefit    Prescriptions written for miralax and melatonin to be tax deductible -others I don't see why you are on them, can discuss further at next appt  Unclear that benefiber is helping at all, would stop for now - defer to GI specialist    Handicap parking form completed    See me in 1 month to work on health further

## 2019-03-08 NOTE — Clinical Note
Call patient - I found in her chart that another med had worked for her diarrhea in the past, so it is worth a try, in addition to stool cultures, before she sees GI specialist.  Rx sent.

## 2019-03-11 RX ORDER — CLOBETASOL PROPIONATE 0.5 MG/G
OINTMENT TOPICAL
Qty: 30 G | Refills: 0 | OUTPATIENT
Start: 2019-03-11

## 2019-03-11 NOTE — TELEPHONE ENCOUNTER
Routing refill request to provider for review/approval because:  Drug not on the [facility] refill protocol     Cynthia GALO RN

## 2019-03-14 DIAGNOSIS — K52.9 CHRONIC DIARRHEA: ICD-10-CM

## 2019-03-14 LAB
C DIFF TOX B STL QL: NEGATIVE
SPECIMEN SOURCE: NORMAL

## 2019-03-14 PROCEDURE — 87506 IADNA-DNA/RNA PROBE TQ 6-11: CPT | Performed by: PHYSICIAN ASSISTANT

## 2019-03-14 PROCEDURE — 87493 C DIFF AMPLIFIED PROBE: CPT | Performed by: PHYSICIAN ASSISTANT

## 2019-03-15 NOTE — RESULT ENCOUNTER NOTE
Kodak Quintero,    Your stool tests were normal.  Continue with plan previously discussed.    Please contact me if you have questions.    Leesa Muñiz PA-C

## 2019-03-26 ENCOUNTER — PATIENT OUTREACH (OUTPATIENT)
Dept: CARE COORDINATION | Facility: CLINIC | Age: 74
End: 2019-03-26

## 2019-03-26 ASSESSMENT — ACTIVITIES OF DAILY LIVING (ADL): DEPENDENT_IADLS:: MEDICATION MANAGEMENT

## 2019-03-26 NOTE — LETTER
[facility]  [address] [place] [address]  [phone]      4/2/2019      Ingrid Amaral  [address] [facility] [address]      Dear  Ingrid,      I have been attempting to reach you since our last contact. I would like to continue to work with you on the goals from our last conversation and provide the support you may need. I would appreciate if you would give me a call at [phone] and let me know if you would like to continue working together. I know that there are many things that can affect our ability to communicate and hope we can plan better moving forward.     All of us at [facility] are invested in your health and are here to assist you in meeting your goals.     Sincerely,        Ria Izquierdo, [facility]  Clinic Care Coordination  [phone]

## 2019-03-26 NOTE — PROGRESS NOTES
Clinic Care Coordination Contact  [facility]/Voicemail    Referral Source: PCP  Clinical Data: Care Coordinator Outreach  Outreach attempted x 1, no answer and no voice mail box set up.    Plan:  Care Coordinator will try to reach patient again in 5-10 business days.  EM Mcfarlane, Clinic Care Coordinator 3/26/2019   11:09 AM  [phone]

## 2019-04-02 ASSESSMENT — ACTIVITIES OF DAILY LIVING (ADL): DEPENDENT_IADLS:: MEDICATION MANAGEMENT

## 2019-04-02 NOTE — PROGRESS NOTES
Clinic Care Coordination Contact  [facility]/Voicemail    Referral Source: PCP  Clinical Data: Care Coordinator Outreach  Outreach attempted x 2, no answer and no voicemail box set up.    Plan: Care Coordinator will mail unable to contact letter. Care Coordinator will try to reach patient again in about 3 weeks.    EM Mcfarlane, [facility] - Care Coordinator   [facility]  4/2/2019   12:49 PM  [phone]'

## 2019-04-08 DIAGNOSIS — J45.40 MODERATE PERSISTENT ASTHMA WITHOUT COMPLICATION: ICD-10-CM

## 2019-04-08 DIAGNOSIS — L40.9 PSORIASIS: ICD-10-CM

## 2019-04-08 RX ORDER — FLUOCINONIDE TOPICAL SOLUTION USP, 0.05% 0.5 MG/ML
SOLUTION TOPICAL
Qty: 60 ML | Refills: 1 | Status: SHIPPED | OUTPATIENT
Start: 2019-04-08 | End: 2019-07-23

## 2019-04-08 NOTE — TELEPHONE ENCOUNTER
"Requested Prescriptions   Pending Prescriptions Disp Refills     fluocinonide (LIDEX) 0.05 % external solution 60 mL 3     Sig: Apply twice daily as needed to scalp.  Do not apply to face.       Topical Steroids and Nonsteroidals Protocol Failed - 4/8/2019 10:08 AM        Failed - High potency steroid not ordered        Passed - Patient is age 6 or older        Passed - Authorizing prescriber's most recent note related to this medication read.     If refill request is for ophthalmic use, please forward request to provider for approval.          Passed - Recent (12 mo) or future (30 days) visit within the authorizing provider's specialty     Patient had office visit in the last 12 months or has a visit in the next 30 days with authorizing provider or within the authorizing provider's specialty.  See \"Patient Info\" tab in inbasket, or \"Choose Columns\" in Meds & Orders section of the refill encounter.              Passed - Medication is active on med list        Last Written Prescription Date:  8/31/18  Last Fill Quantity: 60 ml,  # refills: 3   Last office visit: 8/29/2018 with prescribing provider:     Future Office Visit:      "

## 2019-04-16 ENCOUNTER — PATIENT OUTREACH (OUTPATIENT)
Dept: CARE COORDINATION | Facility: CLINIC | Age: 74
End: 2019-04-16

## 2019-04-16 ASSESSMENT — ACTIVITIES OF DAILY LIVING (ADL): DEPENDENT_IADLS:: MEDICATION MANAGEMENT

## 2019-04-16 NOTE — PROGRESS NOTES
Clinic Care Coordination Contact    Clinic Care Coordination Contact  OUTREACH    Referral Information:  Referral Source: PCP    Primary Diagnosis: Psychosocial    No chief complaint on file.       Universal Utilization: no concerns  Clinic Utilization  Difficulty keeping appointments:: No  Compliance Concerns: No  No-Show Concerns: No  No PCP office visit in Past Year: No  Utilization    Last refreshed: 4/16/2019  6:19 AM:  Hospital Admissions 0           Last refreshed: 4/16/2019  6:19 AM:  ED Visits 1           Last refreshed: 4/16/2019  6:19 AM:  No Show Count (past year) 0              Current as of: 4/16/2019  6:19 AM              Clinical Concerns:  Current Medical Concerns: Spouse has multiple concerns about patient getting good follow-up with gastroenterology.  He feels a go to these appointments and they do not get their answers to questions.  He did say that he has not been going into the appointments with patient.  Current Behavioral Concerns: Patient reports that she feels better with the better weather and able to get outside.  Education Provided to patient: Long discussion with spouse about attending appointments with patient.  Reviewed that with her memory issues she may not be able to effectively communicate what she is feeling or know if she asked all the questions she had.  Did review with patient about spouse going to add to appointment with her to help making sure that her questions are answered.  Patient was okay with this plan and patient's spouse will attend appointments with patient.  Pain  Pain (GOAL):: Yes  Type: Acute (<3mo)  Location of chronic pain:: back  Radiating: No  Progression: Unchanged  Description of pain: Aching, Sharp  Chronic pain severity:: 3  Limitation of routine activities due to chronic pain:: Yes  Description: Able to do most things most days with some rest  Alleviating Factors: Pain Medication, Other(massager)  Aggravating Factors: Inactivity  Health Maintenance  Reviewed: Due/Overdue   Health Maintenance Due   Topic Date Due     ZOSTER IMMUNIZATION (2 of 3) 12/30/2014     MEDICARE ANNUAL WELLNESS VISIT  05/17/2018     ASTHMA CONTROL TEST Q6 MOS  03/11/2019     PHQ-9 Q6 MONTHS  05/02/2019       Clinical Pathway: None    Medication Management:  Spouse assists patient with managing medications.  He tries to encourage her to remember to take her medication before eating yet this does not always work.  He did have a question about medications and wondering if the provider can adjust the orders to add the diagnosis of why they are taking the med.  For example adding just 1 or 2 words after the directions such as anxiety, constipation, asthma, etc.   will route note to primary so she is aware that spouse will be coming in with appointment and asking to have a diagnosis or reason why they are taking that medication.    Functional Status:  Dependent ADLs:: Independent  Dependent IADLs:: Medication Management(starting to have concerns)  Bed or wheelchair confined:: No  Mobility Status: Independent  Fallen 2 or more times in the past year?: No  Any fall with injury in the past year?: Yes    Living Situation:  Current living arrangement:: I live in a private home with spouse  Type of residence:: Private [place] [facility]""    Diet/Exercise/Sleep:  Diet:: Regular  Inadequate nutrition (GOAL):: No  Food Insecurity: No  Tube Feeding: No  Exercise:: Currently not exercising  Inadequate activity/exercise (GOAL):: No  Significant changes in sleep pattern (GOAL): No    Transportation:  Transportation concerns (GOAL):: No  Transportation means:: Accessible car, Family     Psychosocial:  Adventism or spiritual beliefs that impact treatment:: No  Mental health DX:: Yes  Mental health DX how managed:: Medication  Mental health management concern (GOAL):: No  Informal Support system:: Family, Friends     Financial/Insurance:   Financial/Insurance concerns (GOAL):: No  Most of the discussion  was on her appointments with gastro and follow-through.  Spouse feels her not getting good answers and are getting frustrated with going to appointments yet are willing to go if they can get good outcomes.  Encourage spouse to go and to eat all of the appointments with patient to help advocate for her and share what he sees to help get a true picture of what is going on.  Patient was in agreement with this.     Resources and Interventions:  Current Resources:    ;   Community Resources: None  Supplies used at home:: None  Equipment Currently Used at Home: none    Advance Care Plan/Directive  Advanced Care Plans/Directives on file:: In process  Advanced Care Plan/Directive Status: In Process    Referrals Placed:none     Goals:   Goals        General    Medication 1 (pt-stated)     Notes - Note edited  12/26/2018  9:16 AM by Ria Izquierdo LSW    Goal Statement: I will allow my spouse to assist me with medications when issues arise,  for consistent administration.   Measure of Success: I am taking my medications at the right time and every day  Supportive Steps to Achieve: actively involved spouse with supportive ideas  Barriers: memory loss  Strengths: supportive family  Date to Achieve By: 12-31-19                  Patient/Caregiver understanding: pt to take medications as prescribed and allow spouse to assist.     Outreach Frequency: monthly  Future Appointments              In 1 week Leesa Muñiz PA-C [facility]    In 2 weeks Francesco Denton PA-C [facility] include 234 beds at the [facility]    In 1 month Pratibha Santo MD [facility]          Plan: Spouse to attend patient's appointments to help with accurate and appropriate outcomes.   to update primary and then to call in about 1 month.    EM Mcfarlane, [place] [facility] - Care Coordinator   [facility]  4/16/2019   3:12  PM  [phone]

## 2019-04-24 ENCOUNTER — OFFICE VISIT (OUTPATIENT)
Dept: FAMILY MEDICINE | Facility: CLINIC | Age: 74
End: 2019-04-24
Payer: MEDICARE

## 2019-04-24 VITALS
OXYGEN SATURATION: 97 % | BODY MASS INDEX: 23.37 KG/M2 | WEIGHT: 127 LBS | TEMPERATURE: 97 F | DIASTOLIC BLOOD PRESSURE: 80 MMHG | SYSTOLIC BLOOD PRESSURE: 122 MMHG | HEIGHT: 62 IN | RESPIRATION RATE: 16 BRPM | HEART RATE: 69 BPM

## 2019-04-24 DIAGNOSIS — F41.0 PANIC DISORDER WITHOUT AGORAPHOBIA: ICD-10-CM

## 2019-04-24 DIAGNOSIS — N95.2 VAGINAL ATROPHY: ICD-10-CM

## 2019-04-24 DIAGNOSIS — K58.2 IRRITABLE BOWEL SYNDROME WITH BOTH CONSTIPATION AND DIARRHEA: Primary | ICD-10-CM

## 2019-04-24 DIAGNOSIS — G89.29 CHRONIC RIGHT-SIDED LOW BACK PAIN WITH RIGHT-SIDED SCIATICA: ICD-10-CM

## 2019-04-24 DIAGNOSIS — J31.0 CHRONIC RHINITIS: ICD-10-CM

## 2019-04-24 DIAGNOSIS — N90.89 LABIAL AND CLITORAL ADHESIONS, ACQUIRED: ICD-10-CM

## 2019-04-24 DIAGNOSIS — R41.89 COGNITIVE IMPAIRMENT: ICD-10-CM

## 2019-04-24 DIAGNOSIS — J30.9 ALLERGIC RHINITIS, UNSPECIFIED SEASONALITY, UNSPECIFIED TRIGGER: ICD-10-CM

## 2019-04-24 DIAGNOSIS — D32.9 MENINGIOMA (H): ICD-10-CM

## 2019-04-24 DIAGNOSIS — M54.16 LUMBAR RADICULOPATHY: ICD-10-CM

## 2019-04-24 DIAGNOSIS — I10 BENIGN ESSENTIAL HYPERTENSION: ICD-10-CM

## 2019-04-24 DIAGNOSIS — K52.9 CHRONIC DIARRHEA: ICD-10-CM

## 2019-04-24 DIAGNOSIS — M54.41 CHRONIC RIGHT-SIDED LOW BACK PAIN WITH RIGHT-SIDED SCIATICA: ICD-10-CM

## 2019-04-24 DIAGNOSIS — E78.2 ELEVATED CHOLESTEROL WITH ELEVATED TRIGLYCERIDES: ICD-10-CM

## 2019-04-24 DIAGNOSIS — J45.40 MODERATE PERSISTENT ASTHMA WITHOUT COMPLICATION: ICD-10-CM

## 2019-04-24 DIAGNOSIS — L40.9 PSORIASIS: ICD-10-CM

## 2019-04-24 PROCEDURE — 99214 OFFICE O/P EST MOD 30 MIN: CPT | Performed by: PHYSICIAN ASSISTANT

## 2019-04-24 RX ORDER — CETIRIZINE HYDROCHLORIDE 10 MG/1
10 TABLET ORAL EVERY MORNING
Qty: 90 TABLET | Refills: 3 | Status: SHIPPED | OUTPATIENT
Start: 2019-04-24 | End: 2020-05-01

## 2019-04-24 RX ORDER — GABAPENTIN 300 MG/1
CAPSULE ORAL
Qty: 60 CAPSULE | Refills: 0 | Status: SHIPPED | OUTPATIENT
Start: 2019-04-24 | End: 2019-09-19

## 2019-04-24 RX ORDER — SIMVASTATIN 40 MG
TABLET ORAL
Qty: 90 TABLET | Refills: 3 | Status: SHIPPED | OUTPATIENT
Start: 2019-04-24 | End: 2020-03-02

## 2019-04-24 RX ORDER — DONEPEZIL HYDROCHLORIDE 10 MG/1
10 TABLET, FILM COATED ORAL AT BEDTIME
Qty: 30 TABLET | Refills: 1 | Status: SHIPPED | OUTPATIENT
Start: 2019-04-24 | End: 2019-07-02

## 2019-04-24 RX ORDER — BETAMETHASONE DIPROPIONATE 0.5 MG/G
CREAM TOPICAL
Qty: 50 G | Refills: 0 | Status: SHIPPED | OUTPATIENT
Start: 2019-04-24 | End: 2019-09-24

## 2019-04-24 RX ORDER — AMLODIPINE BESYLATE 5 MG/1
5 TABLET ORAL DAILY
Qty: 90 TABLET | Refills: 1 | Status: SHIPPED | OUTPATIENT
Start: 2019-04-24 | End: 2019-09-19

## 2019-04-24 RX ORDER — FLUTICASONE PROPIONATE 50 MCG
SPRAY, SUSPENSION (ML) NASAL
Qty: 16 G | Refills: 1 | Status: SHIPPED | OUTPATIENT
Start: 2019-04-24 | End: 2019-09-19

## 2019-04-24 RX ORDER — SERTRALINE HYDROCHLORIDE 100 MG/1
200 TABLET, FILM COATED ORAL DAILY
Qty: 135 TABLET | Refills: 3 | Status: SHIPPED | OUTPATIENT
Start: 2019-04-24 | End: 2019-09-19

## 2019-04-24 RX ORDER — ESTRADIOL 0.1 MG/G
CREAM VAGINAL
Qty: 85 G | Refills: 0 | Status: SHIPPED | OUTPATIENT
Start: 2019-04-24 | End: 2020-03-02

## 2019-04-24 RX ORDER — MONTELUKAST SODIUM 10 MG/1
1 TABLET ORAL DAILY
Qty: 90 TABLET | Refills: 3 | Status: SHIPPED | OUTPATIENT
Start: 2019-04-24 | End: 2020-05-01

## 2019-04-24 ASSESSMENT — PATIENT HEALTH QUESTIONNAIRE - PHQ9
SUM OF ALL RESPONSES TO PHQ QUESTIONS 1-9: 3
SUM OF ALL RESPONSES TO PHQ QUESTIONS 1-9: 3
10. IF YOU CHECKED OFF ANY PROBLEMS, HOW DIFFICULT HAVE THESE PROBLEMS MADE IT FOR YOU TO DO YOUR WORK, TAKE CARE OF THINGS AT HOME, OR GET ALONG WITH OTHER PEOPLE: NOT DIFFICULT AT ALL

## 2019-04-24 ASSESSMENT — ASTHMA QUESTIONNAIRES
QUESTION_4 LAST FOUR WEEKS HOW OFTEN HAVE YOU USED YOUR RESCUE INHALER OR NEBULIZER MEDICATION (SUCH AS ALBUTEROL): NOT AT ALL
QUESTION_5 LAST FOUR WEEKS HOW WOULD YOU RATE YOUR ASTHMA CONTROL: WELL CONTROLLED
QUESTION_2 LAST FOUR WEEKS HOW OFTEN HAVE YOU HAD SHORTNESS OF BREATH: ONCE OR TWICE A WEEK
ACUTE_EXACERBATION_TODAY: NO
ACT_TOTALSCORE: 23
QUESTION_3 LAST FOUR WEEKS HOW OFTEN DID YOUR ASTHMA SYMPTOMS (WHEEZING, COUGHING, SHORTNESS OF BREATH, CHEST TIGHTNESS OR PAIN) WAKE YOU UP AT NIGHT OR EARLIER THAN USUAL IN THE MORNING: NOT AT ALL
QUESTION_1 LAST FOUR WEEKS HOW MUCH OF THE TIME DID YOUR ASTHMA KEEP YOU FROM GETTING AS MUCH DONE AT WORK, SCHOOL OR AT HOME: NONE OF THE TIME

## 2019-04-24 ASSESSMENT — MIFFLIN-ST. JEOR: SCORE: 1029.32

## 2019-04-24 NOTE — PATIENT INSTRUCTIONS
Diarrhea went away with creon (pancreas enzyme replacement)  Constipation - miralax - take 1 capful EVERY day.  If still getting too small of stools, try 1 cap twice daily for 3 days then back to 1 cap daily.  Can call to update me or discuss with GI.    Memory loss/possible lewy body dementia -   Upcoming appt on 5/29  I changed prescription today to 1 10 mg tab instead of 2 of the 5 mg donezepil   Neurology wanted you to follow up with neurosurgery for meningioma-- [facility] [phone]     Anxiety -  Try increasing sertraline to 2 pills daily.    Upcoming med therapy management appt tomorrow to help review meds    meds are now all labeled with what they're to treat    Recheck with me in 1-2 months

## 2019-04-24 NOTE — PROGRESS NOTES
SUBJECTIVE:   Ingrid Amaral is a 73 year old female who presents to clinic today for the following   health issues:      Parkinson follow up - wants to discuss lesions   Would like medications to say what they are for on medication bottles  Answers for HPI/ROS submitted by the patient on 4/24/2019   If you checked off any problems, how difficult have these problems made it for you to do your work, take care of things at home, or get along with other people?: Not difficult at all  PHQ9 TOTAL SCORE: 3  PHQ-9 ([employer]) 4/24/2019   1.  Little interest or pleasure in doing things 0   2.  Feeling down, depressed, or hopeless 0   3.  Trouble falling or staying asleep, or sleeping too much 0   4.  Feeling tired or having little energy 1   5.  Poor appetite or overeating 0   6.  Feeling bad about yourself 0   7.  Trouble concentrating 1   8.  Moving slowly or restless 1   9.  Suicidal or self-harm thoughts 0   PHQ-9 Total Score 3   Difficulty at work, home, or with people    1.  Little interest or pleasure in doing things Not at all   2.  Feeling down, depressed, or hopeless Not at all   3.  Trouble falling or staying asleep, or sleeping too much Not at all   4.  Feeling tired or having little energy Several days   5.  Poor appetite or overeating Not at all   6.  Feeling bad about yourself Not at all   7.  Trouble concentrating Several days   8.  Moving slowly or restless Several days   9.  Suicidal or self-harm thoughts Not at all   PHQ-9 via [employer] TOTAL SCORE-----> 3 (Minimal depression)   Difficulty at work, home, or with people Not difficult at all       Patient here with  today.    Would like all meds labeled with indication.    Discuss specialists.      Neuro - seeing her for possibly lewy body, had dose increase last visit and referred back to neurosurgery to recheck on meningioma, sees neuro again 5/29.  Memory seems stable to her and , though he feels she is usually fairly anxious and this is a  "bit worse.    Diarrhea - improved with creon, now just having constipation.  She thinks she takes whole cap miralax most days.  Upcoming GI appt.  History rectocele, IBS, lactose intolerance, and extensive past GI work up, see 2014 note.      Additional history: as documented    Reviewed  and updated as needed this visit by clinical staff  Tobacco  Allergies  Meds  Med Hx  Surg Hx  Fam Hx  Soc Hx        Reviewed and updated as needed this visit by Provider         BP Readings from Last 3 Encounters:   04/24/19 122/80   03/08/19 124/65   12/19/18 109/73    Wt Readings from Last 3 Encounters:   04/24/19 57.6 kg (127 lb)   03/08/19 58.1 kg (128 lb)   12/19/18 59.5 kg (131 lb 3.2 oz)                  Labs reviewed in EPIC    ROS:  Constitutional, HEENT, cardiovascular, pulmonary, GI, , musculoskeletal, neuro, skin, endocrine and psych systems are negative, except as otherwise noted.    OBJECTIVE:     /80 (BP Location: Right arm)   Pulse 69   Temp 97  F (36.1  C) (Tympanic)   Resp 16   Ht 1.575 m (5' 2\")   Wt 57.6 kg (127 lb)   SpO2 97%   BMI 23.23 kg/m    Body mass index is 23.23 kg/m .  GENERAL: healthy, alert and no distress  PSYCH: mentation appears normal, affect normal/bright    Diagnostic Test Results:  none     ASSESSMENT/PLAN:       ICD-10-CM    1. Irritable bowel syndrome with both constipation and diarrhea K58.2    2. Cognitive impairment R41.89 donepezil (ARICEPT) 10 MG tablet   3. Panic disorder without agoraphobia F41.0 sertraline (ZOLOFT) 100 MG tablet   4. Meningioma (H) D32.9    5. Benign essential hypertension I10 amLODIPine (NORVASC) 5 MG tablet   6. Chronic diarrhea K52.9 amylase-lipase-protease (CREON) 6000 units CPEP   7. Psoriasis L40.9 augmented betamethasone dipropionate (DIPROLENE-AF) 0.05 % external cream   8. Chronic rhinitis J31.0 cetirizine (ZYRTEC) 10 MG tablet   9. Vaginal atrophy N95.2 estradiol (ESTRACE) 0.1 MG/GM vaginal cream   10. Labial and clitoral adhesions, " acquired N90.89 estradiol (ESTRACE) 0.1 MG/GM vaginal cream   11. Allergic rhinitis, unspecified seasonality, unspecified trigger J30.9 fluticasone (FLONASE) 50 MCG/ACT nasal spray   12. Moderate persistent asthma without complication J45.40 fluticasone-salmeterol (ADVAIR) 250-50 MCG/DOSE inhaler     montelukast (SINGULAIR) 10 MG tablet   13. Lumbar radiculopathy M54.16 gabapentin (NEURONTIN) 300 MG capsule   14. Chronic right-sided low back pain with right-sided sciatica M54.41 gabapentin (NEURONTIN) 300 MG capsule    G89.29    15. Elevated cholesterol with elevated triglycerides E78.2 simvastatin (ZOCOR) 40 MG tablet   all meds refilled today, upcoming MTM tomorrow, and continue to review meds with patient over next few visits as she is newer to me and I'd like to see about trimming med list.  25 min spent with patient and  today, over half in discussion of options for current treatments for memory, anxiety and GI issues, and history and natural history for these conditions.    Patient Instructions   Diarrhea went away with creon (pancreas enzyme replacement)  Constipation - miralax - take 1 capful EVERY day.  If still getting too small of stools, try 1 cap twice daily for 3 days then back to 1 cap daily.  Can call to update me or discuss with GI.    Memory loss/possible lewy body dementia -   Upcoming appt on 5/29  I changed prescription today to 1 10 mg tab instead of 2 of the 5 mg donezepil   Neurology wanted you to follow up with neurosurgery for meningioma-- [facility] [phone]     Anxiety -  Try increasing sertraline to 2 pills daily.    Upcoming med therapy management appt tomorrow to help review meds    meds are now all labeled with what they're to treat    Recheck with me in 1-2 months      Leesa Muñiz PA-C  [facility]

## 2019-04-24 NOTE — NURSING NOTE
"Chief Complaint   Patient presents with     Parkinson     wants to discuss lesions       Initial /80 (BP Location: Right arm)   Pulse 69   Temp 97  F (36.1  C) (Tympanic)   Resp 16   Ht 1.575 m (5' 2\")   Wt 57.6 kg (127 lb)   SpO2 97%   BMI 23.23 kg/m   Estimated body mass index is 23.23 kg/m  as calculated from the following:    Height as of this encounter: 1.575 m (5' 2\").    Weight as of this encounter: 57.6 kg (127 lb).    Patient presents to the clinic using No DME    Health Maintenance that is potentially due pending provider review:  NONE    n/a    Is there anyone who you would like to be able to receive your results? No  If yes have patient fill out BROOKE    "

## 2019-04-25 ENCOUNTER — OFFICE VISIT (OUTPATIENT)
Dept: PHARMACY | Facility: CLINIC | Age: 74
End: 2019-04-25
Payer: COMMERCIAL

## 2019-04-25 VITALS — SYSTOLIC BLOOD PRESSURE: 136 MMHG | DIASTOLIC BLOOD PRESSURE: 78 MMHG

## 2019-04-25 DIAGNOSIS — I10 HYPERTENSION GOAL BP (BLOOD PRESSURE) < 140/90: ICD-10-CM

## 2019-04-25 DIAGNOSIS — R21 RASH: ICD-10-CM

## 2019-04-25 DIAGNOSIS — K52.9 CHRONIC DIARRHEA: ICD-10-CM

## 2019-04-25 DIAGNOSIS — M54.42 BILATERAL LOW BACK PAIN WITH LEFT-SIDED SCIATICA, UNSPECIFIED CHRONICITY: ICD-10-CM

## 2019-04-25 DIAGNOSIS — K21.9 GASTROESOPHAGEAL REFLUX DISEASE, ESOPHAGITIS PRESENCE NOT SPECIFIED: ICD-10-CM

## 2019-04-25 DIAGNOSIS — G47.52 RBD (REM BEHAVIORAL DISORDER): ICD-10-CM

## 2019-04-25 DIAGNOSIS — K59.09 CHRONIC CONSTIPATION: ICD-10-CM

## 2019-04-25 DIAGNOSIS — F33.0 MAJOR DEPRESSIVE DISORDER, RECURRENT EPISODE, MILD (H): ICD-10-CM

## 2019-04-25 DIAGNOSIS — J45.40 MODERATE PERSISTENT ASTHMA WITHOUT COMPLICATION: Primary | ICD-10-CM

## 2019-04-25 DIAGNOSIS — R41.89 COGNITIVE IMPAIRMENT: ICD-10-CM

## 2019-04-25 DIAGNOSIS — J30.2 SEASONAL ALLERGIC RHINITIS, UNSPECIFIED TRIGGER: ICD-10-CM

## 2019-04-25 DIAGNOSIS — N95.2 VAGINAL ATROPHY: ICD-10-CM

## 2019-04-25 DIAGNOSIS — E78.5 HYPERLIPIDEMIA LDL GOAL <100: ICD-10-CM

## 2019-04-25 DIAGNOSIS — E63.9 NUTRITIONAL DEFICIENCY: ICD-10-CM

## 2019-04-25 PROCEDURE — 99605 MTMS BY PHARM NP 15 MIN: CPT | Performed by: PHARMACIST

## 2019-04-25 PROCEDURE — 99607 MTMS BY PHARM ADDL 15 MIN: CPT | Performed by: PHARMACIST

## 2019-04-25 RX ORDER — BIOTIN 1 MG
1000 TABLET ORAL 2 TIMES DAILY
COMMUNITY
End: 2022-06-06

## 2019-04-25 RX ORDER — VIT C/B6/B5/MAGNESIUM/HERB 173 50-5-6-5MG
1 CAPSULE ORAL DAILY
COMMUNITY
End: 2020-03-02

## 2019-04-25 ASSESSMENT — ASTHMA QUESTIONNAIRES: ACT_TOTALSCORE: 23

## 2019-04-25 ASSESSMENT — PATIENT HEALTH QUESTIONNAIRE - PHQ9: SUM OF ALL RESPONSES TO PHQ QUESTIONS 1-9: 3

## 2019-04-25 NOTE — PATIENT INSTRUCTIONS
"Recommendations from today's MTM visit:                                                    MTM (medication therapy management) is a service provided by a clinical pharmacist designed to help you get the most of out of your medicines.   Today we reviewed what your medicines are for, how to know if they are working, that your medicines are safe and how to make your medicine regimen as easy as possible.     1. From Leesa: \"Constipation - miralax - take 1 capful EVERY day.  If still getting too small of stools, try 1 cap twice daily for 3 days then back to 1 cap daily.  Can call to update me or discuss with GI.\"    2. Make sure to double check your donepezil bottle before starting the new prescription. Leesa changed this from a TWO 5 mg tablets to ONE 10 mg daily.     Next MTM visit: 1 year or sooner if need    To schedule another MTM appointment, please call the clinic directly or you may call the MTM scheduling line at [phone] or toll-free at [phone].     My Clinical Pharmacist's contact information:                                                      It was a pleasure talking with you today!  Please feel free to contact me with any questions or concerns you have.      Pepe Mo, PharmD, [facility]  Medication Therapy Management Pharmacist  Pager: [phone]    You may receive a survey about the MTM services you received by email and/or US Mail.  I would appreciate your feedback to help me serve you better in the future. Your comments will be anonymous.        "

## 2019-04-25 NOTE — LETTER
"     [facility]     Date: 2019    Ingrid RUEDA Amaral  [address] [address]    Dear Ms. Quarlesters,    Thank you for talking with me on 19 about your health and medications. Medicare s MTM (Medication Therapy Management) program helps you understand your medications and use them safely.      This letter includes an action plan (Medication Action Plan) and medication list (Personal Medication List). The action plan has steps you should take to help you get the best results from your medications. The medication list will help you keep track of your medications and how to use them the right way.       Have your action plan and medication list with you when you talk with your doctors, pharmacists, and other healthcare providers in your care team.     Ask your doctors, pharmacists, and other healthcare providers to update the action plan and medication list at every visit.     Take your medication list with you if you go to the hospital or emergency room.     Give a copy of the action plan and medication list to your family or caregivers.     If you want to talk about this letter or any of the papers with it, please call   [phone].   We look forward to working with you, your doctors, and other healthcare providers to help you stay healthy.     Sincerely,    Jan Mo      Enclosed: Medication Action Plan and Personal Medication List    MEDICATION ACTION PLAN FOR Ingrid Amaral,  1945     This action plan will help you get the best results from your medications if you:   1. Read \"What we talked about.\"   2. Take the steps listed in the \"What I need to do\" boxes.   3. Fill in \"What I did and when I did it.\"   4. Fill in \"My follow-up plan\" and \"Questions I want to ask.\"     Have this action plan with you when you talk with your doctors, pharmacists, and other healthcare providers in your care team. Share this with your family or caregivers too.  DATE " PREPARED: 2019  What we talked about: What my medicines are for, how to know if my medicines are working, made sure my medicines are safe for me and reviewed how to take my medicines.                                                   What I need to do: Take my medicines every day What I did and when I did it:                                              My follow-up plan:                 Questions I want to ask:              If you have any questions about your action plan, call Jan Mo, Phone: [phone] , Monday-Friday 8-4:30pm.           MEDICATION LIST FOR Ingrid Amaral,  1945     This medication list was made for you after we talked. We also used information from your doctor's chart.      Use blank rows to add new medications. Then fill in the dates you started using them.    Cross out medications when you no longer use them. Then write the date and why you stopped using them.    Ask your doctors, pharmacists, and other healthcare providers to update this list at every visit. Keep this list up-to-date with:       Prescription medications    Over the counter drugs     Herbals    Vitamins    Minerals      If you go to the hospital or emergency room, take this list with you. Share this with your family or caregivers too.     DATE PREPARED: 2019  Allergies or side effects: Sulfa drugs     Medication:  ALBUTEROL SULFATE (5 MG/ML) 0.5% IN NEBU      How I use it:  Take 0.5 mLs (2.5 mg) by nebulization every 4 hours as needed for wheezing or shortness of breath / dyspnea      Why I use it: Asthma    Prescriber:  MARVIN Gaming CNP      Date I started using it:       Date I stopped using it:         Why I stopped using it:            Medication:  ALBUTEROL SULFATE  (90 BASE) MCG/ACT IN AERS      How I use it:  Inhale 2 puffs into the lungs every 4 hours as needed for shortness of breath / dyspnea      Why I use it: Asthma    Prescriber:  MARVIN Diaz  CNP      Date I started using it:       Date I stopped using it:         Why I stopped using it:            Medication:  AMLODIPINE BESYLATE 5 MG PO TABS      How I use it:  Take 1 tablet (5 mg) by mouth daily For blood pressure      Why I use it: Blood pressure    Prescriber:  Leesa Muñiz PA-C      Date I started using it:       Date I stopped using it:         Why I stopped using it:            Medication:  ASCORBIC ACID 1000 MG PO TABS      How I use it:  Take 1,000 mg by mouth daily      Why I use it:  General health    Prescriber:  Patient Reported      Date I started using it:       Date I stopped using it:         Why I stopped using it:            Medication:  BETAMETHASONE DIPROPIONATE AUG 0.05 % EX CREA      How I use it:  Apply sparingly to legs twice daily as needed. Do not apply to face. May occlude with saran wrap for 3-4 days for 6 hours.  For psoriasis rash.      Why I use it: Psoriasis    Prescriber:  Leesa Muñiz PA-C      Date I started using it:       Date I stopped using it:         Why I stopped using it:            Medication:  BIOTIN 1000 MCG PO TABS      How I use it:  Take 1,000 mcg by mouth 2 times daily      Why I use it:  Hair, skin, nails    Prescriber:  Patient Reported      Date I started using it:       Date I stopped using it:         Why I stopped using it:            Medication:  CALCIUM-MAGNESIUM-ZINC 333..33-5 MG PO TABS      How I use it:  Take 1 tablet by mouth daily      Why I use it:  General health    Prescriber:  Patient Reported      Date I started using it:       Date I stopped using it:         Why I stopped using it:            Medication:  CETIRIZINE HCL 10 MG PO TABS      How I use it:  Take 1 tablet (10 mg) by mouth every morning For allergies      Why I use it: Allergies    Prescriber:  Leesa Muñiz PA-C      Date I started using it:       Date I stopped using it:         Why I stopped using it:            Medication:  DONEPEZIL  HCL 10 MG PO TABS      How I use it:  Take 1 tablet (10 mg) by mouth At Bedtime For memory      Why I use it: Memory    Prescriber:  Leesa Muñiz PA-C      Date I started using it:       Date I stopped using it:         Why I stopped using it:            Medication:  ESTRADIOL 0.1 MG/GM VA CREA      How I use it:  USE ONE GRAM VAGINALLY ONCE DAILY AND SPREAD A SMALL AMOUNT AROUND THE CLITORAL REES For vaginal area issues.      Why I use it: Vaginal atrophy    Prescriber:  Leesa Muñiz PA-C      Date I started using it:       Date I stopped using it:         Why I stopped using it:            Medication:  FLUOCINONIDE 0.05 % EX SOLN      How I use it:  Apply twice daily as needed to scalp.  Do not apply to face. Due for office visit:[phone]      Why I use it: Psoriasis    Prescriber:  Miguelina Schulte PA-C      Date I started using it:       Date I stopped using it:         Why I stopped using it:            Medication:  FLUTICASONE PROPIONATE 50 MCG/ACT NA SUSP      How I use it:  INSTILL ONE SPRAY IN EACH NOSTRIL EVERY DAY.  For allergies      Why I use it: Allergies    Prescriber:  Leesa Muñiz PA-C      Date I started using it:       Date I stopped using it:         Why I stopped using it:            Medication:  FLUTICASONE-SALMETEROL 250-50 MCG/DOSE IN AEPB      How I use it:  INHALE 1 PUFF BY MOUTH INTO THE LUNGS TWICE DAILY.  For asthma.      Why I use it: Asthma    Prescriber:  Leesa Muñiz PA-C      Date I started using it:       Date I stopped using it:         Why I stopped using it:            Medication:  GABAPENTIN 300 MG PO CAPS      How I use it:  TAKE ONE CAPSULE BY MOUTH EVERY NIGHT AT BEDTIME AS NEEDED.  For back pain.      Why I use it: Back pain    Prescriber:  Leesa Muñiz PA-C      Date I started using it:       Date I stopped using it:         Why I stopped using it:            Medication:  HYDROCORTISONE 1 % EX CREA      How I use it:   Apply topically as needed      Why I use it:  Psoriasis    Prescriber:  Patient Reported      Date I started using it:       Date I stopped using it:         Why I stopped using it:            Medication:  LACTASE 9000 UNITS PO TABS      How I use it:  Take 9,000 Units by mouth as needed      Why I use it:  Lactose intolerance    Prescriber:  Patient Reported      Date I started using it:       Date I stopped using it:         Why I stopped using it:            Medication:  MELATONIN 5 MG PO TABS      How I use it:  Take 1 tablet (5 mg) by mouth nightly as needed for sleep      Why I use it: Insomnia    Prescriber:  Leesa Muñiz PA-C      Date I started using it:       Date I stopped using it:         Why I stopped using it:            Medication:  MONTELUKAST SODIUM 10 MG PO TABS      How I use it:  Take 1 tablet (10 mg) by mouth daily For allergies and asthma      Why I use it: Asthma    Prescriber:  Leesa Muñiz PA-C      Date I started using it:       Date I stopped using it:         Why I stopped using it:            Medication:  OMEPRAZOLE 20 MG PO CPDR      How I use it:  TAKE ONE CAPSULE BY MOUTH TWICE A DAY FOR HEARTBURN      Why I use it: Heart burn    Prescriber:  MARVIN Diaz CNP      Date I started using it:       Date I stopped using it:         Why I stopped using it:            Medication:  PANCRELIPASE (LIP-PROT-AMYL) 6000 UNITS PO CPEP      How I use it:  Take 2 capsules (12,000 Units) by mouth 3 times daily (with meals) For diarrhea      Why I use it: Chronic diarrhea    Prescriber:  Leesa Muñiz PA-C      Date I started using it:       Date I stopped using it:         Why I stopped using it:            Medication:  POLYETHYLENE GLYCOL 3350 PO POWD      How I use it:  Take 17 g (1 capful) by mouth as needed for constipation      Why I use it: Chronic constipation    Prescriber:  Leesa Muñiz PA-C      Date I started using it:       Date I stopped  using it:         Why I stopped using it:            Medication:  SERTRALINE  MG PO TABS      How I use it:  Take 2 tablets (200 mg) by mouth daily TAKE ONE AND ONE-HALF TABLET BY MOUTH ONCE DAILY.  For mood.      Why I use it: Mood    Prescriber:  Leesa Muñiz PA-C      Date I started using it:       Date I stopped using it:         Why I stopped using it:            Medication:  SIMVASTATIN 40 MG PO TABS      How I use it:  TAKE ONE TABLET BY MOUTH EVERY NIGHT AT BEDTIME.  For cholesterol.      Why I use it: Cholesterol    Prescriber:  Leesa Muñiz PA-C      Date I started using it:       Date I stopped using it:         Why I stopped using it:            Medication:  SKIN OILS EX      How I use it:  Lavender-headache, skin, peppermint-skin, upset stomach mix of oils, asthma mix of oils, eucalyptus. Tea tree oil-skin, vapor rub- chest tightness, sleepy time (vetiver, lavendaer, mrjoram, mandarin, ylang)- sleep, constipation, eczema (sweet almond oil). Also mixes with carriers: Coconut oil, [place] oil, Extra virgin oil. Frankincense- cough. Digize- oil. Purification- oil, lemon- oil. Essentialyme- pill.  Inhales a mix with olive oil almond and coconut oil with peppermint, and eucalyptus- sinus, allergies. Lemon, Cinnamon bark, panaway, Thieves, Orange, [place], [place]opaiba      Why I use it:  General Health    Prescriber:  Patient Reported      Date I started using it:       Date I stopped using it:         Why I stopped using it:            Medication:  TURMERIC 500 MG PO CAPS      How I use it:  Take 1 capsule by mouth daily      Why I use it:  General health    Prescriber:  Patient Reported      Date I started using it:       Date I stopped using it:         Why I stopped using it:            Medication:         How I use it:         Why I use it:      Prescriber:         Date I started using it:       Date I stopped using it:         Why I stopped using it:            Medication:          How I use it:         Why I use it:      Prescriber:         Date I started using it:       Date I stopped using it:         Why I stopped using it:            Medication:         How I use it:         Why I use it:      Prescriber:         Date I started using it:       Date I stopped using it:         Why I stopped using it:              Other Information:     If you have any questions about your action plan, call [phone].    According to the Paperwork Reduction Act of 1995, no persons are required to respond to a collection of information unless it displays a valid OMB control number. The valid OMB number for this information collection is [id number]. The time required to complete this information collection is estimated to average 40 minutes per response, including the time to review instructions, searching existing data resources, gather the data needed, and complete and review the information collection. If you have any comments concerning the accuracy of the time estimate(s) or suggestions for improving this form, please write to: CMS, Attn: LEILANI Reports Clearance Officer, [address] [place] [phone].

## 2019-04-25 NOTE — PROGRESS NOTES
SUBJECTIVE/OBJECTIVE:                Ingrid Amaral is a 73 year old female coming in for a follow-up visit for Medication Therapy Management.  She was referred to me from her [employer]. Current PCP is Leesa Muñiz PA-C. Joined today by , Harsha.     Chief Complaint: Follow up from our visit on 2/8/18.  Comprehensive medication review.  Tobacco: History of tobacco dependence - quit 50-60 years ago   Alcohol: Less than 1 beverages / week    Medication Adherence: once or twice a week has to double check     Asthma:  Current asthma medications: Albuterol MDI and Nebs, montelukast 10 mg daily, and Fluticasone+Salmeterol (Advair) twice daily. Does have prednisone if in [place]. Pt rinses their mouth after using steroid inhaler. Asthma triggers include: exercise or sports.  Pt reports the following symptoms: SOA with exertion (stairs, dancing)  ACT Total Scores 2/21/2018 9/11/2018 4/24/2019   ACT TOTAL SCORE - - -   ASTHMA ER VISITS - - -   ASTHMA HOSPITALIZATIONS - - -   ACT TOTAL SCORE (Goal Greater than or Equal to 20) 23 22 23   In the past 12 months, how many times did you visit the emergency room for your asthma without being admitted to the hospital? 0 0 0   In the past 12 months, how many times were you hospitalized overnight because of your asthma? 0 0 0     Allergic rhinitis: Current medications include cetirizine 10 mg once daily, fluticasone nasal spray  twice daily, and montelukast daily. Primary symptoms are nasal congestion and runny nose. Pt feels that current therapy is effective.     Memory Loss: Pt is taking donepezil 10 mg at bedtime (recently changed from two 5 mg tablets to one 10 mg tablet). Pt and  think her memory has been pretty stable, but note that she did have some trouble remember what her medications were for. Recently had PCP reorder medications with indication as part of directions.     GERD: Current medications include: Prilosec (omeprazole) 20 mg  "twice daily. Pt c/o no current symptoms. Pt states has history of hiatal hernia.   The patient does not have a history of GI bleed.  The patient does notice symptoms if they miss a dose.  Patient has not tried a trial off of therapy and is not interested in doing so. Has history of difficulty clearing throat - thinks this may have helped. Patient feels that current regimen is effective.    Rash: Pt is very reactive to different topical products/chemicals that dry out skin and cause rash.  She uses several oinments/creams including fluocinonide solution, now augmented betamethasone oinment, and hydrocortisone depending on how severe it is. Feels that they help somewhat with her rashes. Wishes she had something for her age spots. Denies any issues.    Depression/REM Behavior Disorder:  Current medications include: Sertraline 150 mg once daily. Pt reports that depression symptoms are well controlled. She does have occasional panic attacks, but feels that doing different activities - such as caring for her rabbits/chickens help.   PHQ-9 SCORE 5/9/2018 11/2/2018 4/24/2019   PHQ-9 Total Score - - -   PHQ-9 Total Score MyChart 3 (Minimal depression) 4 (Minimal depression) 3 (Minimal depression)   PHQ-9 Total Score 3 4 3     Pain:  Pt still gets back pain on occasion.  Pt is taking gabapentin 300 mg at bedtime every night.  Feels that this is effective. Denies any issues.      Diarrhea: Pt is taking Creon 6000 - two capsules three times daily. Pt finds that this has been effective, but she continues to have a \"blowout\" bowel movement a few times a week.  She does take Miralax to stay regular as she has gotten very constipated off of this.  She sees GI in May. Denies any known issues.     Hyperlipidemia: Current therapy includes simvastatin 40 mg once daily.  Pt reports no significant myalgias or other side effects.   The 10-year ASCVD risk score (Fermin AVERY Jr., et al., 2013) is: 17.2%    Values used to calculate the score:    "   Age: 73 years      Sex: Female      Is Non- : No      Diabetic: No      Tobacco smoker: No      Systolic Blood Pressure: 122 mmHg      Is BP treated: Yes      HDL Cholesterol: 71 mg/dL      Total Cholesterol: 185 mg/dL  Recent Labs   Lab Test 11/02/18  1412 08/16/17  1128  08/10/11  0852   CHOL 185 191   < > 224*   HDL 71 85   < > 54   LDL 82 82   < > 136*   TRIG 159* 122   < > 172*   CHOLHDLRATIO  --   --   --  4.0    < > = values in this interval not displayed.     Vaginal atrophy: Pt is using Estrace cream daily.  Pt states this has been effective. Denies any issues.    Hypertension: Current medications include amlodipine 5 mg daily.  Patient does not self-monitor BP.  Patient reports no current medication side effects.    Supplements: Pt uses multiple essential oils, vitamin C 1000 mg daily, biotin 1,000 mcg twice daily (hair loss), turmeric 500 mg daily, Lactaid PRN, calcium [id number]/Vit D 400IU/Mg40mg/Zinc, and vitamin D3 daily. Prefers to continue all of these. Denies any issues.    Today's Vitals: /78  Patient declined being weighed.      ASSESSMENT:              Current medications were reviewed today as discussed above.  Medicare Part D topics discussed:Medications reviewed-no issues identified or changes needed    Medication Adherence: good, no issues identified    Asthma:  Stable.     Allergic rhinitis: Stable.     Memory Loss: Stable per patient/.    GERD: Stable.     Rash: Stable.     Depression/REM Behavior Disorder:  Stable per patient.     Pain:  Stable.       Diarrhea: Somewhat improved. Plan in place for follow-up with GI.     Hyperlipidemia: Stable. Pt is on moderate intensity statin which is indicated based on 2013 ACC/AHA guidelines for lipid management.      Vaginal atrophy: Stable.     Hypertension: Stable. Patient is meeting BP goal of < 140/90mmHg.     Supplements: Stable. Pt preferred to continue current supplements.      PLAN:                  1.  Continue current therapy.     I spent 45 minutes with this patient today (an extra 15 minutes was spent creating the Medication Action Plan). A copy of the visit note was provided to the patient's primary care provider.     Will follow up in 1 year or sooner if needed.    The patient was given a summary of these recommendations as an after visit summary.    Pepe Mo, StuartD, [facility]  Medication Therapy Management Pharmacist  Pager: [phone]

## 2019-05-06 ENCOUNTER — OFFICE VISIT (OUTPATIENT)
Dept: GASTROENTEROLOGY | Facility: CLINIC | Age: 74
End: 2019-05-06
Attending: PHYSICIAN ASSISTANT
Payer: MEDICARE

## 2019-05-06 ENCOUNTER — ANCILLARY PROCEDURE (OUTPATIENT)
Dept: GENERAL RADIOLOGY | Facility: CLINIC | Age: 74
End: 2019-05-06
Attending: PHYSICIAN ASSISTANT
Payer: MEDICARE

## 2019-05-06 VITALS
HEIGHT: 62 IN | HEART RATE: 72 BPM | WEIGHT: 127.2 LBS | BODY MASS INDEX: 23.41 KG/M2 | DIASTOLIC BLOOD PRESSURE: 71 MMHG | OXYGEN SATURATION: 95 % | SYSTOLIC BLOOD PRESSURE: 114 MMHG

## 2019-05-06 DIAGNOSIS — K58.2 IRRITABLE BOWEL SYNDROME WITH BOTH CONSTIPATION AND DIARRHEA: ICD-10-CM

## 2019-05-06 DIAGNOSIS — R14.0 ABDOMINAL BLOATING: ICD-10-CM

## 2019-05-06 DIAGNOSIS — K59.00 CONSTIPATION, UNSPECIFIED CONSTIPATION TYPE: ICD-10-CM

## 2019-05-06 DIAGNOSIS — R19.7 DIARRHEA, UNSPECIFIED TYPE: ICD-10-CM

## 2019-05-06 DIAGNOSIS — M62.89 PELVIC FLOOR DYSFUNCTION: ICD-10-CM

## 2019-05-06 DIAGNOSIS — K44.9 HIATAL HERNIA: Primary | ICD-10-CM

## 2019-05-06 DIAGNOSIS — R13.10 DYSPHAGIA, UNSPECIFIED TYPE: ICD-10-CM

## 2019-05-06 PROCEDURE — 99214 OFFICE O/P EST MOD 30 MIN: CPT | Performed by: PHYSICIAN ASSISTANT

## 2019-05-06 PROCEDURE — 74019 RADEX ABDOMEN 2 VIEWS: CPT | Performed by: STUDENT IN AN ORGANIZED HEALTH CARE EDUCATION/TRAINING PROGRAM

## 2019-05-06 ASSESSMENT — PAIN SCALES - GENERAL: PAINLEVEL: NO PAIN (0)

## 2019-05-06 ASSESSMENT — MIFFLIN-ST. JEOR: SCORE: 1030.23

## 2019-05-06 NOTE — PROGRESS NOTES
GASTROENTEROLOGY NEW PATIENT CLINIC VISIT    CC/REFERRING MD:    Leesa Muñiz    REASON FOR CONSULTATION:   Referred by Leesa Muñiz for Consult (Dysphagia; bloating; stool inconsistency)        HISTORY OF PRESENT ILLNESS:    Ingrid Amaral is 73 year old female who presents for evaluation of dysphagia, bloating and alternating bowel habits.     She presents to the clinic today with her . She is a poor historian.     She has hx of IBS and dysphagia for many years now and has had work up in the past.     She has long standing history of troubles with her bowels. She notes that bowels fluctuate between constipation and diarrhea. She has several small bowel movements throughout the day, that she describes as ranging from DeWitt stool 4 to DeWitt stool 6.  She feels that she is not completely able to evacuate.  She denies any blood or black tarry stool.  She is currently on Creon 2 pills 3 times a day with meals to help her digest fatty foods for history of chronic diarrhea/steatorrhea.  She is also taking MiraLAX once a day for constipation.    She does have history of weak pelvic floor which was previous evaluated by pelvic floor center.  Review of records reveal she benefited from biofeedback therapy however patient does not recall.    She notes abdominal bloating and feels that foods are getting stuck in her chest causing her chest discomfort.  At times she becomes so bloated that she has to unfasten her pants and unfasten her bra.  When down makes symptoms worse.  She does have heartburn but denies any nausea or vomiting.  No abdominal pain.  She recently had upper endoscopy in June 2018 which revealed a large hiatal hernia but was otherwise unremarkable. She recently had speech therapy evaluation in November 2018 which was normal. There were no signs of aspiration.         PREVIOUS ENDOSCOPY:    Upper Endoscopy 06/07/2018  Impression:  - Normal nasopharynx  and oropharynx.                        - Normal esophagus.                        - Large hiatal hernia.                        - Normal examined duodenum.                        - No specimens collected.     Colonoscopy 5/22/2017  Impression:  - The entire examined colon is normal.                        - The distal rectum and anal verge are normal on retroflexion view.                        - No specimens collected.     Colonoscopy 3/20/2015  Impression:      - The entire examined colon is normal on direct and retroflexion views.       PROBLEM LIST  Patient Active Problem List    Diagnosis Date Noted     Meningioma (H) 02/27/2018     Priority: Medium     Hypertension goal BP (blood pressure) < 140/90 05/18/2017     Priority: Medium     FH: colon cancer 03/03/2015     Priority: Medium     RBD (REM behavioral disorder) 04/04/2014     Priority: Medium     Started clonazepam with Dr Gilliam.  Helps a lot.  Self-decreased to usually 0.5 mg nightly.       Dysphagia 01/24/2014     Priority: Medium     She has had complaints of atypical chest pressure and has been evaluated with EGD 3/2012 that was normal as well as manometry and 24-hour pH study. Manometry 4/2012 revealed abnormal bolus clearance for both liquids and solids. She then had a video swallow study done that was recommended swallowing exercises per her speech pathologist. It has been noted that both the symptoms of chest pressure and IBS have a direct correlation with increased stress/anxiety. She notes that the swallowing exercises do help with any symptoms of dysphagia.          Moderate persistent asthma 09/12/2013     Priority: Medium     Vaginal wall prolapse 06/13/2013     Priority: Medium     Fit with size 2 ring with support pessary       Chronic constipation 06/13/2013     Priority: Medium     Vulvitis 08/07/2012     Priority: Medium     Biopsy proven lichen sclerosis-see pathology  Clobetasol ointment prn  Oatmeal sitz baths  Yearly exams        Liver lesion 08/03/2012     Priority: Medium     On MRCP 4/6/12, per GI needs yearly MRCP       Generalized anxiety disorder 08/03/2012     Priority: Medium     Diagnosis updated by automated process. Provider to review and confirm.       Mild major depression (H) 08/03/2012     Priority: Medium     Allergic rhinitis 09/08/2005     Priority: Medium     Hay fever  Problem list name updated by automated process. Provider to review       Panic disorder without agoraphobia 09/08/2005     Priority: Medium     Anxiety; panic disorder       Diarrhea 03/07/2005     Priority: Medium     This has been longstanding and has responded to pancrease in the past.  Colonoscopy 2/05 showed some areas of redness in the ilium which were negative on biopsy .  At times can tend toward constipation.  Always has some bloating and increased gas, occasionally associated with some pain       Pure hypercholesterolemia 03/07/2005     Priority: Medium     Has mild elevated LDL at 141.  No other risk factors for CAD and HDL 56.  No meds for now but will continue to follow.       Advanced directives, counseling/discussion 04/03/2012     Priority: Low     Patient has information at home and is working on finishing            PERTINENT PAST MEDICAL HISTORY:  (I personally reviewed this history with the patient at today's visit)   Past Medical History:   Diagnosis Date     Asthma      GERD (gastroesophageal reflux disease)      Radial head fracture 3/18/2010         PREVIOUS SURGERIES: (I personally reviewed this history with the patient at today's visit)   Past Surgical History:   Procedure Laterality Date     ANKLE SURGERY      bilateral     COLONOSCOPY       COLONOSCOPY N/A 3/20/2015    Procedure: COLONOSCOPY;  Surgeon: Britney Martinez MD;  Location: [facility]     COLONOSCOPY N/A 5/22/2017    Procedure: COLONOSCOPY;  Colonoscopy;  Surgeon: Tristen Rangel MD;  Location: [facility]     ESOPHAGOSCOPY, GASTROSCOPY, DUODENOSCOPY (EGD), COMBINED N/A  6/7/2018    Procedure: COMBINED ESOPHAGOSCOPY, GASTROSCOPY, DUODENOSCOPY (EGD);  gastroscopy;  Surgeon: Tristen Rangel MD;  Location: [facility]     HC ESOPH/GAS REFLUX TEST W NASAL IMPED >1 HR  4/19/2012    Procedure:ESOPHAGEAL IMPEDENCE FUNCTION TEST WITH 24 HOUR PH GREATER THAN 1 HOUR; Surgeon:VALDO UMANZOR; [facility]     HERNIA REPAIR      umbilcal     HYSTERECTOMY, PAP NO LONGER INDICATED  1991     TONSILLECTOMY       UPPER GI ENDOSCOPY           ALLERGIES:     Allergies   Allergen Reactions     Sulfa Drugs Other (See Comments)     Reaction unknown as a child       PERTINENT MEDICATIONS:    Current Outpatient Medications:      albuterol (PROAIR HFA/PROVENTIL HFA/VENTOLIN HFA) 108 (90 BASE) MCG/ACT Inhaler, Inhale 2 puffs into the lungs every 4 hours as needed for shortness of breath / dyspnea, Disp: 1 Inhaler, Rfl: 2     albuterol (PROVENTIL) (5 MG/ML) 0.5% nebulizer solution, Take 0.5 mLs (2.5 mg) by nebulization every 4 hours as needed for wheezing or shortness of breath / dyspnea, Disp: 30 vial, Rfl: 1     amLODIPine (NORVASC) 5 MG tablet, Take 1 tablet (5 mg) by mouth daily For blood pressure, Disp: 90 tablet, Rfl: 1     amylase-lipase-protease (CREON) 6000 units CPEP, Take 2 capsules (12,000 Units) by mouth 3 times daily (with meals) For diarrhea, Disp: 180 capsule, Rfl: 0     ascorbic acid 1000 MG TABS tablet, Take 1,000 mg by mouth daily, Disp: , Rfl:      augmented betamethasone dipropionate (DIPROLENE-AF) 0.05 % external cream, Apply sparingly to legs twice daily as needed. Do not apply to face. May occlude with saran wrap for 3-4 days for 6 hours.  For psoriasis rash., Disp: 50 g, Rfl: 0     biotin 1000 MCG TABS tablet, Take 1,000 mcg by mouth 2 times daily, Disp: , Rfl:      Calcium-Magnesium-Zinc 333..33-5 MG TABS, Take 1 tablet by mouth daily, Disp: , Rfl:      cetirizine (ZYRTEC) 10 MG tablet, Take 1 tablet (10 mg) by mouth every morning For allergies, Disp: 90 tablet, Rfl: 3      donepezil (ARICEPT) 10 MG tablet, Take 1 tablet (10 mg) by mouth At Bedtime For memory, Disp: 30 tablet, Rfl: 1     estradiol (ESTRACE) 0.1 MG/GM vaginal cream, USE ONE GRAM VAGINALLY ONCE DAILY AND SPREAD A SMALL AMOUNT AROUND THE CLITORAL REES For vaginal area issues., Disp: 85 g, Rfl: 0     fluocinonide (LIDEX) 0.05 % external solution, Apply twice daily as needed to scalp.  Do not apply to face. Due for office visit:[phone], Disp: 60 mL, Rfl: 1     fluticasone (FLONASE) 50 MCG/ACT nasal spray, INSTILL ONE SPRAY IN EACH NOSTRIL EVERY DAY.  For allergies, Disp: 16 g, Rfl: 1     fluticasone-salmeterol (ADVAIR) 250-50 MCG/DOSE inhaler, INHALE 1 PUFF BY MOUTH INTO THE LUNGS TWICE DAILY.  For asthma., Disp: 1 Inhaler, Rfl: 5     gabapentin (NEURONTIN) 300 MG capsule, TAKE ONE CAPSULE BY MOUTH EVERY NIGHT AT BEDTIME AS NEEDED.  For back pain., Disp: 60 capsule, Rfl: 0     hydrocortisone 1 % cream, Apply topically as needed, Disp: , Rfl:      lactase (LACTAID) 9000 UNITS TABS, Take 9,000 Units by mouth as needed, Disp: , Rfl:      melatonin 5 MG tablet, Take 1 tablet (5 mg) by mouth nightly as needed for sleep, Disp: 90 tablet, Rfl: 3     montelukast (SINGULAIR) 10 MG tablet, Take 1 tablet (10 mg) by mouth daily For allergies and asthma, Disp: 90 tablet, Rfl: 3     omeprazole (PRILOSEC) 20 MG CR capsule, TAKE ONE CAPSULE BY MOUTH TWICE A DAY FOR HEARTBURN, Disp: 180 capsule, Rfl: 3     ORDER FOR DME, Equipment being ordered: Nebulizer and tubing/filter, Disp: 1 Device, Rfl: 0     polyethylene glycol (MIRALAX/GLYCOLAX) powder, Take 17 g (1 capful) by mouth as needed for constipation, Disp: 1 Bottle, Rfl: 3     sertraline (ZOLOFT) 100 MG tablet, Take 2 tablets (200 mg) by mouth daily TAKE ONE AND ONE-HALF TABLET BY MOUTH ONCE DAILY.  For mood., Disp: 135 tablet, Rfl: 3     simvastatin (ZOCOR) 40 MG tablet, TAKE ONE TABLET BY MOUTH EVERY NIGHT AT BEDTIME.  For cholesterol., Disp: 90 tablet, Rfl: 3     SKIN OILS EX,  Lavender-headache, skin, peppermint-skin, upset stomach mix of oils, asthma mix of oils, eucalyptus. Tea tree oil-skin, vapor rub- chest tightness, sleepy time (vetiver, lavendaer, mrjoram, mandarin, ylang)- sleep, constipation, eczema (sweet almond oil). Also mixes with carriers: Coconut oil, Bolivar oil, Extra virgin oil. Frankincense- cough. Digize- oil. Purification- oil, lemon- oil. Essentialyme- pill.  Inhales a mix with olive oil almond and coconut oil with peppermint, and eucalyptus- sinus, allergies. Updated 12/1/2015.  Updated 4/26/2016: Lemon, Cinnamon bark, panaway, Thieves, Orange, [place], [place]opaiba, Disp: , Rfl:      Turmeric 500 MG CAPS, Take 1 capsule by mouth daily, Disp: , Rfl:     SOCIAL HISTORY:  Social History     Socioeconomic History     Marital status:      Spouse name: Not on file     Number of children: Not on file     Years of education: Not on file     Highest education level: Not on file   Occupational History     Not on file   Social Needs     Financial resource strain: Not on file     Food insecurity:     Worry: Not on file     Inability: Not on file     Transportation needs:     Medical: Not on file     Non-medical: Not on file   Tobacco Use     Smoking status: Former Smoker     Packs/day: 0.50     Years: 3.00     Pack years: 1.50     Types: Cigarettes     Smokeless tobacco: Never Used     Tobacco comment: smoked for 3 years when she was around 19   Substance and Sexual Activity     Alcohol use: Yes     Comment: RARELY     Drug use: No     Sexual activity: Not Currently   Lifestyle     Physical activity:     Days per week: Not on file     Minutes per session: Not on file     Stress: Not on file   Relationships     Social connections:     Talks on phone: Not on file     Gets together: Not on file     Attends Baptism service: Not on file     Active member of club or organization: Not on file     Attends meetings of clubs or organizations: Not on file     Relationship status:  Not on file     Intimate partner violence:     Fear of current or ex partner: Not on file     Emotionally abused: Not on file     Physically abused: Not on file     Forced sexual activity: Not on file   Other Topics Concern     Parent/sibling w/ CABG, MI or angioplasty before 65F 55M? Yes     Comment: mother- valve problem   Social History Narrative     Not on file       FAMILY HISTORY: (I personally reviewed this history with the patient at today's visit)  Family History   Problem Relation Age of Onset     Heart Disease Mother         valve problem     Hypertension Mother      Diabetes Mother         type 2     Cerebrovascular Disease Mother      Allergies Mother      Eye Disorder Mother         Macular degeneration     Osteoporosis Mother      Respiratory Mother      Migraines Mother      Cardiovascular Father         MI at age 80     Cancer - colorectal Father      Diabetes Father      Hypertension Father         type 2     Eye Disorder Father         macular degeneration     Lipids Father      Colon Cancer Father      C.A.D. Maternal Grandmother      Diabetes Paternal Grandmother         type 2     Cardiovascular Paternal Grandmother         MI     Diabetes Sister         type 2     Allergies Sister      Gastrointestinal Disease Sister      Depression Sister      Gastrointestinal Disease Daughter      Migraines Daughter      Gastrointestinal Disease Daughter      Migraines Daughter      Migraines Son      Aneurysm No family hx of      Brain Hemorrhage No family hx of      Brain Tumor No family hx of      Cancer No family hx of      Chiari Malformation No family hx of      LUNG DISEASE No family hx of      Seizure Disorder No family hx of         ROS:    No fevers or chills  No weight loss  No sore throat  No shortness of breath or wheezing  No chest pain or pressure  No arthralgias or myalgias  No rashes or skin changes  No odynophagia  No BRBPR, hematochezia, melena  No dysuria, frequency or urgency  No  "hot/cold intolerance or polyria    PHYSICAL EXAMINATION:  Constitutional: aaox3, cooperative, pleasant, not dyspneic/diaphoretic, no acute distress  Vitals reviewed: /71 (BP Location: Left arm, Patient Position: Sitting, Cuff Size: Adult Regular)   Pulse 72   Ht 1.575 m (5' 2\")   Wt 57.7 kg (127 lb 3.2 oz)   SpO2 95%   BMI 23.27 kg/m    Wt:   Wt Readings from Last 2 Encounters:   05/06/19 57.7 kg (127 lb 3.2 oz)   04/24/19 57.6 kg (127 lb)          Eyes: Sclera anicteric/injected  Ears/nose/mouth/throat: Normal oropharynx without ulcers or exudate, mucus membranes moist, hearing intact  Neck: supple, thyroid normal size  CV: No edema, RRR  Respiratory: Unlabored breathing, CTAB  Lymph: No submandibular, supraclavicular or inguinal lymphadenopathy  Abd: Nondistended, no masses, +bs, no hepatosplenomegaly, nontender, no peritoneal signs  Skin: warm, perfused, no jaundice  Psych: Normal affect  MSK: Normal gait      PERTINENT STUDIES: (I personally reviewed these laboratory studies today)  Most recent CBC:   Recent Labs   Lab Test 11/02/18  1412 05/09/18  1128   WBC 6.0 5.0   HGB 13.0 12.5   HCT 39.7 38.5    284     Most recent hepatic panel:  Recent Labs   Lab Test 11/02/18  1412 09/27/17  1515   ALT 29 28   AST 24 25     Most recent creatinine:  Recent Labs   Lab Test 11/02/18  1412 09/11/18  1351   CR 0.70 0.71       TSH   Date Value Ref Range Status   11/02/2018 2.45 0.40 - 4.00 mU/L Final               ASSESSMENT/PLAN:    Ingrid Amaral is a 73 year old female who presents for evaluation of dysphasia, abdominal bloating and alternating bowel habits.  She has a long-standing history of these concerns.  Work-up in the past includes upper endoscopy in June 2018, colonoscopy in 2017 and 2015.  She also recently had a speech therapy eval for dysphasia which was normal.  She was noted to have a large hiatal hernia on previous upper endoscopy which has not been further evaluated.  We will check barium " esophagram and consider surgical consultation.  In regards to bowel habits, patient's bowel movements alternate between diarrhea and constipation. She has history of IBS. She takes creon for history of steatorrhea and she also takes MiraLAX daily for her constipation. Unclear if she should continue this current regimen as her main issue/concern is her difficulty passing bowel movements. We will check abdominal x-ray today for her bloating to determine if there is stool burden.  Will refer to pelvic floor center for further evaluation as she does have history of pelvic floor issues that may be affecting/causing her constipation.       Hiatal hernia  Dysphagia, unspecified type  Abdominal bloating  Irritable bowel syndrome with both constipation and diarrhea  Constipation, unspecified constipation type  Diarrhea, unspecified type  Pelvic floor dysfunction          Orders Placed This Encounter   Procedures     XR Abdomen 2 Views     XR Esophagram          Thank you for this consultation.  It was a pleasure to participate in the care of this patient; please contact us with any further questions.      This note was created with voice recognition software, and while reviewed for accuracy, typos may remain.     Francesco Denton PA-C  Gastroenterology  [facility]

## 2019-05-06 NOTE — NURSING NOTE
"Ingrid Amaral's goals for this visit include:   Chief Complaint   Patient presents with     Consult     Dysphagia; bloating; stool inconsistency       She requests these members of her care team be copied on today's visit information: Yes    PCP: Leesa Muñiz    Referring Provider:  Leesa Muñiz PA-C  [address] [place] [id number]    /71 (BP Location: Left arm, Patient Position: Sitting, Cuff Size: Adult Regular)   Pulse 72   Ht 1.575 m (5' 2\")   Wt 57.7 kg (127 lb 3.2 oz)   SpO2 95%   BMI 23.27 kg/m      Do you need any medication refills at today's visit? No    Kimber Johnson LPN      " IV discontinued, cath removed intact

## 2019-05-06 NOTE — PATIENT INSTRUCTIONS
1) Let's get an Abdominal xray today. Please head down stairs to the Imaging center.   2) Please call [phone] to schedule a barium esophagram xray at [facility] to evaluate your hiatal hernia.   3.) We will be referring you to the [facility]         Your [employer] Login is [email or web address]  [email or web address]  [employer] help desk at [phone]

## 2019-05-16 ENCOUNTER — PATIENT OUTREACH (OUTPATIENT)
Dept: CARE COORDINATION | Facility: CLINIC | Age: 74
End: 2019-05-16

## 2019-05-16 ASSESSMENT — ACTIVITIES OF DAILY LIVING (ADL): DEPENDENT_IADLS:: MEDICATION MANAGEMENT

## 2019-05-16 NOTE — PROGRESS NOTES
Clinic Care Coordination Contact  [facility]/Voicemail       Clinical Data: Care Coordinator Outreach  Outreach attempted x 1, no voice mail set up.    Plan:  Care Coordinator will try to reach patient again in 4-6 business days.    EM Mcfarlane, [facility] - Care Coordinator   [facility]  5/16/2019   11:06 AM  [phone]

## 2019-05-16 NOTE — LETTER
[facility]  [address] [place] [address]  [phone]      5/22/2019      Ingrid Amaral  [address] [facility] [address]      Dear  Ingrid,      I have been attempting to reach you since our last contact. I would like to continue to work with you on the goals from our last conversation and provide the support you may need. I would appreciate if you would give me a call at [phone] and let me know if you would like to continue working together. I know that there are many things that can affect our ability to communicate and hope we can plan better moving forward.      I will be out of the office from 5-25-19 through 6-10-19 and if you call during that time and leave a message another  may follow up.      All of us at [facility] are invested in your health and are here to assist you in meeting your goals.     Sincerely,      EM Mcfarlane  [facility] - Care Coordination  [facility]   [phone]

## 2019-05-22 ASSESSMENT — ACTIVITIES OF DAILY LIVING (ADL): DEPENDENT_IADLS:: MEDICATION MANAGEMENT

## 2019-05-22 NOTE — PROGRESS NOTES
Clinic Care Coordination Contact  [facility]/Voicemail    Referral Source: PCP  Clinical Data: Care Coordinator Outreach  Outreach attempted x 2, no voice mail set up.    Plan: Care Coordinator will send unable to contact letter. Care Coordinator will try to reach patient again in about one month.    EM Mcfarlane, [facility] - Care Coordinator   [facility]  5/22/2019   1:10 PM  [phone]

## 2019-05-29 ENCOUNTER — OFFICE VISIT (OUTPATIENT)
Dept: NEUROLOGY | Facility: CLINIC | Age: 74
End: 2019-05-29
Payer: MEDICARE

## 2019-05-29 VITALS
RESPIRATION RATE: 12 BRPM | SYSTOLIC BLOOD PRESSURE: 115 MMHG | HEART RATE: 60 BPM | TEMPERATURE: 98.3 F | DIASTOLIC BLOOD PRESSURE: 64 MMHG

## 2019-05-29 DIAGNOSIS — R41.89 COGNITIVE IMPAIRMENT: Primary | ICD-10-CM

## 2019-05-29 DIAGNOSIS — D32.9 MENINGIOMA (H): ICD-10-CM

## 2019-05-29 DIAGNOSIS — R26.89 BALANCE PROBLEMS: ICD-10-CM

## 2019-05-29 PROCEDURE — 99215 OFFICE O/P EST HI 40 MIN: CPT | Performed by: PSYCHIATRY & NEUROLOGY

## 2019-05-29 ASSESSMENT — MONTREAL COGNITIVE ASSESSMENT (MOCA)
13. ORIENTATION SUBSCORE: 6
8. SERIAL SUBTRACTION OF 7S: 2
4. NAME EACH OF THE THREE ANIMALS SHOWN: 2
10. [FLUENCY] NAME WORDS STARTING WITH DESIGNATED LETTER: 1
6. READ LIST OF DIGITS [FORWARD/BACKWARD]: 2
WHAT IS THE TOTAL SCORE (OUT OF 30): 23
VISUOSPATIAL/EXECUTIVE SUBSCORE: 1
WHAT LEVEL OF EDUCATION WAS ATTAINED: 0
7. [VIGILENCE] TAP WHEN HEARING DESIGNATED LETTER: 1
11. FOR EACH PAIR OF WORDS, WHAT CATEGORY DO THEY BELONG TO (OUT OF 2): 2
9. REPEAT EACH SENTENCE: 2
12. MEMORY INDEX SCORE: 4

## 2019-05-29 NOTE — PROGRESS NOTES
ESTABLISHED PATIENT NEUROLOGY NOTE    DATE OF VISIT: 5/29/2019  MRN: [id number]  PATIENT NAME: Ingrid Amaral  YOB: 1945    Chief Complaint   Patient presents with     RECHECK     Memory     SUBJECTIVE:                                                      HISTORY OF PRESENT ILLNESS:  Ingrid is here for follow up regarding cognitive impairment. She had Neuropsych testing done in 3.2018 and the findings showed possible Lewy Body Dementia. PET scan however, showed normal occipital lobe metabolism. I ordered an EEG and wanted the patient to see Neurosurgery for growth of her meningiomas but she had not followed up with either recommendation when we met 6 months ago. She was on Aricept then. No motor signs of Parkinsonism on her exam but she did complain of poor balance so I recommended PT. She wanted to try working on her home exercises first. I was concerned about her driving too. In a 4.2019 care coordination note, they indicate that Ingrid s  helps her managemedications. He reported Ingrid cisneros memory seemed stable. She herself was reporting anxiety. Her PCP increased her Zoloft (about a month ago). She also underwent MTM.     The patient is here with her  today.   She is not sure why she did not make a neurosurgery appointment yet.   She and her  agree that she is having more problems with word-finding and losing her train of thought. She has poor attention, jumping from task to task and not completing things.     They think things are still going well at home. She feels like her balance will be better with her new insoles she recently bought. She had a fall this winter. She feels that her balance has gotten worse.   She has been having more nightmares. She hits her  in her sleep. She is taking melatonin for sleep 3mg to 5mg. She sleeps fine. She says mood is good most of the time. She has been having more problems with her IBS than anything else.     She is still driving,  just around town.  does ride along periodically. He says she gets irritated with him. No safety concerns. He does notice that sometimes if he is off to the side, she does not see him. She has not had an eye exam in a couple of years.     No problems with the Aricept. Her main issue with the IBS is constipation. She also has problems with swallow at times related to a hiatal hernia.     CURRENT MEDICATIONS:     Current Outpatient Medications on File Prior to Visit:  albuterol (PROAIR HFA/PROVENTIL HFA/VENTOLIN HFA) 108 (90 BASE) MCG/ACT Inhaler Inhale 2 puffs into the lungs every 4 hours as needed for shortness of breath / dyspnea   albuterol (PROVENTIL) (5 MG/ML) 0.5% nebulizer solution Take 0.5 mLs (2.5 mg) by nebulization every 4 hours as needed for wheezing or shortness of breath / dyspnea   amLODIPine (NORVASC) 5 MG tablet Take 1 tablet (5 mg) by mouth daily For blood pressure   amylase-lipase-protease (CREON) 6000 units CPEP Take 2 capsules (12,000 Units) by mouth 3 times daily (with meals) For diarrhea   ascorbic acid 1000 MG TABS tablet Take 1,000 mg by mouth daily   augmented betamethasone dipropionate (DIPROLENE-AF) 0.05 % external cream Apply sparingly to legs twice daily as needed. Do not apply to face. May occlude with saran wrap for 3-4 days for 6 hours.  For psoriasis rash.   biotin 1000 MCG TABS tablet Take 1,000 mcg by mouth 2 times daily   Calcium-Magnesium-Zinc 333..33-5 MG TABS Take 1 tablet by mouth daily   cetirizine (ZYRTEC) 10 MG tablet Take 1 tablet (10 mg) by mouth every morning For allergies   donepezil (ARICEPT) 10 MG tablet Take 1 tablet (10 mg) by mouth At Bedtime For memory   estradiol (ESTRACE) 0.1 MG/GM vaginal cream USE ONE GRAM VAGINALLY ONCE DAILY AND SPREAD A SMALL AMOUNT AROUND THE CLITORAL REES For vaginal area issues.   fluocinonide (LIDEX) 0.05 % external solution Apply twice daily as needed to scalp.  Do not apply to face. Due for office visit:[phone]    fluticasone (FLONASE) 50 MCG/ACT nasal spray INSTILL ONE SPRAY IN EACH NOSTRIL EVERY DAY.  For allergies   fluticasone-salmeterol (ADVAIR) 250-50 MCG/DOSE inhaler INHALE 1 PUFF BY MOUTH INTO THE LUNGS TWICE DAILY.  For asthma.   gabapentin (NEURONTIN) 300 MG capsule TAKE ONE CAPSULE BY MOUTH EVERY NIGHT AT BEDTIME AS NEEDED.  For back pain.   hydrocortisone 1 % cream Apply topically as needed   lactase (LACTAID) 9000 UNITS TABS Take 9,000 Units by mouth as needed   melatonin 5 MG tablet Take 1 tablet (5 mg) by mouth nightly as needed for sleep   montelukast (SINGULAIR) 10 MG tablet Take 1 tablet (10 mg) by mouth daily For allergies and asthma   omeprazole (PRILOSEC) 20 MG CR capsule TAKE ONE CAPSULE BY MOUTH TWICE A DAY FOR HEARTBURN   polyethylene glycol (MIRALAX/GLYCOLAX) powder Take 17 g (1 capful) by mouth as needed for constipation   sertraline (ZOLOFT) 100 MG tablet Take 2 tablets (200 mg) by mouth daily TAKE ONE AND ONE-HALF TABLET BY MOUTH ONCE DAILY.  For mood. (Patient taking differently: Take 200 mg by mouth daily )   simvastatin (ZOCOR) 40 MG tablet TAKE ONE TABLET BY MOUTH EVERY NIGHT AT BEDTIME.  For cholesterol.   SKIN OILS EX Lavender-headache, skin, peppermint-skin, upset stomach mix of oils, asthma mix of oils, eucalyptus. Tea tree oil-skin, vapor rub- chest tightness, sleepy time (vetiver, lavendaer, mrjoram, mandarin, ylang)- sleep, constipation, eczema (sweet almond oil). Also mixes with carriers: Coconut oil, [place] oil, Extra virgin oil. Frankincense- cough. Digize- oil. Purification- oil, lemon- oil. Essentialyme- pill.  Inhales a mix with olive oil almond and coconut oil with peppermint, and eucalyptus- sinus, allergies. Updated 12/1/2015. Updated 4/26/2016: Lemon, Cinnamon bark, panaway, Thieves, Orange, Wintergreen, Copaiba   Turmeric 500 MG CAPS Take 1 capsule by mouth daily   ORDER FOR DME Equipment being ordered: Nebulizer and tubing/filter     No current facility-administered  medications on file prior to visit.     RECENT DIAGNOSTIC STUDIES:   Results for orders placed or performed in visit on 05/06/19   XR Abdomen 2 Views    Narrative    Exam: XR ABDOMEN 2 VW, 5/6/2019 3:46 PM    Indication: Abdominal bloating; Constipation, unspecified constipation  type    Comparison: Abdominal radiograph 9/27/2017    Findings:   AP and supine radiographs of the abdomen and pelvis. Nonobstructive  bowel gas pattern. No intra-abdominal free air or findings suggestive  of pneumatosis. No portal venous gas. Partially visualized lung bases  are unremarkable. Small amount of stool in the colon. Mild convex  right curvature of the thoracolumbar spine with a Curtis angle of  approximately 26 degrees.  Hiatal hernia.      Impression    Impression: Nonobstructive bowel gas pattern with a small amount of  stool in the colon.    I have personally reviewed the examination and initial interpretation  and I agree with the findings.    ANA MARIA MIRAMONTES MD       REVIEW OF SYSTEMS:                                                      10-point review of systems is negative except as mentioned above in HPI.     EXAM:                                                      Physical Exam:   Vitals: /64 (BP Location: Left arm, Patient Position: Sitting, Cuff Size: Adult Regular)   Pulse 60   Temp 98.3  F (36.8  C) (Tympanic)   Resp 12   BMI= There is no height or weight on file to calculate BMI.  GENERAL: NAD.   HEENT: NC/AT.   CV: RRR. S1, S2.   NECK: No bruits.  Neurologic:  MENTAL STATUS: Alert, attentive though spacy at times. Speech is fluent. Fair comprehension. MoCA: 23/30 (normal is 26 and above).   CRANIAL NERVES: Visual fields intact to confrontation. Pupils equally, round and reactive to light. Facial sensation and movement normal. EOM full. Hearing intact to conversation. Trapezius strength intact. Palate moves symmetrically. Tongue midline.  MOTOR: 5/5 in proximal and distal muscle groups of upper and lower  extremities. Tone and bulk normal. No tremor on today's exam.  SENSATION: Normal light touch and pinprick throughout. Vibration intact at ankles. Proprioception normal at great toes.   COORDINATION: Normal finger nose finger. Normal hand opening/closing speed and amplitude. Knee heel shin normal.  STATION AND GAIT: Romberg negative. Tandem minimally unsteady. Casual gait is normal except for valgus deformity at the knee.     ASSESSMENT and PLAN:                                                      Assessment and Plan:    ICD-10-CM    1. Cognitive impairment R41.89 NEUROPSYCHOLOGY REFERRAL    possible LBD   2. Balance problems R26.89 MR Brain w/o & w Contrast     PHYSICAL THERAPY REFERRAL   3. Meningioma (H) D32.9 MR Brain w/o & w Contrast     NEUROSURGERY REFERRAL        Ms. Amaral is a pleasant 74 yo woman here for follow-up regarding possibly early stage Lewy Body Dementia. She really has no motor signs of Parkinson's on exam again today, but she does have symptoms of RSBD and hallucinations which would be consistent with LBD. And she does have the ongoing balance problems. She and her  feel that things are going well, but she is having more word-finding difficulties and trouble with multitasking.Her MoCA score decreased by 2 point since last year. I recommend repeat Neuropsych testing. We will continue the Aricept at 10mg at bedtime since she seems to be tolerating this well.    Regarding the meningiomas, we will repeat her brain MRI and refer her once again to Neurosurgery for evaluation. I have also referred Ingrid for physical therapy for the balance issue.     I would like to see her back in 6 months. She understands and agrees with the plan.     Patient Instructions:  Repeat Brain MRI. We will notify you of the results.   Make an appointment to see the Neurosurgeon after the MRI is done.   Repeat Neuropsych (cognitive) testing.   Continue the Aricept: 10mg at bedtime for memory.   physical therapy  for balance.   Make an appointment for an eye exam.   Continue the higher dose of melatonin for sleep.   Return to Neurology is 6 months.     Total Time: 40 minutes were spent with the patient. More than 50% of the time spent on counseling (as described above in Assessment and Plan) /coordinating the care.    Pratibha Hay MD  Neurology

## 2019-05-29 NOTE — LETTER
5/29/2019         RE: Ingrid Amaral  [address] [facility] [address]        Dear Colleague,    Thank you for referring your patient, Ingrid Amaral, to the [facility]. Please see a copy of my visit note below.    ESTABLISHED PATIENT NEUROLOGY NOTE    DATE OF VISIT: 5/29/2019  MRN: [id number]  PATIENT NAME: Ingrid Amaral  YOB: 1945    Chief Complaint   Patient presents with     RECHECK     Memory     SUBJECTIVE:                                                      HISTORY OF PRESENT ILLNESS:  Ingrid is here for follow up regarding cognitive impairment. She had Neuropsych testing done in 3.2018 and the findings showed possible Lewy Body Dementia. PET scan however, showed normal occipital lobe metabolism. I ordered an EEG and wanted the patient to see Neurosurgery for growth of her meningiomas but she had not followed up with either recommendation when we met 6 months ago. She was on Aricept then. No motor signs of Parkinsonism on her exam but she did complain of poor balance so I recommended PT. She wanted to try working on her home exercises first. I was concerned about her driving too. In a 4.2019 care coordination note, they indicate that Ingrid s  helps her managemedications. He reported Ingrid s memory seemed stable. She herself was reporting anxiety. Her PCP increased her Zoloft (about a month ago). She also underwent MTM.     The patient is here with her  today.   She is not sure why she did not make a neurosurgery appointment yet.   She and her  agree that she is having more problems with word-finding and losing her train of thought. She has poor attention, jumping from task to task and not completing things.     They think things are still going well at home. She feels like her balance will be better with her new insoles she recently bought. She had a fall this winter. She feels that her balance has gotten worse.   She has been  having more nightmares. She hits her  in her sleep. She is taking melatonin for sleep 3mg to 5mg. She sleeps fine. She says mood is good most of the time. She has been having more problems with her IBS than anything else.     She is still driving, just around town.  does ride along periodically. He says she gets irritated with him. No safety concerns. He does notice that sometimes if he is off to the side, she does not see him. She has not had an eye exam in a couple of years.     No problems with the Aricept. Her main issue with the IBS is constipation. She also has problems with swallow at times related to a hiatal hernia.     CURRENT MEDICATIONS:     Current Outpatient Medications on File Prior to Visit:  albuterol (PROAIR HFA/PROVENTIL HFA/VENTOLIN HFA) 108 (90 BASE) MCG/ACT Inhaler Inhale 2 puffs into the lungs every 4 hours as needed for shortness of breath / dyspnea   albuterol (PROVENTIL) (5 MG/ML) 0.5% nebulizer solution Take 0.5 mLs (2.5 mg) by nebulization every 4 hours as needed for wheezing or shortness of breath / dyspnea   amLODIPine (NORVASC) 5 MG tablet Take 1 tablet (5 mg) by mouth daily For blood pressure   amylase-lipase-protease (CREON) 6000 units CPEP Take 2 capsules (12,000 Units) by mouth 3 times daily (with meals) For diarrhea   ascorbic acid 1000 MG TABS tablet Take 1,000 mg by mouth daily   augmented betamethasone dipropionate (DIPROLENE-AF) 0.05 % external cream Apply sparingly to legs twice daily as needed. Do not apply to face. May occlude with saran wrap for 3-4 days for 6 hours.  For psoriasis rash.   biotin 1000 MCG TABS tablet Take 1,000 mcg by mouth 2 times daily   Calcium-Magnesium-Zinc 333..33-5 MG TABS Take 1 tablet by mouth daily   cetirizine (ZYRTEC) 10 MG tablet Take 1 tablet (10 mg) by mouth every morning For allergies   donepezil (ARICEPT) 10 MG tablet Take 1 tablet (10 mg) by mouth At Bedtime For memory   estradiol (ESTRACE) 0.1 MG/GM vaginal cream USE  ONE GRAM VAGINALLY ONCE DAILY AND SPREAD A SMALL AMOUNT AROUND THE CLITORAL REES For vaginal area issues.   fluocinonide (LIDEX) 0.05 % external solution Apply twice daily as needed to scalp.  Do not apply to face. Due for office visit:[phone]   fluticasone (FLONASE) 50 MCG/ACT nasal spray INSTILL ONE SPRAY IN EACH NOSTRIL EVERY DAY.  For allergies   fluticasone-salmeterol (ADVAIR) 250-50 MCG/DOSE inhaler INHALE 1 PUFF BY MOUTH INTO THE LUNGS TWICE DAILY.  For asthma.   gabapentin (NEURONTIN) 300 MG capsule TAKE ONE CAPSULE BY MOUTH EVERY NIGHT AT BEDTIME AS NEEDED.  For back pain.   hydrocortisone 1 % cream Apply topically as needed   lactase (LACTAID) 9000 UNITS TABS Take 9,000 Units by mouth as needed   melatonin 5 MG tablet Take 1 tablet (5 mg) by mouth nightly as needed for sleep   montelukast (SINGULAIR) 10 MG tablet Take 1 tablet (10 mg) by mouth daily For allergies and asthma   omeprazole (PRILOSEC) 20 MG CR capsule TAKE ONE CAPSULE BY MOUTH TWICE A DAY FOR HEARTBURN   polyethylene glycol (MIRALAX/GLYCOLAX) powder Take 17 g (1 capful) by mouth as needed for constipation   sertraline (ZOLOFT) 100 MG tablet Take 2 tablets (200 mg) by mouth daily TAKE ONE AND ONE-HALF TABLET BY MOUTH ONCE DAILY.  For mood. (Patient taking differently: Take 200 mg by mouth daily )   simvastatin (ZOCOR) 40 MG tablet TAKE ONE TABLET BY MOUTH EVERY NIGHT AT BEDTIME.  For cholesterol.   SKIN OILS EX Lavender-headache, skin, peppermint-skin, upset stomach mix of oils, asthma mix of oils, eucalyptus. Tea tree oil-skin, vapor rub- chest tightness, sleepy time (vetiver, lavendaer, mrjoram, mandarin, ylang)- sleep, constipation, eczema (sweet almond oil). Also mixes with carriers: Coconut oil, [place] oil, Extra virgin oil. Frankincense- cough. Digize- oil. Purification- oil, lemon- oil. Essentialyme- pill.  Inhales a mix with olive oil almond and coconut oil with peppermint, and eucalyptus- sinus, allergies. Updated 12/1/2015.  Updated 4/26/2016: Lemon, Cinnamon bark, panaway, Thieves, Orange, Wintergreen, Copaiba   Turmeric 500 MG CAPS Take 1 capsule by mouth daily   ORDER FOR DME Equipment being ordered: Nebulizer and tubing/filter     No current facility-administered medications on file prior to visit.     RECENT DIAGNOSTIC STUDIES:   Results for orders placed or performed in visit on 05/06/19   XR Abdomen 2 Views    Narrative    Exam: XR ABDOMEN 2 VW, 5/6/2019 3:46 PM    Indication: Abdominal bloating; Constipation, unspecified constipation  type    Comparison: Abdominal radiograph 9/27/2017    Findings:   AP and supine radiographs of the abdomen and pelvis. Nonobstructive  bowel gas pattern. No intra-abdominal free air or findings suggestive  of pneumatosis. No portal venous gas. Partially visualized lung bases  are unremarkable. Small amount of stool in the colon. Mild convex  right curvature of the thoracolumbar spine with a Curtis angle of  approximately 26 degrees.  Hiatal hernia.      Impression    Impression: Nonobstructive bowel gas pattern with a small amount of  stool in the colon.    I have personally reviewed the examination and initial interpretation  and I agree with the findings.    ANA MARIA MIRAMONTES MD       REVIEW OF SYSTEMS:                                                      10-point review of systems is negative except as mentioned above in HPI.     EXAM:                                                      Physical Exam:   Vitals: /64 (BP Location: Left arm, Patient Position: Sitting, Cuff Size: Adult Regular)   Pulse 60   Temp 98.3  F (36.8  C) (Tympanic)   Resp 12   BMI= There is no height or weight on file to calculate BMI.  GENERAL: NAD.   HEENT: NC/AT.   CV: RRR. S1, S2.   NECK: No bruits.  Neurologic:  MENTAL STATUS: Alert, attentive though spacy at times. Speech is fluent. Fair comprehension. MoCA: 23/30 (normal is 26 and above).   CRANIAL NERVES: Visual fields intact to confrontation. Pupils equally,  round and reactive to light. Facial sensation and movement normal. EOM full. Hearing intact to conversation. Trapezius strength intact. Palate moves symmetrically. Tongue midline.  MOTOR: 5/5 in proximal and distal muscle groups of upper and lower extremities. Tone and bulk normal. No tremor on today's exam.  SENSATION: Normal light touch and pinprick throughout. Vibration intact at ankles. Proprioception normal at great toes.   COORDINATION: Normal finger nose finger. Normal hand opening/closing speed and amplitude. Knee heel shin normal.  STATION AND GAIT: Romberg negative. Tandem minimally unsteady. Casual gait is normal except for valgus deformity at the knee.     ASSESSMENT and PLAN:                                                      Assessment and Plan:    ICD-10-CM    1. Cognitive impairment R41.89 NEUROPSYCHOLOGY REFERRAL    possible LBD   2. Balance problems R26.89 MR Brain w/o & w Contrast     PHYSICAL THERAPY REFERRAL   3. Meningioma (H) D32.9 MR Brain w/o & w Contrast     NEUROSURGERY REFERRAL        Ms. Amaral is a pleasant 74 yo woman here for follow-up regarding possibly early stage Lewy Body Dementia. She really has no motor signs of Parkinson's on exam again today, but she does have symptoms of RSBD and hallucinations which would be consistent with LBD. And she does have the ongoing balance problems. She and her  feel that things are going well, but she is having more word-finding difficulties and trouble with multitasking.Her MoCA score decreased by 2 point since last year. I recommend repeat Neuropsych testing. We will continue the Aricept at 10mg at bedtime since she seems to be tolerating this well.    Regarding the meningiomas, we will repeat her brain MRI and refer her once again to Neurosurgery for evaluation. I have also referred Ingrid for physical therapy for the balance issue.     I would like to see her back in 6 months. She understands and agrees with the plan.     Patient  Instructions:  Repeat Brain MRI. We will notify you of the results.   Make an appointment to see the Neurosurgeon after the MRI is done.   Repeat Neuropsych (cognitive) testing.   Continue the Aricept: 10mg at bedtime for memory.   physical therapy for balance.   Make an appointment for an eye exam.   Continue the higher dose of melatonin for sleep.   Return to Neurology is 6 months.     Total Time: 40 minutes were spent with the patient. More than 50% of the time spent on counseling (as described above in Assessment and Plan) /coordinating the care.    Pratibha Hay MD  Neurology                    Again, thank you for allowing me to participate in the care of your patient.        Sincerely,        Pratibha Hay MD

## 2019-05-29 NOTE — PATIENT INSTRUCTIONS
Plan:    Repeat Brain MRI. We will notify you of the results.   Make an appointment to see the Neurosurgeon after the MRI is done.   Repeat Neuropsych (cognitive) testing.   Continue the Aricept: 10mg at bedtime for memory.   physical therapy for balance.   Make an appointment for an eye exam.   Continue the higher dose of melatonin for sleep.   Return to Neurology is 6 months.

## 2019-06-06 ENCOUNTER — HOSPITAL ENCOUNTER (OUTPATIENT)
Dept: PHYSICAL THERAPY | Facility: CLINIC | Age: 74
Setting detail: THERAPIES SERIES
End: 2019-06-06
Attending: PSYCHIATRY & NEUROLOGY
Payer: MEDICARE

## 2019-06-06 DIAGNOSIS — R26.89 BALANCE PROBLEMS: ICD-10-CM

## 2019-06-06 PROCEDURE — 97110 THERAPEUTIC EXERCISES: CPT | Mod: GP | Performed by: PHYSICAL THERAPIST

## 2019-06-06 PROCEDURE — 97161 PT EVAL LOW COMPLEX 20 MIN: CPT | Mod: GP | Performed by: PHYSICAL THERAPIST

## 2019-06-06 NOTE — PROGRESS NOTES
06/06/19 1400   Quick Adds   Quick Adds Certification   Type of Visit Initial OP PT Evaluation   General Information   Start of Care Date 06/06/19   Referring Physician Dr Kimberlyn Hay   Orders Evaluate and Treat as Indicated   Order Date 05/29/19   Medical Diagnosis Balance problems   Onset of illness/injury or Date of Surgery 05/29/19   Precautions/Limitations no known precautions/limitations   Surgical/Medical history reviewed Yes   Pertinent history of current problem (include personal factors and/or comorbidities that impact the POC) Per MD note: Ms. Amaral is a pleasant 74 yo woman here for follow-up regarding possibly early stage Lewy Body Dementia. She really has no motor signs of Parkinson's on exam again today, but she does have symptoms of RSBD and hallucinations which would be consistent with LBD. And she does have the ongoing balance problems. She and her  feel that things are going well, but she is having more word-finding difficulties and trouble with multitasking.Her MoCA score decreased by 2 point since last year. I recommend repeat Neuropsych testing. We will continue the Aricept at 10mg at bedtime since she seems to be tolerating this well. Pt feels like balance is worsening, fell a few times this winter without injuries.    Pertinent Visual History  Wears glasses, trifocals   Current Community Support Family/friend caregiver  (pt still drives in town)   Patient role/Employment history Retired   [facility]   Home/Community Accessibility Comments 4 stairs to enter, circular stairs from level to level with railing. bedroom upstairs. notes that often crawls on all 4s up the stairs   Patient/Family Goals Statement improve balance   Fall Risk Screen   Have you fallen 2 or more times in the past year? Yes   Have you fallen and had an injury in the past year? No   Timed Up and Go score (seconds) 8.4   Is patient a fall risk? No   Abuse Screen (yes response referral  indicated)   Feels Unsafe at Home or Work/School no   Feels Threatened by Someone no   Does Anyone Try to Keep You From Having Contact with Others or Doing Things Outside Your Home? no   Physical Signs of Abuse Present no   Pain   Patient currently in pain Denies   Cognitive Status Examination   Orientation orientation to person, place and time   Cognitive Comment Does note some word finding difficulties intermittantly    Posture   Posture Comments knee valgus   Range of Motion (ROM)   ROM Comment ROM WFL   Gait   Gait Comments Ambulates demonstrating RLE ER   Gait Special Tests   Gait Special Tests FUNCTIONAL GAIT ASSESSMENT   Gait Special Tests Functional Gait Assessment Score out of 30   Score out of 30 19   Balance Special Tests   Balance Special Tests Single leg stance right;Single leg stance left;Modified CTSIB Conditions;Sit to stand reps;Timed up and go   Balance Special Tests Timed Up and Go   Seconds 8.4 Seconds   Balance Special Tests Single Leg Stance Right,   Right, seconds 3 Seconds   Balance Special Tests Single Leg Stance Left   Left, seconds 2 Seconds   Balance Special Tests Modified CTSIB Conditions   Condition 1, seconds 30 Seconds   Condition 2, seconds 30 Seconds   Condition 4, seconds 30 Seconds   Condition 5, seconds 30 Seconds   Modified CTSIB Comments increased instability noted condition 5    Balance Special Tests Sit to Stand Reps in 30 Seconds   Reps in 30 seconds 12   Height standard chair, no UE assist   Comments notes LE fatigue   Sensory Examination   Sensory Perception Comments reports intact to light touch   Coordination   Coordination Comments good UE and LE. no tremor noted   Muscle Tone   Muscle Tone Comments wnl   Planned Therapy Interventions   Planned Therapy Interventions balance training;gait training;strengthening;transfer training   Clinical Impression   Criteria for Skilled Therapeutic Interventions Met yes, treatment indicated   PT Diagnosis impaired gait and balance    Influenced by the following impairments impaired gait and balance   Functional limitations due to impairments risk of falls, history of falls, decreased strength/endurance   Clinical Presentation Stable/Uncomplicated   Clinical Presentation Rationale motivated patient, complex PMH   Clinical Decision Making (Complexity) Low complexity   Therapy Frequency 1 time/week   Predicted Duration of Therapy Intervention (days/wks) 8 weeks   Risk & Benefits of therapy have been explained Yes   Patient, Family & other staff in agreement with plan of care Yes   Education Assessment   Preferred Learning Style Demonstration;Pictures/video   Barriers to Learning No barriers   GOALS   PT Eval Goals 1;3;2   Goal 1   Goal Identifier 1   Goal Description Patient will improve FGA to >/= 24/30 for improved dynamic balance    Target Date 08/01/19   Goal 2   Goal Identifier 2   Goal Description Patient will be independant with HEP for long term self management   Target Date 08/01/19   Goal 3   Goal Identifier 3   Goal Description Patient will be able to ascend and descend 12 stairs with use of 1 railing reciprical pattern to more safely access all levels of home   Target Date 08/01/19   Total Evaluation Time   PT Eval, Low Complexity Minutes (80027) 30   Therapy Certification   Certification date from 06/06/19   Certification date to 08/01/19   Medical Diagnosis balance problems

## 2019-06-06 NOTE — PROGRESS NOTES
[facility]        OUTPATIENT PHYSICAL THERAPY FUNCTIONAL EVALUATION  PLAN OF TREATMENT FOR OUTPATIENT REHABILITATION  (COMPLETE FOR INITIAL CLAIMS ONLY)  Patient's Last Name, First Name, M.I.  YOB: 1945  Ingrid Amaral     Provider's Name   [facility]   Medical Record No.  [id number]     Start of Care Date:  06/06/19   Onset Date:  05/29/19   Type:     _X__PT   ____OT  ____SLP Medical Diagnosis:  balance problems     PT Diagnosis:  impaired gait and balance Visits from SOC:  1                              __________________________________________________________________________________  Plan of Treatment/Functional Goals:  balance training, gait training, strengthening, transfer training           GOALS  1  Patient will improve FGA to >/= 24/30 for improved dynamic balance   08/01/19    2  Patient will be independant with HEP for long term self management  08/01/19    3  Patient will be able to ascend and descend 12 stairs with use of 1 railing reciprical pattern to more safely access all levels of home  08/01/19             Therapy Frequency:  1 time/week   Predicted Duration of Therapy Intervention:  8 weeks    Caron Rodriguez, PT                                    I CERTIFY THE NEED FOR THESE SERVICES FURNISHED UNDER        THIS PLAN OF TREATMENT AND WHILE UNDER MY CARE     (Physician co-signature of this document indicates review and certification of the therapy plan).                Certification Date From:  06/06/19   Certification Date To:  08/01/19    Referring Provider:  Dr Kimberlyn Hay    Initial Assessment  See Epic Evaluation- Start of Care Date: 06/06/19

## 2019-06-11 ENCOUNTER — HOSPITAL ENCOUNTER (OUTPATIENT)
Dept: MRI IMAGING | Facility: CLINIC | Age: 74
Discharge: HOME | End: 2019-06-11
Attending: PSYCHIATRY & NEUROLOGY | Admitting: PSYCHIATRY & NEUROLOGY
Payer: MEDICARE

## 2019-06-11 DIAGNOSIS — D32.9 MENINGIOMA (H): ICD-10-CM

## 2019-06-11 DIAGNOSIS — R26.89 BALANCE PROBLEMS: ICD-10-CM

## 2019-06-11 LAB
CREAT BLD-MCNC: 1 MG/DL (ref 0.52–1.04)
GFR SERPL CREATININE-BSD FRML MDRD: 54 ML/MIN/{1.73_M2}

## 2019-06-11 PROCEDURE — 25500064 ZZH RX 255 OP 636: Performed by: PSYCHIATRY & NEUROLOGY

## 2019-06-11 PROCEDURE — 82565 ASSAY OF CREATININE: CPT

## 2019-06-11 PROCEDURE — 70553 MRI BRAIN STEM W/O & W/DYE: CPT

## 2019-06-11 PROCEDURE — A9585 GADOBUTROL INJECTION: HCPCS | Performed by: PSYCHIATRY & NEUROLOGY

## 2019-06-11 RX ORDER — GADOBUTROL 604.72 MG/ML
5 INJECTION INTRAVENOUS ONCE
Status: COMPLETED | OUTPATIENT
Start: 2019-06-11 | End: 2019-06-11

## 2019-06-11 RX ADMIN — GADOBUTROL 5 ML: 604.72 INJECTION INTRAVENOUS at 15:16

## 2019-06-13 ENCOUNTER — HOSPITAL ENCOUNTER (OUTPATIENT)
Dept: PHYSICAL THERAPY | Facility: CLINIC | Age: 74
Setting detail: THERAPIES SERIES
End: 2019-06-13
Attending: PSYCHIATRY & NEUROLOGY
Payer: MEDICARE

## 2019-06-13 PROCEDURE — 97112 NEUROMUSCULAR REEDUCATION: CPT | Mod: GP | Performed by: PHYSICAL THERAPIST

## 2019-06-13 PROCEDURE — 97110 THERAPEUTIC EXERCISES: CPT | Mod: GP | Performed by: PHYSICAL THERAPIST

## 2019-06-14 NOTE — RESULT ENCOUNTER NOTE
Please advise Ingrid Amaral,  1945, that her brain MRI shows that the one meningioma has increased slightly in size, the other is stable. I am glad to see she has scheduled to see the Neurosurgeon later this month. They will be able to discuss the findings further with Ingrid.   [phone] (home)   Pratibha Hay

## 2019-06-17 NOTE — RESULT ENCOUNTER NOTE
"Called and spoke to patient, informed per Dr. Hay: \"that her brain MRI shows that the one meningioma has increased slightly in size, the other is stable. I am glad to see she has scheduled to see the Neurosurgeon later this month. They will be able to discuss the findings further with Ingrid.\"    Understanding voiced and no further questions noted.     Brenda NEELY MA  "

## 2019-06-18 ENCOUNTER — HOSPITAL ENCOUNTER (OUTPATIENT)
Dept: PHYSICAL THERAPY | Facility: CLINIC | Age: 74
Setting detail: THERAPIES SERIES
End: 2019-06-18
Attending: PSYCHIATRY & NEUROLOGY
Payer: MEDICARE

## 2019-06-18 PROCEDURE — 97112 NEUROMUSCULAR REEDUCATION: CPT | Mod: GP | Performed by: PHYSICAL THERAPIST

## 2019-06-24 ENCOUNTER — PATIENT OUTREACH (OUTPATIENT)
Dept: CARE COORDINATION | Facility: CLINIC | Age: 74
End: 2019-06-24

## 2019-06-24 ASSESSMENT — ACTIVITIES OF DAILY LIVING (ADL): DEPENDENT_IADLS:: MEDICATION MANAGEMENT

## 2019-06-24 NOTE — PROGRESS NOTES
Clinic Care Coordination Contact    Clinic Care Coordination Contact  OUTREACH    Referral Information:  Referral Source: PCP    Primary Diagnosis: Psychosocial    Chief Complaint   Patient presents with     Clinic Care Coordination - Follow-up             Universal Utilization: No concerns  Clinic Utilization  Difficulty keeping appointments:: No  Compliance Concerns: No  No-Show Concerns: No  No PCP office visit in Past Year: No  Utilization    Last refreshed: 6/23/2019  3:44 PM:  Hospital Admissions 0           Last refreshed: 6/23/2019  3:44 PM:  ED Visits 0           Last refreshed: 6/23/2019  3:44 PM:  No Show Count (past year) 0              Current as of: 6/23/2019  3:44 PM              Clinical Concerns:  Current Medical Concerns: Patient reports that she is feeling pretty good.  Her pain has decreased and her bowel movements are more consistent.  She does continue to have balance issues and did fall this weekend while making the bed in the camper.  She said she forgot about this step.  She was able to do dancing at back to the 50s and said she had a good weekend  Current Behavioral Concerns: Patient reports that she was doing okay had no questions or concerns.  Education Provided to patient: Encourage patient to follow-up with physical therapy and continue to do exercises when she can at home.  Pain  Pain (GOAL):: Yes  Type: Acute (<3mo)  Location of chronic pain:: back  Radiating: No  Progression: Improving  Description of pain: Aching, Sharp  Chronic pain severity:: 2  Limitation of routine activities due to chronic pain:: Yes  Description: Able to do most things most days with some rest  Alleviating Factors: Pain Medication, Other(massager)  Aggravating Factors: Inactivity  Health Maintenance Reviewed: Due/Overdue   Health Maintenance Due   Topic Date Due     ZOSTER IMMUNIZATION (2 of 3) 12/30/2014     MEDICARE ANNUAL WELLNESS VISIT  05/17/2018       Clinical Pathway: None    Medication  Management:  Patient denied any issues she said her spouse continues to help her with medications.    Functional Status:  Dependent ADLs:: Independent  Dependent IADLs:: Medication Management(starting to have concerns)  Bed or wheelchair confined:: No  Mobility Status: Independent  Fallen 2 or more times in the past year?: Yes  Any fall with injury in the past year?: Yes    Living Situation:  Current living arrangement:: I live in a private home with spouse  Type of residence:: Private home - stairs    Diet/Exercise/Sleep:  Diet:: Regular  Inadequate nutrition (GOAL):: No  Food Insecurity: No  Tube Feeding: No  Exercise:: Currently not exercising  Inadequate activity/exercise (GOAL):: No  Significant changes in sleep pattern (GOAL): No    Transportation:  Transportation concerns (GOAL):: No  Transportation means:: Accessible car, Family     Psychosocial:  Yazdanism or spiritual beliefs that impact treatment:: No  Mental health DX:: Yes  Mental health DX how managed:: Medication  Mental health management concern (GOAL):: No  Informal Support system:: Family, Friends     Financial/Insurance:   Financial/Insurance concerns (GOAL):: No  Patient denied any challenges at the moment.  She said they were out camping at back to the 50s and reported that they had a good time.  Patient appeared to be alert and had no challenges answering questions.      Resources and Interventions:  Current Resources:    ;   Community Resources: None  Supplies used at home:: None  Equipment Currently Used at Home: none    Advance Care Plan/Directive  Advanced Care Plans/Directives on file:: In process  Advanced Care Plan/Directive Status: In Process    Referrals Placed: None     Goals:   Goals        General    Medication 1 (pt-stated)     Notes - Note edited  12/26/2018  9:16 AM by Ria Izquierdo LSW    Goal Statement: I will allow my spouse to assist me with medications when issues arise,  for consistent administration.   Measure of  Success: I am taking my medications at the right time and every day  Supportive Steps to Achieve: actively involved spouse with supportive ideas  Barriers: memory loss  Strengths: supportive family  Date to Achieve By: 12-31-19                  Patient/Caregiver understanding: pt to continue to allow spouse to assist.     Outreach Frequency: monthly  Future Appointments              Tomorrow Caron Rodriguez, PT [facility] Physical [facility], [facility]    In 3 days Fred[facility]wing [facility]    In 1 month Francesco Denton PA-C [facility]    In 1 month Armando Agrawal, PhD [facility] Neuropsychology, [facility]          Plan: pt attend appointments as scheduled.  Sw to send complex care plan and follow up in about one month.     EM Mcfarlane, [facility] - Care Coordinator   [facility]  6/24/2019   3:50 PM  [phone]

## 2019-06-24 NOTE — LETTER
[facility]  Complex Care Plan  About Me:    Patient Name:  Ingrid Yancey    YOB: 1945  Age:         73 year old   Cassandra MRN:    [id number] Telephone Information:  Home Phone [phone]   Mobile Not on file.       Address:  [address] [place] [id number] Email address:  [email or web address]      Emergency Contact(s)    Name Relationship Lgl Grd Work Phone Home Phone Mobile Phone   1. BINDU YANCEY Spouse  none [phone] none   2. KORINA SILVESTRE Daughter  none [phone]           Primary language:  English     needed? No   [employer]:  [phone] op. 1  Other communication barriers: Cognitive impairment(starting)  Preferred Method of Communication:  Phone  Current living arrangement: I live in a private home with spouse  Mobility Status/ Medical Equipment: Independent    Health Maintenance  Health Maintenance Reviewed: Due/Overdue   Health Maintenance Due   Topic Date Due     ZOSTER IMMUNIZATION (2 of 3) 12/30/2014     MEDICARE ANNUAL WELLNESS VISIT  05/17/2018     Please talk with your provider about what is needed or can continue to wait.        My Access Plan  Medical Emergency 911   Primary Clinic Line [facility] - [phone]   24 Hour Appointment Line [phone] or  [phone]) (toll-free)   24 Hour Nurse Line [phone] (toll-free)   Preferred Urgent Care [facility], [phone]   [facility] [place], [place]  [phone]   Preferred Pharmacy [facility] [address] [facility]     Behavioral Health Crisis Line The National Suicide Prevention Lifeline at 1-964.201.2988 or 911             My Care Team Members  Patient Care Team       Relationship Specialty Notifications Start End    Leesa Muñiz PA-C PCP - General Physician Assistant  4/11/19     Phone: [phone] Fax: [phone]          [address] [place] [address]    Ria Izquierdo LSW Lead Care Coordinator  Admissions 5/18/17     Phone: [phone] Fax: [phone]        Jan Mo, [facility] Pharmacist Pharmacist Clinician- Clinical Pharmacy Specialist  4/25/19     Phone: [phone] Fax: [phone] [address] [place] [address]    Leesa Muñiz PA-C Assigned PCP   4/26/19     Phone: [phone] Fax: [phone]         [address] [place] [address]            My Care Plans  Self Management and Treatment Plan  Goals and (Comments)  Goals        General    Medication 1 (pt-stated)     Notes - Note edited  12/26/2018  9:16 AM by Ria Izquierdo LSW    Goal Statement: I will allow my spouse to assist me with medications when issues arise,  for consistent administration.   Measure of Success: I am taking my medications at the right time and every day  Supportive Steps to Achieve: actively involved spouse with supportive ideas  Barriers: memory loss  Strengths: supportive family  Date to Achieve By: 12-31-19                 Action Plans on File:   Asthma        Depression          Advance Care Plans/Directives Type:        My Medical and Care Information  Problem List   Patient Active Problem List   Diagnosis     Diarrhea     Pure hypercholesterolemia     Allergic rhinitis     Panic disorder without agoraphobia     Advanced directives, counseling/discussion     Liver lesion     Generalized anxiety disorder     Mild major depression (H)     Vulvitis     Vaginal wall prolapse     Chronic constipation     Moderate persistent asthma     Dysphagia     RBD (REM behavioral disorder)     FH: colon cancer     Hypertension goal BP (blood pressure) < 140/90     Meningioma (H)      Current Medications and Allergies:  Allergies as of 6/24/2019   Reviewed by Mandi Torrez CMA on 5/29/2019    Severity Noted Reaction Type Reactions   Sulfa Drugs Not Specified 02/22/2005    Other (See Comments)    Reaction unknown as a child   Medications - This is your record. Keep this with you and show to your community pharmacist(s) and physician(s) at each visit.      Instructions for use   albuterol (PROAIR HFA/PROVENTIL HFA/VENTOLIN HFA) 108 (90 BASE) MCG/ACT Inhaler Inhale 2 puffs into the lungs every 4 hours as needed for shortness of breath / dyspnea   albuterol (PROVENTIL) (5 MG/ML) 0.5% nebulizer solution Take 0.5 mLs (2.5 mg) by nebulization every 4 hours as needed for wheezing or shortness of breath / dyspnea   amLODIPine (NORVASC) 5 MG tablet Take 1 tablet (5 mg) by mouth daily For blood pressure   amylase-lipase-protease (CREON) 6000 units CPEP Take 2 capsules (12,000 Units) by mouth 3 times daily (with meals) For diarrhea   ascorbic acid 1000 MG TABS tablet Take 1,000 mg by mouth daily   augmented betamethasone dipropionate (DIPROLENE-AF) 0.05 % external cream Apply sparingly to legs twice daily as needed. Do not apply to face. May occlude with saran wrap for 3-4 days for 6 hours.  For psoriasis rash.   biotin 1000 MCG TABS tablet Take 1,000 mcg by mouth 2 times daily   Calcium-Magnesium-Zinc 333..33-5 MG TABS Take 1 tablet by mouth daily   cetirizine (ZYRTEC) 10 MG tablet Take 1 tablet (10 mg) by mouth every morning For allergies   donepezil (ARICEPT) 10 MG tablet Take 1 tablet (10 mg) by mouth At Bedtime For memory   estradiol (ESTRACE) 0.1 MG/GM vaginal cream USE ONE GRAM VAGINALLY ONCE DAILY AND SPREAD A SMALL AMOUNT AROUND THE CLITORAL REES For vaginal area issues.   fluocinonide (LIDEX) 0.05 % external solution Apply twice daily as needed to scalp.  Do not apply to face. Due for office visit:[phone]   fluticasone (FLONASE) 50 MCG/ACT nasal spray INSTILL ONE SPRAY IN EACH NOSTRIL EVERY DAY.  For allergies   fluticasone-salmeterol (ADVAIR) 250-50 MCG/DOSE inhaler INHALE 1 PUFF BY MOUTH INTO THE LUNGS TWICE DAILY.  For asthma.   gabapentin (NEURONTIN) 300 MG capsule TAKE ONE CAPSULE BY MOUTH  EVERY NIGHT AT BEDTIME AS NEEDED.  For back pain.   hydrocortisone 1 % cream Apply topically as needed   lactase (LACTAID) 9000 UNITS TABS Take 9,000 Units by mouth as needed   melatonin 5 MG tablet Take 1 tablet (5 mg) by mouth nightly as needed for sleep   montelukast (SINGULAIR) 10 MG tablet Take 1 tablet (10 mg) by mouth daily For allergies and asthma   omeprazole (PRILOSEC) 20 MG CR capsule TAKE ONE CAPSULE BY MOUTH TWICE A DAY FOR HEARTBURN   ORDER FOR DME Equipment being ordered: Nebulizer and tubing/filter   polyethylene glycol (MIRALAX/GLYCOLAX) powder Take 17 g (1 capful) by mouth as needed for constipation   sertraline (ZOLOFT) 100 MG tablet Take 2 tablets (200 mg) by mouth daily TAKE ONE AND ONE-HALF TABLET BY MOUTH ONCE DAILY.  For mood.    Patient taking differently: Take 200 mg by mouth daily    simvastatin (ZOCOR) 40 MG tablet TAKE ONE TABLET BY MOUTH EVERY NIGHT AT BEDTIME.  For cholesterol.   SKIN OILS EX Lavender-headache, skin, peppermint-skin, upset stomach mix of oils, asthma mix of oils, eucalyptus. Tea tree oil-skin, vapor rub- chest tightness, sleepy time (vetiver, lavendaer, mrjoram, mandarin, ylang)- sleep, constipation, eczema (sweet almond oil). Also mixes with carriers: Coconut oil, Nenana oil, Extra virgin oil. Frankincense- cough. Digize- oil. Purification- oil, lemon- oil. Essentialyme- pill.  Inhales a mix with olive oil almond and coconut oil with peppermint, and eucalyptus- sinus, allergies. Updated 12/1/2015.   Updated 4/26/2016: Lemon, Cinnamon bark, panaway, Thieves, Orange, Wintergreen, Copaiba   Turmeric 500 MG CAPS Take 1 capsule by mouth daily         Care Coordination Start Date: 5/18/2017   Frequency of Care Coordination: monthly   Form Last Updated: 06/24/2019

## 2019-06-25 ENCOUNTER — HOSPITAL ENCOUNTER (OUTPATIENT)
Dept: PHYSICAL THERAPY | Facility: CLINIC | Age: 74
Setting detail: THERAPIES SERIES
End: 2019-06-25
Attending: PSYCHIATRY & NEUROLOGY
Payer: MEDICARE

## 2019-06-25 PROCEDURE — 97112 NEUROMUSCULAR REEDUCATION: CPT | Mod: GP | Performed by: PHYSICAL THERAPIST

## 2019-06-25 PROCEDURE — 97110 THERAPEUTIC EXERCISES: CPT | Mod: GP | Performed by: PHYSICAL THERAPIST

## 2019-07-02 DIAGNOSIS — R41.89 COGNITIVE IMPAIRMENT: ICD-10-CM

## 2019-07-02 RX ORDER — DONEPEZIL HYDROCHLORIDE 10 MG/1
TABLET, FILM COATED ORAL
Qty: 30 TABLET | Refills: 1 | Status: SHIPPED | OUTPATIENT
Start: 2019-07-02 | End: 2019-09-02

## 2019-07-02 NOTE — TELEPHONE ENCOUNTER
"Requested Prescriptions   Pending Prescriptions Disp Refills     donepezil (ARICEPT) 10 MG tablet [Pharmacy Med Name: DONEPEZIL HCL 10MG TABS] 30 tablet 1     Sig: TAKE ONE TABLET BY MOUTH ONCE DAILY AT BEDTIME FOR MEMORY       Miscellaneous Dementia Agents Passed - 7/2/2019  3:38 PM        Passed - Recent (12 mo) or future (30 days) visit within the authorizing provider's specialty     Patient had office visit in the last 12 months or has a visit in the next 30 days with authorizing provider or within the authorizing provider's specialty.  See \"Patient Info\" tab in inbasket, or \"Choose Columns\" in Meds & Orders section of the refill encounter.              Passed - Medication is active on med list        Passed - Patient is 18 years of age or older        Last Written Prescription Date:  4/24/19  Last Fill Quantity: 30,  # refills: 1   Last office visit: 4/24/2019 with prescribing provider:  Antonino   Future Office Visit:   Next 5 appointments (look out 90 days)    Aug 01, 2019  1:30 PM CDT  Return Visit with Francesco Denton PA-C  [facility] [address] [place] [address] [phone]           "

## 2019-07-10 DIAGNOSIS — K52.9 CHRONIC DIARRHEA: ICD-10-CM

## 2019-07-11 RX ORDER — PANCRELIPASE 30000; 6000; 19000 [USP'U]/1; [USP'U]/1; [USP'U]/1
CAPSULE, DELAYED RELEASE PELLETS ORAL
Qty: 180 CAPSULE | Refills: 3 | Status: SHIPPED | OUTPATIENT
Start: 2019-07-11 | End: 2019-09-19

## 2019-07-11 NOTE — TELEPHONE ENCOUNTER
Requested Prescriptions   Pending Prescriptions Disp Refills     CREON 6000 units CPEP per EC capsule [Pharmacy Med Name: CREON 6000UNIT CPEP] 180 capsule 0     Sig: TAKE TWO CAPSULES BY MOUTH THREE TIMES A DAY WITH MEALS FOR DIARRHEA       There is no refill protocol information for this order              Last Written Prescription Date:  4/24/19  Last Fill Quantity: 180,   # refills: 0  Last Office Visit: 3/8/19  Future Office visit:    Next 5 appointments (look out 90 days)    Aug 01, 2019  1:30 PM CDT  Return Visit with Francesco Denton PA-C  [facility] [address] [place] [address] [phone]           Routing refill request to provider for review/approval because:  Drug not on the [facility], [facility] or [facility] refill protocol or controlled substance

## 2019-07-16 ENCOUNTER — HOSPITAL ENCOUNTER (OUTPATIENT)
Dept: PHYSICAL THERAPY | Facility: CLINIC | Age: 74
Setting detail: THERAPIES SERIES
End: 2019-07-16
Attending: PSYCHIATRY & NEUROLOGY
Payer: MEDICARE

## 2019-07-16 PROCEDURE — 97110 THERAPEUTIC EXERCISES: CPT | Mod: GP | Performed by: PHYSICAL THERAPIST

## 2019-07-16 PROCEDURE — 97112 NEUROMUSCULAR REEDUCATION: CPT | Mod: GP | Performed by: PHYSICAL THERAPIST

## 2019-07-22 ENCOUNTER — HOSPITAL ENCOUNTER (OUTPATIENT)
Dept: GENERAL RADIOLOGY | Facility: CLINIC | Age: 74
Discharge: HOME | End: 2019-07-22
Attending: PHYSICIAN ASSISTANT | Admitting: PHYSICIAN ASSISTANT
Payer: MEDICARE

## 2019-07-22 DIAGNOSIS — R13.10 DYSPHAGIA, UNSPECIFIED TYPE: ICD-10-CM

## 2019-07-22 DIAGNOSIS — K44.9 HIATAL HERNIA: ICD-10-CM

## 2019-07-22 PROCEDURE — 74220 X-RAY XM ESOPHAGUS 1CNTRST: CPT

## 2019-07-22 PROCEDURE — 25500045 ZZH RX 255: Performed by: RADIOLOGY

## 2019-07-22 RX ADMIN — ANTACID/ANTIFLATULENT 4 G: 380; 550; 10; 10 GRANULE, EFFERVESCENT ORAL at 11:10

## 2019-07-23 ENCOUNTER — HOSPITAL ENCOUNTER (OUTPATIENT)
Dept: PHYSICAL THERAPY | Facility: CLINIC | Age: 74
Setting detail: THERAPIES SERIES
End: 2019-07-23
Attending: PSYCHIATRY & NEUROLOGY
Payer: MEDICARE

## 2019-07-23 DIAGNOSIS — L40.9 PSORIASIS: ICD-10-CM

## 2019-07-23 PROCEDURE — 97110 THERAPEUTIC EXERCISES: CPT | Mod: GP | Performed by: PHYSICAL THERAPIST

## 2019-07-23 PROCEDURE — 97112 NEUROMUSCULAR REEDUCATION: CPT | Mod: GP | Performed by: PHYSICAL THERAPIST

## 2019-07-23 RX ORDER — FLUOCINONIDE TOPICAL SOLUTION USP, 0.05% 0.5 MG/ML
SOLUTION TOPICAL
Qty: 60 ML | Refills: 0 | Status: SHIPPED | OUTPATIENT
Start: 2019-07-23 | End: 2019-09-18

## 2019-07-23 NOTE — LETTER
[facility]  [address] [place] [address]  [phone]    July 23, 2019    Ingrid Amaral                                                                                                                 [address] [facility] [address]            Dear Ms. Amaral,    We are concerned about your health care.  We recently provided you with a medication refill.  Many medications require routine follow-up with your Dermatology Provider.      At this time we ask that: You schedule a routine office visit with your Dermatology Provider to follow your Psoriasis. You need to be seen at least annually for a prescription renewal. You were last seen in Dermatology on 8-.    Your prescription: Has been refilled so you may have time for the above noted follow-up.  We are currently booking appointments 7-9 weeks in advance.       Thank you,      Miguelina MITCHELL / cherry

## 2019-07-23 NOTE — TELEPHONE ENCOUNTER
Needs appointment before next refill. Letter sent and note sent to pharmacy as well. Khushi Wilder RN

## 2019-07-23 NOTE — TELEPHONE ENCOUNTER
Routing refill request to provider for review/approval because:  Ordered by dermatology  Not on [facility] Refill Protocol    Cynthia GALO RN

## 2019-07-23 NOTE — TELEPHONE ENCOUNTER
fluocinonide (LIDEX) 0.05 % external solution      Last Written Prescription Date:  4/18/19  Last Fill Quantity: 60 ml,   # refills: 1  Last Office Visit: 4/24/19  Future Office visit:    Next 5 appointments (look out 90 days)    Aug 01, 2019  1:30 PM CDT  Return Visit with Francesco Denton PA-C  [facility] ([facility]) [address] [place] [id number] [phone]           Routing refill request to provider for review/approval because:  Drug not on the [facility], [facility] or [facility] refill protocol or controlled substance

## 2019-07-30 ENCOUNTER — PATIENT OUTREACH (OUTPATIENT)
Dept: CARE COORDINATION | Facility: CLINIC | Age: 74
End: 2019-07-30

## 2019-07-30 NOTE — PROGRESS NOTES
Clinic Care Coordination Contact  [facility]/Voicemail       Clinical Data: Care Coordinator Outreach  Outreach attempted x 1, no voice mail set up.    Plan:  Care Coordinator will try to reach patient again in 6-10 business days.    EM Mcfarlane, [facility] - Care Coordinator   [facility]  7/30/2019   12:54 PM  [phone]

## 2019-08-09 ENCOUNTER — OFFICE VISIT (OUTPATIENT)
Dept: NEUROPSYCHOLOGY | Facility: CLINIC | Age: 74
End: 2019-08-09
Attending: PSYCHIATRY & NEUROLOGY
Payer: MEDICARE

## 2019-08-09 DIAGNOSIS — F03.90 SENILE DEMENTIA, UNCOMPLICATED (H): ICD-10-CM

## 2019-08-09 DIAGNOSIS — F41.9 ANXIETY: ICD-10-CM

## 2019-08-09 DIAGNOSIS — R41.842 VISUOSPATIAL DEFICIT: Primary | ICD-10-CM

## 2019-08-09 DIAGNOSIS — F33.0 MAJOR DEPRESSIVE DISORDER, RECURRENT EPISODE, MILD (H): ICD-10-CM

## 2019-08-09 DIAGNOSIS — R41.844 FRONTAL LOBE AND EXECUTIVE FUNCTION DEFICIT: ICD-10-CM

## 2019-08-09 NOTE — NURSING NOTE
The patient was seen for neuropsychological evaluation at the request of Pratibha Hay for the purposes of diagnostic clarification and treatment planning.  155 minutes of test administration and scoring were provided by this writer, Shelley Garrett.  Please see Dr. Armando Agrawal's report for a full interpretation of the findings.

## 2019-08-12 ENCOUNTER — OFFICE VISIT (OUTPATIENT)
Dept: NEUROSURGERY | Facility: CLINIC | Age: 74
End: 2019-08-12
Attending: PSYCHIATRY & NEUROLOGY
Payer: MEDICARE

## 2019-08-12 VITALS
BODY MASS INDEX: 22.6 KG/M2 | TEMPERATURE: 98.2 F | HEIGHT: 62 IN | SYSTOLIC BLOOD PRESSURE: 122 MMHG | WEIGHT: 122.8 LBS | HEART RATE: 60 BPM | RESPIRATION RATE: 16 BRPM | DIASTOLIC BLOOD PRESSURE: 80 MMHG | OXYGEN SATURATION: 98 %

## 2019-08-12 DIAGNOSIS — D32.9 MENINGIOMA (H): Primary | ICD-10-CM

## 2019-08-12 PROCEDURE — 99204 OFFICE O/P NEW MOD 45 MIN: CPT | Performed by: NEUROLOGICAL SURGERY

## 2019-08-12 ASSESSMENT — MIFFLIN-ST. JEOR: SCORE: 1010.27

## 2019-08-12 NOTE — PROGRESS NOTES
I was asked by Dr. Kimberlyn Hay to see this patient in consultation    73F w/ growing right temporal meningioma.  Has been managed for early stage Lewy Body Dementia, and work-up showed a 1 cm meningioma.  Follow up scan has now shown growth to 1.4 cm.  Right handed.  Balance issues, occasional word finding issues, and attention issues.       Past Medical History:   Diagnosis Date     Asthma      GERD (gastroesophageal reflux disease)      Radial head fracture 3/18/2010     Past Surgical History:   Procedure Laterality Date     ANKLE SURGERY      bilateral     COLONOSCOPY       COLONOSCOPY N/A 3/20/2015    Procedure: COLONOSCOPY;  Surgeon: Britney Martinez MD;  Location: [facility]     COLONOSCOPY N/A 5/22/2017    Procedure: COLONOSCOPY;  Colonoscopy;  Surgeon: Tristen Rangel MD;  Location: [facility]     ESOPHAGOSCOPY, GASTROSCOPY, DUODENOSCOPY (EGD), COMBINED N/A 6/7/2018    Procedure: COMBINED ESOPHAGOSCOPY, GASTROSCOPY, DUODENOSCOPY (EGD);  gastroscopy;  Surgeon: Tristen Rangel MD;  Location: [facility]     HC ESOPH/GAS REFLUX TEST W NASAL IMPED >1 HR  4/19/2012    Procedure:ESOPHAGEAL IMPEDENCE FUNCTION TEST WITH 24 HOUR PH GREATER THAN 1 HOUR; Surgeon:VALDO UMANZOR; Location: [facility]     HERNIA REPAIR      umbilcal     HYSTERECTOMY, PAP NO LONGER INDICATED  1991     TONSILLECTOMY       UPPER GI ENDOSCOPY       Social History     Socioeconomic History     Marital status:      Spouse name: Not on file     Number of children: Not on file     Years of education: Not on file     Highest education level: Not on file   Occupational History     Not on file   Social Needs     Financial resource strain: Not on file     Food insecurity:     Worry: Not on file     Inability: Not on file     Transportation needs:     Medical: Not on file     Non-medical: Not on file   Tobacco Use     Smoking status: Former Smoker     Packs/day: 0.50     Years: 3.00     Pack years: 1.50     Types: Cigarettes     Smokeless  tobacco: Never Used     Tobacco comment: smoked for 3 years when she was around 19   Substance and Sexual Activity     Alcohol use: Yes     Comment: RARELY     Drug use: No     Sexual activity: Not Currently   Lifestyle     Physical activity:     Days per week: Not on file     Minutes per session: Not on file     Stress: Not on file   Relationships     Social connections:     Talks on phone: Not on file     Gets together: Not on file     Attends Orthodox service: Not on file     Active member of club or organization: Not on file     Attends meetings of clubs or organizations: Not on file     Relationship status: Not on file     Intimate partner violence:     Fear of current or ex partner: Not on file     Emotionally abused: Not on file     Physically abused: Not on file     Forced sexual activity: Not on file   Other Topics Concern     Parent/sibling w/ CABG, MI or angioplasty before 65F 55M? Yes     Comment: mother- valve problem   Social History Narrative     Not on file     Family History   Problem Relation Age of Onset     Heart Disease Mother         valve problem     Hypertension Mother      Diabetes Mother         type 2     Cerebrovascular Disease Mother      Allergies Mother      Eye Disorder Mother         Macular degeneration     Osteoporosis Mother      Respiratory Mother      Migraines Mother      Cardiovascular Father         MI at age 80     Cancer - colorectal Father      Diabetes Father      Hypertension Father         type 2     Eye Disorder Father         macular degeneration     Lipids Father      Colon Cancer Father      C.A.D. Maternal Grandmother      Diabetes Paternal Grandmother         type 2     Cardiovascular Paternal Grandmother         MI     Diabetes Sister         type 2     Allergies Sister      Gastrointestinal Disease Sister      Depression Sister      Gastrointestinal Disease Daughter      Migraines Daughter      Gastrointestinal Disease Daughter      Migraines Daughter       "Migraines Son      Aneurysm No family hx of      Brain Hemorrhage No family hx of      Brain Tumor No family hx of      Cancer No family hx of      Chiari Malformation No family hx of      LUNG DISEASE No family hx of      Seizure Disorder No family hx of         ROS: 10 point ROS neg other than the symptoms noted above in the HPI.    Physical Exam  /80   Pulse 60   Temp 98.2  F (36.8  C) (Temporal)   Resp 16   Ht 5' 2\" (1.575 m)   Wt 122 lb 12.8 oz (55.7 kg)   SpO2 98%   BMI 22.46 kg/m    HEENT:  Normocephalic, atraumatic.  PERRLA.  EOM s intact.  Visual fields full to gross exam  Neck:  Supple, non-tender, without lymphadenopathy.  Heart:  No peripheral edema  Lungs:  No SOB  Abdomen:  Non-distended.   Skin:  Warm and dry.  Extremities:  No edema, cyanosis or clubbing.  Psychiatric:  No apparent distress  Musculoskeletal:  Normal bulk and tone    NEUROLOGICAL EXAMINATION:     Mental status:  Alert and Oriented x 3, speech is fluent.  Cranial nerves:  II-XII intact.   Motor:    Shoulder Abduction:  Right:  5/5   Left:  5/5  Biceps:                      Right:  5/5   Left:  5/5  Triceps:                     Right:  5/5   Left:  5/5  Wrist Extensors:       Right:  5/5   Left:  5/5  Wrist Flexors:           Right:  5/5   Left:  5/5  interosseus :            Right:  5/5   Left:  5/5  Hip Flexion:                Right: 5/5  Left:  5/5  Quadriceps:             Right:  5/5  Left:  5/5  Hamstrings:             Right:  5/5  Left:  5/5  Gastroc Soleus:        Right:  5/5  Left:  5/5  Tib/Ant:                      Right:  5/5  Left:  5/5  EHL:                     Right:  5/5  Left:  5/5  Sensation:  Intact  Reflexes:  Negative Babinski.  Negative Clonus.  Negative Bangura's.  Coordination:  Smooth finger to nose testing.   Negative pronator drift.  Smooth tandem walking.    A/P:  73F w/ growing right temporal meningioma    I had a discussion with the patient, reviewing the history, symptoms, and imaging  We " discussed that I generally recommend intervention for meningiomas with greater than 5 mm  Will plan for repeat MRI in 6 months

## 2019-08-12 NOTE — LETTER
8/12/2019         RE: Ingrid Amaral  [address] [address]        Dear Colleague,    Thank you for referring your patient, Ingrid Amaral, to the [facility]. Please see a copy of my visit note below.    I was asked by Dr. Kimberlyn Hay to see this patient in consultation    73F w/ growing right temporal meningioma.  Has been managed for early stage Lewy Body Dementia, and work-up showed a 1 cm meningioma.  Follow up scan has now shown growth to 1.4 cm.  Right handed.  Balance issues, occasional word finding issues, and attention issues.       Past Medical History:   Diagnosis Date     Asthma      GERD (gastroesophageal reflux disease)      Radial head fracture 3/18/2010     Past Surgical History:   Procedure Laterality Date     ANKLE SURGERY      bilateral     COLONOSCOPY       COLONOSCOPY N/A 3/20/2015    Procedure: COLONOSCOPY;  Surgeon: Britney Martinez MD;  Location: [facility]     COLONOSCOPY N/A 5/22/2017    Procedure: COLONOSCOPY;  Colonoscopy;  Surgeon: Tristen Rangel MD;  Location: [facility]     ESOPHAGOSCOPY, GASTROSCOPY, DUODENOSCOPY (EGD), COMBINED N/A 6/7/2018    Procedure: COMBINED ESOPHAGOSCOPY, GASTROSCOPY, DUODENOSCOPY (EGD);  gastroscopy;  Surgeon: Tristne Rangel MD;  Location: [facility]     HC ESOPH/GAS REFLUX TEST W NASAL IMPED >1 HR  4/19/2012    Procedure:ESOPHAGEAL IMPEDENCE FUNCTION TEST WITH 24 HOUR PH GREATER THAN 1 HOUR; Surgeon:VALDO UMANZOR; Location: [facility]     HERNIA REPAIR      umbilcal     HYSTERECTOMY, PAP NO LONGER INDICATED  1991     TONSILLECTOMY       UPPER GI ENDOSCOPY       Social History     Socioeconomic History     Marital status:      Spouse name: Not on file     Number of children: Not on file     Years of education: Not on file     Highest education level: Not on file   Occupational History     Not on file   Social Needs     Financial resource strain: Not on file     Food insecurity:     Worry: Not on file      Inability: Not on file     Transportation needs:     Medical: Not on file     Non-medical: Not on file   Tobacco Use     Smoking status: Former Smoker     Packs/day: 0.50     Years: 3.00     Pack years: 1.50     Types: Cigarettes     Smokeless tobacco: Never Used     Tobacco comment: smoked for 3 years when she was around 19   Substance and Sexual Activity     Alcohol use: Yes     Comment: RARELY     Drug use: No     Sexual activity: Not Currently   Lifestyle     Physical activity:     Days per week: Not on file     Minutes per session: Not on file     Stress: Not on file   Relationships     Social connections:     Talks on phone: Not on file     Gets together: Not on file     Attends Orthodoxy service: Not on file     Active member of club or organization: Not on file     Attends meetings of clubs or organizations: Not on file     Relationship status: Not on file     Intimate partner violence:     Fear of current or ex partner: Not on file     Emotionally abused: Not on file     Physically abused: Not on file     Forced sexual activity: Not on file   Other Topics Concern     Parent/sibling w/ CABG, MI or angioplasty before 65F 55M? Yes     Comment: mother- valve problem   Social History Narrative     Not on file     Family History   Problem Relation Age of Onset     Heart Disease Mother         valve problem     Hypertension Mother      Diabetes Mother         type 2     Cerebrovascular Disease Mother      Allergies Mother      Eye Disorder Mother         Macular degeneration     Osteoporosis Mother      Respiratory Mother      Migraines Mother      Cardiovascular Father         MI at age 80     Cancer - colorectal Father      Diabetes Father      Hypertension Father         type 2     Eye Disorder Father         macular degeneration     Lipids Father      Colon Cancer Father      C.A.D. Maternal Grandmother      Diabetes Paternal Grandmother         type 2     Cardiovascular Paternal Grandmother         MI      "Diabetes Sister         type 2     Allergies Sister      Gastrointestinal Disease Sister      Depression Sister      Gastrointestinal Disease Daughter      Migraines Daughter      Gastrointestinal Disease Daughter      Migraines Daughter      Migraines Son      Aneurysm No family hx of      Brain Hemorrhage No family hx of      Brain Tumor No family hx of      Cancer No family hx of      Chiari Malformation No family hx of      LUNG DISEASE No family hx of      Seizure Disorder No family hx of         ROS: 10 point ROS neg other than the symptoms noted above in the HPI.    Physical Exam  /80   Pulse 60   Temp 98.2  F (36.8  C) (Temporal)   Resp 16   Ht 5' 2\" (1.575 m)   Wt 122 lb 12.8 oz (55.7 kg)   SpO2 98%   BMI 22.46 kg/m     HEENT:  Normocephalic, atraumatic.  PERRLA.  EOM s intact.  Visual fields full to gross exam  Neck:  Supple, non-tender, without lymphadenopathy.  Heart:  No peripheral edema  Lungs:  No SOB  Abdomen:  Non-distended.   Skin:  Warm and dry.  Extremities:  No edema, cyanosis or clubbing.  Psychiatric:  No apparent distress  Musculoskeletal:  Normal bulk and tone    NEUROLOGICAL EXAMINATION:     Mental status:  Alert and Oriented x 3, speech is fluent.  Cranial nerves:  II-XII intact.   Motor:    Shoulder Abduction:  Right:  5/5   Left:  5/5  Biceps:                      Right:  5/5   Left:  5/5  Triceps:                     Right:  5/5   Left:  5/5  Wrist Extensors:       Right:  5/5   Left:  5/5  Wrist Flexors:           Right:  5/5   Left:  5/5  interosseus :            Right:  5/5   Left:  5/5  Hip Flexion:                Right: 5/5  Left:  5/5  Quadriceps:             Right:  5/5  Left:  5/5  Hamstrings:             Right:  5/5  Left:  5/5  Gastroc Soleus:        Right:  5/5  Left:  5/5  Tib/Ant:                      Right:  5/5  Left:  5/5  EHL:                     Right:  5/5  Left:  5/5  Sensation:  Intact  Reflexes:  Negative Babinski.  Negative Clonus.  Negative " Bangura's.  Coordination:  Smooth finger to nose testing.   Negative pronator drift.  Smooth tandem walking.    A/P:  73F w/ growing right temporal meningioma    I had a discussion with the patient, reviewing the history, symptoms, and imaging  We discussed that I generally recommend intervention for meningiomas with greater than 5 mm  Will plan for repeat MRI in 6 months         Again, thank you for allowing me to participate in the care of your patient.        Sincerely,        Fredy Ewing MD

## 2019-08-12 NOTE — PATIENT INSTRUCTIONS
Repeat MRI brain in 6 months    SHALINI Hu    [facility]  Phone: [phone]

## 2019-08-13 ENCOUNTER — TELEPHONE (OUTPATIENT)
Dept: NEUROLOGY | Facility: CLINIC | Age: 74
End: 2019-08-13

## 2019-08-13 NOTE — TELEPHONE ENCOUNTER
Please let Ingrid know that I received her updated Neuropsych results and would like to see her back in clinic to review these.    Thank you,  KEITH

## 2019-08-13 NOTE — PROGRESS NOTES
Clinic Care Coordination Contact    Follow Up Progress Note      Assessment: Per spouse patient is doing okay.  They were to neurosurgery and are watching the growth.  They are opting to wait until patient has any symptoms from the growth enlarging before looking at doing any surgery.    Spouse did say that patient had not correctly set up her meds.  They did review and go back through and reset them all up appropriately.    Goals addressed this encounter:   Goals Addressed                 This Visit's Progress      Medication 1 (pt-stated)   On track     Goal Statement: I will allow my spouse to assist me with medications when issues arise,  for consistent administration.   Measure of Success: I am taking my medications at the right time and every day  Supportive Steps to Achieve: actively involved spouse with supportive ideas  Barriers: memory loss  Strengths: supportive family  Date to Achieve By: 12-31-19                 Intervention/Education provided during outreach: Discussed continued follow-up and watching for any symptoms of a Meningioma growing.  Spouse to continue to support patient with correct meds set up.       Outreach Frequency: monthly    Plan:   Spouse to continue to support patient.  Schedule with neurosurgery if any symptoms develop.  Care Coordinator will follow up in 1 month.    EM Mcfarlane, [facility] - Care Coordinator   [facility]  8/13/2019   9:18 AM  [phone]

## 2019-08-13 NOTE — LETTER
[facility]  [address] [address]  [phone]          August 21, 2019    Ingrid Amaarl                                                                                                                     [address] [address]        Dear Dr. Satnam Quintero has asked me to let you know she's received your updated neuropsychological testing results.  She'd like to have you make an appointment with with her to review those results.       Our scheduling phone is [phone], or you can schedule via [employer].     Please don't hesitate to contact our office with any additional questions or concerns.           Sincerely,    ROULA Condon   For Pratibha Hay MD

## 2019-08-13 NOTE — PROGRESS NOTES
Name: Ingrid Amaral MRN: [id number]  : 1945  PABON: 2019  Staff: MACI  Tech: VIDAL Age: 73  Sex: Female  Hand: Right Educ: 12  Vision: 20/30 ?with correction / ?without correction    ORIENTATION     Time  -1     Place       Personal info          Presidents     WAIS-IV     WMI: 77         Raw SSa     Similarities  18 8     Block Design  20 7     Digit Span  16 5 RDS= 8     Arithmetic  9 7     Coding  30 6    LASHAUN-O    Raw  T %ile     Copy    11     <1     Time to Copy  480        COWAT (FAS)   Raw: 34  T: 47  Animals   Raw:  16 T-score:  45   BOSTON NAMING TEST   Raw: 36  %ile <7  COMPLEX IDEATIONAL MATERIAL   Raw:  T: 58    TRAILS  Raw  Err %ile    A 43  0 30-50   B 271  2 30  STROOP Raw %ile   Word 89 70-80   Color  57 30-60       Color/Word  8 <10    MADIE   Raw: 16        %ile: 21  LUGO FACIAL RECOGNITION   Raw: 39        Interp: borderline  LINE BISECTION   # Missed Lines: 0     GROOVED PEGBOARD    Raw  T Drops   RH  162  24 1   LH d/c 240  22 5   (only 21 completed pegs)    BDI-II  Raw:  17 Interpretation: mild  JOHNNIE  Raw:  17 Interpretation: moderate    HVLT-R     Trial 1 2 3 Total      4 7 10  21    Raw T     Total Learning (1-3) 21 46     Delayed Recall  7 45     Percent Retention 70 42     Recognition Hits/FP 11/2 40    BVMT-R     Trial 1 2 3 Total      2 6 5  13    Raw T / %ile     Total Learning (1-3) 13 38     Delayed Recall  6 43     Percent Retention 100 >16th     Recognition Hits/FP 6/0 >16th

## 2019-08-13 NOTE — TELEPHONE ENCOUNTER
I attempted twice to call Ingrid's home phone, but both times I was disconnected before the call was picked up.    I've routed a [employer] message asking her to make a follow-up appt with Dr. Hay.  Will hold note in in-basket until we can confirm pt has read the message.

## 2019-08-13 NOTE — PROGRESS NOTES
Name: Ingrid Amaral   MR#: [id number]  YOB: 1945  Date of Exam: 08/09/2019    Neuropsychology Laboratory  [address]  [phone]    NEUROPSYCHOLOGICAL EVALUATION    IDENTIFYING INFORMATION  Ingrid Amaral is a 73 year old, right handed, homemaker, with 12+ years of formal education. She was unaccompanied to the evaluation.    BACKGROUND INFORMATION / INTERVIEW FINDINGS    Records indicate that Ms. Amaral has meningiomas in her right posterior temporal and left occipital regions. Her other medical history includes generalized anxiety disorder, depression, panic disorder, hypertension, REM behavior disorder, dysphagia, asthma, liver lesion, allergic rhinitis, and hypercholesterolemia. I saw the patient for a neuropsychology exam on 03/23/2018. In light of her history of REM behavior disorder and tremor, the weaknesses in the exam were felt to raise the question of the early stages of a dementia with Lewy bodies syndrome. I did not think her deficits could be attributable to her meningiomas. In the exam, her most pronounced impairments were visually mediated processing, along with less pronounced weaknesses in executive function, naming, nonverbal learning, nonverbal memory, and bilateral fine motor dexterity. FDG-PET imaging on 05/03/2018 documented mild hypometabolism in the parietal lobes bilaterally, possibly representing early Lewy body dementia. Occipital lobe metabolism was noted to be normal however. This scan also documented grossly stable size of the bilateral meningiomas overlying the right temporal lobe and left occipital lobe. The most recent MRI of her brain on 06/11/2019 documented slight interval increase in size of a presumed meningioma at the temporal-occipital junction on the right, and stable meningioma at the left temporal-occipital injunction. This scan also documented diffuse cerebral volume loss and white matter  "changes consistent with chronic small vessel ischemic disease. Recent notes indicate that ongoing concerns have been expressed about her cognition, and there is some discussion about worsening difficulties with word finding, losing her train of thought, and attention. The current follow-up examination was requested by Dr. Pratibha Hay, in this context.    On interview, Ms. Amaral confirmed the above history. She reported that she has had ongoing problems with her memory since the prior evaluation. She stated that she forgets her intentions. She reported that she also has ongoing hallucinations. For example, she stated that she thought she saw a lacy curtain wrapped around her 's neck. She reported that she typically has these hallucinations at night, although the hallucinations have been occurring less in the last month since she was prescribed donepezil. She also noted fewer REM behavior disorder symptoms since being prescribed donepezil. She did note that she has ongoing troubles with word finding and name retrieval. She reported that overall, she has been worsening. She reported that her balance is really bad, and she is now seeing a physical therapist. She stated that she tends to bump into items on her right side, knock things over, and drop stuff on the floor. She reported that she has had falls, and also has a tremor when brushing her teeth. She stated that her hands \"do not like to work very much.\"    With respect to mental health, Ms. Amaral reported that her mood is good most of the time. She indicated that her mood is not as good as it was in the past, however. She stated that she is prescribed medication for anxiety and panic attacks. She is not under the care of a therapist. She denied significant changes in her mental health status in the last year. She denied suicidal ideation.    With respect to other medical background, Ms. Amaral denied interval TBI, stroke, or seizure. " "She stated that her sleep is pretty good, and noted that she takes an over-the-counter sleeping pill. She indicated that she averages eight or nine hours of sleep at night, and also naps in the afternoon. She reported that she slept normally the night before the exam. She reported that she has some pain in her back. She denied significant medical changes in the last year. Por records, her current medications include albuterol, amlodipine, ascorbic acid, augmented betamethasone dipropionate, biotin, calcium-magnesium-zinc, cetirizine, creon, donepezil, estradiol, fluocinonide, fluticasone, fluticasone-salmeterol, gabapentin, hydrocortisone cream, lactase, melatonin, montelukast, omeprazole, polyethylene glycol, sertraline, simvastatin, and turmeric. She stated that she consumes about two alcoholic drinks per week, but denied other substance use.     Ms. Amaral lives at home with her . They are in a duplex. She manages her own basic daily activities. She stated that her  now oversees her medications because she  had them all screwed up.\" Her  manages their finances. The patient prepares their meals. She drives locally. She denied significant changes in her family status. She has children who live nearby. She denied changes in her educational background or her work history. She remains retired, and stated that she stays busy around the house.    BEHAVIORAL OBSERVATIONS  Ms. Amaral was polite and cooperative with the exam. Her speech was notable for severe difficulties with word finding, mild dysfluency, atypical prosody, and occasional paraphasias. Her comprehension was mildly impaired. Her thought processes were notable for occasional forgetting of task instructions, and slowing. She was noted to have slow scanning of visual materials, and was slow to initiate visual tasks. On one measure, she commented that the visual stimulus kept changing, and she stated that her vision was blurry. She also " "had errors and omitted items on another measure that required her to scan a page with printed words. Her mood was mildly anxious and depressed with congruent affect. She had reduced confidence in her ability on testing. Her effort was good. The current results are felt to be an accurate depiction of her cognitive functioning    RESULTS OF EXAM  Her performances on standardized measures of neuropsychological functioning were as follows.    She was oriented to time, place, and various aspects of personal information. She was able to provide the names of the current president and four other presidents who had served in office since 1980. Auditory attention for digits was borderline impaired. Mental calculations were low average. Learning of words in a list format was average. Delayed recall of list words was average. Percent retention of list words was low average. Delayed recognition of list words was low average. Learning of simple geometric shapes and their spatial locations was low average. Delayed recall of the shapes and their locations was low average. Percent retention of the shapes was normal. Delayed recognition of the shapes was normal, and performed without error. Line bisection was broadly normal. There were some instances in which her bisection was skewed to either the right or the left of certain lines, but no overall pattern suggesting inattention to one visual hemifield. Visual-spatial judgments for variably oriented lines were low average. Visuoperceptual matching of faces was performed in the borderline impaired range. Visual problem-solving with blocks was low average. Her drawing of a complicated geometric figure was severely impaired and was notable for poor planning and appreciation of the figure s gestalt. She spent a long time on this task, much of which was spent staring at the figure. She commented that the figure \"keeps changing every time I look at it.\" Comprehension of phrases and short " stories was high average. Verbal associative fluency was average. Semantic verbal fluency was average. Naming to confrontation was impaired. Verbal abstract reasoning was average. Speeded visual sequencing under focused attention was average. A similar measure with a divided attention component was average, but with two errors. Speeded word reading was performed in the average to high average range. Speeded color naming was average. Speeded inhibition of an over-learned response was impaired. Speeded visual motor coding was low average. Speeded fine motor dexterity was impaired, bilaterally. She was slower with the left hand and had five peg drop errors with the left hand.    She endorsed items consistent with mild symptoms of depression, and moderate symptoms of anxiety on self-report measures.    IMPRESSIONS  Ms. Amaral demonstrated a pattern of weaknesses and deficits the remains consistent with a Lewy body dementia syndrome. Relative to the results from her exam from 03/2018, there is variability in her neuropsychological profile. It is important to acknowledge that improvements are expected from one time to the next in neuropsychological testing, as practice and familiarity with testing typically produce improvements. These practice effects are seen in this exam. However, there are a number of scores that have declined mildly relative to the prior exam, which raise the strong question of a mild decline in overall cognitive functioning, but perhaps most particularly so for frontal, subcortical, and right hemispheric brain networks. In this exam, she has deficits in visually mediated processing including visual problem-solving and visuoperception, as well as weaknesses in attention, executive functioning, and bilateral psychomotor speed. Other cognitive abilities, including anterograde memory and many of her language skills are normal and were performed in keeping with her low average range cognitive baseline.  She is reporting mild symptoms of depression, and moderate symptoms of anxiety. These reports are both slightly increased from the prior evaluation. While it may be the case that psychological factors are contributing to some degree of variability in her profile, I do not think that these factors are responsible for her cognitive deficits.    RECOMMENDATIONS  Preliminary results and recommendations were provided to the patient and her  over the telephone on 08/13/2019, and all questions were answered.    1. Ms. Amaral will continue to benefit from support and supervision of her daily activities. It is likely that her support needs increase in the future.    2. In spite of treatment, she is reporting ongoing symptoms of depression and anxiety. If medically indicated, consideration should be given to modified treatment of her mental health.    3. Along similar lines, referral for psychotherapy services could be of benefit. One possible referral option would be [facility], with locations throughout the [facility] area. They can be reached by calling [phone].    4. She should not drive.    5. Follow-up neuropsychological evaluation is again recommended in 1 year in order to assess and update recommendations as appropriate.    Armando Agrawal, Ph.D., L.P., ABPP-CN   / Licensed Psychologist [id number]  Department of Rehabilitation Medicine  Division of Adult Neuropsychology  [facility]    Time spent:  One unit (55 minutes) neurobehavioral status exam including interview and clinical assessment by licensed and board-certified neuropsychologist (CPT 26422). One unit (60 minutes) neuropsychological testing evaluation by licensed and board-certified neuropsychologist, including integration of patient data, interpretation of standardized test results and clinical data, clinical decision-making, treatment planning, and report, first hour (CPT 24086). One unit(s) (80 minutes)  of neuropsychological testing evaluation by licensed and board-certified neuropsychologist, including integration of patient data, interpretation of standardized test results and clinical data, clinical decision-making, treatment planning, and report, subsequent hours (CPT 03200). One unit (30 minutes) of psychological and neuropsychological test administration and scoring by technician, first 30 minutes (CPT 37104). Four units (125 minutes) psychological or neuropsychological test administration and scoring by technician, subsequent 30 minutes (CPT 14930). Diagnoses: R41.842, R41.844, F03.90, F33.0, F41.9.

## 2019-08-21 NOTE — TELEPHONE ENCOUNTER
Patient has not retrieved the [employer] message.  I've mailed a letter to her today asking her to make an appointment with Dr. Hay to review her Neuropsych test results.

## 2019-08-27 ENCOUNTER — OFFICE VISIT (OUTPATIENT)
Dept: GASTROENTEROLOGY | Facility: CLINIC | Age: 74
End: 2019-08-27
Payer: MEDICARE

## 2019-08-27 VITALS
WEIGHT: 124.7 LBS | HEART RATE: 71 BPM | SYSTOLIC BLOOD PRESSURE: 113 MMHG | HEIGHT: 62 IN | BODY MASS INDEX: 22.95 KG/M2 | OXYGEN SATURATION: 96 % | DIASTOLIC BLOOD PRESSURE: 68 MMHG

## 2019-08-27 DIAGNOSIS — R13.10 DYSPHAGIA, UNSPECIFIED TYPE: ICD-10-CM

## 2019-08-27 DIAGNOSIS — K58.8 OTHER IRRITABLE BOWEL SYNDROME: Primary | ICD-10-CM

## 2019-08-27 DIAGNOSIS — E73.9 LACTOSE INTOLERANCE: ICD-10-CM

## 2019-08-27 DIAGNOSIS — K44.9 LARGE HIATAL HERNIA: ICD-10-CM

## 2019-08-27 PROCEDURE — 99214 OFFICE O/P EST MOD 30 MIN: CPT | Performed by: PHYSICIAN ASSISTANT

## 2019-08-27 ASSESSMENT — PAIN SCALES - GENERAL: PAINLEVEL: NO PAIN (0)

## 2019-08-27 ASSESSMENT — MIFFLIN-ST. JEOR: SCORE: 1018.89

## 2019-08-27 NOTE — NURSING NOTE
"Ingrid Amaral's goals for this visit include:   Chief Complaint   Patient presents with     RECHECK     Follow up for dysphagia and diarrhea; Patient reports diarrhea has improved since starting the Creon       She requests these members of her care team be copied on today's visit information: PCP    PCP: Leesa Muñiz    Referring Provider:  Leesa Muñiz PA-C  [address] [place] [id number]    /68 (BP Location: Left arm, Patient Position: Sitting, Cuff Size: Adult Regular)   Pulse 71   Ht 1.575 m (5' 2\")   Wt 56.6 kg (124 lb 11.2 oz)   SpO2 96%   BMI 22.81 kg/m      Do you need any medication refills at today's visit? No    Kimber Johnson LPN      "

## 2019-08-27 NOTE — PROGRESS NOTES
GASTROENTEROLOGY FOLLOW UP CLINIC VISIT    CC/REFERRING MD:    Leesa Muñiz    REASON FOR CONSULTATION:   Referred by Leesa Muñiz for RECHECK (Follow up for dysphagia and diarrhea; Patient reports diarrhea has improved since starting the Creon)        HISTORY OF PRESENT ILLNESS:    Ingrid Amaral is 73 year old female who presents today for follow up with her .     She was initially seen in May 2019 for evaluation of dysphagia, bloating and alternating bowel habits. She has hx of IBS and dysphagia for many years and has had work up in the past.     She noted a long standing history of troubles with her bowels. She notes that bowels fluctuate between constipation and diarrhea.  She denies any blood or black tarry stool. We discussed her alternating bowel habits at her initial office visit. Initially she appeared to be more constipated and hence unclear if she needed creon. Her main concern now is the diarrhea which she feels creon is helping. She currently feels her bowels are better. She is still taking creon daily and has not needed the miralax. Her  notes that she eats foods that tend to trigger her symptoms.     In regards to her dysphagia, this is another long standing concern.  Previous work up included an egd in June 2018 with a normal esophagus and a large hiatal hernia. She recently had speech therapy evaluation in November 2018 which was normal. There were no signs of aspiration. We recently evaluated with a barium esophagram which showed the large hiatal hernia to be 9.4 cm in transverse dimension.  We discussed further evaluating with an esophogeal manometry and considering surgical correction however since she currently feels that her symptoms are under control she is not interested in further work up. Her  notes that she does eat very quickly which seems to make symptoms worse.        PREVIOUS ENDOSCOPY:    Upper Endoscopy  06/07/2018  Impression:  - Normal nasopharynx and oropharynx.                        - Normal esophagus.                        - Large hiatal hernia.                        - Normal examined duodenum.                        - No specimens collected.     Colonoscopy 5/22/2017  Impression:  - The entire examined colon is normal.                        - The distal rectum and anal verge are normal on retroflexion view.                        - No specimens collected.     Colonoscopy 3/20/2015  Impression:      - The entire examined colon is normal on direct and retroflexion views.       PROBLEM LIST  Patient Active Problem List    Diagnosis Date Noted     Meningioma (H) 02/27/2018     Priority: Medium     Hypertension goal BP (blood pressure) < 140/90 05/18/2017     Priority: Medium     FH: colon cancer 03/03/2015     Priority: Medium     RBD (REM behavioral disorder) 04/04/2014     Priority: Medium     Started clonazepam with Dr Gilliam.  Helps a lot.  Self-decreased to usually 0.5 mg nightly.       Dysphagia 01/24/2014     Priority: Medium     She has had complaints of atypical chest pressure and has been evaluated with EGD 3/2012 that was normal as well as manometry and 24-hour pH study. Manometry 4/2012 revealed abnormal bolus clearance for both liquids and solids. She then had a video swallow study done that was recommended swallowing exercises per her speech pathologist. It has been noted that both the symptoms of chest pressure and IBS have a direct correlation with increased stress/anxiety. She notes that the swallowing exercises do help with any symptoms of dysphagia.          Moderate persistent asthma 09/12/2013     Priority: Medium     Vaginal wall prolapse 06/13/2013     Priority: Medium     Fit with size 2 ring with support pessary       Chronic constipation 06/13/2013     Priority: Medium     Vulvitis 08/07/2012     Priority: Medium     Biopsy proven lichen sclerosis-see pathology  Clobetasol ointment  prn  Oatmeal sitz baths  Yearly exams       Liver lesion 08/03/2012     Priority: Medium     On MRCP 4/6/12, per GI needs yearly MRCP       Generalized anxiety disorder 08/03/2012     Priority: Medium     Diagnosis updated by automated process. Provider to review and confirm.       Mild major depression (H) 08/03/2012     Priority: Medium     Allergic rhinitis 09/08/2005     Priority: Medium     Hay fever  Problem list name updated by automated process. Provider to review       Panic disorder without agoraphobia 09/08/2005     Priority: Medium     Anxiety; panic disorder       Diarrhea 03/07/2005     Priority: Medium     This has been longstanding and has responded to pancrease in the past.  Colonoscopy 2/05 showed some areas of redness in the ilium which were negative on biopsy .  At times can tend toward constipation.  Always has some bloating and increased gas, occasionally associated with some pain       Pure hypercholesterolemia 03/07/2005     Priority: Medium     Has mild elevated LDL at 141.  No other risk factors for CAD and HDL 56.  No meds for now but will continue to follow.       Advanced directives, counseling/discussion 04/03/2012     Priority: Low     Patient has information at home and is working on finishing            PERTINENT PAST MEDICAL HISTORY:  (I personally reviewed this history with the patient at today's visit)   Past Medical History:   Diagnosis Date     Asthma      GERD (gastroesophageal reflux disease)      Radial head fracture 3/18/2010         PREVIOUS SURGERIES: (I personally reviewed this history with the patient at today's visit)   Past Surgical History:   Procedure Laterality Date     ANKLE SURGERY      bilateral     COLONOSCOPY       COLONOSCOPY N/A 3/20/2015    Procedure: COLONOSCOPY;  Surgeon: Britney Martinez MD;  Location: [facility]     COLONOSCOPY N/A 5/22/2017    Procedure: COLONOSCOPY;  Colonoscopy;  Surgeon: Tristen Rangel MD;  Location: [facility]     ESOPHAGOSCOPY,  GASTROSCOPY, DUODENOSCOPY (EGD), COMBINED N/A 6/7/2018    Procedure: COMBINED ESOPHAGOSCOPY, GASTROSCOPY, DUODENOSCOPY (EGD);  gastroscopy;  Surgeon: Tristen Rangel MD;  Location: [facility]     HC ESOPH/GAS REFLUX TEST W NASAL IMPED >1 HR  4/19/2012    Procedure:ESOPHAGEAL IMPEDENCE FUNCTION TEST WITH 24 HOUR PH GREATER THAN 1 HOUR; Surgeon:VALDO UMANZOR; Location: GI     HERNIA REPAIR      umbilcal     HYSTERECTOMY, PAP NO LONGER INDICATED  1991     TONSILLECTOMY       UPPER GI ENDOSCOPY           ALLERGIES:     Allergies   Allergen Reactions     Sulfa Drugs Other (See Comments)     Reaction unknown as a child       PERTINENT MEDICATIONS:    Current Outpatient Medications:      albuterol (PROAIR HFA/PROVENTIL HFA/VENTOLIN HFA) 108 (90 BASE) MCG/ACT Inhaler, Inhale 2 puffs into the lungs every 4 hours as needed for shortness of breath / dyspnea, Disp: 1 Inhaler, Rfl: 2     albuterol (PROVENTIL) (5 MG/ML) 0.5% nebulizer solution, Take 0.5 mLs (2.5 mg) by nebulization every 4 hours as needed for wheezing or shortness of breath / dyspnea, Disp: 30 vial, Rfl: 1     amLODIPine (NORVASC) 5 MG tablet, Take 1 tablet (5 mg) by mouth daily For blood pressure, Disp: 90 tablet, Rfl: 1     ascorbic acid 1000 MG TABS tablet, Take 1,000 mg by mouth daily, Disp: , Rfl:      augmented betamethasone dipropionate (DIPROLENE-AF) 0.05 % external cream, Apply sparingly to legs twice daily as needed. Do not apply to face. May occlude with saran wrap for 3-4 days for 6 hours.  For psoriasis rash., Disp: 50 g, Rfl: 0     biotin 1000 MCG TABS tablet, Take 1,000 mcg by mouth 2 times daily, Disp: , Rfl:      Calcium-Magnesium-Zinc 333..33-5 MG TABS, Take 1 tablet by mouth daily, Disp: , Rfl:      cetirizine (ZYRTEC) 10 MG tablet, Take 1 tablet (10 mg) by mouth every morning For allergies, Disp: 90 tablet, Rfl: 3     CREON 6000 units CPEP per EC capsule, TAKE TWO CAPSULES BY MOUTH THREE TIMES A DAY WITH MEALS FOR DIARRHEA, Disp: 180  capsule, Rfl: 3     donepezil (ARICEPT) 10 MG tablet, TAKE ONE TABLET BY MOUTH ONCE DAILY AT BEDTIME FOR MEMORY, Disp: 30 tablet, Rfl: 1     estradiol (ESTRACE) 0.1 MG/GM vaginal cream, USE ONE GRAM VAGINALLY ONCE DAILY AND SPREAD A SMALL AMOUNT AROUND THE CLITORAL REES For vaginal area issues., Disp: 85 g, Rfl: 0     fluocinonide (LIDEX) 0.05 % external solution, Apply twice daily as needed to scalp.  Do not apply to face. Due for office visit:[phone], Disp: 60 mL, Rfl: 0     fluticasone (FLONASE) 50 MCG/ACT nasal spray, INSTILL ONE SPRAY IN EACH NOSTRIL EVERY DAY.  For allergies, Disp: 16 g, Rfl: 1     fluticasone-salmeterol (ADVAIR) 250-50 MCG/DOSE inhaler, INHALE 1 PUFF BY MOUTH INTO THE LUNGS TWICE DAILY.  For asthma., Disp: 1 Inhaler, Rfl: 5     gabapentin (NEURONTIN) 300 MG capsule, TAKE ONE CAPSULE BY MOUTH EVERY NIGHT AT BEDTIME AS NEEDED.  For back pain., Disp: 60 capsule, Rfl: 0     hydrocortisone 1 % cream, Apply topically as needed, Disp: , Rfl:      lactase (LACTAID) 9000 UNITS TABS, Take 9,000 Units by mouth as needed, Disp: , Rfl:      melatonin 5 MG tablet, Take 1 tablet (5 mg) by mouth nightly as needed for sleep, Disp: 90 tablet, Rfl: 3     montelukast (SINGULAIR) 10 MG tablet, Take 1 tablet (10 mg) by mouth daily For allergies and asthma, Disp: 90 tablet, Rfl: 3     omeprazole (PRILOSEC) 20 MG CR capsule, TAKE ONE CAPSULE BY MOUTH TWICE A DAY FOR HEARTBURN, Disp: 180 capsule, Rfl: 3     ORDER FOR DME, Equipment being ordered: Nebulizer and tubing/filter, Disp: 1 Device, Rfl: 0     polyethylene glycol (MIRALAX/GLYCOLAX) powder, Take 17 g (1 capful) by mouth as needed for constipation, Disp: 1 Bottle, Rfl: 3     sertraline (ZOLOFT) 100 MG tablet, Take 2 tablets (200 mg) by mouth daily TAKE ONE AND ONE-HALF TABLET BY MOUTH ONCE DAILY.  For mood. (Patient taking differently: Take 200 mg by mouth daily ), Disp: 135 tablet, Rfl: 3     simvastatin (ZOCOR) 40 MG tablet, TAKE ONE TABLET BY MOUTH  EVERY NIGHT AT BEDTIME.  For cholesterol., Disp: 90 tablet, Rfl: 3     SKIN OILS EX, Lavender-headache, skin, peppermint-skin, upset stomach mix of oils, asthma mix of oils, eucalyptus. Tea tree oil-skin, vapor rub- chest tightness, sleepy time (vetiver, lavendaer, mrjoram, mandarin, ylang)- sleep, constipation, eczema (sweet almond oil). Also mixes with carriers: Coconut oil, [place] oil, Extra virgin oil. Frankincense- cough. Digize- oil. Purification- oil, lemon- oil. Essentialyme- pill.  Inhales a mix with olive oil almond and coconut oil with peppermint, and eucalyptus- sinus, allergies. Updated 12/1/2015.  Updated 4/26/2016: Lemon, Cinnamon bark, panaway, Thieves, Orange, [place], [place]iba, Disp: , Rfl:      Turmeric 500 MG CAPS, Take 1 capsule by mouth daily, Disp: , Rfl:     SOCIAL HISTORY:  Social History     Socioeconomic History     Marital status:      Spouse name: Not on file     Number of children: Not on file     Years of education: Not on file     Highest education level: Not on file   Occupational History     Not on file   Social Needs     Financial resource strain: Not on file     Food insecurity:     Worry: Not on file     Inability: Not on file     Transportation needs:     Medical: Not on file     Non-medical: Not on file   Tobacco Use     Smoking status: Former Smoker     Packs/day: 0.50     Years: 3.00     Pack years: 1.50     Types: Cigarettes     Smokeless tobacco: Never Used     Tobacco comment: smoked for 3 years when she was around 19   Substance and Sexual Activity     Alcohol use: Yes     Comment: RARELY     Drug use: No     Sexual activity: Not Currently   Lifestyle     Physical activity:     Days per week: Not on file     Minutes per session: Not on file     Stress: Not on file   Relationships     Social connections:     Talks on phone: Not on file     Gets together: Not on file     Attends Oriental orthodox service: Not on file     Active member of club or organization: Not on file      Attends meetings of clubs or organizations: Not on file     Relationship status: Not on file     Intimate partner violence:     Fear of current or ex partner: Not on file     Emotionally abused: Not on file     Physically abused: Not on file     Forced sexual activity: Not on file   Other Topics Concern     Parent/sibling w/ CABG, MI or angioplasty before 65F 55M? Yes     Comment: mother- valve problem   Social History Narrative     Not on file       FAMILY HISTORY: (I personally reviewed this history with the patient at today's visit)  Family History   Problem Relation Age of Onset     Heart Disease Mother         valve problem     Hypertension Mother      Diabetes Mother         type 2     Cerebrovascular Disease Mother      Allergies Mother      Eye Disorder Mother         Macular degeneration     Osteoporosis Mother      Respiratory Mother      Migraines Mother      Cardiovascular Father         MI at age 80     Cancer - colorectal Father      Diabetes Father      Hypertension Father         type 2     Eye Disorder Father         macular degeneration     Lipids Father      Colon Cancer Father      C.A.D. Maternal Grandmother      Diabetes Paternal Grandmother         type 2     Cardiovascular Paternal Grandmother         MI     Diabetes Sister         type 2     Allergies Sister      Gastrointestinal Disease Sister      Depression Sister      Gastrointestinal Disease Daughter      Migraines Daughter      Gastrointestinal Disease Daughter      Migraines Daughter      Migraines Son      Aneurysm No family hx of      Brain Hemorrhage No family hx of      Brain Tumor No family hx of      Cancer No family hx of      Chiari Malformation No family hx of      LUNG DISEASE No family hx of      Seizure Disorder No family hx of         ROS:    No fevers or chills  No weight loss  No sore throat  No shortness of breath or wheezing  No chest pain or pressure  No arthralgias or myalgias  No rashes or skin changes  No  "odynophagia  No BRBPR, hematochezia, melena  No dysuria, frequency or urgency  No hot/cold intolerance or polyria    PHYSICAL EXAMINATION:  Constitutional: aaox3, cooperative, pleasant, not dyspneic/diaphoretic, no acute distress  Vitals reviewed: /68 (BP Location: Left arm, Patient Position: Sitting, Cuff Size: Adult Regular)   Pulse 71   Ht 1.575 m (5' 2\")   Wt 56.6 kg (124 lb 11.2 oz)   SpO2 96%   BMI 22.81 kg/m    Wt:   Wt Readings from Last 2 Encounters:   08/27/19 56.6 kg (124 lb 11.2 oz)   08/12/19 55.7 kg (122 lb 12.8 oz)          Eyes: Sclera anicteric/injected  Ears/nose/mouth/throat: Normal oropharynx without ulcers or exudate, mucus membranes moist, hearing intact  Neck: supple, thyroid normal size  CV: No edema, RRR  Respiratory: Unlabored breathing, CTAB  Lymph: No submandibular, supraclavicular or inguinal lymphadenopathy  Abd: Nondistended, no masses, +bs, no hepatosplenomegaly, nontender, no peritoneal signs  Skin: warm, perfused, no jaundice  Psych: Normal affect  MSK: Normal gait      PERTINENT STUDIES: (I personally reviewed these laboratory studies today)  Most recent CBC:   Recent Labs   Lab Test 11/02/18  1412 05/09/18  1128   WBC 6.0 5.0   HGB 13.0 12.5   HCT 39.7 38.5    284     Most recent hepatic panel:  Recent Labs   Lab Test 11/02/18  1412 09/27/17  1515   ALT 29 28   AST 24 25     Most recent creatinine:  Recent Labs   Lab Test 11/02/18  1412 09/11/18  1351   CR 0.70 0.71       TSH   Date Value Ref Range Status   11/02/2018 2.45 0.40 - 4.00 mU/L Final               ASSESSMENT/PLAN:    Ingrid Amaral is a 73 year old female who presents for follow up today of her dysphagia and IBS.    Dysphagia - no esophageal obstructions noted on last egd in June 2018. Speech therapy eval was normal. She does have a large hiatal hernia which could be contributing if not causing her dysphagia. Recommended esophageal manometry for further work up and thereafter consider hiatel henia " surgical repair . Pt feels symptoms are under control at this time and would like to delay further testing. She will notify us if she has any symptoms that return.     In regards to IBS symptoms, she admits to poor eating habits. Although being lactose intolerant she eats ice cream multiple times a day. We reviewed proper eating habits today including taking time to chew foods and smaller portions. Recommended nutrition referral to assit with further necessary dietary changes.     Follow up as needed.     Other irritable bowel syndrome  - NUTRITION REFERRAL  Lactose intolerance  Dysphagia, unspecified type  Large hiatal hernia        Thank you for this consultation.  It was a pleasure to participate in the care of this patient; please contact us with any further questions.   A total of 25 minutes, face to face, was spent with this patient, >50% of which was counseling regarding the above delineated issues.        This note was created with voice recognition software, and while reviewed for accuracy, typos may remain.     Frnacesco Denton PA-C  Gastroenterology  [facility]

## 2019-09-16 ENCOUNTER — PATIENT OUTREACH (OUTPATIENT)
Dept: CARE COORDINATION | Facility: CLINIC | Age: 74
End: 2019-09-16

## 2019-09-16 NOTE — PROGRESS NOTES
Clinic Care Coordination Contact    Follow Up Progress Note      Assessment: No significant changes with patient.  They are frustrated when they have to go to appointments that are quite a distance and no real assessment is completed other than asking questions.  They then get directed back to a more local provider without any real significant input from those that they had to drive a distance to get to.    Goals addressed this encounter:   Goals Addressed                 This Visit's Progress      Medication 1 (pt-stated)   On track     Goal Statement: I will allow my spouse to assist me with medications when issues arise,  for consistent administration.   Measure of Success: I am taking my medications at the right time and every day  Supportive Steps to Achieve: actively involved spouse with supportive ideas  Barriers: memory loss  Strengths: supportive family  Date to Achieve By: 12-31-19                 Intervention/Education provided during outreach: Reviewed the neuropsychological assessment.  Patient and spouse both feel that she is okay driving locally and spouse does ride along with her at times.  His main concern is her time management on days that she has an appointment.  She will going to take care of many other tasks that she feels need to be done even before she gets dressed so that when it is time to go she is still having to get dressed and ready.  They have missed appointments on occasion because they arrived too late.  Some of those appointments will take 2 months to get back into again.  Talked about different options and spouse and patient feel they have tried many of those and patient does not like when spouse is telling her what to do.    Both feel that they are doing okay and she declined any needs for education on depression or anxiety.     Outreach Frequency: monthly    Plan:   Patient to continue to allow spouse to assist with medications.  Patient to only drive locally to areas she is  aware of.  Care Coordinator will follow up in 1 month.    EM Mcfarlane, [facility] - Care Coordinator   [facility]  9/16/2019   2:28 PM  [phone]

## 2019-09-18 DIAGNOSIS — K21.9 GASTROESOPHAGEAL REFLUX DISEASE WITHOUT ESOPHAGITIS: ICD-10-CM

## 2019-09-18 DIAGNOSIS — L40.9 PSORIASIS: ICD-10-CM

## 2019-09-18 RX ORDER — FLUOCINONIDE TOPICAL SOLUTION USP, 0.05% 0.5 MG/ML
SOLUTION TOPICAL
Qty: 60 ML | Refills: 0 | Status: SHIPPED | OUTPATIENT
Start: 2019-09-18 | End: 2019-09-24

## 2019-09-18 NOTE — TELEPHONE ENCOUNTER
"Requested Prescriptions   Pending Prescriptions Disp Refills     fluocinonide (LIDEX) 0.05 % external solution 60 mL 0     Sig: Apply twice daily as needed to scalp.  Do not apply to face. Due for office visit:[phone]       Topical Steroids and Nonsteroidals Protocol Failed - 9/18/2019  9:10 AM        Failed - High potency steroid not ordered        Passed - Patient is age 6 or older        Passed - Authorizing prescriber's most recent note related to this medication read.     If refill request is for ophthalmic use, please forward request to provider for approval.          Passed - Recent (12 mo) or future (30 days) visit within the authorizing provider's specialty     Patient had office visit in the last 12 months or has a visit in the next 30 days with authorizing provider or within the authorizing provider's specialty.  See \"Patient Info\" tab in inbasket, or \"Choose Columns\" in Meds & Orders section of the refill encounter.              Passed - Medication is active on med list        Last Written Prescription Date:  7/23/19  Last Fill Quantity: 60 ml,  # refills: 0   Last office visit: 8/29/2018 with prescribing provider:  JAMISON Schulte  Future Office Visit:   Next 5 appointments (look out 90 days)    Sep 19, 2019  2:00 PM CDT  SHORT with Leesa Muñiz PA-C  [facility] ([facility]) [address] [place] [id number]  [phone]   Sep 24, 2019  1:40 PM CDT  Return Visit with Miguelina Schulte PA-C  [facility] ([facility]) [address] [facility] [address]  [phone]   Oct 31, 2019 11:00 AM CDT  Return Visit with Pratibha Hay MD  [facility] ([facility]) [address] [facility] [address]  [phone]           "

## 2019-09-18 NOTE — TELEPHONE ENCOUNTER
Has upcoming office visit scheduled. Refilled per LOV note and [facility] protocol.     Sneha SALVADOR RN   Specialty Clinics

## 2019-09-18 NOTE — TELEPHONE ENCOUNTER
"Requested Prescriptions   Pending Prescriptions Disp Refills     omeprazole (PRILOSEC) 20 MG DR capsule [Pharmacy Med Name: OMEPRAZOLE 20MG CPDR] 180 capsule 3     Sig: TAKE ONE CAPSULE BY MOUTH TWICE A DAY FOR HEARTBURN       PPI Protocol Failed - 9/18/2019  9:06 AM        Failed - Recent (12 mo) or future (30 days) visit within the authorizing provider's specialty     Patient had office visit in the last 12 months or has a visit in the next 30 days with authorizing provider or within the authorizing provider's specialty.  See \"Patient Info\" tab in inbasket, or \"Choose Columns\" in Meds & Orders section of the refill encounter.              Passed - Not on Clopidogrel (unless Pantoprazole ordered)        Passed - No diagnosis of osteoporosis on record        Passed - Medication is active on med list        Passed - Patient is age 18 or older        Passed - No active pregnacy on record        Passed - No positive pregnancy test in past 12 months        Last Written Prescription Date:  7/25/18  Last Fill Quantity: 90,  # refills: 3   Last office visit: 4/24/2019 with prescribing provider:     Future Office Visit:   Next 5 appointments (look out 90 days)    Sep 19, 2019  2:00 PM CDT  SHORT with Leesa Muñiz PA-C  [facility] ([facility]) [address] [place] [id number]  [phone]   Sep 24, 2019  1:40 PM CDT  Return Visit with Miguelina Schulte PA-C  [facility] ([facility]) [address] [facility] [address]  [phone]   Oct 31, 2019 11:00 AM CDT  Return Visit with Pratibha Hay MD  [facility] ([facility]) [address] [facility] [address]  [phone]           "

## 2019-09-19 ENCOUNTER — OFFICE VISIT (OUTPATIENT)
Dept: FAMILY MEDICINE | Facility: CLINIC | Age: 74
End: 2019-09-19
Payer: MEDICARE

## 2019-09-19 VITALS
HEIGHT: 62 IN | BODY MASS INDEX: 22.26 KG/M2 | HEART RATE: 62 BPM | WEIGHT: 121 LBS | SYSTOLIC BLOOD PRESSURE: 111 MMHG | TEMPERATURE: 98 F | RESPIRATION RATE: 14 BRPM | DIASTOLIC BLOOD PRESSURE: 67 MMHG

## 2019-09-19 DIAGNOSIS — K21.9 GASTROESOPHAGEAL REFLUX DISEASE WITHOUT ESOPHAGITIS: ICD-10-CM

## 2019-09-19 DIAGNOSIS — F32.0 MILD MAJOR DEPRESSION (H): ICD-10-CM

## 2019-09-19 DIAGNOSIS — M54.41 CHRONIC RIGHT-SIDED LOW BACK PAIN WITH RIGHT-SIDED SCIATICA: ICD-10-CM

## 2019-09-19 DIAGNOSIS — I10 BENIGN ESSENTIAL HYPERTENSION: ICD-10-CM

## 2019-09-19 DIAGNOSIS — G47.00 INSOMNIA, UNSPECIFIED TYPE: ICD-10-CM

## 2019-09-19 DIAGNOSIS — G89.29 CHRONIC RIGHT-SIDED LOW BACK PAIN WITH RIGHT-SIDED SCIATICA: ICD-10-CM

## 2019-09-19 DIAGNOSIS — K52.9 CHRONIC DIARRHEA: ICD-10-CM

## 2019-09-19 DIAGNOSIS — J45.40 MODERATE PERSISTENT ASTHMA WITHOUT COMPLICATION: ICD-10-CM

## 2019-09-19 DIAGNOSIS — M54.16 LUMBAR RADICULOPATHY: Primary | ICD-10-CM

## 2019-09-19 DIAGNOSIS — F41.0 PANIC DISORDER WITHOUT AGORAPHOBIA: ICD-10-CM

## 2019-09-19 DIAGNOSIS — J30.9 ALLERGIC RHINITIS, UNSPECIFIED SEASONALITY, UNSPECIFIED TRIGGER: ICD-10-CM

## 2019-09-19 PROCEDURE — 99214 OFFICE O/P EST MOD 30 MIN: CPT | Performed by: PHYSICIAN ASSISTANT

## 2019-09-19 RX ORDER — TRAZODONE HYDROCHLORIDE 50 MG/1
50 TABLET, FILM COATED ORAL AT BEDTIME
Qty: 30 TABLET | Refills: 0 | Status: SHIPPED | OUTPATIENT
Start: 2019-09-19 | End: 2019-09-19

## 2019-09-19 RX ORDER — GABAPENTIN 300 MG/1
CAPSULE ORAL
Qty: 60 CAPSULE | Refills: 0 | Status: SHIPPED | OUTPATIENT
Start: 2019-09-19 | End: 2019-10-22

## 2019-09-19 RX ORDER — FLUTICASONE PROPIONATE 50 MCG
SPRAY, SUSPENSION (ML) NASAL
Qty: 16 G | Refills: 1 | Status: SHIPPED | OUTPATIENT
Start: 2019-09-19 | End: 2020-03-02

## 2019-09-19 RX ORDER — SERTRALINE HYDROCHLORIDE 100 MG/1
100 TABLET, FILM COATED ORAL DAILY
Qty: 30 TABLET | Refills: 0 | Status: SHIPPED | OUTPATIENT
Start: 2019-09-19 | End: 2019-09-19

## 2019-09-19 RX ORDER — TRAZODONE HYDROCHLORIDE 50 MG/1
50 TABLET, FILM COATED ORAL AT BEDTIME
Qty: 30 TABLET | Refills: 1 | Status: SHIPPED | OUTPATIENT
Start: 2019-09-19 | End: 2019-11-04

## 2019-09-19 RX ORDER — AMLODIPINE BESYLATE 5 MG/1
5 TABLET ORAL DAILY
Qty: 90 TABLET | Refills: 1 | Status: SHIPPED | OUTPATIENT
Start: 2019-09-19 | End: 2020-03-02

## 2019-09-19 RX ORDER — ALBUTEROL SULFATE 90 UG/1
2 AEROSOL, METERED RESPIRATORY (INHALATION) EVERY 4 HOURS PRN
Qty: 1 INHALER | Refills: 1 | Status: SHIPPED | OUTPATIENT
Start: 2019-09-19 | End: 2021-09-13

## 2019-09-19 RX ORDER — SERTRALINE HYDROCHLORIDE 100 MG/1
100 TABLET, FILM COATED ORAL DAILY
Qty: 30 TABLET | Refills: 1 | Status: SHIPPED | OUTPATIENT
Start: 2019-09-19 | End: 2020-03-02

## 2019-09-19 ASSESSMENT — MIFFLIN-ST. JEOR: SCORE: 997.1

## 2019-09-19 ASSESSMENT — ANXIETY QUESTIONNAIRES
1. FEELING NERVOUS, ANXIOUS, OR ON EDGE: SEVERAL DAYS
6. BECOMING EASILY ANNOYED OR IRRITABLE: SEVERAL DAYS
3. WORRYING TOO MUCH ABOUT DIFFERENT THINGS: MORE THAN HALF THE DAYS
2. NOT BEING ABLE TO STOP OR CONTROL WORRYING: MORE THAN HALF THE DAYS
7. FEELING AFRAID AS IF SOMETHING AWFUL MIGHT HAPPEN: NOT AT ALL
GAD7 TOTAL SCORE: 8
5. BEING SO RESTLESS THAT IT IS HARD TO SIT STILL: SEVERAL DAYS

## 2019-09-19 ASSESSMENT — PATIENT HEALTH QUESTIONNAIRE - PHQ9
SUM OF ALL RESPONSES TO PHQ QUESTIONS 1-9: 4
5. POOR APPETITE OR OVEREATING: SEVERAL DAYS

## 2019-09-19 NOTE — PROGRESS NOTES
Subjective     Ingrid Amaral is a 74 year old female who presents to clinic today for the following health issues:    HPI     Chief Complaint   Patient presents with     Results     Had MRI, showed 2 lesions on her brain and one of the lesions has seemed to have grown. Had cognitive screening done as well     Forms     Handicap form, DMV wants more information     Hernia     Discuss Hiatal hernia     Sleep Problem     Discuss starting a sleep aid for insomnia.       * Sleep Problem-  Discuss starting a sleep aid for insomnia, something similar to her 's.  She has tried his trazodone at 50 mg and has slept well, much less thrashing around in her sleep and feels rested.      *Hernia-  Discuss hiatal hernia  Large hiatal hernia  Much less heartburn with omeprazole -  never hears complaints  Hasn't tried PPI step down  Bloating has been better with the stools being better, since starting creon    * Forms- needs updated handicap parking, but neuropsych eval indicating she should not drive, though she and  are feeling neurology said they will take away her drivers license.  She drives short distances to store - 3 blocks or so.  I see no documentation to counter the neuropsych eval.  Neuropsych also indicating suboptimal control of depression and anxiety.    Refill meds.  Feels she is stable.    BP Readings from Last 3 Encounters:   09/19/19 111/67   08/27/19 113/68   08/12/19 122/80    Wt Readings from Last 3 Encounters:   09/19/19 54.9 kg (121 lb)   08/27/19 56.6 kg (124 lb 11.2 oz)   08/12/19 55.7 kg (122 lb 12.8 oz)         Reviewed and updated as needed this visit by Provider  Tobacco  Allergies  Meds  Problems  Med Hx  Surg Hx  Fam Hx         Review of Systems   ROS COMP: Constitutional, HEENT, cardiovascular, pulmonary, GI, , musculoskeletal, neuro, skin, endocrine and psych systems are negative, except as otherwise noted.      Objective    /67 (BP Location: Right arm, Patient  "Position: Chair, Cuff Size: Adult Regular)   Pulse 62   Temp 98  F (36.7  C) (Tympanic)   Resp 14   Ht 1.575 m (5' 2\")   Wt 54.9 kg (121 lb)   BMI 22.13 kg/m    Body mass index is 22.13 kg/m .  Physical Exam   GENERAL: healthy, alert and no distress  RESP: lungs clear to auscultation - no rales, rhonchi or wheezes  CV: regular rate and rhythm, normal S1 S2, no S3 or S4, no murmur, click or rub, no peripheral edema and peripheral pulses strong  PSYCH: mentation appears fair, memory poor, affect neutral          Assessment & Plan       ICD-10-CM    1. Lumbar radiculopathy M54.16 gabapentin (NEURONTIN) 300 MG capsule   2. Panic disorder without agoraphobia F41.0 traZODone (DESYREL) 50 MG tablet     sertraline (ZOLOFT) 100 MG tablet     DISCONTINUED: sertraline (ZOLOFT) 100 MG tablet   3. Insomnia, unspecified type G47.00 traZODone (DESYREL) 50 MG tablet     DISCONTINUED: traZODone (DESYREL) 50 MG tablet   4. Mild major depression (H) F32.0 traZODone (DESYREL) 50 MG tablet     sertraline (ZOLOFT) 100 MG tablet     DISCONTINUED: traZODone (DESYREL) 50 MG tablet     DISCONTINUED: sertraline (ZOLOFT) 100 MG tablet   5. Gastroesophageal reflux disease without esophagitis K21.9 omeprazole (PRILOSEC) 20 MG DR capsule     DISCONTINUED: omeprazole (PRILOSEC) 20 MG DR capsule   6. Moderate persistent asthma without complication J45.40 fluticasone-salmeterol (ADVAIR) 250-50 MCG/DOSE inhaler     albuterol (PROAIR HFA/PROVENTIL HFA/VENTOLIN HFA) 108 (90 Base) MCG/ACT inhaler   7. Benign essential hypertension I10 amLODIPine (NORVASC) 5 MG tablet   8. Chronic diarrhea K52.9 amylase-lipase-protease (CREON) 6000 units CPEP   9. Allergic rhinitis, unspecified seasonality, unspecified trigger J30.9 fluticasone (FLONASE) 50 MCG/ACT nasal spray   10. Chronic right-sided low back pain with right-sided sciatica M54.41 gabapentin (NEURONTIN) 300 MG capsule    G89.29           Patient Instructions   Dr Agrawal's note indicating you " should not drive.  Until we agree on whether you should or shouldn't drive, I can't fill out the disability form since I have to indicate if you can drive.  See what Dr Hay says.  Consider formal drivers test to settle the issue.    Meningioma - disliked Dr Ewing, see other specialist when next due for MRI in 6 months.  Call me when need the referral.    Heartburn-  Try decreasing omeprazole to 1 pill daily  Recheck 1 mo    Poor sleep -  Trazodone prescribed    Mood - not doing well enough per Dr Agrawal report  Decrease sertraline to 1 tab daily  Trazodone helps    Refilled meds   Discuss gabapentin and mood and sleep, heartburn all at next visit          Return in about 4 weeks (around 10/17/2019) for gabapentin and mood and sleep, heartburn.    Leesa uMñiz PA-C  [facility]

## 2019-09-19 NOTE — NURSING NOTE
"Chief Complaint   Patient presents with     Results     Had MRI, showed 2 lesions on her brain and has gotten. Had cognitive screening done as well     Forms     Handicap form, DMV wants more information     Hernia     Discuss Hiatal hernia     Sleep Problem     Discuss starting a sleep aid for insomnia.       Initial /67 (BP Location: Right arm, Patient Position: Chair, Cuff Size: Adult Regular)   Pulse 62   Temp 98  F (36.7  C) (Tympanic)   Resp 14   Ht 1.575 m (5' 2\")   Wt 54.9 kg (121 lb)   BMI 22.13 kg/m   Estimated body mass index is 22.13 kg/m  as calculated from the following:    Height as of this encounter: 1.575 m (5' 2\").    Weight as of this encounter: 54.9 kg (121 lb).    Patient presents to the clinic using No DME    Health Maintenance that is potentially due pending provider review:  NONE        Is there anyone who you would like to be able to receive your results? No  If yes have patient fill out BROOKE      "

## 2019-09-19 NOTE — LETTER
My Asthma Action Plan    Name: Ingrid Amaral   YOB: 1945  Date: 9/26/2019   My doctor: Leesa Muñiz PA-C   My clinic: [facility]        My Control Medicine: Fluticasone propionate + salmeterol (Advair Diskus or Wixela Inhub) -  250/50 mcg    My Rescue Medicine: Albuterol nebulizer solution    Albuterol (Proair/Ventolin/Proventil HFA) 2-4 puffs EVERY 4 HOURS as needed. Use a spacer if recommended by your provider.  My Oral Steroid Medicine: NA My Asthma Severity:   Moderate Persistent  Know your asthma triggers: Patient is unaware of triggers               GREEN ZONE   Good Control    I feel good    No cough or wheeze    Can work, sleep and play without asthma symptoms       Take your asthma control medicine every day.     1. If exercise triggers your asthma, take your rescue medication    15 minutes before exercise or sports, and    During exercise if you have asthma symptoms  2. Spacer to use with inhaler: If you have a spacer, make sure to use it with your inhaler             YELLOW ZONE Getting Worse  I have ANY of these:    I do not feel good    Cough or wheeze    Chest feels tight    Wake up at night   1. Keep taking your Green Zone medications  2. Start taking your rescue medicine:    every 20 minutes for up to 1 hour. Then every 4 hours for 24-48 hours.  3. If you stay in the Yellow Zone for more than 12-24 hours, contact your doctor.  4. If you do not return to the Green Zone in 12-24 hours or you get worse, start taking your oral steroid medicine if prescribed by your provider.           RED ZONE Medical Alert - Get Help  I have ANY of these:    I feel awful    Medicine is not helping    Breathing getting harder    Trouble walking or talking    Nose opens wide to breathe       1. Take your rescue medicine NOW  2. If your provider has prescribed an oral steroid medicine, start taking it NOW  3. Call your doctor NOW  4. If you are still in the Red Zone after 20  minutes and you have not reached your doctor:    Take your rescue medicine again and    Call 911 or go to the emergency room right away    See your regular doctor within 2 weeks of an Emergency Room or Urgent Care visit for follow-up treatment.          Annual Reminders:  Meet with Asthma Educator,  Flu Shot in the Fall, consider Pneumonia Vaccination for patients with asthma (aged 19 and older).    Pharmacy[facility]    [facility] - [place], [place] - [address] [facility] [place], [place] - [address] [place] [address] [facility] - [place], [place]                          Asthma Triggers  How To Control Things That Make Your Asthma Worse    Triggers are things that make your asthma worse.  Look at the list below to help you find your triggers and what you can do about them.  You can help prevent asthma flare-ups by staying away from your triggers.      Trigger                                                          What you can do   Cigarette Smoke  Tobacco smoke can make asthma worse. Do not allow smoking in your home, car or around you.  Be sure no one smokes at a child s day care or school.  If you smoke, ask your health care provider for ways to help you quit.  Ask family members to quit too.  Ask your health care provider for a referral to Quit Plan to help you quit smoking, or call 1-280-354-PLAN.     Colds, Flu, Bronchitis  These are common triggers of asthma. Wash your hands often.  Don t touch your eyes, nose or mouth.  Get a flu shot every year.     Dust Mites  These are tiny bugs that live in cloth or carpet. They are too small to see. Wash sheets and blankets in hot water every week.   Encase pillows and mattress in dust mite proof covers.  Avoid having carpet if you can. If you have carpet, vacuum weekly.   Use a dust mask and HEPA vacuum.   Pollen and Outdoor Mold  Some people are allergic to trees, grass, or weed pollen, or molds. Try to keep your windows  closed.  Limit time out doors when pollen count is high.   Ask you health care provider about taking medicine during allergy season.     Animal Dander  Some people are allergic to skin flakes, urine or saliva from pets with fur or feathers. Keep pets with fur or feathers out of your home.    If you can t keep the pet outdoors, then keep the pet out of your bedroom.  Keep the bedroom door closed.  Keep pets off cloth furniture and away from stuffed toys.     Mice, Rats, and Cockroaches   Some people are allergic to the waste from these pests.   Cover food and garbage.  Clean up spills and food crumbs.  Store grease in the refrigerator.   Keep food out of the bedroom.   Indoor Mold  This can be a trigger if your home has high moisture. Fix leaking faucets, pipes, or other sources of water.   Clean moldy surfaces.  Dehumidify basement if it is damp and smelly.   Smoke, Strong Odors, and Sprays  These can reduce air quality. Stay away from strong odors and sprays, such as perfume, powder, hair spray, paints, smoke incense, paint, cleaning products, candles and new carpet.   Exercise or Sports  Some people with asthma have this trigger. Be active!  Ask your doctor about taking medicine before sports or exercise to prevent symptoms.    Warm up for 5-10 minutes before and after sports or exercise.     Other Triggers of Asthma  Cold air:  Cover your nose and mouth with a scarf.  Sometimes laughing or crying can be a trigger.  Some medicines and food can trigger asthma.

## 2019-09-19 NOTE — PATIENT INSTRUCTIONS
Dr Agrawal's note indicating you should not drive.  Until we agree on whether you should or shouldn't drive, I can't fill out the disability form since I have to indicate if you can drive.  See what Dr Hay says.  Consider formal drivers test to settle the issue.    Meningioma - disliked Dr Ewing, see other specialist when next due for MRI in 6 months.  Call me when need the referral.    Heartburn-  Try decreasing omeprazole to 1 pill daily  Recheck 1 mo    Poor sleep -  Trazodone prescribed    Mood - not doing well enough per Dr Agrawal report  Decrease sertraline to 1 tab daily  Trazodone helps    Refilled meds   Discuss gabapentin and mood and sleep, heartburn all at next visit

## 2019-09-20 ASSESSMENT — ASTHMA QUESTIONNAIRES: ACT_TOTALSCORE: 23

## 2019-09-20 ASSESSMENT — ANXIETY QUESTIONNAIRES: GAD7 TOTAL SCORE: 8

## 2019-09-24 ENCOUNTER — OFFICE VISIT (OUTPATIENT)
Dept: DERMATOLOGY | Facility: CLINIC | Age: 74
End: 2019-09-24
Payer: MEDICARE

## 2019-09-24 ENCOUNTER — TELEPHONE (OUTPATIENT)
Dept: DERMATOLOGY | Facility: CLINIC | Age: 74
End: 2019-09-24

## 2019-09-24 VITALS
DIASTOLIC BLOOD PRESSURE: 74 MMHG | SYSTOLIC BLOOD PRESSURE: 131 MMHG | BODY MASS INDEX: 22.13 KG/M2 | HEIGHT: 62 IN | HEART RATE: 63 BPM

## 2019-09-24 DIAGNOSIS — D48.5 NEOPLASM OF UNCERTAIN BEHAVIOR OF SKIN: ICD-10-CM

## 2019-09-24 DIAGNOSIS — D18.01 CHERRY ANGIOMA: ICD-10-CM

## 2019-09-24 DIAGNOSIS — L82.1 SEBORRHEIC KERATOSIS: ICD-10-CM

## 2019-09-24 DIAGNOSIS — L82.0 INFLAMED SEBORRHEIC KERATOSIS: Primary | ICD-10-CM

## 2019-09-24 DIAGNOSIS — L82.0 INFLAMED SEBORRHEIC KERATOSIS: ICD-10-CM

## 2019-09-24 DIAGNOSIS — L81.4 LENTIGO: Primary | ICD-10-CM

## 2019-09-24 DIAGNOSIS — L40.9 PSORIASIS: ICD-10-CM

## 2019-09-24 PROCEDURE — 11900 INJECT SKIN LESIONS </W 7: CPT | Mod: 59 | Performed by: PHYSICIAN ASSISTANT

## 2019-09-24 PROCEDURE — 17110 DESTRUCTION B9 LES UP TO 14: CPT | Performed by: PHYSICIAN ASSISTANT

## 2019-09-24 PROCEDURE — 11102 TANGNTL BX SKIN SINGLE LES: CPT | Mod: 59 | Performed by: PHYSICIAN ASSISTANT

## 2019-09-24 PROCEDURE — 99213 OFFICE O/P EST LOW 20 MIN: CPT | Mod: 25 | Performed by: PHYSICIAN ASSISTANT

## 2019-09-24 PROCEDURE — 88331 PATH CONSLTJ SURG 1 BLK 1SPC: CPT | Performed by: DERMATOLOGY

## 2019-09-24 RX ORDER — BETAMETHASONE DIPROPIONATE 0.5 MG/G
CREAM TOPICAL
Qty: 50 G | Refills: 4 | Status: SHIPPED | OUTPATIENT
Start: 2019-09-24 | End: 2021-04-05

## 2019-09-24 RX ORDER — CALCIPOTRIENE 50 UG/G
CREAM TOPICAL
Qty: 60 G | Refills: 8 | Status: SHIPPED | OUTPATIENT
Start: 2019-09-24 | End: 2023-01-30

## 2019-09-24 RX ORDER — FLUOCINONIDE TOPICAL SOLUTION USP, 0.05% 0.5 MG/ML
SOLUTION TOPICAL
Qty: 60 ML | Refills: 0 | Status: SHIPPED | OUTPATIENT
Start: 2019-09-24 | End: 2021-04-05

## 2019-09-24 RX ORDER — CLOBETASOL PROPIONATE 0.05 G/100ML
SHAMPOO TOPICAL
Qty: 118 ML | Refills: 4 | Status: SHIPPED | OUTPATIENT
Start: 2019-09-24 | End: 2020-03-04

## 2019-09-24 NOTE — TELEPHONE ENCOUNTER
----- Message from Juan Hendrickson MD sent at 9/24/2019  2:29 PM CDT -----  R lat breast inflamed seborrheic keratosis no treatment

## 2019-09-24 NOTE — LETTER
9/24/2019         RE: Ingrid Amaral  [address] [facility] [address]        Dear Colleague,    Thank you for referring your patient, Ingrid Amaral, to the [facility]. Please see a copy of my visit note below.    R lat breast:Sharply demarcated exophytic epidermal growth composed of sheets of small cells basaloid cells with variable melanin pigmentation.  There are keratin-filled cysts throughout.  The dermis is overall unremarkable with a bland monomorphic inflammatory infiltrate.        R lat breast inflamed seborrheic keratosis no treatment     Again, thank you for allowing me to participate in the care of your patient.        Sincerely,        Juan Hendrickson MD

## 2019-09-24 NOTE — PROGRESS NOTES
Ingrid Amaral is a 74 year old year old female patient here today for skin check. She notes psoriasis on scalp is flared, she finds herself picking at her skin. She is still using lidex solution which helps a little. She alternates between calcipotriene and betamethasone for psoriasis on legs. Note a spot on breast that has been bleeding. Patient has no other skin complaints today.  Remainder of the HPI, Meds, PMH, Allergies, FH, and SH was reviewed in chart.    Pertinent Hx:   No personal history of skin cancer.   Past Medical History:   Diagnosis Date     Asthma      GERD (gastroesophageal reflux disease)      Radial head fracture 3/18/2010       Past Surgical History:   Procedure Laterality Date     ANKLE SURGERY      bilateral     COLONOSCOPY       COLONOSCOPY N/A 3/20/2015    Procedure: COLONOSCOPY;  Surgeon: Britney Martinez MD;  Location: [facility]     COLONOSCOPY N/A 5/22/2017    Procedure: COLONOSCOPY;  Colonoscopy;  Surgeon: Tristen Rangel MD;  Location: [facility]     ESOPHAGOSCOPY, GASTROSCOPY, DUODENOSCOPY (EGD), COMBINED N/A 6/7/2018    Procedure: COMBINED ESOPHAGOSCOPY, GASTROSCOPY, DUODENOSCOPY (EGD);  gastroscopy;  Surgeon: Tristen Rangel MD;  Location: [facility]     HC ESOPH/GAS REFLUX TEST W NASAL IMPED >1 HR  4/19/2012    Procedure:ESOPHAGEAL IMPEDENCE FUNCTION TEST WITH 24 HOUR PH GREATER THAN 1 HOUR; Surgeon:VALDO UMANZOR; Location: [facility]     HERNIA REPAIR      umbilcal     HYSTERECTOMY, PAP NO LONGER INDICATED  1991     TONSILLECTOMY       UPPER GI ENDOSCOPY          Family History   Problem Relation Age of Onset     Heart Disease Mother         valve problem     Hypertension Mother      Diabetes Mother         type 2     Cerebrovascular Disease Mother      Allergies Mother      Eye Disorder Mother         Macular degeneration     Osteoporosis Mother      Respiratory Mother      Migraines Mother      Cardiovascular Father         MI at age 80     Cancer - colorectal Father      Diabetes  Father      Hypertension Father         type 2     Eye Disorder Father         macular degeneration     Lipids Father      Colon Cancer Father      C.A.D. Maternal Grandmother      Diabetes Paternal Grandmother         type 2     Cardiovascular Paternal Grandmother         MI     Diabetes Sister         type 2     Allergies Sister      Gastrointestinal Disease Sister      Depression Sister      Gastrointestinal Disease Daughter      Migraines Daughter      Gastrointestinal Disease Daughter      Migraines Daughter      Migraines Son      Aneurysm No family hx of      Brain Hemorrhage No family hx of      Brain Tumor No family hx of      Cancer No family hx of      Chiari Malformation No family hx of      LUNG DISEASE No family hx of      Seizure Disorder No family hx of        Social History     Socioeconomic History     Marital status:      Spouse name: Not on file     Number of children: Not on file     Years of education: Not on file     Highest education level: Not on file   Occupational History     Not on file   Social Needs     Financial resource strain: Not on file     Food insecurity:     Worry: Not on file     Inability: Not on file     Transportation needs:     Medical: Not on file     Non-medical: Not on file   Tobacco Use     Smoking status: Former Smoker     Packs/day: 0.50     Years: 3.00     Pack years: 1.50     Types: Cigarettes     Smokeless tobacco: Never Used     Tobacco comment: smoked for 3 years when she was around 19   Substance and Sexual Activity     Alcohol use: Yes     Comment: RARELY     Drug use: No     Sexual activity: Not Currently   Lifestyle     Physical activity:     Days per week: Not on file     Minutes per session: Not on file     Stress: Not on file   Relationships     Social connections:     Talks on phone: Not on file     Gets together: Not on file     Attends Holiness service: Not on file     Active member of club or organization: Not on file     Attends meetings of  clubs or organizations: Not on file     Relationship status: Not on file     Intimate partner violence:     Fear of current or ex partner: Not on file     Emotionally abused: Not on file     Physically abused: Not on file     Forced sexual activity: Not on file   Other Topics Concern     Parent/sibling w/ CABG, MI or angioplasty before 65F 55M? Yes     Comment: mother- valve problem   Social History Narrative     Not on file       Outpatient Encounter Medications as of 9/24/2019   Medication Sig Dispense Refill     augmented betamethasone dipropionate (DIPROLENE-AF) 0.05 % external cream Apply sparingly twice daily to areas of psoriasis Friday through Sunday. 50 g 4     calcipotriene (DOVONOX) 0.005 % external cream Apply twice daily to psoriasis on Monday through Thursday. 60 g 8     clobetasol propionate (CLOBEX) 0.05 % external shampoo Leave on scalp for 15 minutes then rinse. Use 2-3 times weekly. 118 mL 4     fluocinonide (LIDEX) 0.05 % external solution Apply twice daily as needed to scalp.  Do not apply to face. Please keep upcoming appointment. 60 mL 0     albuterol (PROAIR HFA/PROVENTIL HFA/VENTOLIN HFA) 108 (90 Base) MCG/ACT inhaler Inhale 2 puffs into the lungs every 4 hours as needed for shortness of breath / dyspnea 1 Inhaler 1     albuterol (PROVENTIL) (5 MG/ML) 0.5% nebulizer solution Take 0.5 mLs (2.5 mg) by nebulization every 4 hours as needed for wheezing or shortness of breath / dyspnea 30 vial 1     amLODIPine (NORVASC) 5 MG tablet Take 1 tablet (5 mg) by mouth daily For blood pressure 90 tablet 1     amylase-lipase-protease (CREON) 6000 units CPEP TAKE TWO CAPSULES BY MOUTH THREE TIMES A DAY WITH MEALS FOR DIARRHEA 180 capsule 11     ascorbic acid 1000 MG TABS tablet Take 1,000 mg by mouth daily       biotin 1000 MCG TABS tablet Take 1,000 mcg by mouth 2 times daily       Calcium-Magnesium-Zinc 333..33-5 MG TABS Take 1 tablet by mouth daily       cetirizine (ZYRTEC) 10 MG tablet Take 1  tablet (10 mg) by mouth every morning For allergies 90 tablet 3     donepezil (ARICEPT) 10 MG tablet TAKE ONE TABLET BY MOUTH ONCE DAILY AT BEDTIME FOR MEMORY 30 tablet 1     estradiol (ESTRACE) 0.1 MG/GM vaginal cream USE ONE GRAM VAGINALLY ONCE DAILY AND SPREAD A SMALL AMOUNT AROUND THE CLITORAL REES For vaginal area issues. 85 g 0     fluticasone (FLONASE) 50 MCG/ACT nasal spray INSTILL ONE SPRAY IN EACH NOSTRIL EVERY DAY.  For allergies 16 g 1     fluticasone-salmeterol (ADVAIR) 250-50 MCG/DOSE inhaler INHALE 1 PUFF BY MOUTH INTO THE LUNGS TWICE DAILY.  For asthma. 1 Inhaler 0     gabapentin (NEURONTIN) 300 MG capsule TAKE ONE CAPSULE BY MOUTH EVERY NIGHT AT BEDTIME AS NEEDED.  For back pain. 60 capsule 0     hydrocortisone 1 % cream Apply topically as needed       lactase (LACTAID) 9000 UNITS TABS Take 9,000 Units by mouth as needed       melatonin 5 MG tablet Take 1 tablet (5 mg) by mouth nightly as needed for sleep 90 tablet 3     montelukast (SINGULAIR) 10 MG tablet Take 1 tablet (10 mg) by mouth daily For allergies and asthma 90 tablet 3     omeprazole (PRILOSEC) 20 MG DR capsule Take 1 capsule (20 mg) by mouth daily 30 capsule 1     ORDER FOR DME Equipment being ordered: Nebulizer and tubing/filter 1 Device 0     polyethylene glycol (MIRALAX/GLYCOLAX) powder Take 17 g (1 capful) by mouth as needed for constipation 1 Bottle 3     sertraline (ZOLOFT) 100 MG tablet Take 1 tablet (100 mg) by mouth daily For mood. 30 tablet 1     simvastatin (ZOCOR) 40 MG tablet TAKE ONE TABLET BY MOUTH EVERY NIGHT AT BEDTIME.  For cholesterol. 90 tablet 3     SKIN OILS EX Lavender-headache, skin, peppermint-skin, upset stomach mix of oils, asthma mix of oils, eucalyptus. Tea tree oil-skin, vapor rub- chest tightness, sleepy time (vetiver, lavendaer, mrjoram, mandarin, ylang)- sleep, constipation, eczema (sweet almond oil). Also mixes with carriers: Coconut oil, [place] oil, Extra virgin oil. Frankincense- cough. Digize- oil.  "Purification- oil, lemon- oil. Essentialyme- pill.  Inhales a mix with olive oil almond and coconut oil with peppermint, and eucalyptus- sinus, allergies. Updated 12/1/2015.   Updated 4/26/2016: Lemon, Cinnamon bark, panaway, Thieves, Orange, [place], Copaiba       traZODone (DESYREL) 50 MG tablet Take 1 tablet (50 mg) by mouth At Bedtime 30 tablet 1     Turmeric 500 MG CAPS Take 1 capsule by mouth daily       [DISCONTINUED] augmented betamethasone dipropionate (DIPROLENE-AF) 0.05 % external cream Apply sparingly to legs twice daily as needed. Do not apply to face. May occlude with saran wrap for 3-4 days for 6 hours.  For psoriasis rash. 50 g 0     [DISCONTINUED] fluocinonide (LIDEX) 0.05 % external solution Apply twice daily as needed to scalp.  Do not apply to face. Please keep upcoming appointment. 60 mL 0     No facility-administered encounter medications on file as of 9/24/2019.              Review Of Systems  Skin: As above  Eyes: negative  Ears/Nose/Throat: negative  Respiratory: No shortness of breath, dyspnea on exertion, cough, or hemoptysis  Cardiovascular: negative  Gastrointestinal: negative  Genitourinary: negative  Musculoskeletal: negative  Neurologic: negative  Psychiatric: negative  Hematologic/Lymphatic/Immunologic: negative  Endocrine: negative      O:   NAD, WDWN, Alert & Oriented, Mood & Affect wnl, Vitals stable   Here today alone   /74   Pulse 63   Ht 1.575 m (5' 2\")   BMI 22.13 kg/m     General appearance normal   Vitals stable   Alert, oriented and in no acute distress    0.3 cm pink scabbed papule on right lateral breast   Stuck on papules and brown macules on trunk and ext   Red papules on torso and extremities  Psoriasiform plaques on scalp and extremities      The remainder of the detailed exam was unremarkable; the following areas were examined:  scalp/hair, conjunctiva/lids, face, neck, lips/teeth, oral mucosa/gingiva, chest, back, abdomen, buttocks, digits/nails, RUE, " LUE, RLE, LLE.      Eyes: Conjunctivae/lids:Normal     ENT: Lips: normal    MSK:Normal    Cardiovascular: peripheral edema none    Pulm: Breathing Normal    Neuro/Psych: Orientation:Normal; Mood/Affect:Normal  A/P:  1. Scalp Psoriasis   IL TAC: PGACAC discussed.  Risks including but not limited to injection site reaction, bruising, no resolution.  All questions answered and entertained to patient s satisfaction.  Informed consent obtained.  IL TAC in concentration of 10mg/ml was injected ID to psoriasis.  Total injected was  1 ml.  Patient tolerated without complications and given wound care instructions, including not to move product around.  Return in 4 weeks for follow-up and possible additional IL TAC.    Use clobetasol shampoo.   Apply lidex solution as needed.   2. Psoriasis   Alternate between betamethasone and calcipotriene.   Try vanicream moisturizers and free and clear shampoo.   Discussed that this is chronic, no cure.   Patient is not interested in systemic medication at this time.   3. Inflamed seborrheic keratosis on legs x 3  LN2:  Treated with LN2 for 5s for 1-2 cycles. Warned risks of blistering, pain, pigment change, scarring, and incomplete resolution.  Advised patient to return if lesions do not completely resolve.  Wound care sheet given.  4. R/O ISK vs SCC on right lateral breast  TANGENTIAL BIOPSY IN HOUSE:  After consent, anesthesia with LEC and prep, tangential excision performed.  No complications and routine wound care.    5Seborrheic keratosis, lentigo, angioma  BENIGN LESIONS DISCUSSED WITH PATIENT:  I discussed the specifics of tumor, prognosis, and genetics of benign lesions.  I explained that treatment of these lesions would be purely cosmetic and not medically neccessary.  I discussed with patient different removal options including excision, cautery and /or laser.      Nature and genetics of benign skin lesions dicussed with patient.  Signs and Symptoms of skin cancer discussed  with patient.  ABCDEs of melanoma reviewed with patient.  Patient encouraged to perform monthly skin exams.  UV precautions reviewed with patient.  Risks of non-melanoma skin cancer discussed with patient   Return to clinic one year or sooner if needed.

## 2019-09-24 NOTE — PROGRESS NOTES
R lat breast:Sharply demarcated exophytic epidermal growth composed of sheets of small cells basaloid cells with variable melanin pigmentation.  There are keratin-filled cysts throughout.  The dermis is overall unremarkable with a bland monomorphic inflammatory infiltrate.        R lat breast inflamed seborrheic keratosis no treatment

## 2019-09-24 NOTE — TELEPHONE ENCOUNTER
Patient's , Harsha, (Consent to communicate on file) notified and verbalized understanding. Khushi Wilder RN

## 2019-09-24 NOTE — LETTER
9/24/2019         RE: Ingrid Amaral  [address] [address] [facility] [address]        Dear Colleague,    Thank you for referring your patient, Ingrid Amaral, to the [facility]. Please see a copy of my visit note below.    Ingrid Amaral is a 74 year old year old female patient here today for skin check. She notes psoriasis on scalp is flared, she finds herself picking at her skin. She is still using lidex solution which helps a little. She alternates between calcipotriene and betamethasone for psoriasis on legs. Note a spot on breast that has been bleeding. Patient has no other skin complaints today.  Remainder of the HPI, Meds, PMH, Allergies, FH, and SH was reviewed in chart.    Pertinent Hx:   No personal history of skin cancer.   Past Medical History:   Diagnosis Date     Asthma      GERD (gastroesophageal reflux disease)      Radial head fracture 3/18/2010       Past Surgical History:   Procedure Laterality Date     ANKLE SURGERY      bilateral     COLONOSCOPY       COLONOSCOPY N/A 3/20/2015    Procedure: COLONOSCOPY;  Surgeon: Britney Martinez MD;  Location: [facility]     COLONOSCOPY N/A 5/22/2017    Procedure: COLONOSCOPY;  Colonoscopy;  Surgeon: Tristen Rangel MD;  Location: [facility]     ESOPHAGOSCOPY, GASTROSCOPY, DUODENOSCOPY (EGD), COMBINED N/A 6/7/2018    Procedure: COMBINED ESOPHAGOSCOPY, GASTROSCOPY, DUODENOSCOPY (EGD);  gastroscopy;  Surgeon: Tristen Rangel MD;  Location: [facility]     HC ESOPH/GAS REFLUX TEST W NASAL IMPED >1 HR  4/19/2012    Procedure:ESOPHAGEAL IMPEDENCE FUNCTION TEST WITH 24 HOUR PH GREATER THAN 1 HOUR; Surgeon:VALDO UMANZOR; Location: GI     HERNIA REPAIR      umbilcal     HYSTERECTOMY, PAP NO LONGER INDICATED  1991     TONSILLECTOMY       UPPER GI ENDOSCOPY          Family History   Problem Relation Age of Onset     Heart Disease Mother         valve problem     Hypertension Mother      Diabetes Mother         type 2     Cerebrovascular  Disease Mother      Allergies Mother      Eye Disorder Mother         Macular degeneration     Osteoporosis Mother      Respiratory Mother      Migraines Mother      Cardiovascular Father         MI at age 80     Cancer - colorectal Father      Diabetes Father      Hypertension Father         type 2     Eye Disorder Father         macular degeneration     Lipids Father      Colon Cancer Father      C.A.D. Maternal Grandmother      Diabetes Paternal Grandmother         type 2     Cardiovascular Paternal Grandmother         MI     Diabetes Sister         type 2     Allergies Sister      Gastrointestinal Disease Sister      Depression Sister      Gastrointestinal Disease Daughter      Migraines Daughter      Gastrointestinal Disease Daughter      Migraines Daughter      Migraines Son      Aneurysm No family hx of      Brain Hemorrhage No family hx of      Brain Tumor No family hx of      Cancer No family hx of      Chiari Malformation No family hx of      LUNG DISEASE No family hx of      Seizure Disorder No family hx of        Social History     Socioeconomic History     Marital status:      Spouse name: Not on file     Number of children: Not on file     Years of education: Not on file     Highest education level: Not on file   Occupational History     Not on file   Social Needs     Financial resource strain: Not on file     Food insecurity:     Worry: Not on file     Inability: Not on file     Transportation needs:     Medical: Not on file     Non-medical: Not on file   Tobacco Use     Smoking status: Former Smoker     Packs/day: 0.50     Years: 3.00     Pack years: 1.50     Types: Cigarettes     Smokeless tobacco: Never Used     Tobacco comment: smoked for 3 years when she was around 19   Substance and Sexual Activity     Alcohol use: Yes     Comment: RARELY     Drug use: No     Sexual activity: Not Currently   Lifestyle     Physical activity:     Days per week: Not on file     Minutes per session: Not on  file     Stress: Not on file   Relationships     Social connections:     Talks on phone: Not on file     Gets together: Not on file     Attends Hindu service: Not on file     Active member of club or organization: Not on file     Attends meetings of clubs or organizations: Not on file     Relationship status: Not on file     Intimate partner violence:     Fear of current or ex partner: Not on file     Emotionally abused: Not on file     Physically abused: Not on file     Forced sexual activity: Not on file   Other Topics Concern     Parent/sibling w/ CABG, MI or angioplasty before 65F 55M? Yes     Comment: mother- valve problem   Social History Narrative     Not on file       Outpatient Encounter Medications as of 9/24/2019   Medication Sig Dispense Refill     augmented betamethasone dipropionate (DIPROLENE-AF) 0.05 % external cream Apply sparingly twice daily to areas of psoriasis Friday through Sunday. 50 g 4     calcipotriene (DOVONOX) 0.005 % external cream Apply twice daily to psoriasis on Monday through Thursday. 60 g 8     clobetasol propionate (CLOBEX) 0.05 % external shampoo Leave on scalp for 15 minutes then rinse. Use 2-3 times weekly. 118 mL 4     fluocinonide (LIDEX) 0.05 % external solution Apply twice daily as needed to scalp.  Do not apply to face. Please keep upcoming appointment. 60 mL 0     albuterol (PROAIR HFA/PROVENTIL HFA/VENTOLIN HFA) 108 (90 Base) MCG/ACT inhaler Inhale 2 puffs into the lungs every 4 hours as needed for shortness of breath / dyspnea 1 Inhaler 1     albuterol (PROVENTIL) (5 MG/ML) 0.5% nebulizer solution Take 0.5 mLs (2.5 mg) by nebulization every 4 hours as needed for wheezing or shortness of breath / dyspnea 30 vial 1     amLODIPine (NORVASC) 5 MG tablet Take 1 tablet (5 mg) by mouth daily For blood pressure 90 tablet 1     amylase-lipase-protease (CREON) 6000 units CPEP TAKE TWO CAPSULES BY MOUTH THREE TIMES A DAY WITH MEALS FOR DIARRHEA 180 capsule 11     ascorbic  acid 1000 MG TABS tablet Take 1,000 mg by mouth daily       biotin 1000 MCG TABS tablet Take 1,000 mcg by mouth 2 times daily       Calcium-Magnesium-Zinc 333..33-5 MG TABS Take 1 tablet by mouth daily       cetirizine (ZYRTEC) 10 MG tablet Take 1 tablet (10 mg) by mouth every morning For allergies 90 tablet 3     donepezil (ARICEPT) 10 MG tablet TAKE ONE TABLET BY MOUTH ONCE DAILY AT BEDTIME FOR MEMORY 30 tablet 1     estradiol (ESTRACE) 0.1 MG/GM vaginal cream USE ONE GRAM VAGINALLY ONCE DAILY AND SPREAD A SMALL AMOUNT AROUND THE CLITORAL REES For vaginal area issues. 85 g 0     fluticasone (FLONASE) 50 MCG/ACT nasal spray INSTILL ONE SPRAY IN EACH NOSTRIL EVERY DAY.  For allergies 16 g 1     fluticasone-salmeterol (ADVAIR) 250-50 MCG/DOSE inhaler INHALE 1 PUFF BY MOUTH INTO THE LUNGS TWICE DAILY.  For asthma. 1 Inhaler 0     gabapentin (NEURONTIN) 300 MG capsule TAKE ONE CAPSULE BY MOUTH EVERY NIGHT AT BEDTIME AS NEEDED.  For back pain. 60 capsule 0     hydrocortisone 1 % cream Apply topically as needed       lactase (LACTAID) 9000 UNITS TABS Take 9,000 Units by mouth as needed       melatonin 5 MG tablet Take 1 tablet (5 mg) by mouth nightly as needed for sleep 90 tablet 3     montelukast (SINGULAIR) 10 MG tablet Take 1 tablet (10 mg) by mouth daily For allergies and asthma 90 tablet 3     omeprazole (PRILOSEC) 20 MG DR capsule Take 1 capsule (20 mg) by mouth daily 30 capsule 1     ORDER FOR DME Equipment being ordered: Nebulizer and tubing/filter 1 Device 0     polyethylene glycol (MIRALAX/GLYCOLAX) powder Take 17 g (1 capful) by mouth as needed for constipation 1 Bottle 3     sertraline (ZOLOFT) 100 MG tablet Take 1 tablet (100 mg) by mouth daily For mood. 30 tablet 1     simvastatin (ZOCOR) 40 MG tablet TAKE ONE TABLET BY MOUTH EVERY NIGHT AT BEDTIME.  For cholesterol. 90 tablet 3     SKIN OILS EX Lavender-headache, skin, peppermint-skin, upset stomach mix of oils, asthma mix of oils, eucalyptus. Tea  "tree oil-skin, vapor rub- chest tightness, sleepy time (vetiver, lavendaer, mrjoram, mandarin, ylang)- sleep, constipation, eczema (sweet almond oil). Also mixes with carriers: Coconut oil, [place]will oil, Extra virgin oil. Frankincense- cough. Digize- oil. Purification- oil, lemon- oil. Essentialyme- pill.  Inhales a mix with olive oil almond and coconut oil with peppermint, and eucalyptus- sinus, allergies. Updated 12/1/2015.   Updated 4/26/2016: Lemon, Cinnamon bark, panaway, Thieves, Orange, Wintergreen, Copaiba       traZODone (DESYREL) 50 MG tablet Take 1 tablet (50 mg) by mouth At Bedtime 30 tablet 1     Turmeric 500 MG CAPS Take 1 capsule by mouth daily       [DISCONTINUED] augmented betamethasone dipropionate (DIPROLENE-AF) 0.05 % external cream Apply sparingly to legs twice daily as needed. Do not apply to face. May occlude with saran wrap for 3-4 days for 6 hours.  For psoriasis rash. 50 g 0     [DISCONTINUED] fluocinonide (LIDEX) 0.05 % external solution Apply twice daily as needed to scalp.  Do not apply to face. Please keep upcoming appointment. 60 mL 0     No facility-administered encounter medications on file as of 9/24/2019.              Review Of Systems  Skin: As above  Eyes: negative  Ears/Nose/Throat: negative  Respiratory: No shortness of breath, dyspnea on exertion, cough, or hemoptysis  Cardiovascular: negative  Gastrointestinal: negative  Genitourinary: negative  Musculoskeletal: negative  Neurologic: negative  Psychiatric: negative  Hematologic/Lymphatic/Immunologic: negative  Endocrine: negative      O:   NAD, WDWN, Alert & Oriented, Mood & Affect wnl, Vitals stable   Here today alone   /74   Pulse 63   Ht 1.575 m (5' 2\")   BMI 22.13 kg/m      General appearance normal   Vitals stable   Alert, oriented and in no acute distress    0.3 cm pink scabbed papule on right lateral breast   Stuck on papules and brown macules on trunk and ext   Red papules on torso and extremities  Psoriasiform " plaques on scalp and extremities      The remainder of the detailed exam was unremarkable; the following areas were examined:  scalp/hair, conjunctiva/lids, face, neck, lips/teeth, oral mucosa/gingiva, chest, back, abdomen, buttocks, digits/nails, RUE, LUE, RLE, LLE.      Eyes: Conjunctivae/lids:Normal     ENT: Lips: normal    MSK:Normal    Cardiovascular: peripheral edema none    Pulm: Breathing Normal    Neuro/Psych: Orientation:Normal; Mood/Affect:Normal  A/P:  1. Scalp Psoriasis   IL TAC: PGACAC discussed.  Risks including but not limited to injection site reaction, bruising, no resolution.  All questions answered and entertained to patient s satisfaction.  Informed consent obtained.  IL TAC in concentration of 10mg/ml was injected ID to psoriasis.  Total injected was  1 ml.  Patient tolerated without complications and given wound care instructions, including not to move product around.  Return in 4 weeks for follow-up and possible additional IL TAC.    Use clobetasol shampoo.   Apply lidex solution as needed.   2. Psoriasis   Alternate between betamethasone and calcipotriene.   Try vanicream moisturizers and free and clear shampoo.   Discussed that this is chronic, no cure.   Patient is not interested in systemic medication at this time.   3. Inflamed seborrheic keratosis on legs x 3  LN2:  Treated with LN2 for 5s for 1-2 cycles. Warned risks of blistering, pain, pigment change, scarring, and incomplete resolution.  Advised patient to return if lesions do not completely resolve.  Wound care sheet given.  4 Seborrheic keratosis, lentigo, angioma  BENIGN LESIONS DISCUSSED WITH PATIENT:  I discussed the specifics of tumor, prognosis, and genetics of benign lesions.  I explained that treatment of these lesions would be purely cosmetic and not medically neccessary.  I discussed with patient different removal options including excision, cautery and /or laser.      Nature and genetics of benign skin lesions dicussed  with patient.  Signs and Symptoms of skin cancer discussed with patient.  ABCDEs of melanoma reviewed with patient.  Patient encouraged to perform monthly skin exams.  UV precautions reviewed with patient.  Risks of non-melanoma skin cancer discussed with patient   Return to clinic one year or sooner if needed.       Again, thank you for allowing me to participate in the care of your patient.        Sincerely,        Miguelina Lion PA-C

## 2019-09-24 NOTE — PATIENT INSTRUCTIONS
Wound Care Instructions     FOR SUPERFICIAL WOUNDS     [facility] [phone]    [facility] [phone]                       AFTER 24 HOURS YOU SHOULD REMOVE THE BANDAGE AND BEGIN DAILY DRESSING CHANGES AS FOLLOWS:     1) Remove Dressing.     2) Clean and dry the area with tap water using a Q-tip or sterile gauze pad.     3) Apply Vaseline, Aquaphor, Polysporin ointment or Bacitracin ointment over entire wound.  Do NOT use Neosporin ointment.     4) Cover the wound with a band-aid, or a sterile non-stick gauze pad and micropore paper tape      REPEAT THESE INSTRUCTIONS AT LEAST ONCE A DAY UNTIL THE WOUND HAS COMPLETELY HEALED.    It is an old wives tale that a wound heals better when it is exposed to air and allowed to dry out. The wound will heal faster with a better cosmetic result if it is kept moist with ointment and covered with a bandage.    **Do not let the wound dry out.**      Supplies Needed:      *Cotton tipped applicators (Q-tips)    *Polysporin Ointment or Bacitracin Ointment (NOT NEOSPORIN)    *Band-aids or non-stick gauze pads and micropore paper tape.      PATIENT INFORMATION:    During the healing process you will notice a number of changes. All wounds develop a small halo of redness surrounding the wound.  This means healing is occurring. Severe itching with extensive redness usually indicates sensitivity to the ointment or bandage tape used to dress the wound.  You should call our office if this develops.      Swelling  and/or discoloration around your surgical site is common, particularly when performed around the eye.    All wounds normally drain.  The larger the wound the more drainage there will be.  After 7-10 days, you will notice the wound beginning to shrink and new skin will begin to grow.  The wound is healed when you can see skin has formed over the entire area.  A healed wound has a healthy, shiny look to the surface and is red to dark pink in color  to normalize.  Wounds may take approximately 4-6 weeks to heal.  Larger wounds may take 6-8 weeks.  After the wound is healed you may discontinue dressing changes.    You may experience a sensation of tightness as your wound heals. This is normal and will gradually subside.    Your healed wound may be sensitive to temperature changes. This sensitivity improves with time, but if you re having a lot of discomfort, try to avoid temperature extremes.    Patients frequently experience itching after their wound appears to have healed because of the continue healing under the skin.  Plain Vaseline will help relieve the itching.        POSSIBLE COMPLICATIONS    BLEEDIN. Leave the bandage in place.  2. Use tightly rolled up gauze or a cloth to apply direct pressure over the bandage for 30  minutes.  3. Reapply pressure for an additional 30 minutes if necessary  4. Use additional gauze and tape to maintain pressure once the bleeding has stopped.      WOUND CARE INSTRUCTIONS   FOR CRYOSURGERY   This area treated with liquid nitrogen should form a blister (areas treated may or may not blister-skin may just turn dark and slough off). You do not need to bandage the area unless a blister forms and breaks (which may be a few days). When the blister breaks, begin daily dressing changes as follows:  1) Clean and dry the area with tap water using clean Q-tip or sterile gauze pad.   2) Apply Polysporin ointment or Bacitracin ointment over entire wound. Do NOT use Neosporin ointment.   3) Cover the wound with a band-aid or sterile non-stick gauze pad and micropore paper tape.   REPEAT THESE INSTRUCTIONS AT LEAST ONCE A DAY UNTIL THE WOUND HAS COMPLETELY HEALED.   It is an old wives tale that a wound heals better when it is exposed to air and allowed to dry out. The wound will heal faster with a better cosmetic result if it is kept moist with ointment and covered with a bandage.   Do not let the wound dry out.   IMPORTANT  INFORMATION ON REVERSE SIDE   Supplies Needed:   *Cotton tipped applicators (Q-tips)   *Polysporin ointment or Bacitracin ointment (NOT NEOSPORIN)   *Band-aids, or non stick gauze pads and micropore paper tape   PATIENT INFORMATION   During the healing process you will notice a number of changes. All wounds develop a small halo of redness surrounding the wound. This means healing is occurring. Severe itching with extensive redness usually indicates sensitivity to the ointment or bandage tape used to dress the wound. You should call our office if this develops.   Swelling and/or discoloration around your surgical site is common, particularly when performed around the eye.   All wounds normally drain. The larger the wound the more drainage there will be. After 7-10 days, you will notice the wound beginning to shrink and new skin will begin to grow. The wound is healed when you can see skin has formed over the entire area. A healed wound has a healthy, shiny look to the surface and is red to dark pink in color to normalize. Wounds may take approximately 4-6 weeks to heal. Larger wounds may take 6-8 weeks. After the wound is healed you may discontinue dressing changes.   You may experience a sensation of tightness as your wound heals. This is normal and will gradually subside.   Your healed wound may be sensitive to temperature changes. This sensitivity improves with time, but if you re having a lot of discomfort, try to avoid temperature extremes.   Patients frequently experience itching after their wound appears to have healed because of the continue healing under the skin. Plain Vaseline will help relieve the itching.

## 2019-09-24 NOTE — TELEPHONE ENCOUNTER
Prior Authorization Retail Medication Request    Medication/Dose: clobetasol 0.05 shamp  ICD code (if different than what is on RX):  -*  Previously Tried and Failed:  -  Rationale:  -    Insurance Name:   Part D  Insurance ID:  [id number]      Pharmacy Information (if different than what is on RX)  Name:  [facility]  Phone:  [phone]      Thank You!  Candace Lott  [facility]  P: [phone] F:[phone]

## 2019-10-03 NOTE — TELEPHONE ENCOUNTER
Central Prior Authorization Team  Phone: [phone]    PA Initiation    Medication: clobetasol 0.05 shamp  Insurance Company: [employer] - Phone [phone] Fax [phone]  Pharmacy Filling the Rx: [place], [place] - [address] [place] [address]  Filling Pharmacy Phone: [phone]  Filling Pharmacy Fax:    Start Date: 10/3/2019

## 2019-10-03 NOTE — TELEPHONE ENCOUNTER
What is the status on the prior auth?  Been waiting since 9-24    Thank you  Brenda Urrutia CPht    [facility]  Phone: [phone]  Fax: [phone]

## 2019-10-04 NOTE — TELEPHONE ENCOUNTER
Prior Authorization Approval    Authorization Effective Date: 10/4/2019  Authorization Expiration Date: 10/3/2020  Medication: clobetasol 0.05 shamp- APPROVED  Approved Dose/Quantity:   Reference #:     Insurance Company: [employer] - Phone [phone] Fax [phone]  Expected CoPay:       CoPay Card Available:      Foundation Assistance Needed:    Which Pharmacy is filling the prescription (Not needed for infusion/clinic administered): [facility] [place], [place] - [address] [place] [address]  Pharmacy Notified: Yes  Patient Notified: Comment:  **Instructed pharmacy to notify patient when script is ready to /ship.**

## 2019-10-18 ENCOUNTER — PATIENT OUTREACH (OUTPATIENT)
Dept: CARE COORDINATION | Facility: CLINIC | Age: 74
End: 2019-10-18

## 2019-10-18 ASSESSMENT — ACTIVITIES OF DAILY LIVING (ADL): DEPENDENT_IADLS:: MEDICATION MANAGEMENT

## 2019-10-18 NOTE — PROGRESS NOTES
Clinic Care Coordination Contact  [facility]/Voicemail    Referral Source: PCP  Clinical Data: Care Coordinator Outreach  Outreach attempted x 1, busy and unable to leave a message.    Plan: Care Coordinator will try to reach patient again in 4-6 business days.    EM Mcfarlane, [facility] - Care Coordinator   [facility]  10/18/2019   2:05 PM  [phone]

## 2019-10-21 ENCOUNTER — TELEPHONE (OUTPATIENT)
Dept: FAMILY MEDICINE | Facility: CLINIC | Age: 74
End: 2019-10-21

## 2019-10-21 DIAGNOSIS — M54.16 LUMBAR RADICULOPATHY: Primary | ICD-10-CM

## 2019-10-21 NOTE — ADDENDUM NOTE
Encounter addended by: Caron Rodriguez PT on: 10/21/2019 2:57 PM   Actions taken: Clinical Note Signed, Flowsheet accepted, Episode resolved

## 2019-10-21 NOTE — PROGRESS NOTES
Outpatient Physical Therapy Discharge Note     Patient: Ingrid Amaral  : 1945    Beginning/End Dates of Reporting Period:  19 to 2019    Referring Provider: Dr Kimberlyn Hay    Therapy Diagnosis: impaired gait and balance     Client Self Report: pateint still having a hard time finding time to do the exercises. Has not had any falls lately, but walking on uneven ground seems to be the worst.     Objective Measurements:  Objective Measure: tandem stance  Details: able to hold 10 seconds       Goals:  Goal Identifier 1   Goal Description Patient will improve FGA to >/= 24/30 for improved dynamic balance    Target Date 19   Date Met      Progress:     Goal Identifier 2   Goal Description Patient will be independant with HEP for long term self management   Target Date 19   Date Met      Progress:     Goal Identifier 3   Goal Description Patient will be able to ascend and descend 12 stairs with use of 1 railing reciprical pattern to more safely access all levels of home   Target Date 19   Date Met      Progress:         Progress Toward Goals:   Progress limited due to Patient did not return for follow up treatments as directed.  Goal status and current objective information is therefore unknown.  Discharge from PT services at this time for this episode of treatment. Please see attached documentation under this episode of care for further information including dates of service, start of care date, referring physician, Dx, treatment plan, treatments, etc.    Please contact me with any questions or concerns.    Thank you for your referral.    Caron Rodriguez, PT, DPT  Physical Therapist   [facility]  [phone]

## 2019-10-21 NOTE — TELEPHONE ENCOUNTER
Reason for Call: Request for an order or referral:    Order or referral being requested: Pt would like to get an injection in her low back. She says it has been a couple years since she has had this and is not sure who did it for her in the past.     Date needed: at your convenience      Phone number Patient can be reached at:  Home number on file [phone] (home)    Best Time:  anytime    Can we leave a detailed message on this number?  YES    Call taken on 10/21/2019 at 3:01 PM by Fiona Baird

## 2019-10-22 DIAGNOSIS — G89.29 CHRONIC RIGHT-SIDED LOW BACK PAIN WITH RIGHT-SIDED SCIATICA: ICD-10-CM

## 2019-10-22 DIAGNOSIS — M54.16 LUMBAR RADICULOPATHY: ICD-10-CM

## 2019-10-22 DIAGNOSIS — M54.41 CHRONIC RIGHT-SIDED LOW BACK PAIN WITH RIGHT-SIDED SCIATICA: ICD-10-CM

## 2019-10-22 NOTE — TELEPHONE ENCOUNTER
Requested Prescriptions   Pending Prescriptions Disp Refills     gabapentin (NEURONTIN) 300 MG capsule [Pharmacy Med Name: GABAPENTIN 300MG CAPS] 60 capsule 0     Sig: TAKE ONE CAPSULE BY MOUTH EVERY NIGHT AT BEDTIME AS NEEDED FOR BACK PAIN       There is no refill protocol information for this order        Last Written Prescription Date:  9/19/19  Last Fill Quantity: 60,  # refills: 0   Last office visit: 9/19/2019 with prescribing provider:      Future Office Visit:   Next 5 appointments (look out 90 days)    Oct 29, 2019  2:20 PM CDT  Return Visit with Miguelina Schulte PA-C  [facility] [address] [facility] [address]  [phone]   Oct 31, 2019 11:00 AM CDT  Return Visit with Pratibha Hay MD  [facility] ([facility]) [address] [facility] [address]  [phone]   Nov 04, 2019  1:00 PM CST  SHORT with Leesa Muñiz PA-C  [facility] ([facility]) [address] [place] [id number]  [phone]

## 2019-10-23 RX ORDER — GABAPENTIN 300 MG/1
CAPSULE ORAL
Qty: 60 CAPSULE | Refills: 5 | Status: SHIPPED | OUTPATIENT
Start: 2019-10-23 | End: 2019-10-30

## 2019-10-23 NOTE — TELEPHONE ENCOUNTER
I can't find record of previous injection.  That said, I can order the injection, but it requires MRI be within last 2 yrs.    Do MRI first then I'll order injection.  Confirm not claustrophobic, but otherwise I already ordered test.

## 2019-10-24 ASSESSMENT — ACTIVITIES OF DAILY LIVING (ADL): DEPENDENT_IADLS:: MEDICATION MANAGEMENT

## 2019-10-24 NOTE — PROGRESS NOTES
Clinic Care Coordination Contact    Follow Up Progress Note      Assessment: Per patient and spouse things are generally going okay.  They are waiting for a phone call about an injection and updated spouse that this phone call had come in yesterday that an MRI was needed.  Patient had the phone number and understood what needed to be done yet had not completed at the steps yet.    Goals addressed this encounter:   Goals Addressed                 This Visit's Progress      Medication 1 (pt-stated)   On track     Goal Statement: I will allow my spouse to assist me with medications when issues arise,  for consistent administration.   Measure of Success: I am taking my medications at the right time and every day  Supportive Steps to Achieve: actively involved spouse with supportive ideas  Barriers: memory loss  Strengths: supportive family  Date to Achieve By: 12-31-19                 Intervention/Education provided during outreach: Reviewed message and steps.  Looked at last MRI date and updated spouse.  Patient will call to schedule MRI.  Encouraged them to call  or nurse at clinic if they have any challenges.    No other concerns or changes.  On next call will reassess goal and update to a new goal as needed.     Outreach Frequency: monthly    Plan:   Patient to call and schedule MRI.  Care Coordinator will follow up in 1 month.    EM Mcfarlane, [facility] - Care Coordinator   [facility]  10/24/2019   10:14 AM  [phone]

## 2019-10-25 DIAGNOSIS — R41.89 COGNITIVE IMPAIRMENT: ICD-10-CM

## 2019-10-25 DIAGNOSIS — F32.0 MILD MAJOR DEPRESSION (H): ICD-10-CM

## 2019-10-25 DIAGNOSIS — F41.0 PANIC DISORDER WITHOUT AGORAPHOBIA: ICD-10-CM

## 2019-10-25 DIAGNOSIS — K21.9 GASTROESOPHAGEAL REFLUX DISEASE WITHOUT ESOPHAGITIS: ICD-10-CM

## 2019-10-25 RX ORDER — SERTRALINE HYDROCHLORIDE 100 MG/1
TABLET, FILM COATED ORAL
Qty: 30 TABLET | Refills: 0 | Status: SHIPPED | OUTPATIENT
Start: 2019-10-25 | End: 2019-10-31

## 2019-10-25 RX ORDER — DONEPEZIL HYDROCHLORIDE 10 MG/1
TABLET, FILM COATED ORAL
Qty: 30 TABLET | Refills: 1 | Status: SHIPPED | OUTPATIENT
Start: 2019-10-25 | End: 2019-12-26

## 2019-10-25 NOTE — TELEPHONE ENCOUNTER
"Requested Prescriptions   Pending Prescriptions Disp Refills     omeprazole (PRILOSEC) 20 MG DR capsule [Pharmacy Med Name: OMEPRAZOLE 20MG CPDR] 60 capsule 0     Sig: TAKE ONE CAPSULE BY MOUTH TWICE A DAY FOR HEARTBURN       PPI Protocol Failed - 10/25/2019 10:47 AM        Failed - Recent (12 mo) or future (30 days) visit within the authorizing provider's specialty     Patient has had an office visit with the authorizing provider or a provider within the authorizing providers department within the previous 12 mos or has a future within next 30 days. See \"Patient Info\" tab in inbasket, or \"Choose Columns\" in Meds & Orders section of the refill encounter.              Passed - Not on Clopidogrel (unless Pantoprazole ordered)        Passed - No diagnosis of osteoporosis on record        Passed - Medication is active on med list        Passed - Patient is age 18 or older        Passed - No active pregnacy on record        Passed - No positive pregnancy test in past 12 months        omeprazole (PRILOSEC) 20 MG DR capsule  Last Written Prescription Date:  09/19/2019  Last Fill Quantity: 30 capsule,  # refills: 1   Last office visit: 9/19/2019 with prescribing provider:  KENRICK uMñiz   Future Office Visit:   Next 5 appointments (look out 90 days)    Oct 29, 2019  2:20 PM CDT  [facility] Visit with Miguelina Schulte PA-C  [facility] [address] [place] [id number]  [phone]   Oct 31, 2019 11:00 AM CDT  Return Visit with Pratibha Hay MD  [facility] [address] [place] [id number]  [phone]   Nov 04, 2019  1:00 PM CST  SHORT with Leesa Muñiz PA-C  [facility] [address] [place] [id number]  [phone]         Vidhya Spencer RT (R) (M)    "

## 2019-10-25 NOTE — TELEPHONE ENCOUNTER
"Requested Prescriptions   Pending Prescriptions Disp Refills     donepezil (ARICEPT) 10 MG tablet [Pharmacy Med Name: DONEPEZIL HCL 10MG TABS] 30 tablet 1     Sig: TAKE ONE TABLET BY MOUTH ONCE DAILY AT BEDTIME FOR MEMORY       Miscellaneous Dementia Agents Passed - 10/25/2019 10:47 AM        Passed - Recent (12 mo) or future (30 days) visit within the authorizing provider's specialty     Patient has had an office visit with the authorizing provider or a provider within the authorizing providers department within the previous 12 mos or has a future within next 30 days. See \"Patient Info\" tab in inbasket, or \"Choose Columns\" in Meds & Orders section of the refill encounter.              Passed - Medication is active on med list        Passed - Patient is 18 years of age or older        sertraline (ZOLOFT) 100 MG tablet [Pharmacy Med Name: SERTRALINE HCL 100MG TABS] 30 tablet 0     Sig: TAKE ONE TABLET BY MOUTH ONCE DAILY FOR MOOD       SSRIs Protocol Passed - 10/25/2019 10:47 AM        Passed - PHQ-9 score less than 5 in past 6 months     Please review last PHQ-9 score.           Passed - Medication is active on med list        Passed - Patient is age 18 or older        Passed - No active pregnancy on record        Passed - No positive pregnancy test in last 12 months        Passed - Recent (6 mo) or future (30 days) visit within the authorizing provider's specialty     Patient had office visit in the last 6 months or has a visit in the next 30 days with authorizing provider or within the authorizing provider's specialty.  See \"Patient Info\" tab in inbasket, or \"Choose Columns\" in Meds & Orders section of the refill encounter.          donepezil (ARICEPT) 10 MG tablet  Last Written Prescription Date:  09/03/2019  Last Fill Quantity: 90 tablet,  # refills: 1   Last office visit: 9/19/2019 with prescribing provider:  KENRICK Muñiz   Future Office Visit:   Next 5 appointments (look out 90 days)    Oct 29, 2019  2:20 PM " CDT  Return Visit with Miguelina Schulte PA-C  [facility] [address] [facility] [address]  [phone]   Oct 31, 2019 11:00 AM CDT  Return Visit with Pratibha Hay MD  [facility]) [address] [facility] [address]  [phone]   Nov 04, 2019  1:00 PM CST  SHORT with Leesa Muñiz PA-C  [facility] ([facility]) [address] [place] [address]  [phone]           sertraline (ZOLOFT) 100 MG tablet  Last Written Prescription Date:  09/19/2019  Last Fill Quantity: 30 tablet,  # refills: 1   Last office visit: 9/19/2019 with prescribing provider:  KENRICK Muñiz   Future Office Visit:   Next 5 appointments (look out 90 days)    Oct 29, 2019  2:20 PM CDT  Return Visit with Miguelina Schulte PA-C  [facility] [address] [facility] [address]  [phone]   Oct 31, 2019 11:00 AM CDT  Return Visit with Pratibha Hay MD  [facility] ([facility]) [address] [place] [address]  [phone]   Nov 04, 2019  1:00 PM CST  SHORT with Leesa Muñiz PA-C  [facility] ([facility]) [address] [place] [address]  [phone]         PHQ-9 SCORE 11/2/2018 4/24/2019 9/19/2019   PHQ-9 Total Score - - -   PHQ-9 Total Score MyChart 4 (Minimal depression) 3 (Minimal depression) -   PHQ-9 Total Score 4 3 4     DELMIS-7 SCORE 5/9/2018 11/2/2018 9/19/2019   Total Score - - -   Total Score  (minimal anxiety) 8 (mild anxiety) -   Total Score - 8 8       Vidhya Spencer RT (R) (M)

## 2019-10-28 ENCOUNTER — HOSPITAL ENCOUNTER (OUTPATIENT)
Dept: MRI IMAGING | Facility: CLINIC | Age: 74
Discharge: HOME | End: 2019-10-28
Attending: PHYSICIAN ASSISTANT | Admitting: PHYSICIAN ASSISTANT
Payer: MEDICARE

## 2019-10-28 DIAGNOSIS — M54.16 LUMBAR RADICULOPATHY: ICD-10-CM

## 2019-10-28 PROCEDURE — 72148 MRI LUMBAR SPINE W/O DYE: CPT

## 2019-10-28 NOTE — RESULT ENCOUNTER NOTE
Kodak Quintero,    Your MRI is similar to 2017, no real changes.  I have placed the back injection order you were requesting.  Call [phone] to set this up.    Leesa

## 2019-10-29 ENCOUNTER — OFFICE VISIT (OUTPATIENT)
Dept: DERMATOLOGY | Facility: CLINIC | Age: 74
End: 2019-10-29
Payer: MEDICARE

## 2019-10-29 VITALS — SYSTOLIC BLOOD PRESSURE: 126 MMHG | HEART RATE: 66 BPM | OXYGEN SATURATION: 97 % | DIASTOLIC BLOOD PRESSURE: 78 MMHG

## 2019-10-29 DIAGNOSIS — L40.9 PSORIASIS: Primary | ICD-10-CM

## 2019-10-29 PROCEDURE — 11900 INJECT SKIN LESIONS </W 7: CPT | Performed by: PHYSICIAN ASSISTANT

## 2019-10-29 NOTE — LETTER
10/29/2019         RE: Ingrid Amaral  [address] [facility] [address]        Dear Colleague,    Thank you for referring your patient, Ingrid Amaral, to the [facility]. Please see a copy of my visit note below.    Ingrid Amaral is a 74 year old year old female patient here today for recheck psoriasis. She notes intralesional steroid injections helped a lot. She would like to retreat scalp and try to treat psoriasis on legs. She notes clobetasol shampoo helps. Patient has no other skin complaints today.  Remainder of the HPI, Meds, PMH, Allergies, FH, and SH was reviewed in chart.    Pertinent Hx:   Psoriasis   Past Medical History:   Diagnosis Date     Asthma      GERD (gastroesophageal reflux disease)      Radial head fracture 3/18/2010       Past Surgical History:   Procedure Laterality Date     ANKLE SURGERY      bilateral     COLONOSCOPY       COLONOSCOPY N/A 3/20/2015    Procedure: COLONOSCOPY;  Surgeon: Britney Martinez MD;  Location: [facility]     COLONOSCOPY N/A 5/22/2017    Procedure: COLONOSCOPY;  Colonoscopy;  Surgeon: Tristen Rangel MD;  Location: [facility]     ESOPHAGOSCOPY, GASTROSCOPY, DUODENOSCOPY (EGD), COMBINED N/A 6/7/2018    Procedure: COMBINED ESOPHAGOSCOPY, GASTROSCOPY, DUODENOSCOPY (EGD);  gastroscopy;  Surgeon: Tristen Rangel MD;  Location: [facility]     HC ESOPH/GAS REFLUX TEST W NASAL IMPED >1 HR  4/19/2012    Procedure:ESOPHAGEAL IMPEDENCE FUNCTION TEST WITH 24 HOUR PH GREATER THAN 1 HOUR; Surgeon:VALDO UMANZOR; Location: GI     HERNIA REPAIR      umbilcal     HYSTERECTOMY, PAP NO LONGER INDICATED  1991     TONSILLECTOMY       UPPER GI ENDOSCOPY          Family History   Problem Relation Age of Onset     Heart Disease Mother         valve problem     Hypertension Mother      Diabetes Mother         type 2     Cerebrovascular Disease Mother      Allergies Mother      Eye Disorder Mother         Macular degeneration     Osteoporosis Mother       Respiratory Mother      Migraines Mother      Cardiovascular Father         MI at age 80     Cancer - colorectal Father      Diabetes Father      Hypertension Father         type 2     Eye Disorder Father         macular degeneration     Lipids Father      Colon Cancer Father      C.A.D. Maternal Grandmother      Diabetes Paternal Grandmother         type 2     Cardiovascular Paternal Grandmother         MI     Diabetes Sister         type 2     Allergies Sister      Gastrointestinal Disease Sister      Depression Sister      Gastrointestinal Disease Daughter      Migraines Daughter      Gastrointestinal Disease Daughter      Migraines Daughter      Migraines Son      Aneurysm No family hx of      Brain Hemorrhage No family hx of      Brain Tumor No family hx of      Cancer No family hx of      Chiari Malformation No family hx of      LUNG DISEASE No family hx of      Seizure Disorder No family hx of        Social History     Socioeconomic History     Marital status:      Spouse name: Not on file     Number of children: Not on file     Years of education: Not on file     Highest education level: Not on file   Occupational History     Not on file   Social Needs     Financial resource strain: Not on file     Food insecurity:     Worry: Not on file     Inability: Not on file     Transportation needs:     Medical: Not on file     Non-medical: Not on file   Tobacco Use     Smoking status: Former Smoker     Packs/day: 0.50     Years: 3.00     Pack years: 1.50     Types: Cigarettes     Smokeless tobacco: Never Used     Tobacco comment: smoked for 3 years when she was around 19   Substance and Sexual Activity     Alcohol use: Yes     Comment: RARELY     Drug use: No     Sexual activity: Not Currently   Lifestyle     Physical activity:     Days per week: Not on file     Minutes per session: Not on file     Stress: Not on file   Relationships     Social connections:     Talks on phone: Not on file     Gets  together: Not on file     Attends Holiness service: Not on file     Active member of club or organization: Not on file     Attends meetings of clubs or organizations: Not on file     Relationship status: Not on file     Intimate partner violence:     Fear of current or ex partner: Not on file     Emotionally abused: Not on file     Physically abused: Not on file     Forced sexual activity: Not on file   Other Topics Concern     Parent/sibling w/ CABG, MI or angioplasty before 65F 55M? Yes     Comment: mother- valve problem   Social History Narrative     Not on file       Outpatient Encounter Medications as of 10/29/2019   Medication Sig Dispense Refill     albuterol (PROAIR HFA/PROVENTIL HFA/VENTOLIN HFA) 108 (90 Base) MCG/ACT inhaler Inhale 2 puffs into the lungs every 4 hours as needed for shortness of breath / dyspnea 1 Inhaler 1     albuterol (PROVENTIL) (5 MG/ML) 0.5% nebulizer solution Take 0.5 mLs (2.5 mg) by nebulization every 4 hours as needed for wheezing or shortness of breath / dyspnea 30 vial 1     amLODIPine (NORVASC) 5 MG tablet Take 1 tablet (5 mg) by mouth daily For blood pressure 90 tablet 1     amylase-lipase-protease (CREON) 6000 units CPEP TAKE TWO CAPSULES BY MOUTH THREE TIMES A DAY WITH MEALS FOR DIARRHEA 180 capsule 11     ascorbic acid 1000 MG TABS tablet Take 1,000 mg by mouth daily       augmented betamethasone dipropionate (DIPROLENE-AF) 0.05 % external cream Apply sparingly twice daily to areas of psoriasis Friday through Sunday. 50 g 4     biotin 1000 MCG TABS tablet Take 1,000 mcg by mouth 2 times daily       calcipotriene (DOVONOX) 0.005 % external cream Apply twice daily to psoriasis on Monday through Thursday. 60 g 8     Calcium-Magnesium-Zinc 333..33-5 MG TABS Take 1 tablet by mouth daily       cetirizine (ZYRTEC) 10 MG tablet Take 1 tablet (10 mg) by mouth every morning For allergies 90 tablet 3     clobetasol propionate (CLOBEX) 0.05 % external shampoo Leave on scalp for  15 minutes then rinse. Use 2-3 times weekly. 118 mL 4     donepezil (ARICEPT) 10 MG tablet TAKE ONE TABLET BY MOUTH ONCE DAILY AT BEDTIME FOR MEMORY 30 tablet 1     estradiol (ESTRACE) 0.1 MG/GM vaginal cream USE ONE GRAM VAGINALLY ONCE DAILY AND SPREAD A SMALL AMOUNT AROUND THE CLITORAL REES For vaginal area issues. 85 g 0     fluocinonide (LIDEX) 0.05 % external solution Apply twice daily as needed to scalp.  Do not apply to face. Please keep upcoming appointment. 60 mL 0     fluticasone (FLONASE) 50 MCG/ACT nasal spray INSTILL ONE SPRAY IN EACH NOSTRIL EVERY DAY.  For allergies 16 g 1     fluticasone-salmeterol (ADVAIR) 250-50 MCG/DOSE inhaler INHALE 1 PUFF BY MOUTH INTO THE LUNGS TWICE DAILY.  For asthma. 1 Inhaler 0     gabapentin (NEURONTIN) 300 MG capsule TAKE ONE CAPSULE BY MOUTH EVERY NIGHT AT BEDTIME AS NEEDED FOR BACK PAIN 60 capsule 5     hydrocortisone 1 % cream Apply topically as needed       lactase (LACTAID) 9000 UNITS TABS Take 9,000 Units by mouth as needed       melatonin 5 MG tablet Take 1 tablet (5 mg) by mouth nightly as needed for sleep 90 tablet 3     montelukast (SINGULAIR) 10 MG tablet Take 1 tablet (10 mg) by mouth daily For allergies and asthma 90 tablet 3     omeprazole (PRILOSEC) 20 MG DR capsule TAKE ONE CAPSULE BY MOUTH TWICE A DAY FOR HEARTBURN 60 capsule 0     omeprazole (PRILOSEC) 20 MG DR capsule Take 1 capsule (20 mg) by mouth daily 30 capsule 1     ORDER FOR DME Equipment being ordered: Nebulizer and tubing/filter 1 Device 0     polyethylene glycol (MIRALAX/GLYCOLAX) powder Take 17 g (1 capful) by mouth as needed for constipation 1 Bottle 3     sertraline (ZOLOFT) 100 MG tablet TAKE ONE TABLET BY MOUTH ONCE DAILY FOR MOOD 30 tablet 0     sertraline (ZOLOFT) 100 MG tablet Take 1 tablet (100 mg) by mouth daily For mood. 30 tablet 1     simvastatin (ZOCOR) 40 MG tablet TAKE ONE TABLET BY MOUTH EVERY NIGHT AT BEDTIME.  For cholesterol. 90 tablet 3     SKIN OILS EX Lavender-headache,  skin, peppermint-skin, upset stomach mix of oils, asthma mix of oils, eucalyptus. Tea tree oil-skin, vapor rub- chest tightness, sleepy time (vetiver, lavendaer, mrjoram, mandarin, ylang)- sleep, constipation, eczema (sweet almond oil). Also mixes with carriers: Coconut oil, Murray oil, Extra virgin oil. Frankincense- cough. Digize- oil. Purification- oil, lemon- oil. Essentialyme- pill.  Inhales a mix with olive oil almond and coconut oil with peppermint, and eucalyptus- sinus, allergies. Updated 12/1/2015.   Updated 4/26/2016: Lemon, Cinnamon bark, panaway, Thieves, Orange, Wintergreen, Copaiba       traZODone (DESYREL) 50 MG tablet Take 1 tablet (50 mg) by mouth At Bedtime 30 tablet 1     Turmeric 500 MG CAPS Take 1 capsule by mouth daily       No facility-administered encounter medications on file as of 10/29/2019.              Review Of Systems  Skin: As above  Eyes: negative  Ears/Nose/Throat: negative  Respiratory: No shortness of breath, dyspnea on exertion, cough, or hemoptysis  Cardiovascular: negative  Gastrointestinal: negative  Genitourinary: negative  Musculoskeletal: negative  Neurologic: negative  Psychiatric: negative  Hematologic/Lymphatic/Immunologic: negative  Endocrine: negative      O:   NAD, WDWN, Alert & Oriented, Mood & Affect wnl, Vitals stable   Here today alone   /78   Pulse 66   SpO2 97%    General appearance normal   Vitals stable   Alert, oriented and in no acute distress     Psoriasiform plaques on scalp and thin areas on legs     Eyes: Conjunctivae/lids:Normal     ENT: Lips: normal    MSK:Normal    Cardiovascular: peripheral edema none    Pulm: Breathing Normal    Neuro/Psych: Orientation:Normal; Mood/Affect:Normal  A/P:  1. Psoriasis on scalp and legs  IL TAC: PGACAC discussed.  Risks including but not limited to injection site reaction, bruising, no resolution.  All questions answered and entertained to patient s satisfaction.  Informed consent obtained.  IL TAC in  concentration of 10mg/ml was injected ID to scalp and legs.  Total injected was  1.5 ml.  Patient tolerated without complications and given wound care instructions, including not to move product around.  Return in 4 weeks for follow-up and possible additional IL TAC.        Again, thank you for allowing me to participate in the care of your patient.        Sincerely,        Miguelina Lion PA-C

## 2019-10-30 ENCOUNTER — TELEPHONE (OUTPATIENT)
Dept: FAMILY MEDICINE | Facility: CLINIC | Age: 74
End: 2019-10-30

## 2019-10-30 DIAGNOSIS — G89.29 CHRONIC RIGHT-SIDED LOW BACK PAIN WITH RIGHT-SIDED SCIATICA: ICD-10-CM

## 2019-10-30 DIAGNOSIS — M54.41 CHRONIC RIGHT-SIDED LOW BACK PAIN WITH RIGHT-SIDED SCIATICA: ICD-10-CM

## 2019-10-30 DIAGNOSIS — M54.16 LUMBAR RADICULOPATHY: ICD-10-CM

## 2019-10-30 NOTE — TELEPHONE ENCOUNTER
Patient states that she has been taking her Neurontin different then prescribed and would like to have Leesa change the prescription to Neurontin 300 mg  2 tablets every 4 hours qty of 6 per day. At this point she is out and pharmacy will not refill due insurance not covering the way it is ordered. Jocelin Hsieh MA

## 2019-10-31 ENCOUNTER — OFFICE VISIT (OUTPATIENT)
Dept: NEUROLOGY | Facility: CLINIC | Age: 74
End: 2019-10-31
Payer: MEDICARE

## 2019-10-31 VITALS
BODY MASS INDEX: 22.2 KG/M2 | WEIGHT: 121.4 LBS | RESPIRATION RATE: 12 BRPM | DIASTOLIC BLOOD PRESSURE: 72 MMHG | SYSTOLIC BLOOD PRESSURE: 139 MMHG | TEMPERATURE: 97.5 F | HEART RATE: 58 BPM

## 2019-10-31 DIAGNOSIS — F41.9 ANXIETY: ICD-10-CM

## 2019-10-31 DIAGNOSIS — D32.9 MENINGIOMA (H): ICD-10-CM

## 2019-10-31 DIAGNOSIS — G31.83 LEWY BODY DEMENTIA WITHOUT BEHAVIORAL DISTURBANCE (H): Primary | ICD-10-CM

## 2019-10-31 DIAGNOSIS — G47.52 REM SLEEP BEHAVIOR DISORDER: ICD-10-CM

## 2019-10-31 DIAGNOSIS — F02.80 LEWY BODY DEMENTIA WITHOUT BEHAVIORAL DISTURBANCE (H): Primary | ICD-10-CM

## 2019-10-31 PROCEDURE — 99215 OFFICE O/P EST HI 40 MIN: CPT | Performed by: PSYCHIATRY & NEUROLOGY

## 2019-10-31 RX ORDER — GABAPENTIN 300 MG/1
600 CAPSULE ORAL 3 TIMES DAILY
Qty: 180 CAPSULE | Refills: 5 | Status: SHIPPED | OUTPATIENT
Start: 2019-10-31 | End: 2019-11-04

## 2019-10-31 ASSESSMENT — MONTREAL COGNITIVE ASSESSMENT (MOCA)
13. ORIENTATION SUBSCORE: 5
4. NAME EACH OF THE THREE ANIMALS SHOWN: 3
WHAT LEVEL OF EDUCATION WAS ATTAINED: 0
11. FOR EACH PAIR OF WORDS, WHAT CATEGORY DO THEY BELONG TO (OUT OF 2): 0
10. [FLUENCY] NAME WORDS STARTING WITH DESIGNATED LETTER: 1
7. [VIGILENCE] TAP WHEN HEARING DESIGNATED LETTER: 1
VISUOSPATIAL/EXECUTIVE SUBSCORE: 3
12. MEMORY INDEX SCORE: 3
WHAT IS THE TOTAL SCORE (OUT OF 30): 21
9. REPEAT EACH SENTENCE: 2
6. READ LIST OF DIGITS [FORWARD/BACKWARD]: 2
8. SERIAL SUBTRACTION OF 7S: 1

## 2019-10-31 NOTE — TELEPHONE ENCOUNTER
I updated prescription but please caution her that in the future, she needs to run dose increases by me to be sure the dose changes are safe.  This medication self increase could have caused a lot of tiredness, memory issues, dizziness, etc.

## 2019-10-31 NOTE — PATIENT INSTRUCTIONS
Plan:    -- I recommend a Driving evaluation, as we discussed. *Information provided.   -- Talk to your primary care provider about the anxiety. Perhaps she can recommend an alternative to your Zoloft or something could be added to it.   -- Continue the Aricept (donepezil): 10mg at bedtime.   -- Brain MRI in December. Order is in. We will notify you of the results.   -- Return to Neurology in 6 months. Let us know if any concerns arise in the meantime.

## 2019-10-31 NOTE — PROGRESS NOTES
Ingrid Amaral is a 74 year old year old female patient here today for recheck psoriasis. She notes intralesional steroid injections helped a lot. She would like to retreat scalp and try to treat psoriasis on legs. She notes clobetasol shampoo helps. Patient has no other skin complaints today.  Remainder of the HPI, Meds, PMH, Allergies, FH, and SH was reviewed in chart.    Pertinent Hx:   Psoriasis   Past Medical History:   Diagnosis Date     Asthma      GERD (gastroesophageal reflux disease)      Radial head fracture 3/18/2010       Past Surgical History:   Procedure Laterality Date     ANKLE SURGERY      bilateral     COLONOSCOPY       COLONOSCOPY N/A 3/20/2015    Procedure: COLONOSCOPY;  Surgeon: Britney Martinez MD;  Location: [facility]     COLONOSCOPY N/A 5/22/2017    Procedure: COLONOSCOPY;  Colonoscopy;  Surgeon: Tristen Rangel MD;  Location: [facility]     ESOPHAGOSCOPY, GASTROSCOPY, DUODENOSCOPY (EGD), COMBINED N/A 6/7/2018    Procedure: COMBINED ESOPHAGOSCOPY, GASTROSCOPY, DUODENOSCOPY (EGD);  gastroscopy;  Surgeon: Tristen Rangel MD;  Location: [facility]     HC ESOPH/GAS REFLUX TEST W NASAL IMPED >1 HR  4/19/2012    Procedure:ESOPHAGEAL IMPEDENCE FUNCTION TEST WITH 24 HOUR PH GREATER THAN 1 HOUR; Surgeon:VALDO UMANZOR; Location: GI     HERNIA REPAIR      umbilcal     HYSTERECTOMY, PAP NO LONGER INDICATED  1991     TONSILLECTOMY       UPPER GI ENDOSCOPY          Family History   Problem Relation Age of Onset     Heart Disease Mother         valve problem     Hypertension Mother      Diabetes Mother         type 2     Cerebrovascular Disease Mother      Allergies Mother      Eye Disorder Mother         Macular degeneration     Osteoporosis Mother      Respiratory Mother      Migraines Mother      Cardiovascular Father         MI at age 80     Cancer - colorectal Father      Diabetes Father      Hypertension Father         type 2     Eye Disorder Father         macular degeneration     Lipids  Father      Colon Cancer Father      C.A.D. Maternal Grandmother      Diabetes Paternal Grandmother         type 2     Cardiovascular Paternal Grandmother         MI     Diabetes Sister         type 2     Allergies Sister      Gastrointestinal Disease Sister      Depression Sister      Gastrointestinal Disease Daughter      Migraines Daughter      Gastrointestinal Disease Daughter      Migraines Daughter      Migraines Son      Aneurysm No family hx of      Brain Hemorrhage No family hx of      Brain Tumor No family hx of      Cancer No family hx of      Chiari Malformation No family hx of      LUNG DISEASE No family hx of      Seizure Disorder No family hx of        Social History     Socioeconomic History     Marital status:      Spouse name: Not on file     Number of children: Not on file     Years of education: Not on file     Highest education level: Not on file   Occupational History     Not on file   Social Needs     Financial resource strain: Not on file     Food insecurity:     Worry: Not on file     Inability: Not on file     Transportation needs:     Medical: Not on file     Non-medical: Not on file   Tobacco Use     Smoking status: Former Smoker     Packs/day: 0.50     Years: 3.00     Pack years: 1.50     Types: Cigarettes     Smokeless tobacco: Never Used     Tobacco comment: smoked for 3 years when she was around 19   Substance and Sexual Activity     Alcohol use: Yes     Comment: RARELY     Drug use: No     Sexual activity: Not Currently   Lifestyle     Physical activity:     Days per week: Not on file     Minutes per session: Not on file     Stress: Not on file   Relationships     Social connections:     Talks on phone: Not on file     Gets together: Not on file     Attends [employer] witness service: Not on file     Active member of club or organization: Not on file     Attends meetings of clubs or organizations: Not on file     Relationship status: Not on file     Intimate partner violence:     Fear  of current or ex partner: Not on file     Emotionally abused: Not on file     Physically abused: Not on file     Forced sexual activity: Not on file   Other Topics Concern     Parent/sibling w/ CABG, MI or angioplasty before 65F 55M? Yes     Comment: mother- valve problem   Social History Narrative     Not on file       Outpatient Encounter Medications as of 10/29/2019   Medication Sig Dispense Refill     albuterol (PROAIR HFA/PROVENTIL HFA/VENTOLIN HFA) 108 (90 Base) MCG/ACT inhaler Inhale 2 puffs into the lungs every 4 hours as needed for shortness of breath / dyspnea 1 Inhaler 1     albuterol (PROVENTIL) (5 MG/ML) 0.5% nebulizer solution Take 0.5 mLs (2.5 mg) by nebulization every 4 hours as needed for wheezing or shortness of breath / dyspnea 30 vial 1     amLODIPine (NORVASC) 5 MG tablet Take 1 tablet (5 mg) by mouth daily For blood pressure 90 tablet 1     amylase-lipase-protease (CREON) 6000 units CPEP TAKE TWO CAPSULES BY MOUTH THREE TIMES A DAY WITH MEALS FOR DIARRHEA 180 capsule 11     ascorbic acid 1000 MG TABS tablet Take 1,000 mg by mouth daily       augmented betamethasone dipropionate (DIPROLENE-AF) 0.05 % external cream Apply sparingly twice daily to areas of psoriasis Friday through Sunday. 50 g 4     biotin 1000 MCG TABS tablet Take 1,000 mcg by mouth 2 times daily       calcipotriene (DOVONOX) 0.005 % external cream Apply twice daily to psoriasis on Monday through Thursday. 60 g 8     Calcium-Magnesium-Zinc 333..33-5 MG TABS Take 1 tablet by mouth daily       cetirizine (ZYRTEC) 10 MG tablet Take 1 tablet (10 mg) by mouth every morning For allergies 90 tablet 3     clobetasol propionate (CLOBEX) 0.05 % external shampoo Leave on scalp for 15 minutes then rinse. Use 2-3 times weekly. 118 mL 4     donepezil (ARICEPT) 10 MG tablet TAKE ONE TABLET BY MOUTH ONCE DAILY AT BEDTIME FOR MEMORY 30 tablet 1     estradiol (ESTRACE) 0.1 MG/GM vaginal cream USE ONE GRAM VAGINALLY ONCE DAILY AND SPREAD A  SMALL AMOUNT AROUND THE CLITORAL REES For vaginal area issues. 85 g 0     fluocinonide (LIDEX) 0.05 % external solution Apply twice daily as needed to scalp.  Do not apply to face. Please keep upcoming appointment. 60 mL 0     fluticasone (FLONASE) 50 MCG/ACT nasal spray INSTILL ONE SPRAY IN EACH NOSTRIL EVERY DAY.  For allergies 16 g 1     fluticasone-salmeterol (ADVAIR) 250-50 MCG/DOSE inhaler INHALE 1 PUFF BY MOUTH INTO THE LUNGS TWICE DAILY.  For asthma. 1 Inhaler 0     gabapentin (NEURONTIN) 300 MG capsule TAKE ONE CAPSULE BY MOUTH EVERY NIGHT AT BEDTIME AS NEEDED FOR BACK PAIN 60 capsule 5     hydrocortisone 1 % cream Apply topically as needed       lactase (LACTAID) 9000 UNITS TABS Take 9,000 Units by mouth as needed       melatonin 5 MG tablet Take 1 tablet (5 mg) by mouth nightly as needed for sleep 90 tablet 3     montelukast (SINGULAIR) 10 MG tablet Take 1 tablet (10 mg) by mouth daily For allergies and asthma 90 tablet 3     omeprazole (PRILOSEC) 20 MG DR capsule TAKE ONE CAPSULE BY MOUTH TWICE A DAY FOR HEARTBURN 60 capsule 0     omeprazole (PRILOSEC) 20 MG DR capsule Take 1 capsule (20 mg) by mouth daily 30 capsule 1     ORDER FOR DME Equipment being ordered: Nebulizer and tubing/filter 1 Device 0     polyethylene glycol (MIRALAX/GLYCOLAX) powder Take 17 g (1 capful) by mouth as needed for constipation 1 Bottle 3     sertraline (ZOLOFT) 100 MG tablet TAKE ONE TABLET BY MOUTH ONCE DAILY FOR MOOD 30 tablet 0     sertraline (ZOLOFT) 100 MG tablet Take 1 tablet (100 mg) by mouth daily For mood. 30 tablet 1     simvastatin (ZOCOR) 40 MG tablet TAKE ONE TABLET BY MOUTH EVERY NIGHT AT BEDTIME.  For cholesterol. 90 tablet 3     SKIN OILS EX Lavender-headache, skin, peppermint-skin, upset stomach mix of oils, asthma mix of oils, eucalyptus. Tea tree oil-skin, vapor rub- chest tightness, sleepy time (vetiver, lavendaer, mrjoram, mandarin, ylang)- sleep, constipation, eczema (sweet almond oil). Also mixes with  carriers: Coconut oil, [place] oil, Extra virgin oil. Frankincense- cough. Digize- oil. Purification- oil, lemon- oil. Essentialyme- pill.  Inhales a mix with olive oil almond and coconut oil with peppermint, and eucalyptus- sinus, allergies. Updated 12/1/2015.   Updated 4/26/2016: Lemon, Cinnamon bark, panaway, Thieves, Orange, [place], Copaiba       traZODone (DESYREL) 50 MG tablet Take 1 tablet (50 mg) by mouth At Bedtime 30 tablet 1     Turmeric 500 MG CAPS Take 1 capsule by mouth daily       No facility-administered encounter medications on file as of 10/29/2019.              Review Of Systems  Skin: As above  Eyes: negative  Ears/Nose/Throat: negative  Respiratory: No shortness of breath, dyspnea on exertion, cough, or hemoptysis  Cardiovascular: negative  Gastrointestinal: negative  Genitourinary: negative  Musculoskeletal: negative  Neurologic: negative  Psychiatric: negative  Hematologic/Lymphatic/Immunologic: negative  Endocrine: negative      O:   NAD, WDWN, Alert & Oriented, Mood & Affect wnl, Vitals stable   Here today alone   /78   Pulse 66   SpO2 97%    General appearance normal   Vitals stable   Alert, oriented and in no acute distress     Psoriasiform plaques on scalp and thin areas on legs     Eyes: Conjunctivae/lids:Normal     ENT: Lips: normal    MSK:Normal    Cardiovascular: peripheral edema none    Pulm: Breathing Normal    Neuro/Psych: Orientation:Normal; Mood/Affect:Normal  A/P:  1. Psoriasis on scalp and legs  IL TAC: PGACAC discussed.  Risks including but not limited to injection site reaction, bruising, no resolution.  All questions answered and entertained to patient s satisfaction.  Informed consent obtained.  IL TAC in concentration of 10mg/ml was injected ID to scalp and legs.  Total injected was  1.5 ml.  Patient tolerated without complications and given wound care instructions, including not to move product around.  Return in 4 weeks for follow-up and possible additional  IL TAC.

## 2019-10-31 NOTE — PROGRESS NOTES
ESTABLISHED PATIENT NEUROLOGY NOTE    DATE OF VISIT: 10/31/2019  MRN: [id number]  PATIENT NAME: Ingrid Amaral  YOB: 1945    Chief Complaint   Patient presents with     RECHECK     memory.     SUBJECTIVE:                                                      HISTORY OF PRESENT ILLNESS:  Ingrid is here for follow up regarding cognitive impairment.     As previously documented by me (5.29.19):  She had Neuropsych testing done in 3.2018 and the findings showed possible Lewy Body Dementia. PET scan however, showed normal occipital lobe metabolism. I ordered an EEG and wanted the patient to see Neurosurgery for growth of her meningiomas but she had not followed up with either recommendation when we met 6 months ago. She was on Aricept then. No motor signs of Parkinsonism on her exam but she did complain of poor balance so I recommended PT. She wanted to try working on her home exercises first. I was concerned about her driving too. In a 4.2019 care coordination note, they indicate that Ingrid s  helps her managemedications. He reported Ingrid cisneros memory seemed stable. She herself was reporting anxiety. Her PCP increased her Zoloft (about a month ago). She also underwent MTM.      The patient is here with her  today.   She is not sure why she did not make a neurosurgery appointment yet.   She and her  agree that she is having more problems with word-finding and losing her train of thought. She has poor attention, jumping from task to task and not completing things.      They think things are still going well at home. She feels like her balance will be better with her new insoles she recently bought. She had a fall this winter. She feels that her balance has gotten worse.   She has been having more nightmares. She hits her  in her sleep. She is taking melatonin for sleep 3mg to 5mg. She sleeps fine. She says mood is good most of the time. She has been having more problems with her  IBS than anything else.      She is still driving, just around town.  does ride along periodically. He says she gets irritated with him. No safety concerns. He does notice that sometimes if he is off to the side, she does not see him. She has not had an eye exam in a couple of years.      No problems with the Aricept. Her main issue with the IBS is constipation. She also has problems with swallow at times related to a hiatal hernia.     MoCA score was 23/30 at that last visit. She continued to be free of motor signs of Parksinson s on exam. For monitoring of the meningiomas, I ordered a repeat brain MRI. One of the two meningiomas had increased in size. She saw Dr. Ewing in Neurosurgery on 8.12.19. He recommended resection, but the note indicates plan was to repeat imaging in 6 months.     She did some PT for balance in the interim at my request as well.     I had also asked Ingrid to go in for repeat Neuropsych testing. The findings remain consistent with LBD.     As documented by Dr. Agrawal (8.9.19):  IMPRESSIONS  Ms. Amaral demonstrated a pattern of weaknesses and deficits the remains consistent with a Lewy body dementia syndrome. Relative to the results from her exam from 03/2018, there is variability in her neuropsychological profile. It is important to acknowledge that improvements are expected from one time to the next in neuropsychological testing, as practice and familiarity with testing typically produce improvements. These practice effects are seen in this exam. However, there are a number of scores that have declined mildly relative to the prior exam, which raise the strong question of a mild decline in overall cognitive functioning, but perhaps most particularly so for frontal, subcortical, and right hemispheric brain networks. In this exam, she has deficits in visually mediated processing including visual problem-solving and visuoperception, as well as weaknesses in attention, executive  functioning, and bilateral psychomotor speed. Other cognitive abilities, including anterograde memory and many of her language skills are normal and were performed in keeping with her low average range cognitive baseline. She is reporting mild symptoms of depression, and moderate symptoms of anxiety. These reports are both slightly increased from the prior evaluation. While it may be the case that psychological factors are contributing to some degree of variability in her profile, I do not think that these factors are responsible for her cognitive deficits.     RECOMMENDATIONS  Preliminary results and recommendations were provided to the patient and her  over the telephone on 08/13/2019, and all questions were answered.     1. Ms. Amaral will continue to benefit from support and supervision of her daily activities. It is likely that her support needs increase in the future.     2. In spite of treatment, she is reporting ongoing symptoms of depression and anxiety. If medically indicated, consideration should be given to modified treatment of her mental health.     3. Along similar lines, referral for psychotherapy services could be of benefit. One possible referral option would be [facility], with locations throughout the [facility] area. They can be reached by calling [phone].     4. She should not drive.     5. Follow-up neuropsychological evaluation is again recommended in 1 year in order to assess and update recommendations as appropriate.    According to the [facility] note from September, Ingrid was still driving short distances.  was riding along periodically to monitor safety.     The patient is here with her  again. They arrive almost 10 minutes late for her appointment.  tells me they had to rush out the door because Ingrid had thought the appointment was at 1pm rather than 11am and she does not do well when she is in a hurry. She denies concerns about memory changes.  They agree things are stable. Her  says that their main concern is that the Neurosurgeon wrote in her chart that she cannot drive (he is confused, this was documented in the Neuropsychology report (as above) not the neurosurgeon's note). He is also upset because the Neurosurgeon initially said that he could resect the meningiomas (given their size), but when they reviewed potential symptoms from the tumors,  said she doesn't have any and why not wait? Surgeon went along with this and for some reason this is upsetting to the patient's . Note reviewed.     He wants to get a handicap sticker for Ingrid because she is having trouble walking due to back and Right leg pain, but now with the documentation in her chart that she shouldn't drive, no one will sign the form for her. She continues to do just local driving.     The patient says that she is very anxious due to having a lot to do. She cannot get things done as quickly as she used to. She is not sure if her anxiety is adequately controlled at this time. No safety concerns at home.     She does think the physical therapy was helpful for her balance, but admits she has not been doing the HEP.     She has not had any hallucinations lately. The dream enactment has improved with something new she received from her primary care provider. From the list, I am not sure which medication he is referring to. He says he is on the medication as well but cannot remember the name.     She has occasional cramping in her dominant hand.     Ingrid denies side effects from the Aricept.      CURRENT MEDICATIONS:   albuterol (PROAIR HFA/PROVENTIL HFA/VENTOLIN HFA) 108 (90 Base) MCG/ACT inhaler, Inhale 2 puffs into the lungs every 4 hours as needed for shortness of breath / dyspnea  albuterol (PROVENTIL) (5 MG/ML) 0.5% nebulizer solution, Take 0.5 mLs (2.5 mg) by nebulization every 4 hours as needed for wheezing or shortness of breath / dyspnea  amLODIPine (NORVASC) 5  MG tablet, Take 1 tablet (5 mg) by mouth daily For blood pressure  amylase-lipase-protease (CREON) 6000 units CPEP, TAKE TWO CAPSULES BY MOUTH THREE TIMES A DAY WITH MEALS FOR DIARRHEA  ascorbic acid 1000 MG TABS tablet, Take 1,000 mg by mouth daily  augmented betamethasone dipropionate (DIPROLENE-AF) 0.05 % external cream, Apply sparingly twice daily to areas of psoriasis Friday through Sunday.  biotin 1000 MCG TABS tablet, Take 1,000 mcg by mouth 2 times daily  calcipotriene (DOVONOX) 0.005 % external cream, Apply twice daily to psoriasis on Monday through Thursday.  Calcium-Magnesium-Zinc 333..33-5 MG TABS, Take 1 tablet by mouth daily  cetirizine (ZYRTEC) 10 MG tablet, Take 1 tablet (10 mg) by mouth every morning For allergies  clobetasol propionate (CLOBEX) 0.05 % external shampoo, Leave on scalp for 15 minutes then rinse. Use 2-3 times weekly.  donepezil (ARICEPT) 10 MG tablet, TAKE ONE TABLET BY MOUTH ONCE DAILY AT BEDTIME FOR MEMORY  estradiol (ESTRACE) 0.1 MG/GM vaginal cream, USE ONE GRAM VAGINALLY ONCE DAILY AND SPREAD A SMALL AMOUNT AROUND THE CLITORAL REES For vaginal area issues.  fluocinonide (LIDEX) 0.05 % external solution, Apply twice daily as needed to scalp.  Do not apply to face. Please keep upcoming appointment.  fluticasone (FLONASE) 50 MCG/ACT nasal spray, INSTILL ONE SPRAY IN EACH NOSTRIL EVERY DAY.  For allergies  fluticasone-salmeterol (ADVAIR) 250-50 MCG/DOSE inhaler, INHALE 1 PUFF BY MOUTH INTO THE LUNGS TWICE DAILY.  For asthma.  gabapentin (NEURONTIN) 300 MG capsule, Take 2 capsules (600 mg) by mouth 3 times daily  hydrocortisone 1 % cream, Apply topically as needed  lactase (LACTAID) 9000 UNITS TABS, Take 9,000 Units by mouth as needed  melatonin 5 MG tablet, Take 1 tablet (5 mg) by mouth nightly as needed for sleep  montelukast (SINGULAIR) 10 MG tablet, Take 1 tablet (10 mg) by mouth daily For allergies and asthma  omeprazole (PRILOSEC) 20 MG DR capsule, TAKE ONE CAPSULE BY  MOUTH TWICE A DAY FOR HEARTBURN  polyethylene glycol (MIRALAX/GLYCOLAX) powder, Take 17 g (1 capful) by mouth as needed for constipation  sertraline (ZOLOFT) 100 MG tablet, Take 1 tablet (100 mg) by mouth daily For mood.  simvastatin (ZOCOR) 40 MG tablet, TAKE ONE TABLET BY MOUTH EVERY NIGHT AT BEDTIME.  For cholesterol.  SKIN OILS EX, Lavender-headache, skin, peppermint-skin, upset stomach mix of oils, asthma mix of oils, eucalyptus. Tea tree oil-skin, vapor rub- chest tightness, sleepy time (vetiver, lavendaer, mrjoram, mandarin, ylang)- sleep, constipation, eczema (sweet almond oil). Also mixes with carriers: Coconut oil, Bowman oil, Extra virgin oil. Frankincense- cough. Digize- oil. Purification- oil, lemon- oil. Essentialyme- pill.  Inhales a mix with olive oil almond and coconut oil with peppermint, and eucalyptus- sinus, allergies. Updated 12/1/2015.   Updated 4/26/2016: Lemon, Cinnamon bark, panaway, Thieves, Orange, Wintergreen, Copaiba  traZODone (DESYREL) 50 MG tablet, Take 1 tablet (50 mg) by mouth At Bedtime  Turmeric 500 MG CAPS, Take 1 capsule by mouth daily  ORDER FOR DME, Equipment being ordered: Nebulizer and tubing/filter    No current facility-administered medications on file prior to visit.       RECENT DIAGNOSTIC STUDIES:    Results for orders placed or performed during the hospital encounter of 10/28/19   MR Lumbar Spine w/o Contrast    Narrative    MR LUMBAR SPINE WITHOUT CONTRAST 10/28/2019 4:03 PM     HISTORY: Back pain, risk factors (osteoporosis or chronic steroid use  or elderly). Chronic radiculopathy, now wanting injection, need MRI  updated. Lumbar radiculopathy.    TECHNIQUE: Multiplanar multisequence images were obtained through the  lumbar spine without contrast.    COMPARISON: 5/18/2017    FINDINGS: There is moderate to severe rightward convex upper lumbar  lateral curvature. Posterior alignment is normal. Five lumbar-type  vertebral bodies are presumed. There is disc space  narrowing at L1-L2,  L2-L3, L3-L4, and L4-L5. Discogenic marrow changes are present at  L2-L3 and L4-L5. Bone marrow signal intensity is otherwise normal. The  conus medullaris is normal in appearance with its tip at the L1 level.  Cauda equina nerve roots are normal.    T12-L1: Minimal disc bulging. No stenosis.    L1-L2: Broad-based disc bulging posterior osteophyte formation and  facet and ligamentum flavum hypertrophy is present causing some mild  central canal stenosis, moderate left-sided foraminal stenosis and  mild right-sided foraminal stenosis.    L2-L3: Broad-based disc bulging and mild to moderate facet hypertrophy  is present causing some borderline to mild central canal stenosis,  mild to moderate left-sided foraminal stenosis but no significant  right-sided foraminal stenosis.    L3-L4: Broad-based posterior osteophyte formation, disc bulging, and  facet hypertrophy is present causing mild central canal stenosis,  moderate right-sided foraminal stenosis and moderate left-sided  foraminal stenosis.    L4-L5: Broad-based posterior osteophyte formation disc bulging and  facet hypertrophy is present causing severe right-sided foraminal  stenosis, mild central canal stenosis and borderline to mild  left-sided foraminal stenosis.    L5-SI: There is a chronic partially extruded right paramedian disc  protrusion posteriorly displacing the right S1 nerve root which is  identical in appearance to that seen on the prior exam. There is also  mild facet hypertrophy. No significant foraminal stenosis is present.  There is some mild to moderate right-sided central canal stenosis due  to the chronically extruded disc protrusion.      Impression    IMPRESSION:  1. No significant change from 5/18/2017.  2. 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  2. Multilevel degenerative disc and facet disease with degenerative  scoliosis.  Findings are most advanced at L4 to show 5 where there is  severe right-sided foraminal stenosis. There is multilevel mild  central canal stenosis and multilevel moderate foraminal stenoses.    LAMONT GREGORY MD       Head Imaging:   MRI Brain (6.11.19):  IMPRESSION:  1. Slight interval increase in size in a presumed meningioma at the  temporo-occipital junction on the right, now measuring 1.4 x 1.2 cm  compared to 1.0 x 1.1 cm previously. This remains consistent with a  meningioma.  2. Stable enhancing extra-axial nodule at the left temporo-occipital  junction measuring 0.8 x 0.6 cm on the current exam. This remains  consistent with a meningioma.   3. Diffuse cerebral volume loss and cerebral white matter changes  consistent with chronic small vessel ischemic disease. No evidence for  acute intracranial pathology.    Brain Imaging reviewed by me. Agree with Radiology read.     REVIEW OF SYSTEMS:                                                      10-point review of systems is negative except as mentioned above in HPI.    EXAM:                                                      Physical Exam:   Vitals: /72 (BP Location: Left arm, Patient Position: Sitting, Cuff Size: Adult Regular)   Pulse 58   Temp 97.5  F (36.4  C) (Tympanic)   Resp 12   Wt 55.1 kg (121 lb 6.4 oz)   BMI 22.20 kg/m    BMI= Body mass index is 22.2 kg/m .  GENERAL: NAD.   HEENT: NC/AT.   CV: RRR. S1, S2.   NECK: No bruits.  Neurologic:  MENTAL STATUS: Alert, attentive though spacy at times. Speech is fluent. Fair comprehension. MoCA: 21/30 (normal is 26 and above).   CRANIAL NERVES: Visual fields intact to confrontation. Pupils equally, round and reactive to light. Facial sensation and movement normal. EOM full. Hearing intact to conversation. Trapezius strength intact. Palate moves symmetrically. Tongue midline.  MOTOR: 5/5 in proximal and distal muscle groups of upper and lower extremities. Tone and bulk normal. No tremor on today's  exam.  SENSATION: Normal light touch hroughout. Vibration intact at ankles.   COORDINATION: Normal finger nose finger. Normal hand opening/closing speed and amplitude. Knee heel shin normal.  STATION AND GAIT: Romberg negative. Tandem unsteady. Casual gait is normal except for valgus deformity at the knee and some antalgia.   Right hand-dominant.     ASSESSMENT and PLAN:                                                      Assessment and Plan:    ICD-10-CM    1. Lewy body dementia without behavioral disturbance (H) G31.83     F02.80    2. Meningioma (H) D32.9 MR Brain w/o & w Contrast   3. Anxiety F41.9    4. REM sleep behavior disorder G47.52         Ms. Amaral is a pleasant 74 yo woman here for follow-up regarding possibly early stage Lewy Body Dementia and meningiomas. She really has no motor signs of Parkinson's on exam again today, but she has had symptoms of RSBD and hallucinations which would be consistent with LBD. And she reports longstanding anosmia. We had a long discussion about the Neuropsych test results and the driving issue.  has been monitoring her driving up until now, but I am concerned about his reliability at this point, given that he was confused about things we discussed in clinic today.  If Ingrid wants to continue to drive, I recommend a driving evaluation. Otherwise there do not appear to be any safety concerns at home, and [facility] checks in with them regularly.     Regarding the meningiomas, They have decided not to have these resected though Dr. Ewing recommended she do. I see that there is not an order for the repeat imaging, which would be due in December so I placed the MRI order today. I encouraged Ingrid to resume her home exercises for the balance. No change in the Aricept today, as she seems to be tolerating this well.     Patient Instructions:  -- I recommend a Driving evaluation, as we discussed. *Information provided.   -- Talk to your primary care provider about the  anxiety. Perhaps she can recommend an alternative to your Zoloft or something could be added to it.   -- Continue the Aricept (donepezil): 10mg at bedtime.   -- Brain MRI in December. Order is in. We will notify you of the results.   -- Return to Neurology in 6 months. Let us know if any concerns arise in the meantime.     Total Time: 40 minutes were spent with the patient. More than 50% of the time spent on counseling (as described above in Assessment and Plan/Instructions) /coordinating the care.    Pratibha Hay MD  Neurology

## 2019-10-31 NOTE — LETTER
10/31/2019         RE: Ingrid Amaral  [address] [facility] [address]        Dear Colleague,    Thank you for referring your patient, Ingrid Amaral, to the [facility]. Please see a copy of my visit note below.    ESTABLISHED PATIENT NEUROLOGY NOTE    DATE OF VISIT: 10/31/2019  MRN: [id number]  PATIENT NAME: Ingrid Amaral  YOB: 1945    Chief Complaint   Patient presents with     RECHECK     memory.     SUBJECTIVE:                                                      HISTORY OF PRESENT ILLNESS:  Ingrid is here for follow up regarding cognitive impairment.     As previously documented by me (5.29.19):  She had Neuropsych testing done in 3.2018 and the findings showed possible Lewy Body Dementia. PET scan however, showed normal occipital lobe metabolism. I ordered an EEG and wanted the patient to see Neurosurgery for growth of her meningiomas but she had not followed up with either recommendation when we met 6 months ago. She was on Aricept then. No motor signs of Parkinsonism on her exam but she did complain of poor balance so I recommended PT. She wanted to try working on her home exercises first. I was concerned about her driving too. In a 4.2019 care coordination note, they indicate that Ingrid s  helps her managemedications. He reported Ingrid cisneros memory seemed stable. She herself was reporting anxiety. Her PCP increased her Zoloft (about a month ago). She also underwent MTM.      The patient is here with her  today.   She is not sure why she did not make a neurosurgery appointment yet.   She and her  agree that she is having more problems with word-finding and losing her train of thought. She has poor attention, jumping from task to task and not completing things.      They think things are still going well at home. She feels like her balance will be better with her new insoles she recently bought. She had a fall this winter. She feels  that her balance has gotten worse.   She has been having more nightmares. She hits her  in her sleep. She is taking melatonin for sleep 3mg to 5mg. She sleeps fine. She says mood is good most of the time. She has been having more problems with her IBS than anything else.      She is still driving, just around town.  does ride along periodically. He says she gets irritated with him. No safety concerns. He does notice that sometimes if he is off to the side, she does not see him. She has not had an eye exam in a couple of years.      No problems with the Aricept. Her main issue with the IBS is constipation. She also has problems with swallow at times related to a hiatal hernia.     MoCA score was 23/30 at that last visit. She continued to be free of motor signs of Parksinson s on exam. For monitoring of the meningiomas, I ordered a repeat brain MRI. One of the two meningiomas had increased in size. She saw Dr. Ewing in Neurosurgery on 8.12.19. He recommended resection, but the note indicates plan was to repeat imaging in 6 months.     She did some PT for balance in the interim at my request as well.     I had also asked Ingrid to go in for repeat Neuropsych testing. The findings remain consistent with LBD.     As documented by Dr. Agrawal (8.9.19):  IMPRESSIONS  Ms. Amaral demonstrated a pattern of weaknesses and deficits the remains consistent with a Lewy body dementia syndrome. Relative to the results from her exam from 03/2018, there is variability in her neuropsychological profile. It is important to acknowledge that improvements are expected from one time to the next in neuropsychological testing, as practice and familiarity with testing typically produce improvements. These practice effects are seen in this exam. However, there are a number of scores that have declined mildly relative to the prior exam, which raise the strong question of a mild decline in overall cognitive functioning, but perhaps  most particularly so for frontal, subcortical, and right hemispheric brain networks. In this exam, she has deficits in visually mediated processing including visual problem-solving and visuoperception, as well as weaknesses in attention, executive functioning, and bilateral psychomotor speed. Other cognitive abilities, including anterograde memory and many of her language skills are normal and were performed in keeping with her low average range cognitive baseline. She is reporting mild symptoms of depression, and moderate symptoms of anxiety. These reports are both slightly increased from the prior evaluation. While it may be the case that psychological factors are contributing to some degree of variability in her profile, I do not think that these factors are responsible for her cognitive deficits.     RECOMMENDATIONS  Preliminary results and recommendations were provided to the patient and her  over the telephone on 08/13/2019, and all questions were answered.     1. Ms. Amaral will continue to benefit from support and supervision of her daily activities. It is likely that her support needs increase in the future.     2. In spite of treatment, she is reporting ongoing symptoms of depression and anxiety. If medically indicated, consideration should be given to modified treatment of her mental health.     3. Along similar lines, referral for psychotherapy services could be of benefit. One possible referral option would be [facility], with locations throughout the [facility] area. They can be reached by calling [phone].     4. She should not drive.     5. Follow-up neuropsychological evaluation is again recommended in 1 year in order to assess and update recommendations as appropriate.    According to the [facility] note from September, Ingrid was still driving short distances.  was riding along periodically to monitor safety.     The patient is here with her  again. They arrive  almost 10 minutes late for her appointment.  tells me they had to rush out the door because Ingrid had thought the appointment was at 1pm rather than 11am and she does not do well when she is in a hurry. She denies concerns about memory changes. They agree things are stable. Her  says that their main concern is that the Neurosurgeon wrote in her chart that she cannot drive (he is confused, this was documented in the Neuropsychology report (as above) not the neurosurgeon's note). He is also upset because the Neurosurgeon initially said that he could resect the meningiomas (given their size), but when they reviewed potential symptoms from the tumors,  said she doesn't have any and why not wait? Surgeon went along with this and for some reason this is upsetting to the patient's . Note reviewed.     He wants to get a handicap sticker for Ingrid because she is having trouble walking due to back and Right leg pain, but now with the documentation in her chart that she shouldn't drive, no one will sign the form for her. She continues to do just local driving.     The patient says that she is very anxious due to having a lot to do. She cannot get things done as quickly as she used to. She is not sure if her anxiety is adequately controlled at this time. No safety concerns at home.     She does think the physical therapy was helpful for her balance, but admits she has not been doing the HEP.     She has not had any hallucinations lately. The dream enactment has improved with something new she received from her primary care provider. From the list, I am not sure which medication he is referring to. He says he is on the medication as well but cannot remember the name.     She has occasional cramping in her dominant hand.     Ingrid denies side effects from the Aricept.      CURRENT MEDICATIONS:   albuterol (PROAIR HFA/PROVENTIL HFA/VENTOLIN HFA) 108 (90 Base) MCG/ACT inhaler, Inhale 2 puffs into the  lungs every 4 hours as needed for shortness of breath / dyspnea  albuterol (PROVENTIL) (5 MG/ML) 0.5% nebulizer solution, Take 0.5 mLs (2.5 mg) by nebulization every 4 hours as needed for wheezing or shortness of breath / dyspnea  amLODIPine (NORVASC) 5 MG tablet, Take 1 tablet (5 mg) by mouth daily For blood pressure  amylase-lipase-protease (CREON) 6000 units CPEP, TAKE TWO CAPSULES BY MOUTH THREE TIMES A DAY WITH MEALS FOR DIARRHEA  ascorbic acid 1000 MG TABS tablet, Take 1,000 mg by mouth daily  augmented betamethasone dipropionate (DIPROLENE-AF) 0.05 % external cream, Apply sparingly twice daily to areas of psoriasis Friday through Sunday.  biotin 1000 MCG TABS tablet, Take 1,000 mcg by mouth 2 times daily  calcipotriene (DOVONOX) 0.005 % external cream, Apply twice daily to psoriasis on Monday through Thursday.  Calcium-Magnesium-Zinc 333..33-5 MG TABS, Take 1 tablet by mouth daily  cetirizine (ZYRTEC) 10 MG tablet, Take 1 tablet (10 mg) by mouth every morning For allergies  clobetasol propionate (CLOBEX) 0.05 % external shampoo, Leave on scalp for 15 minutes then rinse. Use 2-3 times weekly.  donepezil (ARICEPT) 10 MG tablet, TAKE ONE TABLET BY MOUTH ONCE DAILY AT BEDTIME FOR MEMORY  estradiol (ESTRACE) 0.1 MG/GM vaginal cream, USE ONE GRAM VAGINALLY ONCE DAILY AND SPREAD A SMALL AMOUNT AROUND THE CLITORAL REES For vaginal area issues.  fluocinonide (LIDEX) 0.05 % external solution, Apply twice daily as needed to scalp.  Do not apply to face. Please keep upcoming appointment.  fluticasone (FLONASE) 50 MCG/ACT nasal spray, INSTILL ONE SPRAY IN EACH NOSTRIL EVERY DAY.  For allergies  fluticasone-salmeterol (ADVAIR) 250-50 MCG/DOSE inhaler, INHALE 1 PUFF BY MOUTH INTO THE LUNGS TWICE DAILY.  For asthma.  gabapentin (NEURONTIN) 300 MG capsule, Take 2 capsules (600 mg) by mouth 3 times daily  hydrocortisone 1 % cream, Apply topically as needed  lactase (LACTAID) 9000 UNITS TABS, Take 9,000 Units by mouth as  needed  melatonin 5 MG tablet, Take 1 tablet (5 mg) by mouth nightly as needed for sleep  montelukast (SINGULAIR) 10 MG tablet, Take 1 tablet (10 mg) by mouth daily For allergies and asthma  omeprazole (PRILOSEC) 20 MG DR capsule, TAKE ONE CAPSULE BY MOUTH TWICE A DAY FOR HEARTBURN  polyethylene glycol (MIRALAX/GLYCOLAX) powder, Take 17 g (1 capful) by mouth as needed for constipation  sertraline (ZOLOFT) 100 MG tablet, Take 1 tablet (100 mg) by mouth daily For mood.  simvastatin (ZOCOR) 40 MG tablet, TAKE ONE TABLET BY MOUTH EVERY NIGHT AT BEDTIME.  For cholesterol.  SKIN OILS EX, Lavender-headache, skin, peppermint-skin, upset stomach mix of oils, asthma mix of oils, eucalyptus. Tea tree oil-skin, vapor rub- chest tightness, sleepy time (vetiver, lavendaer, mrjoram, mandarin, ylang)- sleep, constipation, eczema (sweet almond oil). Also mixes with carriers: Coconut oil, [place] oil, Extra virgin oil. Frankincense- cough. Digize- oil. Purification- oil, lemon- oil. Essentialyme- pill.  Inhales a mix with olive oil almond and coconut oil with peppermint, and eucalyptus- sinus, allergies. Updated 12/1/2015.   Updated 4/26/2016: Lemon, Cinnamon bark, panaway, Thieves, Orange, [place], Copaiba  traZODone (DESYREL) 50 MG tablet, Take 1 tablet (50 mg) by mouth At Bedtime  Turmeric 500 MG CAPS, Take 1 capsule by mouth daily  ORDER FOR DME, Equipment being ordered: Nebulizer and tubing/filter    No current facility-administered medications on file prior to visit.       RECENT DIAGNOSTIC STUDIES:    Results for orders placed or performed during the hospital encounter of 10/28/19   MR Lumbar Spine w/o Contrast    Narrative    MR LUMBAR SPINE WITHOUT CONTRAST 10/28/2019 4:03 PM     HISTORY: Back pain, risk factors (osteoporosis or chronic steroid use  or elderly). Chronic radiculopathy, now wanting injection, need MRI  updated. Lumbar radiculopathy.    TECHNIQUE: Multiplanar multisequence images were obtained through  the  lumbar spine without contrast.    COMPARISON: 5/18/2017    FINDINGS: There is moderate to severe rightward convex upper lumbar  lateral curvature. Posterior alignment is normal. Five lumbar-type  vertebral bodies are presumed. There is disc space narrowing at L1-L2,  L2-L3, L3-L4, and L4-L5. Discogenic marrow changes are present at  L2-L3 and L4-L5. Bone marrow signal intensity is otherwise normal. The  conus medullaris is normal in appearance with its tip at the L1 level.  Cauda equina nerve roots are normal.    T12-L1: Minimal disc bulging. No stenosis.    L1-L2: Broad-based disc bulging posterior osteophyte formation and  facet and ligamentum flavum hypertrophy is present causing some mild  central canal stenosis, moderate left-sided foraminal stenosis and  mild right-sided foraminal stenosis.    L2-L3: Broad-based disc bulging and mild to moderate facet hypertrophy  is present causing some borderline to mild central canal stenosis,  mild to moderate left-sided foraminal stenosis but no significant  right-sided foraminal stenosis.    L3-L4: Broad-based posterior osteophyte formation, disc bulging, and  facet hypertrophy is present causing mild central canal stenosis,  moderate right-sided foraminal stenosis and moderate left-sided  foraminal stenosis.    L4-L5: Broad-based posterior osteophyte formation disc bulging and  facet hypertrophy is present causing severe right-sided foraminal  stenosis, mild central canal stenosis and borderline to mild  left-sided foraminal stenosis.    L5-SI: There is a chronic partially extruded right paramedian disc  protrusion posteriorly displacing the right S1 nerve root which is  identical in appearance to that seen on the prior exam. There is also  mild facet hypertrophy. No significant foraminal stenosis is present.  There is some mild to moderate right-sided central canal stenosis due  to the chronically extruded disc protrusion.      Impression    IMPRESSION:  1. No  significant change from 5/18/2017.  2. 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  2. Multilevel degenerative disc and facet disease with degenerative  scoliosis. Findings are most advanced at L4 to show 5 where there is  severe right-sided foraminal stenosis. There is multilevel mild  central canal stenosis and multilevel moderate foraminal stenoses.    LAMONT GREGORY MD       Head Imaging:   MRI Brain (6.11.19):  IMPRESSION:  1. Slight interval increase in size in a presumed meningioma at the  temporo-occipital junction on the right, now measuring 1.4 x 1.2 cm  compared to 1.0 x 1.1 cm previously. This remains consistent with a  meningioma.  2. Stable enhancing extra-axial nodule at the left temporo-occipital  junction measuring 0.8 x 0.6 cm on the current exam. This remains  consistent with a meningioma.   3. Diffuse cerebral volume loss and cerebral white matter changes  consistent with chronic small vessel ischemic disease. No evidence for  acute intracranial pathology.    Brain Imaging reviewed by me. Agree with Radiology read.     REVIEW OF SYSTEMS:                                                      10-point review of systems is negative except as mentioned above in HPI.    EXAM:                                                      Physical Exam:   Vitals: /72 (BP Location: Left arm, Patient Position: Sitting, Cuff Size: Adult Regular)   Pulse 58   Temp 97.5  F (36.4  C) (Tympanic)   Resp 12   Wt 55.1 kg (121 lb 6.4 oz)   BMI 22.20 kg/m     BMI= Body mass index is 22.2 kg/m .  GENERAL: NAD.   HEENT: NC/AT.   CV: RRR. S1, S2.   NECK: No bruits.  Neurologic:  MENTAL STATUS: Alert, attentive though spacy at times. Speech is fluent. Fair comprehension. MoCA: 21/30 (normal is 26 and above).   CRANIAL NERVES: Visual fields intact to confrontation. Pupils equally, round and reactive to light. Facial sensation and  movement normal. EOM full. Hearing intact to conversation. Trapezius strength intact. Palate moves symmetrically. Tongue midline.  MOTOR: 5/5 in proximal and distal muscle groups of upper and lower extremities. Tone and bulk normal. No tremor on today's exam.  SENSATION: Normal light touch hroughout. Vibration intact at ankles.   COORDINATION: Normal finger nose finger. Normal hand opening/closing speed and amplitude. Knee heel shin normal.  STATION AND GAIT: Romberg negative. Tandem unsteady. Casual gait is normal except for valgus deformity at the knee and some antalgia.   Right hand-dominant.     ASSESSMENT and PLAN:                                                      Assessment and Plan:    ICD-10-CM    1. Lewy body dementia without behavioral disturbance (H) G31.83     F02.80    2. Meningioma (H) D32.9 MR Brain w/o & w Contrast   3. Anxiety F41.9    4. REM sleep behavior disorder G47.52         Ms. Amaral is a pleasant 74 yo woman here for follow-up regarding possibly early stage Lewy Body Dementia and meningiomas. She really has no motor signs of Parkinson's on exam again today, but she has had symptoms of RSBD and hallucinations which would be consistent with LBD.  And she reports longstanding anosmia. We had a long discussion about the Neuropsych test results and the driving issue.  has been monitoring her driving up until now, but I am concerned about his reliability at this point, given that he was confused about things we discussed in clinic today.  If Ingrid wants to continue to drive, I recommend a driving evaluation. Otherwise there do not appear to be any safety concerns at home, and [facility] checks in with them regularly.     Regarding the meningiomas, They have decided not to have these resected though Dr. Ewing recommended she do. I see that there is not an order for the repeat imaging, which would be due in December so I placed the MRI order today. I encouraged Ingrid to resume her home  exercises for the balance. No change in the Aricept today, as she seems to be tolerating this well.     Patient Instructions:  -- I recommend a Driving evaluation, as we discussed. *Information provided.   -- Talk to your primary care provider about the anxiety. Perhaps she can recommend an alternative to your Zoloft or something could be added to it.   -- Continue the Aricept (donepezil): 10mg at bedtime.   -- Brain MRI in December. Order is in. We will notify you of the results.   -- Return to Neurology in 6 months. Let us know if any concerns arise in the meantime.     Total Time: 40 minutes were spent with the patient. More than 50% of the time spent on counseling (as described above in Assessment and Plan/Instructions) /coordinating the care.    Pratibah Hay MD  Neurology                    Again, thank you for allowing me to participate in the care of your patient.        Sincerely,        Pratibha Hay MD

## 2019-11-04 ENCOUNTER — OFFICE VISIT (OUTPATIENT)
Dept: FAMILY MEDICINE | Facility: CLINIC | Age: 74
End: 2019-11-04
Payer: MEDICARE

## 2019-11-04 VITALS
TEMPERATURE: 97.7 F | WEIGHT: 123 LBS | RESPIRATION RATE: 14 BRPM | HEART RATE: 67 BPM | SYSTOLIC BLOOD PRESSURE: 119 MMHG | HEIGHT: 62 IN | DIASTOLIC BLOOD PRESSURE: 72 MMHG | BODY MASS INDEX: 22.63 KG/M2

## 2019-11-04 DIAGNOSIS — K21.9 GASTROESOPHAGEAL REFLUX DISEASE WITHOUT ESOPHAGITIS: ICD-10-CM

## 2019-11-04 DIAGNOSIS — J45.40 MODERATE PERSISTENT ASTHMA WITHOUT COMPLICATION: ICD-10-CM

## 2019-11-04 DIAGNOSIS — F41.0 PANIC DISORDER WITHOUT AGORAPHOBIA: ICD-10-CM

## 2019-11-04 DIAGNOSIS — G89.29 CHRONIC RIGHT-SIDED LOW BACK PAIN WITH RIGHT-SIDED SCIATICA: Primary | ICD-10-CM

## 2019-11-04 DIAGNOSIS — G47.52 RBD (REM BEHAVIORAL DISORDER): ICD-10-CM

## 2019-11-04 DIAGNOSIS — M54.41 CHRONIC RIGHT-SIDED LOW BACK PAIN WITH RIGHT-SIDED SCIATICA: Primary | ICD-10-CM

## 2019-11-04 DIAGNOSIS — F32.0 MILD MAJOR DEPRESSION (H): ICD-10-CM

## 2019-11-04 DIAGNOSIS — F41.1 GENERALIZED ANXIETY DISORDER: ICD-10-CM

## 2019-11-04 DIAGNOSIS — G47.00 INSOMNIA, UNSPECIFIED TYPE: ICD-10-CM

## 2019-11-04 DIAGNOSIS — Z23 NEED FOR PROPHYLACTIC VACCINATION AND INOCULATION AGAINST INFLUENZA: ICD-10-CM

## 2019-11-04 PROCEDURE — 90662 IIV NO PRSV INCREASED AG IM: CPT | Performed by: PHYSICIAN ASSISTANT

## 2019-11-04 PROCEDURE — G0008 ADMIN INFLUENZA VIRUS VAC: HCPCS | Performed by: PHYSICIAN ASSISTANT

## 2019-11-04 PROCEDURE — 99214 OFFICE O/P EST MOD 30 MIN: CPT | Mod: 25 | Performed by: PHYSICIAN ASSISTANT

## 2019-11-04 RX ORDER — OMEPRAZOLE 10 MG/1
10 CAPSULE, DELAYED RELEASE ORAL 2 TIMES DAILY
Qty: 60 CAPSULE | Refills: 11 | Status: SHIPPED | OUTPATIENT
Start: 2019-11-04 | End: 2020-03-02

## 2019-11-04 RX ORDER — GABAPENTIN 300 MG/1
600 CAPSULE ORAL 3 TIMES DAILY
Qty: 180 CAPSULE | Refills: 5 | Status: SHIPPED | OUTPATIENT
Start: 2019-11-04 | End: 2020-06-18

## 2019-11-04 RX ORDER — TRAZODONE HYDROCHLORIDE 50 MG/1
50 TABLET, FILM COATED ORAL AT BEDTIME
Qty: 30 TABLET | Refills: 11 | Status: SHIPPED | OUTPATIENT
Start: 2019-11-04 | End: 2020-03-02

## 2019-11-04 ASSESSMENT — ANXIETY QUESTIONNAIRES
GAD7 TOTAL SCORE: 7
GAD7 TOTAL SCORE: 7
1. FEELING NERVOUS, ANXIOUS, OR ON EDGE: SEVERAL DAYS
4. TROUBLE RELAXING: SEVERAL DAYS
7. FEELING AFRAID AS IF SOMETHING AWFUL MIGHT HAPPEN: NOT AT ALL
6. BECOMING EASILY ANNOYED OR IRRITABLE: SEVERAL DAYS
2. NOT BEING ABLE TO STOP OR CONTROL WORRYING: SEVERAL DAYS
GAD7 TOTAL SCORE: 7
3. WORRYING TOO MUCH ABOUT DIFFERENT THINGS: SEVERAL DAYS
7. FEELING AFRAID AS IF SOMETHING AWFUL MIGHT HAPPEN: NOT AT ALL
5. BEING SO RESTLESS THAT IT IS HARD TO SIT STILL: MORE THAN HALF THE DAYS

## 2019-11-04 ASSESSMENT — MIFFLIN-ST. JEOR: SCORE: 1006.17

## 2019-11-04 ASSESSMENT — PATIENT HEALTH QUESTIONNAIRE - PHQ9
SUM OF ALL RESPONSES TO PHQ QUESTIONS 1-9: 5
10. IF YOU CHECKED OFF ANY PROBLEMS, HOW DIFFICULT HAVE THESE PROBLEMS MADE IT FOR YOU TO DO YOUR WORK, TAKE CARE OF THINGS AT HOME, OR GET ALONG WITH OTHER PEOPLE: NOT DIFFICULT AT ALL
SUM OF ALL RESPONSES TO PHQ QUESTIONS 1-9: 5

## 2019-11-04 NOTE — NURSING NOTE
"Chief Complaint   Patient presents with     Depression     Anxiety     Gastrophageal Reflux     Recheck Medication       Initial /72 (BP Location: Right arm, Patient Position: Chair, Cuff Size: Adult Regular)   Pulse 67   Temp 97.7  F (36.5  C) (Tympanic)   Resp 14   Ht 1.575 m (5' 2\")   Wt 55.8 kg (123 lb)   BMI 22.50 kg/m   Estimated body mass index is 22.5 kg/m  as calculated from the following:    Height as of this encounter: 1.575 m (5' 2\").    Weight as of this encounter: 55.8 kg (123 lb).    Patient presents to the clinic using No DME    Health Maintenance that is potentially due pending provider review:  NONE        Is there anyone who you would like to be able to receive your results? No  If yes have patient fill out BROOKE      "

## 2019-11-04 NOTE — TELEPHONE ENCOUNTER
"Requested Prescriptions   Pending Prescriptions Disp Refills     fluticasone-salmeterol (ADVAIR) 250-50 MCG/DOSE inhaler [Pharmacy Med Name: FLUTICASONE-SALMETEROL 250-50 AEPB]  0     Sig: INHALE ONE PUFF BY MOUTH TWICE A DAY FOR ASTHMA       Inhaled Steroids Protocol Passed - 11/4/2019  8:58 AM        Passed - Patient is age 12 or older        Passed - Asthma control assessment score within normal limits in last 6 months     Please review ACT score.           Passed - Medication is active on med list        Passed - Recent (6 mo) or future (30 days) visit within the authorizing provider's specialty     Patient had office visit in the last 6 months or has a visit in the next 30 days with authorizing provider or within the authorizing provider's specialty.  See \"Patient Info\" tab in inbasket, or \"Choose Columns\" in Meds & Orders section of the refill encounter.            Last Written Prescription Date:  9/19/19  Last Fill Quantity: 1,  # refills: 0   Last office visit: 9/19/2019 with prescribing provider:     Future Office Visit:   Next 5 appointments (look out 90 days)    Nov 04, 2019  1:00 PM CST  SHORT with Leesa Muñiz PA-C  [facility] [address] [place] [id number] [phone]   Dec 13, 2019  9:20 AM CST  Return Visit with Miguelina Schulte PA-C  [facility] ([facility]) [address] [facility] [address] [phone]           "

## 2019-11-04 NOTE — PATIENT INSTRUCTIONS
Try decreasing omeprazole from current 20 mg twice daily to 10 mg twice daily.  New prescription sent today.    Back -  Decided to hold off on doing anything further for pain for now, until after injection  Holding off on handicap parking form for now.  Until you decide to do driving test or otherwise tell me.      Anxiety -  Thinking is due to pain, decided to hold off until after injection   We could consider cymbalta (duloxetine) if anxiety bad and pain still pain    See me back 2-4 weeks after your injection - so about 6 wks from now

## 2019-11-04 NOTE — PROGRESS NOTES
Subjective     Ingrid Amaral is a 74 year old female who presents to clinic today for the following health issues:    HPI     Medication Followup of Omeprazole    Taking Medication as prescribed: yes    Side Effects:  None    Medication Helping Symptoms:  Yes- helping symptoms   Inadvertently didn't try ppi step down.   recalls heartburn symptoms always being worse when she goes to lay down, prefers to try 10 mg bid rather than 20 qday.    Medication Followup of Gabapentin    Taking Medication as prescribed: yes    Side Effects:  None    Medication Helping Symptoms:  Yes, the increased dose seems to be helping   Not noting any side effects with the self increase of gabapentin.  Pain Away topical, other topical as well.  Has upcoming epidural on the 13th.  Recalls the 2017 injection giving great relief until recently.    Medication Followup of Trazodone    Taking Medication as prescribed: yes    Side Effects:  None    Medication Helping Symptoms:  yes   Sleep is much better with the trazodone  Not even 10% of the kicking that she had been doing before the trazodone  Just once she did have a bit of confusion in the night on which way to the bathroom.    Depression and Anxiety Follow-Up    How are you doing with your depression since your last visit? Worsened due to pain    How are you doing with your anxiety since your last visit?  Worsened due to pain    Are you having other symptoms that might be associated with depression or anxiety? No    Have you had a significant life event? No     Do you have any concerns with your use of alcohol or other drugs? No    Social History     Tobacco Use     Smoking status: Former Smoker     Packs/day: 0.50     Years: 3.00     Pack years: 1.50     Types: Cigarettes     Smokeless tobacco: Never Used     Tobacco comment: smoked for 3 years when she was around 19   Substance Use Topics     Alcohol use: Yes     Comment: RARELY     Drug use: No     PHQ 4/24/2019 9/19/2019  11/4/2019   PHQ-9 Total Score 3 4 5   Q9: Thoughts of better off dead/self-harm past 2 weeks Not at all Not at all Not at all     Last PHQ-9 11/4/2019   1.  Little interest or pleasure in doing things 1   2.  Feeling down, depressed, or hopeless 1   3.  Trouble falling or staying asleep, or sleeping too much 1   4.  Feeling tired or having little energy 1   5.  Poor appetite or overeating 0   6.  Feeling bad about yourself 0   7.  Trouble concentrating 1   8.  Moving slowly or restless 0   Q9: Thoughts of better off dead/self-harm past 2 weeks 0   PHQ-9 Total Score 5     DELMIS-7 SCORE 11/2/2018 9/19/2019 11/4/2019   Total Score - - -   Total Score 8 (mild anxiety) - 7 (mild anxiety)   Total Score 8 8 7     DELMIS-7   [employer], 2002; Used with Permission) 11/4/2019   1. Feeling nervous, anxious, or on edge 1   2. Not being able to stop or control worrying 1   3. Worrying too much about different things 1   4. Trouble relaxing 1   5. Being so restless that it is hard to sit still 2   6. Becoming easily annoyed or irritable 1   7. Feeling afraid, as if something awful might happen 0   DELMIS-7 Total Score 7     Suicide Assessment Five-step Evaluation and Treatment (SAFE-T)      How many servings of fruits and vegetables do you eat daily?  2-3    On average, how many sweetened beverages do you drink each day (soda, juice, sweet tea, etc)?   0  How many days per week do you miss taking your medication? 2    What makes it hard for you to take your medications?  remembering to take    Feels anxiety is much worse just since the pain.      Is to do driving test for Dr Hay.    BP Readings from Last 3 Encounters:   11/04/19 119/72   10/31/19 139/72   10/29/19 126/78    Wt Readings from Last 3 Encounters:   11/04/19 55.8 kg (123 lb)   10/31/19 55.1 kg (121 lb 6.4 oz)   09/19/19 54.9 kg (121 lb)         Reviewed and updated as needed this visit by Provider  Tobacco  Allergies  Meds  Problems  Med Hx  Surg Hx  Fam Hx      "    Review of Systems   ROS COMP: Constitutional, cardiovascular, pulmonary, GI, musculoskeletal, neuro and psych systems are negative, except as otherwise noted.      Objective    /72 (BP Location: Right arm, Patient Position: Chair, Cuff Size: Adult Regular)   Pulse 67   Temp 97.7  F (36.5  C) (Tympanic)   Resp 14   Ht 1.575 m (5' 2\")   Wt 55.8 kg (123 lb)   BMI 22.50 kg/m    Body mass index is 22.5 kg/m .  Physical Exam   GENERAL: healthy, alert and no distress  NEURO: mild memory loss on gross conversation  PSYCH: mentation appears normal, affect normal/bright    Diagnostic Test Results:  Labs reviewed in Epic        Assessment & Plan       ICD-10-CM    1. Chronic right-sided low back pain with right-sided sciatica M54.41 gabapentin (NEURONTIN) 300 MG capsule    G89.29    2. Gastroesophageal reflux disease without esophagitis K21.9 omeprazole (PRILOSEC) 10 MG DR capsule   3. Mild major depression (H) F32.0 traZODone (DESYREL) 50 MG tablet   4. Generalized anxiety disorder F41.1    5. RBD (REM behavioral disorder) G47.52    6. Insomnia, unspecified type G47.00 traZODone (DESYREL) 50 MG tablet   7. Panic disorder without agoraphobia F41.0 traZODone (DESYREL) 50 MG tablet   8. Need for prophylactic vaccination and inoculation against influenza Z23 INFLUENZA (HIGH DOSE) 3 VALENT VACCINE [91909]     Vaccine Administration, Initial [69895]          Patient Instructions   Try decreasing omeprazole from current 20 mg twice daily to 10 mg twice daily.  New prescription sent today.    Back -  Decided to hold off on doing anything further for pain for now, until after injection  Holding off on handicap parking form for now.  Until you decide to do driving test or otherwise tell me.      Anxiety -  Thinking is due to pain, decided to hold off until after injection   We could consider cymbalta (duloxetine) if anxiety bad and pain still pain    See me back 2-4 weeks after your injection - so about 6 wks from " now          Return in about 6 weeks (around 12/16/2019) for back pain, anxiety, heartburn.    Leesa Muñiz PA-C  [facility]

## 2019-11-05 ASSESSMENT — PATIENT HEALTH QUESTIONNAIRE - PHQ9: SUM OF ALL RESPONSES TO PHQ QUESTIONS 1-9: 5

## 2019-11-05 ASSESSMENT — ANXIETY QUESTIONNAIRES: GAD7 TOTAL SCORE: 7

## 2019-11-13 ENCOUNTER — RADIOLOGY INJECTION OFFICE VISIT (OUTPATIENT)
Dept: PALLIATIVE MEDICINE | Facility: CLINIC | Age: 74
End: 2019-11-13
Payer: MEDICARE

## 2019-11-13 ENCOUNTER — HOSPITAL ENCOUNTER (OUTPATIENT)
Dept: RADIOLOGY | Facility: CLINIC | Age: 74
Discharge: HOME | End: 2019-11-13
Attending: ANESTHESIOLOGY | Admitting: ANESTHESIOLOGY
Payer: MEDICARE

## 2019-11-13 VITALS — DIASTOLIC BLOOD PRESSURE: 74 MMHG | HEART RATE: 71 BPM | SYSTOLIC BLOOD PRESSURE: 131 MMHG | RESPIRATION RATE: 16 BRPM

## 2019-11-13 DIAGNOSIS — M51.369 DDD (DEGENERATIVE DISC DISEASE), LUMBAR: ICD-10-CM

## 2019-11-13 DIAGNOSIS — M54.16 LUMBAR RADICULOPATHY: Primary | ICD-10-CM

## 2019-11-13 DIAGNOSIS — M41.26 OTHER IDIOPATHIC SCOLIOSIS, LUMBAR REGION: ICD-10-CM

## 2019-11-13 DIAGNOSIS — M54.16 LUMBAR RADICULOPATHY: ICD-10-CM

## 2019-11-13 PROCEDURE — 64484 NJX AA&/STRD TFRM EPI L/S EA: CPT | Mod: RT

## 2019-11-13 PROCEDURE — 64483 NJX AA&/STRD TFRM EPI L/S 1: CPT | Mod: RT | Performed by: ANESTHESIOLOGY

## 2019-11-13 PROCEDURE — 25000128 H RX IP 250 OP 636: Performed by: ANESTHESIOLOGY

## 2019-11-13 PROCEDURE — 64484 NJX AA&/STRD TFRM EPI L/S EA: CPT | Mod: RT | Performed by: ANESTHESIOLOGY

## 2019-11-13 PROCEDURE — 25500064 ZZH RX 255 OP 636: Performed by: ANESTHESIOLOGY

## 2019-11-13 PROCEDURE — 27210202 XR LUMBAR SACRAL TRANSFORAMINAL INJ RIGHT: Mod: TC

## 2019-11-13 RX ORDER — LIDOCAINE HYDROCHLORIDE 10 MG/ML
5 INJECTION, SOLUTION EPIDURAL; INFILTRATION; INTRACAUDAL; PERINEURAL ONCE
Status: COMPLETED | OUTPATIENT
Start: 2019-11-13 | End: 2019-11-13

## 2019-11-13 RX ORDER — BUPIVACAINE HYDROCHLORIDE 5 MG/ML
10 INJECTION, SOLUTION PERINEURAL ONCE
Status: COMPLETED | OUTPATIENT
Start: 2019-11-13 | End: 2019-11-13

## 2019-11-13 RX ORDER — DEXAMETHASONE SODIUM PHOSPHATE 10 MG/ML
10 INJECTION, SOLUTION INTRAMUSCULAR; INTRAVENOUS ONCE
Status: COMPLETED | OUTPATIENT
Start: 2019-11-13 | End: 2019-11-13

## 2019-11-13 RX ORDER — METHYLPREDNISOLONE ACETATE 40 MG/ML
40 INJECTION, SUSPENSION INTRA-ARTICULAR; INTRALESIONAL; INTRAMUSCULAR; SOFT TISSUE ONCE
Status: COMPLETED | OUTPATIENT
Start: 2019-11-13 | End: 2019-11-13

## 2019-11-13 RX ADMIN — LIDOCAINE HYDROCHLORIDE 2.5 ML: 10 INJECTION, SOLUTION EPIDURAL; INFILTRATION; INTRACAUDAL; PERINEURAL at 11:46

## 2019-11-13 RX ADMIN — IOHEXOL 1 ML: 300 INJECTION, SOLUTION INTRAVENOUS at 11:46

## 2019-11-13 RX ADMIN — BUPIVACAINE HYDROCHLORIDE 1.5 ML: 5 INJECTION, SOLUTION EPIDURAL; INTRACAUDAL at 11:46

## 2019-11-13 RX ADMIN — DEXAMETHASONE SODIUM PHOSPHATE 5 MG: 10 INJECTION, SOLUTION INTRAMUSCULAR; INTRAVENOUS at 11:46

## 2019-11-13 RX ADMIN — METHYLPREDNISOLONE ACETATE 40 MG: 40 INJECTION, SUSPENSION INTRA-ARTICULAR; INTRALESIONAL; INTRAMUSCULAR; SOFT TISSUE at 11:46

## 2019-11-13 ASSESSMENT — PAIN SCALES - GENERAL
PAINLEVEL: MODERATE PAIN (4)
PAINLEVEL: EXTREME PAIN (8)

## 2019-11-13 NOTE — IP AVS SNAPSHOT
[facility]  [address]  [facility] [place] [address]  Phone:  [phone]  Fax:  [phone]                                    After Visit Summary   11/13/2019    Ingrid Amaral    MRN: [id number]           After Visit Summary Signature Page    I have received my discharge instructions, and my questions have been answered. I have discussed any challenges I see with this plan with the nurse or doctor.    ..........................................................................................................................................  Patient/Patient Representative Signature      ..........................................................................................................................................  Patient Representative Print Name and Relationship to Patient    ..................................................               ................................................  Date                                   Time    ..........................................................................................................................................  Reviewed by Signature/Title    ...................................................              ..............................................  Date                                               Time          22EPIC Rev 08/18

## 2019-11-13 NOTE — PROGRESS NOTES
Pre procedure Diagnosis: lumbar radiculopathy, lumbar degenerative disc disease   Post procedure Diagnosis: Same  Procedure performed: lumbar transforaminal epidural steroid injection at L4-5 and L5-S1 on the right , fluoroscopically guided, contrast controlled  Anesthesia: none  Complications: none  Operators: Angel Velazquez MD     Indications:   Ingrid Amaral is a 74 year old female was sent by Leesa Muñiz PA-C for lumbar epidural steroid injection.  They have a history of chronic right lower back pain with pain radiating down the right lower extremity into the foot.  Exam shows antalgic gait, lumbar scoliosis, lumbar tenderness, and equivocal SLR and they have tried conservative treatment including physical therapy, medications.    MRI was done on 10/28/19 which showed   FINDINGS: There is moderate to severe rightward convex upper lumbar  lateral curvature. Posterior alignment is normal. Five lumbar-type  vertebral bodies are presumed. There is disc space narrowing at L1-L2,  L2-L3, L3-L4, and L4-L5. Discogenic marrow changes are present at  L2-L3 and L4-L5. Bone marrow signal intensity is otherwise normal. The  conus medullaris is normal in appearance with its tip at the L1 level.  Cauda equina nerve roots are normal.     T12-L1: Minimal disc bulging. No stenosis.     L1-L2: Broad-based disc bulging posterior osteophyte formation and  facet and ligamentum flavum hypertrophy is present causing some mild  central canal stenosis, moderate left-sided foraminal stenosis and  mild right-sided foraminal stenosis.     L2-L3: Broad-based disc bulging and mild to moderate facet hypertrophy  is present causing some borderline to mild central canal stenosis,  mild to moderate left-sided foraminal stenosis but no significant  right-sided foraminal stenosis.     L3-L4: Broad-based posterior osteophyte formation, disc bulging, and  facet hypertrophy is present causing mild central canal stenosis,  moderate  right-sided foraminal stenosis and moderate left-sided  foraminal stenosis.     L4-L5: Broad-based posterior osteophyte formation disc bulging and  facet hypertrophy is present causing severe right-sided foraminal  stenosis, mild central canal stenosis and borderline to mild  left-sided foraminal stenosis.     L5-SI: There is a chronic partially extruded right paramedian disc  protrusion posteriorly displacing the right S1 nerve root which is  identical in appearance to that seen on the prior exam. There is also  mild facet hypertrophy. No significant foraminal stenosis is present.  There is some mild to moderate right-sided central canal stenosis due  to the chronically extruded disc protrusion.                                                                    IMPRESSION:  1. No significant change from 5/18/2017.  2. 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  2. Multilevel degenerative disc and facet disease with degenerative  scoliosis. Findings are most advanced at L4 to show 5 where there is  severe right-sided foraminal stenosis. There is multilevel mild  central canal stenosis and multilevel moderate foraminal stenoses.     LAMONT GREGORY MD    Options/alternatives, benefits and risks were discussed with the patient including bleeding, infection, tissue trauma, numbness, weakness, paralysis, spinal cord injury, radiation exposure, headache and reaction to medications. Questions were answered to her satisfaction and she agrees to proceed. Voluntary informed consent was obtained and signed.     Vitals were reviewed: Yes  /74   Pulse 71   Resp 16   Allergies were reviewed:  Yes   Medications were reviewed:  Yes   Pre-procedure pain score: 8/10    Procedure:  After getting informed consent, patient was brought into the procedure suite and was placed in a prone position on the procedure table.   A Pause for the Cause was  performed.  Patient was prepped and draped in sterile fashion.     After identifying the right L4-5 and L5-S1 neuroforamen, the C-arm was rotated to a right lateral oblique angle.  A total of 2.5 ml of Lidocaine 1% was used to anesthetize the skin and the needle tracks at the skin entry sites coaxial with the fluoroscopy beam, and overriding the superior aspect of the neuroforamen.  Then, 25 gauge 5 inch spinal needles were advanced under intermittent fluoroscopy until they entered the foramen superiorly.    The needle positions were then inspected from anteroposterior and lateral views, and the needles adjusted appropriately.  A total of 1 ml of Omnipaque-300 was injected, confirming appropriate needle positions, with spread into the nerve root sheath and the epidural space, with no intravascular uptake. 9 ml was wasted    Then, after repeated negative aspiration, each level was injected with 2.5 ml of a combination of Depomedrol 40 mg, Decadron 5 mg, 0.5% bupivacaine 1.5 ml, diluted with 2 ml of normal saline for a total injectate volume of 5 ml and the needles were flushed with lidocaine and removed.    During the procedure, there was a paresthesia described as a pressure sensation with instillation of medication.  Hemostasis was achieved, the area was cleaned, and bandaids were placed when appropriate.  The patient tolerated the procedure well, and was taken to the recovery room.    Images were saved to PACS.    Post-procedure pain score: 5/10  Follow-up includes:   -f/u phone call in one week  -f/u with referring provider    Angel Velazquez MD  [facility]

## 2019-11-13 NOTE — DISCHARGE INSTRUCTIONS
[facility]   Procedure Discharge Instructions    Today you saw:    Dr. Angel Velazquez      You had a:   Lumbar Transforaminal Epidural Steroid Injection Right L4-5 and L5-S1    Medications used:  Lidocaine   Bupivacaine   Dexamethasone   Depo-medrol   Omnipaque           Be cautious when walking. Numbness and/or weakness in the lower extremities may occur for up to 6-8 hours after the procedure due to effect of the local anesthetic    Do not drive for 6 hours. The effect of the local anesthetic could slow your reflexes.     You may resume your regular activities after 24 hours    Avoid strenuous activity for the first 24 hours    You may shower, however avoid swimming, tub baths or hot tubs for 24 hours following your procedure    You may have a mild to moderate increase in pain for several days following the injection.    It may take up to 14 days for the steroid medication to start working although you may feel the effect as early as a few days after the procedure.       You may use ice packs for 10-15 minutes, 3 to 4 times a day at the injection site for comfort    Do not use heat to painful areas for 6 to 8 hours. This will give the local anesthetic time to wear off and prevent you from accidentally burning your skin.     Unless you have been directed to avoid the use of anti-inflammatory medications (NSAIDS), you may use medications such as ibuprofen, Aleve or Tylenol for pain control if needed.     If you were fasting, you may resume your normal diet and medications after the procedure    Possible side effects of steroids that you may experience include flushing, elevated blood pressure, increased appetite, mild headaches and restlessness.  All of these symptoms will get better with time.    If you experience any of the following, call the Pain Clinic during work hours (Mon-Friday 8-4:30 pm) at [phone] or the Provider Line after hours at [phone]:  -Fever over 100  degree F  -Swelling, bleeding, redness, drainage, warmth at the injection site  -Progressive weakness or numbness in your legs   -Loss of bowel or bladder function  -Unusual new onset of pain that is not improving

## 2019-11-13 NOTE — IP AVS SNAPSHOT
MRN:[id number]                      After Visit Summary   11/13/2019    Ingrid Amaral    MRN: [id number]           Visit Information        Provider Department      11/13/2019 11:15 AM FABIÁN [facility]           Review of your medicines      UNREVIEWED medicines. Ask your doctor about these medicines       Dose / Directions   * albuterol (5 MG/ML) 0.5% neb solution  Commonly known as:  PROVENTIL  Used for:  Moderate persistent asthma      Dose:  2.5 mg  Take 0.5 mLs (2.5 mg) by nebulization every 4 hours as needed for wheezing or shortness of breath / dyspnea  Quantity:  30 vial  Refills:  1     * albuterol 108 (90 Base) MCG/ACT inhaler  Commonly known as:  PROAIR HFA/PROVENTIL HFA/VENTOLIN HFA  Used for:  Moderate persistent asthma without complication      Dose:  2 puff  Inhale 2 puffs into the lungs every 4 hours as needed for shortness of breath / dyspnea  Quantity:  1 Inhaler  Refills:  1     amLODIPine 5 MG tablet  Commonly known as:  NORVASC  Used for:  Benign essential hypertension      Dose:  5 mg  Take 1 tablet (5 mg) by mouth daily For blood pressure  Quantity:  90 tablet  Refills:  1     amylase-lipase-protease 6000 units Cpep  Commonly known as:  CREON  Used for:  Chronic diarrhea      TAKE TWO CAPSULES BY MOUTH THREE TIMES A DAY WITH MEALS FOR DIARRHEA  Quantity:  180 capsule  Refills:  11     ascorbic acid 1000 MG Tabs tablet      Dose:  1,000 mg  Take 1,000 mg by mouth daily  Refills:  0     augmented betamethasone dipropionate 0.05 % external cream  Commonly known as:  DIPROLENE-AF  Used for:  Psoriasis      Apply sparingly twice daily to areas of psoriasis Friday through Sunday.  Quantity:  50 g  Refills:  4     biotin 1000 MCG Tabs tablet      Dose:  1,000 mcg  Take 1,000 mcg by mouth 2 times daily  Refills:  0     calcipotriene 0.005 % external cream  Commonly known as:  DOVONOX  Used for:  Psoriasis      Apply twice daily to psoriasis on Monday through  Thursday.  Quantity:  60 g  Refills:  8     Calcium-Magnesium-Zinc 333..33-5 MG Tabs      Dose:  1 tablet  Take 1 tablet by mouth daily  Refills:  0     cetirizine 10 MG tablet  Commonly known as:  zyrTEC  Used for:  Chronic rhinitis      Dose:  10 mg  Take 1 tablet (10 mg) by mouth every morning For allergies  Quantity:  90 tablet  Refills:  3     clobetasol propionate 0.05 % external shampoo  Commonly known as:  CLOBEX  Used for:  Psoriasis      Leave on scalp for 15 minutes then rinse. Use 2-3 times weekly.  Quantity:  118 mL  Refills:  4     donepezil 10 MG tablet  Commonly known as:  ARICEPT  Used for:  Cognitive impairment      TAKE ONE TABLET BY MOUTH ONCE DAILY AT BEDTIME FOR MEMORY  Quantity:  30 tablet  Refills:  1     estradiol 0.1 MG/GM vaginal cream  Commonly known as:  ESTRACE  Used for:  Vaginal atrophy, Labial and clitoral adhesions, acquired      USE ONE GRAM VAGINALLY ONCE DAILY AND SPREAD A SMALL AMOUNT AROUND THE CLITORAL REES For vaginal area issues.  Quantity:  85 g  Refills:  0     fluocinonide 0.05 % external solution  Commonly known as:  LIDEX  Used for:  Psoriasis      Apply twice daily as needed to scalp.  Do not apply to face. Please keep upcoming appointment.  Quantity:  60 mL  Refills:  0     fluticasone 50 MCG/ACT nasal spray  Commonly known as:  FLONASE  Used for:  Allergic rhinitis, unspecified seasonality, unspecified trigger      INSTILL ONE SPRAY IN EACH NOSTRIL EVERY DAY.  For allergies  Quantity:  16 g  Refills:  1     fluticasone-salmeterol 250-50 MCG/DOSE inhaler  Commonly known as:  ADVAIR  Used for:  Moderate persistent asthma without complication      INHALE ONE PUFF BY MOUTH TWICE A DAY FOR ASTHMA  Quantity:  3 Inhaler  Refills:  3     gabapentin 300 MG capsule  Commonly known as:  NEURONTIN  Used for:  Chronic right-sided low back pain with right-sided sciatica      Dose:  600 mg  Take 2 capsules (600 mg) by mouth 3 times daily  Quantity:  180 capsule  Refills:  5      hydrocortisone 1 % external cream  Commonly known as:  CORTAID      Apply topically as needed  Refills:  0     lactase 9000 units Tabs tablet  Commonly known as:  LACTAID      Dose:  9,000 Units  Take 9,000 Units by mouth as needed  Refills:  0     melatonin 5 MG tablet  Used for:  Insomnia, unspecified type, RBD (REM behavioral disorder)      Dose:  5 mg  Take 1 tablet (5 mg) by mouth nightly as needed for sleep  Quantity:  90 tablet  Refills:  3     montelukast 10 MG tablet  Commonly known as:  SINGULAIR  Used for:  Moderate persistent asthma without complication      Dose:  1 tablet  Take 1 tablet (10 mg) by mouth daily For allergies and asthma  Quantity:  90 tablet  Refills:  3     omeprazole 10 MG DR capsule  Commonly known as:  priLOSEC  Used for:  Gastroesophageal reflux disease without esophagitis      Dose:  10 mg  Take 1 capsule (10 mg) by mouth 2 times daily  Quantity:  60 capsule  Refills:  11     polyethylene glycol powder  Commonly known as:  MIRALAX/GLYCOLAX  Used for:  Chronic constipation      Dose:  1 capful  Take 17 g (1 capful) by mouth as needed for constipation  Quantity:  1 Bottle  Refills:  3     sertraline 100 MG tablet  Commonly known as:  ZOLOFT  Used for:  Panic disorder without agoraphobia, Mild major depression (H)      Dose:  100 mg  Take 1 tablet (100 mg) by mouth daily For mood.  Quantity:  30 tablet  Refills:  1     simvastatin 40 MG tablet  Commonly known as:  ZOCOR  Used for:  Elevated cholesterol with elevated triglycerides      TAKE ONE TABLET BY MOUTH EVERY NIGHT AT BEDTIME.  For cholesterol.  Quantity:  90 tablet  Refills:  3     SKIN OILS EX      Lavender-headache, skin, peppermint-skin, upset stomach mix of oils, asthma mix of oils, eucalyptus. Tea tree oil-skin, vapor rub- chest tightness, sleepy time (vetiver, lavendaer, mrjoram, mandarin, ylang)- sleep, constipation, eczema (sweet almond oil). Also mixes with carriers: Coconut oil, [place] oil, Extra virgin oil.  Frankincense- cough. Digize- oil. Purification- oil, lemon- oil. Essentialyme- pill.  Inhales a mix with olive oil almond and coconut oil with peppermint, and eucalyptus- sinus, allergies. Updated 12/1/2015.   Updated 4/26/2016: Lemon, Cinnamon bark, panaway, Thieves, Orange, [place], Copaiba  Refills:  0     traZODone 50 MG tablet  Commonly known as:  DESYREL  Used for:  Insomnia, unspecified type, Mild major depression (H), Panic disorder without agoraphobia      Dose:  50 mg  Take 1 tablet (50 mg) by mouth At Bedtime  Quantity:  30 tablet  Refills:  11     Turmeric 500 MG Caps      Dose:  1 capsule  Take 1 capsule by mouth daily  Refills:  0         * This list has 2 medication(s) that are the same as other medications prescribed for you. Read the directions carefully, and ask your doctor or other care provider to review them with you.            CONTINUE these medicines which have NOT CHANGED       Dose / Directions   order for DME  Used for:  Moderate persistent asthma      Equipment being ordered: Nebulizer and tubing/filter  Quantity:  1 Device  Refills:  0              Protect others around you: Learn how to safely use, store and throw away your medicines at www.disposemymeds.org.       Follow-ups after your visit       Your next 10 appointments already scheduled    Dec 13, 2019  9:20 AM CST  Return Visit with Miguelina Schulte PA-C  [facility] [address] [address]  [phone]         Care Instructions       Further instructions from your care team       [facility]   Procedure Discharge Instructions    Today you saw:    Dr. Angel Velazquez      You had a:   Lumbar Transforaminal Epidural Steroid Injection Right L4-5 and L5-S1    Medications used:  Lidocaine   Bupivacaine   Dexamethasone   Depo-medrol   Omnipaque           Be cautious when walking. Numbness and/or weakness in the lower extremities  may occur for up to 6-8 hours after the procedure due to effect of the local anesthetic    Do not drive for 6 hours. The effect of the local anesthetic could slow your reflexes.     You may resume your regular activities after 24 hours    Avoid strenuous activity for the first 24 hours    You may shower, however avoid swimming, tub baths or hot tubs for 24 hours following your procedure    You may have a mild to moderate increase in pain for several days following the injection.    It may take up to 14 days for the steroid medication to start working although you may feel the effect as early as a few days after the procedure.       You may use ice packs for 10-15 minutes, 3 to 4 times a day at the injection site for comfort    Do not use heat to painful areas for 6 to 8 hours. This will give the local anesthetic time to wear off and prevent you from accidentally burning your skin.     Unless you have been directed to avoid the use of anti-inflammatory medications (NSAIDS), you may use medications such as ibuprofen, Aleve or Tylenol for pain control if needed.     If you were fasting, you may resume your normal diet and medications after the procedure    Possible side effects of steroids that you may experience include flushing, elevated blood pressure, increased appetite, mild headaches and restlessness.  All of these symptoms will get better with time.    If you experience any of the following, call the Pain Clinic during work hours (Mon-Friday 8-4:30 pm) at [phone] or the Provider Line after hours at [phone]:  -Fever over 100 degree F  -Swelling, bleeding, redness, drainage, warmth at the injection site  -Progressive weakness or numbness in your legs   -Loss of bowel or bladder function  -Unusual new onset of pain that is not improving          Additional Information About Your Visit       [facility] [employer] Information    [employer] gives you secure access to your electronic health record. If you see a primary care  provider, you can also send messages to your care team and make appointments. If you have questions, please call your primary care clinic.  If you do not have a primary care provider, please call [phone] and they will assist you.       Care EveryWhere ID    This is your Care EveryWhere ID. This could be used by other organizations to access your [facility] medical records  [id number]        Primary Care Provider Office Phone # Fax #    Leesa Muñiz PA-C [phone]      Equal Access to Services    [facility]: Hadii aad javier hadasho Soomaali, waaxda luqadaha, qaybta kaalmada adeskipyamaikol, ollie rangel . So [facility] [phone].    ATENCIÓN: Si habla español, tiene a gauthier disposición servicios gratuitos de asistencia lingüística. Ll[facility] [phone].    We comply with applicable federal civil rights laws and [place] laws. We do not discriminate on the basis of race, color, national origin, age, disability, sex, sexual orientation, or gender identity.           Thank you!    Thank you for choosing [facility] for your care. Our goal is always to provide you with excellent care. Hearing back from our patients is one way we can continue to improve our services. Please take a few minutes to complete the written survey that you may receive in the mail after you visit with us. Thank you!            Medication List      Medications          Morning Afternoon Evening Bedtime As Needed    order for DME  INSTRUCTIONS:  Equipment being ordered: Nebulizer and tubing/filter                       ASK your doctor about these medications          Morning Afternoon Evening Bedtime As Needed    * albuterol (5 MG/ML) 0.5% neb solution  Also known as:  PROVENTIL  INSTRUCTIONS:  Take 0.5 mLs (2.5 mg) by nebulization every 4 hours as needed for wheezing or shortness of breath / dyspnea                     * albuterol 108 (90 Base) MCG/ACT inhaler  Also known as:  PROAIR HFA/PROVENTIL  HFA/VENTOLIN HFA  INSTRUCTIONS:  Inhale 2 puffs into the lungs every 4 hours as needed for shortness of breath / dyspnea  Doctor's comments:  Pharmacy may dispense brand covered by insurance (Proair, or proventil or ventolin or generic albuterol inhaler).  Profile Rx: patient will contact pharmacy when needed                     amLODIPine 5 MG tablet  Also known as:  NORVASC  INSTRUCTIONS:  Take 1 tablet (5 mg) by mouth daily For blood pressure                     amylase-lipase-protease 6000 units Cpep  Also known as:  CREON  INSTRUCTIONS:  TAKE TWO CAPSULES BY MOUTH THREE TIMES A DAY WITH MEALS FOR DIARRHEA                     ascorbic acid 1000 MG Tabs tablet  INSTRUCTIONS:  Take 1,000 mg by mouth daily                     augmented betamethasone dipropionate 0.05 % external cream  Also known as:  DIPROLENE-AF  INSTRUCTIONS:  Apply sparingly twice daily to areas of psoriasis Friday through Sunday.                     biotin 1000 MCG Tabs tablet  INSTRUCTIONS:  Take 1,000 mcg by mouth 2 times daily                     calcipotriene 0.005 % external cream  Also known as:  DOVONOX  INSTRUCTIONS:  Apply twice daily to psoriasis on Monday through Thursday.                     Calcium-Magnesium-Zinc 333..33-5 MG Tabs  INSTRUCTIONS:  Take 1 tablet by mouth daily                     cetirizine 10 MG tablet  Also known as:  zyrTEC  INSTRUCTIONS:  Take 1 tablet (10 mg) by mouth every morning For allergies                     clobetasol propionate 0.05 % external shampoo  Also known as:  CLOBEX  INSTRUCTIONS:  Leave on scalp for 15 minutes then rinse. Use 2-3 times weekly.                     donepezil 10 MG tablet  Also known as:  ARICEPT  INSTRUCTIONS:  TAKE ONE TABLET BY MOUTH ONCE DAILY AT BEDTIME FOR MEMORY                     estradiol 0.1 MG/GM vaginal cream  Also known as:  ESTRACE  INSTRUCTIONS:  USE ONE GRAM VAGINALLY ONCE DAILY AND SPREAD A SMALL AMOUNT AROUND THE CLITORAL REES For vaginal area issues.                      fluocinonide 0.05 % external solution  Also known as:  LIDEX  INSTRUCTIONS:  Apply twice daily as needed to scalp.  Do not apply to face. Please keep upcoming appointment.                     fluticasone 50 MCG/ACT nasal spray  Also known as:  FLONASE  INSTRUCTIONS:  INSTILL ONE SPRAY IN EACH NOSTRIL EVERY DAY.  For allergies                     fluticasone-salmeterol 250-50 MCG/DOSE inhaler  Also known as:  ADVAIR  INSTRUCTIONS:  INHALE ONE PUFF BY MOUTH TWICE A DAY FOR ASTHMA                     gabapentin 300 MG capsule  Also known as:  NEURONTIN  INSTRUCTIONS:  Take 2 capsules (600 mg) by mouth 3 times daily                     hydrocortisone 1 % external cream  Also known as:  CORTAID  INSTRUCTIONS:  Apply topically as needed                     lactase 9000 units Tabs tablet  Also known as:  LACTAID  INSTRUCTIONS:  Take 9,000 Units by mouth as needed                     melatonin 5 MG tablet  INSTRUCTIONS:  Take 1 tablet (5 mg) by mouth nightly as needed for sleep                     montelukast 10 MG tablet  Also known as:  SINGULAIR  INSTRUCTIONS:  Take 1 tablet (10 mg) by mouth daily For allergies and asthma                     omeprazole 10 MG DR capsule  Also known as:  priLOSEC  INSTRUCTIONS:  Take 1 capsule (10 mg) by mouth 2 times daily                     polyethylene glycol powder  Also known as:  MIRALAX/GLYCOLAX  INSTRUCTIONS:  Take 17 g (1 capful) by mouth as needed for constipation                     sertraline 100 MG tablet  Also known as:  ZOLOFT  INSTRUCTIONS:  Take 1 tablet (100 mg) by mouth daily For mood.                     simvastatin 40 MG tablet  Also known as:  ZOCOR  INSTRUCTIONS:  TAKE ONE TABLET BY MOUTH EVERY NIGHT AT BEDTIME.  For cholesterol.                     SKIN OILS EX  INSTRUCTIONS:  Lavender-headache, skin, peppermint-skin, upset stomach mix of oils, asthma mix of oils, eucalyptus. Tea tree oil-skin, vapor rub- chest tightness, sleepy time (vetiver,  deborah tesfaye, mandarin, ylang)- sleep, constipation, eczema (sweet almond oil). Also mixes with carriers: Coconut oil, [place] oil, Extra virgin oil. Frankincense- cough. Digize- oil. Purification- oil, lemon- oil. Essentialyme- pill.  Inhales a mix with olive oil almond and coconut oil with peppermint, and eucalyptus- sinus, allergies. Updated 12/1/2015.   Updated 4/26/2016: Lemon, Cinnamon bark, panaway, Thieves, Orange, [place], Copaiba                     traZODone 50 MG tablet  Also known as:  DESYREL  INSTRUCTIONS:  Take 1 tablet (50 mg) by mouth At Bedtime                     Turmeric 500 MG Caps  INSTRUCTIONS:  Take 1 capsule by mouth daily                        * This list has 2 medication(s) that are the same as other medications prescribed for you. Read the directions carefully, and ask your doctor or other care provider to review them with you.

## 2019-11-20 ENCOUNTER — TELEPHONE (OUTPATIENT)
Dept: PALLIATIVE MEDICINE | Facility: CLINIC | Age: 74
End: 2019-11-20

## 2019-11-20 DIAGNOSIS — F32.0 MILD MAJOR DEPRESSION (H): ICD-10-CM

## 2019-11-20 DIAGNOSIS — F41.0 PANIC DISORDER WITHOUT AGORAPHOBIA: ICD-10-CM

## 2019-11-20 RX ORDER — SERTRALINE HYDROCHLORIDE 100 MG/1
TABLET, FILM COATED ORAL
Qty: 90 TABLET | Refills: 1 | Status: SHIPPED | OUTPATIENT
Start: 2019-11-20 | End: 2020-07-23

## 2019-11-21 NOTE — TELEPHONE ENCOUNTER
Patient had a lumbar transforaminal epidural steroid injection at L4-5 and L5-S1 on the right, fluoroscopically guided, contrast controlled on 11/13/19.  Called patient for an update.    Pt reported the following details:  Pt is feeling better but is still experiencing pain. Pain seems to flare up when pt is carrying/lifting items. Told patient that the information will be forwarded to her provider.  Also explained that, if a steroid medication was used, it could take up to 14 days to feel the full effect and if pt has any further questions or concerns pt should call the clinic at [phone].

## 2019-11-21 NOTE — TELEPHONE ENCOUNTER
Information note - allow two weeks for steroid effect.    Angel Velazquez MD  [facility]

## 2019-11-25 ENCOUNTER — PATIENT OUTREACH (OUTPATIENT)
Dept: CARE COORDINATION | Facility: CLINIC | Age: 74
End: 2019-11-25

## 2019-11-25 ASSESSMENT — ACTIVITIES OF DAILY LIVING (ADL): DEPENDENT_IADLS:: MEDICATION MANAGEMENT

## 2019-11-25 NOTE — PROGRESS NOTES
Clinic Care Coordination Contact  [facility]/Voicemail    Referral Source: PCP  Clinical Data: Care Coordinator Outreach  Outreach attempted x 1.  Busy and not able to leave a voice mail.   Plan:  Care Coordinator will try to reach patient again in 5-7 business days.    EM Mcfarlane, [facility] - Care Coordinator   [facility]  11/25/2019   12:26 PM  [phone]

## 2019-11-25 NOTE — LETTER
[facility]  [address] [place] [address]  [phone]      12/3/2019      Ingrid Amaral  [address] [address]      Dear  Ingrid,      I have been attempting to reach you since our last contact. I would like to continue to work with you on the goals from our last conversation and provide the support you may need. I would appreciate if you would give me a call at [phone] and let me know if you would like to continue working together. I know that there are many things that can affect our ability to communicate and hope we can plan better moving forward.     All of us at [facility] are invested in your health and are here to assist you in meeting your goals.     Sincerely,      EM Mcfarlane  [facility] - Care Coordination  [facility]   [phone]

## 2019-11-27 ENCOUNTER — HOSPITAL ENCOUNTER (OUTPATIENT)
Dept: MRI IMAGING | Facility: CLINIC | Age: 74
Discharge: HOME | End: 2019-11-27
Attending: PSYCHIATRY & NEUROLOGY | Admitting: PSYCHIATRY & NEUROLOGY
Payer: MEDICARE

## 2019-11-27 DIAGNOSIS — D32.9 MENINGIOMA (H): ICD-10-CM

## 2019-11-27 LAB
CREAT BLD-MCNC: 0.7 MG/DL (ref 0.52–1.04)
GFR SERPL CREATININE-BSD FRML MDRD: 82 ML/MIN/{1.73_M2}

## 2019-11-27 PROCEDURE — 82565 ASSAY OF CREATININE: CPT

## 2019-11-27 PROCEDURE — 70553 MRI BRAIN STEM W/O & W/DYE: CPT

## 2019-11-27 PROCEDURE — 25500064 ZZH RX 255 OP 636: Performed by: PSYCHIATRY & NEUROLOGY

## 2019-11-27 PROCEDURE — A9585 GADOBUTROL INJECTION: HCPCS | Performed by: PSYCHIATRY & NEUROLOGY

## 2019-11-27 RX ORDER — GADOBUTROL 604.72 MG/ML
5.5 INJECTION INTRAVENOUS ONCE
Status: COMPLETED | OUTPATIENT
Start: 2019-11-27 | End: 2019-11-27

## 2019-11-27 RX ADMIN — GADOBUTROL 5.5 ML: 604.72 INJECTION INTRAVENOUS at 14:55

## 2019-12-02 NOTE — RESULT ENCOUNTER NOTE
Please advise Ingrid Amaral,  1945, that her brain MRI is stable. Will also send the results to Dr. Ewing for review.    [phone] (home)   Pratibha Hay MD

## 2019-12-03 NOTE — PROGRESS NOTES
Clinic Care Coordination Contact  [facility]/Voicemail    Referral Source: PCP  Clinical Data: Care Coordinator Outreach  Outreach attempted x 2, busy and unable to leave a message.   Plan: Care Coordinator will send unable to contact letter with care coordinator contact information via mail. Care Coordinator will try to reach patient again in 1 month.    EM Mcfarlane, [facility] - Care Coordinator   [facility]  12/3/2019   3:21 PM  [phone]

## 2019-12-13 ENCOUNTER — OFFICE VISIT (OUTPATIENT)
Dept: DERMATOLOGY | Facility: CLINIC | Age: 74
End: 2019-12-13
Payer: MEDICARE

## 2019-12-13 VITALS — OXYGEN SATURATION: 98 % | HEART RATE: 59 BPM | DIASTOLIC BLOOD PRESSURE: 76 MMHG | SYSTOLIC BLOOD PRESSURE: 122 MMHG

## 2019-12-13 DIAGNOSIS — L40.9 PSORIASIS: ICD-10-CM

## 2019-12-13 DIAGNOSIS — L40.9 SCALP PSORIASIS: Primary | ICD-10-CM

## 2019-12-13 PROCEDURE — 11900 INJECT SKIN LESIONS </W 7: CPT | Performed by: PHYSICIAN ASSISTANT

## 2019-12-13 RX ORDER — BETAMETHASONE DIPROPIONATE 0.5 MG/ML
LOTION, AUGMENTED TOPICAL
Qty: 60 ML | Refills: 5 | Status: SHIPPED | OUTPATIENT
Start: 2019-12-13 | End: 2021-03-22

## 2019-12-13 RX ORDER — CALCIPOTRIENE 0.05 MG/ML
SOLUTION TOPICAL
Qty: 60 ML | Refills: 4 | Status: SHIPPED | OUTPATIENT
Start: 2019-12-13 | End: 2021-04-05

## 2019-12-13 NOTE — LETTER
12/13/2019         RE: Ingrid Amaral  [address] [facility] [address]        Dear Colleague,    Thank you for referring your patient, Ingrid Amaral, to the [facility]. Please see a copy of my visit note below.    Ingrid Amaral is a 74 year old year old female patient here today for recheck scalp psoriasis. She has been using clobetasol shampoo which does help. She would like to do more intralesional steroid injections since her scalp is flaring again.  Patient has no other skin complaints today.  Remainder of the HPI, Meds, PMH, Allergies, FH, and SH was reviewed in chart.    Pertinent Hx:   Psoriasis   Past Medical History:   Diagnosis Date     Asthma      GERD (gastroesophageal reflux disease)      Radial head fracture 3/18/2010       Past Surgical History:   Procedure Laterality Date     ANKLE SURGERY      bilateral     COLONOSCOPY       COLONOSCOPY N/A 3/20/2015    Procedure: COLONOSCOPY;  Surgeon: Britney Martinez MD;  Location: [facility]     COLONOSCOPY N/A 5/22/2017    Procedure: COLONOSCOPY;  Colonoscopy;  Surgeon: Tristen Rangel MD;  Location: [facility]     ESOPHAGOSCOPY, GASTROSCOPY, DUODENOSCOPY (EGD), COMBINED N/A 6/7/2018    Procedure: COMBINED ESOPHAGOSCOPY, GASTROSCOPY, DUODENOSCOPY (EGD);  gastroscopy;  Surgeon: Tristen Rangel MD;  [facility]     HC ESOPH/GAS REFLUX TEST W NASAL IMPED >1 HR  4/19/2012    Procedure:ESOPHAGEAL IMPEDENCE FUNCTION TEST WITH 24 HOUR PH GREATER THAN 1 HOUR; Surgeon:VALDO UMANZOR; Location: GI     HERNIA REPAIR      umbilcal     HYSTERECTOMY, PAP NO LONGER INDICATED  1991     TONSILLECTOMY       UPPER GI ENDOSCOPY          Family History   Problem Relation Age of Onset     Heart Disease Mother         valve problem     Hypertension Mother      Diabetes Mother         type 2     Cerebrovascular Disease Mother      Allergies Mother      Eye Disorder Mother         Macular degeneration     Osteoporosis Mother       Respiratory Mother      Migraines Mother      Cardiovascular Father         MI at age 80     Cancer - colorectal Father      Diabetes Father      Hypertension Father         type 2     Eye Disorder Father         macular degeneration     Lipids Father      Colon Cancer Father      C.A.D. Maternal Grandmother      Diabetes Paternal Grandmother         type 2     Cardiovascular Paternal Grandmother         MI     Diabetes Sister         type 2     Allergies Sister      Gastrointestinal Disease Sister      Depression Sister      Gastrointestinal Disease Daughter      Migraines Daughter      Gastrointestinal Disease Daughter      Migraines Daughter      Migraines Son      Aneurysm No family hx of      Brain Hemorrhage No family hx of      Brain Tumor No family hx of      Cancer No family hx of      Chiari Malformation No family hx of      LUNG DISEASE No family hx of      Seizure Disorder No family hx of        Social History     Socioeconomic History     Marital status:      Spouse name: Not on file     Number of children: Not on file     Years of education: Not on file     Highest education level: Not on file   Occupational History     Not on file   Social Needs     Financial resource strain: Not on file     Food insecurity:     Worry: Not on file     Inability: Not on file     Transportation needs:     Medical: Not on file     Non-medical: Not on file   Tobacco Use     Smoking status: Former Smoker     Packs/day: 0.50     Years: 3.00     Pack years: 1.50     Types: Cigarettes     Smokeless tobacco: Never Used     Tobacco comment: smoked for 3 years when she was around 19   Substance and Sexual Activity     Alcohol use: Yes     Comment: RARELY     Drug use: No     Sexual activity: Not Currently   Lifestyle     Physical activity:     Days per week: Not on file     Minutes per session: Not on file     Stress: Not on file   Relationships     Social connections:     Talks on phone: Not on file     Gets together:  Not on file     Attends Yazidi service: Not on file     Active member of club or organization: Not on file     Attends meetings of clubs or organizations: Not on file     Relationship status: Not on file     Intimate partner violence:     Fear of current or ex partner: Not on file     Emotionally abused: Not on file     Physically abused: Not on file     Forced sexual activity: Not on file   Other Topics Concern     Parent/sibling w/ CABG, MI or angioplasty before 65F 55M? Yes     Comment: mother- valve problem   Social History Narrative     Not on file       Outpatient Encounter Medications as of 12/13/2019   Medication Sig Dispense Refill     albuterol (PROAIR HFA/PROVENTIL HFA/VENTOLIN HFA) 108 (90 Base) MCG/ACT inhaler Inhale 2 puffs into the lungs every 4 hours as needed for shortness of breath / dyspnea 1 Inhaler 1     albuterol (PROVENTIL) (5 MG/ML) 0.5% nebulizer solution Take 0.5 mLs (2.5 mg) by nebulization every 4 hours as needed for wheezing or shortness of breath / dyspnea 30 vial 1     amLODIPine (NORVASC) 5 MG tablet Take 1 tablet (5 mg) by mouth daily For blood pressure 90 tablet 1     amylase-lipase-protease (CREON) 6000 units CPEP TAKE TWO CAPSULES BY MOUTH THREE TIMES A DAY WITH MEALS FOR DIARRHEA 180 capsule 11     ascorbic acid 1000 MG TABS tablet Take 1,000 mg by mouth daily       augmented betamethasone dipropionate (DIPROLENE) 0.05 % external lotion Apply sparingly twice daily Friday through Sunday . 60 mL 5     augmented betamethasone dipropionate (DIPROLENE-AF) 0.05 % external cream Apply sparingly twice daily to areas of psoriasis Friday through Sunday. 50 g 4     biotin 1000 MCG TABS tablet Take 1,000 mcg by mouth 2 times daily       calcipotriene (DOVONOX) 0.005 % external cream Apply twice daily to psoriasis on Monday through Thursday. 60 g 8     calcipotriene (DOVONOX) 0.005 % external solution Apply twice daily as needed to affected area on scalp Monday thru Thursday. 60 mL 4      Calcium-Magnesium-Zinc 333..33-5 MG TABS Take 1 tablet by mouth daily       cetirizine (ZYRTEC) 10 MG tablet Take 1 tablet (10 mg) by mouth every morning For allergies 90 tablet 3     clobetasol propionate (CLOBEX) 0.05 % external shampoo Leave on scalp for 15 minutes then rinse. Use 2-3 times weekly. 118 mL 4     donepezil (ARICEPT) 10 MG tablet TAKE ONE TABLET BY MOUTH ONCE DAILY AT BEDTIME FOR MEMORY 30 tablet 1     estradiol (ESTRACE) 0.1 MG/GM vaginal cream USE ONE GRAM VAGINALLY ONCE DAILY AND SPREAD A SMALL AMOUNT AROUND THE CLITORAL REES For vaginal area issues. 85 g 0     fluocinonide (LIDEX) 0.05 % external solution Apply twice daily as needed to scalp.  Do not apply to face. Please keep upcoming appointment. 60 mL 0     fluticasone (FLONASE) 50 MCG/ACT nasal spray INSTILL ONE SPRAY IN EACH NOSTRIL EVERY DAY.  For allergies 16 g 1     fluticasone-salmeterol (ADVAIR) 250-50 MCG/DOSE inhaler INHALE ONE PUFF BY MOUTH TWICE A DAY FOR ASTHMA 3 Inhaler 3     gabapentin (NEURONTIN) 300 MG capsule Take 2 capsules (600 mg) by mouth 3 times daily 180 capsule 5     hydrocortisone 1 % cream Apply topically as needed       lactase (LACTAID) 9000 UNITS TABS Take 9,000 Units by mouth as needed       melatonin 5 MG tablet Take 1 tablet (5 mg) by mouth nightly as needed for sleep 90 tablet 3     montelukast (SINGULAIR) 10 MG tablet Take 1 tablet (10 mg) by mouth daily For allergies and asthma 90 tablet 3     omeprazole (PRILOSEC) 10 MG DR capsule Take 1 capsule (10 mg) by mouth 2 times daily 60 capsule 11     ORDER FOR DME Equipment being ordered: Nebulizer and tubing/filter 1 Device 0     polyethylene glycol (MIRALAX/GLYCOLAX) powder Take 17 g (1 capful) by mouth as needed for constipation 1 Bottle 3     sertraline (ZOLOFT) 100 MG tablet TAKE ONE TABLET BY MOUTH ONCE DAILY FOR MOOD 90 tablet 1     sertraline (ZOLOFT) 100 MG tablet Take 1 tablet (100 mg) by mouth daily For mood. 30 tablet 1     simvastatin (ZOCOR) 40  MG tablet TAKE ONE TABLET BY MOUTH EVERY NIGHT AT BEDTIME.  For cholesterol. 90 tablet 3     SKIN OILS EX Lavender-headache, skin, peppermint-skin, upset stomach mix of oils, asthma mix of oils, eucalyptus. Tea tree oil-skin, vapor rub- chest tightness, sleepy time (vetiver, lavendaer, mrjoram, mandarin, ylang)- sleep, constipation, eczema (sweet almond oil). Also mixes with carriers: Coconut oil, [place] oil, Extra virgin oil. Frankincense- cough. Digize- oil. Purification- oil, lemon- oil. Essentialyme- pill.  Inhales a mix with olive oil almond and coconut oil with peppermint, and eucalyptus- sinus, allergies. Updated 12/1/2015.   Updated 4/26/2016: Lemon, Cinnamon bark, panaway, Thieves, Orange, Wintergreen, Copaiba       traZODone (DESYREL) 50 MG tablet Take 1 tablet (50 mg) by mouth At Bedtime 30 tablet 11     Turmeric 500 MG CAPS Take 1 capsule by mouth daily       No facility-administered encounter medications on file as of 12/13/2019.              Review Of Systems  Skin: As above  Eyes: negative  Ears/Nose/Throat: negative  Respiratory: No shortness of breath, dyspnea on exertion, cough, or hemoptysis  Cardiovascular: negative  Gastrointestinal: negative  Genitourinary: negative  Musculoskeletal: negative  Neurologic: negative  Psychiatric: negative  Hematologic/Lymphatic/Immunologic: negative  Endocrine: negative      O:   NAD, WDWN, Alert & Oriented, Mood & Affect wnl, Vitals stable   Here today alone   /76   Pulse 59   SpO2 98%    General appearance normal   Vitals stable   Alert, oriented and in no acute distress   Thick psoriasiform plaques on scalp and small papules on legs     Eyes: Conjunctivae/lids:Normal     ENT: Lips: normal    MSK:Normal    Pulm: Breathing Normal    Neuro/Psych: Orientation:Alert and Orientedx3 ; Mood/Affect:normal   A/P:  1. Scalp psoriasis and on legs   IL TAC: PGACAC discussed.  Risks including but not limited to injection site reaction, bruising, no resolution.  All  questions answered and entertained to patient s satisfaction.  Informed consent obtained.  IL TAC in concentration of 120mg/ml was injected ID to psoriasis on scalp and legs.  Total injected was  3 ml.  Patient tolerated without complications and given wound care instructions, including not to move product around.  Return in 4 weeks for follow-up and possible additional IL TAC.  Use clobetasol shampoo once weekly, leave on for 15 minutes then rinse.   Try Tea tree shampoo.   Apply calcipotriene solution to scalp Monday through Thursday  Apply betamethasone lotion to scalp Friday through Sunday,  Recheck in 6-8 weeks.     Again, thank you for allowing me to participate in the care of your patient.        Sincerely,        Miguelina Lion PA-C

## 2019-12-13 NOTE — PATIENT INSTRUCTIONS
Use clobetasol shampoo once weekly, leave on for 15 minutes then rinse.   Try Tea tree shampoo.   Apply calcipotriene solution to scalp Monday through Thursday  Apply betamethasone lotion to scalp Friday through Sunday,

## 2019-12-16 NOTE — PROGRESS NOTES
Ingrid Amaral is a 74 year old year old female patient here today for recheck scalp psoriasis. She has been using clobetasol shampoo which does help. She would like to do more intralesional steroid injections since her scalp is flaring again.  Patient has no other skin complaints today.  Remainder of the HPI, Meds, PMH, Allergies, FH, and SH was reviewed in chart.    Pertinent Hx:   Psoriasis   Past Medical History:   Diagnosis Date     Asthma      GERD (gastroesophageal reflux disease)      Radial head fracture 3/18/2010       Past Surgical History:   Procedure Laterality Date     ANKLE SURGERY      bilateral     COLONOSCOPY       COLONOSCOPY N/A 3/20/2015    Procedure: COLONOSCOPY;  Surgeon: Britney Martinez MD;  Location: [facility]     COLONOSCOPY N/A 5/22/2017    Procedure: COLONOSCOPY;  Colonoscopy;  Surgeon: Tristen Rangel MD;  Location: [facility]     ESOPHAGOSCOPY, GASTROSCOPY, DUODENOSCOPY (EGD), COMBINED N/A 6/7/2018    Procedure: COMBINED ESOPHAGOSCOPY, GASTROSCOPY, DUODENOSCOPY (EGD);  gastroscopy;  Surgeon: Tristen Rangel MD;  Location: [facility]     HC ESOPH/GAS REFLUX TEST W NASAL IMPED >1 HR  4/19/2012    Procedure:ESOPHAGEAL IMPEDENCE FUNCTION TEST WITH 24 HOUR PH GREATER THAN 1 HOUR; Surgeon:VALDO UMANZOR; [facility]     HERNIA REPAIR      umbilcal     HYSTERECTOMY, PAP NO LONGER INDICATED  1991     TONSILLECTOMY       UPPER GI ENDOSCOPY          Family History   Problem Relation Age of Onset     Heart Disease Mother         valve problem     Hypertension Mother      Diabetes Mother         type 2     Cerebrovascular Disease Mother      Allergies Mother      Eye Disorder Mother         Macular degeneration     Osteoporosis Mother      Respiratory Mother      Migraines Mother      Cardiovascular Father         MI at age 80     Cancer - colorectal Father      Diabetes Father      Hypertension Father         type 2     Eye Disorder Father         macular degeneration     Lipids Father       Colon Cancer Father      C.A.D. Maternal Grandmother      Diabetes Paternal Grandmother         type 2     Cardiovascular Paternal Grandmother         MI     Diabetes Sister         type 2     Allergies Sister      Gastrointestinal Disease Sister      Depression Sister      Gastrointestinal Disease Daughter      Migraines Daughter      Gastrointestinal Disease Daughter      Migraines Daughter      Migraines Son      Aneurysm No family hx of      Brain Hemorrhage No family hx of      Brain Tumor No family hx of      Cancer No family hx of      Chiari Malformation No family hx of      LUNG DISEASE No family hx of      Seizure Disorder No family hx of        Social History     Socioeconomic History     Marital status:      Spouse name: Not on file     Number of children: Not on file     Years of education: Not on file     Highest education level: Not on file   Occupational History     Not on file   Social Needs     Financial resource strain: Not on file     Food insecurity:     Worry: Not on file     Inability: Not on file     Transportation needs:     Medical: Not on file     Non-medical: Not on file   Tobacco Use     Smoking status: Former Smoker     Packs/day: 0.50     Years: 3.00     Pack years: 1.50     Types: Cigarettes     Smokeless tobacco: Never Used     Tobacco comment: smoked for 3 years when she was around 19   Substance and Sexual Activity     Alcohol use: Yes     Comment: RARELY     Drug use: No     Sexual activity: Not Currently   Lifestyle     Physical activity:     Days per week: Not on file     Minutes per session: Not on file     Stress: Not on file   Relationships     Social connections:     Talks on phone: Not on file     Gets together: Not on file     Attends Protestant service: Not on file     Active member of club or organization: Not on file     Attends meetings of clubs or organizations: Not on file     Relationship status: Not on file     Intimate partner violence:     Fear of current  or ex partner: Not on file     Emotionally abused: Not on file     Physically abused: Not on file     Forced sexual activity: Not on file   Other Topics Concern     Parent/sibling w/ CABG, MI or angioplasty before 65F 55M? Yes     Comment: mother- valve problem   Social History Narrative     Not on file       Outpatient Encounter Medications as of 12/13/2019   Medication Sig Dispense Refill     albuterol (PROAIR HFA/PROVENTIL HFA/VENTOLIN HFA) 108 (90 Base) MCG/ACT inhaler Inhale 2 puffs into the lungs every 4 hours as needed for shortness of breath / dyspnea 1 Inhaler 1     albuterol (PROVENTIL) (5 MG/ML) 0.5% nebulizer solution Take 0.5 mLs (2.5 mg) by nebulization every 4 hours as needed for wheezing or shortness of breath / dyspnea 30 vial 1     amLODIPine (NORVASC) 5 MG tablet Take 1 tablet (5 mg) by mouth daily For blood pressure 90 tablet 1     amylase-lipase-protease (CREON) 6000 units CPEP TAKE TWO CAPSULES BY MOUTH THREE TIMES A DAY WITH MEALS FOR DIARRHEA 180 capsule 11     ascorbic acid 1000 MG TABS tablet Take 1,000 mg by mouth daily       augmented betamethasone dipropionate (DIPROLENE) 0.05 % external lotion Apply sparingly twice daily Friday through Sunday . 60 mL 5     augmented betamethasone dipropionate (DIPROLENE-AF) 0.05 % external cream Apply sparingly twice daily to areas of psoriasis Friday through Sunday. 50 g 4     biotin 1000 MCG TABS tablet Take 1,000 mcg by mouth 2 times daily       calcipotriene (DOVONOX) 0.005 % external cream Apply twice daily to psoriasis on Monday through Thursday. 60 g 8     calcipotriene (DOVONOX) 0.005 % external solution Apply twice daily as needed to affected area on scalp Monday thru Thursday. 60 mL 4     Calcium-Magnesium-Zinc 333..33-5 MG TABS Take 1 tablet by mouth daily       cetirizine (ZYRTEC) 10 MG tablet Take 1 tablet (10 mg) by mouth every morning For allergies 90 tablet 3     clobetasol propionate (CLOBEX) 0.05 % external shampoo Leave on scalp  for 15 minutes then rinse. Use 2-3 times weekly. 118 mL 4     donepezil (ARICEPT) 10 MG tablet TAKE ONE TABLET BY MOUTH ONCE DAILY AT BEDTIME FOR MEMORY 30 tablet 1     estradiol (ESTRACE) 0.1 MG/GM vaginal cream USE ONE GRAM VAGINALLY ONCE DAILY AND SPREAD A SMALL AMOUNT AROUND THE CLITORAL REES For vaginal area issues. 85 g 0     fluocinonide (LIDEX) 0.05 % external solution Apply twice daily as needed to scalp.  Do not apply to face. Please keep upcoming appointment. 60 mL 0     fluticasone (FLONASE) 50 MCG/ACT nasal spray INSTILL ONE SPRAY IN EACH NOSTRIL EVERY DAY.  For allergies 16 g 1     fluticasone-salmeterol (ADVAIR) 250-50 MCG/DOSE inhaler INHALE ONE PUFF BY MOUTH TWICE A DAY FOR ASTHMA 3 Inhaler 3     gabapentin (NEURONTIN) 300 MG capsule Take 2 capsules (600 mg) by mouth 3 times daily 180 capsule 5     hydrocortisone 1 % cream Apply topically as needed       lactase (LACTAID) 9000 UNITS TABS Take 9,000 Units by mouth as needed       melatonin 5 MG tablet Take 1 tablet (5 mg) by mouth nightly as needed for sleep 90 tablet 3     montelukast (SINGULAIR) 10 MG tablet Take 1 tablet (10 mg) by mouth daily For allergies and asthma 90 tablet 3     omeprazole (PRILOSEC) 10 MG DR capsule Take 1 capsule (10 mg) by mouth 2 times daily 60 capsule 11     ORDER FOR DME Equipment being ordered: Nebulizer and tubing/filter 1 Device 0     polyethylene glycol (MIRALAX/GLYCOLAX) powder Take 17 g (1 capful) by mouth as needed for constipation 1 Bottle 3     sertraline (ZOLOFT) 100 MG tablet TAKE ONE TABLET BY MOUTH ONCE DAILY FOR MOOD 90 tablet 1     sertraline (ZOLOFT) 100 MG tablet Take 1 tablet (100 mg) by mouth daily For mood. 30 tablet 1     simvastatin (ZOCOR) 40 MG tablet TAKE ONE TABLET BY MOUTH EVERY NIGHT AT BEDTIME.  For cholesterol. 90 tablet 3     SKIN OILS EX Lavender-headache, skin, peppermint-skin, upset stomach mix of oils, asthma mix of oils, eucalyptus. Tea tree oil-skin, vapor rub- chest tightness,  sleepy time (vetiver, lavendaer, mrjoram, mandarin, ylang)- sleep, constipation, eczema (sweet almond oil). Also mixes with carriers: Coconut oil, [place] oil, Extra virgin oil. Frankincense- cough. Digize- oil. Purification- oil, lemon- oil. Essentialyme- pill.  Inhales a mix with olive oil almond and coconut oil with peppermint, and eucalyptus- sinus, allergies. Updated 12/1/2015.   Updated 4/26/2016: Lemon, Cinnamon bark, panaway, Thieves, Orange, Wintergreen, Copaiba       traZODone (DESYREL) 50 MG tablet Take 1 tablet (50 mg) by mouth At Bedtime 30 tablet 11     Turmeric 500 MG CAPS Take 1 capsule by mouth daily       No facility-administered encounter medications on file as of 12/13/2019.              Review Of Systems  Skin: As above  Eyes: negative  Ears/Nose/Throat: negative  Respiratory: No shortness of breath, dyspnea on exertion, cough, or hemoptysis  Cardiovascular: negative  Gastrointestinal: negative  Genitourinary: negative  Musculoskeletal: negative  Neurologic: negative  Psychiatric: negative  Hematologic/Lymphatic/Immunologic: negative  Endocrine: negative      O:   NAD, WDWN, Alert & Oriented, Mood & Affect wnl, Vitals stable   Here today alone   /76   Pulse 59   SpO2 98%    General appearance normal   Vitals stable   Alert, oriented and in no acute distress   Thick psoriasiform plaques on scalp and small papules on legs     Eyes: Conjunctivae/lids:Normal     ENT: Lips: normal    MSK:Normal    Pulm: Breathing Normal    Neuro/Psych: Orientation:Alert and Orientedx3 ; Mood/Affect:normal   A/P:  1. Scalp psoriasis and on legs   IL TAC: PGACAC discussed.  Risks including but not limited to injection site reaction, bruising, no resolution.  All questions answered and entertained to patient s satisfaction.  Informed consent obtained.  IL TAC in concentration of 120mg/ml was injected ID to psoriasis on scalp and legs.  Total injected was  3 ml.  Patient tolerated without complications and given  wound care instructions, including not to move product around.  Return in 4 weeks for follow-up and possible additional IL TAC.  Use clobetasol shampoo once weekly, leave on for 15 minutes then rinse.   Try Tea tree shampoo.   Apply calcipotriene solution to scalp Monday through Thursday  Apply betamethasone lotion to scalp Friday through Sunday,  Recheck in 6-8 weeks.

## 2020-01-03 ENCOUNTER — PATIENT OUTREACH (OUTPATIENT)
Dept: CARE COORDINATION | Facility: CLINIC | Age: 75
End: 2020-01-03

## 2020-01-03 NOTE — LETTER
[place] CARE COORDINATION [facility]  [address] [place] [address]  [phone]    January 3, 2020    Ingrid Amaral  [address] [facility] [address]      Dear Ingrid,    I have been unsuccessful in reaching you since our last contact. At this time I will make no further attempts to reach you, however this does not change your ability to continue receiving care from your providers at [place]. If you are needing additional support from a care coordinator in the future please contact me at [phone].    All of us at [facility] are invested in your health and are here to assist you in meeting your goals.    Sincerely,    EM Mcfarlane

## 2020-01-03 NOTE — PROGRESS NOTES
Clinic Care Coordination Contact  [facility]/Voicemail       Clinical Data: Care Coordinator Outreach  Outreach attempted x 3.  Unable to leave a message on voice mail  Plan: Care Coordinator will send disenrollment letter with care coordinator contact information via [employer]. Care Coordinator will do no further outreaches at this time.    EM Mcfarlane, [facility] - Care Coordinator   [facility]  1/3/2020   9:52 AM  [phone]

## 2020-01-19 DIAGNOSIS — K21.9 GASTROESOPHAGEAL REFLUX DISEASE WITHOUT ESOPHAGITIS: ICD-10-CM

## 2020-01-20 NOTE — TELEPHONE ENCOUNTER
"Requested Prescriptions   Pending Prescriptions Disp Refills     omeprazole (PRILOSEC) 20 MG DR capsule [Pharmacy Med Name: OMEPRAZOLE 20MG CPDR] 60 capsule 0     Sig: TAKE ONE CAPSULE BY MOUTH TWICE A DAY FOR HEARTBURN       PPI Protocol Passed - 1/19/2020 11:35 AM        Passed - Not on Clopidogrel (unless Pantoprazole ordered)        Passed - No diagnosis of osteoporosis on record        Passed - Recent (12 mo) or future (30 days) visit within the authorizing provider's specialty     Patient has had an office visit with the authorizing provider or a provider within the authorizing providers department within the previous 12 mos or has a future within next 30 days. See \"Patient Info\" tab in inbasket, or \"Choose Columns\" in Meds & Orders section of the refill encounter.              Passed - Medication is active on med list        Passed - Patient is age 18 or older        Passed - No active pregnacy on record        Passed - No positive pregnancy test in past 12 months        Last Written Prescription Date:  11/4/19  Last Fill Quantity: 60,  # refills: 11   Last office visit: 11/4/2019 with prescribing provider:     Future Office Visit:   Next 5 appointments (look out 90 days)    Jan 30, 2020  2:00 PM CST  Return Visit with Miguelina Schulte PA-C  [facility] [address] [facility] [address] [phone]           "

## 2020-01-30 ENCOUNTER — OFFICE VISIT (OUTPATIENT)
Dept: DERMATOLOGY | Facility: CLINIC | Age: 75
End: 2020-01-30
Payer: MEDICARE

## 2020-01-30 VITALS
SYSTOLIC BLOOD PRESSURE: 142 MMHG | OXYGEN SATURATION: 98 % | HEART RATE: 61 BPM | DIASTOLIC BLOOD PRESSURE: 83 MMHG | RESPIRATION RATE: 16 BRPM

## 2020-01-30 DIAGNOSIS — L40.9 PSORIASIS: Primary | ICD-10-CM

## 2020-01-30 PROCEDURE — 11900 INJECT SKIN LESIONS </W 7: CPT | Performed by: PHYSICIAN ASSISTANT

## 2020-01-30 PROCEDURE — 99213 OFFICE O/P EST LOW 20 MIN: CPT | Mod: 25 | Performed by: PHYSICIAN ASSISTANT

## 2020-01-30 NOTE — LETTER
1/30/2020         RE: Ingrid Amaral  [address] [facility] [id number]        Dear Colleague,    Thank you for referring your patient, Ingrid Amaral, to the [facility]. Please see a copy of my visit note below.    Ingrid Amaral is a 74 year old year old female patient here today for recheck psoriasis. She notes that injections do help with her scalp, but typically only for a few weeks. She is not currently interested in systemic medication. Patient has no other skin complaints today.  Remainder of the HPI, Meds, PMH, Allergies, FH, and SH was reviewed in chart.    Pertinent Hx:   Psoriasis   Past Medical History:   Diagnosis Date     Asthma      GERD (gastroesophageal reflux disease)      Radial head fracture 3/18/2010       Past Surgical History:   Procedure Laterality Date     ANKLE SURGERY      bilateral     COLONOSCOPY       COLONOSCOPY N/A 3/20/2015    Procedure: COLONOSCOPY;  Surgeon: Britney Martinez MD;  Location: [facility]     COLONOSCOPY N/A 5/22/2017    Procedure: COLONOSCOPY;  Colonoscopy;  Surgeon: Tristen Rangel MD;  Location: [facility]     ESOPHAGOSCOPY, GASTROSCOPY, DUODENOSCOPY (EGD), COMBINED N/A 6/7/2018    Procedure: COMBINED ESOPHAGOSCOPY, GASTROSCOPY, DUODENOSCOPY (EGD);  gastroscopy;  Surgeon: Tristen Rangel MD;  Location: [facility]     HC ESOPH/GAS REFLUX TEST W NASAL IMPED >1 HR  4/19/2012    Procedure:ESOPHAGEAL IMPEDENCE FUNCTION TEST WITH 24 HOUR PH GREATER THAN 1 HOUR; Surgeon:AVLDO UMANZOR; Location: GI     HERNIA REPAIR      umbilcal     HYSTERECTOMY, PAP NO LONGER INDICATED  1991     TONSILLECTOMY       UPPER GI ENDOSCOPY          Family History   Problem Relation Age of Onset     Heart Disease Mother         valve problem     Hypertension Mother      Diabetes Mother         type 2     Cerebrovascular Disease Mother      Allergies Mother      Eye Disorder Mother         Macular degeneration     Osteoporosis Mother      Respiratory Mother       Migraines Mother      Cardiovascular Father         MI at age 80     Cancer - colorectal Father      Diabetes Father      Hypertension Father         type 2     Eye Disorder Father         macular degeneration     Lipids Father      Colon Cancer Father      C.A.D. Maternal Grandmother      Diabetes Paternal Grandmother         type 2     Cardiovascular Paternal Grandmother         MI     Diabetes Sister         type 2     Allergies Sister      Gastrointestinal Disease Sister      Depression Sister      Gastrointestinal Disease Daughter      Migraines Daughter      Gastrointestinal Disease Daughter      Migraines Daughter      Migraines Son      Aneurysm No family hx of      Brain Hemorrhage No family hx of      Brain Tumor No family hx of      Cancer No family hx of      Chiari Malformation No family hx of      LUNG DISEASE No family hx of      Seizure Disorder No family hx of        Social History     Socioeconomic History     Marital status:      Spouse name: Not on file     Number of children: Not on file     Years of education: Not on file     Highest education level: Not on file   Occupational History     Not on file   Social Needs     Financial resource strain: Not on file     Food insecurity:     Worry: Not on file     Inability: Not on file     Transportation needs:     Medical: Not on file     Non-medical: Not on file   Tobacco Use     Smoking status: Former Smoker     Packs/day: 0.50     Years: 3.00     Pack years: 1.50     Types: Cigarettes     Smokeless tobacco: Never Used     Tobacco comment: smoked for 3 years when she was around 19   Substance and Sexual Activity     Alcohol use: Yes     Comment: RARELY     Drug use: No     Sexual activity: Not Currently   Lifestyle     Physical activity:     Days per week: Not on file     Minutes per session: Not on file     Stress: Not on file   Relationships     Social connections:     Talks on phone: Not on file     Gets together: Not on file      Attends Confucianism service: Not on file     Active member of club or organization: Not on file     Attends meetings of clubs or organizations: Not on file     Relationship status: Not on file     Intimate partner violence:     Fear of current or ex partner: Not on file     Emotionally abused: Not on file     Physically abused: Not on file     Forced sexual activity: Not on file   Other Topics Concern     Parent/sibling w/ CABG, MI or angioplasty before 65F 55M? Yes     Comment: mother- valve problem   Social History Narrative     Not on file       Outpatient Encounter Medications as of 1/30/2020   Medication Sig Dispense Refill     albuterol (PROAIR HFA/PROVENTIL HFA/VENTOLIN HFA) 108 (90 Base) MCG/ACT inhaler Inhale 2 puffs into the lungs every 4 hours as needed for shortness of breath / dyspnea 1 Inhaler 1     albuterol (PROVENTIL) (5 MG/ML) 0.5% nebulizer solution Take 0.5 mLs (2.5 mg) by nebulization every 4 hours as needed for wheezing or shortness of breath / dyspnea 30 vial 1     amLODIPine (NORVASC) 5 MG tablet Take 1 tablet (5 mg) by mouth daily For blood pressure 90 tablet 1     amylase-lipase-protease (CREON) 6000 units CPEP TAKE TWO CAPSULES BY MOUTH THREE TIMES A DAY WITH MEALS FOR DIARRHEA 180 capsule 11     ascorbic acid 1000 MG TABS tablet Take 1,000 mg by mouth daily       augmented betamethasone dipropionate (DIPROLENE) 0.05 % external lotion Apply sparingly twice daily Friday through Sunday . 60 mL 5     augmented betamethasone dipropionate (DIPROLENE-AF) 0.05 % external cream Apply sparingly twice daily to areas of psoriasis Friday through Sunday. 50 g 4     biotin 1000 MCG TABS tablet Take 1,000 mcg by mouth 2 times daily       calcipotriene (DOVONOX) 0.005 % external cream Apply twice daily to psoriasis on Monday through Thursday. 60 g 8     calcipotriene (DOVONOX) 0.005 % external solution Apply twice daily as needed to affected area on scalp Monday thru Thursday. 60 mL 4      Calcium-Magnesium-Zinc 333..33-5 MG TABS Take 1 tablet by mouth daily       cetirizine (ZYRTEC) 10 MG tablet Take 1 tablet (10 mg) by mouth every morning For allergies 90 tablet 3     clobetasol propionate (CLOBEX) 0.05 % external shampoo Leave on scalp for 15 minutes then rinse. Use 2-3 times weekly. 118 mL 4     donepezil (ARICEPT) 10 MG tablet TAKE ONE TABLET BY MOUTH ONCE DAILY AT BEDTIME FOR MEMORY 90 tablet 1     fluocinonide (LIDEX) 0.05 % external solution Apply twice daily as needed to scalp.  Do not apply to face. Please keep upcoming appointment. 60 mL 0     fluticasone (FLONASE) 50 MCG/ACT nasal spray INSTILL ONE SPRAY IN EACH NOSTRIL EVERY DAY.  For allergies 16 g 1     fluticasone-salmeterol (ADVAIR) 250-50 MCG/DOSE inhaler INHALE ONE PUFF BY MOUTH TWICE A DAY FOR ASTHMA 3 Inhaler 3     gabapentin (NEURONTIN) 300 MG capsule Take 2 capsules (600 mg) by mouth 3 times daily 180 capsule 5     hydrocortisone 1 % cream Apply topically as needed       lactase (LACTAID) 9000 UNITS TABS Take 9,000 Units by mouth as needed       melatonin 5 MG tablet Take 1 tablet (5 mg) by mouth nightly as needed for sleep 90 tablet 3     montelukast (SINGULAIR) 10 MG tablet Take 1 tablet (10 mg) by mouth daily For allergies and asthma 90 tablet 3     omeprazole (PRILOSEC) 10 MG DR capsule Take 1 capsule (10 mg) by mouth 2 times daily 60 capsule 11     omeprazole (PRILOSEC) 20 MG DR capsule TAKE ONE CAPSULE BY MOUTH TWICE A DAY FOR HEARTBURN 60 capsule 5     ORDER FOR DME Equipment being ordered: Nebulizer and tubing/filter 1 Device 0     polyethylene glycol (MIRALAX/GLYCOLAX) powder Take 17 g (1 capful) by mouth as needed for constipation 1 Bottle 3     sertraline (ZOLOFT) 100 MG tablet TAKE ONE TABLET BY MOUTH ONCE DAILY FOR MOOD 90 tablet 1     sertraline (ZOLOFT) 100 MG tablet Take 1 tablet (100 mg) by mouth daily For mood. 30 tablet 1     simvastatin (ZOCOR) 40 MG tablet TAKE ONE TABLET BY MOUTH EVERY NIGHT AT  BEDTIME.  For cholesterol. 90 tablet 3     SKIN OILS EX Lavender-headache, skin, peppermint-skin, upset stomach mix of oils, asthma mix of oils, eucalyptus. Tea tree oil-skin, vapor rub- chest tightness, sleepy time (vetiver, lavendaer, mrjoram, mandarin, ylang)- sleep, constipation, eczema (sweet almond oil). Also mixes with carriers: Coconut oil, [place] oil, Extra virgin oil. Frankincense- cough. Digize- oil. Purification- oil, lemon- oil. Essentialyme- pill.  Inhales a mix with olive oil almond and coconut oil with peppermint, and eucalyptus- sinus, allergies. Updated 12/1/2015.   Updated 4/26/2016: Lemon, Cinnamon bark, panaway, Thieves, Orange, Wintergreen, Copaiba       traZODone (DESYREL) 50 MG tablet Take 1 tablet (50 mg) by mouth At Bedtime 30 tablet 11     Turmeric 500 MG CAPS Take 1 capsule by mouth daily       estradiol (ESTRACE) 0.1 MG/GM vaginal cream USE ONE GRAM VAGINALLY ONCE DAILY AND SPREAD A SMALL AMOUNT AROUND THE CLITORAL REES For vaginal area issues. (Patient not taking: Reported on 1/30/2020) 85 g 0     No facility-administered encounter medications on file as of 1/30/2020.              Review Of Systems  Skin: As above  Eyes: negative  Ears/Nose/Throat: negative  Respiratory: No shortness of breath, dyspnea on exertion, cough, or hemoptysis  Cardiovascular: negative  Gastrointestinal: negative  Genitourinary: negative  Musculoskeletal: negative  Neurologic: negative  Psychiatric: negative  Hematologic/Lymphatic/Immunologic: negative  Endocrine: negative      O:   NAD, WDWN, Alert & Oriented, Mood & Affect wnl, Vitals stable   Here today alone   BP (!) 142/83 (BP Location: Right arm, Patient Position: Sitting, Cuff Size: Adult Regular)   Pulse 61   Resp 16   SpO2 98%    General appearance normal   Vitals stable   Alert, oriented and in no acute distress     Psoriasiform plaques on scalp     Eyes: Conjunctivae/lids:Normal     ENT: Lips: normal    MSK:Normal    Pulm: Breathing  Normal    Neuro/Psych: Orientation:Alert and Orientedx3 ; Mood/Affect:normal   A/P:  1. Psoriasis   IL TAC: PGACAC discussed.  Risks including but not limited to injection site reaction, bruising, no resolution.  All questions answered and entertained to patient s satisfaction.  Informed consent obtained.  IL TAC in concentration of 120mg/ml was injected ID to psoriasis on scalp.  Total injected was   2 ml.  Patient tolerated without complications and given wound care instructions, including not to move product around.  Return in 4 weeks for follow-up and possible additional IL TAC.   Try T-gel shampoo.   Apply calcipotriene solution to scalp Monday through Thursday  Apply betamethasone lotion to scalp Friday through Sunday,  Recheck in 6-8 weeks.       Again, thank you for allowing me to participate in the care of your patient.        Sincerely,        Miguelina Lion PA-C

## 2020-01-30 NOTE — NURSING NOTE
"Initial BP (!) 142/83 (BP Location: Right arm, Patient Position: Sitting, Cuff Size: Adult Regular)   Pulse 61   Resp 16   SpO2 98%  Estimated body mass index is 22.5 kg/m  as calculated from the following:    Height as of 11/4/19: 1.575 m (5' 2\").    Weight as of 11/4/19: 55.8 kg (123 lb). .    Sharon Wang, [facility]    "

## 2020-02-03 NOTE — PROGRESS NOTES
Ingrid Amaral is a 74 year old year old female patient here today for recheck psoriasis. She notes that injections do help with her scalp, but typically only for a few weeks. She is not currently interested in systemic medication. Patient has no other skin complaints today.  Remainder of the HPI, Meds, PMH, Allergies, FH, and SH was reviewed in chart.    Pertinent Hx:   Psoriasis   Past Medical History:   Diagnosis Date     Asthma      GERD (gastroesophageal reflux disease)      Radial head fracture 3/18/2010       Past Surgical History:   Procedure Laterality Date     ANKLE SURGERY      bilateral     COLONOSCOPY       COLONOSCOPY N/A 3/20/2015    Procedure: COLONOSCOPY;  Surgeon: Britney Martinez MD;  Location: [facility]     COLONOSCOPY N/A 5/22/2017    Procedure: COLONOSCOPY;  Colonoscopy;  Surgeon: Tristen Rangel MD;  Location: [facility]     ESOPHAGOSCOPY, GASTROSCOPY, DUODENOSCOPY (EGD), COMBINED N/A 6/7/2018    Procedure: COMBINED ESOPHAGOSCOPY, GASTROSCOPY, DUODENOSCOPY (EGD);  gastroscopy;  Surgeon: Tristen Rangel MD;  Location: [facility]     HC ESOPH/GAS REFLUX TEST W NASAL IMPED >1 HR  4/19/2012    Procedure:ESOPHAGEAL IMPEDENCE FUNCTION TEST WITH 24 HOUR PH GREATER THAN 1 HOUR; Surgeon:VALDO UMANZOR; Location: GI     HERNIA REPAIR      umbilcal     HYSTERECTOMY, PAP NO LONGER INDICATED  1991     TONSILLECTOMY       UPPER GI ENDOSCOPY          Family History   Problem Relation Age of Onset     Heart Disease Mother         valve problem     Hypertension Mother      Diabetes Mother         type 2     Cerebrovascular Disease Mother      Allergies Mother      Eye Disorder Mother         Macular degeneration     Osteoporosis Mother      Respiratory Mother      Migraines Mother      Cardiovascular Father         MI at age 80     Cancer - colorectal Father      Diabetes Father      Hypertension Father         type 2     Eye Disorder Father         macular degeneration     Lipids Father      Colon Cancer  Father      C.A.D. Maternal Grandmother      Diabetes Paternal Grandmother         type 2     Cardiovascular Paternal Grandmother         MI     Diabetes Sister         type 2     Allergies Sister      Gastrointestinal Disease Sister      Depression Sister      Gastrointestinal Disease Daughter      Migraines Daughter      Gastrointestinal Disease Daughter      Migraines Daughter      Migraines Son      Aneurysm No family hx of      Brain Hemorrhage No family hx of      Brain Tumor No family hx of      Cancer No family hx of      Chiari Malformation No family hx of      LUNG DISEASE No family hx of      Seizure Disorder No family hx of        Social History     Socioeconomic History     Marital status:      Spouse name: Not on file     Number of children: Not on file     Years of education: Not on file     Highest education level: Not on file   Occupational History     Not on file   Social Needs     Financial resource strain: Not on file     Food insecurity:     Worry: Not on file     Inability: Not on file     Transportation needs:     Medical: Not on file     Non-medical: Not on file   Tobacco Use     Smoking status: Former Smoker     Packs/day: 0.50     Years: 3.00     Pack years: 1.50     Types: Cigarettes     Smokeless tobacco: Never Used     Tobacco comment: smoked for 3 years when she was around 19   Substance and Sexual Activity     Alcohol use: Yes     Comment: RARELY     Drug use: No     Sexual activity: Not Currently   Lifestyle     Physical activity:     Days per week: Not on file     Minutes per session: Not on file     Stress: Not on file   Relationships     Social connections:     Talks on phone: Not on file     Gets together: Not on file     Attends Orthodoxy service: Not on file     Active member of club or organization: Not on file     Attends meetings of clubs or organizations: Not on file     Relationship status: Not on file     Intimate partner violence:     Fear of current or ex partner:  Not on file     Emotionally abused: Not on file     Physically abused: Not on file     Forced sexual activity: Not on file   Other Topics Concern     Parent/sibling w/ CABG, MI or angioplasty before 65F 55M? Yes     Comment: mother- valve problem   Social History Narrative     Not on file       Outpatient Encounter Medications as of 1/30/2020   Medication Sig Dispense Refill     albuterol (PROAIR HFA/PROVENTIL HFA/VENTOLIN HFA) 108 (90 Base) MCG/ACT inhaler Inhale 2 puffs into the lungs every 4 hours as needed for shortness of breath / dyspnea 1 Inhaler 1     albuterol (PROVENTIL) (5 MG/ML) 0.5% nebulizer solution Take 0.5 mLs (2.5 mg) by nebulization every 4 hours as needed for wheezing or shortness of breath / dyspnea 30 vial 1     amLODIPine (NORVASC) 5 MG tablet Take 1 tablet (5 mg) by mouth daily For blood pressure 90 tablet 1     amylase-lipase-protease (CREON) 6000 units CPEP TAKE TWO CAPSULES BY MOUTH THREE TIMES A DAY WITH MEALS FOR DIARRHEA 180 capsule 11     ascorbic acid 1000 MG TABS tablet Take 1,000 mg by mouth daily       augmented betamethasone dipropionate (DIPROLENE) 0.05 % external lotion Apply sparingly twice daily Friday through Sunday . 60 mL 5     augmented betamethasone dipropionate (DIPROLENE-AF) 0.05 % external cream Apply sparingly twice daily to areas of psoriasis Friday through Sunday. 50 g 4     biotin 1000 MCG TABS tablet Take 1,000 mcg by mouth 2 times daily       calcipotriene (DOVONOX) 0.005 % external cream Apply twice daily to psoriasis on Monday through Thursday. 60 g 8     calcipotriene (DOVONOX) 0.005 % external solution Apply twice daily as needed to affected area on scalp Monday thru Thursday. 60 mL 4     Calcium-Magnesium-Zinc 333..33-5 MG TABS Take 1 tablet by mouth daily       cetirizine (ZYRTEC) 10 MG tablet Take 1 tablet (10 mg) by mouth every morning For allergies 90 tablet 3     clobetasol propionate (CLOBEX) 0.05 % external shampoo Leave on scalp for 15 minutes  then rinse. Use 2-3 times weekly. 118 mL 4     donepezil (ARICEPT) 10 MG tablet TAKE ONE TABLET BY MOUTH ONCE DAILY AT BEDTIME FOR MEMORY 90 tablet 1     fluocinonide (LIDEX) 0.05 % external solution Apply twice daily as needed to scalp.  Do not apply to face. Please keep upcoming appointment. 60 mL 0     fluticasone (FLONASE) 50 MCG/ACT nasal spray INSTILL ONE SPRAY IN EACH NOSTRIL EVERY DAY.  For allergies 16 g 1     fluticasone-salmeterol (ADVAIR) 250-50 MCG/DOSE inhaler INHALE ONE PUFF BY MOUTH TWICE A DAY FOR ASTHMA 3 Inhaler 3     gabapentin (NEURONTIN) 300 MG capsule Take 2 capsules (600 mg) by mouth 3 times daily 180 capsule 5     hydrocortisone 1 % cream Apply topically as needed       lactase (LACTAID) 9000 UNITS TABS Take 9,000 Units by mouth as needed       melatonin 5 MG tablet Take 1 tablet (5 mg) by mouth nightly as needed for sleep 90 tablet 3     montelukast (SINGULAIR) 10 MG tablet Take 1 tablet (10 mg) by mouth daily For allergies and asthma 90 tablet 3     omeprazole (PRILOSEC) 10 MG DR capsule Take 1 capsule (10 mg) by mouth 2 times daily 60 capsule 11     omeprazole (PRILOSEC) 20 MG DR capsule TAKE ONE CAPSULE BY MOUTH TWICE A DAY FOR HEARTBURN 60 capsule 5     ORDER FOR DME Equipment being ordered: Nebulizer and tubing/filter 1 Device 0     polyethylene glycol (MIRALAX/GLYCOLAX) powder Take 17 g (1 capful) by mouth as needed for constipation 1 Bottle 3     sertraline (ZOLOFT) 100 MG tablet TAKE ONE TABLET BY MOUTH ONCE DAILY FOR MOOD 90 tablet 1     sertraline (ZOLOFT) 100 MG tablet Take 1 tablet (100 mg) by mouth daily For mood. 30 tablet 1     simvastatin (ZOCOR) 40 MG tablet TAKE ONE TABLET BY MOUTH EVERY NIGHT AT BEDTIME.  For cholesterol. 90 tablet 3     SKIN OILS EX Lavender-headache, skin, peppermint-skin, upset stomach mix of oils, asthma mix of oils, eucalyptus. Tea tree oil-skin, vapor rub- chest tightness, sleepy time (vetiver, lavendaer, mrjoram, mandarin, ylang)- sleep,  constipation, eczema (sweet almond oil). Also mixes with carriers: Coconut oil, [place] oil, Extra virgin oil. Frankincense- cough. Digize- oil. Purification- oil, lemon- oil. Essentialyme- pill.  Inhales a mix with olive oil almond and coconut oil with peppermint, and eucalyptus- sinus, allergies. Updated 12/1/2015.   Updated 4/26/2016: Lemon, Cinnamon bark, panaway, Thieves, Orange, Wintergreen, Copaiba       traZODone (DESYREL) 50 MG tablet Take 1 tablet (50 mg) by mouth At Bedtime 30 tablet 11     Turmeric 500 MG CAPS Take 1 capsule by mouth daily       estradiol (ESTRACE) 0.1 MG/GM vaginal cream USE ONE GRAM VAGINALLY ONCE DAILY AND SPREAD A SMALL AMOUNT AROUND THE CLITORAL REES For vaginal area issues. (Patient not taking: Reported on 1/30/2020) 85 g 0     No facility-administered encounter medications on file as of 1/30/2020.              Review Of Systems  Skin: As above  Eyes: negative  Ears/Nose/Throat: negative  Respiratory: No shortness of breath, dyspnea on exertion, cough, or hemoptysis  Cardiovascular: negative  Gastrointestinal: negative  Genitourinary: negative  Musculoskeletal: negative  Neurologic: negative  Psychiatric: negative  Hematologic/Lymphatic/Immunologic: negative  Endocrine: negative      O:   NAD, WDWN, Alert & Oriented, Mood & Affect wnl, Vitals stable   Here today alone   BP (!) 142/83 (BP Location: Right arm, Patient Position: Sitting, Cuff Size: Adult Regular)   Pulse 61   Resp 16   SpO2 98%    General appearance normal   Vitals stable   Alert, oriented and in no acute distress     Psoriasiform plaques on scalp     Eyes: Conjunctivae/lids:Normal     ENT: Lips: normal    MSK:Normal    Pulm: Breathing Normal    Neuro/Psych: Orientation:Alert and Orientedx3 ; Mood/Affect:normal   A/P:  1. Psoriasis   IL TAC: PGACAC discussed.  Risks including but not limited to injection site reaction, bruising, no resolution.  All questions answered and entertained to patient s satisfaction.   Informed consent obtained.  IL TAC in concentration of 120mg/ml was injected ID to psoriasis on scalp.  Total injected was  2 ml.  Patient tolerated without complications and given wound care instructions, including not to move product around.  Return in 4 weeks for follow-up and possible additional IL TAC.   Try T-gel shampoo.   Apply calcipotriene solution to scalp Monday through Thursday  Apply betamethasone lotion to scalp Friday through Sunday,  Recheck in 6-8 weeks.

## 2020-02-28 ENCOUNTER — TELEPHONE (OUTPATIENT)
Dept: FAMILY MEDICINE | Facility: CLINIC | Age: 75
End: 2020-02-28

## 2020-02-28 DIAGNOSIS — F32.0 MILD MAJOR DEPRESSION (H): ICD-10-CM

## 2020-02-28 DIAGNOSIS — G47.00 INSOMNIA, UNSPECIFIED TYPE: ICD-10-CM

## 2020-02-28 DIAGNOSIS — F41.0 PANIC DISORDER WITHOUT AGORAPHOBIA: ICD-10-CM

## 2020-02-28 RX ORDER — TRAZODONE HYDROCHLORIDE 50 MG/1
50 TABLET, FILM COATED ORAL AT BEDTIME
Qty: 30 TABLET | Refills: 11 | Status: CANCELLED | OUTPATIENT
Start: 2020-02-28

## 2020-02-28 NOTE — TELEPHONE ENCOUNTER
Pt has been doubling up on her trazodone, can we have new rx for correct billing?      Thank You,  Almaz Lindo [facility], [place]

## 2020-02-28 NOTE — TELEPHONE ENCOUNTER
left message for patient to return call.  Need to know what dose she is taking now?  Also, she was seen on 11/4/19 and was supposed to recheck in 6 weeks  Irma Ivey RN

## 2020-03-02 ENCOUNTER — OFFICE VISIT (OUTPATIENT)
Dept: FAMILY MEDICINE | Facility: CLINIC | Age: 75
End: 2020-03-02
Payer: MEDICARE

## 2020-03-02 VITALS
BODY MASS INDEX: 21.71 KG/M2 | TEMPERATURE: 98.4 F | SYSTOLIC BLOOD PRESSURE: 115 MMHG | HEART RATE: 65 BPM | HEIGHT: 62 IN | DIASTOLIC BLOOD PRESSURE: 71 MMHG | OXYGEN SATURATION: 98 % | RESPIRATION RATE: 16 BRPM | WEIGHT: 118 LBS

## 2020-03-02 DIAGNOSIS — G47.52 RBD (REM BEHAVIORAL DISORDER): ICD-10-CM

## 2020-03-02 DIAGNOSIS — F32.0 MILD MAJOR DEPRESSION (H): ICD-10-CM

## 2020-03-02 DIAGNOSIS — J30.9 ALLERGIC RHINITIS, UNSPECIFIED SEASONALITY, UNSPECIFIED TRIGGER: ICD-10-CM

## 2020-03-02 DIAGNOSIS — E78.5 DYSLIPIDEMIA: ICD-10-CM

## 2020-03-02 DIAGNOSIS — F41.0 PANIC DISORDER WITHOUT AGORAPHOBIA: ICD-10-CM

## 2020-03-02 DIAGNOSIS — G47.00 INSOMNIA, UNSPECIFIED TYPE: Primary | ICD-10-CM

## 2020-03-02 DIAGNOSIS — I10 BENIGN ESSENTIAL HYPERTENSION: ICD-10-CM

## 2020-03-02 DIAGNOSIS — K21.9 GASTROESOPHAGEAL REFLUX DISEASE WITHOUT ESOPHAGITIS: ICD-10-CM

## 2020-03-02 PROCEDURE — 99214 OFFICE O/P EST MOD 30 MIN: CPT | Performed by: PHYSICIAN ASSISTANT

## 2020-03-02 RX ORDER — TRAZODONE HYDROCHLORIDE 100 MG/1
100 TABLET ORAL AT BEDTIME
Qty: 30 TABLET | Refills: 11 | Status: SHIPPED | OUTPATIENT
Start: 2020-03-02 | End: 2020-06-18

## 2020-03-02 RX ORDER — AMLODIPINE BESYLATE 2.5 MG/1
2.5 TABLET ORAL DAILY
Qty: 90 TABLET | Refills: 0 | Status: SHIPPED | OUTPATIENT
Start: 2020-03-02 | End: 2020-06-08

## 2020-03-02 RX ORDER — OMEPRAZOLE 10 MG/1
10 CAPSULE, DELAYED RELEASE ORAL 2 TIMES DAILY
Qty: 60 CAPSULE | Refills: 11 | Status: SHIPPED | OUTPATIENT
Start: 2020-03-02 | End: 2020-06-18

## 2020-03-02 RX ORDER — FLUTICASONE PROPIONATE 50 MCG
SPRAY, SUSPENSION (ML) NASAL
Qty: 16 G | Refills: 1 | Status: SHIPPED | OUTPATIENT
Start: 2020-03-02 | End: 2021-04-05

## 2020-03-02 ASSESSMENT — ANXIETY QUESTIONNAIRES
3. WORRYING TOO MUCH ABOUT DIFFERENT THINGS: SEVERAL DAYS
6. BECOMING EASILY ANNOYED OR IRRITABLE: SEVERAL DAYS
7. FEELING AFRAID AS IF SOMETHING AWFUL MIGHT HAPPEN: SEVERAL DAYS
IF YOU CHECKED OFF ANY PROBLEMS ON THIS QUESTIONNAIRE, HOW DIFFICULT HAVE THESE PROBLEMS MADE IT FOR YOU TO DO YOUR WORK, TAKE CARE OF THINGS AT HOME, OR GET ALONG WITH OTHER PEOPLE: SOMEWHAT DIFFICULT
1. FEELING NERVOUS, ANXIOUS, OR ON EDGE: SEVERAL DAYS
2. NOT BEING ABLE TO STOP OR CONTROL WORRYING: SEVERAL DAYS
GAD7 TOTAL SCORE: 7
5. BEING SO RESTLESS THAT IT IS HARD TO SIT STILL: SEVERAL DAYS

## 2020-03-02 ASSESSMENT — MIFFLIN-ST. JEOR: SCORE: 983.49

## 2020-03-02 ASSESSMENT — PATIENT HEALTH QUESTIONNAIRE - PHQ9
5. POOR APPETITE OR OVEREATING: SEVERAL DAYS
SUM OF ALL RESPONSES TO PHQ QUESTIONS 1-9: 8

## 2020-03-02 NOTE — PATIENT INSTRUCTIONS
Try reducing omeprazole to 10 mg 1-2 times daily - prescription sent  Try reducing amlodipine from 5 mg to 2.5 mg daily - prescription sent.  Try to check BP occasionally.    Switched trazodone pill to 100 mg so you have to take only 1 tab   STOP simvastatin.  Recheck cholesterol in 2-3 months.    Decided no change in anxiety medications at this time    Refilled flonase

## 2020-03-02 NOTE — PROGRESS NOTES
"Subjective     Ingrid Amaral is a 74 year old female who presents to clinic today for the following health issues:    HPI   Depression and Anxiety Follow-Up    How are you doing with your depression since your last visit? Improved     How are you doing with your anxiety since your last visit?  No change    Are you having other symptoms that might be associated with depression or anxiety? No    Have you had a significant life event? OTHER: panic issues     Do you have any concerns with your use of alcohol or other drugs? No    Social History     Tobacco Use     Smoking status: Former Smoker     Packs/day: 0.50     Years: 3.00     Pack years: 1.50     Types: Cigarettes     Smokeless tobacco: Never Used     Tobacco comment: smoked for 3 years when she was around 19   Substance Use Topics     Alcohol use: Yes     Comment: RARELY     Drug use: No     PHQ 9/19/2019 11/4/2019 3/2/2020   PHQ-9 Total Score 4 5 8   Q9: Thoughts of better off dead/self-harm past 2 weeks Not at all Not at all Not at all     DELMIS-7 SCORE 9/19/2019 11/4/2019 3/2/2020   Total Score - - -   Total Score - 7 (mild anxiety) -   Total Score 8 7 7     Suicide Assessment Five-step Evaluation and Treatment (SAFE-T)      How many servings of fruits and vegetables do you eat daily?  0-1    On average, how many sweetened beverages do you drink each day (Examples: soda, juice, sweet tea, etc.  Do NOT count diet or artificially sweetened beverages)?   1    How many days per week do you exercise enough to make your heart beat faster? Active at home    How many minutes a day do you exercise enough to make your heart beat faster? Active at home  How many days per week do you miss taking your medication? 3 times a month    What makes it hard for you to take your medications?  remembering to take    Self increased the trazodone when she was kicking and hitting more in her sleep.    She feels she sometimes gets \"panic\", which she describes as getting \"stirred up\" " "by little things.  She is always jumping up to do things.  She notes she likes to go to the movies, because she'll actually sit and watch the whole movie, instead of at home where she jumps up to do the dishes or scrap book.    Heartburn ok.  She is somehow back on omeprazole 20 instead of 10 mg.  They don't recall there being symptoms on 10 mg.      Dyslipidemia, primary prevention.  On simvastatin.    HTN - never high.    She quit some supplements.    BP Readings from Last 3 Encounters:   03/02/20 115/71   01/30/20 (!) 142/83   12/13/19 122/76    Wt Readings from Last 3 Encounters:   03/02/20 53.5 kg (118 lb)   11/04/19 55.8 kg (123 lb)   10/31/19 55.1 kg (121 lb 6.4 oz)           Reviewed and updated as needed this visit by Provider  Tobacco  Allergies  Meds  Problems  Med Hx  Surg Hx  Fam Hx         Review of Systems   ROS COMP: Constitutional, HEENT, cardiovascular, pulmonary, GI, , musculoskeletal, neuro, skin, endocrine and psych systems are negative, except as otherwise noted.      Objective    /71   Pulse 65   Temp 98.4  F (36.9  C) (Tympanic)   Resp 16   Ht 1.575 m (5' 2\")   Wt 53.5 kg (118 lb)   SpO2 98%   BMI 21.58 kg/m    Body mass index is 21.58 kg/m .  Physical Exam   GENERAL: healthy, alert and no distress  RESP: lungs clear to auscultation - no rales, rhonchi or wheezes  CV: regular rate and rhythm, normal S1 S2, no S3 or S4, no murmur, click or rub  PSYCH: mentation appears at baseline, affect normal/bright    Diagnostic Test Results:  Labs reviewed in Epic        Assessment & Plan       ICD-10-CM    1. Insomnia, unspecified type G47.00 traZODone (DESYREL) 100 MG tablet   2. Mild major depression (H) F32.0 traZODone (DESYREL) 100 MG tablet   3. Panic disorder without agoraphobia F41.0 traZODone (DESYREL) 100 MG tablet   4. Gastroesophageal reflux disease without esophagitis K21.9 omeprazole (PRILOSEC) 10 MG DR capsule   5. Benign essential hypertension I10 amLODIPine (NORVASC) " 2.5 MG tablet     **Basic metabolic panel FUTURE anytime   6. Dyslipidemia E78.5 Lipid panel reflex to direct LDL Non-fasting   7. RBD (REM behavioral disorder) G47.52 traZODone (DESYREL) 100 MG tablet   8. Allergic rhinitis, unspecified seasonality, unspecified trigger J30.9 fluticasone (FLONASE) 50 MCG/ACT nasal spray          Patient Instructions   Try reducing omeprazole to 10 mg 1-2 times daily - prescription sent  Try reducing amlodipine from 5 mg to 2.5 mg daily - prescription sent.  Try to check BP occasionally.    Switched trazodone pill to 100 mg so you have to take only 1 tab   STOP simvastatin.  Recheck cholesterol in 2-3 months.    Decided no change in anxiety medications at this time    Refilled flonase       Return in about 3 months (around 6/2/2020).    Leesa Muñiz PA-C  [facility]

## 2020-03-03 ASSESSMENT — ANXIETY QUESTIONNAIRES: GAD7 TOTAL SCORE: 7

## 2020-03-03 ASSESSMENT — ASTHMA QUESTIONNAIRES: ACT_TOTALSCORE: 24

## 2020-03-04 ENCOUNTER — OFFICE VISIT (OUTPATIENT)
Dept: DERMATOLOGY | Facility: CLINIC | Age: 75
End: 2020-03-04
Payer: MEDICARE

## 2020-03-04 VITALS — HEART RATE: 68 BPM | SYSTOLIC BLOOD PRESSURE: 110 MMHG | DIASTOLIC BLOOD PRESSURE: 71 MMHG | OXYGEN SATURATION: 98 %

## 2020-03-04 DIAGNOSIS — L03.114 CELLULITIS OF LEFT UPPER EXTREMITY: Primary | ICD-10-CM

## 2020-03-04 DIAGNOSIS — L57.0 ACTINIC KERATOSIS: ICD-10-CM

## 2020-03-04 DIAGNOSIS — L40.9 PSORIASIS: ICD-10-CM

## 2020-03-04 PROCEDURE — 11900 INJECT SKIN LESIONS </W 7: CPT | Mod: 59 | Performed by: PHYSICIAN ASSISTANT

## 2020-03-04 PROCEDURE — 17000 DESTRUCT PREMALG LESION: CPT | Performed by: PHYSICIAN ASSISTANT

## 2020-03-04 PROCEDURE — 17003 DESTRUCT PREMALG LES 2-14: CPT | Performed by: PHYSICIAN ASSISTANT

## 2020-03-04 PROCEDURE — 99214 OFFICE O/P EST MOD 30 MIN: CPT | Mod: 25 | Performed by: PHYSICIAN ASSISTANT

## 2020-03-04 RX ORDER — DOXYCYCLINE 100 MG/1
100 CAPSULE ORAL 2 TIMES DAILY WITH MEALS
Qty: 20 CAPSULE | Refills: 0 | Status: SHIPPED | OUTPATIENT
Start: 2020-03-04 | End: 2020-06-18

## 2020-03-04 RX ORDER — CLOBETASOL PROPIONATE 0.05 G/100ML
SHAMPOO TOPICAL
Qty: 118 ML | Refills: 4 | Status: SHIPPED | OUTPATIENT
Start: 2020-03-04 | End: 2021-04-05

## 2020-03-04 NOTE — PATIENT INSTRUCTIONS
Start Doxycycline twice daily with food for next 10 days with cellulitis, please let me know if not improving within next 3 days.     Continue clobetasol shampoo 2-3 times a week.     Continue alternating between betamethasone and calcipotriene solution.   WOUND CARE INSTRUCTIONS   FOR CRYOSURGERY   This area treated with liquid nitrogen should form a blister (areas treated may or may not blister-skin may just turn dark and slough off). You do not need to bandage the area unless a blister forms and breaks (which may be a few days). When the blister breaks, begin daily dressing changes as follows:  1) Clean and dry the area with tap water using clean Q-tip or sterile gauze pad.   2) Apply Polysporin ointment or Bacitracin ointment over entire wound. Do NOT use Neosporin ointment.   3) Cover the wound with a band-aid or sterile non-stick gauze pad and micropore paper tape.   REPEAT THESE INSTRUCTIONS AT LEAST ONCE A DAY UNTIL THE WOUND HAS COMPLETELY HEALED.   It is an old wives tale that a wound heals better when it is exposed to air and allowed to dry out. The wound will heal faster with a better cosmetic result if it is kept moist with ointment and covered with a bandage.   Do not let the wound dry out.   IMPORTANT INFORMATION ON REVERSE SIDE   Supplies Needed:   *Cotton tipped applicators (Q-tips)   *Polysporin ointment or Bacitracin ointment (NOT NEOSPORIN)   *Band-aids, or non stick gauze pads and micropore paper tape   PATIENT INFORMATION   During the healing process you will notice a number of changes. All wounds develop a small halo of redness surrounding the wound. This means healing is occurring. Severe itching with extensive redness usually indicates sensitivity to the ointment or bandage tape used to dress the wound. You should call our office if this develops.   Swelling and/or discoloration around your surgical site is common, particularly when performed around the eye.   All wounds normally drain. The  larger the wound the more drainage there will be. After 7-10 days, you will notice the wound beginning to shrink and new skin will begin to grow. The wound is healed when you can see skin has formed over the entire area. A healed wound has a healthy, shiny look to the surface and is red to dark pink in color to normalize. Wounds may take approximately 4-6 weeks to heal. Larger wounds may take 6-8 weeks. After the wound is healed you may discontinue dressing changes.   You may experience a sensation of tightness as your wound heals. This is normal and will gradually subside.   Your healed wound may be sensitive to temperature changes. This sensitivity improves with time, but if you re having a lot of discomfort, try to avoid temperature extremes.   Patients frequently experience itching after their wound appears to have healed because of the continue healing under the skin. Plain Vaseline will help relieve the itching.

## 2020-03-04 NOTE — LETTER
3/4/2020         RE: Ingrid Amaral  [address] [facility] [id number]        Dear Colleague,    Thank you for referring your patient, Ingrid Amaral, to the [facility]. Please see a copy of my visit note below.    Ingrid Amaral is a 74 year old year old female patient here today for recheck psoriasis. She notes scalp is continuing to flare. She would like to repeat steroid injections. She has been alternating topical medications. She notes hand has become swollen after the rooster pecked her hand yesterday. She notes rough areas on right hand, no pain or bleeding. Patient has no other skin complaints today.  Remainder of the HPI, Meds, PMH, Allergies, FH, and SH was reviewed in chart.    Pertinent Hx:   Psoriasis  Past Medical History:   Diagnosis Date     Asthma      GERD (gastroesophageal reflux disease)      Radial head fracture 3/18/2010       Past Surgical History:   Procedure Laterality Date     ANKLE SURGERY      bilateral     COLONOSCOPY       COLONOSCOPY N/A 3/20/2015    Procedure: COLONOSCOPY;  Surgeon: Britney Martinez MD;  Location: [facility]     COLONOSCOPY N/A 5/22/2017    Procedure: COLONOSCOPY;  Colonoscopy;  Surgeon: Tristen Rangel MD;  Location: [facility]     ESOPHAGOSCOPY, GASTROSCOPY, DUODENOSCOPY (EGD), COMBINED N/A 6/7/2018    Procedure: COMBINED ESOPHAGOSCOPY, GASTROSCOPY, DUODENOSCOPY (EGD);  gastroscopy;  Surgeon: Tristen Rangel MD;  Location: [facility]     HC ESOPH/GAS REFLUX TEST W NASAL IMPED >1 HR  4/19/2012    Procedure:ESOPHAGEAL IMPEDENCE FUNCTION TEST WITH 24 HOUR PH GREATER THAN 1 HOUR; Surgeon:VALDO UMANZOR; Location: GI     HERNIA REPAIR      umbilcal     HYSTERECTOMY, PAP NO LONGER INDICATED  1991     TONSILLECTOMY       UPPER GI ENDOSCOPY          Family History   Problem Relation Age of Onset     Heart Disease Mother         valve problem     Hypertension Mother      Diabetes Mother         type 2     Cerebrovascular Disease Mother       Allergies Mother      Eye Disorder Mother         Macular degeneration     Osteoporosis Mother      Respiratory Mother      Migraines Mother      Cardiovascular Father         MI at age 80     Cancer - colorectal Father      Diabetes Father      Hypertension Father         type 2     Eye Disorder Father         macular degeneration     Lipids Father      Colon Cancer Father      C.A.D. Maternal Grandmother      Diabetes Paternal Grandmother         type 2     Cardiovascular Paternal Grandmother         MI     Diabetes Sister         type 2     Allergies Sister      Gastrointestinal Disease Sister      Depression Sister      Gastrointestinal Disease Daughter      Migraines Daughter      Gastrointestinal Disease Daughter      Migraines Daughter      Migraines Son      Aneurysm No family hx of      Brain Hemorrhage No family hx of      Brain Tumor No family hx of      Cancer No family hx of      Chiari Malformation No family hx of      LUNG DISEASE No family hx of      Seizure Disorder No family hx of        Social History     Socioeconomic History     Marital status:      Spouse name: Not on file     Number of children: Not on file     Years of education: Not on file     Highest education level: Not on file   Occupational History     Not on file   Social Needs     Financial resource strain: Not on file     Food insecurity:     Worry: Not on file     Inability: Not on file     Transportation needs:     Medical: Not on file     Non-medical: Not on file   Tobacco Use     Smoking status: Former Smoker     Packs/day: 0.50     Years: 3.00     Pack years: 1.50     Types: Cigarettes     Smokeless tobacco: Never Used     Tobacco comment: smoked for 3 years when she was around 19   Substance and Sexual Activity     Alcohol use: Yes     Comment: RARELY     Drug use: No     Sexual activity: Not Currently   Lifestyle     Physical activity:     Days per week: Not on file     Minutes per session: Not on file     Stress:  Not on file   Relationships     Social connections:     Talks on phone: Not on file     Gets together: Not on file     Attends Nondenominational service: Not on file     Active member of club or organization: Not on file     Attends meetings of clubs or organizations: Not on file     Relationship status: Not on file     Intimate partner violence:     Fear of current or ex partner: Not on file     Emotionally abused: Not on file     Physically abused: Not on file     Forced sexual activity: Not on file   Other Topics Concern     Parent/sibling w/ CABG, MI or angioplasty before 65F 55M? Yes     Comment: mother- valve problem   Social History Narrative     Not on file       Outpatient Encounter Medications as of 3/4/2020   Medication Sig Dispense Refill     albuterol (PROAIR HFA/PROVENTIL HFA/VENTOLIN HFA) 108 (90 Base) MCG/ACT inhaler Inhale 2 puffs into the lungs every 4 hours as needed for shortness of breath / dyspnea 1 Inhaler 1     albuterol (PROVENTIL) (5 MG/ML) 0.5% nebulizer solution Take 0.5 mLs (2.5 mg) by nebulization every 4 hours as needed for wheezing or shortness of breath / dyspnea 30 vial 1     amLODIPine (NORVASC) 2.5 MG tablet Take 1 tablet (2.5 mg) by mouth daily For blood pressure 90 tablet 0     amylase-lipase-protease (CREON) 6000 units CPEP TAKE TWO CAPSULES BY MOUTH THREE TIMES A DAY WITH MEALS FOR DIARRHEA 180 capsule 11     ascorbic acid 1000 MG TABS tablet Take 1,000 mg by mouth daily       augmented betamethasone dipropionate (DIPROLENE) 0.05 % external lotion Apply sparingly twice daily Friday through Sunday . 60 mL 5     augmented betamethasone dipropionate (DIPROLENE-AF) 0.05 % external cream Apply sparingly twice daily to areas of psoriasis Friday through Sunday. 50 g 4     biotin 1000 MCG TABS tablet Take 1,000 mcg by mouth 2 times daily       calcipotriene (DOVONOX) 0.005 % external cream Apply twice daily to psoriasis on Monday through Thursday. 60 g 8     calcipotriene (DOVONOX) 0.005 %  external solution Apply twice daily as needed to affected area on scalp Monday thru Thursday. 60 mL 4     Calcium-Magnesium-Zinc 333..33-5 MG TABS Take 1 tablet by mouth daily       cetirizine (ZYRTEC) 10 MG tablet Take 1 tablet (10 mg) by mouth every morning For allergies 90 tablet 3     clobetasol propionate (CLOBEX) 0.05 % external shampoo Leave on scalp for 15 minutes then rinse. Use 2-3 times weekly. 118 mL 4     donepezil (ARICEPT) 10 MG tablet TAKE ONE TABLET BY MOUTH ONCE DAILY AT BEDTIME FOR MEMORY 90 tablet 1     doxycycline monohydrate (MONODOX) 100 MG capsule Take 1 capsule (100 mg) by mouth 2 times daily (with meals) 20 capsule 0     fluocinonide (LIDEX) 0.05 % external solution Apply twice daily as needed to scalp.  Do not apply to face. Please keep upcoming appointment. 60 mL 0     fluticasone (FLONASE) 50 MCG/ACT nasal spray INSTILL ONE SPRAY IN EACH NOSTRIL EVERY DAY.  For allergies 16 g 1     fluticasone-salmeterol (ADVAIR) 250-50 MCG/DOSE inhaler INHALE ONE PUFF BY MOUTH TWICE A DAY FOR ASTHMA 3 Inhaler 3     gabapentin (NEURONTIN) 300 MG capsule Take 2 capsules (600 mg) by mouth 3 times daily 180 capsule 5     hydrocortisone 1 % cream Apply topically as needed       lactase (LACTAID) 9000 UNITS TABS Take 9,000 Units by mouth as needed       montelukast (SINGULAIR) 10 MG tablet Take 1 tablet (10 mg) by mouth daily For allergies and asthma 90 tablet 3     omeprazole (PRILOSEC) 10 MG DR capsule Take 1 capsule (10 mg) by mouth 2 times daily 60 capsule 11     omeprazole (PRILOSEC) 20 MG DR capsule TAKE ONE CAPSULE BY MOUTH TWICE A DAY FOR HEARTBURN 60 capsule 5     ORDER FOR DME Equipment being ordered: Nebulizer and tubing/filter 1 Device 0     polyethylene glycol (MIRALAX/GLYCOLAX) powder Take 17 g (1 capful) by mouth as needed for constipation 1 Bottle 3     sertraline (ZOLOFT) 100 MG tablet TAKE ONE TABLET BY MOUTH ONCE DAILY FOR MOOD 90 tablet 1     SKIN OILS EX Lavender-headache, skin,  peppermint-skin, upset stomach mix of oils, asthma mix of oils, eucalyptus. Tea tree oil-skin, vapor rub- chest tightness, sleepy time (vetiver, lavendaer, mrjoram, mandarin, ylang)- sleep, constipation, eczema (sweet almond oil). Also mixes with carriers: Coconut oil, [place] oil, Extra virgin oil. Frankincense- cough. Digize- oil. Purification- oil, lemon- oil. Essentialyme- pill.  Inhales a mix with olive oil almond and coconut oil with peppermint, and eucalyptus- sinus, allergies. Updated 12/1/2015.   Updated 4/26/2016: Lemon, Cinnamon bark, panaway, Thieves, Orange, [place], Copaiba       traZODone (DESYREL) 100 MG tablet Take 1 tablet (100 mg) by mouth At Bedtime 30 tablet 11     [DISCONTINUED] clobetasol propionate (CLOBEX) 0.05 % external shampoo Leave on scalp for 15 minutes then rinse. Use 2-3 times weekly. 118 mL 4     No facility-administered encounter medications on file as of 3/4/2020.              Review Of Systems  Skin: As above  Eyes: negative  Ears/Nose/Throat: negative  Respiratory: No shortness of breath, dyspnea on exertion, cough, or hemoptysis  Cardiovascular: negative  Gastrointestinal: negative  Genitourinary: negative  Musculoskeletal: negative  Neurologic: negative  Psychiatric: negative  Hematologic/Lymphatic/Immunologic: negative  Endocrine: negative      O:   NAD, WDWN, Alert & Oriented, Mood & Affect wnl, Vitals stable   Here today alone   /71   Pulse 68   SpO2 98%    General appearance normal   Vitals stable   Alert, oriented and in no acute distress       Psoriasiform plaque on crown of scalp     Gritty papule on right hand x 2    Erythematous warm to touch, slightly edema left hand with two scabs       Eyes: Conjunctivae/lids:Normal     ENT: Lips: normal    MSK:Normal    Pulm: Breathing Normal    Neuro/Psych: Orientation:Alert and Orientedx3 ; Mood/Affect:normal   A/P:  1. Psoriasis   IL TAC: PGACAC discussed.  Risks including but not limited to injection site reaction,  bruising, no resolution.  All questions answered and entertained to patient s satisfaction.  Informed consent obtained.  IL TAC in concentration of 10mg/ml was injected ID to psoriasis on scalp.  Total injected was  1 ml.  Patient tolerated without complications and given wound care instructions, including not to move product around.  Return in 4 weeks for follow-up and possible additional IL TAC.   Continue clobetasol shampoo.   Apply calcipotriene solution to scalp Monday through Thursday  Apply betamethasone lotion to scalp Friday through Sunday,  Recheck in 6-8 weeks.      2. Actinic keratosis x 2 on right hand   LN2:  Treated with LN2 for 5s for 1-2 cycles. Warned risks of blistering, pain, pigment change, scarring, and incomplete resolution.  Advised patient to return if lesions do not completely resolve.  Wound care sheet given.    3. Cellulitis on left hand after her rooster caused wounds    Start doxycycline 100 mg twice daily with food. If not improving in 2 days follow-up in ED or urgent care.     Again, thank you for allowing me to participate in the care of your patient.        Sincerely,        Miguelina Lion PA-C

## 2020-03-05 NOTE — PROGRESS NOTES
Ingrid Amaral is a 74 year old year old female patient here today for recheck psoriasis. She notes scalp is continuing to flare. She would like to repeat steroid injections. She has been alternating topical medications. She notes hand has become swollen after the rooster pecked her hand yesterday. She notes rough areas on right hand, no pain or bleeding. Patient has no other skin complaints today.  Remainder of the HPI, Meds, PMH, Allergies, FH, and SH was reviewed in chart.    Pertinent Hx:   Psoriasis  Past Medical History:   Diagnosis Date     Asthma      GERD (gastroesophageal reflux disease)      Radial head fracture 3/18/2010       Past Surgical History:   Procedure Laterality Date     ANKLE SURGERY      bilateral     COLONOSCOPY       COLONOSCOPY N/A 3/20/2015    Procedure: COLONOSCOPY;  Surgeon: Britney Martinez MD;  Location: [facility]     COLONOSCOPY N/A 5/22/2017    Procedure: COLONOSCOPY;  Colonoscopy;  Surgeon: Tristen Rangel MD;  Location: [facility]     ESOPHAGOSCOPY, GASTROSCOPY, DUODENOSCOPY (EGD), COMBINED N/A 6/7/2018    Procedure: COMBINED ESOPHAGOSCOPY, GASTROSCOPY, DUODENOSCOPY (EGD);  gastroscopy;  Surgeon: Tristen Rangel MD;  Location: [facility]     HC ESOPH/GAS REFLUX TEST W NASAL IMPED >1 HR  4/19/2012    Procedure:ESOPHAGEAL IMPEDENCE FUNCTION TEST WITH 24 HOUR PH GREATER THAN 1 HOUR; Surgeon:VALDO UMANZOR; Location: GI     HERNIA REPAIR      umbilcal     HYSTERECTOMY, PAP NO LONGER INDICATED  1991     TONSILLECTOMY       UPPER GI ENDOSCOPY          Family History   Problem Relation Age of Onset     Heart Disease Mother         valve problem     Hypertension Mother      Diabetes Mother         type 2     Cerebrovascular Disease Mother      Allergies Mother      Eye Disorder Mother         Macular degeneration     Osteoporosis Mother      Respiratory Mother      Migraines Mother      Cardiovascular Father         MI at age 80     Cancer - colorectal Father      Diabetes Father       Hypertension Father         type 2     Eye Disorder Father         macular degeneration     Lipids Father      Colon Cancer Father      C.A.D. Maternal Grandmother      Diabetes Paternal Grandmother         type 2     Cardiovascular Paternal Grandmother         MI     Diabetes Sister         type 2     Allergies Sister      Gastrointestinal Disease Sister      Depression Sister      Gastrointestinal Disease Daughter      Migraines Daughter      Gastrointestinal Disease Daughter      Migraines Daughter      Migraines Son      Aneurysm No family hx of      Brain Hemorrhage No family hx of      Brain Tumor No family hx of      Cancer No family hx of      Chiari Malformation No family hx of      LUNG DISEASE No family hx of      Seizure Disorder No family hx of        Social History     Socioeconomic History     Marital status:      Spouse name: Not on file     Number of children: Not on file     Years of education: Not on file     Highest education level: Not on file   Occupational History     Not on file   Social Needs     Financial resource strain: Not on file     Food insecurity:     Worry: Not on file     Inability: Not on file     Transportation needs:     Medical: Not on file     Non-medical: Not on file   Tobacco Use     Smoking status: Former Smoker     Packs/day: 0.50     Years: 3.00     Pack years: 1.50     Types: Cigarettes     Smokeless tobacco: Never Used     Tobacco comment: smoked for 3 years when she was around 19   Substance and Sexual Activity     Alcohol use: Yes     Comment: RARELY     Drug use: No     Sexual activity: Not Currently   Lifestyle     Physical activity:     Days per week: Not on file     Minutes per session: Not on file     Stress: Not on file   Relationships     Social connections:     Talks on phone: Not on file     Gets together: Not on file     Attends Synagogue service: Not on file     Active member of club or organization: Not on file     Attends meetings of clubs or  organizations: Not on file     Relationship status: Not on file     Intimate partner violence:     Fear of current or ex partner: Not on file     Emotionally abused: Not on file     Physically abused: Not on file     Forced sexual activity: Not on file   Other Topics Concern     Parent/sibling w/ CABG, MI or angioplasty before 65F 55M? Yes     Comment: mother- valve problem   Social History Narrative     Not on file       Outpatient Encounter Medications as of 3/4/2020   Medication Sig Dispense Refill     albuterol (PROAIR HFA/PROVENTIL HFA/VENTOLIN HFA) 108 (90 Base) MCG/ACT inhaler Inhale 2 puffs into the lungs every 4 hours as needed for shortness of breath / dyspnea 1 Inhaler 1     albuterol (PROVENTIL) (5 MG/ML) 0.5% nebulizer solution Take 0.5 mLs (2.5 mg) by nebulization every 4 hours as needed for wheezing or shortness of breath / dyspnea 30 vial 1     amLODIPine (NORVASC) 2.5 MG tablet Take 1 tablet (2.5 mg) by mouth daily For blood pressure 90 tablet 0     amylase-lipase-protease (CREON) 6000 units CPEP TAKE TWO CAPSULES BY MOUTH THREE TIMES A DAY WITH MEALS FOR DIARRHEA 180 capsule 11     ascorbic acid 1000 MG TABS tablet Take 1,000 mg by mouth daily       augmented betamethasone dipropionate (DIPROLENE) 0.05 % external lotion Apply sparingly twice daily Friday through Sunday . 60 mL 5     augmented betamethasone dipropionate (DIPROLENE-AF) 0.05 % external cream Apply sparingly twice daily to areas of psoriasis Friday through Sunday. 50 g 4     biotin 1000 MCG TABS tablet Take 1,000 mcg by mouth 2 times daily       calcipotriene (DOVONOX) 0.005 % external cream Apply twice daily to psoriasis on Monday through Thursday. 60 g 8     calcipotriene (DOVONOX) 0.005 % external solution Apply twice daily as needed to affected area on scalp Monday thru Thursday. 60 mL 4     Calcium-Magnesium-Zinc 333..33-5 MG TABS Take 1 tablet by mouth daily       cetirizine (ZYRTEC) 10 MG tablet Take 1 tablet (10 mg) by  mouth every morning For allergies 90 tablet 3     clobetasol propionate (CLOBEX) 0.05 % external shampoo Leave on scalp for 15 minutes then rinse. Use 2-3 times weekly. 118 mL 4     donepezil (ARICEPT) 10 MG tablet TAKE ONE TABLET BY MOUTH ONCE DAILY AT BEDTIME FOR MEMORY 90 tablet 1     doxycycline monohydrate (MONODOX) 100 MG capsule Take 1 capsule (100 mg) by mouth 2 times daily (with meals) 20 capsule 0     fluocinonide (LIDEX) 0.05 % external solution Apply twice daily as needed to scalp.  Do not apply to face. Please keep upcoming appointment. 60 mL 0     fluticasone (FLONASE) 50 MCG/ACT nasal spray INSTILL ONE SPRAY IN EACH NOSTRIL EVERY DAY.  For allergies 16 g 1     fluticasone-salmeterol (ADVAIR) 250-50 MCG/DOSE inhaler INHALE ONE PUFF BY MOUTH TWICE A DAY FOR ASTHMA 3 Inhaler 3     gabapentin (NEURONTIN) 300 MG capsule Take 2 capsules (600 mg) by mouth 3 times daily 180 capsule 5     hydrocortisone 1 % cream Apply topically as needed       lactase (LACTAID) 9000 UNITS TABS Take 9,000 Units by mouth as needed       montelukast (SINGULAIR) 10 MG tablet Take 1 tablet (10 mg) by mouth daily For allergies and asthma 90 tablet 3     omeprazole (PRILOSEC) 10 MG DR capsule Take 1 capsule (10 mg) by mouth 2 times daily 60 capsule 11     omeprazole (PRILOSEC) 20 MG DR capsule TAKE ONE CAPSULE BY MOUTH TWICE A DAY FOR HEARTBURN 60 capsule 5     ORDER FOR DME Equipment being ordered: Nebulizer and tubing/filter 1 Device 0     polyethylene glycol (MIRALAX/GLYCOLAX) powder Take 17 g (1 capful) by mouth as needed for constipation 1 Bottle 3     sertraline (ZOLOFT) 100 MG tablet TAKE ONE TABLET BY MOUTH ONCE DAILY FOR MOOD 90 tablet 1     SKIN OILS EX Lavender-headache, skin, peppermint-skin, upset stomach mix of oils, asthma mix of oils, eucalyptus. Tea tree oil-skin, vapor rub- chest tightness, sleepy time (vetiver, lavendaer, mrjoram, mandarin, ylang)- sleep, constipation, eczema (sweet almond oil). Also mixes with  carriers: Coconut oil, Hibbs oil, Extra virgin oil. Frankincense- cough. Digize- oil. Purification- oil, lemon- oil. Essentialyme- pill.  Inhales a mix with olive oil almond and coconut oil with peppermint, and eucalyptus- sinus, allergies. Updated 12/1/2015.   Updated 4/26/2016: Lemon, Cinnamon bark, panaway, Thieves, Orange, [place], Copaiba       traZODone (DESYREL) 100 MG tablet Take 1 tablet (100 mg) by mouth At Bedtime 30 tablet 11     [DISCONTINUED] clobetasol propionate (CLOBEX) 0.05 % external shampoo Leave on scalp for 15 minutes then rinse. Use 2-3 times weekly. 118 mL 4     No facility-administered encounter medications on file as of 3/4/2020.              Review Of Systems  Skin: As above  Eyes: negative  Ears/Nose/Throat: negative  Respiratory: No shortness of breath, dyspnea on exertion, cough, or hemoptysis  Cardiovascular: negative  Gastrointestinal: negative  Genitourinary: negative  Musculoskeletal: negative  Neurologic: negative  Psychiatric: negative  Hematologic/Lymphatic/Immunologic: negative  Endocrine: negative      O:   NAD, WDWN, Alert & Oriented, Mood & Affect wnl, Vitals stable   Here today alone   /71   Pulse 68   SpO2 98%    General appearance normal   Vitals stable   Alert, oriented and in no acute distress       Psoriasiform plaque on crown of scalp     Gritty papule on right hand x 2    Erythematous warm to touch, slightly edema left hand with two scabs       Eyes: Conjunctivae/lids:Normal     ENT: Lips: normal    MSK:Normal    Pulm: Breathing Normal    Neuro/Psych: Orientation:Alert and Orientedx3 ; Mood/Affect:normal   A/P:  1. Psoriasis   IL TAC: PGACAC discussed.  Risks including but not limited to injection site reaction, bruising, no resolution.  All questions answered and entertained to patient s satisfaction.  Informed consent obtained.  IL TAC in concentration of 10mg/ml was injected ID to psoriasis on scalp.  Total injected was  1 ml.  Patient tolerated  without complications and given wound care instructions, including not to move product around.  Return in 4 weeks for follow-up and possible additional IL TAC.   Continue clobetasol shampoo.   Apply calcipotriene solution to scalp Monday through Thursday  Apply betamethasone lotion to scalp Friday through Sunday,  Recheck in 6-8 weeks.      2. Actinic keratosis x 2 on right hand   LN2:  Treated with LN2 for 5s for 1-2 cycles. Warned risks of blistering, pain, pigment change, scarring, and incomplete resolution.  Advised patient to return if lesions do not completely resolve.  Wound care sheet given.    3. Cellulitis on left hand after her rooster caused wounds    Start doxycycline 100 mg twice daily with food. If not improving in 2 days follow-up in ED or urgent care.

## 2020-03-11 ENCOUNTER — HOSPITAL ENCOUNTER (OUTPATIENT)
Dept: MAMMOGRAPHY | Facility: CLINIC | Age: 75
Discharge: HOME | End: 2020-03-11
Attending: PHYSICIAN ASSISTANT | Admitting: PHYSICIAN ASSISTANT
Payer: MEDICARE

## 2020-03-11 DIAGNOSIS — Z12.31 VISIT FOR SCREENING MAMMOGRAM: ICD-10-CM

## 2020-03-11 PROCEDURE — 77063 BREAST TOMOSYNTHESIS BI: CPT

## 2020-03-31 DIAGNOSIS — J45.40 MODERATE PERSISTENT ASTHMA WITHOUT COMPLICATION: ICD-10-CM

## 2020-03-31 NOTE — TELEPHONE ENCOUNTER
"Ingrid says she had gotten 3 inhalers the last time she filled. She has used 2 and can't find the 3rd one.  Please send new Rx.    Requested Prescriptions   Pending Prescriptions Disp Refills     fluticasone-salmeterol (ADVAIR) 250-50 MCG/DOSE inhaler 3 Inhaler 3     Sig: INHALE ONE PUFF BY MOUTH TWICE A DAY FOR ASTHMA       Long-Acting Beta Agonist Inhalers Protocol  Failed - 3/31/2020  2:48 PM        Failed - Order for Serevent, Striverdi, or Foradil and pt has steroid inhaler        Passed - Patient is age 12 or older        Passed - Asthma control assessment score within normal limits in last 6 months     Please review ACT score.           Passed - Medication is active on med list        Passed - Recent (6 mo) or future (30 days) visit within the authorizing provider's specialty     Patient had office visit in the last 6 months or has a visit in the next 30 days with authorizing provider or within the authorizing provider's specialty.  See \"Patient Info\" tab in inbasket, or \"Choose Columns\" in Meds & Orders section of the refill encounter.           Inhaled Steroids Protocol Passed - 3/31/2020  2:48 PM        Passed - Patient is age 12 or older        Passed - Asthma control assessment score within normal limits in last 6 months     Please review ACT score.           Passed - Medication is active on med list        Passed - Recent (6 mo) or future (30 days) visit within the authorizing provider's specialty     Patient had office visit in the last 6 months or has a visit in the next 30 days with authorizing provider or within the authorizing provider's specialty.  See \"Patient Info\" tab in inbasket, or \"Choose Columns\" in Meds & Orders section of the refill encounter.               Last Written Prescription Date:  11/4/19  Last Fill Quantity: 3,  # refills: 3   Last office visit: 3/2/2020 with prescribing provider:  Antonino   Future Office Visit:        "

## 2020-05-01 DIAGNOSIS — J45.40 MODERATE PERSISTENT ASTHMA WITHOUT COMPLICATION: ICD-10-CM

## 2020-05-01 DIAGNOSIS — J31.0 CHRONIC RHINITIS: ICD-10-CM

## 2020-05-01 RX ORDER — MONTELUKAST SODIUM 10 MG/1
TABLET ORAL
Qty: 90 TABLET | Refills: 3 | Status: SHIPPED | OUTPATIENT
Start: 2020-05-01 | End: 2021-04-19

## 2020-05-01 RX ORDER — CETIRIZINE HYDROCHLORIDE 10 MG/1
TABLET ORAL
Qty: 90 TABLET | Refills: 3 | Status: SHIPPED | OUTPATIENT
Start: 2020-05-01 | End: 2021-04-19

## 2020-05-04 DIAGNOSIS — J45.40 MODERATE PERSISTENT ASTHMA WITHOUT COMPLICATION: ICD-10-CM

## 2020-06-08 DIAGNOSIS — I10 BENIGN ESSENTIAL HYPERTENSION: ICD-10-CM

## 2020-06-08 RX ORDER — AMLODIPINE BESYLATE 2.5 MG/1
TABLET ORAL
Qty: 90 TABLET | Refills: 0 | Status: SHIPPED | OUTPATIENT
Start: 2020-06-08 | End: 2020-09-25

## 2020-06-18 ENCOUNTER — OFFICE VISIT (OUTPATIENT)
Dept: FAMILY MEDICINE | Facility: CLINIC | Age: 75
End: 2020-06-18
Payer: MEDICARE

## 2020-06-18 VITALS
OXYGEN SATURATION: 98 % | HEIGHT: 62 IN | RESPIRATION RATE: 14 BRPM | TEMPERATURE: 98.3 F | DIASTOLIC BLOOD PRESSURE: 74 MMHG | BODY MASS INDEX: 20.8 KG/M2 | WEIGHT: 113 LBS | HEART RATE: 66 BPM | SYSTOLIC BLOOD PRESSURE: 118 MMHG

## 2020-06-18 DIAGNOSIS — G47.00 INSOMNIA, UNSPECIFIED TYPE: ICD-10-CM

## 2020-06-18 DIAGNOSIS — K21.9 GASTROESOPHAGEAL REFLUX DISEASE WITHOUT ESOPHAGITIS: ICD-10-CM

## 2020-06-18 DIAGNOSIS — R68.89 TEMPERATURE INTOLERANCE: Primary | ICD-10-CM

## 2020-06-18 DIAGNOSIS — E53.8 VITAMIN B12 DEFICIENCY (NON ANEMIC): ICD-10-CM

## 2020-06-18 DIAGNOSIS — F02.80 LEWY BODY DEMENTIA WITHOUT BEHAVIORAL DISTURBANCE (H): ICD-10-CM

## 2020-06-18 DIAGNOSIS — R63.4 WEIGHT LOSS: ICD-10-CM

## 2020-06-18 DIAGNOSIS — G47.52 RBD (REM BEHAVIORAL DISORDER): ICD-10-CM

## 2020-06-18 DIAGNOSIS — G89.29 CHRONIC RIGHT-SIDED LOW BACK PAIN WITH RIGHT-SIDED SCIATICA: ICD-10-CM

## 2020-06-18 DIAGNOSIS — L60.3 NAIL DYSTROPHY: ICD-10-CM

## 2020-06-18 DIAGNOSIS — F32.0 MILD MAJOR DEPRESSION (H): ICD-10-CM

## 2020-06-18 DIAGNOSIS — G31.83 LEWY BODY DEMENTIA WITHOUT BEHAVIORAL DISTURBANCE (H): ICD-10-CM

## 2020-06-18 DIAGNOSIS — E73.9 LACTOSE INTOLERANCE: ICD-10-CM

## 2020-06-18 DIAGNOSIS — R26.89 POOR BALANCE: ICD-10-CM

## 2020-06-18 DIAGNOSIS — F41.0 PANIC DISORDER WITHOUT AGORAPHOBIA: ICD-10-CM

## 2020-06-18 DIAGNOSIS — M54.41 CHRONIC RIGHT-SIDED LOW BACK PAIN WITH RIGHT-SIDED SCIATICA: ICD-10-CM

## 2020-06-18 LAB
ALBUMIN SERPL-MCNC: 3.5 G/DL (ref 3.4–5)
ALP SERPL-CCNC: 96 U/L (ref 40–150)
ALT SERPL W P-5'-P-CCNC: 23 U/L (ref 0–50)
ANION GAP SERPL CALCULATED.3IONS-SCNC: 4 MMOL/L (ref 3–14)
AST SERPL W P-5'-P-CCNC: 17 U/L (ref 0–45)
BILIRUB SERPL-MCNC: 0.1 MG/DL (ref 0.2–1.3)
BUN SERPL-MCNC: 13 MG/DL (ref 7–30)
CALCIUM SERPL-MCNC: 8.4 MG/DL (ref 8.5–10.1)
CHLORIDE SERPL-SCNC: 106 MMOL/L (ref 94–109)
CO2 SERPL-SCNC: 29 MMOL/L (ref 20–32)
CREAT SERPL-MCNC: 0.62 MG/DL (ref 0.52–1.04)
ERYTHROCYTE [DISTWIDTH] IN BLOOD BY AUTOMATED COUNT: 12.8 % (ref 10–15)
GFR SERPL CREATININE-BSD FRML MDRD: 88 ML/MIN/{1.73_M2}
GLUCOSE SERPL-MCNC: 116 MG/DL (ref 70–99)
HCT VFR BLD AUTO: 37.5 % (ref 35–47)
HGB BLD-MCNC: 12.1 G/DL (ref 11.7–15.7)
MCH RBC QN AUTO: 30.8 PG (ref 26.5–33)
MCHC RBC AUTO-ENTMCNC: 32.3 G/DL (ref 31.5–36.5)
MCV RBC AUTO: 95 FL (ref 78–100)
PLATELET # BLD AUTO: 266 10E9/L (ref 150–450)
POTASSIUM SERPL-SCNC: 3.8 MMOL/L (ref 3.4–5.3)
PROT SERPL-MCNC: 6.5 G/DL (ref 6.8–8.8)
RBC # BLD AUTO: 3.93 10E12/L (ref 3.8–5.2)
SODIUM SERPL-SCNC: 139 MMOL/L (ref 133–144)
TSH SERPL DL<=0.005 MIU/L-ACNC: 1.41 MU/L (ref 0.4–4)
VIT B12 SERPL-MCNC: 162 PG/ML (ref 193–986)
WBC # BLD AUTO: 4.8 10E9/L (ref 4–11)

## 2020-06-18 PROCEDURE — 82607 VITAMIN B-12: CPT | Performed by: PHYSICIAN ASSISTANT

## 2020-06-18 PROCEDURE — 87107 FUNGI IDENTIFICATION MOLD: CPT | Performed by: PHYSICIAN ASSISTANT

## 2020-06-18 PROCEDURE — 85027 COMPLETE CBC AUTOMATED: CPT | Performed by: PHYSICIAN ASSISTANT

## 2020-06-18 PROCEDURE — 87101 SKIN FUNGI CULTURE: CPT | Performed by: PHYSICIAN ASSISTANT

## 2020-06-18 PROCEDURE — 80053 COMPREHEN METABOLIC PANEL: CPT | Performed by: PHYSICIAN ASSISTANT

## 2020-06-18 PROCEDURE — 36415 COLL VENOUS BLD VENIPUNCTURE: CPT | Performed by: PHYSICIAN ASSISTANT

## 2020-06-18 PROCEDURE — 99214 OFFICE O/P EST MOD 30 MIN: CPT | Performed by: PHYSICIAN ASSISTANT

## 2020-06-18 PROCEDURE — 84443 ASSAY THYROID STIM HORMONE: CPT | Performed by: PHYSICIAN ASSISTANT

## 2020-06-18 RX ORDER — OMEPRAZOLE 10 MG/1
10 CAPSULE, DELAYED RELEASE ORAL DAILY
Qty: 30 CAPSULE | Refills: 11 | Status: SHIPPED | OUTPATIENT
Start: 2020-06-18 | End: 2021-08-27

## 2020-06-18 RX ORDER — PAROXETINE 10 MG/1
10 TABLET, FILM COATED ORAL DAILY
Qty: 30 TABLET | Refills: 0 | Status: SHIPPED | OUTPATIENT
Start: 2020-06-18 | End: 2020-07-20

## 2020-06-18 RX ORDER — TRAZODONE HYDROCHLORIDE 100 MG/1
100 TABLET ORAL AT BEDTIME
Qty: 30 TABLET | Refills: 11 | Status: SHIPPED | OUTPATIENT
Start: 2020-06-18 | End: 2021-08-06

## 2020-06-18 RX ORDER — GABAPENTIN 300 MG/1
600 CAPSULE ORAL 3 TIMES DAILY
Qty: 180 CAPSULE | Refills: 5 | Status: SHIPPED | OUTPATIENT
Start: 2020-06-18 | End: 2021-08-25

## 2020-06-18 ASSESSMENT — MIFFLIN-ST. JEOR: SCORE: 960.81

## 2020-06-18 NOTE — PATIENT INSTRUCTIONS
Hot flashes -  Doubt related to stopping vaginal estrogen  Labs today   Try adding paroxetine   Recheck 1 mo    Heartburn -  Doing well  Try decreasing to 10 mg once daily    Balance -  Lab  Physical Therapy   Neurology recheck, can be related to your type of dementia  May also be related to the clonazepam, but will try Physical Therapy first    Skin issue on leg -  Try hydrocortisone 1%, 1-2 times daily, and daily lotion    Toe nails -  Nail culture to check for fungus - can take up to 3 wks  If no nail fungus -   For foot care -    [facility]-  Sheryl Escudero  [phone]   OR  [facility]  [phone]  [facility] -   Saray Hsieh

## 2020-06-18 NOTE — NURSING NOTE
"Chief Complaint   Patient presents with     Derm Problem     Toenail     Balance/ Vestibular       Initial /74 (BP Location: Right arm, Patient Position: Chair, Cuff Size: Adult Regular)   Pulse 66   Temp 98.3  F (36.8  C) (Tympanic)   Resp 14   Ht 1.575 m (5' 2\")   Wt 51.3 kg (113 lb)   SpO2 98%   BMI 20.67 kg/m   Estimated body mass index is 20.67 kg/m  as calculated from the following:    Height as of this encounter: 1.575 m (5' 2\").    Weight as of this encounter: 51.3 kg (113 lb).    Patient presents to the clinic using No DME    Health Maintenance that is potentially due pending provider review:  NONE        Is there anyone who you would like to be able to receive your results? No  If yes have patient fill out BROOKE      "

## 2020-06-18 NOTE — PROGRESS NOTES
"Subjective     Ingrid Amaral is a 74 year old female who presents to clinic today for the following health issues:    HPI       * Has been having hot flashes and cold chills, not sweats and no fever.  Down 10 pounds since Nov.  Not intentional per Ingrid but  points out they're now eating meals from [employer], much smaller portions, than when they would eat out or cook for themselves.   has been losing wt too.      * Toenails, thick and hard to cut    * Balance- Not doing so well  lewy body dementia, no known parkinsonism.  Trouble on uneven ground    * Derm Problem-  Lower right leg has a rash, just a little on the left leg. Has been dealing with it off/on for the past 15 years.  Psoriasis in on scalp.    Heartburn - doing well with decreased ppi.    BP Readings from Last 3 Encounters:   06/18/20 118/74   03/04/20 110/71   03/02/20 115/71    Wt Readings from Last 3 Encounters:   06/18/20 51.3 kg (113 lb)   03/02/20 53.5 kg (118 lb)   11/04/19 55.8 kg (123 lb)           Reviewed and updated as needed this visit by Provider  Tobacco  Allergies  Meds  Problems  Med Hx  Surg Hx  Fam Hx         Review of Systems   Constitutional, HEENT, cardiovascular, pulmonary, GI, , musculoskeletal, neuro, skin, endocrine and psych systems are negative, except as otherwise noted.      Objective    /74 (BP Location: Right arm, Patient Position: Chair, Cuff Size: Adult Regular)   Pulse 66   Temp 98.3  F (36.8  C) (Tympanic)   Resp 14   Ht 1.575 m (5' 2\")   Wt 51.3 kg (113 lb)   SpO2 98%   BMI 20.67 kg/m    Body mass index is 20.67 kg/m .  Physical Exam   GENERAL: healthy, alert and no distress  EYES: Eyes grossly normal to inspection, PERRL and conjunctivae and sclerae normal  HENT: ear canals and TM's normal, nose and mouth without ulcers or lesions  NECK: no adenopathy, no asymmetry, masses, or scars and thyroid normal to palpation  RESP: lungs clear to auscultation - no rales, rhonchi or " wheezes  CV: regular rate and rhythm, normal S1 S2, no S3 or S4, no murmur, click or rub, no peripheral edema and peripheral pulses strong  ABDOMEN: soft, nontender, no hepatosplenomegaly, no masses and bowel sounds normal  MS: no gross musculoskeletal defects noted, no edema  SKIN: no suspicious lesions or rashes, nails thickened and discolored  NEURO: grossly normal strength and tone, mentation intact and speech normal, memory impaired  PSYCH: mentation appears normal, affect normal/bright    Diagnostic Test Results:  Labs reviewed in Epic        Assessment & Plan       ICD-10-CM    1. Temperature intolerance  R68.89 PARoxetine (PAXIL) 10 MG tablet     CBC with platelets     TSH with free T4 reflex     Comprehensive metabolic panel (BMP + Alb, Alk Phos, ALT, AST, Total. Bili, TP)   2. Weight loss  R63.4 CBC with platelets     TSH with free T4 reflex   3. Lewy body dementia without behavioral disturbance (H)  G31.83 PHYSICAL THERAPY REFERRAL    F02.80    4. Poor balance  R26.89 Vitamin B12     PHYSICAL THERAPY REFERRAL   5. Nail dystrophy  L60.3 Fungus skin hair nail culture   6. Lactose intolerance  E73.9 lactase (LACTAID) 9000 units TABS tablet   7. Chronic right-sided low back pain with right-sided sciatica  M54.41 gabapentin (NEURONTIN) 300 MG capsule    G89.29    8. Gastroesophageal reflux disease without esophagitis  K21.9 omeprazole (PRILOSEC) 10 MG DR capsule   9. Insomnia, unspecified type  G47.00 traZODone (DESYREL) 100 MG tablet   10. Mild major depression (H)  F32.0 traZODone (DESYREL) 100 MG tablet   11. Panic disorder without agoraphobia  F41.0 traZODone (DESYREL) 100 MG tablet   12. RBD (REM behavioral disorder)  G47.52 traZODone (DESYREL) 100 MG tablet     PHYSICAL THERAPY REFERRAL       Patient Instructions   Hot flashes -  Doubt related to stopping vaginal estrogen  Labs today   Try adding paroxetine   Recheck 1 mo    Heartburn -  Doing well  Try decreasing to 10 mg once daily    Balance  -  Lab  Physical Therapy   Neurology recheck, can be related to your type of dementia  May also be related to the clonazepam, but will try Physical Therapy first    Skin issue on leg -  Try hydrocortisone 1%, 1-2 times daily, and daily lotion    Toe nails -  Nail culture to check for fungus - can take up to 3 wks  If no nail fungus -   For foot care -    [facility]-  Sheryl Escudero  [phone]   OR  [facility]  [phone]  [facility] -   Saray Hsieh                 Return in about 1 month (around 7/18/2020).    Leesa Muñiz PA-C  [facility]

## 2020-06-19 DIAGNOSIS — R89.9 ABNORMAL LABORATORY TEST RESULT: Primary | ICD-10-CM

## 2020-06-19 PROBLEM — E53.8 VITAMIN B12 DEFICIENCY (NON ANEMIC): Status: ACTIVE | Noted: 2020-06-19

## 2020-06-19 RX ORDER — UBIDECARENONE 75 MG
100 CAPSULE ORAL DAILY
Qty: 60 TABLET | Refills: 0 | Status: SHIPPED | OUTPATIENT
Start: 2020-06-19 | End: 2020-07-25

## 2020-06-19 NOTE — RESULT ENCOUNTER NOTE
CMA - future cmp please, diagnosis abn lab results or low protein.    Ingrid,    B12 level is low.  This can contribute to poor balance, so treating it may improve balance somewhat.  I would still recommend Physical Therapy for balance.  We'll treat the low B12 with pills.  I sent a prescription.  We can recheck a lab for this in 1-2 months.  Other labs overall look good.  We'll repeat one of the labs in 3 months to be sure of no concerns.    Leesa

## 2020-06-22 ENCOUNTER — TELEPHONE (OUTPATIENT)
Dept: PHARMACY | Facility: CLINIC | Age: 75
End: 2020-06-22

## 2020-06-22 NOTE — TELEPHONE ENCOUNTER
This patient is due for MTM follow-up. I called the patient to schedule an appointment. Pt/ state that they would prefer to meet in-person if able.  MTM will touch base again this fall if appropriate.    Pepe Mo, StuartD, [facility]  Medication Therapy Management Pharmacist  Pager: [phone]

## 2020-06-30 ENCOUNTER — HOSPITAL ENCOUNTER (OUTPATIENT)
Dept: PHYSICAL THERAPY | Facility: CLINIC | Age: 75
Setting detail: THERAPIES SERIES
End: 2020-06-30
Attending: PHYSICIAN ASSISTANT
Payer: MEDICARE

## 2020-06-30 DIAGNOSIS — G31.83 LEWY BODY DEMENTIA WITHOUT BEHAVIORAL DISTURBANCE (H): ICD-10-CM

## 2020-06-30 DIAGNOSIS — G47.52 RBD (REM BEHAVIORAL DISORDER): ICD-10-CM

## 2020-06-30 DIAGNOSIS — R26.89 POOR BALANCE: ICD-10-CM

## 2020-06-30 DIAGNOSIS — F02.80 LEWY BODY DEMENTIA WITHOUT BEHAVIORAL DISTURBANCE (H): ICD-10-CM

## 2020-06-30 PROCEDURE — 97161 PT EVAL LOW COMPLEX 20 MIN: CPT | Mod: GP | Performed by: PHYSICAL THERAPIST

## 2020-06-30 PROCEDURE — 97110 THERAPEUTIC EXERCISES: CPT | Mod: GP | Performed by: PHYSICAL THERAPIST

## 2020-06-30 NOTE — PROGRESS NOTES
[facility]        OUTPATIENT PHYSICAL THERAPY FUNCTIONAL EVALUATION  PLAN OF TREATMENT FOR OUTPATIENT REHABILITATION  (COMPLETE FOR INITIAL CLAIMS ONLY)  Patient's Last Name, First Name, M.I.  YOB: 1945  Ingrid Amaral     Provider's Name   [facility]   Medical Record No.  [id number]     Start of Care Date:  06/30/20   Onset Date:  06/18/20   Type:     _X__PT   ____OT  ____SLP Medical Diagnosis:  Balance, RBD, Lewy Body Dementia     PT Diagnosis:  impaired balance Visits from SOC:  1                              __________________________________________________________________________________  Plan of Treatment/Functional Goals:  balance training, gait training, neuromuscular re-education, motor coordination training, strengthening, ROM, stretching, transfer training           GOALS  1  Patient will improve FGA to >/= 24/30 for improved dynamic balance   08/11/20    2  Patient will be independant with HEP for long term self management  08/11/20    3  Patient will be able to ascend and descend 12 stairs with use of 1 railing reciprical pattern to more safely access all levels of home   08/11/20       Therapy Frequency:  1 time/week   Predicted Duration of Therapy Intervention:  6 weeks    Caron Rodriguez, PT                                    I CERTIFY THE NEED FOR THESE SERVICES FURNISHED UNDER        THIS PLAN OF TREATMENT AND WHILE UNDER MY CARE     (Physician co-signature of this document indicates review and certification of the therapy plan).                Certification Date From:  06/30/20   Certification Date To:  08/25/20    Referring Provider:  Leesa Muñiz PA-C    Initial Assessment  See Epic Evaluation- Start of Care Date: 06/30/20

## 2020-06-30 NOTE — PROGRESS NOTES
06/30/20 1300   Quick Adds   Quick Adds Certification   Type of Visit Initial OP PT Evaluation   General Information   Start of Care Date 06/30/20   Referring Physician Leesa Muñiz PA-C   Orders Evaluate and Treat as Indicated   Order Date 06/18/20   Medical Diagnosis Balance, RBD, Lewy Body Dementia   Onset of illness/injury or Date of Surgery 06/18/20   Precautions/Limitations no known precautions/limitations   Surgical/Medical history reviewed Yes   Pertinent history of current problem (include personal factors and/or comorbidities that impact the POC) Pt notes that balance seems to be worsening, fell a few times this winter but has not fallen since there was snow out. Previously felt like PT helped but has  a  hard time keeping up with ex at home. Does have chickens at home and spends time outside with them everyday.    Prior level of function comment Independant   Patient role/Employment history Retired   Living environment House/[facility]  (w/ spouse)   Home/Community Accessibility Comments Flight of Stairs, in house is circular stairway, goes up on all 4s. Steps into house has a railing, more confident with that   ADL Devices   (na)   Patient/Family Goals Statement improve balance   Fall Risk Screen   Have you fallen 2 or more times in the past year? Yes   Have you fallen and had an injury in the past year? No   Timed Up and Go score (seconds) 9   Is patient a fall risk? Department fall risk interventions implemented;Yes   Fall screen comments due to falls history   Abuse Screen (yes response referral indicated)   Feels Unsafe at Home or Work/School no   Feels Threatened by Someone no   Does Anyone Try to Keep You From Having Contact with Others or Doing Things Outside Your Home? no   Physical Signs of Abuse Present no   Functional Scales   Functional Scales and Outcomes   (ABC see chart)   Pain   Pain comments Reports chronic low back pain   Cognitive Status Examination   Orientation orientation to  person, place and time   Cognitive Comment Known Lewy Body Dementia, see chart for details and recent memory testing   Posture   Posture Comments thoracic kyphosis, rounded shoulers. Knee valgus R. lumbar scoliosis   Range of Motion (ROM)   ROM Comment ROM WFL   Strength   Strength Comments Hip flexion 4/5 B, hip abductoin 4/5 B. Knee extension, knee flexion, dorsiflexion 4+/5 B   Gait Special Tests   Gait Special Tests FUNCTIONAL GAIT ASSESSMENT   Gait Special Tests Functional Gait Assessment Score out of 30   Score out of 30 19   Comments see flowsheet   Balance Special Tests   Balance Special Tests Timed up and go;Lopez balance;Sit to stand reps   Balance Special Tests Timed Up and Go   Seconds 9 Seconds   Comments no AD   Balance Special Tests Lopez Balance   Score out of 56 47   Comments see flowsheet   Balance Special Tests Sit to Stand Reps in 30 Seconds   Reps in 30 seconds 9   Height Standard chair   Comments arms crossed on chest   Sensory Examination   Sensory Perception Comments reports intact to light touch   Coordination   Coordination Comments intact w/ finger to nose, heel/shin slides   Planned Therapy Interventions   Planned Therapy Interventions balance training;gait training;neuromuscular re-education;motor coordination training;strengthening;ROM;stretching;transfer training   Clinical Impression   Criteria for Skilled Therapeutic Interventions Met yes, treatment indicated   PT Diagnosis impaired balance   Influenced by the following impairments decreased strength, endurance, impaired balance   Functional limitations due to impairments risk of falls   Clinical Presentation Stable/Uncomplicated   Clinical Presentation Rationale motivated pt, Comorbidities RBD, Lewy Body dementia   Clinical Decision Making (Complexity) Low complexity   Therapy Frequency 1 time/week   Predicted Duration of Therapy Intervention (days/wks) 6 weeks   Risk & Benefits of therapy have been explained Yes   Patient, Family &  other staff in agreement with plan of care Yes   Education Assessment   Preferred Learning Style Demonstration;Pictures/video   Barriers to Learning No barriers   GOALS   PT Eval Goals 1;2;3   Goal 1   Goal Identifier 1   Goal Description Patient will improve FGA to >/= 24/30 for improved dynamic balance    Target Date 08/11/20   Goal 2   Goal Identifier 2   Goal Description Patient will be independant with HEP for long term self management   Target Date 08/11/20   Goal 3   Goal Identifier 3   Goal Description Patient will be able to ascend and descend 12 stairs with use of 1 railing reciprical pattern to more safely access all levels of home    Target Date 08/11/20   Total Evaluation Time   PT Eval, Low Complexity Minutes (15899) 30   Therapy Certification   Certification date from 06/30/20   Certification date to 08/25/20   Medical Diagnosis Balance, RBD, Lewy Body Dementia

## 2020-07-07 ENCOUNTER — HOSPITAL ENCOUNTER (OUTPATIENT)
Dept: PHYSICAL THERAPY | Facility: CLINIC | Age: 75
Setting detail: THERAPIES SERIES
End: 2020-07-07
Attending: PHYSICIAN ASSISTANT
Payer: MEDICARE

## 2020-07-07 PROCEDURE — 97112 NEUROMUSCULAR REEDUCATION: CPT | Mod: GP | Performed by: PHYSICAL THERAPIST

## 2020-07-07 PROCEDURE — 97110 THERAPEUTIC EXERCISES: CPT | Mod: GP | Performed by: PHYSICAL THERAPIST

## 2020-07-14 ENCOUNTER — HOSPITAL ENCOUNTER (OUTPATIENT)
Dept: PHYSICAL THERAPY | Facility: CLINIC | Age: 75
Setting detail: THERAPIES SERIES
End: 2020-07-14
Attending: PHYSICIAN ASSISTANT
Payer: MEDICARE

## 2020-07-14 PROCEDURE — 97112 NEUROMUSCULAR REEDUCATION: CPT | Mod: GP | Performed by: PHYSICAL THERAPIST

## 2020-07-14 PROCEDURE — 97110 THERAPEUTIC EXERCISES: CPT | Mod: GP | Performed by: PHYSICAL THERAPIST

## 2020-07-15 ENCOUNTER — TELEPHONE (OUTPATIENT)
Dept: FAMILY MEDICINE | Facility: CLINIC | Age: 75
End: 2020-07-15

## 2020-07-15 DIAGNOSIS — J45.40 MODERATE PERSISTENT ASTHMA WITHOUT COMPLICATION: ICD-10-CM

## 2020-07-15 DIAGNOSIS — R41.89 COGNITIVE IMPAIRMENT: ICD-10-CM

## 2020-07-15 DIAGNOSIS — R68.89 TEMPERATURE INTOLERANCE: ICD-10-CM

## 2020-07-16 NOTE — TELEPHONE ENCOUNTER
"Pt is supposed to recheck about the paroxetine per 6/18/2020 OV note. Do you want her to schedule a Visit or should RN call for update?    Requested Prescriptions   Pending Prescriptions Disp Refills     PARoxetine (PAXIL) 10 MG tablet [Pharmacy Med Name: PAROXETINE HCL 10MG TABS] 30 tablet 0     Sig: TAKE ONE TABLET BY MOUTH ONCE DAILY FOR HOT FLASHES       SSRIs Protocol Passed - 7/15/2020  2:11 PM        Passed - Recent (12 mo) or future (30 days) visit within the authorizing provider's specialty     Patient has had an office visit with the authorizing provider or a provider within the authorizing providers department within the previous 12 mos or has a future within next 30 days. See \"Patient Info\" tab in inbasket, or \"Choose Columns\" in Meds & Orders section of the refill encounter.              Passed - Medication is active on med list        Passed - Patient is age 18 or older        Passed - No active pregnancy on record        Passed - No positive pregnancy test in last 12 months           donepezil (ARICEPT) 10 MG tablet [Pharmacy Med Name: DONEPEZIL HCL 10MG TABS] 90 tablet 1     Sig: TAKE ONE TABLET BY MOUTH ONCE DAILY AT BEDTIME FOR MEMORY       Miscellaneous Dementia Agents Passed - 7/15/2020  2:11 PM        Passed - Recent (12 mo) or future (30 days) visit within the authorizing provider's specialty     Patient has had an office visit with the authorizing provider or a provider within the authorizing providers department within the previous 12 mos or has a future within next 30 days. See \"Patient Info\" tab in inbasket, or \"Choose Columns\" in Meds & Orders section of the refill encounter.              Passed - Medication is active on med list        Passed - Patient is 18 years of age or older         Refused Prescriptions Disp Refills     fluticasone-salmeterol (ADVAIR) 250-50 MCG/DOSE inhaler [Pharmacy Med Name: FLUTICASONE-SALMETEROL 250-50 AEPB]  0     Sig: INHALE ONE PUFF BY MOUTH TWICE A DAY    " "   Long-Acting Beta Agonist Inhalers Protocol  Failed - 7/15/2020  2:11 PM        Failed - Order for Serevent, Striverdi, or Foradil and pt has steroid inhaler        Passed - Patient is age 12 or older        Passed - Asthma control assessment score within normal limits in last 6 months     Please review ACT score.           Passed - Medication is active on med list        Passed - Recent (6 mo) or future (30 days) visit within the authorizing provider's specialty     Patient had office visit in the last 6 months or has a visit in the next 30 days with authorizing provider or within the authorizing provider's specialty.  See \"Patient Info\" tab in inbasket, or \"Choose Columns\" in Meds & Orders section of the refill encounter.           Inhaled Steroids Protocol Passed - 7/15/2020  2:11 PM        Passed - Patient is age 12 or older        Passed - Asthma control assessment score within normal limits in last 6 months     Please review ACT score.           Passed - Medication is active on med list        Passed - Recent (6 mo) or future (30 days) visit within the authorizing provider's specialty     Patient had office visit in the last 6 months or has a visit in the next 30 days with authorizing provider or within the authorizing provider's specialty.  See \"Patient Info\" tab in inbasket, or \"Choose Columns\" in Meds & Orders section of the refill encounter.               Cynthia GALO RN, BSN      "

## 2020-07-17 NOTE — RESULT ENCOUNTER NOTE
Ingrid,    Your nail culture did finally grow out fungus.  You can treat with vicks OTC chest rub, on toe nails nightly.  You may have to treat for multiple months.  Otherwise we can discuss other treatment at your next recheck appointment.      Leesa

## 2020-07-20 RX ORDER — PAROXETINE 10 MG/1
TABLET, FILM COATED ORAL
Qty: 30 TABLET | Refills: 0 | Status: SHIPPED | OUTPATIENT
Start: 2020-07-20 | End: 2020-09-09

## 2020-07-20 RX ORDER — DONEPEZIL HYDROCHLORIDE 10 MG/1
TABLET, FILM COATED ORAL
Qty: 30 TABLET | Refills: 0 | Status: SHIPPED | OUTPATIENT
Start: 2020-07-20 | End: 2020-09-28

## 2020-07-23 ENCOUNTER — OFFICE VISIT (OUTPATIENT)
Dept: FAMILY MEDICINE | Facility: CLINIC | Age: 75
End: 2020-07-23
Payer: MEDICARE

## 2020-07-23 VITALS
BODY MASS INDEX: 20.35 KG/M2 | WEIGHT: 110.6 LBS | HEIGHT: 62 IN | SYSTOLIC BLOOD PRESSURE: 139 MMHG | OXYGEN SATURATION: 100 % | RESPIRATION RATE: 14 BRPM | TEMPERATURE: 97.3 F | HEART RATE: 58 BPM | DIASTOLIC BLOOD PRESSURE: 88 MMHG

## 2020-07-23 DIAGNOSIS — N95.1 MENOPAUSAL SYNDROME (HOT FLASHES): ICD-10-CM

## 2020-07-23 DIAGNOSIS — B35.1 ONYCHOMYCOSIS: ICD-10-CM

## 2020-07-23 DIAGNOSIS — F41.0 PANIC DISORDER WITHOUT AGORAPHOBIA: ICD-10-CM

## 2020-07-23 DIAGNOSIS — F32.0 MILD MAJOR DEPRESSION (H): ICD-10-CM

## 2020-07-23 DIAGNOSIS — L30.9 DERMATITIS: ICD-10-CM

## 2020-07-23 DIAGNOSIS — F02.80 LEWY BODY DEMENTIA WITHOUT BEHAVIORAL DISTURBANCE (H): Primary | ICD-10-CM

## 2020-07-23 DIAGNOSIS — E53.8 VITAMIN B12 DEFICIENCY (NON ANEMIC): ICD-10-CM

## 2020-07-23 DIAGNOSIS — G31.83 LEWY BODY DEMENTIA WITHOUT BEHAVIORAL DISTURBANCE (H): Primary | ICD-10-CM

## 2020-07-23 DIAGNOSIS — Z76.89 SLEEP CONCERN: ICD-10-CM

## 2020-07-23 DIAGNOSIS — G47.52 RBD (REM BEHAVIORAL DISORDER): ICD-10-CM

## 2020-07-23 DIAGNOSIS — R26.89 POOR BALANCE: ICD-10-CM

## 2020-07-23 PROCEDURE — 99214 OFFICE O/P EST MOD 30 MIN: CPT | Performed by: PHYSICIAN ASSISTANT

## 2020-07-23 PROCEDURE — 36415 COLL VENOUS BLD VENIPUNCTURE: CPT | Performed by: PHYSICIAN ASSISTANT

## 2020-07-23 PROCEDURE — 82607 VITAMIN B-12: CPT | Performed by: PHYSICIAN ASSISTANT

## 2020-07-23 RX ORDER — DONEPEZIL HYDROCHLORIDE 5 MG/1
5 TABLET, FILM COATED ORAL AT BEDTIME
Qty: 30 TABLET | Refills: 0 | Status: SHIPPED | OUTPATIENT
Start: 2020-07-23 | End: 2020-09-30

## 2020-07-23 RX ORDER — TRIAMCINOLONE ACETONIDE 1 MG/G
CREAM TOPICAL 2 TIMES DAILY
Qty: 15 G | Refills: 0 | Status: SHIPPED | OUTPATIENT
Start: 2020-07-23 | End: 2021-08-27

## 2020-07-23 ASSESSMENT — MIFFLIN-ST. JEOR: SCORE: 949.93

## 2020-07-23 NOTE — LETTER
July 27, 2020      Ingrid Amaral  [address] [facility] [address]        Dear ,    We are writing to inform you of your test results.    Your B12 improved but is still lower than desired.  I sent a new prescription for a higher dose.  Repeat lab at next office visit.     Resulted Orders   **Vitamin B12 FUTURE 2mo   Result Value Ref Range    Vitamin B12 234 193 - 986 pg/mL       If you have any questions or concerns, please call the clinic at the number listed above.       Sincerely,    Leesa Muñiz PA-C/ ss

## 2020-07-23 NOTE — PATIENT INSTRUCTIONS
Hot flashes -  Doing well enough, decided to not make changes    Tiredness -  Try to be consistent on bedtime and wake times.   Move eating to earlier in evening  See if days you are more tired correlate with when take gabapentin    Stomach -  No changes to meds    Mood -  Try cutting back sertraline from 100 mg to 50 mg    Memory -  Unclear donepezil helping, and seems to impact sleep/tiredness  Go down on dose from 10 mg to 5 mg for 1 month, then off the medication    Consider tart cherry juice for joint pains     Recheck in 2 months

## 2020-07-23 NOTE — PROGRESS NOTES
"Subjective     Ingrid Amaral is a 74 year old female who presents to clinic today for the following health issues:    HPI       * Balance-  Not doing good.  Feels that PT may have helped a little bit    * Hot Flashes-  Still has them, but feels she may be having less hot flashes since adding on paroxetine.  Throws her covers off/on a lot.  Most of the time she seems to sleep well, per .  Took only half a donepezil the other night since she feels that med makes her tired.  Night owl by nature.  Will scrap book until midnight or 2 am.    C/o being tired regardless of whether she sleeps in.  Initially tired in mornings but seems to wake up well. 1-2 cups coffee helps wake her, but sometimes caffeine makes her jittery.  Poor \"clock management\".      * Heartburn-  Has been doing better with decreasing to 10mg once daily.  Hasn't had much bloating.  Did poorly with backing off the medication in the past.    * Skin issue on leg-  Looking better, but has a long way to go still.    * Toe nails- States never heard back from the nail culture.      BP Readings from Last 3 Encounters:   07/23/20 139/88   06/18/20 118/74   03/04/20 110/71    Wt Readings from Last 3 Encounters:   07/23/20 50.2 kg (110 lb 9.6 oz)   06/18/20 51.3 kg (113 lb)   03/02/20 53.5 kg (118 lb)           Reviewed and updated as needed this visit by Provider  Tobacco  Allergies  Meds  Problems  Med Hx  Surg Hx  Fam Hx         Review of Systems   Constitutional, GI, musculoskeletal, neuro, skin, endocrine and psych systems are negative, except as otherwise noted.      Objective    /88 (BP Location: Right arm, Patient Position: Chair, Cuff Size: Adult Regular)   Pulse 58   Temp 97.3  F (36.3  C) (Tympanic)   Resp 14   Ht 1.575 m (5' 2\")   Wt 50.2 kg (110 lb 9.6 oz)   SpO2 100%   BMI 20.23 kg/m    Body mass index is 20.23 kg/m .  Physical Exam   GENERAL: healthy, alert and no distress  EYES: Eyes grossly normal to inspection.  No " discharge or erythema, or obvious scleral/conjunctival abnormalities.  RESP: No audible wheeze, cough, or visible cyanosis.  No visible retractions or increased work of breathing.    SKIN: R shin with red inflammed area no scaling or plaque,otherwise visible skin clear. No significant rash, abnormal pigmentation or lesions.  NEURO: Cranial nerves grossly intact.  Memory deficit noted.  Speech appropriate for age.  PSYCH: Affect normal/bright, judgement and insight impaired, normal speech and appearance well-groomed.      Diagnostic Test Results:  Labs reviewed in Epic        Assessment & Plan       ICD-10-CM    1. Lewy body dementia without behavioral disturbance (H)  G31.83 donepezil (ARICEPT) 5 MG tablet    F02.80    2. Poor balance  R26.89    3. Sleep concern  Z76.89    4. Panic disorder without agoraphobia  F41.0 sertraline (ZOLOFT) 50 MG tablet   5. Mild major depression (H)  F32.0 sertraline (ZOLOFT) 50 MG tablet   6. Menopausal syndrome (hot flashes)  N95.1    7. Dermatitis  L30.9 triamcinolone (KENALOG) 0.1 % external cream   8. Vitamin B12 deficiency (non anemic)  E53.8 **Vitamin B12 FUTURE 2mo     CANCELED: Vitamin B12   9. RBD (REM behavioral disorder)  G47.52    10. Onychomycosis  B35.1 vicks        Patient Instructions   Hot flashes -  Doing well enough, decided to not make changes    Tiredness -  Try to be consistent on bedtime and wake times.   Move eating to earlier in evening  See if days you are more tired correlate with when take gabapentin    Stomach -  No changes to meds    Mood -  Try cutting back sertraline from 100 mg to 50 mg    Memory -  Unclear donepezil helping, and seems to impact sleep/tiredness  Go down on dose from 10 mg to 5 mg for 1 month, then off the medication    Consider tart cherry juice for joint pains     Recheck in 2 months       Return in about 2 months (around 9/23/2020).    Leesa Muñiz PA-C  [facility]

## 2020-07-23 NOTE — NURSING NOTE
"Chief Complaint   Patient presents with     Balance/ Vestibular     Gastrophageal Reflux     Derm Problem       Initial /88 (BP Location: Right arm, Patient Position: Chair, Cuff Size: Adult Regular)   Pulse 58   Temp 97.3  F (36.3  C) (Tympanic)   Resp 14   Ht 1.575 m (5' 2\")   Wt 50.2 kg (110 lb 9.6 oz)   SpO2 100%   BMI 20.23 kg/m   Estimated body mass index is 20.23 kg/m  as calculated from the following:    Height as of this encounter: 1.575 m (5' 2\").    Weight as of this encounter: 50.2 kg (110 lb 9.6 oz).    Patient presents to the clinic using No DME    Health Maintenance that is potentially due pending provider review:  NONE        Is there anyone who you would like to be able to receive your results? No  If yes have patient fill out BROOKE      "

## 2020-07-24 LAB — VIT B12 SERPL-MCNC: 234 PG/ML (ref 193–986)

## 2020-07-25 PROBLEM — F02.80 LEWY BODY DEMENTIA WITHOUT BEHAVIORAL DISTURBANCE (H): Status: ACTIVE | Noted: 2020-07-25

## 2020-07-25 PROBLEM — G31.83 LEWY BODY DEMENTIA WITHOUT BEHAVIORAL DISTURBANCE (H): Status: ACTIVE | Noted: 2020-07-25

## 2020-07-25 RX ORDER — CYANOCOBALAMIN (VITAMIN B-12) 250 MCG
250 TABLET ORAL DAILY
Qty: 60 TABLET | Refills: 0 | Status: SHIPPED | OUTPATIENT
Start: 2020-07-25 | End: 2020-09-25

## 2020-07-26 NOTE — RESULT ENCOUNTER NOTE
Ingrid,    Your B12 improved but is still lower than desired.  I sent a new prescription for a higher dose.  Repeat lab at next office visit.    Leesa

## 2020-07-27 ENCOUNTER — HOSPITAL ENCOUNTER (OUTPATIENT)
Dept: PHYSICAL THERAPY | Facility: CLINIC | Age: 75
Setting detail: THERAPIES SERIES
End: 2020-07-27
Attending: PHYSICIAN ASSISTANT
Payer: MEDICARE

## 2020-07-27 PROCEDURE — 97112 NEUROMUSCULAR REEDUCATION: CPT | Mod: GP | Performed by: PHYSICAL THERAPIST

## 2020-07-27 PROCEDURE — 97110 THERAPEUTIC EXERCISES: CPT | Mod: GP | Performed by: PHYSICAL THERAPIST

## 2020-07-29 LAB
BACTERIA SPEC CULT: ABNORMAL
SPECIMEN SOURCE: ABNORMAL

## 2020-08-05 ENCOUNTER — HOSPITAL ENCOUNTER (OUTPATIENT)
Dept: PHYSICAL THERAPY | Facility: CLINIC | Age: 75
Setting detail: THERAPIES SERIES
End: 2020-08-05
Attending: PHYSICIAN ASSISTANT
Payer: MEDICARE

## 2020-08-05 PROCEDURE — 97112 NEUROMUSCULAR REEDUCATION: CPT | Mod: GP | Performed by: PHYSICAL THERAPIST

## 2020-08-06 NOTE — PROGRESS NOTES
Outpatient Physical Therapy Discharge Note     Patient: Ingrid Amaral  : 1945    Beginning/End Dates of Reporting Period:  20 to 2020    Referring Provider: Leesa Miranda Diagnosis: impaired balance     Client Self Report: Pt notes doing well at home. Exercises are going well. Seems to have good days and bad.     Objective Measurements:  Objective Measure: FGA   Details: 26  Objective Measure: MCTSIB  Details: 30 s all conditions     Goals:  Goal Identifier 1   Goal Description Patient will improve FGA to >/= 24/30 for improved dynamic balance    Target Date 20   Date Met  20   Progress:     Goal Identifier 2   Goal Description Patient will be independant with HEP for long term self management   Target Date 20   Date Met  20   Progress:     Goal Identifier 3   Goal Description Patient will be able to ascend and descend 12 stairs with use of 1 railing reciprical pattern to more safely access all levels of home    Target Date 20   Date Met  20   Progress:       Progress Toward Goals:   Progress this reporting period: Pt completed 5 sessions to address balance, strength, endurance. At this time demonstrating excellent improvements, able to complete high level balance activities in sessions, and performing well with objective testing as noted above. Pt is independent with HEP, and anticipate ability to maintain with HEP compliance at home.     Plan:  Discharge from therapy.    Discharge:    Reason for Discharge: Patient has met all goals.  No further expectation of progress.    Equipment Issued: na    Discharge Plan: Patient to continue home program.

## 2020-08-27 DIAGNOSIS — E53.8 VITAMIN B12 DEFICIENCY (NON ANEMIC): ICD-10-CM

## 2020-08-31 RX ORDER — MULTIVITAMIN WITH IRON
TABLET ORAL
Qty: 60 TABLET | Refills: 0 | Status: SHIPPED | OUTPATIENT
Start: 2020-08-31 | End: 2021-04-05

## 2020-09-08 DIAGNOSIS — F41.0 PANIC DISORDER WITHOUT AGORAPHOBIA: ICD-10-CM

## 2020-09-08 DIAGNOSIS — R68.89 TEMPERATURE INTOLERANCE: ICD-10-CM

## 2020-09-08 DIAGNOSIS — F32.0 MILD MAJOR DEPRESSION (H): ICD-10-CM

## 2020-09-09 RX ORDER — SERTRALINE HYDROCHLORIDE 100 MG/1
TABLET, FILM COATED ORAL
Qty: 90 TABLET | Refills: 1 | Status: SHIPPED | OUTPATIENT
Start: 2020-09-09 | End: 2020-09-28

## 2020-09-09 RX ORDER — PAROXETINE 10 MG/1
TABLET, FILM COATED ORAL
Qty: 30 TABLET | Refills: 0 | Status: SHIPPED | OUTPATIENT
Start: 2020-09-09 | End: 2020-09-30

## 2020-09-25 DIAGNOSIS — E53.8 VITAMIN B12 DEFICIENCY (NON ANEMIC): ICD-10-CM

## 2020-09-25 DIAGNOSIS — I10 BENIGN ESSENTIAL HYPERTENSION: ICD-10-CM

## 2020-09-25 RX ORDER — CYANOCOBALAMIN (VITAMIN B-12) 500 MCG
TABLET ORAL
Qty: 45 TABLET | Refills: 3 | Status: SHIPPED | OUTPATIENT
Start: 2020-09-25 | End: 2020-09-30

## 2020-09-25 RX ORDER — AMLODIPINE BESYLATE 2.5 MG/1
TABLET ORAL
Qty: 90 TABLET | Refills: 3 | Status: SHIPPED | OUTPATIENT
Start: 2020-09-25 | End: 2021-03-22

## 2020-09-28 ENCOUNTER — TELEPHONE (OUTPATIENT)
Dept: FAMILY MEDICINE | Facility: CLINIC | Age: 75
End: 2020-09-28

## 2020-09-28 ENCOUNTER — OFFICE VISIT (OUTPATIENT)
Dept: FAMILY MEDICINE | Facility: CLINIC | Age: 75
End: 2020-09-28
Payer: MEDICARE

## 2020-09-28 DIAGNOSIS — J45.40 MODERATE PERSISTENT ASTHMA WITHOUT COMPLICATION: ICD-10-CM

## 2020-09-28 DIAGNOSIS — I10 BENIGN ESSENTIAL HYPERTENSION: Primary | ICD-10-CM

## 2020-09-28 PROCEDURE — 99207 ZZC NO CHARGE NURSE ONLY: CPT

## 2020-09-28 NOTE — PROGRESS NOTES
S-(situation): Ingrid her accompanied by  to sort out meds and dosages.  She has all her meds with her.  Ingrid admits to maybe taking her meds the wrong way.   will be etting up an dgiving her meds.    B-(background): Lewy body dementia    A-(assessment): med confusion    R-(recommendations): went through all meds and straightened everything out.  Irma Ivey RN

## 2020-09-28 NOTE — TELEPHONE ENCOUNTER
Reason for call:  Patient reporting a symptom    Symptom or request: throwing up, can't sleep, hot and cold started 2 days ago.   Pt is wondering if this could be due to taking her medications as they are starting new meds and taking off some. Please Advise.    Phone Number patient can be reached at:  Home number on file [phone] (home)    Best Time:  Any Time      Can we leave a detailed message on this number:  YES    Call taken on 9/28/2020 at 8:59 AM by Anita Graham

## 2020-09-28 NOTE — TELEPHONE ENCOUNTER
S-(situation): I talked with Ingrdi and she says she thinks too many meds.  I got them messed up.      Harsha and Ingrid will come into clinic and will give them AVS.  Irma Ivey RN

## 2020-09-30 ENCOUNTER — OFFICE VISIT (OUTPATIENT)
Dept: FAMILY MEDICINE | Facility: CLINIC | Age: 75
End: 2020-09-30
Payer: MEDICARE

## 2020-09-30 VITALS
DIASTOLIC BLOOD PRESSURE: 68 MMHG | RESPIRATION RATE: 16 BRPM | SYSTOLIC BLOOD PRESSURE: 100 MMHG | HEIGHT: 62 IN | WEIGHT: 112 LBS | HEART RATE: 69 BPM | TEMPERATURE: 97.3 F | OXYGEN SATURATION: 98 % | BODY MASS INDEX: 20.61 KG/M2

## 2020-09-30 DIAGNOSIS — G31.83 LEWY BODY DEMENTIA WITHOUT BEHAVIORAL DISTURBANCE (H): ICD-10-CM

## 2020-09-30 DIAGNOSIS — R68.89 TEMPERATURE INTOLERANCE: ICD-10-CM

## 2020-09-30 DIAGNOSIS — E53.8 VITAMIN B12 DEFICIENCY (NON ANEMIC): ICD-10-CM

## 2020-09-30 DIAGNOSIS — F41.0 PANIC DISORDER WITHOUT AGORAPHOBIA: ICD-10-CM

## 2020-09-30 DIAGNOSIS — Z23 NEED FOR PROPHYLACTIC VACCINATION AND INOCULATION AGAINST INFLUENZA: ICD-10-CM

## 2020-09-30 DIAGNOSIS — F32.0 MILD MAJOR DEPRESSION (H): ICD-10-CM

## 2020-09-30 DIAGNOSIS — F02.80 LEWY BODY DEMENTIA WITHOUT BEHAVIORAL DISTURBANCE (H): ICD-10-CM

## 2020-09-30 DIAGNOSIS — D32.9 MENINGIOMA (H): ICD-10-CM

## 2020-09-30 DIAGNOSIS — L57.0 ACTINIC KERATOSIS: ICD-10-CM

## 2020-09-30 PROCEDURE — 90662 IIV NO PRSV INCREASED AG IM: CPT | Performed by: PHYSICIAN ASSISTANT

## 2020-09-30 PROCEDURE — 99214 OFFICE O/P EST MOD 30 MIN: CPT | Mod: 25 | Performed by: PHYSICIAN ASSISTANT

## 2020-09-30 PROCEDURE — G0008 ADMIN INFLUENZA VIRUS VAC: HCPCS | Performed by: PHYSICIAN ASSISTANT

## 2020-09-30 PROCEDURE — 17000 DESTRUCT PREMALG LESION: CPT | Performed by: PHYSICIAN ASSISTANT

## 2020-09-30 RX ORDER — PYRIDOXINE HCL (VITAMIN B6) 50 MG
200 TABLET ORAL DAILY
Qty: 60 TABLET | Refills: 0 | Status: SHIPPED | OUTPATIENT
Start: 2020-09-30 | End: 2021-03-10

## 2020-09-30 RX ORDER — PAROXETINE 20 MG/1
TABLET, FILM COATED ORAL
Qty: 30 TABLET | Refills: 1 | Status: SHIPPED | OUTPATIENT
Start: 2020-09-30 | End: 2021-03-22

## 2020-09-30 ASSESSMENT — ANXIETY QUESTIONNAIRES
GAD7 TOTAL SCORE: 14
4. TROUBLE RELAXING: MORE THAN HALF THE DAYS
7. FEELING AFRAID AS IF SOMETHING AWFUL MIGHT HAPPEN: MORE THAN HALF THE DAYS
6. BECOMING EASILY ANNOYED OR IRRITABLE: SEVERAL DAYS
GAD7 TOTAL SCORE: 14
2. NOT BEING ABLE TO STOP OR CONTROL WORRYING: NEARLY EVERY DAY
5. BEING SO RESTLESS THAT IT IS HARD TO SIT STILL: NEARLY EVERY DAY
1. FEELING NERVOUS, ANXIOUS, OR ON EDGE: SEVERAL DAYS
3. WORRYING TOO MUCH ABOUT DIFFERENT THINGS: MORE THAN HALF THE DAYS
GAD7 TOTAL SCORE: 14
7. FEELING AFRAID AS IF SOMETHING AWFUL MIGHT HAPPEN: MORE THAN HALF THE DAYS

## 2020-09-30 ASSESSMENT — MIFFLIN-ST. JEOR: SCORE: 951.28

## 2020-09-30 ASSESSMENT — PATIENT HEALTH QUESTIONNAIRE - PHQ9
10. IF YOU CHECKED OFF ANY PROBLEMS, HOW DIFFICULT HAVE THESE PROBLEMS MADE IT FOR YOU TO DO YOUR WORK, TAKE CARE OF THINGS AT HOME, OR GET ALONG WITH OTHER PEOPLE: SOMEWHAT DIFFICULT
SUM OF ALL RESPONSES TO PHQ QUESTIONS 1-9: 6
SUM OF ALL RESPONSES TO PHQ QUESTIONS 1-9: 6

## 2020-09-30 NOTE — PATIENT INSTRUCTIONS
"Stay off donepezil since memory did not worsening with stopping medication    Anxiety -  Stop sertraline entirely since decreasing medication dose did not worsen anxiety  Increased paroxetine dose a bit to see if would help anxiety more    B12 -  200 mg (2 of the 100 mg pills daily), lab in 1 month     Skin spot -  Frozen today     Recheck 1-2 months    [facility] Line  [phone] - to discuss options for care as Ingrid's memory worsens or for after [place] might pass.  If not enough information, we can place free \"care coordinator\" referral.    Flu shot today  "

## 2020-09-30 NOTE — PROGRESS NOTES
Subjective     Ingrid Amaral is a 75 year old female who presents to clinic today for the following health issues:    HPI     Chief Complaint   Patient presents with     Recheck Medication       Lewy body dementia, meningioma.  Last neuro appt in Oct.  Imaging in Nov, stable.  No rapid memory changes but:    -Discuss B12 vitamin dosage. 100mg? 500mg in half?(tablet is very small and is difficult).     just took over helping with medications.  Found she was getting confused and making errors.    -Daughter/wife using the essential oils -  concerned about contradictions with medications, concerned if mixing up medications or using the oils. DISCUSS SAFETY AND USE.   Using various essential oils in diffuser and sometimes on the skin.    Hot flashes doing well with paxil.  Anxiety did not worsen with decreasing zoloft.  Still lots of worries, lifelong.  No worsened memory noted with weaning off aricept.    Skin spot R thigh.  Just noted, not symptoms.    Review of Systems   Constitutional, HEENT, cardiovascular, pulmonary, GI, , musculoskeletal, neuro, skin, endocrine and psych systems are negative, except as otherwise noted.      Objective    There were no vitals taken for this visit.  There is no height or weight on file to calculate BMI.  Physical Exam   GENERAL: healthy, alert and no distress  SKIN: raised light colored dry lesion to R upper inner thigh  NEURO: Normal strength and tone, mentation at baseline and speech normal  PSYCH: mentation appears normal, affect normal/bright    Past labs reviewed today.         Assessment & Plan     Ingrid was seen today for recheck medication, flu shot and imm/inj.    Diagnoses and all orders for this visit:    Lewy body dementia without behavioral disturbance (H)  Meningioma (H)  Follows with neuro    Vitamin B12 deficiency (non anemic)  -     cyanocobalamin 100 MCG TABS; Take 200 mcg by mouth daily    Panic disorder without agoraphobia  Mild major  "depression (H)  -     PARoxetine (PAXIL) 20 MG tablet; TAKE ONE TABLET BY MOUTH ONCE DAILY FOR HOT FLASHES AND ANXIETY    Actinic keratosis  -     DESTRUCT PREMALIGNANT LESION, FIRST    LN applied to lesion x 2 cycles today    Temperature intolerance  -     PARoxetine (PAXIL) 20 MG tablet; TAKE ONE TABLET BY MOUTH ONCE DAILY FOR HOT FLASHES AND ANXIETY    Need for prophylactic vaccination and inoculation against influenza  -     FLUZONE HIGH DOSE 65+  [[id number]]      Patient Instructions   Stay off donepezil since memory did not worsening with stopping medication    Anxiety -  Stop sertraline entirely since decreasing medication dose did not worsen anxiety  Increased paroxetine dose a bit to see if would help anxiety more    B12 -  200 mg (2 of the 100 mg pills daily), lab in 1 month     Skin spot -  Frozen today     Recheck 1-2 months    [facility] Linkage Line  [phone] - to discuss options for care as Ingrid's memory worsens or for after [place] might pass.  If not enough information, we can place free \"care coordinator\" referral.    Flu shot today      Return in about 2 months (around 11/30/2020).    Leesa Muñiz PA-C  [facility]    "

## 2020-09-30 NOTE — NURSING NOTE
"Initial /68   Pulse 69   Temp 97.3  F (36.3  C) (Tympanic)   Resp 16   Ht 1.575 m (5' 2\")   Wt 50.8 kg (112 lb)   SpO2 98%   BMI 20.49 kg/m   Estimated body mass index is 20.49 kg/m  as calculated from the following:    Height as of this encounter: 1.575 m (5' 2\").    Weight as of this encounter: 50.8 kg (112 lb). .    Sharon Beckwith CMA([facility])    "

## 2020-10-01 ASSESSMENT — ANXIETY QUESTIONNAIRES: GAD7 TOTAL SCORE: 14

## 2020-10-01 ASSESSMENT — PATIENT HEALTH QUESTIONNAIRE - PHQ9: SUM OF ALL RESPONSES TO PHQ QUESTIONS 1-9: 6

## 2020-10-08 ENCOUNTER — TELEPHONE (OUTPATIENT)
Dept: PHARMACY | Facility: CLINIC | Age: 75
End: 2020-10-08

## 2020-10-08 NOTE — TELEPHONE ENCOUNTER
This patient is due for MTM follow-up. I called the patient to schedule an appointment.   states the want to wait until able to be seen in person and declines to schedule a telephone visit.    Will route to MTM Coordinators as CORINNE.    Pepe Mo, StuartD, [facility]  Medication Therapy Management Pharmacist  Pager: [phone]

## 2020-11-08 DIAGNOSIS — R41.89 COGNITIVE IMPAIRMENT: ICD-10-CM

## 2020-11-09 RX ORDER — DONEPEZIL HYDROCHLORIDE 10 MG/1
TABLET, FILM COATED ORAL
Qty: 30 TABLET | Refills: 0 | Status: SHIPPED | OUTPATIENT
Start: 2020-11-09 | End: 2021-01-04

## 2020-11-18 DIAGNOSIS — K52.9 CHRONIC DIARRHEA: ICD-10-CM

## 2021-01-03 DIAGNOSIS — R41.89 COGNITIVE IMPAIRMENT: ICD-10-CM

## 2021-01-04 RX ORDER — DONEPEZIL HYDROCHLORIDE 10 MG/1
TABLET, FILM COATED ORAL
Qty: 30 TABLET | Refills: 0 | Status: SHIPPED | OUTPATIENT
Start: 2021-01-04 | End: 2021-02-10

## 2021-01-15 DIAGNOSIS — J45.40 MODERATE PERSISTENT ASTHMA WITHOUT COMPLICATION: ICD-10-CM

## 2021-02-09 DIAGNOSIS — R41.89 COGNITIVE IMPAIRMENT: ICD-10-CM

## 2021-02-10 RX ORDER — DONEPEZIL HYDROCHLORIDE 10 MG/1
TABLET, FILM COATED ORAL
Qty: 30 TABLET | Refills: 0 | Status: SHIPPED | OUTPATIENT
Start: 2021-02-10 | End: 2021-03-10

## 2021-02-13 NOTE — TELEPHONE ENCOUNTER
Please advise further refill.  NADIA Navas RN     Patient is a 55y old  Female who presents with a chief complaint of abd pain (2021 10:05)      HPI:  54 yo F with pmh R nephrectomy in , HTN, asthma, hypothyroidism, unknown rheumatologic disorder with peripheral neuropathy for which she has been on chronic steroids since 2020 (presently on 10mg po qd) now presenting with c/o several weeks of intermittent epigastric pain which radiates to back. Pain worse post prandially. Associated with non bloody emesis. She recently underwent EGD with Dr hernandez approx 1 week ago and findings were indicative of gastritis.   Otherwise denies joint pain, arthralgias, rash, fevers or ulcerations. No chest pain or interscapular pain. Hemodynamically stable however she was found to be hypoxic in ED to 88%. Covid negative however CT abd demonstrated bilateral LL pna and GB wall thickening and pericholecystic fluid. U/S or RUQ also redemonstrated GB wall thickening and edema. No stones. LFTS/Bili normal. Pt is afebrile.    ED course: Nl lactate. WBC 24K. Given ceftriaxone and flagyl. IV morphine plus 2L NS    Patient continues to note some periumbilical pain and diarrhea. In midst of workup for vasculitis.       Social: neg x 3    Surg Hx:  R nephrectomy  Csection x2; no h/o VTE or miscarriages    Fam Hx:   Father  of cardiomyopathy at age 54 - unknown etiology  Daughter: h/o pericarditis unknown etiology   Mother  of colon ca in her 60s    (2021 09:09)      PAST MEDICAL & SURGICAL HISTORY:  Hypothyroid    HTN (hypertension)    Sciatica    Asthma    No significant past surgical history        MEDICATIONS  (STANDING):  budesonide 160 MICROgram(s)/formoterol 4.5 MICROgram(s) Inhaler 2 Puff(s) Inhalation two times a day  diltiazem    milliGRAM(s) Oral daily  DULoxetine 20 milliGRAM(s) Oral daily  FIRST- Mouthwash  BLM 10 milliLiter(s) Swish and Swallow four times a day  gabapentin 600 milliGRAM(s) Oral three times a day  heparin   Injectable 5000 Unit(s) SubCutaneous every 8 hours  levothyroxine 88 MICROGram(s) Oral daily  melatonin 3 milliGRAM(s) Oral at bedtime  methylPREDNISolone sodium succinate Injectable 40 milliGRAM(s) IV Push two times a day  pantoprazole    Tablet 40 milliGRAM(s) Oral two times a day  sodium chloride 0.9%. 1000 milliLiter(s) (75 mL/Hr) IV Continuous <Continuous>  sucralfate 1 Gram(s) Oral two times a day    MEDICATIONS  (PRN):  acetaminophen   Tablet .. 650 milliGRAM(s) Oral every 4 hours PRN Mild Pain (1 - 3)  aluminum hydroxide/magnesium hydroxide/simethicone Suspension 30 milliLiter(s) Oral every 4 hours PRN Dyspepsia  HYDROmorphone  Injectable 1 milliGRAM(s) IV Push every 3 hours PRN Severe Pain (7 - 10)  metoclopramide Injectable 10 milliGRAM(s) IV Push once PRN Nausea and/or Vomiting  ondansetron Injectable 4 milliGRAM(s) IV Push every 6 hours PRN Nausea  oxyCODONE    IR 5 milliGRAM(s) Oral every 4 hours PRN Moderate Pain (4 - 6)      Allergies    ciprofloxacin (Unknown)  penicillin (Unknown)    Intolerances          Vital Signs Last 24 Hrs  T(C): 36.6 (2021 08:04), Max: 36.6 (2021 16:15)  T(F): 97.8 (2021 08:04), Max: 97.8 (2021 16:15)  HR: 59 (2021 08:04) (59 - 78)  BP: 148/88 (2021 08:04) (148/88 - 153/81)  BP(mean): --  RR: 18 (2021 08:04) (18 - 18)  SpO2: 96% (2021 08:04) (96% - 98%)    PHYSICAL EXAM:    Constitutional: NAD, well-developed  Respiratory: CTAB  Cardiovascular: S1 and S2, RRR, no M/G/R  Gastrointestinal: BS+, soft, NT/ND    LABS:                        11.0   15.43 )-----------( 435      ( 2021 08:45 )             34.3     -    137  |  105  |  20  ----------------------------<  120<H>  4.1   |  25  |  0.70    Ca    9.1      2021 08:45  Mg     2.7     02-    TPro  7.4  /  Alb  2.5<L>  /  TBili  0.4  /  DBili  x   /  AST  224<H>  /  ALT  172<H>  /  AlkPhos  115  02-13    PT/INR - ( 2021 08:45 )   PT: 13.0 sec;   INR: 1.12 ratio           LIVER FUNCTIONS - ( 2021 08:45 )  Alb: 2.5 g/dL / Pro: 7.4 gm/dL / ALK PHOS: 115 U/L / ALT: 172 U/L / AST: 224 U/L / GGT: x             RADIOLOGY & ADDITIONAL STUDIES:

## 2021-03-01 NOTE — PATIENT INSTRUCTIONS
Schedule mammogram and ultrasound at [phone] - will call with results     Blood work today - will update you on results     Return to clinic as needed  
no

## 2021-03-22 ENCOUNTER — OFFICE VISIT (OUTPATIENT)
Dept: FAMILY MEDICINE | Facility: CLINIC | Age: 76
End: 2021-03-22
Payer: COMMERCIAL

## 2021-03-22 VITALS
OXYGEN SATURATION: 97 % | BODY MASS INDEX: 20.43 KG/M2 | HEART RATE: 66 BPM | RESPIRATION RATE: 14 BRPM | HEIGHT: 62 IN | WEIGHT: 111 LBS | DIASTOLIC BLOOD PRESSURE: 74 MMHG | SYSTOLIC BLOOD PRESSURE: 106 MMHG | TEMPERATURE: 98 F

## 2021-03-22 DIAGNOSIS — R68.89 TEMPERATURE INTOLERANCE: ICD-10-CM

## 2021-03-22 DIAGNOSIS — G31.83 LEWY BODY DEMENTIA WITHOUT BEHAVIORAL DISTURBANCE (H): ICD-10-CM

## 2021-03-22 DIAGNOSIS — F32.0 MILD MAJOR DEPRESSION (H): ICD-10-CM

## 2021-03-22 DIAGNOSIS — J45.40 MODERATE PERSISTENT ASTHMA WITHOUT COMPLICATION: ICD-10-CM

## 2021-03-22 DIAGNOSIS — D32.9 MENINGIOMA (H): ICD-10-CM

## 2021-03-22 DIAGNOSIS — L40.9 PSORIASIS: ICD-10-CM

## 2021-03-22 DIAGNOSIS — Z00.00 ENCOUNTER FOR MEDICARE ANNUAL WELLNESS EXAM: Primary | ICD-10-CM

## 2021-03-22 DIAGNOSIS — F02.80 LEWY BODY DEMENTIA WITHOUT BEHAVIORAL DISTURBANCE (H): ICD-10-CM

## 2021-03-22 DIAGNOSIS — L40.9 SCALP PSORIASIS: ICD-10-CM

## 2021-03-22 DIAGNOSIS — F41.0 PANIC DISORDER WITHOUT AGORAPHOBIA: ICD-10-CM

## 2021-03-22 DIAGNOSIS — F41.9 ANXIETY: ICD-10-CM

## 2021-03-22 PROCEDURE — 99214 OFFICE O/P EST MOD 30 MIN: CPT | Mod: 25 | Performed by: PHYSICIAN ASSISTANT

## 2021-03-22 PROCEDURE — 99397 PER PM REEVAL EST PAT 65+ YR: CPT | Performed by: PHYSICIAN ASSISTANT

## 2021-03-22 RX ORDER — DULOXETIN HYDROCHLORIDE 20 MG/1
20 CAPSULE, DELAYED RELEASE ORAL DAILY
Qty: 30 CAPSULE | Refills: 1 | Status: SHIPPED | OUTPATIENT
Start: 2021-03-22 | End: 2021-04-19

## 2021-03-22 RX ORDER — PAROXETINE 10 MG/1
TABLET, FILM COATED ORAL
Qty: 30 TABLET | Refills: 1 | Status: SHIPPED | OUTPATIENT
Start: 2021-03-22 | End: 2021-04-19 | Stop reason: ALTCHOICE

## 2021-03-22 RX ORDER — DULOXETIN HYDROCHLORIDE 20 MG/1
20 CAPSULE, DELAYED RELEASE ORAL DAILY
Qty: 30 CAPSULE | Refills: 1 | Status: SHIPPED | OUTPATIENT
Start: 2021-03-22 | End: 2021-03-22

## 2021-03-22 RX ORDER — BETAMETHASONE DIPROPIONATE 0.5 MG/ML
LOTION, AUGMENTED TOPICAL
Qty: 30 ML | Refills: 0 | Status: SHIPPED | OUTPATIENT
Start: 2021-03-22 | End: 2021-08-27

## 2021-03-22 ASSESSMENT — ASTHMA QUESTIONNAIRES
QUESTION_3 LAST FOUR WEEKS HOW OFTEN DID YOUR ASTHMA SYMPTOMS (WHEEZING, COUGHING, SHORTNESS OF BREATH, CHEST TIGHTNESS OR PAIN) WAKE YOU UP AT NIGHT OR EARLIER THAN USUAL IN THE MORNING: NOT AT ALL
QUESTION_1 LAST FOUR WEEKS HOW MUCH OF THE TIME DID YOUR ASTHMA KEEP YOU FROM GETTING AS MUCH DONE AT WORK, SCHOOL OR AT HOME: NONE OF THE TIME
ACT_TOTALSCORE: 24
QUESTION_4 LAST FOUR WEEKS HOW OFTEN HAVE YOU USED YOUR RESCUE INHALER OR NEBULIZER MEDICATION (SUCH AS ALBUTEROL): NOT AT ALL
QUESTION_2 LAST FOUR WEEKS HOW OFTEN HAVE YOU HAD SHORTNESS OF BREATH: ONCE OR TWICE A WEEK
QUESTION_5 LAST FOUR WEEKS HOW WOULD YOU RATE YOUR ASTHMA CONTROL: COMPLETELY CONTROLLED

## 2021-03-22 ASSESSMENT — ANXIETY QUESTIONNAIRES
5. BEING SO RESTLESS THAT IT IS HARD TO SIT STILL: MORE THAN HALF THE DAYS
1. FEELING NERVOUS, ANXIOUS, OR ON EDGE: SEVERAL DAYS
6. BECOMING EASILY ANNOYED OR IRRITABLE: SEVERAL DAYS
2. NOT BEING ABLE TO STOP OR CONTROL WORRYING: NEARLY EVERY DAY
3. WORRYING TOO MUCH ABOUT DIFFERENT THINGS: NEARLY EVERY DAY
GAD7 TOTAL SCORE: 13
7. FEELING AFRAID AS IF SOMETHING AWFUL MIGHT HAPPEN: MORE THAN HALF THE DAYS

## 2021-03-22 ASSESSMENT — MIFFLIN-ST. JEOR: SCORE: 946.74

## 2021-03-22 ASSESSMENT — PATIENT HEALTH QUESTIONNAIRE - PHQ9
5. POOR APPETITE OR OVEREATING: SEVERAL DAYS
SUM OF ALL RESPONSES TO PHQ QUESTIONS 1-9: 7

## 2021-03-22 NOTE — PROGRESS NOTES
"  SUBJECTIVE:   Ingrid Amaral is a 75 year old female who presents for Preventive Visit.    Patient has been advised of split billing requirements and indicates understanding: Yes  Are you in the first 12 months of your Medicare Part B coverage?  No    Physical Health:    In general, how would you rate your overall physical health? good    Outside of work, how many days during the week do you exercise? 2-3 days/week    Outside of work, approximately how many minutes a day do you exercise?15-30 minutes    If you drink alcohol do you typically have >3 drinks per day or >7 drinks per week? No    Do you usually eat at least 4 servings of fruit and vegetables a day, include whole grains & fiber and avoid regularly eating high fat or \"junk\" foods? NO    Do you have any problems taking medications regularly?  No    Do you have any side effects from medications? none    Needs assistance for the following daily activities: taking medicine    Which of the following safety concerns are present in your home?  none identified     Hearing impairment: Yes, Difficulty following a conversation in a noisy restaurant or crowded room.    In the past 6 months, have you been bothered by leaking of urine? no    Mental Health:    In general, how would you rate your overall mental or emotional health? fair  PHQ-2 Score:      Do you feel safe in your environment? Yes    Have you ever done Advance Care Planning? (For example, a Health Directive, POLST, or a discussion with a medical provider or your loved ones about your wishes): No, advance care planning information given to patient to review.  Advanced care planning was discussed at today's visit.    Additional concerns to address?  YES  * MRI of brain-states is overdue on looking at the tumors.  Unsure if that is related to her dementia    Memory is a bit worse.  Can't seem to find things that are in plain sight at home.  Or will walk past  in the grocery store.  Inefficient to " "do chores with the chickens, makes multiple trips out to [place].  Exhausted by end of day -  sometimes wonders if she'll make it up the stairs to the bedroom.      Occasional sharp pain in toe.  Nail still cosmetically bothersome.  Using vicks, culture pos last summer.      Skin area R shin itches sometimes.  Has improved but still bothersome.  Uses \"salves\" all the time but  unsure if the betamethasone cream - Ingrid says it is.    Lump at anus that comes and goes.  History protruding-retracting int hemorrhoids.  History vaginal prolapse.  No symptoms at current.      Fall risk:  Fallen 2 or more times in the past year?: No  Any fall with injury in the past year?: No    Cognitive Screening: Not appropriate due to known dementia    Reviewed and updated as needed this visit by clinical staff  Tobacco  Allergies  Meds   Med Hx  Surg Hx  Fam Hx  Soc Hx        Reviewed and updated as needed this visit by Provider                Social History     Tobacco Use     Smoking status: Former Smoker     Packs/day: 0.50     Years: 3.00     Pack years: 1.50     Types: Cigarettes     Smokeless tobacco: Never Used     Tobacco comment: smoked for 3 years when she was around 19   Substance Use Topics     Alcohol use: Yes     Comment: RARELY                           Current providers sharing in care for this patient include:   Patient Care Team:  Leesa Muñiz PA-C as PCP - General (Physician Assistant)  Leesa Muñiz PA-C as Assigned PCP  Jan Mo [facility] as Pharmacist (Pharmacist Clinician- Clinical Pharmacy Specialist)  Pratibha Santo MD as Assigned Neuroscience Provider  Juan Hendrickson MD as Assigned Surgical Provider    The following health maintenance items are reviewed in Epic and correct as of today:  Health Maintenance   Topic Date Due     COVID-19 Vaccine (1) Never done     HEPATITIS C SCREENING  Never done     ZOSTER IMMUNIZATION (2 of 3) " "12/30/2014     ASTHMA ACTION PLAN  09/19/2020     MAMMO SCREENING  03/11/2021     ASTHMA CONTROL TEST  09/22/2021     PHQ-9  09/22/2021     DTAP/TDAP/TD IMMUNIZATION (3 - Td) 12/23/2021     MEDICARE ANNUAL WELLNESS VISIT  03/22/2022     FALL RISK ASSESSMENT  03/22/2022     COLORECTAL CANCER SCREENING  05/22/2022     LIPID  11/02/2023     DEXA  02/03/2026     ADVANCE CARE PLANNING  03/22/2026     DEPRESSION ACTION PLAN  Completed     INFLUENZA VACCINE  Completed     Pneumococcal Vaccine: Pediatrics (0 to 5 Years) and At-Risk Patients (6 to 64 Years)  Completed     Pneumococcal Vaccine: 65+ Years  Completed     IPV IMMUNIZATION  Aged Out     MENINGITIS IMMUNIZATION  Aged Out     HEPATITIS B IMMUNIZATION  Aged Out     Labs reviewed in EPIC  BP Readings from Last 3 Encounters:   03/22/21 106/74   09/30/20 100/68   07/23/20 139/88    Wt Readings from Last 3 Encounters:   03/22/21 50.3 kg (111 lb)   09/30/20 50.8 kg (112 lb)   07/23/20 50.2 kg (110 lb 9.6 oz)         ROS:  Constitutional, HEENT, cardiovascular, pulmonary, GI, , musculoskeletal, neuro, skin, endocrine and psych systems are negative, except as otherwise noted.    OBJECTIVE:   /74 (BP Location: Right arm, Patient Position: Chair, Cuff Size: Adult Regular)   Pulse 66   Temp 98  F (36.7  C) (Tympanic)   Resp 14   Ht 1.575 m (5' 2\")   Wt 50.3 kg (111 lb)   SpO2 97%   BMI 20.30 kg/m   Estimated body mass index is 20.3 kg/m  as calculated from the following:    Height as of this encounter: 1.575 m (5' 2\").    Weight as of this encounter: 50.3 kg (111 lb).  EXAM:   GENERAL: healthy, alert and no distress  EYES: Eyes grossly normal to inspection, PERRL and conjunctivae and sclerae normal  HENT: ear canals and TM's normal, nose and mouth deferred  NECK: no adenopathy, no asymmetry, masses, or scars and thyroid normal to palpation  RESP: lungs clear to auscultation - no rales, rhonchi or wheezes  CV: regular rate and rhythm, normal S1 S2, no S3 or S4, " no murmur, click or rub, no peripheral edema   ABDOMEN: soft, nontender, no hepatosplenomegaly, no masses and bowel sounds normal  MS: no gross musculoskeletal defects noted, no edema  SKIN: no suspicious lesions or rashes, multiple small SK which she indicates are bothersome, great toe nail proximal half appears clear of fungus  NEURO: Normal strength and tone, mentation intact and speech normal  PSYCH: mentation appears at baseline impairment, affect normal/bright    PHQ 3/2/2020 9/30/2020 3/22/2021   PHQ-9 Total Score 8 6 7   Q9: Thoughts of better off dead/self-harm past 2 weeks Not at all Not at all Not at all     DELMIS-7 SCORE 3/2/2020 9/30/2020 3/22/2021   Total Score - - -   Total Score - 14 (moderate anxiety) -   Total Score 7 14 13         Diagnostic Test Results:  Labs reviewed in Epic    ASSESSMENT / PLAN:       ICD-10-CM    1. Encounter for Medicare annual wellness exam  Z00.00    2. Lewy body dementia without behavioral disturbance (H)  G31.83 CARE COORDINATION REFERRAL  Worsening slightly, decrease paxil due to anticholinergic effects    F02.80    3. Meningioma (H)  D32.9 MR Brain w/o & w Contrast   4. Mild major depression (H)  F32.0 PARoxetine (PAXIL) 10 MG tablet decrease, add cymbalta due to pain benefit as well/consider gabapentin taper due to dementia   5. Moderate persistent asthma without complication  J45.40 stable   6. Psoriasis  L40.9 augmented betamethasone dipropionate (DIPROLENE) 0.05 % external lotion  Improved however still bothersome to patient, increase potency of steroid   7. Scalp psoriasis  L40.9 augmented betamethasone dipropionate (DIPROLENE) 0.05 % external lotion   8. Temperature intolerance  R68.89 PARoxetine (PAXIL) 10 MG tablet   9. Panic disorder without agoraphobia  F41.0 PARoxetine (PAXIL) 10 MG tablet   10. Anxiety  F41.9 DULoxetine (CYMBALTA) 20 MG capsule     DISCONTINUED: DULoxetine (CYMBALTA) 20 MG capsule       COUNSELING:  Reviewed preventive health counseling, as  "reflected in patient instructions    Estimated body mass index is 20.3 kg/m  as calculated from the following:    Height as of this encounter: 1.575 m (5' 2\").    Weight as of this encounter: 50.3 kg (111 lb).        She reports that she has quit smoking. Her smoking use included cigarettes. She has a 1.50 pack-year smoking history. She has never used smokeless tobacco.    Appropriate preventive services were discussed with this patient, including applicable screening as appropriate for cardiovascular disease, diabetes, osteopenia/osteoporosis, and glaucoma.  As appropriate for age/gender, discussed screening for colorectal cancer, prostate cancer, breast cancer, and cervical cancer. Checklist reviewing preventive services available has been given to the patient.    Reviewed patients plan of care and provided an AVS. The Basic Care Plan (routine screening as documented in Health Maintenance) for Ingrid meets the Care Plan requirement. This Care Plan has been established and reviewed with the Patient and spouse.    Counseling Resources:  ATP IV Guidelines  Pooled Cohorts Equation Calculator  Breast Cancer Risk Calculator  BRCA-Related Cancer Risk Assessment: FHS-7 Tool  FRAX Risk Assessment  ICSI Preventive Guidelines  Dietary Guidelines for Americans, 2010  USDA's MyPlate  ASA Prophylaxis  Lung CA Screening    Leesa Muñiz PA-C  [facility]  "

## 2021-03-22 NOTE — PATIENT INSTRUCTIONS
Call [phone] to set up your brain MRI for rechecking meningioma     Decrease paxil (paroxetine) to 10 mg  To see if helps memory  Adding cymbalta (duloxetine) to see if we can help anxiety.  Also can help pain.    Recheck in 4 weeks.  Freeze spots on back at that visit    Stop amlodipine due to BP being lower     Betamethasone LOTION for area on leg.  Slightly stronger than the cream.      Toenail looks to be improving, so continue [facility]    Care coordinator will call to talk about options for help in the home      Patient Education   Personalized Prevention Plan  You are due for the preventive services outlined below.  Your care team is available to assist you in scheduling these services.  If you have already completed any of these items, please share that information with your care team to update in your medical record.  Health Maintenance Due   Topic Date Due     COVID-19 Vaccine (1) Never done     Hepatitis C Screening  Never done     Zoster (Shingles) Vaccine (2 of 3) 12/30/2014     Annual Wellness Visit  05/17/2018     FALL RISK ASSESSMENT  06/24/2020     Asthma Control Test  09/02/2020     Asthma Action Plan - yearly  09/19/2020     Mammogram  03/11/2021     Depression Assessment  03/30/2021

## 2021-03-22 NOTE — NURSING NOTE
"Chief Complaint   Patient presents with     Wellness Visit       Initial /74 (BP Location: Right arm, Patient Position: Chair, Cuff Size: Adult Regular)   Pulse 66   Temp 98  F (36.7  C) (Tympanic)   Resp 14   Ht 1.575 m (5' 2\")   Wt 50.3 kg (111 lb)   SpO2 97%   BMI 20.30 kg/m   Estimated body mass index is 20.3 kg/m  as calculated from the following:    Height as of this encounter: 1.575 m (5' 2\").    Weight as of this encounter: 50.3 kg (111 lb).    Patient presents to the clinic using No DME    Health Maintenance that is potentially due pending provider review:  NONE        Is there anyone who you would like to be able to receive your results? No  If yes have patient fill out [facility]      "

## 2021-03-23 ENCOUNTER — PATIENT OUTREACH (OUTPATIENT)
Dept: NURSING | Facility: CLINIC | Age: 76
End: 2021-03-23
Payer: COMMERCIAL

## 2021-03-23 ASSESSMENT — ANXIETY QUESTIONNAIRES: GAD7 TOTAL SCORE: 13

## 2021-03-23 ASSESSMENT — ASTHMA QUESTIONNAIRES: ACT_TOTALSCORE: 24

## 2021-03-23 NOTE — LETTER
[facility] CARE COORDINATION  [facility]  [address] [place]  [address]    April 13, 2021    Ingrid Amaral  [address] [address]      Dear Ingrid,    I am a clinic community health worker who works with Leesa Muñiz PA-C at [facility]. I have been trying to reach you recently to introduce Clinic Care Coordination and to ask if there is anything we could provide you with.  Below is a description of clinic care coordination and how our team can further assist you.      The clinic care coordination team is made up of a registered nurse,  and community health worker who understand the health care system. The goal of clinic care coordination is to help you manage your health and improve access to the health care system in the most efficient manner. The team can assist you in meeting your health care goals by providing education, coordinating services, strengthening the communication among your providers and supporting you with any resource needs.    Please feel free to contact me with any questions or concerns. We are focused on providing you with the highest-quality healthcare experience possible and that all starts with you.     Sincerely,       Yue ALBERTO  Community Health Worker  [facility]  Clinic Care Coordination  [facility]  [email or web address]   Office: [phone]

## 2021-03-24 NOTE — PROGRESS NOTES
Clinic Care Coordination Contact  Community Health Worker Initial Outreach    CHW Initial Information Gathering:  Current living arrangement:: I live in a private home with spouse  Informal Support system:: Spouse  No PCP office visit in Past Year: Yes  CHW Additional Questions  If ED/Hospital discharge, follow-up appointment scheduled as recommended?: N/A  Medication changes made following ED/Hospital discharge?: N/A  MyChart active?: Yes    Patient accepts CC: Yes. Patient scheduled for assessment with  CC on 4/1 at 1pm. Patient noted desire to discuss in home services and other support/resources available.     The Clinic Community Health Worker spoke with the patient's spouse, Harsha, today to discuss possible Clinic Care Coordination enrollment. The service was described and immediate needs were discussed. The patient's spouse agreed to enrollment and an assessment was scheduled.            Assessment date: 4/1 @1pm    Yue ALBERTO  Community Health Worker   [facility]  Clinic Care Coordination  [facility]  [email or web address]   Office: [phone]

## 2021-03-30 ENCOUNTER — HOSPITAL ENCOUNTER (OUTPATIENT)
Dept: MRI IMAGING | Facility: CLINIC | Age: 76
Discharge: HOME | End: 2021-03-30
Attending: PHYSICIAN ASSISTANT | Admitting: PHYSICIAN ASSISTANT
Payer: COMMERCIAL

## 2021-03-30 DIAGNOSIS — D32.9 MENINGIOMA (H): ICD-10-CM

## 2021-03-30 LAB
CREAT BLD-MCNC: 0.7 MG/DL (ref 0.52–1.04)
GFR SERPL CREATININE-BSD FRML MDRD: 81 ML/MIN/{1.73_M2}

## 2021-03-30 PROCEDURE — 82565 ASSAY OF CREATININE: CPT

## 2021-03-30 PROCEDURE — 255N000002 HC RX 255 OP 636: Performed by: PHYSICIAN ASSISTANT

## 2021-03-30 PROCEDURE — A9585 GADOBUTROL INJECTION: HCPCS | Performed by: PHYSICIAN ASSISTANT

## 2021-03-30 PROCEDURE — 70553 MRI BRAIN STEM W/O & W/DYE: CPT

## 2021-03-30 RX ORDER — GADOBUTROL 604.72 MG/ML
5 INJECTION INTRAVENOUS ONCE
Status: COMPLETED | OUTPATIENT
Start: 2021-03-30 | End: 2021-03-30

## 2021-03-30 RX ADMIN — GADOBUTROL 5 ML: 604.72 INJECTION INTRAVENOUS at 13:16

## 2021-03-31 NOTE — RESULT ENCOUNTER NOTE
Ingrid,    MRI shows small increase in size of meningioma.  I would do nothing different at this time.    Leesa

## 2021-04-05 ENCOUNTER — VIRTUAL VISIT (OUTPATIENT)
Dept: NEUROSURGERY | Facility: CLINIC | Age: 76
End: 2021-04-05
Attending: NEUROLOGICAL SURGERY
Payer: COMMERCIAL

## 2021-04-05 DIAGNOSIS — D32.9 MENINGIOMA (H): Primary | ICD-10-CM

## 2021-04-05 PROCEDURE — 99213 OFFICE O/P EST LOW 20 MIN: CPT | Mod: 95 | Performed by: NEUROLOGICAL SURGERY

## 2021-04-05 NOTE — NURSING NOTE
April 5, 2021 10:30 AM  Ingrid RUEDA Amaral is a 75 year old female who is being evaluated via a billable telephone visit.      What phone number would you like to be contacted at? [phone]  How would you like to obtain your AVS? Karo Mack MA

## 2021-04-05 NOTE — LETTER
4/5/2021         RE: Ingrid Amaral  [address] [id number]        Dear Colleague,    Thank you for referring your patient, Ingrid Amaral, to the [facility]UROS[facility] [place]. Please see a copy of my visit note below.    73F w/ growing right temporal meningioma.  Has been managed for early stage Lewy Body Dementia, and work-up showed a 1 cm meningioma.  Follow up scan has now shown growth to 1.4 cm.  Right handed.  Balance issues, occasional word finding issues, and attention issues.    Telephone encounter for follow up.  New MRI shows continued interval growth of lesion, now 1.2 x 1.6 cm.       Past Medical History:   Diagnosis Date     Asthma      GERD (gastroesophageal reflux disease)      Radial head fracture 3/18/2010     Past Surgical History:   Procedure Laterality Date     ANKLE SURGERY      bilateral     COLONOSCOPY       COLONOSCOPY N/A 3/20/2015    Procedure: COLONOSCOPY;  Surgeon: Britney Martinez MD;  Location: [facility]     COLONOSCOPY N/A 5/22/2017    Procedure: COLONOSCOPY;  Colonoscopy;  Surgeon: Tristen Rangel MD;  Location: [facility]     ESOPHAGOSCOPY, GASTROSCOPY, DUODENOSCOPY (EGD), COMBINED N/A 6/7/2018    Procedure: COMBINED ESOPHAGOSCOPY, GASTROSCOPY, DUODENOSCOPY (EGD);  gastroscopy;  Surgeon: Tristen Rangel MD;  Location: [facility]     HC ESOPH/GAS REFLUX TEST W NASAL IMPED >1 HR  4/19/2012    Procedure:ESOPHAGEAL IMPEDENCE FUNCTION TEST WITH 24 HOUR PH GREATER THAN 1 HOUR; Surgeon:VALDO UMANZOR; Location: [facility]     HERNIA REPAIR      umbilcal     HYSTERECTOMY, PAP NO LONGER INDICATED  1991     TONSILLECTOMY       UPPER GI ENDOSCOPY       Social History     Socioeconomic History     Marital status:      Spouse name: Not on file     Number of children: Not on file     Years of education: Not on file     Highest education level: Not on file   Occupational History     Not on file   Social Needs     Financial resource strain: Not on  file     Food insecurity     Worry: Not on file     Inability: Not on file     Transportation needs     Medical: Not on file     Non-medical: Not on file   Tobacco Use     Smoking status: Former Smoker     Packs/day: 0.50     Years: 3.00     Pack years: 1.50     Types: Cigarettes     Smokeless tobacco: Never Used     Tobacco comment: smoked for 3 years when she was around 19   Substance and Sexual Activity     Alcohol use: Yes     Comment: RARELY     Drug use: No     Sexual activity: Not Currently   Lifestyle     Physical activity     Days per week: Not on file     Minutes per session: Not on file     Stress: Not on file   Relationships     Social connections     Talks on phone: Not on file     Gets together: Not on file     Attends [employer] service: Not on file     Active member of club or organization: Not on file     Attends meetings of clubs or organizations: Not on file     Relationship status: Not on file     Intimate partner violence     Fear of current or ex partner: Not on file     Emotionally abused: Not on file     Physically abused: Not on file     Forced sexual activity: Not on file   Other Topics Concern     Parent/sibling w/ CABG, MI or angioplasty before 65F 55M? Yes     Comment: mother- valve problem   Social History Narrative     Not on file     Family History   Problem Relation Age of Onset     Heart Disease Mother         valve problem     Hypertension Mother      Diabetes Mother         type 2     Cerebrovascular Disease Mother      Allergies Mother      Eye Disorder Mother         Macular degeneration     Osteoporosis Mother      Respiratory Mother      Migraines Mother      Cardiovascular Father         MI at age 80     Cancer - colorectal Father 87     Diabetes Father      Hypertension Father         type 2     Eye Disorder Father         macular degeneration     Lipids Father      C.A.D. Maternal Grandmother      Diabetes Paternal Grandmother         type 2     Cardiovascular Paternal  Grandmother         MI     Diabetes Sister         type 2     Allergies Sister      Gastrointestinal Disease Sister      Depression Sister      Gastrointestinal Disease Daughter      Migraines Daughter      Gastrointestinal Disease Daughter      Migraines Daughter      Migraines Son      Aneurysm No family hx of      Brain Hemorrhage No family hx of      Brain Tumor No family hx of      Cancer No family hx of      Chiari Malformation No family hx of      LUNG DISEASE No family hx of      Seizure Disorder No family hx of         ROS: 10 point ROS neg other than the symptoms noted above in the HPI.    Physical Exam  There were no vitals taken for this visit.  HEENT:  Normocephalic, atraumatic.  PERRLA.  EOM s intact.  Visual fields full to gross exam  Neck:  Supple, non-tender, without lymphadenopathy.  Heart:  No peripheral edema  Lungs:  No SOB  Abdomen:  Non-distended.   Skin:  Warm and dry.  Extremities:  No edema, cyanosis or clubbing.  Psychiatric:  No apparent distress  Musculoskeletal:  Normal bulk and tone    NEUROLOGICAL EXAMINATION:     Mental status:  Alert and Oriented x 3, speech is fluent.  Cranial nerves:  II-XII intact.   Motor:    Shoulder Abduction:  Right:  5/5   Left:  5/5  Biceps:                      Right:  5/5   Left:  5/5  Triceps:                     Right:  5/5   Left:  5/5  Wrist Extensors:       Right:  5/5   Left:  5/5  Wrist Flexors:           Right:  5/5   Left:  5/5  interosseus :            Right:  5/5   Left:  5/5  Hip Flexion:                Right: 5/5  Left:  5/5  Quadriceps:             Right:  5/5  Left:  5/5  Hamstrings:             Right:  5/5  Left:  5/5  Gastroc Soleus:        Right:  5/5  Left:  5/5  Tib/Ant:                      Right:  5/5  Left:  5/5  EHL:                     Right:  5/5  Left:  5/5  Sensation:  Intact  Reflexes:  Negative Babinski.  Negative Clonus.  Negative Bangura's.  Coordination:  Smooth finger to nose testing.   Negative pronator drift.  Smooth  tandem walking.    A/P:  73F w/ growing right temporal meningioma    Discussed option of surgical resection to remove mass and establish pathologic grade  They feel strongly to avoid surgery at this point; we discussed that I feel this is reasonable given the patient's age and other medical issues  Plan for repeat MR Brain in 1 year    Note: 15 minutes of telephone discussion         Again, thank you for allowing me to participate in the care of your patient.        Sincerely,        Fredy Ewing MD

## 2021-04-05 NOTE — PROGRESS NOTES
73F w/ growing right temporal meningioma.  Has been managed for early stage Lewy Body Dementia, and work-up showed a 1 cm meningioma.  Follow up scan has now shown growth to 1.4 cm.  Right handed.  Balance issues, occasional word finding issues, and attention issues.    Telephone encounter for follow up.  New MRI shows continued interval growth of lesion, now 1.2 x 1.6 cm.       Past Medical History:   Diagnosis Date     Asthma      GERD (gastroesophageal reflux disease)      Radial head fracture 3/18/2010     Past Surgical History:   Procedure Laterality Date     ANKLE SURGERY      bilateral     COLONOSCOPY       COLONOSCOPY N/A 3/20/2015    Procedure: COLONOSCOPY;  Surgeon: Britney Martinez MD;  Location: [facility]     COLONOSCOPY N/A 5/22/2017    Procedure: COLONOSCOPY;  Colonoscopy;  Surgeon: Tristen Rangel MD;  Location: [facility]     ESOPHAGOSCOPY, GASTROSCOPY, DUODENOSCOPY (EGD), COMBINED N/A 6/7/2018    Procedure: COMBINED ESOPHAGOSCOPY, GASTROSCOPY, DUODENOSCOPY (EGD);  gastroscopy;  Surgeon: Tristen Rangel MD;  Location: [facility]     HC ESOPH/GAS REFLUX TEST W NASAL IMPED >1 HR  4/19/2012    Procedure:ESOPHAGEAL IMPEDENCE FUNCTION TEST WITH 24 HOUR PH GREATER THAN 1 HOUR; Surgeon:VALDO UMANZOR; Location: [facility]     HERNIA REPAIR      umbilcal     HYSTERECTOMY, PAP NO LONGER INDICATED  1991     TONSILLECTOMY       UPPER GI ENDOSCOPY       Social History     Socioeconomic History     Marital status:      Spouse name: Not on file     Number of children: Not on file     Years of education: Not on file     Highest education level: Not on file   Occupational History     Not on file   Social Needs     Financial resource strain: Not on file     Food insecurity     Worry: Not on file     Inability: Not on file     Transportation needs     Medical: Not on file     Non-medical: Not on file   Tobacco Use     Smoking status: Former Smoker     Packs/day: 0.50     Years: 3.00     Pack years: 1.50     Types:  Cigarettes     Smokeless tobacco: Never Used     Tobacco comment: smoked for 3 years when she was around 19   Substance and Sexual Activity     Alcohol use: Yes     Comment: RARELY     Drug use: No     Sexual activity: Not Currently   Lifestyle     Physical activity     Days per week: Not on file     Minutes per session: Not on file     Stress: Not on file   Relationships     Social connections     Talks on phone: Not on file     Gets together: Not on file     Attends [employer] service: Not on file     Active member of club or organization: Not on file     Attends meetings of clubs or organizations: Not on file     Relationship status: Not on file     Intimate partner violence     Fear of current or ex partner: Not on file     Emotionally abused: Not on file     Physically abused: Not on file     Forced sexual activity: Not on file   Other Topics Concern     Parent/sibling w/ CABG, MI or angioplasty before 65F 55M? Yes     Comment: mother- valve problem   Social History Narrative     Not on file     Family History   Problem Relation Age of Onset     Heart Disease Mother         valve problem     Hypertension Mother      Diabetes Mother         type 2     Cerebrovascular Disease Mother      Allergies Mother      Eye Disorder Mother         Macular degeneration     Osteoporosis Mother      Respiratory Mother      Migraines Mother      Cardiovascular Father         MI at age 80     Cancer - colorectal Father 87     Diabetes Father      Hypertension Father         type 2     Eye Disorder Father         macular degeneration     Lipids Father      C.A.D. Maternal Grandmother      Diabetes Paternal Grandmother         type 2     Cardiovascular Paternal Grandmother         MI     Diabetes Sister         type 2     Allergies Sister      Gastrointestinal Disease Sister      Depression Sister      Gastrointestinal Disease Daughter      Migraines Daughter      Gastrointestinal Disease Daughter      Migraines Daughter       Migraines Son      Aneurysm No family hx of      Brain Hemorrhage No family hx of      Brain Tumor No family hx of      Cancer No family hx of      Chiari Malformation No family hx of      LUNG DISEASE No family hx of      Seizure Disorder No family hx of         ROS: 10 point ROS neg other than the symptoms noted above in the HPI.    Physical Exam  There were no vitals taken for this visit.  HEENT:  Normocephalic, atraumatic.  PERRLA.  EOM s intact.  Visual fields full to gross exam  Neck:  Supple, non-tender, without lymphadenopathy.  Heart:  No peripheral edema  Lungs:  No SOB  Abdomen:  Non-distended.   Skin:  Warm and dry.  Extremities:  No edema, cyanosis or clubbing.  Psychiatric:  No apparent distress  Musculoskeletal:  Normal bulk and tone    NEUROLOGICAL EXAMINATION:     Mental status:  Alert and Oriented x 3, speech is fluent.  Cranial nerves:  II-XII intact.   Motor:    Shoulder Abduction:  Right:  5/5   Left:  5/5  Biceps:                      Right:  5/5   Left:  5/5  Triceps:                     Right:  5/5   Left:  5/5  Wrist Extensors:       Right:  5/5   Left:  5/5  Wrist Flexors:           Right:  5/5   Left:  5/5  interosseus :            Right:  5/5   Left:  5/5  Hip Flexion:                Right: 5/5  Left:  5/5  Quadriceps:             Right:  5/5  Left:  5/5  Hamstrings:             Right:  5/5  Left:  5/5  Gastroc Soleus:        Right:  5/5  Left:  5/5  Tib/Ant:                      Right:  5/5  Left:  5/5  EHL:                     Right:  5/5  Left:  5/5  Sensation:  Intact  Reflexes:  Negative Babinski.  Negative Clonus.  Negative Bangura's.  Coordination:  Smooth finger to nose testing.   Negative pronator drift.  Smooth tandem walking.    A/P:  73F w/ growing right temporal meningioma    Discussed option of surgical resection to remove mass and establish pathologic grade  They feel strongly to avoid surgery at this point; we discussed that I feel this is reasonable given the patient's  age and other medical issues  Plan for repeat MR Brain in 1 year    Note: 15 minutes of telephone discussion

## 2021-04-05 NOTE — PATIENT INSTRUCTIONS
Patient Next Steps:      Dr. Ewing would like to see you back in the clinic for follow up in 1 year(s) with a new brain  MRI. We will set a reminder to call you in about 8-10 months to remind you to get this scheduled.     Please call us if you have any further questions or concerns.    Yuliana ROBERTS RN  [facility]   Phone: [phone]  Fax: [phone]

## 2021-04-07 NOTE — PROGRESS NOTES
Clinic Care Coordination Contact  [facility]/Voicemail     Clinical Data: Care Coordination Outreach  Outreach attempted x 1.  Left message on patient's spouse's voicemail with call back information and requested return call.  Plan: CHW will try to reach patient's spouse again in 3-5 business days to reschedule SW Assessment if desired.    Yue ALBERTO  Community Health Worker  [facility] Care Coordination  [facility]  [email or web address]   Office: [phone]

## 2021-04-13 NOTE — PROGRESS NOTES
Clinic Care Coordination Contact  [facility]/Voicemail    Clinical Data: Care Coordination Outreach  Outreach attempted x 2.  Left message on patient's spouse's voicemail with call back information and requested return call.  Plan: CHW will try to reach patient's spouse once more in 1-2 business days to inquire about rescheduling.  If unsuccessful outreach, CHW will send CCC Introduction letter via [employer].     Yue ALBERTO  Community Health Worker  [facility] Care Coordination  [facility]  [email or web address]   Office: [phone]

## 2021-04-13 NOTE — PROGRESS NOTES
Clinic Care Coordination Contact  [facility]/Voicemail    Clinical Data: Care Coordination Outreach  Outreach attempted x 3.  Left message on patient's voicemail with call back information and requested return call.  Plan: CHW sent care coordination introduction letter on 4/13 via [employer]. CHW will do no further outreaches at this time.    Yue ALBERTO  Community Health Worker  [facility] Care Coordination  [facility]  [email or web address]   Office: [phone]

## 2021-04-19 ENCOUNTER — OFFICE VISIT (OUTPATIENT)
Dept: FAMILY MEDICINE | Facility: CLINIC | Age: 76
End: 2021-04-19
Payer: COMMERCIAL

## 2021-04-19 VITALS
TEMPERATURE: 97.5 F | SYSTOLIC BLOOD PRESSURE: 108 MMHG | OXYGEN SATURATION: 97 % | BODY MASS INDEX: 20.46 KG/M2 | HEART RATE: 67 BPM | WEIGHT: 111.2 LBS | HEIGHT: 62 IN | RESPIRATION RATE: 12 BRPM | DIASTOLIC BLOOD PRESSURE: 70 MMHG

## 2021-04-19 DIAGNOSIS — F41.0 PANIC DISORDER WITHOUT AGORAPHOBIA: ICD-10-CM

## 2021-04-19 DIAGNOSIS — J31.0 CHRONIC RHINITIS: ICD-10-CM

## 2021-04-19 DIAGNOSIS — D32.9 MENINGIOMA (H): ICD-10-CM

## 2021-04-19 DIAGNOSIS — F32.0 MILD MAJOR DEPRESSION (H): ICD-10-CM

## 2021-04-19 DIAGNOSIS — F41.9 ANXIETY: ICD-10-CM

## 2021-04-19 DIAGNOSIS — L57.0 AK (ACTINIC KERATOSIS): ICD-10-CM

## 2021-04-19 DIAGNOSIS — B35.1 ONYCHOMYCOSIS: ICD-10-CM

## 2021-04-19 DIAGNOSIS — G31.83 LEWY BODY DEMENTIA WITHOUT BEHAVIORAL DISTURBANCE (H): Primary | ICD-10-CM

## 2021-04-19 DIAGNOSIS — F02.80 LEWY BODY DEMENTIA WITHOUT BEHAVIORAL DISTURBANCE (H): Primary | ICD-10-CM

## 2021-04-19 PROCEDURE — 17000 DESTRUCT PREMALG LESION: CPT | Performed by: PHYSICIAN ASSISTANT

## 2021-04-19 PROCEDURE — 17003 DESTRUCT PREMALG LES 2-14: CPT | Performed by: PHYSICIAN ASSISTANT

## 2021-04-19 PROCEDURE — 99214 OFFICE O/P EST MOD 30 MIN: CPT | Mod: 25 | Performed by: PHYSICIAN ASSISTANT

## 2021-04-19 RX ORDER — TERBINAFINE HYDROCHLORIDE 250 MG/1
TABLET ORAL
Qty: 21 TABLET | Refills: 0 | Status: SHIPPED | OUTPATIENT
Start: 2021-04-19 | End: 2021-09-13

## 2021-04-19 RX ORDER — TERBINAFINE HYDROCHLORIDE 250 MG/1
TABLET ORAL
Qty: 21 TABLET | Refills: 0 | Status: SHIPPED | OUTPATIENT
Start: 2021-04-19 | End: 2021-04-19

## 2021-04-19 RX ORDER — DULOXETIN HYDROCHLORIDE 30 MG/1
30 CAPSULE, DELAYED RELEASE ORAL DAILY
Qty: 30 CAPSULE | Refills: 1 | Status: SHIPPED | OUTPATIENT
Start: 2021-04-19 | End: 2021-07-14

## 2021-04-19 RX ORDER — CETIRIZINE HYDROCHLORIDE 10 MG/1
TABLET ORAL
Qty: 90 TABLET | Refills: 3 | Status: SHIPPED | OUTPATIENT
Start: 2021-04-19 | End: 2021-08-27

## 2021-04-19 ASSESSMENT — ANXIETY QUESTIONNAIRES
5. BEING SO RESTLESS THAT IT IS HARD TO SIT STILL: MORE THAN HALF THE DAYS
1. FEELING NERVOUS, ANXIOUS, OR ON EDGE: MORE THAN HALF THE DAYS
6. BECOMING EASILY ANNOYED OR IRRITABLE: SEVERAL DAYS
7. FEELING AFRAID AS IF SOMETHING AWFUL MIGHT HAPPEN: SEVERAL DAYS
GAD7 TOTAL SCORE: 12
2. NOT BEING ABLE TO STOP OR CONTROL WORRYING: MORE THAN HALF THE DAYS
3. WORRYING TOO MUCH ABOUT DIFFERENT THINGS: MORE THAN HALF THE DAYS

## 2021-04-19 ASSESSMENT — MIFFLIN-ST. JEOR: SCORE: 947.65

## 2021-04-19 ASSESSMENT — PATIENT HEALTH QUESTIONNAIRE - PHQ9
SUM OF ALL RESPONSES TO PHQ QUESTIONS 1-9: 6
5. POOR APPETITE OR OVEREATING: MORE THAN HALF THE DAYS

## 2021-04-19 NOTE — PROGRESS NOTES
Assessment & Plan     Lewy body dementia without behavioral disturbance (H)  Stable, unfortunately not improved with decreasing anti-cholinergic paxil    Meningioma (H)  Worsened, monitoring with specialist    Mild major depression (H)  Panic disorder without agoraphobia  Anxiety  stable  - DULoxetine (CYMBALTA) 30 MG capsule; Take 1 capsule (30 mg) by mouth daily For anxiety.    Chronic rhinitis  Stable, trial discontinue singulair  - cetirizine (ZYRTEC) 10 MG tablet; TAKE ONE TABLET BY MOUTH ONCE DAILY IN THE MORNING FOR ALLERGIES    Onychomycosis  Start treatment, vicks not helping  - terbinafine (LAMISIL) 250 MG tablet; 1 tab by mouth daily x 7 days, repeating this 7 days of treatment one week per month x 3 months.  For nail fungus.    AK (actinic keratosis)  LN x 2 applied to approx 10 lesions  - DESTRUCT PREMALIGNANT LESION, 2-14    Patient Instructions   Right leg rash -  Stop the solarcaine spray  See if does well enough, if redness goes away  If redness does not go away, call me.  If redness goes but usual rash not improving enough, see dermatology    Mood and memory -  Stop paxil (paroxetine) entirely  Increase cymbalta (duloxetine)    Asthma/allergies -  Stop singulair (montelukast) as it may no longer be needed and can sometimes impact mood    Nail fungus -  Decided to treat with pills, start terbinafine    Recheck 1-2 months      Return in about 1 month (around 5/19/2021).    FRANKIE Fisher [facility]    Heena Quintero is a 75 year old who presents for the following health issues  accompanied by her spouse:    HPI     Anxiety Follow-Up    How are you doing with your anxiety since your last visit? No change    Are you having other symptoms that might be associated with anxiety? No    Have you had a significant life event? No     Are you feeling depressed? Yes:  sometimes, when she can't get things done that she wants to get done    Do you have any concerns  "with your use of alcohol or other drugs? No    R leg rash - feels was improvement with the betamethasone lotion but 2 wks ago rajan pecked at her leg.  Has been using solarcaine topical since - and has become much more red and wide spread.    Anxiety and dementia - Seems unchanged to , maybe worse to her.    Meningoma - decided to not biopy but monitor again in 1 yr.    * BP- Has been running OK..    * Derm Problem-Freeze spots on back      Social History     Tobacco Use     Smoking status: Former Smoker     Packs/day: 0.50     Years: 3.00     Pack years: 1.50     Types: Cigarettes     Smokeless tobacco: Never Used     Tobacco comment: smoked for 3 years when she was around 19   Substance Use Topics     Alcohol use: Yes     Comment: RARELY     Drug use: No     DELMIS-7 SCORE 9/30/2020 3/22/2021 4/19/2021   Total Score - - -   Total Score 14 (moderate anxiety) - -   Total Score 14 13 12     PHQ 9/30/2020 3/22/2021 4/19/2021   PHQ-9 Total Score 6 7 6   Q9: Thoughts of better off dead/self-harm past 2 weeks Not at all Not at all Not at all       Objective    /70 (BP Location: Right arm, Patient Position: Chair, Cuff Size: Adult Regular)   Pulse 67   Temp 97.5  F (36.4  C) (Tympanic)   Resp 12   Ht 1.575 m (5' 2\")   Wt 50.4 kg (111 lb 3.2 oz)   SpO2 97%   BMI 20.34 kg/m    Body mass index is 20.34 kg/m .  Physical Exam   GENERAL: healthy, alert and no distress  SKIN: AK over back, arms, R thigh  PSYCH: at recent baseline    "

## 2021-04-19 NOTE — PATIENT INSTRUCTIONS
Right leg rash -  Stop the solarcaine spray  See if does well enough, if redness goes away  If redness does not go away, call me.  If redness goes but usual rash not improving enough, see dermatology    Mood and memory -  Stop paxil (paroxetine) entirely  Increase cymbalta (duloxetine)    Asthma/allergies -  Stop singulair (montelukast) as it may no longer be needed and can sometimes impact mood    Nail fungus -  Decided to treat with pills, start terbinafine    Recheck 1-2 months

## 2021-04-20 ASSESSMENT — ANXIETY QUESTIONNAIRES: GAD7 TOTAL SCORE: 12

## 2021-04-28 ENCOUNTER — TELEPHONE (OUTPATIENT)
Dept: FAMILY MEDICINE | Facility: CLINIC | Age: 76
End: 2021-04-28

## 2021-04-28 DIAGNOSIS — J45.40 MODERATE PERSISTENT ASTHMA WITHOUT COMPLICATION: ICD-10-CM

## 2021-06-06 DIAGNOSIS — R68.89 TEMPERATURE INTOLERANCE: ICD-10-CM

## 2021-06-06 DIAGNOSIS — F32.0 MILD MAJOR DEPRESSION (H): ICD-10-CM

## 2021-06-06 DIAGNOSIS — F41.0 PANIC DISORDER WITHOUT AGORAPHOBIA: ICD-10-CM

## 2021-06-08 RX ORDER — PAROXETINE 10 MG/1
TABLET, FILM COATED ORAL
Qty: 30 TABLET | Refills: 4 | Status: SHIPPED | OUTPATIENT
Start: 2021-06-08 | End: 2021-09-13

## 2021-07-13 DIAGNOSIS — R68.89 TEMPERATURE INTOLERANCE: ICD-10-CM

## 2021-07-13 DIAGNOSIS — R41.89 COGNITIVE IMPAIRMENT: ICD-10-CM

## 2021-07-13 DIAGNOSIS — F32.0 MILD MAJOR DEPRESSION (H): ICD-10-CM

## 2021-07-13 DIAGNOSIS — F41.9 ANXIETY: ICD-10-CM

## 2021-07-13 DIAGNOSIS — F41.0 PANIC DISORDER WITHOUT AGORAPHOBIA: ICD-10-CM

## 2021-07-13 DIAGNOSIS — E53.8 VITAMIN B12 DEFICIENCY (NON ANEMIC): ICD-10-CM

## 2021-07-14 RX ORDER — DONEPEZIL HYDROCHLORIDE 10 MG/1
TABLET, FILM COATED ORAL
Qty: 30 TABLET | Refills: 0 | Status: SHIPPED | OUTPATIENT
Start: 2021-07-14 | End: 2021-08-12

## 2021-07-14 RX ORDER — DULOXETIN HYDROCHLORIDE 30 MG/1
CAPSULE, DELAYED RELEASE ORAL
Qty: 30 CAPSULE | Refills: 0 | Status: SHIPPED | OUTPATIENT
Start: 2021-07-14 | End: 2021-08-12

## 2021-07-14 RX ORDER — PAROXETINE 10 MG/1
TABLET, FILM COATED ORAL
Qty: 30 TABLET | Refills: 4 | Status: CANCELLED | OUTPATIENT
Start: 2021-07-14

## 2021-07-14 RX ORDER — MULTIVITAMIN WITH IRON
200 TABLET ORAL DAILY
Qty: 60 TABLET | Refills: 0 | Status: SHIPPED | OUTPATIENT
Start: 2021-07-14 | End: 2021-08-11

## 2021-07-14 NOTE — TELEPHONE ENCOUNTER
"Requested Prescriptions   Pending Prescriptions Disp Refills     donepezil (ARICEPT) 10 MG tablet [Pharmacy Med Name: DONEPEZIL HCL 10MG TABS] 30 tablet 3     Sig: TAKE ONE TABLET BY MOUTH EVERY NIGHT AT BEDTIME FOR MEMORY       Miscellaneous Dementia Agents Passed - 7/14/2021  2:59 PM        Passed - Recent (12 mo) or future (30 days) visit within the authorizing provider's specialty     Patient has had an office visit with the authorizing provider or a provider within the authorizing providers department within the previous 12 mos or has a future within next 30 days. See \"Patient Info\" tab in inbasket, or \"Choose Columns\" in Meds & Orders section of the refill encounter.              Passed - Medication is active on med list        Passed - Patient is 18 years of age or older           DULoxetine (CYMBALTA) 30 MG capsule [Pharmacy Med Name: DULOXETINE HCL 30MG CPEP] 30 capsule 1     Sig: TAKE ONE CAPSULE BY MOUTH ONCE DAILY FOR ANXIETY       Serotonin-Norepinephrine Reuptake Inhibitors  Failed - 7/14/2021  2:59 PM        Failed - PHQ-9 score of less than 5 in past 6 months     Please review last PHQ-9 score.           Passed - Blood pressure under 140/90 in past 12 months     BP Readings from Last 3 Encounters:   04/19/21 108/70   03/22/21 106/74   09/30/20 100/68                 Passed - Medication is active on med list        Passed - Patient is age 18 or older        Passed - No active pregnancy on record        Passed - No positive pregnancy test in past 12 months        Passed - Recent (6 mo) or future (30 days) visit within the authorizing provider's specialty     Patient had office visit in the last 6 months or has a visit in the next 30 days with authorizing provider or within the authorizing provider's specialty.  See \"Patient Info\" tab in inbasket, or \"Choose Columns\" in Meds & Orders section of the refill encounter.               vitamin B-12 (CYANOCOBALAMIN) 100 MCG tablet 60 tablet 3       Vitamin " "Supplements (Adult) Protocol Passed - 7/14/2021  2:59 PM        Passed - High dose Vitamin D not ordered        Passed - Recent (12 mo) or future (30 days) visit within the authorizing provider's specialty     Patient has had an office visit with the authorizing provider or a provider within the authorizing providers department within the previous 12 mos or has a future within next 30 days. See \"Patient Info\" tab in inbasket, or \"Choose Columns\" in Meds & Orders section of the refill encounter.              Passed - Medication is active on med list             "

## 2021-08-06 DIAGNOSIS — F41.0 PANIC DISORDER WITHOUT AGORAPHOBIA: ICD-10-CM

## 2021-08-06 DIAGNOSIS — G47.52 RBD (REM BEHAVIORAL DISORDER): ICD-10-CM

## 2021-08-06 DIAGNOSIS — F32.0 MILD MAJOR DEPRESSION (H): ICD-10-CM

## 2021-08-06 DIAGNOSIS — G47.00 INSOMNIA, UNSPECIFIED TYPE: ICD-10-CM

## 2021-08-06 RX ORDER — TRAZODONE HYDROCHLORIDE 100 MG/1
TABLET ORAL
Qty: 30 TABLET | Refills: 11 | Status: SHIPPED | OUTPATIENT
Start: 2021-08-06 | End: 2022-09-07

## 2021-08-06 NOTE — TELEPHONE ENCOUNTER
Ingrid would like trazodone 50mg take 1-2 by mouth nightly so on days when she has morning events like [employer] she can just take 50mg the night before rather than 100mg

## 2021-08-07 DIAGNOSIS — J45.40 MODERATE PERSISTENT ASTHMA WITHOUT COMPLICATION: ICD-10-CM

## 2021-08-10 DIAGNOSIS — E53.8 VITAMIN B12 DEFICIENCY (NON ANEMIC): ICD-10-CM

## 2021-08-10 DIAGNOSIS — F32.0 MILD MAJOR DEPRESSION (H): ICD-10-CM

## 2021-08-10 DIAGNOSIS — F41.0 PANIC DISORDER WITHOUT AGORAPHOBIA: ICD-10-CM

## 2021-08-10 DIAGNOSIS — R41.89 COGNITIVE IMPAIRMENT: ICD-10-CM

## 2021-08-10 DIAGNOSIS — F41.9 ANXIETY: ICD-10-CM

## 2021-08-10 NOTE — TELEPHONE ENCOUNTER
Ingrid would like all her directions to say what the medication is for like '1qd for mood/ depression/ anxiety.' She said she really appreciates when the directions say what the indication is so she can see why she is to take the specific meds

## 2021-08-11 RX ORDER — UBIDECARENONE 75 MG
CAPSULE ORAL
Qty: 60 TABLET | Refills: 11 | Status: SHIPPED | OUTPATIENT
Start: 2021-08-11 | End: 2021-08-27

## 2021-08-12 RX ORDER — DONEPEZIL HYDROCHLORIDE 10 MG/1
TABLET, FILM COATED ORAL
Qty: 30 TABLET | Refills: 0 | Status: SHIPPED | OUTPATIENT
Start: 2021-08-12 | End: 2021-08-12

## 2021-08-12 RX ORDER — DONEPEZIL HYDROCHLORIDE 10 MG/1
TABLET, FILM COATED ORAL
Qty: 30 TABLET | Refills: 0 | Status: SHIPPED | OUTPATIENT
Start: 2021-08-12 | End: 2021-08-27

## 2021-08-12 RX ORDER — DULOXETIN HYDROCHLORIDE 30 MG/1
30 CAPSULE, DELAYED RELEASE ORAL DAILY
Qty: 30 CAPSULE | Refills: 0 | Status: SHIPPED | OUTPATIENT
Start: 2021-08-12 | End: 2021-08-12

## 2021-08-12 RX ORDER — DULOXETIN HYDROCHLORIDE 30 MG/1
30 CAPSULE, DELAYED RELEASE ORAL DAILY
Qty: 30 CAPSULE | Refills: 0 | Status: SHIPPED | OUTPATIENT
Start: 2021-08-12 | End: 2021-08-27

## 2021-08-24 DIAGNOSIS — G89.29 CHRONIC RIGHT-SIDED LOW BACK PAIN WITH RIGHT-SIDED SCIATICA: ICD-10-CM

## 2021-08-24 DIAGNOSIS — M54.41 CHRONIC RIGHT-SIDED LOW BACK PAIN WITH RIGHT-SIDED SCIATICA: ICD-10-CM

## 2021-08-25 RX ORDER — GABAPENTIN 300 MG/1
CAPSULE ORAL
Qty: 180 CAPSULE | Refills: 5 | Status: SHIPPED | OUTPATIENT
Start: 2021-08-25 | End: 2022-09-21

## 2021-08-27 ENCOUNTER — OFFICE VISIT (OUTPATIENT)
Dept: FAMILY MEDICINE | Facility: CLINIC | Age: 76
End: 2021-08-27
Payer: COMMERCIAL

## 2021-08-27 ENCOUNTER — ANCILLARY PROCEDURE (OUTPATIENT)
Dept: GENERAL RADIOLOGY | Facility: CLINIC | Age: 76
End: 2021-08-27
Attending: NURSE PRACTITIONER
Payer: COMMERCIAL

## 2021-08-27 VITALS
HEART RATE: 70 BPM | OXYGEN SATURATION: 99 % | WEIGHT: 108.6 LBS | HEIGHT: 61 IN | SYSTOLIC BLOOD PRESSURE: 134 MMHG | DIASTOLIC BLOOD PRESSURE: 76 MMHG | BODY MASS INDEX: 20.5 KG/M2

## 2021-08-27 DIAGNOSIS — J31.0 CHRONIC RHINITIS: ICD-10-CM

## 2021-08-27 DIAGNOSIS — E53.8 VITAMIN B12 DEFICIENCY (NON ANEMIC): ICD-10-CM

## 2021-08-27 DIAGNOSIS — G89.29 CHRONIC RIGHT-SIDED LOW BACK PAIN WITHOUT SCIATICA: ICD-10-CM

## 2021-08-27 DIAGNOSIS — M54.50 CHRONIC RIGHT-SIDED LOW BACK PAIN WITHOUT SCIATICA: ICD-10-CM

## 2021-08-27 DIAGNOSIS — F32.0 MILD MAJOR DEPRESSION (H): Primary | ICD-10-CM

## 2021-08-27 DIAGNOSIS — L30.9 DERMATITIS: ICD-10-CM

## 2021-08-27 DIAGNOSIS — F41.9 ANXIETY: ICD-10-CM

## 2021-08-27 DIAGNOSIS — R41.89 COGNITIVE IMPAIRMENT: ICD-10-CM

## 2021-08-27 DIAGNOSIS — F41.0 PANIC DISORDER WITHOUT AGORAPHOBIA: ICD-10-CM

## 2021-08-27 DIAGNOSIS — K21.9 GASTROESOPHAGEAL REFLUX DISEASE WITHOUT ESOPHAGITIS: ICD-10-CM

## 2021-08-27 DIAGNOSIS — E73.9 LACTOSE INTOLERANCE: ICD-10-CM

## 2021-08-27 DIAGNOSIS — J45.40 MODERATE PERSISTENT ASTHMA WITHOUT COMPLICATION: ICD-10-CM

## 2021-08-27 DIAGNOSIS — B35.1 ONYCHOMYCOSIS: ICD-10-CM

## 2021-08-27 DIAGNOSIS — K52.9 CHRONIC DIARRHEA: ICD-10-CM

## 2021-08-27 DIAGNOSIS — L40.9 SCALP PSORIASIS: ICD-10-CM

## 2021-08-27 PROCEDURE — 72100 X-RAY EXAM L-S SPINE 2/3 VWS: CPT | Performed by: RADIOLOGY

## 2021-08-27 PROCEDURE — 99214 OFFICE O/P EST MOD 30 MIN: CPT | Performed by: NURSE PRACTITIONER

## 2021-08-27 RX ORDER — UBIDECARENONE 75 MG
CAPSULE ORAL
Qty: 60 TABLET | Refills: 11 | Status: SHIPPED | OUTPATIENT
Start: 2021-08-27 | End: 2022-09-20

## 2021-08-27 RX ORDER — CETIRIZINE HYDROCHLORIDE 10 MG/1
TABLET ORAL
Qty: 90 TABLET | Refills: 3 | Status: SHIPPED | OUTPATIENT
Start: 2021-08-27 | End: 2022-09-09

## 2021-08-27 RX ORDER — TRIAMCINOLONE ACETONIDE 1 MG/G
CREAM TOPICAL 2 TIMES DAILY
Qty: 15 G | Refills: 0 | Status: SHIPPED | OUTPATIENT
Start: 2021-08-27 | End: 2022-06-06

## 2021-08-27 RX ORDER — OMEPRAZOLE 10 MG/1
10 CAPSULE, DELAYED RELEASE ORAL DAILY
Qty: 30 CAPSULE | Refills: 11 | Status: SHIPPED | OUTPATIENT
Start: 2021-08-27 | End: 2022-09-21

## 2021-08-27 RX ORDER — PAROXETINE 10 MG/1
TABLET, FILM COATED ORAL
Qty: 90 TABLET | Refills: 3 | Status: CANCELLED | OUTPATIENT
Start: 2021-08-27

## 2021-08-27 RX ORDER — DONEPEZIL HYDROCHLORIDE 10 MG/1
TABLET, FILM COATED ORAL
Qty: 90 TABLET | Refills: 3 | Status: SHIPPED | OUTPATIENT
Start: 2021-08-27 | End: 2022-09-20

## 2021-08-27 RX ORDER — TERBINAFINE HYDROCHLORIDE 250 MG/1
TABLET ORAL
Qty: 21 TABLET | Refills: 0 | Status: CANCELLED | OUTPATIENT
Start: 2021-08-27

## 2021-08-27 RX ORDER — BETAMETHASONE DIPROPIONATE 0.5 MG/ML
LOTION, AUGMENTED TOPICAL
Qty: 30 ML | Refills: 0 | Status: SHIPPED | OUTPATIENT
Start: 2021-08-27 | End: 2023-01-30

## 2021-08-27 RX ORDER — DULOXETIN HYDROCHLORIDE 30 MG/1
30 CAPSULE, DELAYED RELEASE ORAL DAILY
Qty: 90 CAPSULE | Refills: 3 | Status: SHIPPED | OUTPATIENT
Start: 2021-08-27 | End: 2022-09-09

## 2021-08-27 ASSESSMENT — MIFFLIN-ST. JEOR: SCORE: 914.11

## 2021-08-27 ASSESSMENT — ANXIETY QUESTIONNAIRES
2. NOT BEING ABLE TO STOP OR CONTROL WORRYING: NEARLY EVERY DAY
IF YOU CHECKED OFF ANY PROBLEMS ON THIS QUESTIONNAIRE, HOW DIFFICULT HAVE THESE PROBLEMS MADE IT FOR YOU TO DO YOUR WORK, TAKE CARE OF THINGS AT HOME, OR GET ALONG WITH OTHER PEOPLE: SOMEWHAT DIFFICULT
6. BECOMING EASILY ANNOYED OR IRRITABLE: SEVERAL DAYS
7. FEELING AFRAID AS IF SOMETHING AWFUL MIGHT HAPPEN: MORE THAN HALF THE DAYS
1. FEELING NERVOUS, ANXIOUS, OR ON EDGE: MORE THAN HALF THE DAYS
5. BEING SO RESTLESS THAT IT IS HARD TO SIT STILL: NOT AT ALL
GAD7 TOTAL SCORE: 12
3. WORRYING TOO MUCH ABOUT DIFFERENT THINGS: MORE THAN HALF THE DAYS

## 2021-08-27 ASSESSMENT — PATIENT HEALTH QUESTIONNAIRE - PHQ9
SUM OF ALL RESPONSES TO PHQ QUESTIONS 1-9: 8
5. POOR APPETITE OR OVEREATING: MORE THAN HALF THE DAYS

## 2021-08-27 NOTE — PATIENT INSTRUCTIONS
For nail fungus  over-the-counter ProX Clears and follow package directions.  Follow-up in clinic not improving    For back, orthopedic referral placed, you should be receiving a phone call to schedule.  Discussed repeat injection.    For scalp psoriasis, you do have steroid lotion you can apply to this as per directions.    We will keep you off Paxil for anxiety/depression and continue the Cymbalta    Follow-up in 6 months in office

## 2021-08-27 NOTE — PROGRESS NOTES
Assessment & Plan     Mild major depression (H)  Chronic, stable.  Has continued to take Paxil, although was discontinued at last office visit April 2021.  Advised continuing Cymbalta at current dose and stopping Paxil completely.  Follow-up in 6 months or sooner if needed.  - DULoxetine (CYMBALTA) 30 MG capsule; Take 1 capsule (30 mg) by mouth daily ANXIETY/DEPRESSION ONE CAP DAILY    Anxiety  Chronic, stable.  Started on Cymbalta.  Tolerating well.  Continue without any changes.  Follow-up in 6 months or sooner if needed.  - DULoxetine (CYMBALTA) 30 MG capsule; Take 1 capsule (30 mg) by mouth daily ANXIETY/DEPRESSION ONE CAP DAILY    Panic disorder without agoraphobia  Chronic, see above.  - DULoxetine (CYMBALTA) 30 MG capsule; Take 1 capsule (30 mg) by mouth daily ANXIETY/DEPRESSION ONE CAP DAILY    Moderate persistent asthma without complication  Chronic, stable.  No changes.  Continue Advair.  - fluticasone-salmeterol (ADVAIR) 250-50 MCG/DOSE inhaler; ASTHMA; INHALE ONE PUFF BY MOUTH TWICE A DAY    Cognitive impairment  Stable, no significant changes.  Continue Aricept without change.  - donepezil (ARICEPT) 10 MG tablet; MEMORY TAKE ONE TABLET BY MOUTH EVERY NIGHT AT BEDTIME    Vitamin B12 deficiency (non anemic)  Chronic, stable.  No changes.  - cyanocobalamin (VITAMIN B-12) 100 MCG tablet; B12 DEFICIENCY TAKE TWO TABLETS BY MOUTH ONCE DAILY    Gastroesophageal reflux disease without esophagitis  Chronic, taking Prilosec as prescribed.  Controlling reflux well.  No changes.  - omeprazole (PRILOSEC) 10 MG DR capsule; Take 1 capsule (10 mg) by mouth daily HEARTBURN; TAKE 1 CAPSULE (10 MG) BY MOUTH PER DAY    Lactose intolerance  Chronic, stable.  - lactase (LACTAID) 9000 units TABS tablet; Take 1 tablet (9,000 Units) by mouth 3 times daily (with meals) For lactose intolerance, when eating milk-related foods.    Chronic diarrhea  Chronic, stable.  No changes.  - amylase-lipase-protease (CREON) 5275-44646-10468  units CPEP; DIARRHEA TAKE TWO CAPSULES BY MOUTH THREE TIMES A DAY WITH MEALS    Chronic right-sided low back pain without sciatica  Chronic, intermittent.  Has had lumbar injections in the past.  Feels the last 1 helped a great deal and held up through just recently.  Patient does like to dance and would like to keep moving.  Orthopedic referral placed to discuss further management and possible repeat injection.  - XR Lumbar Spine 2/3 Views; Future  - Spine Referral; Future    Dermatitis  Chronic, stable.  No changes.  - triamcinolone (KENALOG) 0.1 % external cream; Apply topically 2 times daily RASH ON LEGS; USE AS NEEDED    Scalp psoriasis  Continued scalp psoriasis, okay to use Diprolene lotion to area as well as legs psoriasis.  Follow-up if not improving or worsening.  - augmented betamethasone dipropionate (DIPROLENE) 0.05 % external lotion; AREA ON LEG AND SCALP Apply sparingly twice daily Friday through Sunday. Stronger.    Onychomycosis  Chronic, has tried over-the-counter treatments as well as oral Lamisil x3 months, has improved, however is not completely resolved.  Recommend picking up over-the-counter ProX clearz and if not improving follow-up in clinic.    Chronic rhinitis  Chronic, stable.  No changes.  - cetirizine (ZYRTEC) 10 MG tablet; ALLERGIES TAKE ONE TABLET BY MOUTH ONCE DAILY IN THE MORNING             See Patient Instructions    Return in about 6 months (around 2/27/2022) for Recheck.    Soha Foley, DNP, APRN-CNP   [facility]     Ingrid Amaral is a 75 year old female who presents today for the following   health issues:    HPI    Depression and Anxiety Follow-Up    How are you doing with your depression since your last visit? No change    How are you doing with your anxiety since your last visit?  No change    Are you having other symptoms that might be associated with depression or anxiety? Yes:  talking outloud and parkinsons. difficulty  finishing a sentence.     Have you had a significant life event? OTHER: daughter is visiting and other family is home     Do you have any concerns with your use of alcohol or other drugs? No    Social History     Tobacco Use     Smoking status: Former Smoker     Packs/day: 0.50     Years: 3.00     Pack years: 1.50     Types: Cigarettes     Smokeless tobacco: Never Used     Tobacco comment: smoked for 3 years when she was around 19   Substance Use Topics     Alcohol use: Yes     Comment: RARELY     Drug use: No     PHQ 3/22/2021 4/19/2021 8/27/2021   PHQ-9 Total Score 7 6 8   Q9: Thoughts of better off dead/self-harm past 2 weeks Not at all Not at all Not at all     DELMIS-7 SCORE 3/22/2021 4/19/2021 8/27/2021   Total Score - - -   Total Score - - -   Total Score 13 12 12     Last PHQ-9 8/27/2021   1.  Little interest or pleasure in doing things 0   2.  Feeling down, depressed, or hopeless 1   3.  Trouble falling or staying asleep, or sleeping too much 1   4.  Feeling tired or having little energy 1   5.  Poor appetite or overeating 0   6.  Feeling bad about yourself 1   7.  Trouble concentrating 2   8.  Moving slowly or restless 2   Q9: Thoughts of better off dead/self-harm past 2 weeks 0   PHQ-9 Total Score 8   Difficulty at work, home, or with people Somewhat difficult     DELMIS-7  8/27/2021   1. Feeling nervous, anxious, or on edge 2   2. Not being able to stop or control worrying 3   3. Worrying too much about different things 2   4. Trouble relaxing 2   5. Being so restless that it is hard to sit still 0   6. Becoming easily annoyed or irritable 1   7. Feeling afraid, as if something awful might happen 2   DELMIS-7 Total Score 12   If you checked any problems, how difficult have they made it for you to do your work, take care of things at home, or get along with other people? Somewhat difficult       Suicide Assessment Five-step Evaluation and Treatment (SAFE-T)      Chronic Pain Follow-Up    Where in your body do you  "have pain? Right hip  How has your pain affected your ability to work? Unable to work due to pain   What type of work do you or did you do? Everyday activities and dancing  Which of these pain treatments have you tried since your last clinic visit? Other: no new treatments  How well are you sleeping? Good with sleep medication  How has your mood been since your last visit? About the same  Have you had a significant life event? No  Other aggravating factors: prolonged standing, poor posture and walking, bending, outside chores, twisting  Taking medication as directed? Yes    Had steroid injection in 2016 and helped significantly   Pain started up again just in the recent months when  Notices more when walking for a while, burns and will have to sit down   History of scoliosis    PHQ-9 SCORE 3/22/2021 4/19/2021 8/27/2021   PHQ-9 Total Score - - -   PHQ-9 Total Score MyChart - - -   PHQ-9 Total Score 7 6 8     DELMIS-7 SCORE 3/22/2021 4/19/2021 8/27/2021   Total Score - - -   Total Score - - -   Total Score 13 12 12     No flowsheet data found.  Encounter-Level CSA:    There are no encounter-level csa.     Patient-Level CSA:    There are no patient-level csa.         How many servings of fruits and vegetables do you eat daily?  2-3    On average, how many sweetened beverages do you drink each day (Examples: soda, juice, sweet tea, etc.  Do NOT count diet or artificially sweetened beverages)?   0    How many days per week do you exercise enough to make your heart beat faster? 7    How many minutes a day do you exercise enough to make your heart beat faster? 60 or more    How many days per week do you miss taking your medication? 0        Review of Systems    Constitutional, HEENT, cardiovascular, pulmonary, gi and gu systems are negative, except as otherwise noted.    Objective   /76 (BP Location: Right arm, Patient Position: Sitting, Cuff Size: Adult Regular)   Pulse 70   Ht 1.54 m (5' 0.63\")   Wt 49.3 kg (108 lb " 9.6 oz)   SpO2 99%   BMI 20.77 kg/m    Body mass index is 20.77 kg/m .    Physical Exam  GENERAL APPEARANCE: healthy, alert and no distress  RESP: lungs clear to auscultation - no rales, rhonchi or wheezes  CV: regular rates and rhythm, normal S1 S2, no S3 or S4 and no murmur, click or rub  ABDOMEN: soft, nontender, without hepatosplenomegaly or masses and bowel sounds normal  MS: extremities normal- no gross deformities noted, peripheral pulses normal and tenderness over lumbar spinous processes, no sciatic notch tenderness and negative straight leg raises.  SKIN: no suspicious lesions or rashes  NEURO: Normal strength and tone, mentation intact and speech normal  PSYCH: affect normal/bright and mentation at baseline    Diagnostic Test Results:  Results for orders placed or performed in visit on 08/27/21   XR Lumbar Spine 2/3 Views     Status: None    Narrative    LUMBAR SPINE TWO TO THREE VIEWS 8/27/2021 3:53 PM     COMPARISON: Two view lumbar spine 5/2/2017    HISTORY: Chronic right-sided low back pain without sciatica.      Impression    IMPRESSION: Severe right convex curvature of the lumbar spine centered  at L2 with significant rotatory component again noted. Degenerative  right lateral listhesis of L2 upon L3 and L3 upon L4 again noted.  Alignment otherwise normal. Vertebral body heights normal. No evidence  for fracture. Severe degenerative endplate spurring on the left at  L1-L2, L2-L3 and L3-L4 as well as on the right at L4-L5 again noted.  Facet arthropathy throughout the lumbar spine again noted. Overall, no  appreciable change from the comparison study.    ARLINE CONKLIN MD         SYSTEM ID:  [id number]        Chart documentation with Dragon Voice recognition Software. Although reviewed after completion, some words and grammatical errors may remain.

## 2021-08-28 ASSESSMENT — ANXIETY QUESTIONNAIRES: GAD7 TOTAL SCORE: 12

## 2021-09-02 ENCOUNTER — OFFICE VISIT (OUTPATIENT)
Dept: PALLIATIVE MEDICINE | Facility: CLINIC | Age: 76
End: 2021-09-02
Payer: COMMERCIAL

## 2021-09-02 VITALS — DIASTOLIC BLOOD PRESSURE: 81 MMHG | HEART RATE: 61 BPM | SYSTOLIC BLOOD PRESSURE: 151 MMHG

## 2021-09-02 DIAGNOSIS — G89.29 CHRONIC RIGHT-SIDED LOW BACK PAIN WITHOUT SCIATICA: ICD-10-CM

## 2021-09-02 DIAGNOSIS — M54.50 CHRONIC RIGHT-SIDED LOW BACK PAIN WITHOUT SCIATICA: ICD-10-CM

## 2021-09-02 DIAGNOSIS — M54.16 LUMBAR RADICULOPATHY: Primary | ICD-10-CM

## 2021-09-02 PROCEDURE — 99213 OFFICE O/P EST LOW 20 MIN: CPT | Performed by: STUDENT IN AN ORGANIZED HEALTH CARE EDUCATION/TRAINING PROGRAM

## 2021-09-02 ASSESSMENT — PAIN SCALES - GENERAL: PAINLEVEL: SEVERE PAIN (6)

## 2021-09-02 NOTE — PATIENT INSTRUCTIONS
I think your low back and right leg pain is most likely due to pinched nerves in your low back. The nerves are getting pinched in large part due to scoliosis and arthritis.     For this we will:     1. Do the same right-sided injections you had done previously that helped so much.     2. If those injections don't help the left side enough, we can go back and do an injection to help the left side as well.     Follow up:   - Schedule your injection. You will be called to schedule this.   - in clinic as needed after injection    Sharon Morse MD  [facility]     ----------------------------------------------------------------  Clinic Number:  [phone]     Call with any questions about your care and for scheduling assistance.     Calls are returned Monday through Friday between 8 AM and 4:30 PM. We usually get back to you within 2 business days depending on the issue/request.    If we are prescribing your medications:    For opioid medication refills, call the clinic or send a [email or web address] message 7 days in advance.  Please include:    Name of requested medication    Name of the pharmacy.    For non-opioid medications, call your pharmacy directly to request a refill. Please allow 3-4 days to be processed.     Per [place] State Law:    All controlled substance prescriptions must be filled within 30 days of being written.      For those controlled substances allowing refills, pickup must occur within 30 days of last fill.      We believe regular attendance is key to your success in our program!      Any time you are unable to keep your appointment we ask that you call us at least 24 hours in advance to cancel.This will allow us to offer the appointment time to another patient.     Multiple missed appointments may lead to dismissal from the clinic.

## 2021-09-03 NOTE — PROGRESS NOTES
Ingrid Amaral  :  1945  DOS: 9/3/2021  MRN: [id number]    Medical Spine Clinic Visit    PCP: Leesa Muñiz     Ingrid Amaral is a 75 year old female with a history of DELMIS, depression referred by Soha Sanchez for: chronic low back and R leg pain    Accompanied by  today  Has had this pain for years.   Previously received epidural steroid injection with very good relief for 2+ yrs.   Pain is in low back at belt line/top of iliac crests. It is a burning pain. It radiates down R lateral thigh and leg to ankle. Can radiate across low back to L side with burning pain. This is new since last injection.    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 Pt and  states low back pain and leg pain limit dancing now      Past Medical History:   Diagnosis Date     Asthma      GERD (gastroesophageal reflux disease)      Radial head fracture 3/18/2010     Past Surgical History:   Procedure Laterality Date     ANKLE SURGERY      bilateral     COLONOSCOPY       COLONOSCOPY N/A 3/20/2015    Procedure: COLONOSCOPY;  Surgeon: Britney Martinez MD;  Location: [facility]     COLONOSCOPY N/A 2017    Procedure: COLONOSCOPY;  Colonoscopy;  Surgeon: Tristen Rangel MD;  Location: [facility]     ESOPHAGOSCOPY, GASTROSCOPY, DUODENOSCOPY (EGD), COMBINED N/A 2018    Procedure: COMBINED ESOPHAGOSCOPY, GASTROSCOPY, DUODENOSCOPY (EGD);  gastroscopy;  Surgeon: Tristen Rangel MD;  Location: [facility]     HC ESOPH/GAS REFLUX TEST W NASAL IMPED >1 HR  2012    Procedure:ESOPHAGEAL IMPEDENCE  FUNCTION TEST WITH 24 HOUR PH GREATER THAN 1 HOUR; Surgeon:VALDO UMANZOR; Location:[facility]     HERNIA REPAIR      umbilcal     HYSTERECTOMY, PAP NO LONGER INDICATED  1991     TONSILLECTOMY       UPPER GI ENDOSCOPY       Family History   Problem Relation Age of Onset     Heart Disease Mother         valve problem     Hypertension Mother      Diabetes Mother         type 2     Cerebrovascular Disease Mother      Allergies Mother      Eye Disorder Mother         Macular degeneration     Osteoporosis Mother      Respiratory Mother      Migraines Mother      Cardiovascular Father         MI at age 80     Cancer - colorectal Father 87     Diabetes Father      Hypertension Father         type 2     Eye Disorder Father         macular degeneration     Lipids Father      C.A.D. Maternal Grandmother      Diabetes Paternal Grandmother         type 2     Cardiovascular Paternal Grandmother         MI     Diabetes Sister         type 2     Allergies Sister      Gastrointestinal Disease Sister      Depression Sister      Gastrointestinal Disease Daughter      Migraines Daughter      Gastrointestinal Disease Daughter      Migraines Daughter      Migraines Son      Aneurysm No family hx of      Brain Hemorrhage No family hx of      Brain Tumor No family hx of      Cancer No family hx of      Chiari Malformation No family hx of      LUNG DISEASE No family hx of      Seizure Disorder No family hx of        Objective  BP (!) 151/81   Pulse 61       General: healthy, alert and in no distress      HEENT: no scleral icterus or conjunctival erythema     Skin: no suspicious lesions or rash. No jaundice.     CV: normal rate      Resp: normal respiratory effort without conversational dyspnea     Psych: normal mood and affect      Gait: nonantalgic, appropriate coordination and balance     Neuro:  Motor strength as noted below     Low back exam:  Strength at least 3/5 in bilateral upper extremities and lower extremities      Radiology:  10/28/2019 MRI lumbar spine  IMPRESSION:  1. No significant change from 5/18/2017.  2. 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  2. Multilevel degenerative disc and facet disease with degenerative  scoliosis. Findings are most advanced at L4 to show 5 where there is  severe right-sided foraminal stenosis. There is multilevel mild  central canal stenosis and multilevel moderate foraminal stenoses.    Assessment:  1. Lumbar radiculopathy    2. Chronic right-sided low back pain without sciatica      Right sided radicular pain with R paramedian partially extruded disc at L5/S1, severe R L4 foraminal stenosis on MRI. Previously very well controlled with R L4 and L5 Transforaminal epidural steroid injections. Left sided back pain is new since prior injection. Possibly secondary to mod L foraminal stenosis seen on MRI. However I think it is most likely related to R sided lumbar pathology.   WIll repeat prior injection. If insufficient pain relief on left, will consider L4/5 interlaminar epidural steroid injection.    Plan:  Discussed the assessment with the patient.  Continue current medications  Right L4 and L5 Transforaminal epidural steroid injection. If insufficient L-sided low back pain relief, consider L4/5 interlaminar epidural steroid injection   Follow up: for injection    BILLING TIME DOCUMENTATION:   The total TIME spent on this patient on the date of the encounter/appointment was 20 minutes.      TOTAL TIME includes:   Time spent preparing to see the patient (reviewing records and tests) - 1 min  Time spent face to face (or over the phone) with the patient - 0 min  Time spent ordering tests, medications, procedures and referrals - 00 min  Time spent Referring and communicating with other healthcare professionals -  min  Time spent documenting clinical information in Epic - 19 min     Sharon Morse  MD  [facility]     Disclaimer: This note consists of symbols derived from keyboarding, dictation and/or voice recognition software. As a result, there may be errors in the script that have gone undetected. Please consider this when interpreting information found in this chart.

## 2021-09-08 ENCOUNTER — TELEPHONE (OUTPATIENT)
Dept: PALLIATIVE MEDICINE | Facility: CLINIC | Age: 76
End: 2021-09-08

## 2021-09-08 DIAGNOSIS — Z20.822 ENCOUNTER FOR LABORATORY TESTING FOR COVID-19 VIRUS: Primary | ICD-10-CM

## 2021-09-08 NOTE — TELEPHONE ENCOUNTER
COVID test ordered.    Kacey Blackwell, RN-BSN  [facility]

## 2021-09-08 NOTE — TELEPHONE ENCOUNTER
Screening Questions for Radiology Injections:    Injection to be done at which interventional clinic site? [facility]    If [facility] location, tell patient that this procedure requires a COVID-19 lab test be done within 4 days of the procedure. Would you still like to move forward with scheduling the procedure?  YES:    If YES, let patient know that someone will call them to schedule the COVID-19 test and that they will only receive a call back if the result is positive. Route to nursing to enter order.     Instruct patient to arrive as directed prior to the scheduled appointment time:    [place] minutes before      [place]: 30 minutes before; if IV needed 1 hour before     Procedure ordered by Mini    Procedure ordered? Right L4/5 and L5/S1 Transforaminal epidural steroid injections      Transforaminal Cervical PARAM -  [place] does NOT have providers that do these- [facility] and [facility] do have providers that do    As a reminder, receiving steroids can decrease your body's ability to fight infection.   Would you still like to move forward with scheduling the injection?  Yes    What insurance would patient like us to bill for this procedure?  Medicare      Worker's comp or MVA (motor vehicle accident) -Any injection DO NOT SCHEDULE and route to Dayna Peterson.      [facility] insurance - For SI joint injections, DO NOT SCHEDULE and route Dayna Peterson.       ALL BCBS, [employer] and [employer]-Route to Dayna for review DO NOT SCHEDULE      IF SCHEDULING IN [place] AND NEEDS A PA, IT IS OKAY TO SCHEDULE. [place] HANDLES THEIR OWN PA'S AFTER THE PATIENT IS SCHEDULED. PLEASE SCHEDULE AT LEAST 1 WEEK OUT SO A PA CAN BE OBTAINED.    Any chance of pregnancy? NO   If YES, do NOT schedule and route to RN pool    Is an  needed? No     Patient has a drive home? (mandatory) YES: INFORMED    Is patient taking any blood thinners (i.e. plavix, coumadin, jantoven, warfarin, heparin, pradaxa  or dabigatran, etc)? No   If hold needed, do NOT schedule, route to RN pool     Is patient taking any aspirin products (includes Excedrin and Fiorinal)? Yes - Pt takes 81mg daily; instructed to hold 0 day(s) prior to procedure.      If more than 325mg/day, OK to schedule; Instruct pt to decrease to less than 325 mg for 7 days AND route to RN pool    For CERVICAL procedures, hold all aspirin products for 6 days.     Tell pt that if aspirin product is not held for 6 days, the procedure WILL BE cancelled.      Does the patient have a bleeding or clotting disorder? No     If YES, okay to schedule AND route to RN nurse pool    For any patients with platelet count <100, must be forwarded to provider    Any allergies to contrast dye, iodine, shellfish, or numbing and steroid medications? No    If YES, add allergy information to appointment notes AND route to the RN pool     If PARAM and Contrast Dye / Iodine Allergy? DO NOT SCHEDULE, route to RN pool    Allergies: Sulfa drugs     Is patient diabetic?  No  If YES, instruct them to bring their glucometer.    Does patient have an active infection or treated for one within the past week? No     Is patient currently taking any antibiotics?  No     For patients on chronic, preventative, or prophylactic antibiotics, procedures may be scheduled.     For patients on antibiotics for active or recent infection:antibiotic course must have been completed for 4 days    Is patient currently taking any steroid medications? (i.e. Prednisone, Medrol)  No     For patients on steroid medications, course must have been completed for 4 days    Is patient actively being treated for cancer or immunocompromised? No  If YES, do NOT schedule and route to RN pool     Are you able to get on and off an exam table with minimal or no assistance? Yes  If NO, do NOT schedule and route to RN pool    Are you able to roll over and lay on your stomach with minimal or no assistance? Yes  If NO, do NOT schedule  and route to RN pool     Has the patient had a flu shot or any other vaccinations within 7 days before or after the procedure.  No     Have you recently had a COVID vaccine or have plans to get it in the near future? No    If yes, explain that for the vaccine to work best they need to:       wait 1 week before and 1 week after getting Vaccine #1    wait 1 week before and 2 weeks after getting Vaccine #2    If patient has concerns about the timing, send to RN pool     Does patient have an MRI/CT?  YES: 2019  Check Procedure Scheduling Grid to see if required.      Was the MRI done within the last 3 years?  Yes    If yes, where was the MRI done i.e.[facility], [facility], [facility], [facility] etc? [facility]      If no, do not schedule and route to RN pool    If MRI was not done at [facility], [facility] or [facility] do NOT schedule and route to RN pool.      If pt has an imaging disc, the injection MAY be scheduled but pt has to bring disc to appt.     If they show up without the disc the injection cannot be done    Procedure Specific Instructions:      If celiac plexus block, informed patient NPO for 6 hours and that it is okay to take medications with sips of water, especially blood pressure medications  Not Applicable         If this is for a cervical procedure, informed patient that aspirin needs to be held for 6 days.   Not Applicable      If IV needed:    Do not schedule procedures requiring IV placement in the first appointment of the day or first appointment after lunch. Do NOT schedule at 0745, 0815 or 1245.     Instructed pt to arrive 30 minutes early for IV start if required. (Check Procedure Scheduling Grid)  Not Applicable    Reminders:      If you are started on any steroids or antibiotics between now and your appointment, you must contact us because the procedure may need to be cancelled.  Yes      For all procedures except radiofrequency ablations (RFAs) and spinal cord stimulator (SCS) trials, informed  patient:    IV sedation is not provided for this procedure.  If you feel that an oral anti-anxiety medication is needed, you can discuss this further with your referring provider or primary care provider.  The Pain Clinic provider will discuss specifics of what the procedure includes at your appointment.  Most procedures last 10-20 minutes.  We use numbing medications to help with any discomfort during the procedure.  Not Applicable      For patients 85 or older we recommend having an adult stay w/ them for the remainder of the day.       Does the patient have any questions?  NO  Felicita Hanley  [facility]

## 2021-09-12 NOTE — PROGRESS NOTES
Medication Therapy Management (MTM) Encounter    ASSESSMENT:                            Medication Adherence/Access: Patient would benefit from a medication list denoting indications so he doesn't have to depend on the pharmacy putting indications on the bottle labels as this can easily get missed.    Asthma: Stable. Up to date and at goal of ACT > or equal to 20.    Allergic Rhinitis: Stable.    Dementia: Patient is due for neurology follow-up.     GERD: Stable. Educated patient to raise head of bed to help with supine reflux.    Dermatitis/Psoriasis: Stable.     Depression/Anxiety/REM Behavior Disorder: Patient would benefit from stopping paroxetine due to memory concerns and dry mouth.     Pain: Patient may benefit from increased gabapentin dose at bedtime. Patient would like to increase dose now rather than wait until upcoming lumbar steroid injections.    IBS: Stable.    Hyperlipidemia: Due for lipid panel recheck.    Hypertension: Stable. Patient is meeting blood pressure goal of < 150/90mmHg.    Supplements/Osteopenia: Discussed increasing calcium intake through diet and supplement. Patient would like to recheck bone scan first. Additionally educated patient to stay off CBD oil due to memory concerns.    Stroke Primary Prevention: According to the 2019 ACC/AHA guidelines, low-dose aspirin should not be administered on a routine basis for primary prevention of ASCVD among adults >70 years of age.     Onychomycosis: Improved.     PLAN:                            1. Increase gabapentin to 600mg at bedtime.  2. Please schedule a follow-up with Dr. Sofia (neurologist for dementia) and Dr. Ewing (neurosurgeon for brain tumors).  3. Stop paroxetine.  4. Stop baby aspirin.  5. PCP to consider rechecking DEXA scan.  7. Stay off CBD oil.  8. Placed future order for lipid panel. Patient to schedule fasting lab-only appointment at your convenience.   9. Today threw away of paroxetine, CBD oil, and terbinafine  bottles. Patient gave permission to do this.   10. Mailed patient laminated medication list.    Follow-up: Return in about 3 months (around 12/13/2021) for Medication Therapy Management.    SUBJECTIVE/OBJECTIVE:                          Ingrid Amaral is a 75 year old female coming in for an initial visit. She was referred to me from [facility]. Current PCP is MARVIN Bolton CNP. Patient was accompanied by her  Harsha and daughter Anali. Last [facility] visit with Pepe Mo PharmD on 4/4/19.    Reason for visit: Polypharmacy. Comprehensive medication review.    Allergies/ADRs: Reviewed in chart  Past Medical History: Reviewed in chart  Tobacco: She reports that she has quit smoking. Her smoking use included cigarettes. She has a 1.50 pack-year smoking history. She has never used smokeless tobacco.  Alcohol: Less than 1 beverages / week    Medication Adherence/Access:   Patient uses pill box(es), set up by .  Patient takes medications 2 time(s) per day.   Per patient, misses medication 0-1 times per week.   Medication barriers:  wants pharmacy to put indications on medication bottle labels, however having issues with them doing this consistently. .   The patient fills medications at [place]: YES.    Asthma:  Current asthma medications: ICS/LABA- Advair Diskus 250-50mcg 1 puff once daily. No longer has an albuterol inhaler, doesn't feel she needs a rescue inhaler. PCP stopped montelukast as it may no longer be needed and can sometimes impact mood. Patient states breathing has been pretty good lately. Patient rinses their mouth after using steroid inhaler. Asthma triggers include: physical activity.  Patient reports the following symptoms: none.  Asthma Action Plan on file: YES  ACT Total Scores 9/19/2019 3/2/2020 3/22/2021   ACT TOTAL SCORE - - -   ASTHMA ER VISITS - - -   ASTHMA HOSPITALIZATIONS - - -   ACT TOTAL SCORE (Goal Greater than or Equal to 20) 23 24 24   In the past 12 months,  how many times did you visit the emergency room for your asthma without being admitted to the hospital? 0 0 0   In the past 12 months, how many times were you hospitalized overnight because of your asthma? 0 0 0     Allergic Rhinitis: Current medications include cetirizine 10mg daily. Primary symptoms are cough and runny nose. Patient feels that current therapy is effective.    Dementia: Currently taking donepezil 10mg at bedtime. Follows with neurologist Dr. Sofia and neurosurgeon Dr. Ewing. Is being monitored for brain tumors. Husbands states that memory loss is progressing over time. Sometimes looses track in the middle of a sentence or can't recall a particular word.     GERD: Current medications include: omeprazole 10mg daily. Patient complains of heartburn, especially upon laying down. Has history of hiatal hernia and dysphagia. The patient does not have a history of GI bleed. The patient does notice symptoms if they miss a dose. Patient has not tried a trial off of therapy and is not interested in doing so. Patient feels that current regimen is effective.    Dermatitis/Psoriasis: Patient uses several topical products including moisturizing cream all over and triamcinolone 0.1% cream as needed (occasional use). No longer using augmented betamethasone 0.05% ointment or calcipotriene 0.005% cream, although would like to keep on hand. Patient feels current therapy is effective and no concerns today.    Depression/Anxiety/REM Behavior Disorder:  Current medications include: duloxetine 30mg daily and trazodone 100mg at bedtime. At 4/19 office visit, Paxil was discontinued due to anti-cholinergic concerns. At office visit on 8/27, patient was still taking Paxil and instructed to stop it. Today patient brings a bottle of paroxetine 10mg that she is taking daily. States sleeping has been better lately. Patient reports that depression symptoms are worsened, feels tired and doesn't want to do anything. Family  "reports mental health seems \"in good spirits\". Endorses side effects of dry mouth. Uses Biotene products - mouth rinse and tooth paste and lozenges.      PHQ 3/22/2021 4/19/2021 8/27/2021   PHQ-9 Total Score 7 6 8   Q9: Thoughts of better off dead/self-harm past 2 weeks Not at all Not at all Not at all     DELMIS-7 SCORE 3/22/2021 4/19/2021 8/27/2021   Total Score - - -   Total Score - - -   Total Score 13 12 12     Pain:  Currently taking gabapentin 600mg every morning and 300mg at bedtime, also 300mg in the afternoon if needed (prescribed as 600mg 3 times daily) and duloxetine 30mg daily. Complains of bilateral lower back pain, it's been poorly controlled lately. Has upcoming lumbar steroid injections, these have been really helpful in the past.    IBS: Currently taking Creon 2 capsules 3 times daily with meals and Miralax 1 capful daily as needed. Patient feels current regimen is effective. Yesterday had diarrhea issues because forgot to take Creon. Follows with gastroenterology.    Hyperlipidemia: Current therapy includes no medications. Simvastatin was stopped in March 2020.  Pt reports no significant myalgias or other side effects.   The 10-year ASCVD risk score (Fermin AVERY Jr., et al., 2013) is: 21.8%    Values used to calculate the score:      Age: 75 years      Sex: Female      Is Non- : No      Diabetic: No      Tobacco smoker: No      Systolic Blood Pressure: 143 mmHg      Is BP treated: No      HDL Cholesterol: 71 mg/dL      Total Cholesterol: 185 mg/dL    Lab Results   Component Value Date    CHOL 185 11/02/2018     Lab Results   Component Value Date    HDL 71 11/02/2018     Lab Results   Component Value Date    LDL 82 11/02/2018     Lab Results   Component Value Date    TRIG 159 11/02/2018     Lab Results   Component Value Date    CHOLHDLRATIO 4.0 08/10/2011     Hypertension: Patient is not currently taking medications. Amlodipine was stopped due to lower BP.  Patient does not " self-monitor BP.     BP Readings from Last 3 Encounters:   09/13/21 (!) 143/84   09/02/21 (!) 151/81   08/27/21 134/76     Supplements/Osteopenia: Patient uses multiple essential oils, vitamin C 500mg daily, vitamin B12 200mcg daily, biotin 1000mcg twice daily for hair loss, Lactaid as needed, calcium carbonate 333mg/vitamin D 200 units/magnesium 133mg/zinc 5mg daily. Doesn't eat much dairy, ice cream but not every day and leafy green veggies in the summer. Patient also brings a dropper of CBD oil, although hasn't been using this lately.  DEXA History: 2/3/11 showed osteopenia.    Lab Results   Component Value Date    B12 234 07/23/2020     Stroke Primary Prevention: Currently taking aspirin 81mg daily. This was stopped by PCP in March 2019, however patient continued on it. Denies any bruising/bleeding concerns.    Onychomycosis: Completed course of oral terbinafine x3 months. Patient brings a bottle of this with 6 tablets left. Patient reports toe fungus is better.     Today's Vitals: BP (!) 143/84   Pulse 65   Wt 106 lb 3.2 oz (48.2 kg)   BMI 20.31 kg/m       Last Comprehensive Metabolic Panel:  Sodium   Date Value Ref Range Status   06/18/2020 139 133 - 144 mmol/L Final     Potassium   Date Value Ref Range Status   06/18/2020 3.8 3.4 - 5.3 mmol/L Final     Chloride   Date Value Ref Range Status   06/18/2020 106 94 - 109 mmol/L Final     Carbon Dioxide   Date Value Ref Range Status   06/18/2020 29 20 - 32 mmol/L Final     Anion Gap   Date Value Ref Range Status   06/18/2020 4 3 - 14 mmol/L Final     Glucose   Date Value Ref Range Status   06/18/2020 116 (H) 70 - 99 mg/dL Final     Urea Nitrogen   Date Value Ref Range Status   06/18/2020 13 7 - 30 mg/dL Final     Creatinine   Date Value Ref Range Status   06/18/2020 0.62 0.52 - 1.04 mg/dL Final     GFR Estimate   Date Value Ref Range Status   03/30/2021 81 >60 mL/min/[1.73_m2] Final     Calcium   Date Value Ref Range Status   06/18/2020 8.4 (L) 8.5 - 10.1 mg/dL  Final     ----------------    I spent 60 minutes with this patient today (an extra 15 minutes was spent creating the Medication Action Plan). All changes were made via collaborative practice agreement with Leesa Muñiz PA-C. A copy of the visit note was provided to the patient's primary care provider.    The patient was given a summary of these recommendations.     Vonnie Peña, PharmD, BCACP  Medication Therapy Management Pharmacist  Pager: [phone]     Medication Therapy Recommendations  Aspirin long-term use    Rationale: Unsafe medication for the patient - Adverse medication event - Safety   Recommendation: Discontinue Medication - aspirin 81 MG Tbec - Stop baby aspirin.   Status: Patient Agreed - Adherence/Education         Mild major depression (H)    Current Medication: PARoxetine (PAXIL) 10 MG tablet (Discontinued)   Rationale: Unsafe medication for the patient - Adverse medication event - Safety   Recommendation: Discontinue Medication - Stop paroxetine.   Status: Patient Agreed - Adherence/Education         Osteopenia, unspecified location    Current Medication: CALCIUM-MAGNESIUM-ZINC PO   Rationale: Medication requires monitoring - Needs additional monitoring - Effectiveness   Recommendation: Order Lab - Recheck DEXA   Status: Accepted per Provider         Pain    Current Medication: gabapentin (NEURONTIN) 300 MG capsule   Rationale: Dose too low - Dosage too low - Effectiveness   Recommendation: Increase Dose - gabapentin 300 MG capsule - Increase bedtime dose to 600mg   Status: Patient Agreed - Adherence/Education         Pure hypercholesterolemia    Rationale: Medication requires monitoring - Needs additional monitoring - Effectiveness   Recommendation: Order Lab - Recheck lipid panel   Status: Accepted per CPA

## 2021-09-13 ENCOUNTER — OFFICE VISIT (OUTPATIENT)
Dept: PHARMACY | Facility: CLINIC | Age: 76
End: 2021-09-13
Payer: COMMERCIAL

## 2021-09-13 VITALS
DIASTOLIC BLOOD PRESSURE: 84 MMHG | BODY MASS INDEX: 20.31 KG/M2 | HEART RATE: 65 BPM | SYSTOLIC BLOOD PRESSURE: 143 MMHG | WEIGHT: 106.2 LBS

## 2021-09-13 DIAGNOSIS — G31.83 LEWY BODY DEMENTIA WITHOUT BEHAVIORAL DISTURBANCE (H): ICD-10-CM

## 2021-09-13 DIAGNOSIS — G47.52 RBD (REM BEHAVIORAL DISORDER): ICD-10-CM

## 2021-09-13 DIAGNOSIS — F32.0 MILD MAJOR DEPRESSION (H): ICD-10-CM

## 2021-09-13 DIAGNOSIS — J30.9 ALLERGIC RHINITIS, UNSPECIFIED SEASONALITY, UNSPECIFIED TRIGGER: ICD-10-CM

## 2021-09-13 DIAGNOSIS — L30.9 DERMATITIS: ICD-10-CM

## 2021-09-13 DIAGNOSIS — K21.9 GASTROESOPHAGEAL REFLUX DISEASE, UNSPECIFIED WHETHER ESOPHAGITIS PRESENT: ICD-10-CM

## 2021-09-13 DIAGNOSIS — E78.00 PURE HYPERCHOLESTEROLEMIA: Primary | ICD-10-CM

## 2021-09-13 DIAGNOSIS — F02.80 LEWY BODY DEMENTIA WITHOUT BEHAVIORAL DISTURBANCE (H): ICD-10-CM

## 2021-09-13 DIAGNOSIS — M85.89 OSTEOPENIA OF MULTIPLE SITES: Primary | ICD-10-CM

## 2021-09-13 DIAGNOSIS — F41.1 GENERALIZED ANXIETY DISORDER: ICD-10-CM

## 2021-09-13 DIAGNOSIS — L40.9 PSORIASIS: ICD-10-CM

## 2021-09-13 DIAGNOSIS — Z79.82 ASPIRIN LONG-TERM USE: ICD-10-CM

## 2021-09-13 DIAGNOSIS — E78.5 DYSLIPIDEMIA: ICD-10-CM

## 2021-09-13 DIAGNOSIS — I10 HYPERTENSION GOAL BP (BLOOD PRESSURE) < 140/90: ICD-10-CM

## 2021-09-13 DIAGNOSIS — K58.9 IRRITABLE BOWEL SYNDROME, UNSPECIFIED TYPE: ICD-10-CM

## 2021-09-13 DIAGNOSIS — J45.40 MODERATE PERSISTENT ASTHMA WITHOUT COMPLICATION: ICD-10-CM

## 2021-09-13 DIAGNOSIS — B35.1 ONYCHOMYCOSIS: ICD-10-CM

## 2021-09-13 DIAGNOSIS — Z78.9 TAKES DIETARY SUPPLEMENTS: ICD-10-CM

## 2021-09-13 DIAGNOSIS — M85.80 OSTEOPENIA, UNSPECIFIED LOCATION: ICD-10-CM

## 2021-09-13 DIAGNOSIS — R52 PAIN: ICD-10-CM

## 2021-09-13 PROCEDURE — 99607 MTMS BY PHARM ADDL 15 MIN: CPT | Performed by: PHARMACIST

## 2021-09-13 PROCEDURE — 99605 MTMS BY PHARM NP 15 MIN: CPT | Performed by: PHARMACIST

## 2021-09-13 NOTE — PATIENT INSTRUCTIONS
Recommendations from today's MTM visit:                                                    MTM (medication therapy management) is a service provided by a clinical pharmacist designed to help you get the most of out of your medicines.   Today we reviewed what your medicines are for, how to know if they are working, that your medicines are safe and how to make your medicine regimen as easy as possible.      1. Increase gabapentin 600mg to at bedtime for back pain.    2. Please schedule a follow-up with Dr. Sofia (neurologist for dementia) and Dr. Ewing (neurosurgeon for brain tumors).    3. Stop paroxetine - hopefully this helps with dry mouth and memory.    4. Try raising head of bed to help with heartburn.    5. Stop baby aspirin.    6. I'll ask Soha about rechecking a bone scan.    7. Stay off CBD oil due to memory concerns.    8. Lab order place for cholesterol labs. Please schedule a fasting lab-only appointment at your convenience.    Follow-up: Return in about 3 months (around 12/13/2021) for Medication Therapy Management.    It was great to speak with you today.  I value your experience and would be very thankful for your time with providing feedback on our clinic survey. You may receive a survey via email or text message in the next few days.     To schedule another MTM appointment, please call the clinic directly or you may call the MTM scheduling line at [phone] or toll-free at [phone].     My Clinical Pharmacist's contact information:                                                      Please feel free to contact me with any questions or concerns you have.      Vonnie Peña, PharmD, [facility]  Medication Therapy Management Pharmacist  Pager: [phone]

## 2021-09-13 NOTE — LETTER
"        Date: 2021    Ingrid Amaral  [address] [address]    Dear Ms. Amaral,    Thank you for talking with me on 21 about your health and medications. Medicare s MTM (Medication Therapy Management) program helps you understand your medications and use them safely.      This letter includes an action plan (Medication Action Plan) and medication list (Personal Medication List). The action plan has steps you should take to help you get the best results from your medications. The medication list will help you keep track of your medications and how to use them the right way.       Have your action plan and medication list with you when you talk with your doctors, pharmacists, and other healthcare providers in your care team.     Ask your doctors, pharmacists, and other healthcare providers to update the action plan and medication list at every visit.     Take your medication list with you if you go to the hospital or emergency room.     Give a copy of the action plan and medication list to your family or caregivers.     If you want to talk about this letter or any of the papers with it, please call   [phone].We look forward to working with you, your doctors, and other healthcare providers to help you stay healthy through the [facility] MTM program.    Sincerely,  Vonnie Peña [facility]    Enclosed: Medication Action Plan and Personal Medication List    MEDICATION ACTION PLAN FOR Ingrid Amaral,  1945     This action plan will help you get the best results from your medications if you:   1. Read \"What we talked about.\"   2. Take the steps listed in the \"What I need to do\" boxes.   3. Fill in \"What I did and when I did it.\"   4. Fill in \"My follow-up plan\" and \"Questions I want to ask.\"     Have this action plan with you when you talk with your doctors, pharmacists, and other healthcare providers in your care team. Share this with your family or caregivers " too.  DATE PREPARED: 2021  What we talked about: Back pain                                                What I need to do: Increase gabapentin to 600mg at bedtime. What I did and when I did it:                                              What we talked about: Depression, anxiety                                                What I need to do: Stop paroxetine. This could be causing memory issues and dry mouth.   What I did and when I did it:                                               What we talked about: Aspirin                                                  What I need to do: Stop baby aspirin. You don't need this because you've never had a heart event or stroke. The risk of bleeding outweighs the benefit for you.   What I did and when I did it:                                               What we talked about: Bone health                                                  What I need to do: Vonnie will ask Soha about rechecking a bone scan.   What I did and when I did it:                                               What we talked about: Cholesterol                                                  What I need to do: Placed a future lab order for cholesterol recheck. Please schedule a fasting lab-only appointment at your convenience.   What I did and when I did it:                                               My follow-up plan:                 Questions I want to ask:              If you have any questions about your action plan, call Vonnie Peña [facility], Phone: [phone] , Monday-Friday 8-4:30pm.           PERSONAL MEDICATION LIST FOR Ingrid RUEDA JAIME Amaral 1945     This medication list was made for you after we talked. We also used information from your doctor's chart.      Use blank rows to add new medications. Then fill in the dates you started using them.    Cross out medications when you no longer use them. Then write the date and why you stopped using them.    Ask your doctors,  pharmacists, and other healthcare providers to update this list at every visit. Keep this list up-to-date with:       Prescription medications    Over the counter drugs     Herbals    Vitamins    Minerals      If you go to the hospital or emergency room, take this list with you. Share this with your family or caregivers too.     DATE PREPARED: 9/13/2021  Allergies or side effects: Sulfa drugs     Medication:  ASCORBIC ACID 500 MG PO TABS      How I use it:  Take 1 tablet (500 mg) by mouth daily       Why I use it:  General health    Prescriber:  Self      Date I started using it:       Date I stopped using it:         Why I stopped using it:            Medication:  BETAMETHASONE DIPROPIONATE AUG 0.05 % EX LOTN      How I use it:  Apply topically as needed      Why I use it: Psoriasis    Prescriber:  MARVIN Diaz CNP      Date I started using it:       Date I stopped using it:         Why I stopped using it:            Medication:  BIOTIN 1000 MCG PO TABS      How I use it:  Take 1 tablet (1000 mcg) by mouth 2 times daily      Why I use it:  General health    Prescriber:  Self      Date I started using it:       Date I stopped using it:         Why I stopped using it:            Medication:  CALCIPOTRIENE 0.005 % EX CREA      How I use it:  Apply topically as needed      Why I use it: Psoriasis    Prescriber:  MARVIN Diaz CNP      Date I started using it:       Date I stopped using it:         Why I stopped using it:            Medication:  CALCIUM-MAGNESIUM-ZINC PO      How I use it:  Take 1 tablet by mouth daily       Why I use it:  Bone health    Prescriber:  MARVIN Diaz CNP      Date I started using it:       Date I stopped using it:         Why I stopped using it:            Medication:  CETIRIZINE HCL 10 MG PO TABS      How I use it:  Take 1 tablet (10 mg) by mouth once daily      Why I use it: Allergies    Prescriber:  MARVIN Diaz CNP      Date I started using it:        Date I stopped using it:         Why I stopped using it:            Medication:  DONEPEZIL HCL 10 MG PO TABS      How I use it:  Take 1 tablet (10 mg) by mouth at bedtime      Why I use it: Memory    Prescriber:  MARVIN Diaz CNP      Date I started using it:       Date I stopped using it:         Why I stopped using it:            Medication:  DULOXETINE HCL 30 MG PO CPEP      How I use it:  Take 1 capsule (30 mg) by mouth once daily      Why I use it: Anxiety, depression    Prescriber:  MARVIN Diaz CNP      Date I started using it:       Date I stopped using it:         Why I stopped using it:            Medication:  FLUTICASONE-SALMETEROL 250-50 MCG/DOSE IN AEPB      How I use it:  Inhale 1 puff by mouth twice daily      Why I use it: Asthma    Prescriber:  MARVIN Diaz CNP      Date I started using it:       Date I stopped using it:         Why I stopped using it:            Medication:  GABAPENTIN 300 MG PO CAPS      How I use it:  Take 2 capsules (600 mg) by mouth up to 3 times daily      Why I use it: Back pain    Prescriber:  MARVIN Diaz CNP      Date I started using it:       Date I stopped using it:         Why I stopped using it:            Medication:  LACTAID 9000 UNITS PO TABS      How I use it:  Take 1 tablet by mouth 3 times daily (when eating milk-related foods)      Why I use it: Lactose intolerance    Prescriber:  MARVIN Diaz CNP      Date I started using it:       Date I stopped using it:         Why I stopped using it:            Medication:  OMEPRAZOLE 10 MG PO CPDR      How I use it:  Take 1 capsule (10 mg) by mouth once daily      Why I use it: Heartburn    Prescriber:  MARVIN Diaz CNP      Date I started using it:       Date I stopped using it:         Why I stopped using it:            Medication:  PANCRELIPASE (LIP-PROT-AMYL) 6000-14389 UNITS PO CPEP      How I use it:  Take 2 capsules by mouth 3 times daily with  meals      Why I use it: Diarrhea    Prescriber:  MARVIN Diaz CNP      Date I started using it:       Date I stopped using it:         Why I stopped using it:            Medication:  POLYETHYLENE GLYCOL 3350 PO POWD      How I use it:  Take 17 g (1 capful) by mouth as needed      Why I use it: Constipation    Prescriber:  Leesa Muñiz PA-C      Date I started using it:       Date I stopped using it:         Why I stopped using it:            Medication:  TRAZODONE  MG PO TABS      How I use it:  Take 1 tablet (100 mg) by mouth at bedtime.      Why I use it: Sleep    Prescriber:  Leesa Muñiz PA-C      Date I started using it:       Date I stopped using it:         Why I stopped using it:            Medication:  TRIAMCINOLONE ACETONIDE 0.1 % EX CREA      How I use it:  Apply topically as needed      Why I use it: Dermatitis    Prescriber:  MARVIN Diaz CNP      Date I started using it:       Date I stopped using it:         Why I stopped using it:            Medication:  VITAMIN B-12 100 MCG PO TABS      How I use it:  Take 2 tablets (200 mcg) by mouth once daily      Why I use it: B12 deficiency    Prescriber:  MARVIN Diaz CNP      Date I started using it:       Date I stopped using it:         Why I stopped using it:            Medication:         How I use it:         Why I use it:      Prescriber:         Date I started using it:       Date I stopped using it:         Why I stopped using it:            Medication:         How I use it:         Why I use it:      Prescriber:         Date I started using it:       Date I stopped using it:         Why I stopped using it:            Medication:         How I use it:         Why I use it:      Prescriber:         Date I started using it:       Date I stopped using it:         Why I stopped using it:              Other Information:     If you have any questions about your medication list, call Vonnie Peña  [facility], Phone: [phone] , Monday-Friday 8-4:30pm.    According to the Paperwork Reduction Act of 1995, no persons are required to respond to a collection of information unless it displays a valid OMB control number. The valid OMB number for this information collection is [id number]. The time required to complete this information collection is estimated to average 40 minutes per response, including the time to review instructions, searching existing data resources, gather the data needed, and complete and review the information collection. If you have any comments concerning the accuracy of the time estimate(s) or suggestions for improving this form, please write to: CMS, Attn: LEILANI Reports Clearance Officer, [address] [place] [id number].

## 2021-09-14 NOTE — PROGRESS NOTES
Please notify patient of order for DEXA scan to re-evaluate her bone density as it has been 10 years. She can call [phone] to schedule.     Thanks,  Soha Foley, MICAH, APRN-CNP

## 2021-09-14 NOTE — PROGRESS NOTES
Spoke with patient and notified her that DEXA scan order was in and she should schedule appointment.   Michelle Ricks CMA

## 2021-09-14 NOTE — PROGRESS NOTES
From: Soha Foley, MARVIN CNP  Sent: 9/13/2021   9:50 PM CDT  To: Vonnie Peña [facility]  Subject: RE: CE                                         Vonnie,    Yes, given her history and pain would be a good idea. Thank you for recognizing this and suggesting. I will order this for her and have someone contact her to notify her it was ordered and to schedule.     Thanks,  Soha Foley, MICAH, APRN-CNP

## 2021-09-19 ENCOUNTER — LAB (OUTPATIENT)
Dept: FAMILY MEDICINE | Facility: CLINIC | Age: 76
End: 2021-09-19
Payer: COMMERCIAL

## 2021-09-19 DIAGNOSIS — Z20.822 ENCOUNTER FOR LABORATORY TESTING FOR COVID-19 VIRUS: ICD-10-CM

## 2021-09-19 PROCEDURE — U0003 INFECTIOUS AGENT DETECTION BY NUCLEIC ACID (DNA OR RNA); SEVERE ACUTE RESPIRATORY SYNDROME CORONAVIRUS 2 (SARS-COV-2) (CORONAVIRUS DISEASE [COVID-19]), AMPLIFIED PROBE TECHNIQUE, MAKING USE OF HIGH THROUGHPUT TECHNOLOGIES AS DESCRIBED BY CMS-2020-01-R: HCPCS

## 2021-09-19 PROCEDURE — U0005 INFEC AGEN DETEC AMPLI PROBE: HCPCS

## 2021-09-19 PROCEDURE — 99207 PR NO CHARGE LOS: CPT

## 2021-09-20 LAB — SARS-COV-2 RNA RESP QL NAA+PROBE: NEGATIVE

## 2021-09-23 ENCOUNTER — HOSPITAL ENCOUNTER (OUTPATIENT)
Dept: PALLIATIVE MEDICINE | Facility: CLINIC | Age: 76
Discharge: HOME | End: 2021-09-23
Attending: PHYSICIAN ASSISTANT | Admitting: STUDENT IN AN ORGANIZED HEALTH CARE EDUCATION/TRAINING PROGRAM
Payer: COMMERCIAL

## 2021-09-23 VITALS — RESPIRATION RATE: 16 BRPM | HEART RATE: 62 BPM | SYSTOLIC BLOOD PRESSURE: 167 MMHG | DIASTOLIC BLOOD PRESSURE: 87 MMHG

## 2021-09-23 DIAGNOSIS — M54.16 LUMBAR RADICULOPATHY: ICD-10-CM

## 2021-09-23 PROCEDURE — 64483 NJX AA&/STRD TFRM EPI L/S 1: CPT | Mod: RT | Performed by: STUDENT IN AN ORGANIZED HEALTH CARE EDUCATION/TRAINING PROGRAM

## 2021-09-23 PROCEDURE — 255N000002 HC RX 255 OP 636: Performed by: STUDENT IN AN ORGANIZED HEALTH CARE EDUCATION/TRAINING PROGRAM

## 2021-09-23 PROCEDURE — 250N000011 HC RX IP 250 OP 636: Performed by: STUDENT IN AN ORGANIZED HEALTH CARE EDUCATION/TRAINING PROGRAM

## 2021-09-23 PROCEDURE — 250N000009 HC RX 250: Performed by: STUDENT IN AN ORGANIZED HEALTH CARE EDUCATION/TRAINING PROGRAM

## 2021-09-23 PROCEDURE — 64484 NJX AA&/STRD TFRM EPI L/S EA: CPT | Mod: RT | Performed by: STUDENT IN AN ORGANIZED HEALTH CARE EDUCATION/TRAINING PROGRAM

## 2021-09-23 RX ORDER — DEXAMETHASONE SODIUM PHOSPHATE 10 MG/ML
10 INJECTION, SOLUTION INTRAMUSCULAR; INTRAVENOUS ONCE
Status: COMPLETED | OUTPATIENT
Start: 2021-09-23 | End: 2021-09-23

## 2021-09-23 RX ORDER — BUPIVACAINE HYDROCHLORIDE 2.5 MG/ML
10 INJECTION, SOLUTION EPIDURAL; INFILTRATION; INTRACAUDAL ONCE
Status: COMPLETED | OUTPATIENT
Start: 2021-09-23 | End: 2021-09-23

## 2021-09-23 RX ORDER — LIDOCAINE HYDROCHLORIDE 10 MG/ML
5 INJECTION, SOLUTION EPIDURAL; INFILTRATION; INTRACAUDAL; PERINEURAL ONCE
Status: COMPLETED | OUTPATIENT
Start: 2021-09-23 | End: 2021-09-23

## 2021-09-23 RX ORDER — IOPAMIDOL 408 MG/ML
10 INJECTION, SOLUTION INTRATHECAL ONCE
Status: COMPLETED | OUTPATIENT
Start: 2021-09-23 | End: 2021-09-23

## 2021-09-23 RX ADMIN — BUPIVACAINE HYDROCHLORIDE 2 ML: 2.5 INJECTION, SOLUTION EPIDURAL; INFILTRATION; INTRACAUDAL at 15:40

## 2021-09-23 RX ADMIN — DEXAMETHASONE SODIUM PHOSPHATE 10 MG: 10 INJECTION, SOLUTION INTRAMUSCULAR; INTRAVENOUS at 15:39

## 2021-09-23 RX ADMIN — IOPAMIDOL 1 ML: 408 INJECTION, SOLUTION INTRATHECAL at 15:40

## 2021-09-23 RX ADMIN — LIDOCAINE HYDROCHLORIDE 2 ML: 10 INJECTION, SOLUTION EPIDURAL; INFILTRATION; INTRACAUDAL; PERINEURAL at 15:40

## 2021-09-23 NOTE — PROGRESS NOTES
Pre-procedure Intake    Have you been fasting? No     If yes, for how long?     Are you taking a prescribed blood thinner such as coumadin, Plavix, Xarelto?    No    If yes, when did you take your last dose?     Do you take aspirin?  No    If cervical procedure, have you held aspirin for 6 days?   NA    Do you have any allergies to contrast dye, iodine, steroid and/or numbing medications?  NO    Are you currently taking antibiotics or have an active infection?  NO    Have you had a fever/elevated temperature within the past week? NO    Are you currently taking oral steroids? NO    Do you have a ? Yes       Are you pregnant or breastfeeding?  Not Applicable    Have you received the COVID-19 vaccine? Yes       If yes, was it your 1st, 2nd or only dose needed? 2nd    Date of most recent vaccine: 4/2021    Notify provider and RNs if systolic BP >170, diastolic BP >100, P >100 or O2 sats < 90%

## 2021-09-23 NOTE — DISCHARGE INSTRUCTIONS
[facility]   Procedure Discharge Instructions    Today you saw:   Dr. Sharon Morse    You had an:   Lumbar Epidural steroid injection      Medications used:  Lidocaine   Bupivacaine   Dexamethasone  IsoVue   Normal saline            Be cautious when walking. Numbness and/or weakness in the lower extremities may occur for up to 6-8 hours after the procedure due to effect of the local anesthetic    Do not drive for 6 hours. The effect of the local anesthetic could slow your reflexes.     You may resume your regular activities after 24 hours    Avoid strenuous activity for the first 24 hours    You may shower, however avoid swimming, tub baths or hot tubs for 24 hours following your procedure    You may have a mild to moderate increase in pain for several days following the injection.    It may take up to 14 days for the steroid medication to start working although you may feel the effect as early as a few days after the procedure.       You may use ice packs for 10-15 minutes, 3 to 4 times a day at the injection site for comfort    Do not use heat to painful areas for 6 to 8 hours. This will give the local anesthetic time to wear off and prevent you from accidentally burning your skin.     Unless you have been directed to avoid the use of anti-inflammatory medications (NSAIDS), you may use medications such as ibuprofen, Aleve or Tylenol for pain control if needed.     Possible side effects of steroids that you may experience include flushing, elevated blood pressure, increased appetite, mild headaches and restlessness.  All of these symptoms will get better with time.    If you experience any of the following, call the Pain Clinic during work hours (Mon-Friday 8-4:30 pm) at [phone] or the Provider Line after hours at [phone]:  -Fever over 100 degree F  -Swelling, bleeding, redness, drainage, warmth at the injection site  -Progressive weakness or numbness in your legs    -Loss of bowel or bladder function  -Unusual new onset of pain that is not improving

## 2021-09-27 ENCOUNTER — LAB (OUTPATIENT)
Dept: LAB | Facility: CLINIC | Age: 76
End: 2021-09-27
Payer: COMMERCIAL

## 2021-09-27 DIAGNOSIS — E78.00 PURE HYPERCHOLESTEROLEMIA: ICD-10-CM

## 2021-09-27 LAB
CHOLEST SERPL-MCNC: 240 MG/DL
FASTING STATUS PATIENT QL REPORTED: YES
HDLC SERPL-MCNC: 100 MG/DL
LDLC SERPL CALC-MCNC: 122 MG/DL
NONHDLC SERPL-MCNC: 140 MG/DL
TRIGL SERPL-MCNC: 88 MG/DL

## 2021-09-27 PROCEDURE — 80061 LIPID PANEL: CPT

## 2021-09-27 PROCEDURE — 36415 COLL VENOUS BLD VENIPUNCTURE: CPT

## 2021-09-28 NOTE — PROGRESS NOTES
"Pre procedure Diagnosis: lumbar radiculopathy   Post procedure Diagnosis: Same  Procedure performed: right L4 and L5 transforaminal epidural steroid injection   Anesthesia: none  Complications: none  Operators: Sharon Morse MD     Needle: 25g, 3.5\" spinal  Injectate: 2ml 0.25% bupivacaine, 10mg of dexamethasone    Indications:   Ingrid Amaral is a 76 year old female who is well known to me from previous visits, here for lumbar epidural steroid injection.  she has a history of low back pain radiating down R lateral thigh and leg to ankle.  Exam shows strength and sensation intact in bilateral lower extremities and she has tried conservative treatment including medications.    MRI was done on 10/28/2019 which showed   IMPRESSION:  1. No significant change from 5/18/2017.  2. 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  2. Multilevel degenerative disc and facet disease with degenerative  scoliosis. Findings are most advanced at L4 to show 5 where there is  severe right-sided foraminal stenosis. There is multilevel mild  central canal stenosis and multilevel moderate foraminal stenoses.    Options/alternatives, benefits and risks were discussed with the patient including bleeding, infection, tissue trauma, numbness, weakness, paralysis, spinal cord injury, radiation exposure, headache and reaction to medications. Questions were answered to her satisfaction and she agrees to proceed. Voluntary informed consent was obtained and signed.     Vitals were reviewed: Yes  Allergies were reviewed:  Yes   Medications were reviewed:  Yes   Pre-procedure pain score: 7/10    Procedure:  After getting informed consent, patient was brought into the procedure suite and was placed in a prone position on the procedure table.   A Pause for the Cause was performed.  Patient was prepped and draped in sterile fashion.     After identifying the " right L5 and L4 neuroforamen, the C-arm was rotated to a right lateral oblique angle.  A 25 gauge 5 inch spinal needle was placed coaxial with the fluoroscopy beam and overriding the superior aspect of the neuroforamen. The spinal needle was advanced under intermittent fluoroscopy until it entered the foramen superiorly.    The position was then inspected from anteroposterior and lateral views, and the needle adjusted appropriately.  A total of 1ml of Isovue 200 was injected, confirming appropriate position, with spread into the epidural space, with no intravascular uptake. 9ml was wasted    2ml of 0.25% bupivacaine and 10mg of dexamethasone were injected from separate syringes. The tubing was flushed with contrast dye between administering injectates.  The needle was removed.    During the procedure, there was not a paresthesia.   Hemostasis was achieved, the area was cleaned, and bandaids were placed when appropriate.  The patient tolerated the procedure well, and was taken to the recovery room.    Images were saved to PACS.    Post-procedure pain score: 7/10  Follow-up includes:   -f/u with referring provider  - of note, epidural dye spread was noted in the right L5 neuroforamen. Dye spread in the L4 neuroforamen was most consistent with mixed facet and epidural spread. If Pt does not get sufficient pain relief, would recommend consideration of L4/5 interlaminar epidural steroid injection in the future    Sharon Morse MD  [facility]

## 2021-10-04 ENCOUNTER — HOSPITAL ENCOUNTER (OUTPATIENT)
Dept: BONE DENSITY | Facility: CLINIC | Age: 76
End: 2021-10-04
Attending: NURSE PRACTITIONER
Payer: COMMERCIAL

## 2021-10-04 PROCEDURE — 77080 DXA BONE DENSITY AXIAL: CPT

## 2021-10-06 DIAGNOSIS — M85.89 OSTEOPENIA OF MULTIPLE SITES: Primary | ICD-10-CM

## 2021-10-06 RX ORDER — SWAB
1 SWAB, NON-MEDICATED MISCELLANEOUS DAILY
Qty: 90 CAPSULE | Refills: 3 | Status: SHIPPED | OUTPATIENT
Start: 2021-10-06 | End: 2022-06-06

## 2021-11-01 DIAGNOSIS — K52.9 CHRONIC DIARRHEA: ICD-10-CM

## 2021-11-07 ENCOUNTER — NURSE TRIAGE (OUTPATIENT)
Dept: NURSING | Facility: CLINIC | Age: 76
End: 2021-11-07
Payer: COMMERCIAL

## 2021-11-07 DIAGNOSIS — R05.9 COUGH: Primary | ICD-10-CM

## 2021-11-08 NOTE — TELEPHONE ENCOUNTER
Patient reporting symptoms of produxctive cough with yellow phlegm, 99.5 oral before medication.  Patient was exposed to Covid Tuesday 11-2-21.  Denies dyspnea, chest pain.    Patient at high risk due to age.  Per disposition, paged Dr. Dale Lopez via answering service.  Covid PCR test ordered.  If patient develops chest pain or breathing problems should go to ER.    Patient notified and verbalized understanding of care advice and pkan of care.    Asya Oshea RN  [facility]    COVID 19 Nurse Triage Plan/Patient Instructions    Please be aware that novel coronavirus (COVID-19) may be circulating in the community. If you develop symptoms such as fever, cough, or SOB or if you have concerns about the presence of another infection including coronavirus (COVID-19), please contact your health care provider or visit [email or web address].     Disposition/Instructions    Home care recommended. Follow home care protocol based instructions.    Thank you for taking steps to prevent the spread of this virus.  o Limit your contact with others.  o Wear a simple mask to cover your cough.  o Wash your hands well and often.    Resources    [facility]: About COVID-19: [email or web address]    CDC: What to Do If You're Sick: www.cdc.gov/coronavirus/2019-ncov/about/steps-when-sick.html    CDC: Ending Home Isolation: www.cdc.gov/coronavirus/2019-ncov/hcp/disposition-in-home-patients.html     CDC: Caring for Someone: www.cdc.gov/coronavirus/2019-ncov/if-you-are-sick/care-for-someone.html     [facility]: Interim Guidance for Hospital Discharge to Home: www.health.[facility].mn.us/diseases/coronavirus/hcp/hospdischarge.pdf    [facility] clinical trials (COVID-19 research studies): [email or web address]     Below are the COVID-19 hotlines at the [place] Department of Health ([facility]). Interpreters are available.   o For health questions: Call 598-463-8449 or 1-636.357.2608  (7 a.m. to 7 p.m.)  o For questions about schools and childcare: Call 660-018-5669 or 1-935.303.6491 (7 a.m. to 7 p.m.)      Reason for Disposition    HIGH RISK for severe COVID complications (e.g., age > 64 years, obesity with BMI > 25, pregnant, chronic lung disease or other chronic medical condition)  (Exception: Already seen by PCP and no new or worsening symptoms.)    Additional Information    Negative: SEVERE difficulty breathing (e.g., struggling for each breath, speaks in single words)    Negative: Difficult to awaken or acting confused (e.g., disoriented, slurred speech)    Negative: Bluish (or gray) lips or face now    Negative: Shock suspected (e.g., cold/pale/clammy skin, too weak to stand, low BP, rapid pulse)    Negative: Sounds like a life-threatening emergency to the triager    Negative: SEVERE or constant chest pain or pressure (Exception: mild central chest pain, present only when coughing)    Negative: MODERATE difficulty breathing (e.g., speaks in phrases, SOB even at rest, pulse 100-120)    Negative: [1] Headache AND [2] stiff neck (can't touch chin to chest)    Negative: MILD difficulty breathing (e.g., minimal/no SOB at rest, SOB with walking, pulse <100)    Negative: Chest pain or pressure    Negative: Patient sounds very sick or weak to the triager    Negative: Fever > 103 F (39.4 C)    Negative: [1] Fever > 101 F (38.3 C) AND [2] age > 60 years    Negative: [1] Fever > 100.0 F (37.8 C) AND [2] bedridden (e.g., nursing home patient, CVA, chronic illness, recovering from surgery)    Protocols used: CORONAVIRUS (COVID-19) DIAGNOSED OR [id number] 8.25.2021

## 2021-11-09 ENCOUNTER — LAB (OUTPATIENT)
Dept: FAMILY MEDICINE | Facility: CLINIC | Age: 76
End: 2021-11-09
Attending: FAMILY MEDICINE
Payer: COMMERCIAL

## 2021-11-09 DIAGNOSIS — R05.9 COUGH: ICD-10-CM

## 2021-11-09 PROCEDURE — U0003 INFECTIOUS AGENT DETECTION BY NUCLEIC ACID (DNA OR RNA); SEVERE ACUTE RESPIRATORY SYNDROME CORONAVIRUS 2 (SARS-COV-2) (CORONAVIRUS DISEASE [COVID-19]), AMPLIFIED PROBE TECHNIQUE, MAKING USE OF HIGH THROUGHPUT TECHNOLOGIES AS DESCRIBED BY CMS-2020-01-R: HCPCS

## 2021-11-09 PROCEDURE — U0005 INFEC AGEN DETEC AMPLI PROBE: HCPCS

## 2021-11-10 ENCOUNTER — TELEPHONE (OUTPATIENT)
Dept: FAMILY MEDICINE | Facility: CLINIC | Age: 76
End: 2021-11-10
Payer: COMMERCIAL

## 2021-11-10 LAB — SARS-COV-2 RNA RESP QL NAA+PROBE: POSITIVE

## 2021-11-11 NOTE — TELEPHONE ENCOUNTER
"-Coronavirus (COVID-19) Notification    Caller Name (Patient, parent, daughter/son, grandparent, etc)  Patient    Reason for call  Notify of Positive Coronavirus (COVID-19) lab results, assess symptoms,  review [facility] recommendations    Lab Result    Lab test:  2019-nCoV rRt-PCR or SARS-CoV-2 PCR    Oropharyngeal AND/OR nasopharyngeal swabs is POSITIVE for 2019-nCoV RNA/SARS-COV-2 PCR (COVID-19 virus)    RN Recommendations/Instructions per [facility] Coronavirus COVID-19 recommendations    Brief introduction script  Introduce self then review script:  \"I am calling on behalf of [employer].  We were notified that your Coronavirus test (COVID-19) for was POSITIVE for the virus.  I have some information to relay to you but first I wanted to mention that the [place] Dept of Health will be contacting you shortly [it's possible MD already called Patient] to talk to you more about how you are feeling and other people you have had contact with who might now also have the virus.  Also, [facility] is Partnering with the [facility] for Covid-19 research, you may be contacted directly by research staff.\"    Assessment (Inquire about Patient's current symptoms)   Assessment   Current Symptoms at time of phone call: (if no symptoms, document No symptoms] Decreased appetite, shortness of breath,   Cough, fatigue,   Symptoms onset (if applicable) 11/5/2021     If at time of call, Patients symptoms hare worsened, the Patient should contact 911 or have someone drive them to Emergency Dept promptly:      If Patient calling 911, inform 911 personal that you have tested positive for the Coronavirus (COVID-19).  Place mask on and await 911 to arrive.    If Emergency Dept, If possible, please have another adult drive you to the Emergency Dept but you need to wear mask when in contact with other people.      Monoclonal Antibody Administration    You may be eligible to receive a new treatment with a " "monoclonal antibody for preventing hospitalization in patients at high risk for complications from COVID-19.   This medication is still experimental and available on a limited basis; it is given through an IV and must be given at an infusion center. Please note that not all people who are eligible will receive the medication since it is in limited supply.     Are you interested in being considered for this medication?  No.   Does the patient fit the criteria: No    If patient qualifies based on above criteria:  \"You will be contacted if you are selected to receive this treatment in the next 1-2 business days.   This is time sensitive and if you are not selected in the next 1-2 business days, you will not receive the medication.  If you do not receive a call to schedule, you have not been selected.\"      Review information with Patient    Your result was positive. This means you have COVID-19 (coronavirus).  We have sent you a letter that reviews the information that I'll be reviewing with you now.    How can I protect others?    If you have symptoms: stay home and away from others (self-isolate) until:    You've had no fever--and no medicine that reduces fever--for 1 full day (24 hours). And       Your other symptoms have gotten better. For example, your cough or breathing has improved. And     At least 10 days have passed since your symptoms started. (If you've been told by a doctor that you have a weak immune system, wait 20 days.)     If you don't have symptoms: Stay home and away from others (self-isolate) until at least 10 days have passed since your first positive COVID-19 test. (Date test collected)    During this time:    Stay in your own room, including for meals. Use your own bathroom if you can.    Stay away from others in your home. No hugging, kissing or shaking hands. No visitors.     Don't go to work, school or anywhere else.     Clean  high touch  surfaces often (doorknobs, counters, handles, etc.). " Use a household cleaning spray or wipes. You'll find a full list on the EPA website at www.epa.gov/pesticide-registration/list-n-disinfectants-use-against-sars-cov-2.     Cover your mouth and nose with a mask, tissue or other face covering to avoid spreading germs.    Wash your hands and face often with soap and water.    Make a list of people you have been in close contact with recently, even if either of you wore a face covering.   ; Start your list from 2 days before you became ill or had a positive test.  ; Include anyone that was within 6 feet of you for a cumulative total of 15 minutes or more in 24 hours. (Example: if you sat next to Chauncey for 5 minutes in the morning and 10 minutes in the afternoon, then you were in close contact for 15 minutes total that day. Chauncey would be added to your list.)    A public health worker will call or text you. It is important that you answer. They will ask you questions about possible exposures to COVID-19, such as people you have been in direct contact with and places you have visited.    Tell the people on your list that you have COVID-19; they should stay away from others for 14 days starting from the last time they were in contact with you (unless you are told something different from a public health worker).     Caregivers in these groups are at risk for severe illness due to COVID-19:  o People 65 years and older  o People who live in a nursing home or long-term care facility  o People with chronic disease (lung, heart, cancer, diabetes, kidney, liver, immunologic)  o People who have a weakened immune system, including those who:  - Are in cancer treatment  - Take medicine that weakens the immune system, such as corticosteroids  - Had a bone marrow or organ transplant  - Have an immune deficiency  - Have poorly controlled HIV or AIDS  - Are obese (body mass index of 40 or higher)  - Smoke regularly    Caregivers should wear gloves while washing dishes, handling laundry  and cleaning bedrooms and bathrooms.    Wash and dry laundry with special caution. Don't shake dirty laundry, and use the warmest water setting you can.    If you have a weakened immune system, ask your doctor about other actions you should take.    For more tips, go to www.cdc.gov/coronavirus/2019-ncov/downloads/10Things.pdf.    You should not go back to work until you meet the guidelines above for ending your home isolation. You don't need to be retested for COVID-19 before going back to work--studies show that you won't spread the virus if it's been at least 10 days since your symptoms started (or 20 days, if you have a weak immune system).    Employers: This document serves as formal notice of your employee's medical guidelines for going back to work. They must meet the above guidelines before going back to work in person.    How can I take care of myself?    1. Get lots of rest. Drink extra fluids (unless a doctor has told you not to).    2. Take Tylenol (acetaminophen) for fever or pain. If you have liver or kidney problems, ask your family doctor if it's okay to take Tylenol.     Take either:     650 mg (two 325 mg pills) every 4 to 6 hours, or     1,000 mg (two 500 mg pills) every 8 hours as needed.     Note: Don't take more than 3,000 mg in one day. Acetaminophen is found in many medicines (both prescribed and over-the-counter medicines). Read all labels to be sure you don't take too much.    For children, check the Tylenol bottle for the right dose (based on their age or weight).    3. If you have other health problems (like cancer, heart failure, an organ transplant or severe kidney disease): Call your specialty clinic if you don't feel better in the next 2 days.    4. Know when to call 911: Emergency warning signs include:    Trouble breathing or shortness of breath    Pain or pressure in the chest that doesn't go away    Feeling confused like you haven't felt before, or not being able to wake  up    Bluish-colored lips or face    5. Sign up for [employer]. We know it's scary to hear that you have COVID-19. We want to track your symptoms to make sure you're okay over the next 2 weeks. Please look for an email from [employer]--this is a free, online program that we'll use to keep in touch. To sign up, follow the link in the email. Learn more at [email or web address].    Where can I get more information?    [facility]: [email or web address]    Coronavirus Basics: www.health.[facility].[email or web address]./diseases/coronavirus/basics.html    What to Do If You're Sick: www.cdc.gov/coronavirus/2019-ncov/about/steps-when-sick.html    Ending Home Isolation: www.cdc.gov/coronavirus/2019-ncov/hcp/disposition-in-home-patients.html     Caring for Someone with COVID-19: www.cdc.gov/coronavirus/2019-ncov/if-you-are-sick/care-for-someone.html     [facility] clinical trials (COVID-19 research studies): clinicalaffairs.[facility]-clinical-trials     A Positive COVID-19 letter will be sent via [employer] or the mail. (Exception, no letters sent to Presurgerical/Preprocedure Patients)    Kristina Summers LPN

## 2021-11-12 ENCOUNTER — TELEPHONE (OUTPATIENT)
Dept: FAMILY MEDICINE | Facility: CLINIC | Age: 76
End: 2021-11-12
Payer: COMMERCIAL

## 2021-11-13 NOTE — TELEPHONE ENCOUNTER
Reason for Call:  Other call back    Detailed comments: Ingrid called and would like an order placed for COVID-19 antibody testing. Please call and discuss. Does not use [employer]    Phone Number Patient can be reached at: Home number on file [phone] (home)    Best Time: Anytime    Can we leave a detailed message on this number? YES    Call taken on 11/12/2021 at 6:18 PM by Lexie Lujan

## 2021-11-15 NOTE — TELEPHONE ENCOUNTER
Spoke with Harsha Ingrid's , he is calling about antibody infusion, not blood test. He was contacted by [facility] to receive the infusion but [place]. She will continue to treat  symptoms at home and call if getting worse instead of better. Has low grade temp, cough and is exhausted. His wife also has Covid but she is feeling a little better than he is. They do have an adult daughter that lives with them and can help in the evening hours.

## 2021-11-16 NOTE — PROGRESS NOTES
SUBJECTIVE:   Ingrid Amaral is a 72 year old female who presents to clinic today for the following health issues:      1.) recheck since GI visit on 10/27/17. States that she is still having bleeding. Has started the prune juice and feels like it helps some. States GI attributed this to her IBS.     Had done the mag citrate since last office and felt like she got mostly cleared out     Had started doing prune juice by itself for the last 2 days - helping  Started doing the miralax every day starting about a week ago - not having regular bowel movements since, still having to push the last out   Having a bowel movement 1-2 times a day - small and formed        Problem list and histories reviewed & adjusted, as indicated.  Additional history: as documented    Patient Active Problem List   Diagnosis     Diarrhea     Pure hypercholesterolemia     Allergic rhinitis     Panic disorder without agoraphobia     Advanced directives, counseling/discussion     Liver lesion     Generalized anxiety disorder     Mild major depression (H)     Vulvitis     Vaginal wall prolapse     Chronic constipation     Moderate persistent asthma     Dysphagia     RBD (REM behavioral disorder)     FH: colon cancer     Hypertension goal BP (blood pressure) < 140/90     Past Surgical History:   Procedure Laterality Date     ANKLE SURGERY      bilateral     COLONOSCOPY       COLONOSCOPY N/A 3/20/2015    Procedure: COLONOSCOPY;  Surgeon: Britney Martinez MD;  Location: [facility]     COLONOSCOPY N/A 5/22/2017    Procedure: COLONOSCOPY;  Colonoscopy;  Surgeon: Tristen Rangel MD;  [facility]     HC ESOPH/GAS REFLUX TEST W NASAL IMPED >1 HR  4/19/2012    Procedure:ESOPHAGEAL IMPEDENCE FUNCTION TEST WITH 24 HOUR PH GREATER THAN 1 HOUR; Surgeon:VALDO UMANZOR; Location: GI     HERNIA REPAIR      umbilcal     HYSTERECTOMY, PAP NO LONGER INDICATED  1991     TONSILLECTOMY       UPPER GI ENDOSCOPY         Social History   Substance Use  done Topics     Smoking status: Former Smoker     Packs/day: 0.50     Years: 3.00     Types: Cigarettes     Smokeless tobacco: Never Used      Comment: smoked for 3 years when she was around 19     Alcohol use Yes      Comment: RARELY     Family History   Problem Relation Age of Onset     HEART DISEASE Mother      valve problem     Hypertension Mother      DIABETES Mother      type 2     CEREBROVASCULAR DISEASE Mother      Allergies Mother      Eye Disorder Mother      Macular degeneration     OSTEOPOROSIS Mother      Respiratory Mother      Cardiovascular Father      MI at age 80     Cancer - colorectal Father      DIABETES Father      Hypertension Father      type 2     Eye Disorder Father      macular degeneration     Lipids Father      C.A.D. Maternal Grandmother      DIABETES Paternal Grandmother      type 2     Cardiovascular Paternal Grandmother      MI     DIABETES Sister      type 2     Allergies Sister      GASTROINTESTINAL DISEASE Sister      GASTROINTESTINAL DISEASE Daughter      GASTROINTESTINAL DISEASE Daughter          Current Outpatient Prescriptions   Medication Sig Dispense Refill     diclofenac (VOLTAREN) 50 MG EC tablet Take 1 tablet (50 mg) by mouth 2 times daily as needed for moderate pain 30 tablet 1     ESTRACE VAGINAL 0.1 MG/GM cream USE ONE GRAM VAGINALLY EVERY DAY AND SPREAD A SMALL AMOUNT AROUND THE CLITORAL REES 85 g 0     cyclobenzaprine (FLEXERIL) 10 MG tablet TAKE ONE-HALF TO ONE TABLET BY MOUTH THREE TIMES A DAY AS NEEDED FOR MUSCLE SPASMS 30 tablet 0     simvastatin (ZOCOR) 40 MG tablet Take 1 tablet (40 mg) by mouth At Bedtime 90 tablet 3     gabapentin (NEURONTIN) 300 MG capsule Take 1 capsule (300 mg) by mouth nightly as needed 60 capsule 0     amLODIPine (NORVASC) 5 MG tablet TAKE ONE TABLET BY MOUTH EVERY DAY 90 tablet 1     fluticasone (FLONASE) 50 MCG/ACT spray Spray 1 spray into both nostrils daily 1 Bottle 11     sertraline (ZOLOFT) 100 MG tablet Take 1.5 tablets (150 mg) by  mouth daily 90 tablet 3     cetirizine (ZYRTEC) 10 MG tablet Take 1 tablet (10 mg) by mouth every evening 90 tablet 3     omeprazole (PRILOSEC) 20 MG CR capsule Take 1 capsule (20 mg) by mouth 2 times daily For heartburn 180 capsule 3     albuterol (PROAIR HFA/PROVENTIL HFA/VENTOLIN HFA) 108 (90 BASE) MCG/ACT Inhaler Inhale 2 puffs into the lungs every 4 hours as needed for shortness of breath / dyspnea 1 Inhaler 2     fluticasone-salmeterol (ADVAIR) 250-50 MCG/DOSE diskus inhaler Inhale 1 puff into the lungs 2 times daily 1 Inhaler 11     Multiple Minerals-Vitamins (CALCIUM CITRATE +) TABS Reported on 5/17/2017       SKIN OILS EX Lavender-headache, skin, peppermint-skin, upset stomach mix of oils, asthma mix of oils, eucalyptus. Tea tree oil-skin, vapor rub- chest tightness, sleepy time (vetiver, lavendaer, mrjoram, mandarin, ylang)- sleep, constipation, eczema (sweet almond oil). Also mixes with carriers: Coconut oil, [place] oil, Extra virgin oil. Frankincense- cough. Digize- oil. Purification- oil, lemon- oil. Essentialyme- pill.  Inhales a mix with olive oil almond and coconut oil with peppermint, and eucalyptus- sinus, allergies. Updated 12/1/2015.   Updated 4/26/2016: Lemon, Cinnamon bark, panaway, Thieves, Orange, Wintergreen, Copaiba       ASPIRIN PO Take 81 mg by mouth Takes every other day       lactase (LACTAID) 9000 UNITS TABS Take 9,000 Units by mouth as needed       VITAMIN D, CHOLECALCIFEROL, PO Take 2,400 Units by mouth daily       hydrocortisone 1 % cream Apply topically as needed       desonide (DESOWEN) 0.05 % lotion Apply topically 2 times daily Reported on 5/2/2017 59 mL 0     montelukast (SINGULAIR) 10 MG tablet TAKE ONE TABLET BY MOUTH EVERY DAY 90 tablet 1     calcipotriene 0.005 % OINT Externally apply topically 2 times daily (Patient not taking: Reported on 11/1/2017) 60 g 0     [DISCONTINUED] montelukast (SINGULAIR) 10 MG tablet Take 1 tablet (10 mg) by mouth daily 90 tablet 3      acetaminophen-codeine (TYLENOL #3) 300-30 MG per tablet Take 1 tablet by mouth daily as needed for pain maximum 1 tablet(s) per day (Patient not taking: Reported on 9/27/2017) 30 tablet 0     guaiFENesin (MUCINEX) 600 MG 12 hr tablet Take 2 tablets (1,200 mg) by mouth 2 times daily as needed To see if helps phlegm. (Patient not taking: Reported on 6/20/2017) 40 tablet 0     ORDER FOR DME Equipment being ordered: Nebulizer and tubing/filter (Patient not taking: Reported on 6/20/2017) 1 Device 0     albuterol (PROVENTIL) (5 MG/ML) 0.5% nebulizer solution Take 0.5 mLs (2.5 mg) by nebulization every 4 hours as needed for wheezing or shortness of breath / dyspnea (Patient not taking: Reported on 6/20/2017) 30 vial 1     Probiotic Product (PROBIOTIC DAILY PO) Take 1 chew tab by mouth 2 times daily        clobetasol (TEMOVATE) 0.05 % ointment Apply to area of irritation twice daily for 2 weeks (Patient not taking: Reported on 9/27/2017) 30 g 0     Triamcinolone Acetonide 0.05 % OINT Externally apply  topically.       BENEFIBER OR 2 tsp daily       FLAXSEED Reported on 5/2/2017       Allergies   Allergen Reactions     Sulfa Drugs Other (See Comments)     Reaction unknown as a child         Reviewed and updated as needed this visit by clinical staff  Tobacco  Allergies  Meds  Problems  Med Hx  Surg Hx  Fam Hx  Soc Hx        Reviewed and updated as needed this visit by Provider  Tobacco  Allergies  Meds  Problems  Med Hx  Surg Hx  Fam Hx  Soc Hx          ROS:  Constitutional, HEENT, cardiovascular, pulmonary, gi and gu systems are negative, except as otherwise noted.      OBJECTIVE:   /82 (BP Location: Right arm, Patient Position: Chair, Cuff Size: Adult Regular)  Pulse 80  Temp 98.1  F (36.7  C) (Tympanic)  Resp 16  Wt 125 lb 9.6 oz (57 kg)  BMI 22.25 kg/m2  Body mass index is 22.25 kg/(m^2).  GENERAL APPEARANCE: healthy, alert and no distress  RESP: lungs clear to auscultation - no rales, rhonchi or  wheezes  CV: regular rates and rhythm, normal S1 S2, no S3 or S4 and no murmur, click or rub  ABDOMEN: soft, nontender, without hepatosplenomegaly or masses and bowel sounds normal    Diagnostic Test Results:  none     ASSESSMENT/PLAN:     1. Chronic constipation  Patient has been seen by GI since last office visit. Started on Miralax 1 capful with prune juice and advised to increased by 1/2 to 1 more capful if stools are not regular. Patient just started miralax last week and started prune juice 2 days ago. Feels stools are a bit better, but still having to strain with the last part. Still scant amount of bleeding with wiping. Would recommend patient start increasing her miralax by a 1/2 capful for the next week and then by another 1/2 then if stools still not normal. Follow up gastroenterology as scheduled already.     2. Lumbar radiculopathy  Stable, discussed Voltaren at length and encouraged patient to only use this as needed for back pain. Patient verbalizes understanding.   - diclofenac (VOLTAREN) 50 MG EC tablet; Take 1 tablet (50 mg) by mouth 2 times daily as needed for moderate pain  Dispense: 30 tablet; Refill: 1    3. Chronic right-sided low back pain with right-sided sciatica  See above.   - diclofenac (VOLTAREN) 50 MG EC tablet; Take 1 tablet (50 mg) by mouth 2 times daily as needed for moderate pain  Dispense: 30 tablet; Refill: 1    Patient Instructions   Increase your miralax to 1 1/2 capful with the prune juice everyday     If stools still are tough to get out in 1 week, I want you to increase to 2 capfuls of miralax a day     Push fluids     Gastroenterology wants to see you back in 1-2 months - keep appointment if you have made one    See me back after gastroenterology or sooner if still having difficulties       MARVIN Mas [facility]

## 2021-11-23 DIAGNOSIS — I10 HYPERTENSION GOAL BP (BLOOD PRESSURE) < 140/90: Primary | ICD-10-CM

## 2021-11-24 RX ORDER — AMLODIPINE BESYLATE 2.5 MG/1
TABLET ORAL
Qty: 90 TABLET | Refills: 1 | Status: SHIPPED | OUTPATIENT
Start: 2021-11-24 | End: 2022-06-01

## 2021-11-24 NOTE — TELEPHONE ENCOUNTER
"Requested Prescriptions   Pending Prescriptions Disp Refills     amLODIPine (NORVASC) 2.5 MG tablet [Pharmacy Med Name: AMLODIPINE BESYLATE 2.5MG TABS] 90 tablet 3     Sig: TAKE ONE TABLET BY MOUTH ONCE DAILY       Calcium Channel Blockers Protocol  Failed - 11/23/2021 11:37 AM        Failed - Blood pressure under 140/90 in past 12 months     BP Readings from Last 3 Encounters:   09/23/21 (!) 167/87   09/13/21 (!) 143/84   09/02/21 (!) 151/81                 Failed - Medication is active on med list        Passed - Recent (12 mo) or future (30 days) visit within the authorizing provider's specialty     Patient has had an office visit with the authorizing provider or a provider within the authorizing providers department within the previous 12 mos or has a future within next 30 days. See \"Patient Info\" tab in inbasket, or \"Choose Columns\" in Meds & Orders section of the refill encounter.              Passed - Patient is age 18 or older        Passed - No active pregnancy on record        Passed - Normal serum creatinine on file in past 12 months     Recent Labs   Lab Test 03/30/21  1358 06/18/20  1623   CR  --  0.62   CREAT 0.7  --        Ok to refill medication if creatinine is low          Passed - No positive pregnancy test in past 12 months             "

## 2021-12-21 ENCOUNTER — NURSE TRIAGE (OUTPATIENT)
Dept: NURSING | Facility: CLINIC | Age: 76
End: 2021-12-21
Payer: COMMERCIAL

## 2021-12-21 NOTE — TELEPHONE ENCOUNTER
Got 2 moderna shots.    Then got covid 3 weeks ago.    Now interested in booster and high dose annual flu shot.    Also questioning about getting shingles shot.  [facility] schedule [facility].    Tana Bhandari RN  [facility] Nurse Advisor    Reason for Disposition    [1] Requesting COVID-19 vaccine AND [2] vaccine available in the community for this patient group    Additional Information    Negative: [1] Difficulty breathing or swallowing AND [2] starts within 2 hours after injection    Negative: Sounds like a life-threatening emergency to the triager    Negative: [1] Symptoms of COVID-19 (e.g., cough, fever, SOB, or others) AND [2] within 14 days of EXPOSURE (close contact) with diagnosed or suspected COVID-19 patient    Negative: [1] Symptoms of COVID-19 (e.g., cough, fever, SOB, or others) AND [2] within 14 days of being at a crowded indoor or outdoor event (e.g., concert, festival, rally, wedding)    Negative: Typical COVID-19 symptoms (e.g., cough, difficulty breathing, loss of taste or smell, runny nose, sore throat) that are NOT expected from vaccine    Negative: [1] COVID-19 exposure AND [2] no symptoms, or symptoms not typical of COVID-19    Negative: Fever > 104 F (40 C)    Negative: Sounds like a severe, unusual reaction to the triager    Negative: [1] Redness or red streak around the injection site AND [2] started > 48 hours after getting vaccine AND [3] fever    Negative: [1] Fever > 101 F (38.3 C) AND [2] age > 60 years AND [3] started > 48 hours after getting vaccine    Negative: [1] Fever > 100.0 F (37.8 C) AND [2] bedridden (e.g., nursing home patient, CVA, chronic illness, recovering from surgery) AND [3] started > 48 hours after getting vaccine    Negative: [1] Fever > 100.0 F (37.8 C) AND [2] diabetes mellitus or weak immune system (e.g., HIV positive, cancer chemo, splenectomy, organ transplant, chronic steroids) AND [3] started > 48 hours after getting vaccine     Negative: Fever > 100.0 F (37.8 C) present > 3 days (72 hours)    Negative: [1] Fever > 100.0 F (37.8 C) AND [2] healthcare worker    Negative: [1] Redness around the injection site AND [2] started > 48 hours after getting vaccine AND [3] no fever  (Exception: red area < 1 inch or 2.5 cm wide)    Negative: [1] Pain, tenderness, or swelling at the injection site AND [2] over 3 days (72 hours) since vaccine AND [3] getting worse    Negative: [1] Pain, tenderness, or swelling at the injection site AND [2] lasts > 7 days    Negative: [1] Lymph node swelling (i.e., armpit or neck on side of vaccine) AND [2] lasts > 3 weeks    Negative: [1] Requesting COVID-19 vaccine AND [2] healthcare worker (e.g., EMS first responders, doctors, nurses)    Negative: [1] Requesting COVID-19 vaccine AND [2] resident of a long-term care facility (e.g., nursing home)    Protocols used: CORONAVIRUS (COVID-19) VACCINE QUESTIONS AND [id number] 8.25.2021

## 2021-12-30 ENCOUNTER — IMMUNIZATION (OUTPATIENT)
Dept: FAMILY MEDICINE | Facility: CLINIC | Age: 76
End: 2021-12-30
Payer: COMMERCIAL

## 2021-12-30 PROCEDURE — 0064A COVID-19,PF,MODERNA (18+ YRS BOOSTER .25ML): CPT

## 2021-12-30 PROCEDURE — 91306 COVID-19,PF,MODERNA (18+ YRS BOOSTER .25ML): CPT

## 2022-01-02 ENCOUNTER — HEALTH MAINTENANCE LETTER (OUTPATIENT)
Age: 77
End: 2022-01-02

## 2022-02-11 ENCOUNTER — OFFICE VISIT (OUTPATIENT)
Dept: DERMATOLOGY | Facility: CLINIC | Age: 77
End: 2022-02-11
Payer: COMMERCIAL

## 2022-02-11 VITALS — HEART RATE: 65 BPM | SYSTOLIC BLOOD PRESSURE: 137 MMHG | OXYGEN SATURATION: 98 % | DIASTOLIC BLOOD PRESSURE: 84 MMHG

## 2022-02-11 DIAGNOSIS — L82.1 SEBORRHEIC KERATOSIS: ICD-10-CM

## 2022-02-11 DIAGNOSIS — L81.4 LENTIGO: ICD-10-CM

## 2022-02-11 DIAGNOSIS — L40.9 PSORIASIS: Primary | ICD-10-CM

## 2022-02-11 DIAGNOSIS — L60.3 DYSTROPHIC NAIL: ICD-10-CM

## 2022-02-11 DIAGNOSIS — D22.9 MULTIPLE BENIGN NEVI: ICD-10-CM

## 2022-02-11 DIAGNOSIS — D18.01 CHERRY ANGIOMA: ICD-10-CM

## 2022-02-11 PROCEDURE — 11900 INJECT SKIN LESIONS </W 7: CPT | Performed by: PHYSICIAN ASSISTANT

## 2022-02-11 PROCEDURE — 99213 OFFICE O/P EST LOW 20 MIN: CPT | Mod: 25 | Performed by: PHYSICIAN ASSISTANT

## 2022-02-11 PROCEDURE — 87101 SKIN FUNGI CULTURE: CPT | Performed by: PHYSICIAN ASSISTANT

## 2022-02-11 RX ORDER — CLOBETASOL PROPIONATE 0.05 G/100ML
SHAMPOO TOPICAL
Qty: 118 ML | Refills: 3 | Status: SHIPPED | OUTPATIENT
Start: 2022-02-11 | End: 2023-01-30

## 2022-02-11 NOTE — NURSING NOTE
"Initial /84 (BP Location: Left arm, Patient Position: Sitting, Cuff Size: Adult Regular)   Pulse 65   SpO2 98%  Estimated body mass index is 20.31 kg/m  as calculated from the following:    Height as of 8/27/21: 1.54 m (5' 0.63\").    Weight as of 9/13/21: 48.2 kg (106 lb 3.2 oz). .      "

## 2022-02-11 NOTE — PROGRESS NOTES
Ingrid Amaral is an extremely pleasant 76 year old year old female patient here today for psoriasis and skin check. She notes she is not currently using anything on scalp psoriasis. She notes her skin is itchy. She notes thickened nails on toenails present for years.  Patient has no other skin complaints today.  Remainder of the HPI, Meds, PMH, Allergies, FH, and SH was reviewed in chart.    Pertinent Hx:   No personal history of skin cancer   Past Medical History:   Diagnosis Date     Asthma      GERD (gastroesophageal reflux disease)      Radial head fracture 3/18/2010       Past Surgical History:   Procedure Laterality Date     ANKLE SURGERY      bilateral     COLONOSCOPY       COLONOSCOPY N/A 3/20/2015    Procedure: COLONOSCOPY;  Surgeon: Britney Martinez MD;  Location: [facility]     COLONOSCOPY N/A 5/22/2017    Procedure: COLONOSCOPY;  Colonoscopy;  Surgeon: Tristen Rangel MD;  Location: [facility]     ESOPHAGOSCOPY, GASTROSCOPY, DUODENOSCOPY (EGD), COMBINED N/A 6/7/2018    Procedure: COMBINED ESOPHAGOSCOPY, GASTROSCOPY, DUODENOSCOPY (EGD);  gastroscopy;  Surgeon: Tristen Rangel MD;  Location: [facility]     HC ESOPH/GAS REFLUX TEST W NASAL IMPED >1 HR  4/19/2012    Procedure:ESOPHAGEAL IMPEDENCE FUNCTION TEST WITH 24 HOUR PH GREATER THAN 1 HOUR; Surgeon:VALDO UMANZOR; Location: GI     HERNIA REPAIR      umbilcal     HYSTERECTOMY, PAP NO LONGER INDICATED  1991     TONSILLECTOMY       UPPER GI ENDOSCOPY          Family History   Problem Relation Age of Onset     Heart Disease Mother         valve problem     Hypertension Mother      Diabetes Mother         type 2     Cerebrovascular Disease Mother      Allergies Mother      Eye Disorder Mother         Macular degeneration     Osteoporosis Mother      Respiratory Mother      Migraines Mother      Cardiovascular Father         MI at age 80     Cancer - colorectal Father 87     Diabetes Father      Hypertension Father         type 2     Eye Disorder Father          macular degeneration     Lipids Father      C.A.D. Maternal Grandmother      Diabetes Paternal Grandmother         type 2     Cardiovascular Paternal Grandmother         MI     Diabetes Sister         type 2     Allergies Sister      Gastrointestinal Disease Sister      Depression Sister      Gastrointestinal Disease Daughter      Migraines Daughter      Gastrointestinal Disease Daughter      Migraines Daughter      Migraines Son      Aneurysm No family hx of      Brain Hemorrhage No family hx of      Brain Tumor No family hx of      Cancer No family hx of      Chiari Malformation No family hx of      LUNG DISEASE No family hx of      Seizure Disorder No family hx of        Social History     Socioeconomic History     Marital status:      Spouse name: Not on file     Number of children: Not on file     Years of education: Not on file     Highest education level: Not on file   Occupational History     Not on file   Tobacco Use     Smoking status: Former Smoker     Packs/day: 0.50     Years: 3.00     Pack years: 1.50     Types: Cigarettes     Smokeless tobacco: Never Used     Tobacco comment: smoked for 3 years when she was around 19   Substance and Sexual Activity     Alcohol use: Yes     Comment: RARELY     Drug use: No     Sexual activity: Not Currently   Other Topics Concern     Parent/sibling w/ CABG, MI or angioplasty before 65F 55M? Yes     Comment: mother- valve problem   Social History Narrative     Not on file     Social Determinants of Health     Financial Resource Strain: Not on file   Food Insecurity: Not on file   Transportation Needs: Not on file   Physical Activity: Not on file   Stress: Not on file   Social Connections: Not on file   Intimate Partner Violence: Not on file   Housing Stability: Not on file       Outpatient Encounter Medications as of 2/11/2022   Medication Sig Dispense Refill     amLODIPine (NORVASC) 2.5 MG tablet TAKE ONE TABLET BY MOUTH ONCE DAILY 90 tablet 1      amylase-lipase-protease (CREON) 9504-70662-49605 units CPEP TAKE TWO CAPSULES BY MOUTH THREE TIMES A DAY WITH MEALS FOR DIARRHEA 90 capsule 3     biotin 1000 MCG TABS tablet Take 1,000 mcg by mouth 2 times daily       calcium carb-cholecalciferol (OS-LARS) 500-200 MG-UNIT tablet Take 1 tablet by mouth 2 times daily (with meals) 180 tablet 3     CALCIUM-MAGNESIUM-ZINC PO Take 1 tablet by mouth daily        cetirizine (ZYRTEC) 10 MG tablet ALLERGIES TAKE ONE TABLET BY MOUTH ONCE DAILY IN THE MORNING 90 tablet 3     clobetasol propionate (CLOBEX) 0.05 % external shampoo Leave on scalp for 15-30 minutes then rinse. Use 1-2 times weekly. 118 mL 3     cyanocobalamin (VITAMIN B-12) 100 MCG tablet B12 DEFICIENCY TAKE TWO TABLETS BY MOUTH ONCE DAILY 60 tablet 11     donepezil (ARICEPT) 10 MG tablet MEMORY TAKE ONE TABLET BY MOUTH EVERY NIGHT AT BEDTIME 90 tablet 3     DULoxetine (CYMBALTA) 30 MG capsule Take 1 capsule (30 mg) by mouth daily ANXIETY/DEPRESSION ONE CAP DAILY 90 capsule 3     fluticasone-salmeterol (ADVAIR) 250-50 MCG/DOSE inhaler ASTHMA; INHALE ONE PUFF BY MOUTH TWICE A  each 3     gabapentin (NEURONTIN) 300 MG capsule TAKE TWO CAPSULES BY MOUTH THREE TIMES A DAY FOR BACK PAIN. 180 capsule 5     lactase (LACTAID) 9000 units TABS tablet Take 1 tablet (9,000 Units) by mouth 3 times daily (with meals) For lactose intolerance, when eating milk-related foods. 90 tablet 11     omeprazole (PRILOSEC) 10 MG DR capsule Take 1 capsule (10 mg) by mouth daily HEARTBURN; TAKE 1 CAPSULE (10 MG) BY MOUTH PER DAY 30 capsule 11     polyethylene glycol (MIRALAX/GLYCOLAX) powder Take 17 g (1 capful) by mouth as needed for constipation 1 Bottle 3     SKIN OILS EX Lavender-headache, skin, peppermint-skin, upset stomach mix of oils, asthma mix of oils, eucalyptus. Tea tree oil-skin, vapor rub- chest tightness, sleepy time (vetiver, lavendaer, mrjoram, mandarin, ylang)- sleep, constipation, eczema (sweet almond oil). Also mixes  with carriers: Coconut oil, [place] oil, Extra virgin oil. Frankincense- cough. Digize- oil. Purification- oil, lemon- oil. Essentialyme- pill.  Inhales a mix with olive oil almond and coconut oil with peppermint, and eucalyptus- sinus, allergies. Updated 12/1/2015.   Updated 4/26/2016: Lemon, Cinnamon bark, panaway, Thieves, Orange, [place]green, Copaiba       traZODone (DESYREL) 100 MG tablet TAKE ONE TABLET BY MOUTH EVERY NIGHT AT BEDTIME FOR SLEEP 30 tablet 11     triamcinolone (KENALOG) 0.1 % external cream Apply topically 2 times daily RASH ON LEGS; USE AS NEEDED 15 g 0     vitamin C (ASCORBIC ACID) 500 MG tablet Take 500 mg by mouth daily        vitamin D3 (CHOLECALCIFEROL) 10 MCG (400 UNIT) capsule Take 1 capsule (400 Units) by mouth daily 90 capsule 3     augmented betamethasone dipropionate (DIPROLENE) 0.05 % external lotion AREA ON LEG AND SCALP Apply sparingly twice daily Friday through Sunday. Stronger. (Patient not taking: Reported on 9/13/2021) 30 mL 0     calcipotriene (DOVONOX) 0.005 % external cream Apply twice daily to psoriasis on Monday through Thursday. (Patient not taking: Reported on 9/13/2021) 60 g 8     No facility-administered encounter medications on file as of 2/11/2022.             O:   NAD, WDWN, Alert & Oriented, Mood & Affect wnl, Vitals stable   Here today alone   /84 (BP Location: Left arm, Patient Position: Sitting, Cuff Size: Adult Regular)   Pulse 65   SpO2 98%    General appearance normal   Vitals stable   Alert, oriented and in no acute distress    Stuck on papules and brown macules on trunk and ext   Red papules on trunk  Brown papules and macules with regular pigment network and borders on torso and extremities  psoriasiform plaques on scalp  Dystrophic nails   The remainder of skin exam is normal       Eyes: Conjunctivae/lids:Normal     ENT: Lips: normal    MSK:Normal    Cardiovascular: peripheral edema none    Pulm: Breathing Normal     Neuro/Psych: Orientation:Alert  and Orientedx3 ; Mood/Affect:normal   A/P:  1. Psoriasis   IL TAC: PGACAC discussed.  Risks including but not limited to injection site reaction, bruising, no resolution.  All questions answered and entertained to patient s satisfaction.  Informed consent obtained.  IL TAC in concentration of 40mg/ml was injected ID to psoriasis.  Total injected was  1 ml.  Patient tolerated without complications and given wound care instructions, including not to move product around.  Return in 4 weeks for follow-up and possible additional IL TAC.    2. Dystrophic nail- toenails   Culture ordered to r/o fungus   3. Seborrheic keratosis, lentigo, angioma, benign nevi   BENIGN LESIONS DISCUSSED WITH PATIENT:  I discussed the specifics of tumor, prognosis, and genetics of benign lesions.  I explained that treatment of these lesions would be purely cosmetic and not medically neccessary.  I discussed with patient different removal options including excision, cautery and /or laser.      Nature and genetics of benign skin lesions dicussed with patient.  Signs and Symptoms of skin cancer discussed with patient.  ABCDEs of melanoma reviewed with patient.  Patient encouraged to perform monthly skin exams.  UV precautions reviewed with patient.  Risks of non-melanoma skin cancer discussed with patient   Return to clinic as needed.

## 2022-02-11 NOTE — PATIENT INSTRUCTIONS
Skin check was normal, all benign skin lesions.   Psoriasis:   We did intralesional steroid injection to scalp, should see improvement in scalp within 1-2 weeks.   Also sent nail clipping  For culture to rule out fungus.     For scalp:   Use clobetasol shampoo leave on for 15-30 minutes to dry hair then rinse off  When getting in shower.   Use once to twice weekly.   Can use conditioner on ends of hair.

## 2022-02-11 NOTE — LETTER
2/11/2022         RE: Ingrid Amaral  [address] [facility] [id number]        Dear Colleague,    Thank you for referring your patient, Ingrid Amaral, to the [facility]. Please see a copy of my visit note below.    Ingrid Amaral is an extremely pleasant 76 year old year old female patient here today for psoriasis and skin check. She notes she is not currently using anything on scalp psoriasis. She notes her skin is itchy. She notes thickened nails on toenails present for years.  Patient has no other skin complaints today.  Remainder of the HPI, Meds, PMH, Allergies, FH, and SH was reviewed in chart.    Pertinent Hx:   No personal history of skin cancer   Past Medical History:   Diagnosis Date     Asthma      GERD (gastroesophageal reflux disease)      Radial head fracture 3/18/2010       Past Surgical History:   Procedure Laterality Date     ANKLE SURGERY      bilateral     COLONOSCOPY       COLONOSCOPY N/A 3/20/2015    Procedure: COLONOSCOPY;  Surgeon: Britney Martinez MD;  Location: [facility]     COLONOSCOPY N/A 5/22/2017    Procedure: COLONOSCOPY;  Colonoscopy;  Surgeon: Tristen Rangel MD;  Location: [facility]     ESOPHAGOSCOPY, GASTROSCOPY, DUODENOSCOPY (EGD), COMBINED N/A 6/7/2018    Procedure: COMBINED ESOPHAGOSCOPY, GASTROSCOPY, DUODENOSCOPY (EGD);  gastroscopy;  Surgeon: Tristen Rangel MD;  Location: [facility]     HC ESOPH/GAS REFLUX TEST W NASAL IMPED >1 HR  4/19/2012    Procedure:ESOPHAGEAL IMPEDENCE FUNCTION TEST WITH 24 HOUR PH GREATER THAN 1 HOUR; Surgeon:VALDO UMANZOR; Location: [facility]     HERNIA REPAIR      umbilcal     HYSTERECTOMY, PAP NO LONGER INDICATED  1991     TONSILLECTOMY       UPPER GI ENDOSCOPY          Family History   Problem Relation Age of Onset     Heart Disease Mother         valve problem     Hypertension Mother      Diabetes Mother         type 2     Cerebrovascular Disease Mother      Allergies Mother      Eye Disorder Mother          Macular degeneration     Osteoporosis Mother      Respiratory Mother      Migraines Mother      Cardiovascular Father         MI at age 80     Cancer - colorectal Father 87     Diabetes Father      Hypertension Father         type 2     Eye Disorder Father         macular degeneration     Lipids Father      C.A.D. Maternal Grandmother      Diabetes Paternal Grandmother         type 2     Cardiovascular Paternal Grandmother         MI     Diabetes Sister         type 2     Allergies Sister      Gastrointestinal Disease Sister      Depression Sister      Gastrointestinal Disease Daughter      Migraines Daughter      Gastrointestinal Disease Daughter      Migraines Daughter      Migraines Son      Aneurysm No family hx of      Brain Hemorrhage No family hx of      Brain Tumor No family hx of      Cancer No family hx of      Chiari Malformation No family hx of      LUNG DISEASE No family hx of      Seizure Disorder No family hx of        Social History     Socioeconomic History     Marital status:      Spouse name: Not on file     Number of children: Not on file     Years of education: Not on file     Highest education level: Not on file   Occupational History     Not on file   Tobacco Use     Smoking status: Former Smoker     Packs/day: 0.50     Years: 3.00     Pack years: 1.50     Types: Cigarettes     Smokeless tobacco: Never Used     Tobacco comment: smoked for 3 years when she was around 19   Substance and Sexual Activity     Alcohol use: Yes     Comment: RARELY     Drug use: No     Sexual activity: Not Currently   Other Topics Concern     Parent/sibling w/ CABG, MI or angioplasty before 65F 55M? Yes     Comment: mother- valve problem   Social History Narrative     Not on file     Social Determinants of Health     Financial Resource Strain: Not on file   Food Insecurity: Not on file   Transportation Needs: Not on file   Physical Activity: Not on file   Stress: Not on file   Social Connections: Not on file    Intimate Partner Violence: Not on file   Housing Stability: Not on file       Outpatient Encounter Medications as of 2/11/2022   Medication Sig Dispense Refill     amLODIPine (NORVASC) 2.5 MG tablet TAKE ONE TABLET BY MOUTH ONCE DAILY 90 tablet 1     amylase-lipase-protease (CREON) [id number] units CPEP TAKE TWO CAPSULES BY MOUTH THREE TIMES A DAY WITH MEALS FOR DIARRHEA 90 capsule 3     biotin 1000 MCG TABS tablet Take 1,000 mcg by mouth 2 times daily       calcium carb-cholecalciferol (OS-LARS) 500-200 MG-UNIT tablet Take 1 tablet by mouth 2 times daily (with meals) 180 tablet 3     CALCIUM-MAGNESIUM-ZINC PO Take 1 tablet by mouth daily        cetirizine (ZYRTEC) 10 MG tablet ALLERGIES TAKE ONE TABLET BY MOUTH ONCE DAILY IN THE MORNING 90 tablet 3     clobetasol propionate (CLOBEX) 0.05 % external shampoo Leave on scalp for 15-30 minutes then rinse. Use 1-2 times weekly. 118 mL 3     cyanocobalamin (VITAMIN B-12) 100 MCG tablet B12 DEFICIENCY TAKE TWO TABLETS BY MOUTH ONCE DAILY 60 tablet 11     donepezil (ARICEPT) 10 MG tablet MEMORY TAKE ONE TABLET BY MOUTH EVERY NIGHT AT BEDTIME 90 tablet 3     DULoxetine (CYMBALTA) 30 MG capsule Take 1 capsule (30 mg) by mouth daily ANXIETY/DEPRESSION ONE CAP DAILY 90 capsule 3     fluticasone-salmeterol (ADVAIR) 250-50 MCG/DOSE inhaler ASTHMA; INHALE ONE PUFF BY MOUTH TWICE A  each 3     gabapentin (NEURONTIN) 300 MG capsule TAKE TWO CAPSULES BY MOUTH THREE TIMES A DAY FOR BACK PAIN. 180 capsule 5     lactase (LACTAID) 9000 units TABS tablet Take 1 tablet (9,000 Units) by mouth 3 times daily (with meals) For lactose intolerance, when eating milk-related foods. 90 tablet 11     omeprazole (PRILOSEC) 10 MG DR capsule Take 1 capsule (10 mg) by mouth daily HEARTBURN; TAKE 1 CAPSULE (10 MG) BY MOUTH PER DAY 30 capsule 11     polyethylene glycol (MIRALAX/GLYCOLAX) powder Take 17 g (1 capful) by mouth as needed for constipation 1 Bottle 3     SKIN OILS EX  Lavender-headache, skin, peppermint-skin, upset stomach mix of oils, asthma mix of oils, eucalyptus. Tea tree oil-skin, vapor rub- chest tightness, sleepy time (vetiver, lavendaer, mrjoram, mandarin, ylang)- sleep, constipation, eczema (sweet almond oil). Also mixes with carriers: Coconut oil, [place] oil, Extra virgin oil. Frankincense- cough. Digize- oil. Purification- oil, lemon- oil. Essentialyme- pill.  Inhales a mix with olive oil almond and coconut oil with peppermint, and eucalyptus- sinus, allergies. Updated 12/1/2015.   Updated 4/26/2016: Lemon, Cinnamon bark, panaway, Thieves, Orange, Wintergreen, Copaiba       traZODone (DESYREL) 100 MG tablet TAKE ONE TABLET BY MOUTH EVERY NIGHT AT BEDTIME FOR SLEEP 30 tablet 11     triamcinolone (KENALOG) 0.1 % external cream Apply topically 2 times daily RASH ON LEGS; USE AS NEEDED 15 g 0     vitamin C (ASCORBIC ACID) 500 MG tablet Take 500 mg by mouth daily        vitamin D3 (CHOLECALCIFEROL) 10 MCG (400 UNIT) capsule Take 1 capsule (400 Units) by mouth daily 90 capsule 3     augmented betamethasone dipropionate (DIPROLENE) 0.05 % external lotion AREA ON LEG AND SCALP Apply sparingly twice daily Friday through Sunday. Stronger. (Patient not taking: Reported on 9/13/2021) 30 mL 0     calcipotriene (DOVONOX) 0.005 % external cream Apply twice daily to psoriasis on Monday through Thursday. (Patient not taking: Reported on 9/13/2021) 60 g 8     No facility-administered encounter medications on file as of 2/11/2022.             O:   NAD, WDWN, Alert & Oriented, Mood & Affect wnl, Vitals stable   Here today alone   /84 (BP Location: Left arm, Patient Position: Sitting, Cuff Size: Adult Regular)   Pulse 65   SpO2 98%    General appearance normal   Vitals stable   Alert, oriented and in no acute distress    Stuck on papules and brown macules on trunk and ext   Red papules on trunk  Brown papules and macules with regular pigment network and borders on torso and  extremities  psoriasiform plaques on scalp  Dystrophic nails   The remainder of skin exam is normal       Eyes: Conjunctivae/lids:Normal     ENT: Lips: normal    MSK:Normal    Cardiovascular: peripheral edema none    Pulm: Breathing Normal     Neuro/Psych: Orientation:Alert and Orientedx3 ; Mood/Affect:normal   A/P:  1. Psoriasis   IL TAC: PGACAC discussed.  Risks including but not limited to injection site reaction, bruising, no resolution.  All questions answered and entertained to patient s satisfaction.  Informed consent obtained.  IL TAC in concentration of 40mg/ml was injected ID to psoriasis.  Total injected was  1 ml.  Patient tolerated without complications and given wound care instructions, including not to move product around.  Return in 4 weeks for follow-up and possible additional IL TAC.    2. Dystrophic nail- toenails   Culture ordered to r/o fungus   3. Seborrheic keratosis, lentigo, angioma, benign nevi   BENIGN LESIONS DISCUSSED WITH PATIENT:  I discussed the specifics of tumor, prognosis, and genetics of benign lesions.  I explained that treatment of these lesions would be purely cosmetic and not medically neccessary.  I discussed with patient different removal options including excision, cautery and /or laser.      Nature and genetics of benign skin lesions dicussed with patient.  Signs and Symptoms of skin cancer discussed with patient.  ABCDEs of melanoma reviewed with patient.  Patient encouraged to perform monthly skin exams.  UV precautions reviewed with patient.  Risks of non-melanoma skin cancer discussed with patient   Return to clinic as needed.         Again, thank you for allowing me to participate in the care of your patient.        Sincerely,        Miguelina Lion PA-C

## 2022-02-18 ENCOUNTER — TELEPHONE (OUTPATIENT)
Dept: FAMILY MEDICINE | Facility: CLINIC | Age: 77
End: 2022-02-18
Payer: COMMERCIAL

## 2022-02-18 NOTE — TELEPHONE ENCOUNTER
Pt and her  called saying they had virtual visit with neurologist Dr Fredy Ewing on 4/25/2021 to discuss MRI results and plan, the expectation by pt and  was that there would be a repeat MRI in 6 months as they did not want to have surgery at this time. This RN reviewed documentation from visit, Dr Ewing said repeat MRI to be done in a year and pt will get reminder from his office to get it scheduled. Pt has not received a reminder, no order is in chart. Pt does not use [employer] so it was de-activated per her request. Message forwarded to Dr Ewing, please have someone in his office call pt to tell her when the order is in so they can schedule MRI at [place].   Nevaeh Tomlinson RN

## 2022-02-23 DIAGNOSIS — K52.9 CHRONIC DIARRHEA: ICD-10-CM

## 2022-03-01 ENCOUNTER — TELEPHONE (OUTPATIENT)
Dept: NEUROSURGERY | Facility: CLINIC | Age: 77
End: 2022-03-01
Payer: COMMERCIAL

## 2022-03-01 DIAGNOSIS — D32.9 MENINGIOMA (H): Primary | ICD-10-CM

## 2022-03-04 ENCOUNTER — HOSPITAL ENCOUNTER (OUTPATIENT)
Dept: MRI IMAGING | Facility: CLINIC | Age: 77
Discharge: HOME | End: 2022-03-04
Attending: NEUROLOGICAL SURGERY | Admitting: NEUROLOGICAL SURGERY
Payer: COMMERCIAL

## 2022-03-04 DIAGNOSIS — D32.9 MENINGIOMA (H): ICD-10-CM

## 2022-03-04 PROCEDURE — 70553 MRI BRAIN STEM W/O & W/DYE: CPT

## 2022-03-04 PROCEDURE — 255N000002 HC RX 255 OP 636: Performed by: NEUROLOGICAL SURGERY

## 2022-03-04 PROCEDURE — A9585 GADOBUTROL INJECTION: HCPCS | Performed by: NEUROLOGICAL SURGERY

## 2022-03-04 RX ORDER — GADOBUTROL 604.72 MG/ML
5 INJECTION INTRAVENOUS ONCE
Status: COMPLETED | OUTPATIENT
Start: 2022-03-04 | End: 2022-03-04

## 2022-03-04 RX ADMIN — GADOBUTROL 5 ML: 604.72 INJECTION INTRAVENOUS at 14:15

## 2022-03-07 ENCOUNTER — TELEPHONE (OUTPATIENT)
Dept: NEUROSURGERY | Facility: CLINIC | Age: 77
End: 2022-03-07
Payer: COMMERCIAL

## 2022-03-07 NOTE — TELEPHONE ENCOUNTER
As per Dr Ewing, MRI stable. Repeat MRI in 1 year.   Patient notified of above.  Reminder sent to pool to order and schedule.

## 2022-03-11 LAB — BACTERIA SPEC CULT: NO GROWTH

## 2022-03-31 NOTE — PROGRESS NOTES
ESTABLISHED PATIENT NEUROLOGY NOTE    DATE OF VISIT: 12/19/2018  MRN: [id number]  PATIENT NAME: Ingrid Amaral  YOB: 1945    Chief Complaint   Patient presents with     RECHECK     Cognition     SUBJECTIVE:                                                      HISTORY OF PRESENT ILLNESS:  Ingrid is here for follow up regarding cognitive impairment.      When I met with the patient about 6 months ago, we reviewed her Neuropsych test results. There was concern for Lewy Body Dementia  , given the patient's additional history of RBD symptoms and tremor. I ordered a PET scan, which was consistent with possible early Lewy Body Dementia, but the occipital lobe metabolism was noted as normal. She has additional meningiomas which had increased in size on imaging so I referred Ingrid on to Neurosurgery. I also ordered an electroencephalogram. It does not appear that the latter 2 recommendations were completed. I asked her to try melatonin for sleep.        She is here alone today. The patient denies tremor. She says that balance is off. She also feels clumsy and drops things. She had one fall and hit her knee a while ago. She mentions that she has been to physical therapy several times. She admits she has not been doing the HEP. No weakness or numbness/tingling.     She says that the memory issues come and go. She feels that some memories have come back that she used to not be able to remember. She is still acting out her dreams. She does feel well-rested. She also has some visual hallucinations. She is taking the melatonin, and now feels that the sleep is better.      She says she has to stay in the kitchen when she cooks so that she doesn't leave things on the stove too long. She is still driving and has gotten lost just once in the dark.    She is now on 5mg at bedtime of Aricept, with the approval of her primary care provider. She does not think the Aricept is causing any side effects and has not made her  IBS symptoms worse.     Patient does not think that she did the electroencephalogram or Neurosurgery consult. She says it would be in our chart if she did.     CURRENT MEDICATIONS:     Current Outpatient Medications on File Prior to Visit:  albuterol (PROAIR HFA/PROVENTIL HFA/VENTOLIN HFA) 108 (90 BASE) MCG/ACT Inhaler Inhale 2 puffs into the lungs every 4 hours as needed for shortness of breath / dyspnea   albuterol (PROVENTIL) (5 MG/ML) 0.5% nebulizer solution Take 0.5 mLs (2.5 mg) by nebulization every 4 hours as needed for wheezing or shortness of breath / dyspnea   amLODIPine (NORVASC) 5 MG tablet Take 1 tablet (5 mg) by mouth daily   ascorbic acid 500 MG TABS Take 1 tablet by mouth daily   ASPIRIN 81 PO Take 1 tablet by mouth every other day    augmented betamethasone dipropionate (DIPROLENE-AF) 0.05 % cream Apply sparingly to legs twice daily as needed. Do not apply to face. May occlude with saran wrap for 3-4 days for 6 hours.   BENEFIBER OR 2 tsp daily   Biotin 59702 MCG TABS Take 1 tablet by mouth 2 times daily   Calcium-Magnesium-Zinc 333..33-5 MG TABS Take 1 tablet by mouth daily   cetirizine (ZYRTEC) 10 MG tablet TAKE ONE TABLET BY MOUTH EVERY EVENING   estradiol (ESTRACE VAGINAL) 0.1 MG/GM cream USE ONE GRAM VAGINALLY EVERY DAY AND SPREAD A SMALL AMOUNT AROUND THE CLITORAL REES   fluocinonide (LIDEX) 0.05 % solution Apply twice daily as needed to scalp.  Do not apply to face.   fluticasone (FLONASE) 50 MCG/ACT spray USE ONE SPRAY IN EACH NOSTRIL DAILY   fluticasone-salmeterol (ADVAIR DISKUS) 250-50 MCG/DOSE diskus inhaler Inhale 1 puff into the lungs 2 times daily   gabapentin (NEURONTIN) 300 MG capsule TAKE ONE CAPSULE BY MOUTH EVERY NIGHT AT BEDTIME AS NEEDED   hydrocortisone 1 % cream Apply topically as needed   lactase (LACTAID) 9000 UNITS TABS Take 9,000 Units by mouth as needed   montelukast (SINGULAIR) 10 MG tablet TAKE ONE TABLET BY MOUTH EVERY DAY   omeprazole (PRILOSEC) 20 MG CR capsule  TAKE ONE CAPSULE BY MOUTH TWICE A DAY FOR HEARTBURN   sertraline (ZOLOFT) 100 MG tablet TAKE ONE AND ONE-HALF TABLET BY MOUTH ONCE DAILY   simvastatin (ZOCOR) 40 MG tablet TAKE ONE TABLET BY MOUTH EVERY NIGHT AT BEDTIME   SKIN OILS EX Lavender-headache, skin, peppermint-skin, upset stomach mix of oils, asthma mix of oils, eucalyptus. Tea tree oil-skin, vapor rub- chest tightness, sleepy time (vetiver, lavendaer, mrjoram, mandarin, ylang)- sleep, constipation, eczema (sweet almond oil). Also mixes with carriers: Coconut oil, Courtland oil, Extra virgin oil. Frankincense- cough. Digize- oil. Purification- oil, lemon- oil. Essentialyme- pill.  Inhales a mix with olive oil almond and coconut oil with peppermint, and eucalyptus- sinus, allergies. Updated 12/1/2015. Updated 4/26/2016: Lemon, Cinnamon bark, panaway, Thieves, Orange, Wintergreen, Copaiba   clobetasol (TEMOVATE) 0.05 % ointment APPLY TO AREA OF IRRITATION TWICE DAILY FOR 2 WEEKS.  Cover loosely with gauze or saran wrap. (Patient not taking: Reported on 12/19/2018)   ORDER FOR DME Equipment being ordered: Nebulizer and tubing/filter     No current facility-administered medications on file prior to visit.     RECENT DIAGNOSTIC STUDIES:   Labs:   Results for orders placed or performed in visit on 11/02/18   CBC with platelets   Result Value Ref Range    WBC 6.0 4.0 - 11.0 10e9/L    RBC Count 4.25 3.8 - 5.2 10e12/L    Hemoglobin 13.0 11.7 - 15.7 g/dL    Hematocrit 39.7 35.0 - 47.0 %    MCV 93 78 - 100 fl    MCH 30.6 26.5 - 33.0 pg    MCHC 32.7 31.5 - 36.5 g/dL    RDW 13.0 10.0 - 15.0 %    Platelet Count 275 150 - 450 10e9/L   Iron and iron binding capacity   Result Value Ref Range    Iron 123 35 - 180 ug/dL    Iron Binding Cap 409 240 - 430 ug/dL    Iron Saturation Index 30 15 - 46 %   Ferritin   Result Value Ref Range    Ferritin 18 8 - 252 ng/mL   Lipid panel reflex to direct LDL Non-fasting   Result Value Ref Range    Cholesterol 185 <200 mg/dL    Triglycerides  159 (H) <150 mg/dL    HDL Cholesterol 71 >49 mg/dL    LDL Cholesterol Calculated 82 <100 mg/dL    Non HDL Cholesterol 114 <130 mg/dL   Comprehensive metabolic panel   Result Value Ref Range    Sodium 138 133 - 144 mmol/L    Potassium 3.8 3.4 - 5.3 mmol/L    Chloride 104 94 - 109 mmol/L    Carbon Dioxide 28 20 - 32 mmol/L    Anion Gap 6 3 - 14 mmol/L    Glucose 90 70 - 99 mg/dL    Urea Nitrogen 16 7 - 30 mg/dL    Creatinine 0.70 0.52 - 1.04 mg/dL    GFR Estimate 82 >60 mL/min/1.7m2    GFR Estimate If Black >90 >60 mL/min/1.7m2    Calcium 8.9 8.5 - 10.1 mg/dL    Bilirubin Total 0.5 0.2 - 1.3 mg/dL    Albumin 4.0 3.4 - 5.0 g/dL    Protein Total 7.2 6.8 - 8.8 g/dL    Alkaline Phosphatase 97 40 - 150 U/L    ALT 29 0 - 50 U/L    AST 24 0 - 45 U/L   TSH with free T4 reflex   Result Value Ref Range    TSH 2.45 0.40 - 4.00 mU/L       Imaging:   PET Brain (5.3.18):  IMPRESSION:    1. Mild hypometabolism in the parietal lobes bilaterally, possibly  representing early Lewy body dementia. Occipital lobe remains normal  however.  2. Grossly stable size of the bilateral meningiomas overlying the  right temporal lobe and left occipital lobe.    REVIEW OF SYSTEMS:                                                      10-point review of systems is negative except as mentioned above in HPI.     EXAM:                                                      Physical Exam:   Vitals: /73 (BP Location: Right arm, Patient Position: Sitting, Cuff Size: Adult Regular)   Pulse 67   Temp 98.4  F (36.9  C) (Tympanic)   Resp 16   Wt 59.5 kg (131 lb 3.2 oz)   BMI 23.24 kg/m    BMI= Body mass index is 23.24 kg/m .  GENERAL: NAD.   HEENT: NC/AT.   Neurologic:  MENTAL STATUS: Alert, attentive though spacy at times. Speech is fluent. Fair comprehension. MoCA: 25/30 (normal is 26 and above).   CRANIAL NERVES: Visual fields intact to confrontation. Pupils equally, round and reactive to light. Facial sensation and movement normal. EOM full.  Hearing intact to conversation. Trapezius strength intact. Palate moves symmetrically. Tongue midline.  MOTOR: 5/5 in proximal and distal muscle groups of upper and lower extremities. Tone and bulk normal. No tremor on today's exam.  SENSATION: Normal light touch throughout.   COORDINATION: Normal finger nose finger. Normal hand opening/closing speed and amplitude. Knee heel shin normal.  STATION AND GAIT: Romberg negative. Tandem minimally unsteady. Casual gait is normal except for valgus deformity at the knee.   CV: RRR. S1, S2.   NECK: No bruits.    ASSESSMENT and PLAN:                                                      Assessment and Plan:    ICD-10-CM    1. Cognitive impairment R41.89 donepezil (ARICEPT) 5 MG tablet   2. Meningioma (H) D32.9 NEUROSURGERY REFERRAL       Ms. Amaral is a pleasant 74 yo woman here for follow-up regarding possibly early stage Lewy Body Dementia. She really has no motor signs of Parkinson's on exam today. Her memory/cognition appears to be stable. We discussed increasing the nightly Aricept to 10mg since she is tolerating it well. We also discussed physical therapy for balance. She would like to work on doing her home exercises first. I expressed concern/caution about driving. It does not sound like there are any safety concerns at home or with the driving although the patient is alone in clinic today so I do not have corroborative information.     I would like for Ingrid to see a Neurosurgeon regarding the meningiomas so I referred her again.     We reviewed the PET scan results in clinic today. For simplicity sake, will hold off on the electroencephalogram for now.     Patient Instructions:  -- Try to resume your home exercises. Let me know if you change your mind about a physical therapy referral to get going on this again.   -- Increase the Aricept to 10mg at bedtime.   -- Referral to Neurosurgery to review your imaging/meningiomas.  -- Return to Neurology clinic in 6 months,  or sooner if there are new concerns.     Total Time: 40 minutes were spent with the patient. More than 50% of the time spent on counseling (as described above in Assessment and Plan) /coordinating the care.    Pratibha Hay MD  Neurology                   " I am having fibroids removed from my uterus"

## 2022-04-04 ENCOUNTER — TELEPHONE (OUTPATIENT)
Dept: PHARMACY | Facility: CLINIC | Age: 77
End: 2022-04-04
Payer: COMMERCIAL

## 2022-04-04 NOTE — TELEPHONE ENCOUNTER
This patient is due for MTM follow-up. I called the patient to schedule an appointment. Appointment scheduled for 5/18.    Stuart CruzD, BCACP  Medication Therapy Management Pharmacist  Pager: [phone]

## 2022-04-13 DIAGNOSIS — K52.9 CHRONIC DIARRHEA: ICD-10-CM

## 2022-04-24 ENCOUNTER — HEALTH MAINTENANCE LETTER (OUTPATIENT)
Age: 77
End: 2022-04-24

## 2022-05-03 ENCOUNTER — TELEPHONE (OUTPATIENT)
Dept: FAMILY MEDICINE | Facility: CLINIC | Age: 77
End: 2022-05-03
Payer: COMMERCIAL

## 2022-05-03 NOTE — TELEPHONE ENCOUNTER
Patient Quality Outreach    Patient is due for the following:   Breast Cancer Screening - Mammogram  Depression  -  PHQ-9 Needed  Physical  - Due after 03/22/2022    NEXT STEPS:   Schedule a yearly physical    Type of outreach:    Sent [employer] message.      Questions for provider review:    None     Marlyn Blum, [facility]

## 2022-05-20 NOTE — PROGRESS NOTES
Medication Therapy Management (MTM) Encounter    ASSESSMENT:                            Medication Adherence/Access: Today I listened to 's concerns about medication bottles including indication and tried to provide alternative solutions to reduce his frustration. I can resend prescriptions to include indications and will discuss his concerns with the pharmacy manager.     Asthma/Allergic Rhinitis: Stable. Last ACT at goal of 20 or greater. Encouraged patient to see PCP for further evaluation of phlegm.     Dementia: Unimproved - defer to neurology.     GERD: Stable.    Psoriasis: Stable - defer to dermatology.    Onychomycosis: Improving with OTC topical antifungal.     Depression/Anxiety/Insomnia: Stable per patient. Did not repeat PHQ-9 or DELMIS-7 assessments today given memory issues.     Back Pain: Stable.     IBS: Patient needs education on Creon and loperamide use. Encouraged patient to follow-up with gastroenterology if needed.     Hypertension: Stable. Patient is meeting blood pressure goal of < 130/80mmHg. Encouraged patient to see PCP for further evaluation of imbalance.    Supplements/Osteopenia: Stable.    PLAN:                            1. Resent prescriptions for amlodipine, calcium/vitamin D, and vitamin D including indications. Also discussed with the pharmacy manager Haleigh to request all medication bottle labels to include indications.  2. Patient to schedule PCP visit to discuss imbalance and phlegm problems.  3. Educated Creon is used to help digest fatty foods and prevent diarrhea, whereas loperamide is used as needed for diarrhea treatment. Patient can see GI provider for IBS follow-up if needed.    Follow-up: Return in 6 months (on 12/6/2022) for Medication Therapy Management.    SUBJECTIVE/OBJECTIVE:                          Ingrid Amaral is a 76 year old female coming in for a follow-up visit. Patient was accompanied by her  Harsha. Today's visit is a follow-up MTM visit from  9/13/21. This serves as an initial visit for 2022.    Reason for visit: Comprehensive medication review. Patient brings in all her medications bottles today. No specific concerns today.    Allergies/ADRs: Reviewed in chart  Past Medical History: Reviewed in chart  Tobacco: She reports that she has quit smoking. Her smoking use included cigarettes. She has a 1.50 pack-year smoking history. She has never used smokeless tobacco.  Alcohol: Less than 1 beverages / week    Medication Adherence/Access:   Patient uses pill box(es), set up by .  Patient takes medications 2 time(s) per day.   Per patient, misses medication 0 times per week.   Medication barriers:  states continues having trouble with pharmacy putting indications on medication bottle labels. He feels it's imperative to include indications on bottle directions and expresses frustration that this isn't done on a consistent basis, despite several requests to do so. He understands the barriers including the clinic refill process, pharmacy transcribing directions, and it's not a legally required element on prescription labels. I offered to create a medication list so he can reference all the drug names and their associated indications, however he declines. I suggested that he ask pharmacy staff to review medication bottles at pick-up.  is not open to alternative solutions and states it's our responsibility to fix or else will transfer to a different pharmacy. Today we reviewed all the medication bottles and the only ones without indications include: amlodipine, calcium/vitamin D, and vitamin D.  states that he's aware of the indications for the vitamins/supplements, however still wants all medications to include indications for consistency.   The patient fills medications at [facility]: YES.    Asthma/Allergic Rhinitis:  Current asthma medications: ICS/LABA- Advair Diskus 250-50mcg 1 puff twice daily and cetirizine 10mg daily. No longer  has an albuterol inhaler, doesn't feel she needs a rescue inhaler anymore. Patient states breathing has been good lately, however complains of recent plugged nose and phlegm. She started taking OTC mucus relief (contains acetaminophen, dextromethorphan, guaifenesin, and phenylephrine) as recommended by a family member. Uncertain if it's been effective and symptoms haven't been evaluated by a provider yet. Patient rinses their mouth after using steroid inhaler. Patient is not experiencing side effects.  Patient reports the following symptoms: plugged nose and phlegm. Patient r.  Asthma Action Plan on file: YES  ACT Total Scores 9/19/2019 3/2/2020 3/22/2021   ACT TOTAL SCORE - - -   ASTHMA ER VISITS - - -   ASTHMA HOSPITALIZATIONS - - -   ACT TOTAL SCORE (Goal Greater than or Equal to 20) 23 24 24   In the past 12 months, how many times did you visit the emergency room for your asthma without being admitted to the hospital? 0 0 0   In the past 12 months, how many times were you hospitalized overnight because of your asthma? 0 0 0     Dementia: Currently taking donepezil 10mg at bedtime. Follows with neurologist Dr. Taylor and neurosurgeon Dr. Ewing. Patient also has brain tumor, which is not felt to be related to dementia. They have discussed option of surgical resection to remove mass, however chose to avoid surgery and continue monitoring with yearly brain MRI. Husbands states that memory loss is progressing slowly over time. Sometimes looses track in the middle of a sentence or can't recall a particular word.     GERD: Current medications include: omeprazole 10mg once daily. History of hiatal hernia and dysphagia. The patient does notice symptoms if they miss a dose. Patient feels that current regimen is effective.    Psoriasis: Patient uses several topical products including moisturizing cream all over, augmented betamethasone dipropionate 0.05% lotion, calcipotriene 0.005% cream, and clobetasol 0.05%  shampoo (all occasional use as needed). Patient feels current therapy is effective and no concerns today. Follows with dermatology.     Onychomycosis: Patient is currently using OTC fungi nail tolnaftate 1% soln. States toenails are slowly improving. Tried Vicks which didn't help.    Depression/Anxiety/Insomnia:  Current medications include: duloxetine 30mg daily and trazodone 100mg at bedtime. Patient feels mental health has been good lately and no longer has nightmares. Patient is not experiencing side effects.     PHQ 3/22/2021 4/19/2021 8/27/2021   PHQ-9 Total Score 7 6 8   Q9: Thoughts of better off dead/self-harm past 2 weeks Not at all Not at all Not at all     DELMIS-7 SCORE 3/22/2021 4/19/2021 8/27/2021   Total Score - - -   Total Score - - -   Total Score 13 12 12     Back Pain:  Currently taking gabapentin 300mg twice daily and occasionally 300mg in the afternoon if needed, also duloxetine 30mg daily (for mental health). Gabapentin is prescribed as 600mg 3 times daily, which they would like to keep directions this way because sometimes need to increase dose depending on pain control. Patient feels current therapy is effective and reports no side effects.    IBS: Currently taking Creon 2 capsules as needed for diarrhea, lactase as needed if eating ice cream, and loperamide daily. Creon is prescribed as 2 capsules 3 times daily with meals, however hasn't been taking lately because not having any diarrhea, just sometimes after eating Chinese food. Patient recently started taking loperamide on her own, however unclear why because not having diarrhea issues lately. Previously followed with gastroenterology, last visit was in 2019.    Hypertension: Current medications include amlodipine 2.5mg daily.  Patient does self-monitor blood pressure. Home BP monitoring in range of 120-130's systolic over 60-70's diastolic.  Patient reports the following medication side effects: lightheadedness and intermittent imbalance.  Previously saw PT for this which was helpful.    BP Readings from Last 3 Encounters:   06/06/22 121/72   02/11/22 137/84   09/23/21 (!) 167/87     Supplements/Osteopenia: Currently taking vitamin D 400 units daily, vitamin C 500mg daily, vitamin B12 100mcg twice daily, hair/skin/nails daily, OTC calcium carbonate 333mg/vitamin D 200 units/magnesium 133mg/zinc 5mg daily, and Rx calcium carbonate/vitamin D 500mg-200 unit twice daily. Patient is content with current supplement regimen and wants to continue on it.   Patient was told to add Rx calcium/vitamin D to her previous OTC supplement. She doesn't eat much dairy due to lactose intolerance. Leafy green veggies at least 1 serving daily and ice cream a couple times weekly.   DEXA History: 10/4/21 showed osteopenia.    Lab Results   Component Value Date    VITDT 47 09/29/2015     Lab Results   Component Value Date    B12 234 07/23/2020     Today's Vitals: /72   Pulse 69   Wt 104 lb 12.8 oz (47.5 kg)   BMI 20.04 kg/m       Last Comprehensive Metabolic Panel:  Sodium   Date Value Ref Range Status   06/18/2020 139 133 - 144 mmol/L Final     Potassium   Date Value Ref Range Status   06/18/2020 3.8 3.4 - 5.3 mmol/L Final     Chloride   Date Value Ref Range Status   06/18/2020 106 94 - 109 mmol/L Final     Carbon Dioxide   Date Value Ref Range Status   06/18/2020 29 20 - 32 mmol/L Final     Anion Gap   Date Value Ref Range Status   06/18/2020 4 3 - 14 mmol/L Final     Glucose   Date Value Ref Range Status   06/18/2020 116 (H) 70 - 99 mg/dL Final     Urea Nitrogen   Date Value Ref Range Status   06/18/2020 13 7 - 30 mg/dL Final     Creatinine   Date Value Ref Range Status   06/18/2020 0.62 0.52 - 1.04 mg/dL Final     GFR Estimate   Date Value Ref Range Status   03/30/2021 81 >60 mL/min/[1.73_m2] Final     Calcium   Date Value Ref Range Status   06/18/2020 8.4 (L) 8.5 - 10.1 mg/dL Final     ----------------    I spent 60 minutes with this patient today (an extra 15  minutes was spent creating the Medication Action Plan). All changes were made via collaborative practice agreement with MARVIN Branham CNP. A copy of the visit note was provided to the patient's provider(s).    The patient was mailed a summary of these recommendations.     Vonnie Peña, PharmD, [facility]  Medication Therapy Management Pharmacist  Pager: [phone]     Medication Therapy Recommendations  Diarrhea    Current Medication: amylase-lipase-protease (CREON) [id number] units CPEP   Rationale: Does not understand instructions - Adherence - Adherence   Recommendation: Provide Education - Creon vs loperamide for prevention vs treatment   Status: Patient Agreed - Adherence/Education

## 2022-05-31 DIAGNOSIS — I10 HYPERTENSION GOAL BP (BLOOD PRESSURE) < 140/90: ICD-10-CM

## 2022-05-31 NOTE — LETTER
[facility]  [address] [place] [address]  Phone: [phone]  Fax: [phone]       June 1, 2022         Ingrid Amaral  [address] [facility] [address]            Dear Ingrid:    We are concerned about your health care.  We recently provided you with medication refills.  Many medications require routine follow-up with your doctor.    Your prescription(s) have been refilled for amlodipine so you may have time for the above noted follow-up. Please call to schedule soon so we can assure you have an appointment before your next refills are needed.    Thank you,      Soha Lino NP/ Irma Ivey RN

## 2022-06-01 ENCOUNTER — HOSPITAL ENCOUNTER (OUTPATIENT)
Dept: MAMMOGRAPHY | Facility: CLINIC | Age: 77
Discharge: HOME | End: 2022-06-01
Attending: PHYSICIAN ASSISTANT | Admitting: PHYSICIAN ASSISTANT
Payer: COMMERCIAL

## 2022-06-01 DIAGNOSIS — Z12.31 VISIT FOR SCREENING MAMMOGRAM: ICD-10-CM

## 2022-06-01 PROCEDURE — 77067 SCR MAMMO BI INCL CAD: CPT

## 2022-06-01 RX ORDER — AMLODIPINE BESYLATE 2.5 MG/1
TABLET ORAL
Qty: 90 TABLET | Refills: 0 | Status: SHIPPED | OUTPATIENT
Start: 2022-06-01 | End: 2022-06-06

## 2022-06-01 NOTE — TELEPHONE ENCOUNTER
Creatinine   Date Value Ref Range Status   06/18/2020 0.62 0.52 - 1.04 mg/dL Final     Last seen 8/27/21    BP Readings from Last 6 Encounters:   02/11/22 137/84   09/23/21 (!) 167/87   09/13/21 (!) 143/84   09/02/21 (!) 151/81   08/27/21 134/76   04/19/21 108/70     Has upcoming appointment with [facility].  Sent My Chart stating needs appointment  Irma Ivey RN

## 2022-06-06 ENCOUNTER — OFFICE VISIT (OUTPATIENT)
Dept: PHARMACY | Facility: CLINIC | Age: 77
End: 2022-06-06
Payer: COMMERCIAL

## 2022-06-06 VITALS
HEART RATE: 69 BPM | SYSTOLIC BLOOD PRESSURE: 121 MMHG | BODY MASS INDEX: 20.04 KG/M2 | DIASTOLIC BLOOD PRESSURE: 72 MMHG | WEIGHT: 104.8 LBS

## 2022-06-06 DIAGNOSIS — L40.9 PSORIASIS: ICD-10-CM

## 2022-06-06 DIAGNOSIS — M54.41 CHRONIC RIGHT-SIDED LOW BACK PAIN WITH RIGHT-SIDED SCIATICA: ICD-10-CM

## 2022-06-06 DIAGNOSIS — F02.80 LEWY BODY DEMENTIA WITHOUT BEHAVIORAL DISTURBANCE (H): ICD-10-CM

## 2022-06-06 DIAGNOSIS — J30.9 ALLERGIC RHINITIS, UNSPECIFIED SEASONALITY, UNSPECIFIED TRIGGER: ICD-10-CM

## 2022-06-06 DIAGNOSIS — G47.00 INSOMNIA, UNSPECIFIED TYPE: ICD-10-CM

## 2022-06-06 DIAGNOSIS — G89.29 CHRONIC RIGHT-SIDED LOW BACK PAIN WITH RIGHT-SIDED SCIATICA: ICD-10-CM

## 2022-06-06 DIAGNOSIS — K21.9 GASTROESOPHAGEAL REFLUX DISEASE, UNSPECIFIED WHETHER ESOPHAGITIS PRESENT: ICD-10-CM

## 2022-06-06 DIAGNOSIS — I10 HYPERTENSION GOAL BP (BLOOD PRESSURE) < 140/90: ICD-10-CM

## 2022-06-06 DIAGNOSIS — F41.1 GENERALIZED ANXIETY DISORDER: ICD-10-CM

## 2022-06-06 DIAGNOSIS — M85.89 OSTEOPENIA OF MULTIPLE SITES: ICD-10-CM

## 2022-06-06 DIAGNOSIS — K58.9 IRRITABLE BOWEL SYNDROME, UNSPECIFIED TYPE: ICD-10-CM

## 2022-06-06 DIAGNOSIS — B35.1 ONYCHOMYCOSIS: ICD-10-CM

## 2022-06-06 DIAGNOSIS — J45.40 MODERATE PERSISTENT ASTHMA WITHOUT COMPLICATION: Primary | ICD-10-CM

## 2022-06-06 DIAGNOSIS — F32.0 MILD MAJOR DEPRESSION (H): ICD-10-CM

## 2022-06-06 DIAGNOSIS — G31.83 LEWY BODY DEMENTIA WITHOUT BEHAVIORAL DISTURBANCE (H): ICD-10-CM

## 2022-06-06 DIAGNOSIS — Z78.9 TAKES DIETARY SUPPLEMENTS: ICD-10-CM

## 2022-06-06 PROCEDURE — 99605 MTMS BY PHARM NP 15 MIN: CPT | Performed by: PHARMACIST

## 2022-06-06 PROCEDURE — 99607 MTMS BY PHARM ADDL 15 MIN: CPT | Performed by: PHARMACIST

## 2022-06-06 RX ORDER — SWAB
1 SWAB, NON-MEDICATED MISCELLANEOUS DAILY
Qty: 90 CAPSULE | Refills: 3 | Status: SHIPPED | OUTPATIENT
Start: 2022-06-06 | End: 2023-09-11

## 2022-06-06 RX ORDER — AMLODIPINE BESYLATE 2.5 MG/1
2.5 TABLET ORAL DAILY
Qty: 90 TABLET | Refills: 1 | Status: SHIPPED | OUTPATIENT
Start: 2022-06-06 | End: 2023-02-27

## 2022-06-06 NOTE — LETTER
_  Medication List        Prepared on: 6/11/2022     Bring your Medication List when you go to the doctor, hospital, or   emergency room. And, share it with your family or caregivers.     Note any changes to how you take your medications.  Cross out medications when you no longer use them.    Medication How I take it Why I use it Prescriber   amLODIPine (NORVASC) 2.5 MG tablet Take 1 tablet (2.5 mg) by mouth daily Blood pressure MARVIN Driscoll CNP   amylase-lipase-protease (CREON) [id number] units CPEP TAKE TWO CAPSULES BY MOUTH THREE TIMES A DAY WITH MEALS Chronic Diarrhea MARVIN Driscoll CNP   augmented betamethasone dipropionate (DIPROLENE) 0.05 % external lotion Apply sparingly to affected area(s) twice daily as needed. Scalp Psoriasis Miguelina Schulte PA-C   Biotin w/ Vitamins C & E (HAIR/SKIN/NAILS PO) Take 1 capsule by mouth daily General health Self   calcipotriene (DOVONOX) 0.005 % external cream Apply sparingly to affected area(s) twice daily as needed. Psoriasis Miguelina Schulte PA-C   calcium carb-cholecalciferol (OS-LARS) 500-200 MG-UNIT tablet Take 1 tablet by mouth 2 times daily Bone health MARVIN Driscoll CNP   CALCIUM-MAGNESIUM-ZINC PO Take 1 tablet by mouth daily  Bone health Self   cetirizine (ZYRTEC) 10 MG tablet TAKE ONE TABLET BY MOUTH ONCE DAILY Allergies MARVIN Driscoll CNP   clobetasol propionate (CLOBEX) 0.05 % external shampoo Leave on scalp for 15-30 minutes then rinse. Use 1-2 times weekly. Scalp Psoriasis Miguelina Schulte PA-C   cyanocobalamin (VITAMIN B-12) 100 MCG tablet TAKE TWO TABLETS BY MOUTH ONCE DAILY Vitamin B12 Deficiency MARVIN Driscoll CNP   donepezil (ARICEPT) 10 MG tablet TAKE ONE TABLET (10 MG) BY MOUTH EVERY NIGHT AT BEDTIME Memory MARVIN Driscoll CNP   DULoxetine (CYMBALTA) 30 MG capsule Take 1 capsule (30 mg) by mouth leon Anxiety, depression MARVIN Driscoll CNP    fluticasone-salmeterol (ADVAIR) 250-50 MCG/DOSE inhaler INHALE ONE PUFF BY MOUTH TWICE A DAY Asthma MARVIN Driscoll CNP   gabapentin (NEURONTIN) 300 MG capsule TAKE TWO CAPSULES BY MOUTH THREE TIMES A DAY Chronic back pain MARVIN Dirscoll CNP   lactase (LACTAID) 9000 units TABS tablet Take 1 tablet (9,000 Units) by mouth 3 times daily (with meals). For lactose intolerance, when eating milk-related foods. Lactose Intolerance MARVIN Driscoll CNP   loperamide (IMODIUM A-D) 2 MG tablet Take 1 capsule by mouth daily as needed for diarrhea Diarrhea Self   omeprazole (PRILOSEC) 10 MG DR capsule Take 1 capsule (10 mg) by mouth daily Gastroesophageal Reflux Disease (GERD) MARVIN Driscoll CNP   Phenylephrine-DM-GG-APAP (MUCUS RELIEF COLD FLU THROAT) 5--325 MG/10ML LIQD Take 10 mls by mouth daily as needed Phlegm Self   TOLNAFTATE ANTIFUNGAL EX Apply topically daily as needed Toenail fungus Self   traZODone (DESYREL) 100 MG tablet TAKE ONE TABLET (100 MG) BY MOUTH EVERY NIGHT AT BEDTIME Insomnia MARVIN Driscoll CNP   vitamin C (ASCORBIC ACID) 500 MG tablet Take 1 capsule by mouth daily  General health Self   vitamin D3 (CHOLECALCIFEROL) 10 MCG (400 UNIT) capsule Take 1 capsule (400 Units) by mouth daily Bone health MARVIN Driscoll CNP         Add new medications, over-the-counter drugs, herbals, vitamins, or  minerals in the blank rows below.    Medication How I take it Why I use it Prescriber                          Allergies:      sulfa drugs        Side effects I have had:               Other Information:              My notes and questions:

## 2022-06-06 NOTE — LETTER
"Recommended To-Do List      Prepared on: 6/11/2022     You can get the best results from your medications by completing the items on this \"To-Do List.\"      Bring your To-Do List when you go to your doctor. And, share it with your family or caregivers.    My To-Do List:  What we talked about: What I should do:   Diarrhea medications    Education: Creon is used to help digest fatty foods and prevent diarrhea, whereas loperamide is used as needed for diarrhea treatment.          What we talked about: What I should do:                       "

## 2022-06-06 NOTE — LETTER
June 11, 2022  Ingrid Amaral  [address] [address]    Dear Ms. Amaral, [facility]        Thank you for talking with me on Jun 6, 2022 about your health and medications. As a follow-up to our conversation, I have included two documents:      1. Your Recommended To-Do List has steps you should take to get the best results from your medications.  2. Your Medication List will help you keep track of your medications and how to take them.    If you want to talk about these documents, please call Vonnie Peña RPH at phone: [phone], Monday-Friday 8-4:30pm.    I look forward to working with you and your doctors to make sure your medications work well for you.    Sincerely,    Vonnie Peña RPH  [facility] Pharmacist, [facility]

## 2022-06-11 RX ORDER — LOPERAMIDE HYDROCHLORIDE 2 MG/1
2 TABLET ORAL DAILY PRN
COMMUNITY
End: 2023-01-30

## 2022-06-11 NOTE — PATIENT INSTRUCTIONS
"Recommendations from today's MTM visit:                                                    MTM (medication therapy management) is a service provided by a clinical pharmacist designed to help you get the most of out of your medicines.   Today we reviewed what your medicines are for, how to know if they are working, that your medicines are safe and how to make your medicine regimen as easy as possible.      1. Resent prescriptions for amlodipine, calcium/vitamin D, and vitamin D including indications. Also Vonnie will discuss with the pharmacy manager [place] to request all medication bottle labels to include indications.    2. Please schedule PCP visit to discuss imbalance and phlegm problems.    3. Educated Creon is used to help digest fatty foods and prevent diarrhea, whereas loperamide is used as needed for diarrhea treatment. Suggest scheduling with GI provider for IBS follow-up if needed.    Follow-up: Return in 6 months (on 12/6/2022) for Medication Therapy Management.    It was great speaking with you today.  I value your experience and would be very thankful for your time in providing feedback in our clinic survey. In the next few days, you may receive an email or text message from [employer] with a link to a survey related to your  clinical pharmacist.\"     To schedule another MTM appointment, please call the clinic directly or you may call the MTM scheduling line at [phone] or toll-free at [phone].     My Clinical Pharmacist's contact information:                                                      Please feel free to contact me with any questions or concerns you have.      Vonnie Peña, PharmD, BCACP  Medication Therapy Management Pharmacist  Pager: [phone]    "

## 2022-08-29 ENCOUNTER — TELEPHONE (OUTPATIENT)
Dept: FAMILY MEDICINE | Facility: CLINIC | Age: 77
End: 2022-08-29

## 2022-09-03 DIAGNOSIS — G47.00 INSOMNIA, UNSPECIFIED TYPE: ICD-10-CM

## 2022-09-03 DIAGNOSIS — F41.0 PANIC DISORDER WITHOUT AGORAPHOBIA: ICD-10-CM

## 2022-09-03 DIAGNOSIS — G47.52 RBD (REM BEHAVIORAL DISORDER): ICD-10-CM

## 2022-09-03 DIAGNOSIS — F32.0 MILD MAJOR DEPRESSION (H): ICD-10-CM

## 2022-09-06 DIAGNOSIS — F32.0 MILD MAJOR DEPRESSION (H): ICD-10-CM

## 2022-09-06 DIAGNOSIS — F41.0 PANIC DISORDER WITHOUT AGORAPHOBIA: ICD-10-CM

## 2022-09-06 DIAGNOSIS — J31.0 CHRONIC RHINITIS: ICD-10-CM

## 2022-09-06 DIAGNOSIS — F41.9 ANXIETY: ICD-10-CM

## 2022-09-07 RX ORDER — TRAZODONE HYDROCHLORIDE 100 MG/1
TABLET ORAL
Qty: 30 TABLET | Refills: 11 | Status: SHIPPED | OUTPATIENT
Start: 2022-09-07 | End: 2023-10-25

## 2022-09-09 RX ORDER — CETIRIZINE HYDROCHLORIDE 10 MG/1
TABLET ORAL
Qty: 90 TABLET | Refills: 3 | Status: SHIPPED | OUTPATIENT
Start: 2022-09-09 | End: 2023-09-12

## 2022-09-09 RX ORDER — DULOXETIN HYDROCHLORIDE 30 MG/1
CAPSULE, DELAYED RELEASE ORAL
Qty: 90 CAPSULE | Refills: 3 | Status: SHIPPED | OUTPATIENT
Start: 2022-09-09 | End: 2022-09-21

## 2022-09-09 NOTE — TELEPHONE ENCOUNTER
"Requested Prescriptions   Pending Prescriptions Disp Refills     DULoxetine (CYMBALTA) 30 MG capsule [Pharmacy Med Name: DULOXETINE HCL 30MG CPEP] 90 capsule 3     Sig: TAKE 1 CAPSULE (30 MG) BY MOUTH DAILY FOR ANXIETY/DEPRESSION       Serotonin-Norepinephrine Reuptake Inhibitors  Failed - 9/6/2022  8:51 PM        Failed - PHQ-9 score of less than 5 in past 6 months     Please review last PHQ-9 score.           Passed - Blood pressure under 140/90 in past 12 months     BP Readings from Last 3 Encounters:   06/06/22 121/72   02/11/22 137/84   09/23/21 (!) 167/87                 Passed - Medication is active on med list        Passed - Patient is age 18 or older        Passed - No active pregnancy on record        Passed - No positive pregnancy test in past 12 months        Passed - Recent (6 mo) or future (30 days) visit within the authorizing provider's specialty     Patient had office visit in the last 6 months or has a visit in the next 30 days with authorizing provider or within the authorizing provider's specialty.  See \"Patient Info\" tab in inbasket, or \"Choose Columns\" in Meds & Orders section of the refill encounter.          Last Written Prescription Date:  8/27/21  Last Fill Quantity: 90,  # refills: 3   Last office visit: 4/25/2022 with prescribing provider:     Future Office Visit:   Next 5 appointments (look out 90 days)    Sep 21, 2022  3:30 PM  (Arrive by 3:10 PM)  Annual Wellness Visit with MARVIN Driscoll CNP  [facility] ([facility] ) [address] [place] [id number] [phone]                    cetirizine (ZYRTEC) 10 MG tablet [Pharmacy Med Name: CETIRIZINE HCL 10MG TABS] 90 tablet 3     Sig: TAKE ONE TABLET BY MOUTH ONCE DAILY IN THE MORNING FOR ALLERGIES       Antihistamines Protocol Failed - 9/6/2022  8:51 PM        Failed - Patient is 3-64 years of age     Apply weight-based dosing for peds patients age 3 - 12 " "years of age.    Forward request to provider for patients under the age of 3 or over the age of 64.          Passed - Recent (12 mo) or future (30 days) visit within the authorizing provider's specialty     Patient has had an office visit with the authorizing provider or a provider within the authorizing providers department within the previous 12 mos or has a future within next 30 days. See \"Patient Info\" tab in inbasket, or \"Choose Columns\" in Meds & Orders section of the refill encounter.              Passed - Medication is active on med list           Last Written Prescription Date:  8/27/21  Last Fill Quantity: 90,  # refills: 3   Last office visit: 4/25/2022 with prescribing provider:     Future Office Visit:   Next 5 appointments (look out 90 days)    Sep 21, 2022  3:30 PM  (Arrive by 3:10 PM)  Annual Wellness Visit with MARVIN Driscoll CNP  [facility] ([facility] ) [address] [place] [id number] [phone]                 "

## 2022-09-19 DIAGNOSIS — E53.8 VITAMIN B12 DEFICIENCY (NON ANEMIC): ICD-10-CM

## 2022-09-19 DIAGNOSIS — R41.89 COGNITIVE IMPAIRMENT: ICD-10-CM

## 2022-09-20 RX ORDER — DONEPEZIL HYDROCHLORIDE 10 MG/1
TABLET, FILM COATED ORAL
Qty: 90 TABLET | Refills: 3 | Status: SHIPPED | OUTPATIENT
Start: 2022-09-20 | End: 2023-10-09

## 2022-09-20 RX ORDER — UBIDECARENONE 75 MG
CAPSULE ORAL
Qty: 60 TABLET | Refills: 11 | Status: SHIPPED | OUTPATIENT
Start: 2022-09-20 | End: 2023-11-06

## 2022-09-21 ENCOUNTER — OFFICE VISIT (OUTPATIENT)
Dept: FAMILY MEDICINE | Facility: CLINIC | Age: 77
End: 2022-09-21
Payer: COMMERCIAL

## 2022-09-21 VITALS
DIASTOLIC BLOOD PRESSURE: 77 MMHG | RESPIRATION RATE: 24 BRPM | TEMPERATURE: 98.6 F | OXYGEN SATURATION: 100 % | SYSTOLIC BLOOD PRESSURE: 138 MMHG | BODY MASS INDEX: 19.8 KG/M2 | HEART RATE: 61 BPM | WEIGHT: 103.5 LBS

## 2022-09-21 DIAGNOSIS — Z00.00 ENCOUNTER FOR MEDICARE ANNUAL WELLNESS EXAM: Primary | ICD-10-CM

## 2022-09-21 DIAGNOSIS — E73.9 LACTOSE INTOLERANCE: ICD-10-CM

## 2022-09-21 DIAGNOSIS — K21.9 GASTROESOPHAGEAL REFLUX DISEASE WITHOUT ESOPHAGITIS: ICD-10-CM

## 2022-09-21 DIAGNOSIS — F41.9 ANXIETY: ICD-10-CM

## 2022-09-21 DIAGNOSIS — L98.9 BENIGN SKIN LESION OF MULTIPLE SITES: ICD-10-CM

## 2022-09-21 DIAGNOSIS — F32.0 MILD MAJOR DEPRESSION (H): ICD-10-CM

## 2022-09-21 DIAGNOSIS — G89.29 CHRONIC RIGHT-SIDED LOW BACK PAIN WITH RIGHT-SIDED SCIATICA: ICD-10-CM

## 2022-09-21 DIAGNOSIS — J45.40 MODERATE PERSISTENT ASTHMA WITHOUT COMPLICATION: ICD-10-CM

## 2022-09-21 DIAGNOSIS — Z12.11 SCREEN FOR COLON CANCER: ICD-10-CM

## 2022-09-21 DIAGNOSIS — G31.83 LEWY BODY DEMENTIA WITHOUT BEHAVIORAL DISTURBANCE (H): ICD-10-CM

## 2022-09-21 DIAGNOSIS — D32.9 MENINGIOMA (H): ICD-10-CM

## 2022-09-21 DIAGNOSIS — M54.41 CHRONIC RIGHT-SIDED LOW BACK PAIN WITH RIGHT-SIDED SCIATICA: ICD-10-CM

## 2022-09-21 DIAGNOSIS — F41.0 PANIC DISORDER WITHOUT AGORAPHOBIA: ICD-10-CM

## 2022-09-21 DIAGNOSIS — F02.80 LEWY BODY DEMENTIA WITHOUT BEHAVIORAL DISTURBANCE (H): ICD-10-CM

## 2022-09-21 LAB
ALBUMIN SERPL BCG-MCNC: 4.2 G/DL (ref 3.5–5.2)
ALP SERPL-CCNC: 77 U/L (ref 35–104)
ALT SERPL W P-5'-P-CCNC: 15 U/L (ref 10–35)
ANION GAP SERPL CALCULATED.3IONS-SCNC: 11 MMOL/L (ref 7–15)
AST SERPL W P-5'-P-CCNC: 23 U/L (ref 10–35)
BILIRUB SERPL-MCNC: 0.2 MG/DL
BUN SERPL-MCNC: 11.1 MG/DL (ref 8–23)
CALCIUM SERPL-MCNC: 9.2 MG/DL (ref 8.8–10.2)
CHLORIDE SERPL-SCNC: 100 MMOL/L (ref 98–107)
CREAT SERPL-MCNC: 0.74 MG/DL (ref 0.51–0.95)
DEPRECATED HCO3 PLAS-SCNC: 27 MMOL/L (ref 22–29)
GFR SERPL CREATININE-BSD FRML MDRD: 82 ML/MIN/1.73M2
GLUCOSE SERPL-MCNC: 91 MG/DL (ref 70–99)
POTASSIUM SERPL-SCNC: 3.9 MMOL/L (ref 3.4–5.3)
PROT SERPL-MCNC: 6.4 G/DL (ref 6.4–8.3)
SODIUM SERPL-SCNC: 138 MMOL/L (ref 136–145)

## 2022-09-21 PROCEDURE — 80053 COMPREHEN METABOLIC PANEL: CPT | Performed by: NURSE PRACTITIONER

## 2022-09-21 PROCEDURE — 36415 COLL VENOUS BLD VENIPUNCTURE: CPT | Performed by: NURSE PRACTITIONER

## 2022-09-21 PROCEDURE — G0439 PPPS, SUBSEQ VISIT: HCPCS | Performed by: NURSE PRACTITIONER

## 2022-09-21 PROCEDURE — 99214 OFFICE O/P EST MOD 30 MIN: CPT | Mod: 25 | Performed by: NURSE PRACTITIONER

## 2022-09-21 RX ORDER — OMEPRAZOLE 10 MG/1
10 CAPSULE, DELAYED RELEASE ORAL DAILY
Qty: 30 CAPSULE | Refills: 11 | Status: SHIPPED | OUTPATIENT
Start: 2022-09-21 | End: 2022-09-23

## 2022-09-21 RX ORDER — GABAPENTIN 300 MG/1
300 CAPSULE ORAL 3 TIMES DAILY
Qty: 180 CAPSULE | Refills: 5 | Status: SHIPPED | OUTPATIENT
Start: 2022-09-21 | End: 2024-01-15

## 2022-09-21 RX ORDER — DULOXETIN HYDROCHLORIDE 30 MG/1
30 CAPSULE, DELAYED RELEASE ORAL 2 TIMES DAILY
Qty: 60 CAPSULE | Refills: 11 | Status: SHIPPED | OUTPATIENT
Start: 2022-09-21 | End: 2023-09-29

## 2022-09-21 RX ORDER — FLUTICASONE PROPIONATE AND SALMETEROL 250; 50 UG/1; UG/1
1 POWDER RESPIRATORY (INHALATION) 2 TIMES DAILY
Qty: 3 EACH | Refills: 3 | Status: SHIPPED | OUTPATIENT
Start: 2022-09-21 | End: 2022-11-22

## 2022-09-21 ASSESSMENT — PATIENT HEALTH QUESTIONNAIRE - PHQ9
SUM OF ALL RESPONSES TO PHQ QUESTIONS 1-9: 8
10. IF YOU CHECKED OFF ANY PROBLEMS, HOW DIFFICULT HAVE THESE PROBLEMS MADE IT FOR YOU TO DO YOUR WORK, TAKE CARE OF THINGS AT HOME, OR GET ALONG WITH OTHER PEOPLE: SOMEWHAT DIFFICULT
SUM OF ALL RESPONSES TO PHQ QUESTIONS 1-9: 8

## 2022-09-21 ASSESSMENT — ENCOUNTER SYMPTOMS
MYALGIAS: 0
WEAKNESS: 0
EYE PAIN: 0
ARTHRALGIAS: 0
DYSURIA: 0
HEARTBURN: 0
NERVOUS/ANXIOUS: 1
HEMATURIA: 0
COUGH: 0
PARESTHESIAS: 0
DIZZINESS: 0
NAUSEA: 0
SORE THROAT: 0
SHORTNESS OF BREATH: 0
ABDOMINAL PAIN: 0
JOINT SWELLING: 0
DIARRHEA: 0
PALPITATIONS: 0
BREAST MASS: 0
FREQUENCY: 0
FEVER: 0
HEADACHES: 0
CHILLS: 1
HEMATOCHEZIA: 0
CONSTIPATION: 1

## 2022-09-21 ASSESSMENT — ASTHMA QUESTIONNAIRES
QUESTION_3 LAST FOUR WEEKS HOW OFTEN DID YOUR ASTHMA SYMPTOMS (WHEEZING, COUGHING, SHORTNESS OF BREATH, CHEST TIGHTNESS OR PAIN) WAKE YOU UP AT NIGHT OR EARLIER THAN USUAL IN THE MORNING: NOT AT ALL
ACT_TOTALSCORE: 25
ACT_TOTALSCORE: 25
QUESTION_5 LAST FOUR WEEKS HOW WOULD YOU RATE YOUR ASTHMA CONTROL: COMPLETELY CONTROLLED
QUESTION_4 LAST FOUR WEEKS HOW OFTEN HAVE YOU USED YOUR RESCUE INHALER OR NEBULIZER MEDICATION (SUCH AS ALBUTEROL): NOT AT ALL
QUESTION_2 LAST FOUR WEEKS HOW OFTEN HAVE YOU HAD SHORTNESS OF BREATH: NOT AT ALL
QUESTION_1 LAST FOUR WEEKS HOW MUCH OF THE TIME DID YOUR ASTHMA KEEP YOU FROM GETTING AS MUCH DONE AT WORK, SCHOOL OR AT HOME: NONE OF THE TIME

## 2022-09-21 ASSESSMENT — ACTIVITIES OF DAILY LIVING (ADL)
CURRENT_FUNCTION: SHOPPING REQUIRES ASSISTANCE
CURRENT_FUNCTION: TELEPHONE REQUIRES ASSISTANCE
CURRENT_FUNCTION: TRANSPORTATION REQUIRES ASSISTANCE
CURRENT_FUNCTION: MEDICATION ADMINISTRATION REQUIRES ASSISTANCE
CURRENT_FUNCTION: PREPARING MEALS REQUIRES ASSISTANCE

## 2022-09-21 NOTE — PATIENT INSTRUCTIONS
(Z00.00) Encounter for Medicare annual wellness exam  (primary encounter diagnosis)    (G31.83,  F02.80) Lewy body dementia without behavioral disturbance (H)  Comment: Chronic, slightly worsening.  Is on daily Aricept.  Has not seen neurology in approximately 3 years.  Would recommend routine follow-up, patient can call [place] [phone] to schedule.    (F32.0) Mild major depression (H)  Comment: Chronic, slightly worsening due to multiple factors including worsening dementia, having to pack away some belongings/collectibles, changes with 's new injury and multiple medical concerns.  Discussed increasing Cymbalta, patient would like to trial.  We will increase to 30 mg twice daily.  Advised can use current pills at home until they  the new prescription.  Plan: DULoxetine (CYMBALTA) 30 MG capsule          (F41.9) Anxiety  Comment: Chronic, slightly worsening as above.  Increasing Cymbalta.  Plan: DULoxetine (CYMBALTA) 30 MG capsule          (F41.0) Panic disorder without agoraphobia  Comment: Chronic, slightly worsening with the above.  Especially at night now with  not sleeping in the same room.  Changes as above.  Plan: DULoxetine (CYMBALTA) 30 MG capsule          (D32.9) Meningioma (H)  Comment: Chronic, has been stable.  Most recent MRI in March which was stable.  Follows neurosurgery.  No changes, continue to follow neurosurgery yearly.    (J45.40) Moderate persistent asthma without complication  Comment: Chronic, stable.  Feels is very well under control.  Continue inhalers as prescribed, okay to stop over-the-counter Zyrtec and if allergy symptoms restart advised patient to restart.  Plan: fluticasone-salmeterol (ADVAIR DISKUS) 250-50         MCG/ACT inhaler          (M54.41,  G89.29) Chronic right-sided low back pain with right-sided sciatica  Comment: Chronic, stable.  Taking gabapentin 3 times daily 1 capsule.  Managing symptoms well, will continue this current dosage.  Plan:  gabapentin (NEURONTIN) 300 MG capsule          (K21.9) Gastroesophageal reflux disease without esophagitis  Comment: Chronic, stable.  No changes.  Plan: omeprazole (PRILOSEC) 10 MG DR capsule          (E73.9) Lactose intolerance  Comment: Chronic, stable.  No changes.  Plan: lactase (LACTAID) 9000 units TABS tablet          (L98.9) Benign skin lesion of multiple sites  Comment: Multiple all over body skin lesions, a couple patient is concerned about.  Given the extent of lesions would recommend a full body examination by dermatology.  Referral placed.  Plan: Adult Dermatology Referral          (Z12.11) Screen for colon cancer  Comment: Screen.  Patient should be receiving a phone call to schedule.  Plan: Colonoscopy Screening  Referral      Patient Education   Personalized Prevention Plan  You are due for the preventive services outlined below.  Your care team is available to assist you in scheduling these services.  If you have already completed any of these items, please share that information with your care team to update in your medical record.  Health Maintenance Due   Topic Date Due    ANNUAL REVIEW OF HM ORDERS  Never done    Hepatitis C Screening  Never done    LUNG CANCER SCREENING  Never done    Asthma Action Plan - yearly  09/19/2020    Asthma Control Test  09/22/2021    Depression Assessment  02/27/2022    Annual Wellness Visit  03/22/2022    FALL RISK ASSESSMENT  03/22/2022    Colorectal Cancer Screening  05/22/2022    Flu Vaccine (1) 09/01/2022    COVID-19 Vaccine (5 - Booster for Moderna series) 09/22/2022

## 2022-09-21 NOTE — PROGRESS NOTES
"SUBJECTIVE:   Ingrid is a 77 year old who presents for Preventive Visit.    Here with  and daughter-in-law  Patient has been advised of split billing requirements and indicates understanding: Yes  Are you in the first 12 months of your Medicare coverage?  No    Healthy Habits:     In general, how would you rate your overall health?  Fair    Frequency of exercise:  4-5 days/week    Duration of exercise:  15-30 minutes    Do you usually eat at least 4 servings of fruit and vegetables a day, include whole grains    & fiber and avoid regularly eating high fat or \"junk\" foods?  Yes    Taking medications regularly:  Yes    Medication side effects:  Not applicable    Ability to successfully perform activities of daily living:  Telephone requires assistance, transportation requires assistance, shopping requires assistance, preparing meals requires assistance and medication administration requires assistance    Home Safety:  No safety concerns identified    Hearing Impairment:  Difficulty following a conversation in a noisy restaurant or crowded room, need to ask people to speak up or repeat themselves and difficulty understanding speech on the telephone    In the past 6 months, have you been bothered by leaking of urine?  No    In general, how would you rate your overall mental or emotional health?  Fair      PHQ-2 Total Score: 2    Additional concerns today:  Yes    Dementia  Forgetful of what she is doing one room to the next - tasks   Forgetting what she's talking about in the middle of a conversation   Difficulty with word finding   A lot of repeating  Having difficulty comprehending     Depression   Forgetfulness is frustrating   Had to move some collectors out of the home to accommodate a safe environment   Now not sleeping in the same room as  now because of his injury   Having night terrors   Had to put 2 cats down   Has only had a few crying spells       Do you feel safe in your environment? " Yes    Have you ever done Advance Care Planning? (For example, a Health Directive, POLST, or a discussion with a medical provider or your loved ones about your wishes): No, advance care planning information given to patient to review.  Patient plans to discuss their wishes with loved ones or provider.        Fall risk  Fallen 2 or more times in the past year?: Yes  Any fall with injury in the past year?: No    Cognitive Screening   1) Repeat 3 items (Leader, Season, Table)    2) Clock draw: ABNORMAL .  3) 3 item recall: Recalls 1 object   Results: ABNORMAL clock, 1-2 items recalled: PROBABLE COGNITIVE IMPAIRMENT, **INFORM PROVIDER**    Mini-CogTM Copyright S Danny. Licensed by the author for use in [facility]; reprinted with permission ([email or web address]). All rights reserved.      Do you have sleep apnea, excessive snoring or daytime drowsiness?: yes   Snores and night terrors    Reviewed and updated as needed this visit by clinical staff   Tobacco  Allergies  Meds  Problems  Med Hx  Surg Hx  Fam Hx  Soc   Hx          Reviewed and updated as needed this visit by Provider     Meds  Problems              Social History     Tobacco Use     Smoking status: Former Smoker     Packs/day: 0.50     Years: 3.00     Pack years: 1.50     Types: Cigarettes     Smokeless tobacco: Never Used     Tobacco comment: smoked for 3 years when she was around 19   Substance Use Topics     Alcohol use: Yes     Comment: RARELY     If you drink alcohol do you typically have >3 drinks per day or >7 drinks per week? No    Alcohol Use 9/21/2022   Prescreen: >3 drinks/day or >7 drinks/week? Not Applicable   Prescreen: >3 drinks/day or >7 drinks/week? -   No flowsheet data found.          Current providers sharing in care for this patient include:   Patient Care Team:  Soha Lino APRN CNP as PCP - General (Family Medicine)  Fredy Ewing MD as Assigned Neuroscience Provider  Vonnie Peña RPH as Pharmacist  (Pharmacist)  Soha Lino APRN CNP as Assigned PCP  Vonnie Peña RP as Assigned MTM Pharmacist    The following health maintenance items are reviewed in Epic and correct as of today:  Health Maintenance   Topic Date Due     HEPATITIS C SCREENING  Never done     LUNG CANCER SCREENING  Never done     ASTHMA ACTION PLAN  09/19/2020     COLORECTAL CANCER SCREENING  05/22/2022     INFLUENZA VACCINE (1) 09/01/2022     COVID-19 Vaccine (5 - Booster for Moderna series) 09/22/2022     ASTHMA CONTROL TEST  03/21/2023     PHQ-9  03/21/2023     MAMMO SCREENING  06/01/2023     MEDICARE ANNUAL WELLNESS VISIT  09/21/2023     ANNUAL REVIEW OF HM ORDERS  09/21/2023     FALL RISK ASSESSMENT  09/21/2023     LIPID  09/27/2026     ADVANCE CARE PLANNING  09/21/2027     DTAP/TDAP/TD IMMUNIZATION (4 - Td or Tdap) 03/11/2032     DEXA  10/04/2036     DEPRESSION ACTION PLAN  Completed     Pneumococcal Vaccine: 65+ Years  Completed     ZOSTER IMMUNIZATION  Completed     IPV IMMUNIZATION  Aged Out     MENINGITIS IMMUNIZATION  Aged Out     HEPATITIS B IMMUNIZATION  Aged Out     Lab work is in process  Labs reviewed in EPIC  BP Readings from Last 3 Encounters:   09/21/22 138/77   06/06/22 121/72   02/11/22 137/84    Wt Readings from Last 3 Encounters:   09/21/22 46.9 kg (103 lb 8 oz)   06/06/22 47.5 kg (104 lb 12.8 oz)   09/13/21 48.2 kg (106 lb 3.2 oz)                  Patient Active Problem List   Diagnosis     Diarrhea     Pure hypercholesterolemia     Allergic rhinitis     Panic disorder without agoraphobia     Advanced directives, counseling/discussion     Liver lesion     Generalized anxiety disorder     Mild major depression (H)     Vulvitis     Vaginal wall prolapse     Chronic constipation     Moderate persistent asthma     Dysphagia     RBD (REM behavioral disorder)     FH: colon cancer     Hypertension goal BP (blood pressure) < 140/90     Meningioma (H)     Vitamin B12 deficiency (non anemic)     Lewy body dementia  without behavioral disturbance (H)     Osteopenia of multiple sites     Past Surgical History:   Procedure Laterality Date     ANKLE SURGERY      bilateral     COLONOSCOPY       COLONOSCOPY N/A 3/20/2015    Procedure: COLONOSCOPY;  Surgeon: Britney Martinez MD;  Location: [facility]     COLONOSCOPY N/A 5/22/2017    Procedure: COLONOSCOPY;  Colonoscopy;  Surgeon: Tristne Rangel MD;  Location: [facility]     ESOPHAGOSCOPY, GASTROSCOPY, DUODENOSCOPY (EGD), COMBINED N/A 6/7/2018    Procedure: COMBINED ESOPHAGOSCOPY, GASTROSCOPY, DUODENOSCOPY (EGD);  gastroscopy;  Surgeon: Tristen Rangel MD;  Location: [facility]     HC ESOPH/GAS REFLUX TEST W NASAL IMPED >1 HR  4/19/2012    Procedure:ESOPHAGEAL IMPEDENCE FUNCTION TEST WITH 24 HOUR PH GREATER THAN 1 HOUR; Surgeon:VALDO UMANZOR; Location: GI     HERNIA REPAIR      umbilcal     HYSTERECTOMY, PAP NO LONGER INDICATED  1991     TONSILLECTOMY       UPPER GI ENDOSCOPY         Social History     Tobacco Use     Smoking status: Former Smoker     Packs/day: 0.50     Years: 3.00     Pack years: 1.50     Types: Cigarettes     Smokeless tobacco: Never Used     Tobacco comment: smoked for 3 years when she was around 19   Substance Use Topics     Alcohol use: Yes     Comment: RARELY     Family History   Problem Relation Age of Onset     Heart Disease Mother         valve problem     Hypertension Mother      Diabetes Mother         type 2     Cerebrovascular Disease Mother      Allergies Mother      Eye Disorder Mother         Macular degeneration     Osteoporosis Mother      Respiratory Mother      Migraines Mother      Cardiovascular Father         MI at age 80     Cancer - colorectal Father 87     Diabetes Father      Hypertension Father         type 2     Eye Disorder Father         macular degeneration     Lipids Father      C.A.D. Maternal Grandmother      Diabetes Paternal Grandmother         type 2     Cardiovascular Paternal Grandmother         MI     Diabetes Sister          type 2     Allergies Sister      Gastrointestinal Disease Sister      Depression Sister      Gastrointestinal Disease Daughter      Migraines Daughter      Gastrointestinal Disease Daughter      Migraines Daughter      Migraines Son      Aneurysm No family hx of      Brain Hemorrhage No family hx of      Brain Tumor No family hx of      Cancer No family hx of      Chiari Malformation No family hx of      LUNG DISEASE No family hx of      Seizure Disorder No family hx of          Current Outpatient Medications   Medication Sig Dispense Refill     amLODIPine (NORVASC) 2.5 MG tablet Take 1 tablet (2.5 mg) by mouth daily FOR BLOOD PRESSURE 90 tablet 1     amylase-lipase-protease (CREON) 8646-91745-20323 units CPEP TAKE TWO CAPSULES BY MOUTH THREE TIMES A DAY WITH MEALS FOR DIARRHEA 180 capsule 5     augmented betamethasone dipropionate (DIPROLENE) 0.05 % external lotion AREA ON LEG AND SCALP Apply sparingly twice daily Friday through Sunday. Stronger. 30 mL 0     Biotin w/ Vitamins C & E (HAIR/SKIN/NAILS PO) Take 1 capsule by mouth daily       calcipotriene (DOVONOX) 0.005 % external cream Apply twice daily to psoriasis on Monday through Thursday. 60 g 8     calcium carb-cholecalciferol (OS-LARS) 500-200 MG-UNIT tablet Take 1 tablet by mouth 2 times daily (with meals) FOR BONE HEALTH 180 tablet 3     CALCIUM-MAGNESIUM-ZINC PO Take 1 tablet by mouth daily        cetirizine (ZYRTEC) 10 MG tablet TAKE ONE TABLET BY MOUTH ONCE DAILY IN THE MORNING FOR ALLERGIES 90 tablet 3     clobetasol propionate (CLOBEX) 0.05 % external shampoo Leave on scalp for 15-30 minutes then rinse. Use 1-2 times weekly. 118 mL 3     cyanocobalamin (VITAMIN B-12) 100 MCG tablet TAKE TWO TABLETS BY MOUTH ONCE DAILY FOR B12 DEFICIENCY 60 tablet 11     donepezil (ARICEPT) 10 MG tablet TAKE ONE TABLET BY MOUTH EVERY NIGHT AT BEDTIME FOR MEMORY 90 tablet 3     DULoxetine (CYMBALTA) 30 MG capsule Take 1 capsule (30 mg) by mouth 2 times daily 60 capsule  11     fluticasone-salmeterol (ADVAIR DISKUS) 250-50 MCG/ACT inhaler Inhale 1 puff into the lungs 2 times daily ASTHMA; INHALE ONE PUFF BY MOUTH TWICE A DAY 3 each 3     gabapentin (NEURONTIN) 300 MG capsule Take 1 capsule (300 mg) by mouth 3 times daily 180 capsule 5     lactase (LACTAID) 9000 units TABS tablet Take 1 tablet (9,000 Units) by mouth 3 times daily (with meals) For lactose intolerance, when eating milk-related foods. 90 tablet 11     loperamide (IMODIUM A-D) 2 MG tablet Take 2 mg by mouth daily as needed for diarrhea       Phenylephrine-DM-GG-APAP (MUCUS RELIEF COLD FLU THROAT) 5--325 MG/10ML LIQD Take by mouth daily as needed (phlegm)       TOLNAFTATE ANTIFUNGAL EX Externally apply topically daily as needed (toenail fungus)       traZODone (DESYREL) 100 MG tablet TAKE ONE TABLET BY MOUTH EVERY NIGHT AT BEDTIME FOR SLEEP 30 tablet 11     vitamin C (ASCORBIC ACID) 500 MG tablet Take 500 mg by mouth daily        vitamin D3 (CHOLECALCIFEROL) 10 MCG (400 UNIT) capsule Take 1 capsule (400 Units) by mouth daily FOR BONE HEALTH 90 capsule 3     omeprazole (PRILOSEC) 10 MG DR capsule TAKE 1 CAPSULE (10 MG) BY MOUTH DAILY FOR HEARTBURN 30 capsule 3     Allergies   Allergen Reactions     Sulfa Drugs Other (See Comments)     Reaction unknown as a child       Mammogram Screening - Patient over age 75, has elected to continue with screening.  Pertinent mammograms are reviewed under the imaging tab.    Review of Systems   Constitutional: Positive for chills. Negative for fever.   HENT: Positive for hearing loss. Negative for congestion, ear pain and sore throat.    Eyes: Negative for pain and visual disturbance.   Respiratory: Negative for cough and shortness of breath.    Cardiovascular: Positive for chest pain. Negative for palpitations and peripheral edema.   Gastrointestinal: Positive for constipation. Negative for abdominal pain, diarrhea, heartburn, hematochezia and nausea.   Breasts:  Negative for  "tenderness, breast mass and discharge.   Genitourinary: Negative for dysuria, frequency, genital sores, hematuria, pelvic pain, urgency, vaginal bleeding and vaginal discharge.   Musculoskeletal: Negative for arthralgias, joint swelling and myalgias.   Skin: Negative for rash.   Neurological: Negative for dizziness, weakness, headaches and paresthesias.   Psychiatric/Behavioral: Positive for mood changes. The patient is nervous/anxious.        OBJECTIVE:   /77   Pulse 61   Temp 98.6  F (37  C)   Resp 24   Wt 46.9 kg (103 lb 8 oz)   LMP  (LMP Unknown)   SpO2 100%   Breastfeeding No   BMI 19.80 kg/m   Estimated body mass index is 19.8 kg/m  as calculated from the following:    Height as of 8/27/21: 1.54 m (5' 0.63\").    Weight as of this encounter: 46.9 kg (103 lb 8 oz).  Physical Exam  GENERAL: healthy, alert and no distress  EYES: Eyes grossly normal to inspection, PERRL and conjunctivae and sclerae normal  HENT: ear canals and TM's normal, nose and mouth without ulcers or lesions  NECK: no adenopathy, no asymmetry, masses, or scars and thyroid normal to palpation  RESP: lungs clear to auscultation - no rales, rhonchi or wheezes  BREAST: normal without masses, tenderness or nipple discharge and no palpable axillary masses or adenopathy  CV: regular rate and rhythm, normal S1 S2, no S3 or S4, no murmur, click or rub, no peripheral edema and peripheral pulses strong  ABDOMEN: soft, nontender, no hepatosplenomegaly, no masses and bowel sounds normal  MS: no gross musculoskeletal defects noted, no edema  SKIN: no suspicious lesions or rashes  NEURO: Normal strength and tone, mentation intact and speech normal  PSYCH: mentation appears normal, affect normal/bright, tearful and forgetful    Diagnostic Test Results:  Labs reviewed in Epic  Results for orders placed or performed in visit on 09/21/22   Comprehensive metabolic panel (BMP + Alb, Alk Phos, ALT, AST, Total. Bili, TP)     Status: Normal   Result " Value Ref Range    Sodium 138 136 - 145 mmol/L    Potassium 3.9 3.4 - 5.3 mmol/L    Chloride 100 98 - 107 mmol/L    Carbon Dioxide (CO2) 27 22 - 29 mmol/L    Anion Gap 11 7 - 15 mmol/L    Urea Nitrogen 11.1 8.0 - 23.0 mg/dL    Creatinine 0.74 0.51 - 0.95 mg/dL    Calcium 9.2 8.8 - 10.2 mg/dL    Glucose 91 70 - 99 mg/dL    Alkaline Phosphatase 77 35 - 104 U/L    AST 23 10 - 35 U/L    ALT 15 10 - 35 U/L    Protein Total 6.4 6.4 - 8.3 g/dL    Albumin 4.2 3.5 - 5.2 g/dL    Bilirubin Total 0.2 <=1.2 mg/dL    GFR Estimate 82 >60 mL/min/1.73m2       ASSESSMENT / PLAN:   (Z00.00) Encounter for Medicare annual wellness exam  (primary encounter diagnosis)    (G31.83,  F02.80) Lewy body dementia without behavioral disturbance (H)  Comment: Chronic, slightly worsening.  Is on daily Aricept.  Has not seen neurology in approximately 3 years.  Would recommend routine follow-up, patient can call [place] [phone] to schedule.    (F32.0) Mild major depression (H)  Comment: Chronic, slightly worsening due to multiple factors including worsening dementia, having to pack away some belongings/collectibles, changes with 's new injury and multiple medical concerns.  Discussed increasing Cymbalta, patient would like to trial.  We will increase to 30 mg twice daily.  Advised can use current pills at home until they  the new prescription.  Plan: DULoxetine (CYMBALTA) 30 MG capsule          (F41.9) Anxiety  Comment: Chronic, slightly worsening as above.  Increasing Cymbalta.  Plan: DULoxetine (CYMBALTA) 30 MG capsule          (F41.0) Panic disorder without agoraphobia  Comment: Chronic, slightly worsening with the above.  Especially at night now with  not sleeping in the same room.  Changes as above.  Plan: DULoxetine (CYMBALTA) 30 MG capsule          (D32.9) Meningioma (H)  Comment: Chronic, has been stable.  Most recent MRI in March which was stable.  Follows neurosurgery.  No changes, continue to follow  "neurosurgery yearly.    (J45.40) Moderate persistent asthma without complication  Comment: Chronic, stable.  Feels is very well under control.  Continue inhalers as prescribed, okay to stop over-the-counter Zyrtec and if allergy symptoms restart advised patient to restart.  Plan: fluticasone-salmeterol (ADVAIR DISKUS) 250-50         MCG/ACT inhaler          (M54.41,  G89.29) Chronic right-sided low back pain with right-sided sciatica  Comment: Chronic, stable.  Taking gabapentin 3 times daily 1 capsule.  Managing symptoms well, will continue this current dosage.  Plan: gabapentin (NEURONTIN) 300 MG capsule          (K21.9) Gastroesophageal reflux disease without esophagitis  Comment: Chronic, stable.  No changes.  Plan: omeprazole (PRILOSEC) 10 MG DR capsule          (E73.9) Lactose intolerance  Comment: Chronic, stable.  No changes.  Plan: lactase (LACTAID) 9000 units TABS tablet          (L98.9) Benign skin lesion of multiple sites  Comment: Multiple all over body skin lesions, a couple patient is concerned about.  Given the extent of lesions would recommend a full body examination by dermatology.  Referral placed.  Plan: Adult Dermatology Referral          (Z12.11) Screen for colon cancer  Comment: Screen.  Patient should be receiving a phone call to schedule.  Plan: Colonoscopy Screening  Referral              Patient has been advised of split billing requirements and indicates understanding: Yes    COUNSELING:  Reviewed preventive health counseling, as reflected in patient instructions    Estimated body mass index is 19.8 kg/m  as calculated from the following:    Height as of 8/27/21: 1.54 m (5' 0.63\").    Weight as of this encounter: 46.9 kg (103 lb 8 oz).        She reports that she has quit smoking. Her smoking use included cigarettes. She has a 1.50 pack-year smoking history. She has never used smokeless tobacco.      Appropriate preventive services were discussed with this patient, including " applicable screening as appropriate for cardiovascular disease, diabetes, osteopenia/osteoporosis, and glaucoma.  As appropriate for age/gender, discussed screening for colorectal cancer, prostate cancer, breast cancer, and cervical cancer. Checklist reviewing preventive services available has been given to the patient.    Reviewed patients plan of care and provided an AVS. The Basic Care Plan (routine screening as documented in Health Maintenance) for Ingrid meets the Care Plan requirement. This Care Plan has been established and reviewed with the Patient and spouse and daughter.    Counseling Resources:  ATP IV Guidelines  Pooled Cohorts Equation Calculator  Breast Cancer Risk Calculator  Breast Cancer: Medication to Reduce Risk  FRAX Risk Assessment  ICSI Preventive Guidelines  Dietary Guidelines for Americans, 2010  USDA's MyPlate  ASA Prophylaxis  Lung CA Screening    Soha Lino, MICAH, APRN-CNP   [facility]    Chart documentation with Dragon Voice recognition Software. Although reviewed after completion, some words and grammatical errors may remain.     Identified Health Risks:

## 2022-09-21 NOTE — LETTER
September 30, 2022      Ingrid J Cristin  [address] [address]        Dear ,    We are writing to inform you of your test results.    Normal metabolic panel including electrolytes, glucose level, kidney and liver functions.    Resulted Orders   Comprehensive metabolic panel (BMP + Alb, Alk Phos, ALT, AST, Total. Bili, TP)   Result Value Ref Range    Sodium 138 136 - 145 mmol/L    Potassium 3.9 3.4 - 5.3 mmol/L    Chloride 100 98 - 107 mmol/L    Carbon Dioxide (CO2) 27 22 - 29 mmol/L    Anion Gap 11 7 - 15 mmol/L    Urea Nitrogen 11.1 8.0 - 23.0 mg/dL    Creatinine 0.74 0.51 - 0.95 mg/dL    Calcium 9.2 8.8 - 10.2 mg/dL    Glucose 91 70 - 99 mg/dL    Alkaline Phosphatase 77 35 - 104 U/L    AST 23 10 - 35 U/L    ALT 15 10 - 35 U/L    Protein Total 6.4 6.4 - 8.3 g/dL    Albumin 4.2 3.5 - 5.2 g/dL    Bilirubin Total 0.2 <=1.2 mg/dL    GFR Estimate 82 >60 mL/min/1.73m2      Comment:      Effective December 21, 2021 eGFRcr in adults is calculated using the 2021 CKD-EPI creatinine equation which includes age and gender ( et al., NEJM, DOI: 10.1056/BOYKon2691692)       If you have any questions or concerns, please call the clinic at the number listed above.       Sincerely,      MARVIN Driscoll CNP

## 2022-09-22 DIAGNOSIS — K21.9 GASTROESOPHAGEAL REFLUX DISEASE WITHOUT ESOPHAGITIS: ICD-10-CM

## 2022-09-23 RX ORDER — OMEPRAZOLE 10 MG/1
CAPSULE, DELAYED RELEASE ORAL
Qty: 30 CAPSULE | Refills: 3 | Status: SHIPPED | OUTPATIENT
Start: 2022-09-23 | End: 2023-10-10

## 2022-11-19 ENCOUNTER — HEALTH MAINTENANCE LETTER (OUTPATIENT)
Age: 77
End: 2022-11-19

## 2022-11-21 DIAGNOSIS — J45.40 MODERATE PERSISTENT ASTHMA WITHOUT COMPLICATION: ICD-10-CM

## 2022-11-22 RX ORDER — FLUTICASONE PROPIONATE AND SALMETEROL 250; 50 UG/1; UG/1
POWDER RESPIRATORY (INHALATION)
Qty: 180 EACH | Refills: 3 | Status: SHIPPED | OUTPATIENT
Start: 2022-11-22 | End: 2023-01-30

## 2023-01-30 ENCOUNTER — OFFICE VISIT (OUTPATIENT)
Dept: PHARMACY | Facility: CLINIC | Age: 78
End: 2023-01-30
Payer: COMMERCIAL

## 2023-01-30 VITALS — DIASTOLIC BLOOD PRESSURE: 79 MMHG | SYSTOLIC BLOOD PRESSURE: 135 MMHG | HEART RATE: 59 BPM

## 2023-01-30 DIAGNOSIS — Z78.9 TAKES DIETARY SUPPLEMENTS: ICD-10-CM

## 2023-01-30 DIAGNOSIS — G31.83 LEWY BODY DEMENTIA WITHOUT BEHAVIORAL DISTURBANCE (H): ICD-10-CM

## 2023-01-30 DIAGNOSIS — L40.9 PSORIASIS: ICD-10-CM

## 2023-01-30 DIAGNOSIS — D32.9 MENINGIOMA (H): ICD-10-CM

## 2023-01-30 DIAGNOSIS — I10 HYPERTENSION GOAL BP (BLOOD PRESSURE) < 140/90: ICD-10-CM

## 2023-01-30 DIAGNOSIS — F02.80 LEWY BODY DEMENTIA WITHOUT BEHAVIORAL DISTURBANCE (H): ICD-10-CM

## 2023-01-30 DIAGNOSIS — J30.9 ALLERGIC RHINITIS, UNSPECIFIED SEASONALITY, UNSPECIFIED TRIGGER: ICD-10-CM

## 2023-01-30 DIAGNOSIS — G47.00 INSOMNIA, UNSPECIFIED TYPE: ICD-10-CM

## 2023-01-30 DIAGNOSIS — K52.9 CHRONIC DIARRHEA: Primary | ICD-10-CM

## 2023-01-30 DIAGNOSIS — K58.9 IRRITABLE BOWEL SYNDROME, UNSPECIFIED TYPE: ICD-10-CM

## 2023-01-30 DIAGNOSIS — M85.89 OSTEOPENIA OF MULTIPLE SITES: ICD-10-CM

## 2023-01-30 DIAGNOSIS — J45.40 MODERATE PERSISTENT ASTHMA WITHOUT COMPLICATION: ICD-10-CM

## 2023-01-30 DIAGNOSIS — K59.09 CHRONIC CONSTIPATION: ICD-10-CM

## 2023-01-30 DIAGNOSIS — F32.0 MILD MAJOR DEPRESSION (H): ICD-10-CM

## 2023-01-30 DIAGNOSIS — G89.29 CHRONIC RIGHT-SIDED LOW BACK PAIN WITH RIGHT-SIDED SCIATICA: ICD-10-CM

## 2023-01-30 DIAGNOSIS — F41.1 GENERALIZED ANXIETY DISORDER: ICD-10-CM

## 2023-01-30 DIAGNOSIS — K21.9 GASTROESOPHAGEAL REFLUX DISEASE WITHOUT ESOPHAGITIS: ICD-10-CM

## 2023-01-30 DIAGNOSIS — M54.41 CHRONIC RIGHT-SIDED LOW BACK PAIN WITH RIGHT-SIDED SCIATICA: ICD-10-CM

## 2023-01-30 PROCEDURE — 99607 MTMS BY PHARM ADDL 15 MIN: CPT | Performed by: PHARMACIST

## 2023-01-30 PROCEDURE — 99605 MTMS BY PHARM NP 15 MIN: CPT | Performed by: PHARMACIST

## 2023-01-30 ASSESSMENT — ASTHMA QUESTIONNAIRES: ACT_TOTALSCORE: 25

## 2023-01-30 NOTE — LETTER
"Recommended To-Do List      Prepared on: Jan 30, 2023       You can get the best results from your medications by completing the items on this \"To-Do List.\"      Bring your To-Do List when you go to your doctor. And, share it with your family or caregivers.    My To-Do List:  What we talked about: What I should do:   Cetirizine for allergies    Ok to try off cetirizine and monitor allergy symptoms.          What we talked about: What I should do:   Creon for digestion    Change Creon to 1 capsule twice daily with meals (in pill box, not as needed for diarrhea).          What we talked about: What I should do:   Gabapentin dosing    Take gabapentin 1 capsule twice daily, and may take an additional 1 capsule once daily as needed (maximum of 3 capsules per day).          What we talked about: What I should do:   OTC supplements    Stop calcium/magnesium/zinc supplement because calcium can cause constipation and magnesium can cause diarrhea. Stop NeuroTonix memory supplement due to GI side effects.          What we talked about: What I should do:                       "

## 2023-01-30 NOTE — PATIENT INSTRUCTIONS
"Recommendations from today's MTM visit:                                                    MTM (medication therapy management) is a service provided by a clinical pharmacist designed to help you get the most of out of your medicines.   Today we reviewed what your medicines are for, how to know if they are working, that your medicines are safe and how to make your medicine regimen as easy as possible.      1. Ok to try off cetirizine and monitor allergy symptoms.    2. Stop calcium/magnesium/zinc supplement because calcium can cause constipation and magnesium can cause diarrhea.    3. Take gabapentin 1 capsule twice daily, and may take an additional 1 capsule once daily as needed (maximum of 3 capsules per day).    4. Placed referral for a GI specialist.     5. Change Creon to 1 capsule twice daily with meals for digestion (in pill box, not as needed for diarrhea).    6. Stop NeuroTonix memory supplement due to GI side effects.    Follow-up: Return in about 3 months (around 4/30/2023) for Medication Therapy Management.    It was great speaking with you today.  I value your experience and would be very thankful for your time in providing feedback in our clinic survey. In the next few days, you may receive an email or text message from [employer] with a link to a survey related to your  clinical pharmacist.\"     To schedule another MTM appointment, please call the clinic directly or you may call the MTM scheduling line at [phone] or toll-free at [phone].     My Clinical Pharmacist's contact information:                                                      Please feel free to contact me with any questions or concerns you have.      Vonnie Peña, PharmD, BCACP  Medication Therapy Management Pharmacist  Pager: [phone]    "

## 2023-01-30 NOTE — LETTER
February 3, 2023  Ingrid Amaral  [address] [address]    Dear Ms. Amaral, [facility]     Thank you for talking with me on Jan 30, 2023 about your health and medications. As a follow-up to our conversation, I have included two documents:      1. Your Recommended To-Do List has steps you should take to get the best results from your medications.  2. Your Medication List will help you keep track of your medications and how to take them.    If you want to talk about these documents, please call Vonnie Peña RPH at phone: [phone], Monday-Friday 8-4:30pm.    I look forward to working with you and your doctors to make sure your medications work well for you.    Sincerely,  Vonnie Peña RPH  [facility] Pharmacist, [facility]

## 2023-01-30 NOTE — LETTER
_  Medication List        Prepared on: Jan 30, 2023     Bring your Medication List when you go to the doctor, hospital, or   emergency room. And, share it with your family or caregivers.     Note any changes to how you take your medications.  Cross out medications when you no longer use them.    Medication How I take it Why I use it Prescriber   Alpha-d-galactosidase (BEANO) tablet Take 1 tablet by mouth daily as needed for bloating Bloating Patient Reported   amLODIPine (NORVASC) 2.5 MG tablet Take 1 tablet (2.5 mg) by mouth daily Blood pressure MARVIN Driscoll CNP   amylase-lipase-protease (CREON) 5420-57863-02472 units CPEP TAKE ONE CAPSULE BY MOUTH TWICE DAILY WITH MEALS Chronic Diarrhea MARVIN Driscoll CNP   ascorbic acid 1000 MG TABS tablet Take 1 tablet (1,000 mg) by mouth daily General health Patient Reported   Biotin w/ Vitamins C & E (HAIR/SKIN/NAILS PO) Take 1 capsule by mouth daily General health Patient Reported   calcium carb-cholecalciferol (OS-LARS) 500-200 MG-UNIT tablet Take 1 tablet by mouth 2 times daily (with meals) Bone health MARVIN Driscoll CNP   cetirizine (ZYRTEC) 10 MG tablet TAKE ONE TABLET (10 MG) BY MOUTH ONCE DAILY IN THE MORNING Allergies MARVIN Driscoll CNP   cyanocobalamin (VITAMIN B-12) 100 MCG tablet TAKE TWO TABLETS (200 MCG) BY MOUTH ONCE DAILY Vitamin B12 Deficiency MARVIN Driscoll CNP   donepezil (ARICEPT) 10 MG tablet TAKE ONE TABLET (10 MG) BY MOUTH EVERY NIGHT AT BEDTIME FOR MEMORY Memory MARVIN Driscoll CNP   DULoxetine (CYMBALTA) 30 MG capsule Take 1 capsule (30 mg) by mouth 2 times daily Depression, anxiety MARVIN Driscoll CNP   gabapentin (NEURONTIN) 300 MG capsule Take 1 capsule (300 mg) by mouth 3 times daily Back Pain MARVIN Driscoll CNP   lactase (LACTAID) 9000 units TABS tablet Take 1 tablet (9,000 Units) by mouth 3 times daily (with meals) For lactose intolerance, when eating  milk-related foods. Lactose Intolerance MARVIN Driscoll CNP   omeprazole (PRILOSEC) 10 MG DR capsule TAKE 1 CAPSULE (10 MG) BY MOUTH DAILY FOR HEARTBURN Gastroesophageal Reflux Disease (GERD) MARVIN Driscoll CNP   traZODone (DESYREL) 100 MG tablet TAKE ONE TABLET (100 MG) BY MOUTH EVERY NIGHT AT BEDTIME FOR SLEEP Insomnia Leesa Muñiz PA-C   vitamin D3 (CHOLECALCIFEROL) 10 MCG (400 UNIT) capsule Take 1 capsule (400 Units) by mouth daily Bone health MARVIN Driscoll CNP         Add new medications, over-the-counter drugs, herbals, vitamins, or  minerals in the blank rows below.    Medication How I take it Why I use it Prescriber                          Allergies:      sulfa drugs        Side effects I have had:               Other Information:              My notes and questions:

## 2023-01-30 NOTE — PROGRESS NOTES
Medication Therapy Management (MTM) Encounter    ASSESSMENT:                            Medication Adherence/Access: No issues identified.    Asthma/Allergic Rhinitis: Breathing remains well controlled off Advair, although it's only been 3 weeks so will continue monitoring. It would be reasonable to trial off Zyrtec in attempt to reduce polypharmacy.    Dementia/Meningioma: According to [employer] Medicines database, inulin most common side effects include: bloating, constipation, diarrhea, flatulence, and GI cramps. Recommend stopping NeuroTonix supplement due to uncontrolled IBS symptoms and discussed unlikely to be effective for memory. Follow-up plan in place with neurosurgery for annual brain scan    GERD: Stable.    Psoriasis: Encouraged dermatology follow-up as planned.    Depression/Anxiety/Insomnia: Stable per patient.     Back Pain:  needs education on appropriate gabapentin dosing.    IBS/Diarrhea/Constipation: Uncontrolled with both constipation and diarrhea.  needs education on Creon indication with meals for digestion not as needed for diarrhea. Patient would benefit from following-up with gastroenterology.     Hypertension: Stable. Patient is meeting blood pressure goal of < 140/90mmHg.    Supplements/Osteopenia: Recommend stopping extra OTC calcium/magnesium supplement due to uncontrolled IBS symptoms.     PLAN:                            1. Ok to try off cetirizine and monitor allergy symptoms.  2. Stop calcium/magnesium/zinc supplement because calcium can cause constipation and magnesium can cause diarrhea.  3. Take gabapentin 1 capsule twice daily, and may take an additional 1 capsule once daily as needed (maximum of 3 capsules per day).  4. Placed referral for a GI specialist.   5. Change Creon to 1 capsule twice daily with meals for digestion (in pill box, not as needed for diarrhea).  6. Stop NeuroTonix memory supplement due to GI side effects.    Follow-up: Return in about 3 months  (around 4/30/2023) for Medication Therapy Management.    SUBJECTIVE/OBJECTIVE:                          Ingrid Amaral is a 77 year old female coming in for a follow-up visit. Patient was accompanied by her  Harsha. Today's visit is a follow-up MTM visit from 6/6/22.     Reason for visit: General medication review. Patient brought in all her medication bottles today.    Allergies/ADRs: Reviewed in chart  Past Medical History: Reviewed in chart  Tobacco: She reports that she has quit smoking. Her smoking use included cigarettes. She has a 1.50 pack-year smoking history. She has never used smokeless tobacco.  Alcohol: Less than 1 beverages / week    Medication Adherence/Access:   Patient uses pill box(es), set up .  Patient takes medications 2 time(s) per day.   Per patient, misses medication 0 times per week.   Medication barriers: Poor historian due to dementia.  administers the Rx medications, however patient takes her own OTC medications.   The patient fills medications at [facility]: YES.    Asthma/Allergic Rhinitis: Patient stopped Advair about 3 weeks ago because PCP previously told her she could try off it and see how it goes. Today states breathing remains good without Advair. Currently taking cetirizine 10mg daily. She's wondering if can trial off cetirizine as well because not having any allergy issues at this time.    ACT Total Scores 3/22/2021 9/21/2022 1/30/2023   ACT TOTAL SCORE - - -   ASTHMA ER VISITS - - -   ASTHMA HOSPITALIZATIONS - - -   ACT TOTAL SCORE (Goal Greater than or Equal to 20) 24 25 25   In the past 12 months, how many times did you visit the emergency room for your asthma without being admitted to the hospital? 0 0 0   In the past 12 months, how many times were you hospitalized overnight because of your asthma? 0 0 0     Dementia/Meningioma: Currently taking donepezil 10mg at bedtime. Also on NeuroTonix memory and focus supplement (contains inulin powder chicory root  and proprietary probiotic blend) - started 1 month ago, bought 3 month supply from TV advertisement. Patient has brain tumors, which not felt related to dementia. They have discussed option of surgical resection, however chose to continue monitoring with yearly brain MRI.  reports memory loss progressing slowly over time. Follows with neurosurgeon Dr. Ewing.     GERD: Current medications include: omeprazole 10mg once daily. History of hiatal hernia and dysphagia. The patient does notice symptoms if they miss a dose. Patient feels that current regimen is effective.    Psoriasis: Patient may still have clobetasol 0.05% shampoo at home, but  doesn't believe she's used any topical products in a long time. Plans to follow-up with dermatology.     Depression/Anxiety/Insomnia:  Current medications include: duloxetine 30mg twice daily and trazodone 100mg at bedtime. Patient feels current therapy is effective and reports no side effects.    PHQ 4/19/2021 8/27/2021 9/21/2022   PHQ-9 Total Score 6 8 8   Q9: Thoughts of better off dead/self-harm past 2 weeks Not at all Not at all Not at all     DELMIS-7 SCORE 3/22/2021 4/19/2021 8/27/2021   Total Score - - -   Total Score - - -   Total Score 13 12 12     Back Pain: Currently taking gabapentin 300mg twice daily and occasionally increases dose to 600mg or takes an additional 300mg in the afternoon if needed. Also on duloxetine 30mg daily (for mental health). Patient feels current therapy is effective and reports no side effects. She finds as needed gabapentin to be helpful for back pain.    IBS/Diarrhea/Constipation: Currently taking Creon 2 capsule twice daily as needed for diarrhea. Also has beano as needed for bloating and lactaid as needed for lactose intolerance. Family took away her OTC loperamide bottle. Patient alternates between constipation vs diarrhea. Also endorses having issues with stool incontinence.  asks if having constipation or diarrhea lately  then gives Creon if having diarrhea but will hold if constipated. Previously followed with gastroenterology, last visit was in 2019.    Hypertension: Current medications include amlodipine 2.5mg daily.  Patient does self-monitor blood pressure. Home BP monitoring in range of 120-130's systolic over 60-70's diastolic. Patient reports no current medication side effects.    BP Readings from Last 3 Encounters:   01/30/23 135/79   09/21/22 138/77   06/06/22 121/72     Supplements/Osteopenia: Currently taking hair/skin/nails 1 capsule daily, OTC calcium carbonate 333mg/vitamin D 200 units/magnesium 133mg/zinc 5mg daily, vitamin D 400 units daily, vitamin C 1000mg daily, vitamin B12 100mcg twice daily, and Rx oyster shell calcium/vitamin D 500mg-200 unit twice daily. Patient doesn't eat much dairy due to lactose intolerance. Leafy green veggies at least 1 serving daily.  DEXA History: 10/4/21 showed osteopenia.    Lab Results   Component Value Date    VITDT 47 09/29/2015     Lab Results   Component Value Date    B12 234 07/23/2020     Today's Vitals: /79   Pulse 59    Last Comprehensive Metabolic Panel:  Sodium   Date Value Ref Range Status   09/21/2022 138 136 - 145 mmol/L Final   06/18/2020 139 133 - 144 mmol/L Final     Potassium   Date Value Ref Range Status   09/21/2022 3.9 3.4 - 5.3 mmol/L Final   06/18/2020 3.8 3.4 - 5.3 mmol/L Final     Chloride   Date Value Ref Range Status   09/21/2022 100 98 - 107 mmol/L Final   06/18/2020 106 94 - 109 mmol/L Final     Carbon Dioxide   Date Value Ref Range Status   06/18/2020 29 20 - 32 mmol/L Final     Carbon Dioxide (CO2)   Date Value Ref Range Status   09/21/2022 27 22 - 29 mmol/L Final     Anion Gap   Date Value Ref Range Status   09/21/2022 11 7 - 15 mmol/L Final   06/18/2020 4 3 - 14 mmol/L Final     Glucose   Date Value Ref Range Status   09/21/2022 91 70 - 99 mg/dL Final   06/18/2020 116 (H) 70 - 99 mg/dL Final     Urea Nitrogen   Date Value Ref Range Status    09/21/2022 11.1 8.0 - 23.0 mg/dL Final   06/18/2020 13 7 - 30 mg/dL Final     Creatinine   Date Value Ref Range Status   09/21/2022 0.74 0.51 - 0.95 mg/dL Final   06/18/2020 0.62 0.52 - 1.04 mg/dL Final     GFR Estimate   Date Value Ref Range Status   09/21/2022 82 >60 mL/min/1.73m2 Final     Comment:     Effective December 21, 2021 eGFRcr in adults is calculated using the 2021 CKD-EPI creatinine equation which includes age and gender (Ronnie et al., NEJ, DOI: 10.1056/AHNHdc1398792)   03/30/2021 81 >60 mL/min/[1.73_m2] Final     Calcium   Date Value Ref Range Status   09/21/2022 9.2 8.8 - 10.2 mg/dL Final   06/18/2020 8.4 (L) 8.5 - 10.1 mg/dL Final     ----------------    I spent 60 minutes with this patient today (an extra 15 minutes was spent creating the Medication Action Plan). All changes were made via collaborative practice agreement with MARVIN Tabares CNP. A copy of the visit note was provided to the patient's provider(s).    A summary of these recommendations was given to the patient.    Vonnie Peña, PharmD, BCACP  Medication Therapy Management Pharmacist  Pager: [phone]     Medication Therapy Recommendations  Allergic rhinitis    Current Medication: cetirizine (ZYRTEC) 10 MG tablet   Rationale: No medical indication at this time - Unnecessary medication therapy - Indication   Recommendation: Discontinue Medication - Trial off Zyrtec and monitor symptoms.   Status: Accepted per CPA         Chronic diarrhea    Current Medication: amylase-lipase-protease (CREON) [id number] units CPEP   Rationale: Does not understand instructions - Adherence - Adherence   Recommendation: Change Medication - amylase-lipase-protease 1394-41587-32848 units Cpep - Take 1 capsule by mouth twice daily with meals as directed.   Status: Patient Agreed - Adherence/Education         Chronic right-sided low back pain with right-sided sciatica    Current Medication: gabapentin (NEURONTIN) 300 MG capsule   Rationale:  Does not understand instructions - Adherence - Adherence   Recommendation: Provide Education - gabapentin 300 MG capsule - Max 3 capsules per day.   Status: Patient Agreed - Adherence/Education         Takes dietary supplements    Current Medication: CALCIUM-MAGNESIUM-ZINC PO (Discontinued)   Rationale: Undesirable effect - Adverse medication event - Safety   Recommendation: Discontinue Medication - Stop calcium/mag/zinc and NeuroTonix supplements.   Status: Accepted - no CPA Needed

## 2023-02-01 ENCOUNTER — TELEPHONE (OUTPATIENT)
Dept: NEUROSURGERY | Facility: CLINIC | Age: 78
End: 2023-02-01
Payer: COMMERCIAL

## 2023-02-01 DIAGNOSIS — D32.9 MENINGIOMA (H): Primary | ICD-10-CM

## 2023-02-01 NOTE — TELEPHONE ENCOUNTER
Last MRI Brain, March 2021, stable. To repeat 1 year from that time as per Dr Ewing.   Order placed.   Message sent to schedulers.

## 2023-02-01 NOTE — TELEPHONE ENCOUNTER
Called patient and LVM  Patient needs to schedule her yearly MRI as requested by  (per staff message) order has already been placed. Please tell patient to call imaging at [phone] to schedule MRI.

## 2023-02-02 NOTE — TELEPHONE ENCOUNTER
Received message from scheduling team, patient wanted to know how she would receive results.     Advised patient Dr. Ewing will review results and he will either call patients with next steps or nursing team will once completed. She verbalized understanding.

## 2023-02-05 ENCOUNTER — TELEPHONE (OUTPATIENT)
Dept: PHARMACY | Facility: CLINIC | Age: 78
End: 2023-02-05
Payer: COMMERCIAL

## 2023-02-05 NOTE — PROGRESS NOTES
has a medication question after MTM visit last week. Daughter told him B12 is for energy, so wondering if taking B12 in the evening makes sense. I advised B12 works in a different way so taking twice daily is fine.    Vonnie Peña, PharmD, [facility]  Medication Therapy Management Pharmacist  Pager: [phone]

## 2023-02-07 ENCOUNTER — HOSPITAL ENCOUNTER (OUTPATIENT)
Dept: MRI IMAGING | Facility: CLINIC | Age: 78
Discharge: HOME | End: 2023-02-07
Attending: NEUROLOGICAL SURGERY | Admitting: NEUROLOGICAL SURGERY
Payer: COMMERCIAL

## 2023-02-07 DIAGNOSIS — D32.9 MENINGIOMA (H): ICD-10-CM

## 2023-02-07 PROCEDURE — 255N000002 HC RX 255 OP 636: Performed by: NEUROLOGICAL SURGERY

## 2023-02-07 PROCEDURE — A9585 GADOBUTROL INJECTION: HCPCS | Performed by: NEUROLOGICAL SURGERY

## 2023-02-07 PROCEDURE — 70553 MRI BRAIN STEM W/O & W/DYE: CPT

## 2023-02-07 RX ORDER — GADOBUTROL 604.72 MG/ML
4.5 INJECTION INTRAVENOUS ONCE
Status: COMPLETED | OUTPATIENT
Start: 2023-02-07 | End: 2023-02-07

## 2023-02-07 RX ADMIN — GADOBUTROL 4.5 ML: 604.72 INJECTION INTRAVENOUS at 11:10

## 2023-02-08 ENCOUNTER — TELEPHONE (OUTPATIENT)
Dept: NEUROSURGERY | Facility: CLINIC | Age: 78
End: 2023-02-08
Payer: COMMERCIAL

## 2023-02-08 NOTE — TELEPHONE ENCOUNTER
MRI Brain from 2/7/23 stable as per Dr Ewing. To repeat in 1 year.   Attempted to reach out to patient, no answer. [employer] message sent.     Reminder sent to pool to order and schedule MRI in 1 year.

## 2023-02-13 ENCOUNTER — OFFICE VISIT (OUTPATIENT)
Dept: GASTROENTEROLOGY | Facility: CLINIC | Age: 78
End: 2023-02-13
Payer: COMMERCIAL

## 2023-02-13 VITALS
DIASTOLIC BLOOD PRESSURE: 76 MMHG | BODY MASS INDEX: 19.45 KG/M2 | SYSTOLIC BLOOD PRESSURE: 130 MMHG | HEIGHT: 61 IN | WEIGHT: 103 LBS

## 2023-02-13 DIAGNOSIS — E73.9 LACTOSE INTOLERANCE: ICD-10-CM

## 2023-02-13 DIAGNOSIS — K52.9 CHRONIC DIARRHEA: Primary | ICD-10-CM

## 2023-02-13 DIAGNOSIS — R14.0 ABDOMINAL BLOATING: ICD-10-CM

## 2023-02-13 PROCEDURE — 99204 OFFICE O/P NEW MOD 45 MIN: CPT | Performed by: NURSE PRACTITIONER

## 2023-02-13 RX ORDER — POLYETHYLENE GLYCOL 3350 17 G/17G
1 POWDER, FOR SOLUTION ORAL DAILY
Qty: 507 G | Refills: 1 | Status: SHIPPED | OUTPATIENT
Start: 2023-02-13 | End: 2023-11-03

## 2023-02-13 ASSESSMENT — PAIN SCALES - GENERAL: PAINLEVEL: NO PAIN (0)

## 2023-02-13 NOTE — PATIENT INSTRUCTIONS
"It was a pleasure taking care of you today.  I've included a brief summary of our discussion and care plan from today's visit below.  Please review this information with your primary care provider.  ______________________________________________________________________    My recommendations are summarized as follows:    Start taking fiber supplement metamucil one teaspoon every evening.    2. Take half of the capful of Miralax every morning.    3. I ordered lab work in 2 weeks, which could be completed at any [facility]. Call them to schedule an appointment. Consume 3 gram of gluten a day until lab test. It is approximately one slice of wheat bread.    4.   Avoid dairy products or take Lactaid tablet before consuming dairy.    Return to GI Clinic in 3 months  to review your progress.    ______________________________________________________________________    Irritable bowel syndrome (IBS)   Irritable bowel syndrome (IBS) is a chronic condition of the digestive system. Its primary symptoms are abdominal pain and changes in bowel habits (eg, constipation and/or diarrhea).  There are a number of theories about how and why irritable bowel syndrome (IBS) develops. Despite intensive research, the cause is not clear.   -One theory suggests that IBS is caused by abnormal contractions of the colon and intestines (hence the term \"spastic bowel,\" which has sometimes been used to describe IBS).   -Some people develop IBS after a severe gastrointestinal infection (eg, Salmonella or Campylobacter, or viruses). However, it is not clear how the infection triggers IBS to develop, and most people with IBS do not have a history of these infections.  -People with IBS who seek medical help are more likely to suffer from anxiety and stress than those who do not seek help. Stress and anxiety are known to affect the intestine; thus, it is likely that anxiety and stress worsen symptoms.    -Food intolerances are common in " "patients with IBS, raising the possibility that it is caused by food sensitivity or allergy. This theory has been difficult to prove, although it continues to be studied.   A number of foods are known to cause symptoms that mimic or aggravate IBS, including dairy products (which contain lactose), legumes (such as beans), and cruciferous vegetables (such as broccoli, cauliflower, [place] sprouts, and cabbage). These foods increase intestinal gas, which can cause cramps.    -Many researchers believe that IBS is caused by heightened sensitivity of the intestines. The medical term for this is \"visceral hyperalgesia.\" This theory proposes that nerves in the bowels are overactive in people with IBS, so that normal amounts of gas or movement are perceived as excessive and painful.     A person with irritable bowel syndrome may have frequent loose stools. Bowel movements usually occur during the daytime, and most often in the morning or after meals. Diarrhea is often preceded by a sense of extreme urgency and followed by a feeling of incomplete emptying. About one-half of people with IBS also notice mucous discharge with diarrhea. Diarrhea occurring during the night is very unusual with IBS.     Treatments are often given to reduce the pain and other symptoms of IBS, and it may be necessary to try more than one combination of treatments to find the one that is most helpful for you.  Diet:  The first step in treating IBS is usually to monitor your symptoms, daily bowel habits, and any other factors that may affect your bowels. This can help to identify factors that worsen symptoms in some people with IBS, such as lactose or other food intolerances and stress. Keeping a daily diary to track your diet and bowel symptoms can be helpful.  Many clinicians recommend temporarily eliminating milk products, since lactose intolerance is common and can aggravate IBS or cause symptoms similar to IBS. The greatest concentration of " lactose is found in milk and ice cream, although it is present in smaller quantities in yogurt, cottage and other cheeses.Try eliminating dairy for 2 weeks. If IBS symptoms improve, it is reasonable to continue avoiding lactose.   Many foods are only partially digested in the small intestines. When they reach the colon (large intestine), further digestion takes place, which may cause gas and cramps. Eliminating these foods temporarily is reasonable if gas or bloating is bothersome.  The most common gas-producing foods are legumes (such as beans) and cruciferous vegetables (such as cabbage, [place] sprouts, cauliflower, and broccoli). In addition, some people have trouble with onions, celery, carrots, raisins, bananas, apricots, prunes, sprouts, and wheat.    A bulk-forming fiber supplement, such as Psyllium, may also be recommended to increase fiber intake since it is difficult to consume enough fiber in the diet. Fiber supplements should be started at a low dose and increased slowly over several weeks to reduce the symptoms of excessive intestinal gas, which can occur in some people when beginning fiber therapy.     Some foods are easy to digest for people with IBS related diarrhea. These include the following: plain pasta or noodles, white rice. Boiled or baked potato. Whole white bread, [place] bread, plain fish, plain chicken or turkey, soft-boiled eggs, rice or soy milk, cooked carrots,and cereals (plain Cornflakes, Rice Krispies, Corn or Rice Chex, or Cheerios).    Other approaches to management of IBS:  Stress and anxiety can worsen IBS in some people. The best approach for reducing stress and anxiety depends upon your situation and the severity of your symptoms.  Although many drugs are available to treat the symptoms of IBS, these drugs do not cure the condition. They are mainly used to relieve symptoms.          A high-fiber diet is a commonly recommended treatment for digestive problems, such as  constipation, diarrhea, and hemorrhoids.   Most dietary fiber is not digested or absorbed, so it stays within the intestine where it modulates digestion of other foods and affects the consistency of stool. There are two types of fiber, each of which is thought to have its own benefits:  ?Soluble fiber consists of a group of substances that is made of carbohydrates and dissolves in water. Examples of foods that contain soluble fiber include fruits, oats, barley, and legumes (peas and beans).  ?Insoluble fiber comes from plant cell walls and does not dissolve in water. Examples of foods that contain insoluble fiber include wheat, rye, and other grains. Insoluble fiber (wheat bran, and some fruits and vegetables) has been recommended to treat digestive problems such as constipation, hemorrhoids, chronic diarrhea, and fecal incontinence.   ?Dietary fiber is the sum of all soluble and insoluble fiber.Fiber bulks the stool, making it softer and easier to pass. Fiber helps the stool pass regularly, although it is not a laxative.   - Recommended daily dose of fiber is 25-35 gram. It is difficult to consume this amount of fiber from food alone. Therefore, I would suggest to take fiber supplement.  - Please start supplementation with a powdered soluble fiber. When used on a daily basis, this can help regulate the consistency of your stools.   - Metamucil (psyllium) and Citrucel are preferred examples. You can start with 1-2 teaspoons per day, with goal to gradually increase the dose  to 1 tablespoon daily. You can increase up to 1 tablespoon three times daily if needed.   - It is important to stay well-hydrated with use of fiber supplementation and to make sure that the fiber powder is well mixed with water as directed on the label.   - You may experience some bloating with initiation of fiber, which will improve over the first few weeks. We will evaluate the effect  of fiber in 3-6 months.   - Of note, many of the fiber  products contain artificial sweeteners, which can cause bloating, gas, and diarrhea in those who may be sensitive to artificial sweeteners. If this is the case, I would recommend trying Metamucil Premium Blend (with Stevia), Metamucil 4-in-1 without Added Sweeteners, or [place] (sweetened with [place] fruit extract).    ______________________________________________________________________    Who do I call with any questions after my visit?  Please be in touch if there are any further questions that arise following today's visit.  There are multiple ways to contact your gastroenterology care team.      During business hours, you may reach a Gastroenterology nurse at [phone], option 3.     To schedule or reschedule an appointment, please call [phone].   To schedule your imaging studies (CT, MRI, ultrasound)  call [phone] (or toll-free # [phone])  To schedule your lab work at [facility], please call [phone]    You can always send a secure message through Knome.  [employer]ome messages are answered by your nurse or doctor typically within 24 hours.  Please allow extra time on weekends and holidays.      For urgent/emergent questions after business hours, you may reach the on-call GI Fellow by contacting the [facility]  at [phone].    In order for your refill to be processed in a timely fashion, it is your responsibility to ensure you follow the recommendations from your provider regarding your laboratory studies and follow up appointments.       How will I get the results of any tests ordered?    You will receive all of your results.  If you have signed up for [employer]ome, any tests ordered at your visit will be available to you after your physician reviews them.  Typically this takes 1-2 weeks.  If there are urgent results that require a change in your care plan, your physician or nurse will call you to discuss the next steps.   What is  [employer]?  [employer] is a secure way for you to access all of your healthcare records from the [facility].  It is a web based computer program, so you can sign on to it from any location.  It also allows you to send secure messages to your care team.  I recommend signing up for [employer] access if you have not already done so and are comfortable with using a computer.    How to I schedule a follow-up visit?  If you did not schedule a follow-up visit today, please call [phone] to schedule a follow-up office visit.      Sincerely,  MIKE Mauro,  [facility],  Division of Gastroenterology   ([facility])

## 2023-02-14 NOTE — PROGRESS NOTES
"Gastroenterology CLINIC VISIT, NEW PATIENT    CC/REFERRING PROVIDER: Soha Lino  REASON FOR CONSULTATION: diarrhea    HPI: 77 year old female with PMH of Lewy body dementia, asthma, HTN, and GERD, presenting to GI clinic with her  for evaluation of ongoing episodes of diarrhea. Patient is a poor historian; majority of history was obtained from her .  Patient's  stated that he gives the patient her medications. Said that there was a misconception regarding indications for Creon. The bottle label says \"for diarrhea\" so her has been giving Creon only when the patient complained of loose stools. Also, the patient discarded prescribed fiber supplement and has not been taking it for a while. He added that the patient has abdominal distention at times, after she misses OTC anti-gas pills (Gas-X or Beano). Admits to not giving the patient omeprazole as she has no complains of acid reflux for some time.  Said, \"if we were taking those medications correctly, we won't be here today\".    He reported that the patient has problems with gulping down her food and probably, swallows lots of air. She has lactose intolerance, but likes dairy creamers and ice-cream. They have Lactaid tablet available but sometimes, fail to take it. Request screening for gluten intolerance as many of the patient's family members suffer celiac disease. Admits to avoiding gluten. \"Our daughter moved to live with us and buys gluten free foods\".  Patient complains of occasional rectal pain from hemorrhoids. Denies seeing any blood. Reported family history of colon cancer in her father.    ROS: 10pt ROS performed and otherwise negative.    PAST MEDICAL HISTORY:  Past Medical History:   Diagnosis Date     Asthma      GERD (gastroesophageal reflux disease)      Radial head fracture 03/18/2010       PREVIOUS ABDOMINAL/GYNECOLOGIC SURGERIES:    Past Surgical History:   Procedure Laterality Date     ANKLE SURGERY      bilateral     " COLONOSCOPY       COLONOSCOPY N/A 3/20/2015    Procedure: COLONOSCOPY;  Surgeon: Britney Martinez MD;  Location: [facility]     COLONOSCOPY N/A 5/22/2017    Procedure: COLONOSCOPY;  Colonoscopy;  Surgeon: Tristen Rangel MD;  Location: [facility]     ESOPHAGOSCOPY, GASTROSCOPY, DUODENOSCOPY (EGD), COMBINED N/A 6/7/2018    Procedure: COMBINED ESOPHAGOSCOPY, GASTROSCOPY, DUODENOSCOPY (EGD);  gastroscopy;  Surgeon: Tristen Rangel MD;  Location: [facility]     HC ESOPH/GAS REFLUX TEST W NASAL IMPED >1 HR  4/19/2012    Procedure:ESOPHAGEAL IMPEDENCE FUNCTION TEST WITH 24 HOUR PH GREATER THAN 1 HOUR; Surgeon:VALDO UMANZOR; Location: GI     HERNIA REPAIR      umbilcal     HYSTERECTOMY, PAP NO LONGER INDICATED  1991     TONSILLECTOMY       UPPER GI ENDOSCOPY           PERTINENT MEDICATIONS:  Current Outpatient Medications   Medication Sig Dispense Refill     alpha-d-galactosidase (BEANO) tablet Take 1 tablet by mouth daily as needed for other (bloating)       amLODIPine (NORVASC) 2.5 MG tablet Take 1 tablet (2.5 mg) by mouth daily FOR BLOOD PRESSURE 90 tablet 1     amylase-lipase-protease (CREON) 9742-17314-50836 units CPEP TAKE TWO CAPSULES BY MOUTH THREE TIMES A DAY WITH MEALS FOR DIARRHEA (Patient taking differently: Take 1 capsule by mouth 2 times daily (with meals) TAKE TWO CAPSULES BY MOUTH THREE TIMES A DAY WITH MEALS FOR DIARRHEA) 180 capsule 5     ascorbic acid 1000 MG TABS tablet Take 1,000 mg by mouth daily       Biotin w/ Vitamins C & E (HAIR/SKIN/NAILS PO) Take 1 capsule by mouth daily       cetirizine (ZYRTEC) 10 MG tablet TAKE ONE TABLET BY MOUTH ONCE DAILY IN THE MORNING FOR ALLERGIES 90 tablet 3     cyanocobalamin (VITAMIN B-12) 100 MCG tablet TAKE TWO TABLETS BY MOUTH ONCE DAILY FOR B12 DEFICIENCY 60 tablet 11     donepezil (ARICEPT) 10 MG tablet TAKE ONE TABLET BY MOUTH EVERY NIGHT AT BEDTIME FOR MEMORY 90 tablet 3     DULoxetine (CYMBALTA) 30 MG capsule Take 1 capsule (30 mg) by mouth 2 times daily  60 capsule 11     gabapentin (NEURONTIN) 300 MG capsule Take 1 capsule (300 mg) by mouth 3 times daily (Patient taking differently: Take 300 mg by mouth 3 times daily Take 1 capsule by mouth twice daily (morning and evening). May take an additional 1 capsule if needed in the afternoon. Maximum of 3 capsules per day.) 180 capsule 5     lactase (LACTAID) 9000 units TABS tablet Take 1 tablet (9,000 Units) by mouth 3 times daily (with meals) For lactose intolerance, when eating milk-related foods. 90 tablet 11     polyethylene glycol (MIRALAX) 17 GM/Dose powder Take 17 g (1 capful) by mouth daily Start with 1/2 of the capful and take it in the morning for constipation, every day. Increase the dose to one capful a day if no bowel movement for 2 or more days. 507 g 1     psyllium (METAMUCIL/KONSYL) 58.6 % powder Take 6 g (1 teaspoonful) by mouth daily Take in the evening 283 g 3     traZODone (DESYREL) 100 MG tablet TAKE ONE TABLET BY MOUTH EVERY NIGHT AT BEDTIME FOR SLEEP 30 tablet 11     calcium carb-cholecalciferol (OS-LARS) 500-200 MG-UNIT tablet Take 1 tablet by mouth 2 times daily (with meals) FOR BONE HEALTH 180 tablet 3     omeprazole (PRILOSEC) 10 MG DR capsule TAKE 1 CAPSULE (10 MG) BY MOUTH DAILY FOR HEARTBURN 30 capsule 3     vitamin D3 (CHOLECALCIFEROL) 10 MCG (400 UNIT) capsule Take 1 capsule (400 Units) by mouth daily FOR BONE HEALTH 90 capsule 3     No other OTC/herbal/supplements reported by patient.    SOCIAL HISTORY:  Social History     Socioeconomic History     Marital status:      Spouse name: Not on file     Number of children: Not on file     Years of education: Not on file     Highest education level: Not on file   Occupational History     Not on file   Tobacco Use     Smoking status: Former     Packs/day: 0.50     Years: 3.00     Pack years: 1.50     Types: Cigarettes     Smokeless tobacco: Never     Tobacco comments:     smoked for 3 years when she was around 19   Substance and Sexual  Activity     Alcohol use: Yes     Comment: RARELY     Drug use: No     Sexual activity: Not Currently   Other Topics Concern     Parent/sibling w/ CABG, MI or angioplasty before 65F 55M? Yes     Comment: mother- valve problem   Social History Narrative     Not on file     Social Determinants of Health     Financial Resource Strain: Not on file   Food Insecurity: Not on file   Transportation Needs: Not on file   Physical Activity: Not on file   Stress: Not on file   Social Connections: Not on file   Intimate Partner Violence: Not on file   Housing Stability: Not on file       FAMILY HISTORY:  Denies colon/panc/esophageal/other GI CA, no other [place] or other HPS-related Barbara. No IBD/celiac, no other AI/liver/thyroid disease.    Family History   Problem Relation Age of Onset     Heart Disease Mother         valve problem     Hypertension Mother      Diabetes Mother         type 2     Cerebrovascular Disease Mother      Allergies Mother      Eye Disorder Mother         Macular degeneration     Osteoporosis Mother      Respiratory Mother      Migraines Mother      Cardiovascular Father         MI at age 80     Cancer - colorectal Father 87     Diabetes Father      Hypertension Father         type 2     Eye Disorder Father         macular degeneration     Lipids Father      C.A.D. Maternal Grandmother      Diabetes Paternal Grandmother         type 2     Cardiovascular Paternal Grandmother         MI     Diabetes Sister         type 2     Allergies Sister      Gastrointestinal Disease Sister      Depression Sister      Gastrointestinal Disease Daughter      Migraines Daughter      Gastrointestinal Disease Daughter      Migraines Daughter      Migraines Son      Aneurysm No family hx of      Brain Hemorrhage No family hx of      Brain Tumor No family hx of      Cancer No family hx of      Chiari Malformation No family hx of      LUNG DISEASE No family hx of      Seizure Disorder No family hx of        PHYSICAL  "EXAMINATION:  Vitals reviewed  /76 (BP Location: Right arm, Patient Position: Sitting, Cuff Size: Adult Regular)   Ht 1.54 m (5' 0.63\")   Wt 46.7 kg (103 lb)   LMP  (LMP Unknown)   BMI 19.70 kg/m      General: Patient appears well in no acute distress. Orientated to self and place. Patient's  provided majority of history.  Skin: No visualized rash or lesions on visualized skin  Eyes: EOMI, no erythema, sclera icterus or discharge noted  Resp: breathing comfortably without accessory muscle usage, speaking in full sentences, no cough  Lung sounds clear  Card: Regular and rhythmic S1 and S2. No murmur, gallop, or rub  Abdomen: Active bowel sounds X 4 quadrants. Soft to palpation, no guarding or rebound tenderness   MSK: Appears to have normal range of motion based on visualized movements  Neurologic: No apparent tremors, facial movements symmetric      PERTINENT STUDIES Reviewed in EMR    ASSESSMENT/PLAN:  77 year old female  presented  to GI clinic with her . Initially, was referred for management of ongoing episodes of loose stools and bloating. They saw the [facility] provider, who addressed incorrect administration of Creon (was given as needed for diarrhea). Since the patient started receiving the medication twice a day, her symptoms had improved. Although, she still takes it incorrectly- one capsule twice a day instead of 2 capsules three times a day. Education provided. Patient's  stated, \"we don't take any pills at lunch\", and asked for twice a day dosing. Recommended to take 2 capsules twice a day.    Reviewed the patient's last EGD in 2018 and x-ray esophagram in 2019, suggestive for moderate to large size hiatal hernia. Normal swallowing evaluation by speech therapist in 2018. We briefly talked about symptoms consistent with hiatal hernia and benefits of a PPI therapy. Patient's  is convinced that she does not need omeprazole at this time. We talked about indication for hiatal " hernia surgery and I explained risks of the surgery. Conservative management was recommended.  Patient had normal colonoscopy in 2017. Family history significant for colon cancer in father. I explained that due to advanced age, we typically do not perform surveillance colonoscopy every 5 years, particularly if her last study was normal. May consider colonoscopy if develops hematochezia, or if her bloating/diarrhea/constipation persist despite taking the prescribed medications correctly.  Noted that for some time, the patient was on yearly MRCP surveillance schedule for enlarged pancreatic duct. It was discontinued in 2015, when she had normal MRCP.    We discussed bloating management. Suggested to use OTC anti-gas medications on a regular basis. Avoid dairy or take Lactaid before consuming dairy.   Ordered TTG. Advised the patient and her  to have at least 3 gram of gluten a day for 2 weeks before testing. Reportably, the patient has been consuming both- gluten containing and gluten free products.  Suspect that the patient suffers IBS, mixed type, or overflow diarrhea. Suggested to start Miralax and take it every morning. Resume fiber supplement in the evening.      ICD-10-CM    1. Chronic diarrhea  K52.9 Tissue transglutaminase robert IgA and IgG     CBC with platelets and differential     Vitamin B12     polyethylene glycol (MIRALAX) 17 GM/Dose powder     psyllium (METAMUCIL/KONSYL) 58.6 % powder      2. Lactose intolerance  E73.9 CBC with platelets and differential     polyethylene glycol (MIRALAX) 17 GM/Dose powder     psyllium (METAMUCIL/KONSYL) 58.6 % powder      3. Abdominal bloating  R14.0           Patient verbalized understanding and appreciation of care provided. Stated that all of the questions were answered to her/his satisfaction.    Additional 17 minutes on the date of service was spent performing the following:   -Preparing to see the patient (eg, review of tests,providers progress notes,  medical/surgical history,etc)   -Ordering medications, tests, or procedures   -Documenting clinical information in the electronic or other health record     RTC in 12 weeks.     Thank you for this consultation. It was a pleasure to participate in the care of this patient; please contact us with any further questions.    MARVIN Mauro, BRADYP-C  [facility]  Gastroenterology Department  [place], [place]    This note was created with Dragon voice recognition software, and while reviewed for accuracy, inadvertent minor typographic errors may occur. Please contact the provider if you have any questions.

## 2023-02-20 ENCOUNTER — OFFICE VISIT (OUTPATIENT)
Dept: FAMILY MEDICINE | Facility: CLINIC | Age: 78
End: 2023-02-20
Payer: COMMERCIAL

## 2023-02-20 VITALS
TEMPERATURE: 97.6 F | WEIGHT: 102 LBS | OXYGEN SATURATION: 99 % | HEART RATE: 67 BPM | SYSTOLIC BLOOD PRESSURE: 122 MMHG | DIASTOLIC BLOOD PRESSURE: 80 MMHG | BODY MASS INDEX: 19.26 KG/M2 | HEIGHT: 61 IN | RESPIRATION RATE: 18 BRPM

## 2023-02-20 DIAGNOSIS — K52.9 CHRONIC DIARRHEA: ICD-10-CM

## 2023-02-20 DIAGNOSIS — R26.89 BALANCE PROBLEMS: Primary | ICD-10-CM

## 2023-02-20 DIAGNOSIS — L98.9 BENIGN SKIN LESION OF MULTIPLE SITES: ICD-10-CM

## 2023-02-20 PROCEDURE — 99213 OFFICE O/P EST LOW 20 MIN: CPT | Performed by: NURSE PRACTITIONER

## 2023-02-20 ASSESSMENT — PAIN SCALES - GENERAL: PAINLEVEL: NO PAIN (0)

## 2023-02-20 NOTE — PROGRESS NOTES
Assessment & Plan     Balance problems  Ongoing, chronic balance problems.  Patient has done course of physical therapy, has balance forward and bands at home.  Has not been working on these as of recent.  Patient also does have some clutter in home which makes patient at increased risk of falls as well.  Recommend clearing out clutter, out of the way of walking has and working on balance with bands and balance board.  If patient and/or  feel another course of physical therapy will benefit, advised to notify provider.    Chronic diarrhea/digestive concerns  Chronic digestion issues.  Following with gastroenterology.  Taking Creon 3 times daily.  New prescription sent to pharmacy.  No changes.  - lipase-protease-amylase (CREON) [id number] units CPEP; TAKE TWO CAPSULES BY MOUTH THREE TIMES A DAY WITH MEALS FOR DIGESTION    Benign skin lesion of multiple sites  Benign, multiple lesions.  Dermatology referral placed in September 2022.  Referral still good, reprinted for patient  to call and schedule appointment for a skin check.               See Patient Instructions    No follow-ups on file.    Soha Lino, DNP, APRN-CNP   [facility]    Heena Quintero is a 77 year old accompanied by her spouse, presenting for the following health issues:  No chief complaint on file.      HPI     Patient would like to discuss her MRI results from 2/7/23. -Does not check MyChart messages, never received results.    She would to discuss getting gluten tolerance testing.  Just saw gastroenterology approximately 1 week ago.  Orders pending.  Had been on a gluten-free diet however at this time, was advised to eat normal gluten for a minimum of 2 weeks before lab draw.    She has some spots on her arms and legs that she would like checked.  - on abdomen   Has been seen for in the past, dermatology referral placed last fall.  No follow-up as of yet.    She has noticed that she has been  having some issues with her balance. She has been running into things a lot lately.  This has been chronic, has done a course of physical therapy in the past.  Working on clutter at home.  Has not had any recent falls.        Review of Systems   Constitutional, HEENT, cardiovascular, pulmonary, gi and gu systems are negative, except as otherwise noted.      Objective    LMP  (LMP Unknown)   There is no height or weight on file to calculate BMI.  Physical Exam   GENERAL APPEARANCE: healthy, alert and no distress  NECK: no adenopathy, no asymmetry, masses, or scars and thyroid normal to palpation  RESP: lungs clear to auscultation - no rales, rhonchi or wheezes  CV: regular rates and rhythm, normal S1 S2, no S3 or S4 and no murmur, click or rub  ABDOMEN: soft, nontender, without hepatosplenomegaly or masses and bowel sounds normal  MS: extremities normal- no gross deformities noted  SKIN: no suspicious lesions or rashes  NEURO: Normal strength and tone, mentation intact and speech normal  PSYCH: mentation appears normal and affect normal/bright    Diagnostic Test Results:  none    Reviewed MRI results and my chart message with patient has been            Chart documentation with Dragon Voice recognition Software. Although reviewed after completion, some words and grammatical errors may remain.

## 2023-02-20 NOTE — LETTER
My Asthma Action Plan    Name: Ingrid Amaral   YOB: 1945  Date: 2/20/2023   My doctor: MARVIN Tabares CNP   My clinic: [facility]        My Rescue Medicine:   Albuterol inhaler (Proair/Ventolin/Proventil HFA)  2-4 puffs EVERY 4 HOURS as needed. Use a spacer if recommended by your provider.   My Asthma Severity:   Intermittent / Exercise Induced  Know your asthma triggers: Patient is unaware of triggers             GREEN ZONE   Good Control    I feel good    No cough or wheeze    Can work, sleep and play without asthma symptoms       Take your asthma control medicine every day.     1. If exercise triggers your asthma, take your rescue medication    15 minutes before exercise or sports, and    During exercise if you have asthma symptoms  2. Spacer to use with inhaler: If you have a spacer, make sure to use it with your inhaler             YELLOW ZONE Getting Worse  I have ANY of these:    I do not feel good    Cough or wheeze    Chest feels tight    Wake up at night   1. Keep taking your Green Zone medications  2. Start taking your rescue medicine:    every 20 minutes for up to 1 hour. Then every 4 hours for 24-48 hours.  3. If you stay in the Yellow Zone for more than 12-24 hours, contact your doctor.  4. If you do not return to the Green Zone in 12-24 hours or you get worse, start taking your oral steroid medicine if prescribed by your provider.           RED ZONE Medical Alert - Get Help  I have ANY of these:    I feel awful    Medicine is not helping    Breathing getting harder    Trouble walking or talking    Nose opens wide to breathe       1. Take your rescue medicine NOW  2. If your provider has prescribed an oral steroid medicine, start taking it NOW  3. Call your doctor NOW  4. If you are still in the Red Zone after 20 minutes and you have not reached your doctor:    Take your rescue medicine again and    Call 911 or go to the emergency room right  away    See your regular doctor within 2 weeks of an Emergency Room or Urgent Care visit for follow-up treatment.          Annual Reminders:  Meet with Asthma Educator,  Flu Shot in the Fall, consider Pneumonia Vaccination for patients with asthma (aged 19 and older).    [place]    [facility] [place] [address] [facility] [place], [place] - [address] [place] [address] MEDICAL [facility]QUIPMENT - [place], [place]  WRITTEN PRESCRIPTION REQUESTED    Electronically signed by MARVIN Tabares CNP   Date: 02/20/23                    Asthma Triggers  How To Control Things That Make Your Asthma Worse    Triggers are things that make your asthma worse.  Look at the list below to help you find your triggers and   what you can do about them. You can help prevent asthma flare-ups by staying away from your triggers.      Trigger                                                          What you can do   Cigarette Smoke  Tobacco smoke can make asthma worse. Do not allow smoking in your home, car or around you.  Be sure no one smokes at a child s day care or school.  If you smoke, ask your health care provider for ways to help you quit.  Ask family members to quit too.  Ask your health care provider for a referral to Quit Plan to help you quit smoking, or call 5-939-826-PLAN.     Colds, Flu, Bronchitis  These are common triggers of asthma. Wash your hands often.  Don t touch your eyes, nose or mouth.  Get a flu shot every year.     Dust Mites  These are tiny bugs that live in cloth or carpet. They are too small to see. Wash sheets and blankets in hot water every week.   Encase pillows and mattress in dust mite proof covers.  Avoid having carpet if you can. If you have carpet, vacuum weekly.   Use a dust mask and HEPA vacuum.   Pollen and Outdoor Mold  Some people are allergic to trees, grass, or weed pollen, or molds. Try to keep your windows closed.  Limit time out doors when  pollen count is high.   Ask you health care provider about taking medicine during allergy season.     Animal Dander  Some people are allergic to skin flakes, urine or saliva from pets with fur or feathers. Keep pets with fur or feathers out of your home.    If you can t keep the pet outdoors, then keep the pet out of your bedroom.  Keep the bedroom door closed.  Keep pets off cloth furniture and away from stuffed toys.     Mice, Rats, and Cockroaches  Some people are allergic to the waste from these pests.   Cover food and garbage.  Clean up spills and food crumbs.  Store grease in the refrigerator.   Keep food out of the bedroom.   Indoor Mold  This can be a trigger if your home has high moisture. Fix leaking faucets, pipes, or other sources of water.   Clean moldy surfaces.  Dehumidify basement if it is damp and smelly.   Smoke, Strong Odors, and Sprays  These can reduce air quality. Stay away from strong odors and sprays, such as perfume, powder, hair spray, paints, smoke incense, paint, cleaning products, candles and new carpet.   Exercise or Sports  Some people with asthma have this trigger. Be active!  Ask your doctor about taking medicine before sports or exercise to prevent symptoms.    Warm up for 5-10 minutes before and after sports or exercise.     Other Triggers of Asthma  Cold air:  Cover your nose and mouth with a scarf.  Sometimes laughing or crying can be a trigger.  Some medicines and food can trigger asthma.

## 2023-02-20 NOTE — PATIENT INSTRUCTIONS
Balance problems  Ongoing, chronic balance problems.  Patient has done course of physical therapy, has balance forward and bands at home.  Has not been working on these as of recent.  Patient also does have some clutter in home which makes patient at increased risk of falls as well.  Recommend clearing out clutter, out of the way of walking has and working on balance with bands and balance board.  If patient and/or  feel another course of physical therapy will benefit, advised to notify provider.    Chronic diarrhea/digestive concerns  Chronic digestion issues.  Following with gastroenterology.  Taking Creon 3 times daily.  New prescription sent to pharmacy.  No changes.  - lipase-protease-amylase (CREON) [id number] units CPEP; TAKE TWO CAPSULES BY MOUTH THREE TIMES A DAY WITH MEALS FOR DIGESTION    Benign skin lesion of multiple sites  Benign, multiple lesions.  Dermatology referral placed in September 2022.  Referral still good, reprinted for patient  to call and schedule appointment for a skin check.    Schedule lab only appointment on the way out today for approximately 1 week.    MRI results -   MyChart message from neurosurgery:    Dr Ewing reviewed your MRI from yesterday. It is stable, no changes. He recommends you getting another one in a year. I will order that and have someone call you closer to that time to get that scheduled.      If anything should come up in the meantime, please call the clinic.

## 2023-02-26 DIAGNOSIS — I10 HYPERTENSION GOAL BP (BLOOD PRESSURE) < 140/90: ICD-10-CM

## 2023-02-27 ENCOUNTER — LAB (OUTPATIENT)
Dept: LAB | Facility: CLINIC | Age: 78
End: 2023-02-27
Payer: COMMERCIAL

## 2023-02-27 DIAGNOSIS — Z11.59 NEED FOR HEPATITIS C SCREENING TEST: Primary | ICD-10-CM

## 2023-02-27 DIAGNOSIS — E73.9 LACTOSE INTOLERANCE: ICD-10-CM

## 2023-02-27 DIAGNOSIS — K52.9 CHRONIC DIARRHEA: ICD-10-CM

## 2023-02-27 LAB
BASOPHILS # BLD AUTO: 0.1 10E3/UL (ref 0–0.2)
BASOPHILS NFR BLD AUTO: 1 %
EOSINOPHIL # BLD AUTO: 0.3 10E3/UL (ref 0–0.7)
EOSINOPHIL NFR BLD AUTO: 4 %
ERYTHROCYTE [DISTWIDTH] IN BLOOD BY AUTOMATED COUNT: 13.4 % (ref 10–15)
HCT VFR BLD AUTO: 35.9 % (ref 35–47)
HGB BLD-MCNC: 11.6 G/DL (ref 11.7–15.7)
IMM GRANULOCYTES # BLD: 0 10E3/UL
IMM GRANULOCYTES NFR BLD: 0 %
LYMPHOCYTES # BLD AUTO: 1.1 10E3/UL (ref 0.8–5.3)
LYMPHOCYTES NFR BLD AUTO: 15 %
MCH RBC QN AUTO: 30.6 PG (ref 26.5–33)
MCHC RBC AUTO-ENTMCNC: 32.3 G/DL (ref 31.5–36.5)
MCV RBC AUTO: 95 FL (ref 78–100)
MONOCYTES # BLD AUTO: 0.6 10E3/UL (ref 0–1.3)
MONOCYTES NFR BLD AUTO: 9 %
NEUTROPHILS # BLD AUTO: 5.1 10E3/UL (ref 1.6–8.3)
NEUTROPHILS NFR BLD AUTO: 71 %
PLATELET # BLD AUTO: 280 10E3/UL (ref 150–450)
RBC # BLD AUTO: 3.79 10E6/UL (ref 3.8–5.2)
VIT B12 SERPL-MCNC: 1054 PG/ML (ref 232–1245)
WBC # BLD AUTO: 7.1 10E3/UL (ref 4–11)

## 2023-02-27 PROCEDURE — 86364 TISS TRNSGLTMNASE EA IG CLAS: CPT

## 2023-02-27 PROCEDURE — 86803 HEPATITIS C AB TEST: CPT

## 2023-02-27 PROCEDURE — 82607 VITAMIN B-12: CPT

## 2023-02-27 PROCEDURE — 85025 COMPLETE CBC W/AUTO DIFF WBC: CPT

## 2023-02-27 PROCEDURE — 36415 COLL VENOUS BLD VENIPUNCTURE: CPT

## 2023-02-27 RX ORDER — AMLODIPINE BESYLATE 2.5 MG/1
2.5 TABLET ORAL DAILY
Qty: 90 TABLET | Refills: 3 | Status: SHIPPED | OUTPATIENT
Start: 2023-02-27 | End: 2024-03-29

## 2023-02-28 LAB
HCV AB SERPL QL IA: NONREACTIVE
TTG IGA SER-ACNC: <0.2 U/ML
TTG IGG SER-ACNC: <0.6 U/ML

## 2023-05-01 NOTE — LETTER
April 1, 2021      Ingridvilla Amaral  [address] [id number]        Dear ,    We are writing to inform you of your test results.    MRI shows small increase in size of meningioma.  I would do nothing different at this time.     Resulted Orders   MR Brain w/o & w Contrast    Narrative    MRI BRAIN WITHOUT AND WITH CONTRAST  3/30/2021 2:09 PM    HISTORY: Brain mass or lesion. Recheck meningioma. Meningioma (H).    TECHNIQUE: Multiplanar, multisequence MRI of the brain without and  with 5ml Gadavist.    COMPARISON: Brain MRI 11/27/2019, 11/6/2015    FINDINGS: Dural-based enhancing masses at the junction of the  bilateral temporal and occipital lobes have slightly increased in size  compared to the prior exam. Lesion on the right measures up to 12 mm,  previously 11 mm. Lesion on the left measures up to 6 mm, not  significantly changed. A more conspicuous increase in size of the  right-sided mass is appreciated compared to 2015. Mild mass effect on  the adjacent parenchyma without significant parenchymal edema. No new  lesions are identified.    Mild volume loss is present with scattered frontoparietal predominant  white matter T2 intensities which like represent mild chronic small  ischemic change. Incidental cavum septum pellucidum. No evidence of  recent ischemia, hemorrhage, or hydrocephalus. Major intracranial flow  voids are maintained.    Trace opacification of the right petrous apex and left mastoid cavity,  likely inflammatory. Mild paranasal sinus mucosal thickening. Marrow  signal is within normal limits.      Impression    IMPRESSION:    1. Two dural-based enhancing masses at the bilateral temporooccipital  junctions consistent with meningiomas. Slight increase in size of the  right-sided mass compared to the prior exam with a more conspicuous  increase in size compared to 2015.  2. Volume loss and white matter T2 hyperintensities which likely  represent chronic small vessel  Recent Visits  Date Type Provider Dept   03/15/23 Office Visit HARRISON Seaman CNP [facility]   01/18/23 Office Visit HARRISON Seaman CNP [facility]   11/16/22 Office Visit HARRISON Seaman CNP [facility]   09/14/22 Office Visit HARRISON Seaman CNP [facility]   08/15/22 Office Visit Tha Larsen MD [facility]   07/25/22 Office Visit HARRISON Seaman CNP [facility]   06/27/22 Office Visit HARRISON Seaman CNP [facility]   06/13/22 Office Visit HARRISON Seaman CNP [facility]   03/09/22 Office Visit HARRISON Seaman CNP [facility]   01/21/22 Office Visit HARRISON Seaman CNP [facility]   Showing recent visits within past 540 days with a meds authorizing provider and meeting all other requirements  Future Appointments  Date Type Provider Dept   06/14/23 Appointment HARRISON Seaman CNP [facility]   Showing future appointments within next 150 days with a meds authorizing provider and meeting all other requirements     3/15/2023 ischemic change.    TIA SCHULTZ MD       If you have any questions or concerns, please call the clinic at the number listed above.       Sincerely,      Leesa Muñiz PA-C

## 2023-05-16 NOTE — PROGRESS NOTES
GASTROENTEROLOGY RETURN PATIENT TELEPHONE VISIT      How would you like to obtain your AVS? Mail a copy  If the telephone visit is dropped, what is a good phone number to reach you at? Text to cell phone:   Will anyone else be joining your telephone visit? Yes: . How would they like to receive their invitation? Other e-mail: ...    Telephone-Visit Details    Type of service:  Telephone Visit    Telephone Call length: 16 min    Originating Location (pt. Location): Home        Distant Location (provider location):  Off-site    Mode of Communication:  Telephone Call    Physician has received verbal consent for a Video Visit from the patient? Yes    Fiona More cma.....5/16/2023 at 3:48 PM        GASTROENTEROLOGY FOLLOW UP TELEPHONE VISIT    CC/REFERRING MD:    Soha Lino    REASON FOR CONSULTATION:   Referred by Sheryl Marie for Follow Up (Chronic diarrhea)    HISTORY OF PRESENT ILLNESS:  Ingrid Amaral is 77 year old female who presents for a follow up. PMH of Lewy body dementia, asthma, HTN, hiatal hernia, exocrine pancreatic insufficiency, and GERD. She is here with her  , who helps the patient to answer questions and clarifies medications use.  She was initially seen for ongoing episodes of loose stools and bloating. Had some issues with taking Creon incorrectly. Now, the  stated that he gives the prescribed dose 3 times a day, before meals. The patient's diarrhea had improved, but still has occasional loose stools. History of lactose intolerance, admits to some non-compliance with lactose free diet.  She started taking fiber supplement every day, before bed, and this helps. Reported that her bloating is better, but still an issue. Bloating Improves with Gas-x, taken as needed.  Worse when she gets anxious.  stated that the patient sleeps well throughout the night, but has frequent restless episodes during the day. Asking for an antianxiety medication to  take as needed.  Complains of rectal/anal discomfort. Still seeing small amount of blood on wipes at times. Patient is aware that she has hemorrhoids.     Previous studies:  Negative recent celiac serology.  Had last EGD in 2018 and x-ray esophagram in 2019, suggestive for moderate to large size hiatal hernia. Normal swallowing evaluation by speech therapist in 2018. Patient had normal colonoscopy in 2017. Family history significant for colon cancer in father . Noted that for some time, the patient was on yearly MRCP surveillance schedule for enlarged pancreatic duct. It was discontinued in 2015, when she had normal MRCP.    PMHx, PSHx, Social Hx, Family Hx have been reviewed.     Physical Exam   healthy, alert and no distress  PSYCH: Alert and oriented times 3; coherent speech, normal   rate and volume, able to articulate some thoughts, no tangential thoughts, no hallucinations   or delusions, her affect is pleasant  RESP: No cough, no audible wheezing, able to talk in full sentences  Remainder of exam unable to be completed due to telephone visits    ASSESSMENT/PLAN:  Ingrid Amaral is a 77 year old female who presents for a follow up on persistent diarrhea, abdominal bloating, and anal discomfort. Reported improvement in her compliance with Creon and fiber supplement. Noted that her stools are more soft and formed. No longer has nocturnal incontinence episodes. Trying to limit dairy intake and uses Lactaid tablets as needed for lactose intolerance. Negative screening for celiac disease.  Recommended the following:  - Continue current medications without changes.  - Prescription for hydrocortisone rectal suppository X 14 days. Explained correct suppository insertion.   - Suggested to discuss with her PCP or psychiatrist management of restlessness. Anxiety episodes. In a meantime, small dose of hydroxyzine provided to take as needed. This could have a positive effect on her bloating as well.  -Patient and her   are aware that she has a large hiatal hernia, but they don't believe in benefits of PPI therapy at this time. Patient has rare acid reflux events. Will take TUMs if needed.      ICD-10-CM    1. Chronic diarrhea  K52.9 hydrocortisone acetate 30 MG SUPP      2. External hemorrhoids  K64.4 hydrocortisone acetate 30 MG SUPP      3. Anxiety with restlessness  F41.9 hydrOXYzine (ATARAX) 10 MG tablet    R45.1       4. Abdominal bloating  R14.0 hydrOXYzine (ATARAX) 10 MG tablet          Follow up in 6 months  Thank you for this consultation.  It was a pleasure to participate in the care of this patient; please contact us with any further questions.    This note was created with Dragon voice recognition software, and while reviewed for accuracy, inadvertent minor typographic errors may remain. Please contact the provider if you have any questions.    MIKE Mauro  [facility]  Gastroenterology  [place], [place]

## 2023-05-17 ENCOUNTER — TELEPHONE (OUTPATIENT)
Dept: GASTROENTEROLOGY | Facility: CLINIC | Age: 78
End: 2023-05-17

## 2023-05-17 ENCOUNTER — VIRTUAL VISIT (OUTPATIENT)
Dept: GASTROENTEROLOGY | Facility: CLINIC | Age: 78
End: 2023-05-17
Attending: NURSE PRACTITIONER
Payer: COMMERCIAL

## 2023-05-17 DIAGNOSIS — R14.0 ABDOMINAL BLOATING: ICD-10-CM

## 2023-05-17 DIAGNOSIS — K64.4 EXTERNAL HEMORRHOIDS: ICD-10-CM

## 2023-05-17 DIAGNOSIS — K52.9 CHRONIC DIARRHEA: Primary | ICD-10-CM

## 2023-05-17 DIAGNOSIS — R45.1 ANXIETY WITH RESTLESSNESS: ICD-10-CM

## 2023-05-17 DIAGNOSIS — K64.4 EXTERNAL HEMORRHOIDS: Primary | ICD-10-CM

## 2023-05-17 DIAGNOSIS — F41.9 ANXIETY WITH RESTLESSNESS: ICD-10-CM

## 2023-05-17 PROCEDURE — 99214 OFFICE O/P EST MOD 30 MIN: CPT | Mod: 93 | Performed by: NURSE PRACTITIONER

## 2023-05-17 RX ORDER — HYDROCORTISONE ACETATE SUPPOSITORY 30 MG/1
30 SUPPOSITORY RECTAL AT BEDTIME
Qty: 14 SUPPOSITORY | Refills: 1 | Status: SHIPPED | OUTPATIENT
Start: 2023-05-17 | End: 2023-05-31

## 2023-05-17 RX ORDER — HYDROXYZINE HYDROCHLORIDE 10 MG/1
10 TABLET, FILM COATED ORAL 2 TIMES DAILY PRN
Qty: 30 TABLET | Refills: 0 | Status: SHIPPED | OUTPATIENT
Start: 2023-05-17

## 2023-05-17 NOTE — PATIENT INSTRUCTIONS
It was a pleasure taking care of you today.  I've included a brief summary of our discussion and care plan from today's visit below.  Please review this information with your primary care provider.  ______________________________________________________________________    My recommendations are summarized as follows:    Continue taking Creon pill before every meal, as prescribed.    2. I ordered hydroxyzine tablet that could help both- anxiety/restlessness and abdominal bloating. Take it as needed only. May cause drowsiness and constipation.    3. Continue fiber supplement every night. Limit dairy or use Lactaid tablets before consuming dairy.    4. I placed an order for rectal suppositories to use every night X 2 weeks for hemorrhoids and rectal discomfort. Please remember to insert rounded end first (not the pointed end!).    Return to GI Clinic in 6 months to review your progress.    ______________________________________________________________________    BLOATING AND GAS  Some people feel that they pass too much gas or burp too frequently, both of which can be a source of embarrassment and discomfort. The average adult produces about one to three pints of gas each day, which is passed through the anus 14 to 23 times per day. Burping occasionally before or after meals is also normal.  The amount of gas produced by the body depends upon your diet and other individual factors. However, most people who complain of excessive gas do not produce more gas than the average person. Instead, they are more aware of normal amounts of gas. On the other hand, certain foods and medical conditions can cause you to make excessive amounts of gas.    There are two primary sources of intestinal gas: gas that is ingested (mostly swallowed air) and gas that is produced by bacteria in the colon.   Air swallowing is the major source of gas in the stomach. It is normal to swallow a small amount of air when eating and drinking and when  swallowing saliva. You may swallow larger amounts of air when eating food rapidly, gulping liquids, chewing gum, or smoking.     Bacterial production -- The colon normally provides a home for billions of harmless bacteria, some of which support the health of the bowel. Certain carbohydrates are incompletely digested by enzymes in the stomach and intestines, allowing bacteria to digest them. For example, cabbage, [place] sprouts, and broccoli contain raffinose, a carbohydrate that is poorly digested. These foods tend to cause more gas and flatulence because the raffinose is digested by bacteria once it reaches the colon. The by-products of this process include odorless gases, such as carbon dioxide, hydrogen, and methane. Minor components of gas have an unpleasant odor, including trace amounts of sulfur.  Some people are not able to digest certain carbohydrates. A classic example is lactose, the major sugar contained in dairy products . Thus, consuming large amounts of lactose may lead to increased gas production, along with cramping and diarrhea.  Starch and soluble fiber can also contribute increase gas. Potatoes, corn, noodles, and wheat produce gas, while rice does not. Soluble fiber (found in oat bran, peas and other legumes, beans, and most fruit) also causes gas. Some laxatives contain soluble fiber and may cause gas, particularly during the first few weeks of use.   Certain diseases can also cause excessive bloating and gas. For example, people with diabetes or scleroderma may, over time, have slowing in the activity of the small intestine. This can lead to bacterial overgrowth within the bowel, with poor digestion of carbohydrates and other nutrients. However, even in the absence of apparent disease, some people tend to harbor large numbers of bacteria in their small bowel and are prone to develop excessive gas.   Most people with gas and bloating do not need to have any testing. However, symptoms such as  "diarrhea, weight loss, abdominal pain, anemia, blood in the stool, lack of appetite, fever, or vomiting can be warning signs of a more serious problem; people with one or more of these symptoms usually require testing.     Several measures can help to reduce bothersome gas.   Chronic, repeated belching can occur if you swallow large amounts of air (ie, aerophagia). Aerophagia is typically an unconscious process, and is often associated with emotional stress. Treatment focuses on decreasing air swallowing by reducing anxiety, when it is considered to be a cause, as well as on eating slowly without gulping and avoiding carbonated beverages, chewing gum, and smoking.   Diet recommendations --   Certain foods contain specific carbohydrates called \"FODMAPs\" (fermentable oligo-, di-, and monosaccharides and polyols). FODMAPs are poorly absorbed and can result in bloating and gas production in some people. For more information and a list of foods, please check the https://[employer] website      Avoid foods that appear to aggravate your symptoms. These may include milk and dairy products, certain fruits or vegetables, whole grains, artificial sweeteners, and/or carbonated beverages.If you are lactose intolerant, do not consume products that contain lactose;  you can use a lactose-digestive aid such as lactose-reduced milk or over-the-counter lactase supplement (eg, Lactaid tablets or liquid).    Over-the-counter medications -- Try an over-the-counter product that contains Simethicone, such as certain antacids (eg, Maalox Anti-Gas, Mylanta Gas, Gas-X, Phazyme). Also, you can try an over-the-counter product that contains activated charcoal (eg, CharcoCaps, CharcoAid) or Beano, which is an over-the-counter preparation that helps to breakdown certain complex carbohydrates. This treatment may be effective in reducing gas after eating beans or other vegetables that contain raffinose. Another option is  Pepto-Bismol to " reduce the odor of unpleasant-smelling gas.       ______________________________________________________________________    Who do I call with any questions after my visit?  Please be in touch if there are any further questions that arise following today's visit.  There are multiple ways to contact your gastroenterology care team.      During business hours, you may reach a Gastroenterology nurse at [phone], option 3.     To schedule or reschedule an appointment, please call [phone].   To schedule your imaging studies (CT, MRI, ultrasound)  call [phone] (or toll-free # [phone])  To schedule your lab work at [facility], please call [phone]    You can always send a secure message through bookjam.  bookjam messages are answered by your nurse or doctor typically within 24 hours.  Please allow extra time on weekends and holidays.      For urgent/emergent questions after business hours, you may reach the on-call GI Fellow by contacting the [facility]  at [phone].    In order for your refill to be processed in a timely fashion, it is your responsibility to ensure you follow the recommendations from your provider regarding your laboratory studies and follow up appointments.       How will I get the results of any tests ordered?    You will receive all of your results.  If you have signed up for bookjam, any tests ordered at your visit will be available to you after your physician reviews them.  Typically this takes 1-2 weeks.  If there are urgent results that require a change in your care plan, your physician or nurse will call you to discuss the next steps.   What is bookjam?  bookjam is a secure way for you to access all of your healthcare records from the [facility].  It is a web based computer program, so you can sign on to it from any location.  It also allows you to send secure messages to your care team.  I recommend signing up  for [employer] access if you have not already done so and are comfortable with using a computer.    How to I schedule a follow-up visit?  If you did not schedule a follow-up visit today, please call [phone] to schedule a follow-up office visit.      Sincerely,  MIKE Mauro M [facility],  Division of Gastroenterology   ([facility])

## 2023-05-17 NOTE — TELEPHONE ENCOUNTER
Hydrocortisone suppositories are not covered by [employer] insurance, the discount card copay is $187.14. Can we try hydrocortisone rectal cream?        Thank You,  Almaz Lindo [facility]

## 2023-05-18 RX ORDER — HYDROCORTISONE 25 MG/G
CREAM TOPICAL AT BEDTIME
Qty: 30 G | Refills: 1 | Status: SHIPPED | OUTPATIENT
Start: 2023-05-18 | End: 2023-06-01

## 2023-05-18 NOTE — TELEPHONE ENCOUNTER
Sheryl Marie, APRN CNP  You 10 hours ago (10:45 PM)     CARYN Santos,   Please communicate verbal order  to patient's pharmacy for hydrocortisone rectal cream to use at HS X 14 days with one refill. Place an order in Epic if needed.   Thank you,   Sheryl Marie, CNP

## 2023-05-23 ENCOUNTER — PATIENT OUTREACH (OUTPATIENT)
Dept: CARE COORDINATION | Facility: CLINIC | Age: 78
End: 2023-05-23
Payer: COMMERCIAL

## 2023-08-17 ENCOUNTER — OFFICE VISIT (OUTPATIENT)
Dept: URGENT CARE | Facility: URGENT CARE | Age: 78
End: 2023-08-17
Payer: COMMERCIAL

## 2023-08-17 VITALS
BODY MASS INDEX: 19.89 KG/M2 | SYSTOLIC BLOOD PRESSURE: 132 MMHG | DIASTOLIC BLOOD PRESSURE: 81 MMHG | RESPIRATION RATE: 16 BRPM | HEART RATE: 64 BPM | WEIGHT: 104 LBS | TEMPERATURE: 98.5 F | OXYGEN SATURATION: 98 %

## 2023-08-17 DIAGNOSIS — H10.13 ALLERGIC CONJUNCTIVITIS OF BOTH EYES: Primary | ICD-10-CM

## 2023-08-17 PROCEDURE — 99213 OFFICE O/P EST LOW 20 MIN: CPT

## 2023-08-17 RX ORDER — METHYLPREDNISOLONE 4 MG
TABLET, DOSE PACK ORAL
Qty: 21 TABLET | Refills: 0 | Status: SHIPPED | OUTPATIENT
Start: 2023-08-17 | End: 2023-10-25

## 2023-08-17 RX ORDER — DIPHENHYDRAMINE HCL 50 MG
50 CAPSULE ORAL
Qty: 30 CAPSULE | Refills: 0 | Status: SHIPPED | OUTPATIENT
Start: 2023-08-17 | End: 2023-08-20

## 2023-08-17 NOTE — PROGRESS NOTES
URGENT CARE  Assessment & Plan   Assessment:   Ingrid Amaral is a 77 year old female who's clinical presentation today is consistent with:   1. Allergic conjunctivitis of both eyes  - diphenhydrAMINE (BENADRYL) 50 MG capsule;   - methylPREDNISolone (MEDROL DOSEPAK) 4 MG tablet therapy pack;     Plan:  Will treat patient's allergic eye puffiness today with first generation antihistamines such as diphenhydramine and a second-generation agents such as: cetirizine.   Additionally will prescribe patient a glucocorticosteroids as a skin taper, encouraged patient to continue with antihistamine for 1-2 weeks.  Educated patient to monitor for skin triggers that may affect urticaria and to follow up with an allergy specialist if further allergen testing is desired   Additionally we discussed if symptoms do not improve after starting today's treatment (or if symptoms worsen) to follow up in 7-10 days.  No alarm signs or symptoms present   Differential Diagnoses for this patient's chief complaint that I considered include:  Viral exanthem, strep pharyngitis, Viral URI, dermatitis allergic, Erythema infectiosum, Rubella, rubeola, Scarlet Fever, Enteroviral infection, Drug reaction     Patient is} agreeable to treatment plan and state they will follow-up if symptoms do not improve and/or if symptoms worsen   see patient's AVS 'monitor for' section for specific patient instructions given and discussed regarding what to watch for and when to follow up    MARVIN Agosto [facility] [place]      ______________________________________________________________________      Subjective     HPI: Ingrid Amaral  is a 77 year old  female who presents today for evaluation the following concerns:   Patient presents for evaluation of bilateral eyes that they describe as having increased itching and swelling of the surrounding soft tissue in the mornings.   Patient's  who is present endorses the eye  swelling seems to go in the am after she takes her zyrtec   Patient states symptoms started about 3 day ago    Patient denies wearing contacts. Patient denies any other viral URI symptoms.   Patient denies any Increasing eye pain or worsening/changes in vision/ visual acuity/ blurry vision  patient denies any discharge from the eye} patient denies any redness of the eyelids or surrounding soft tissue/skin  Patient denies any  photosensitively, pain with eye movement}      Review of Systems:  Pertinent review of systems as reflected in HPI, otherwise negative.     Objective    Physical Exam:  Vitals:    08/17/23 1222   BP: 132/81   Pulse: 64   Resp: 16   Temp: 98.5  F (36.9  C)   TempSrc: Tympanic   SpO2: 98%   Weight: 47.2 kg (104 lb)      General: Alert and oriented, no acute distress, Vital signs reviewed: afebrile,  normotensive   EYES: EOMs intact, PERRLA bilaterally   Conjunctiva: slight injection and erythema to bilateral} conjunctiva of eyes.   No swelling noted, no edema seen   Drainage: tearing noted but no drainage noted   No photophobia noted   Cornea is clear    No visual acuity changes noted, no blur vision noted       ______________________________________________________________________    I explained my diagnostic considerations and recommendations to the patient, who voiced understanding and agreement with the treatment plan.   All questions were answered.   We discussed potential side effects, risks and benefits of any prescribed or recommended therapies, as well as expectations for response to treatments.  Please see AVS for any patient instructions & handouts given.   Patient was advised to contact the Nurse Care Line, their Primary Care provider, Urgent Care, or the Emergency Department if there are new or worsening symptoms, or call 911 for emergencies.

## 2023-08-17 NOTE — PATIENT INSTRUCTIONS
Diagnosis: facial swelling, allergic reaction, hives, rash, angioedema    Plan:   Rest quietly today. Don't do vigorous physical activity.  Medicines: antihistamines, steroids} for itching, swelling, or pain.  Diphenhydramine for next two days   Certrizine for next 1-2 weeks   You may use acetaminophen or ibuprofen for pain, unless another pain medicine was prescribed.  Monitor for triggers that may have caused the reaction: medications, bugs, irritants, foods - if note what caused it then STOP     Follow up w/ your PCP vs an allergy specialist for further testing and prevention strategies   and for possible prescription of an epinephrine auto injector kit, keep it with you at all times incase of future reactions   Monitor for:   Trouble breathing or swallowing, or wheezing  Hoarse voice or trouble speaking, or drooling  Chest pain or tightness  Confusion, lightheadedness, or dizziness  Extreme drowsiness or trouble awakening  Fainting or loss of consciousness  Rapid heart rate  Vomiting blood, or large amounts of blood in stool  Seizure  Nausea, vomiting, diarrhea, abdominal pain, or stomach cramp  Symptoms don't go away  Symptoms come back  Symptoms get worse or new symptoms develop  Fever of 100.4 F

## 2023-08-22 ENCOUNTER — PATIENT OUTREACH (OUTPATIENT)
Dept: CARE COORDINATION | Facility: CLINIC | Age: 78
End: 2023-08-22
Payer: COMMERCIAL

## 2023-09-05 ENCOUNTER — PATIENT OUTREACH (OUTPATIENT)
Dept: CARE COORDINATION | Facility: CLINIC | Age: 78
End: 2023-09-05
Payer: COMMERCIAL

## 2023-09-09 DIAGNOSIS — M85.89 OSTEOPENIA OF MULTIPLE SITES: ICD-10-CM

## 2023-09-09 DIAGNOSIS — J31.0 CHRONIC RHINITIS: ICD-10-CM

## 2023-09-10 ENCOUNTER — HEALTH MAINTENANCE LETTER (OUTPATIENT)
Age: 78
End: 2023-09-10

## 2023-09-11 RX ORDER — OMEGA-3S/DHA/EPA/FISH OIL/D3 300MG-1000
CAPSULE ORAL
Qty: 100 TABLET | Refills: 3 | Status: SHIPPED | OUTPATIENT
Start: 2023-09-11

## 2023-09-11 NOTE — TELEPHONE ENCOUNTER
"Routing refill request to provider for review/approval because:  Age        Requested Prescriptions   Pending Prescriptions Disp Refills    cetirizine (ZYRTEC) 10 MG tablet [Pharmacy Med Name: CETIRIZINE HCL 10MG TABS] 90 tablet 3     Sig: TAKE ONE TABLET BY MOUTH ONCE DAILY IN THE MORNING FOR ALLERGIES       Antihistamines Protocol Failed - 9/9/2023  2:37 PM        Failed - Patient is 3-64 years of age     Apply weight-based dosing for peds patients age 3 - 12 years of age.    Forward request to provider for patients under the age of 3 or over the age of 64.          Passed - Recent (12 mo) or future (30 days) visit within the authorizing provider's specialty     Patient has had an office visit with the authorizing provider or a provider within the authorizing providers department within the previous 12 mos or has a future within next 30 days. See \"Patient Info\" tab in inbasket, or \"Choose Columns\" in Meds & Orders section of the refill encounter.              Passed - Medication is active on med list         Signed Prescriptions Disp Refills    Vitamin D3 (VITAMIN D) 10 MCG (400 UNIT) tablet 100 tablet 3     Sig: TAKE ONE TABLET BY MOUTH ONCE DAILY FOR BONE HEALTH       Vitamin Supplements (Adult) Protocol Passed - 9/9/2023  2:37 PM        Passed - High dose Vitamin D not ordered        Passed - Recent (12 mo) or future (30 days) visit within the authorizing provider's specialty     Patient has had an office visit with the authorizing provider or a provider within the authorizing providers department within the previous 12 mos or has a future within next 30 days. See \"Patient Info\" tab in inbasket, or \"Choose Columns\" in Meds & Orders section of the refill encounter.              Passed - Medication is active on med list                 Brenda Mcleod RN 09/11/23 12:40 PM    "

## 2023-09-12 RX ORDER — CETIRIZINE HYDROCHLORIDE 10 MG/1
TABLET ORAL
Qty: 90 TABLET | Refills: 3 | Status: SHIPPED | OUTPATIENT
Start: 2023-09-12

## 2023-09-29 DIAGNOSIS — F41.9 ANXIETY: ICD-10-CM

## 2023-09-29 DIAGNOSIS — F32.0 MILD MAJOR DEPRESSION (H): ICD-10-CM

## 2023-09-29 DIAGNOSIS — F41.0 PANIC DISORDER WITHOUT AGORAPHOBIA: ICD-10-CM

## 2023-09-29 RX ORDER — DULOXETIN HYDROCHLORIDE 30 MG/1
30 CAPSULE, DELAYED RELEASE ORAL 2 TIMES DAILY
Qty: 60 CAPSULE | Refills: 11 | OUTPATIENT
Start: 2023-09-29

## 2023-09-29 RX ORDER — DULOXETIN HYDROCHLORIDE 30 MG/1
30 CAPSULE, DELAYED RELEASE ORAL 2 TIMES DAILY
Qty: 60 CAPSULE | Refills: 0 | Status: SHIPPED | OUTPATIENT
Start: 2023-09-29 | End: 2023-10-25

## 2023-09-29 NOTE — TELEPHONE ENCOUNTER
Overdue for needed care. Please call to schedule med check, update PHQ9 and GAD7. Once appt is scheduled, route back to refill pool.    Laquita Rahman RN  [facility]

## 2023-09-29 NOTE — TELEPHONE ENCOUNTER
My chart sent to update PHQ/DELMIS and also notified needs a virtual appointment for review, awaiting response.      SHALINI Kaiser

## 2023-10-07 DIAGNOSIS — R41.89 COGNITIVE IMPAIRMENT: ICD-10-CM

## 2023-10-09 DIAGNOSIS — K21.9 GASTROESOPHAGEAL REFLUX DISEASE WITHOUT ESOPHAGITIS: ICD-10-CM

## 2023-10-09 RX ORDER — DONEPEZIL HYDROCHLORIDE 10 MG/1
TABLET, FILM COATED ORAL
Qty: 90 TABLET | Refills: 2 | Status: SHIPPED | OUTPATIENT
Start: 2023-10-09

## 2023-10-10 RX ORDER — OMEPRAZOLE 10 MG/1
CAPSULE, DELAYED RELEASE ORAL
Qty: 90 CAPSULE | Refills: 1 | Status: SHIPPED | OUTPATIENT
Start: 2023-10-10 | End: 2024-01-15

## 2023-10-25 ENCOUNTER — OFFICE VISIT (OUTPATIENT)
Dept: FAMILY MEDICINE | Facility: CLINIC | Age: 78
End: 2023-10-25
Payer: COMMERCIAL

## 2023-10-25 ENCOUNTER — ANCILLARY PROCEDURE (OUTPATIENT)
Dept: GENERAL RADIOLOGY | Facility: CLINIC | Age: 78
End: 2023-10-25
Attending: NURSE PRACTITIONER
Payer: COMMERCIAL

## 2023-10-25 VITALS
WEIGHT: 104 LBS | DIASTOLIC BLOOD PRESSURE: 88 MMHG | OXYGEN SATURATION: 98 % | HEART RATE: 77 BPM | SYSTOLIC BLOOD PRESSURE: 136 MMHG | BODY MASS INDEX: 20.42 KG/M2 | RESPIRATION RATE: 16 BRPM | HEIGHT: 60 IN | TEMPERATURE: 98.5 F

## 2023-10-25 DIAGNOSIS — F41.9 ANXIETY: ICD-10-CM

## 2023-10-25 DIAGNOSIS — R22.41 LOCALIZED SWELLING OF RIGHT FOOT: ICD-10-CM

## 2023-10-25 DIAGNOSIS — G47.52 RBD (REM BEHAVIORAL DISORDER): ICD-10-CM

## 2023-10-25 DIAGNOSIS — B35.1 ONYCHOMYCOSIS: ICD-10-CM

## 2023-10-25 DIAGNOSIS — G31.83 LEWY BODY DEMENTIA WITHOUT BEHAVIORAL DISTURBANCE (H): ICD-10-CM

## 2023-10-25 DIAGNOSIS — G47.00 INSOMNIA, UNSPECIFIED TYPE: ICD-10-CM

## 2023-10-25 DIAGNOSIS — F32.0 MILD MAJOR DEPRESSION (H): Primary | ICD-10-CM

## 2023-10-25 DIAGNOSIS — F02.80 LEWY BODY DEMENTIA WITHOUT BEHAVIORAL DISTURBANCE (H): ICD-10-CM

## 2023-10-25 DIAGNOSIS — F41.0 PANIC DISORDER WITHOUT AGORAPHOBIA: ICD-10-CM

## 2023-10-25 PROCEDURE — G0008 ADMIN INFLUENZA VIRUS VAC: HCPCS | Performed by: NURSE PRACTITIONER

## 2023-10-25 PROCEDURE — 73630 X-RAY EXAM OF FOOT: CPT | Mod: TC | Performed by: RADIOLOGY

## 2023-10-25 PROCEDURE — 91320 SARSCV2 VAC 30MCG TRS-SUC IM: CPT | Performed by: NURSE PRACTITIONER

## 2023-10-25 PROCEDURE — 90662 IIV NO PRSV INCREASED AG IM: CPT | Performed by: NURSE PRACTITIONER

## 2023-10-25 PROCEDURE — 90480 ADMN SARSCOV2 VAC 1/ONLY CMP: CPT | Performed by: NURSE PRACTITIONER

## 2023-10-25 PROCEDURE — 99214 OFFICE O/P EST MOD 30 MIN: CPT | Mod: 25 | Performed by: NURSE PRACTITIONER

## 2023-10-25 RX ORDER — RESPIRATORY SYNCYTIAL VIRUS VACCINE 120MCG/0.5
0.5 KIT INTRAMUSCULAR ONCE
Qty: 1 EACH | Refills: 0 | Status: CANCELLED | OUTPATIENT
Start: 2023-10-25 | End: 2023-10-25

## 2023-10-25 RX ORDER — TRAZODONE HYDROCHLORIDE 100 MG/1
TABLET ORAL
Qty: 30 TABLET | Refills: 11 | Status: SHIPPED | OUTPATIENT
Start: 2023-10-25

## 2023-10-25 RX ORDER — DULOXETIN HYDROCHLORIDE 30 MG/1
30 CAPSULE, DELAYED RELEASE ORAL 2 TIMES DAILY
Qty: 60 CAPSULE | Refills: 0 | Status: SHIPPED | OUTPATIENT
Start: 2023-10-25 | End: 2023-12-01

## 2023-10-25 ASSESSMENT — ANXIETY QUESTIONNAIRES
5. BEING SO RESTLESS THAT IT IS HARD TO SIT STILL: MORE THAN HALF THE DAYS
GAD7 TOTAL SCORE: 13
1. FEELING NERVOUS, ANXIOUS, OR ON EDGE: MORE THAN HALF THE DAYS
GAD7 TOTAL SCORE: 13
IF YOU CHECKED OFF ANY PROBLEMS ON THIS QUESTIONNAIRE, HOW DIFFICULT HAVE THESE PROBLEMS MADE IT FOR YOU TO DO YOUR WORK, TAKE CARE OF THINGS AT HOME, OR GET ALONG WITH OTHER PEOPLE: SOMEWHAT DIFFICULT
4. TROUBLE RELAXING: MORE THAN HALF THE DAYS
3. WORRYING TOO MUCH ABOUT DIFFERENT THINGS: MORE THAN HALF THE DAYS
7. FEELING AFRAID AS IF SOMETHING AWFUL MIGHT HAPPEN: MORE THAN HALF THE DAYS
6. BECOMING EASILY ANNOYED OR IRRITABLE: SEVERAL DAYS
2. NOT BEING ABLE TO STOP OR CONTROL WORRYING: MORE THAN HALF THE DAYS

## 2023-10-25 ASSESSMENT — ASTHMA QUESTIONNAIRES: ACT_TOTALSCORE: 24

## 2023-10-25 ASSESSMENT — PAIN SCALES - GENERAL: PAINLEVEL: NO PAIN (0)

## 2023-10-25 ASSESSMENT — PATIENT HEALTH QUESTIONNAIRE - PHQ9
SUM OF ALL RESPONSES TO PHQ QUESTIONS 1-9: 10
10. IF YOU CHECKED OFF ANY PROBLEMS, HOW DIFFICULT HAVE THESE PROBLEMS MADE IT FOR YOU TO DO YOUR WORK, TAKE CARE OF THINGS AT HOME, OR GET ALONG WITH OTHER PEOPLE: VERY DIFFICULT
SUM OF ALL RESPONSES TO PHQ QUESTIONS 1-9: 10

## 2023-10-25 NOTE — PROGRESS NOTES
Assessment & Plan     Mild major depression (H24)  Panic disorder without agoraphobia  Chronic, stable on current dose of fluoxetine.  Trazodone at night.  Tolerating well.  Continue medications without any changes.  - DULoxetine (CYMBALTA) 30 MG capsule; Take 1 capsule (30 mg) by mouth 2 times daily  - traZODone (DESYREL) 100 MG tablet; TAKE ONE TABLET BY MOUTH EVERY NIGHT AT BEDTIME FOR SLEEP    Anxiety  Chronic, stable on current dose of fluoxetine.  Continue with any changes.  - DULoxetine (CYMBALTA) 30 MG capsule; Take 1 capsule (30 mg) by mouth 2 times daily    Insomnia, unspecified type  RBD (REM behavioral disorder)  Chronic, sleeps well most of the time.  No acute concerns.  Continue medications without any changes.  - traZODone (DESYREL) 100 MG tablet; TAKE ONE TABLET BY MOUTH EVERY NIGHT AT BEDTIME FOR SLEEP    Lewy body dementia without behavioral disturbance (H)  Chronic, on Aricept currently.  Due for neurology recheck, has not seen since 2019.  New referral placed in case that the new one is needed, patient to call and schedule soon as possible.  - Adult Neurology  Referral; Future    Localized swelling of right foot  Acute on chronic swelling of right foot, top.  Has always had, however has worsened has a large lump that is not always tender.  Some redness from rubbing present.  We will get foot x-ray to further evaluate, will call with results and further recommendations.  - XR Foot Right G/E 3 Views; Future    Onychomycosis  Ongoing toenail fungus on both feet.  Patient was using an over-the-counter treatment, however was not consistent.  Recommend picking up over-the-counter ProX Clearz for toenail fungus, use as advised on box for length of time advised.             See Patient Instructions    Soha Lino, MICAH, APRN-CNP   [facility]    Heena Quintero is a 78 year old, presenting for the following health issues:  Recheck Medication        10/25/2023      2:15 PM   Additional Questions   Roomed by Alondra ALBRECHT CMA   Accompanied by        History of Present Illness       Back Pain:  She presents for follow up of back pain. Patient's back pain is a recurring problem.  Location of back pain:  Right upper back  Description of back pain: dull ache  Back pain spreads: nowhere    Since patient first noticed back pain, pain is: gradually improving  Does back pain interfere with her job:  Not applicable       She eats 2-3 servings of fruits and vegetables daily.She consumes 0 sweetened beverage(s) daily.She exercises with enough effort to increase her heart rate 9 or less minutes per day.  She exercises with enough effort to increase her heart rate 3 or less days per week.   She is taking medications regularly.       Memory worsening ~ hasn't seen Neurology since 2019    Toenail fungus   ~ has been using over the counter, however not consistently using.      Do you have sleep apnea, excessive snoring or daytime drowsiness? : no    Social History     Tobacco Use    Smoking status: Former     Packs/day: 0.50     Years: 3.00     Additional pack years: 0.00     Total pack years: 1.50     Types: Cigarettes    Smokeless tobacco: Never    Tobacco comments:     smoked for 3 years when she was around 19   Substance Use Topics    Alcohol use: Yes     Comment: RARELY           Current providers sharing in care for this patient include:   Patient Care Team:  Soha Lino APRN CNP as PCP - General (Family Medicine)  Vonnie Peña RPH as Pharmacist (Pharmacist)  Soha Lino APRN CNP as Assigned PCP  Vonnie Peña RPH as Assigned MTM Pharmacist  Sheryl Marie APRN CNP as Nurse Practitioner  Juan Hendrickson MD as MD (Dermatology)    The following health maintenance items are reviewed in Epic and correct as of today:  Health Maintenance   Topic Date Due    LUNG CANCER SCREENING  Never done    RSV VACCINE (Pregnancy & 60+) (1 - 1-dose 60+  series) Never done    COLORECTAL CANCER SCREENING  05/22/2022    MAMMO SCREENING  06/01/2023    MEDICARE ANNUAL WELLNESS VISIT  09/21/2023    ASTHMA ACTION PLAN  02/20/2024    ASTHMA CONTROL TEST  04/25/2024    PHQ-9  04/25/2024    ANNUAL REVIEW OF HM ORDERS  10/25/2024    FALL RISK ASSESSMENT  10/25/2024    LIPID  09/27/2026    ADVANCE CARE PLANNING  10/05/2027    DTAP/TDAP/TD IMMUNIZATION (3 - Td or Tdap) 03/11/2032    DEXA  10/04/2036    HEPATITIS C SCREENING  Completed    DEPRESSION ACTION PLAN  Completed    INFLUENZA VACCINE  Completed    Pneumococcal Vaccine: 65+ Years  Completed    ZOSTER IMMUNIZATION  Completed    COVID-19 Vaccine  Completed    IPV IMMUNIZATION  Aged Out    HPV IMMUNIZATION  Aged Out    MENINGITIS IMMUNIZATION  Aged Out    RSV MONOCLONAL ANTIBODY  Aged Out         Review of Systems   Constitutional, HEENT, cardiovascular, pulmonary, gi and gu systems are negative, except as otherwise noted.      Objective    /88 (BP Location: Right arm, Patient Position: Sitting, Cuff Size: Adult Regular)   Pulse 77   Temp 98.5  F (36.9  C) (Tympanic)   Resp 16   Ht 1.524 m (5')   Wt 47.2 kg (104 lb)   LMP  (LMP Unknown)   SpO2 98%   BMI 20.31 kg/m    Body mass index is 20.31 kg/m .  Physical Exam   GENERAL APPEARANCE: healthy, alert, and no distress  RESP: lungs clear to auscultation - no rales, rhonchi or wheezes  CV: regular rates and rhythm, normal S1 S2, no S3 or S4, and no murmur, click or rub  ABDOMEN: soft, nontender, without hepatosplenomegaly or masses and bowel sounds normal  MS: extremities normal- no gross deformities noted and peripheral pulses normal  SKIN: no suspicious lesions or rashes and  bilateral toenail discoloration and subungual hyperkeratosis consistent with onychomycosis   NEURO: Normal strength and tone, mentation intact, and speech normal  PSYCH: mentation appears normal and affect normal/bright    Diagnostic Test Results:  Labs reviewed in Epic  Xray - foot -  pending          Chart documentation with Dragon Voice recognition Software. Although reviewed after completion, some words and grammatical errors may remain.

## 2023-10-25 NOTE — PATIENT INSTRUCTIONS
Mild major depression (H24)  Panic disorder without agoraphobia  Chronic, stable on current dose of fluoxetine.  Trazodone at night.  Tolerating well.  Continue medications without any changes.  - DULoxetine (CYMBALTA) 30 MG capsule; Take 1 capsule (30 mg) by mouth 2 times daily  - traZODone (DESYREL) 100 MG tablet; TAKE ONE TABLET BY MOUTH EVERY NIGHT AT BEDTIME FOR SLEEP    Anxiety  Chronic, stable on current dose of fluoxetine.  Continue with any changes.  - DULoxetine (CYMBALTA) 30 MG capsule; Take 1 capsule (30 mg) by mouth 2 times daily    Insomnia, unspecified type  RBD (REM behavioral disorder)  Chronic, sleeps well most of the time.  No acute concerns.  Continue medications without any changes.  - traZODone (DESYREL) 100 MG tablet; TAKE ONE TABLET BY MOUTH EVERY NIGHT AT BEDTIME FOR SLEEP    Lewy body dementia without behavioral disturbance (H)  Chronic, on Aricept currently.  Due for neurology recheck, has not seen since 2019.  New referral placed in case that the new one is needed, patient to call and schedule soon as possible.  - Adult Neurology  Referral; Future    Localized swelling of right foot  Acute on chronic swelling of right foot, top.  Has always had, however has worsened has a large lump that is not always tender.  Some redness from rubbing present.  We will get foot x-ray to further evaluate, will call with results and further recommendations.  - XR Foot Right G/E 3 Views; Future    Onychomycosis  Ongoing toenail fungus on both feet.  Patient was using an over-the-counter treatment, however was not consistent.  Recommend picking up over-the-counter ProX Clearz for toenail fungus, use as advised on box for length of time advised.    Schedule follow-up visit with gastroenterology

## 2023-11-01 DIAGNOSIS — R22.41 LOCALIZED SWELLING OF RIGHT FOOT: Primary | ICD-10-CM

## 2023-11-01 DIAGNOSIS — M79.671 RIGHT FOOT PAIN: ICD-10-CM

## 2023-11-02 NOTE — PROGRESS NOTES
Gastroenterology CLINIC VISIT, RETURN PATIENT    CC/REFERRING PROVIDER: Referred Self  REASON FOR CONSULTATION: follow up    HPI: 78 year old female presented to GI clinic for follow up on diarrhea, abdominal bloating, and anal discomfort. PMH of Lewy body dementia, asthma, HTN, hiatal hernia, exocrine pancreatic insufficiency, and GERD. Patient is here with her , who provides history and answers questions. Patient also participates in the conversation.  Reported improvement in her compliance with Creon and fiber supplement. She switched from metamucil powder to tablets; take one tablet a day. Noted that her stools are more soft and formed. No longer has nocturnal incontinence episodes. Trying to limit dairy intake and uses Lactaid tablets as needed.  Patient's  verbalized concerns of bloating. At times, the patient is unable to wear jeans or bra due to discomfort from abdominal distention. Patient's  worries that she is consuming lots of potato chips and has abdominal symptoms after that. He also mention that drinking beer or pop causes bloating. He was giving simethicone before and after consuming foods/beverages that usually cause bloating.    Patient and her  shared some issues with anxiety and restlessness. Stated that hydroxyzine that I prescribed her on the last visit, helps her to calm down but sometimes, she becomes very sleepy.Complains of nightmares and seeing things that are not present.  Suggested to discuss with her PCP or psychiatrist management of her symptoms.    PERTINENT STUDIES Reviewed in EMR    ROS: 10pt ROS performed and otherwise negative.    PAST MEDICAL HISTORY:  Past Medical History:   Diagnosis Date    Asthma     GERD (gastroesophageal reflux disease)     Radial head fracture 03/18/2010       PREVIOUS ABDOMINAL/GYNECOLOGIC SURGERIES:    Past Surgical History:   Procedure Laterality Date    ANKLE SURGERY      bilateral    COLONOSCOPY      COLONOSCOPY N/A  3/20/2015    Procedure: COLONOSCOPY;  Surgeon: Britney Martinez MD;  Location: [facility]    COLONOSCOPY N/A 5/22/2017    Procedure: COLONOSCOPY;  Colonoscopy;  Surgeon: Tristen Rangel MD;  Location: [facility]    ESOPHAGOSCOPY, GASTROSCOPY, DUODENOSCOPY (EGD), COMBINED N/A 6/7/2018    Procedure: COMBINED ESOPHAGOSCOPY, GASTROSCOPY, DUODENOSCOPY (EGD);  gastroscopy;  Surgeon: Tristen Rangel MD;  Location: [facility]    HC ESOPH/GAS REFLUX TEST W NASAL IMPED >1 HR  4/19/2012    Procedure:ESOPHAGEAL IMPEDENCE FUNCTION TEST WITH 24 HOUR PH GREATER THAN 1 HOUR; Surgeon:VALDO UMANZOR; Location: GI    HERNIA REPAIR      umbilcal    HYSTERECTOMY, PAP NO LONGER INDICATED  1991    TONSILLECTOMY      UPPER GI ENDOSCOPY         PERTINENT MEDICATIONS:  Current Outpatient Medications   Medication Sig Dispense Refill    alpha-d-galactosidase (BEANO) tablet Take 1 tablet by mouth daily as needed for other (bloating)      amLODIPine (NORVASC) 2.5 MG tablet TAKE 1 TABLET (2.5 MG) BY MOUTH DAILY FOR BLOOD PRESSURE 90 tablet 3    ascorbic acid 1000 MG TABS tablet Take 1,000 mg by mouth daily      Biotin w/ Vitamins C & E (HAIR/SKIN/NAILS PO) Take 1 capsule by mouth daily      calcium carbonate-vitamin D (OSCAL) 500-5 MG-MCG tablet TAKE 1 TABLET BY MOUTH 2 TIMES DAILY (WITH MEALS) FOR BONE HEALTH 180 tablet 1    cetirizine (ZYRTEC) 10 MG tablet TAKE ONE TABLET BY MOUTH ONCE DAILY IN THE MORNING FOR ALLERGIES 90 tablet 3    cyanocobalamin (VITAMIN B-12) 100 MCG tablet TAKE TWO TABLETS BY MOUTH ONCE DAILY FOR B12 DEFICIENCY 60 tablet 11    donepezil (ARICEPT) 10 MG tablet TAKE ONE TABLET BY MOUTH EVERY NIGHT AT BEDTIME FOR MEMORY 90 tablet 2    DULoxetine (CYMBALTA) 30 MG capsule Take 1 capsule (30 mg) by mouth 2 times daily 60 capsule 0    gabapentin (NEURONTIN) 300 MG capsule Take 1 capsule (300 mg) by mouth 3 times daily (Patient taking differently: Take 300 mg by mouth 3 times daily Take 1 capsule by mouth twice daily (morning  and evening). May take an additional 1 capsule if needed in the afternoon. Maximum of 3 capsules per day.) 180 capsule 5    hydrOXYzine (ATARAX) 10 MG tablet Take 1 tablet (10 mg) by mouth 2 times daily as needed for anxiety (as needed only, for restlessness and axiety) 30 tablet 0    lactase (LACTAID) 9000 units TABS tablet Take 1 tablet (9,000 Units) by mouth 3 times daily (with meals) For lactose intolerance, when eating milk-related foods. 90 tablet 11    lipase-protease-amylase (CREON) 2223-43415-51615 units CPEP TAKE TWO CAPSULES BY MOUTH THREE TIMES A DAY WITH MEALS FOR DIGESTION 540 capsule 3    omeprazole (PRILOSEC) 10 MG DR capsule TAKE ONE CAPSULE BY MOUTH ONCE DAILY FOR HEARTBURN 90 capsule 1    traZODone (DESYREL) 100 MG tablet TAKE ONE TABLET BY MOUTH EVERY NIGHT AT BEDTIME FOR SLEEP 30 tablet 11    Vitamin D3 (VITAMIN D) 10 MCG (400 UNIT) tablet TAKE ONE TABLET BY MOUTH ONCE DAILY FOR BONE HEALTH 100 tablet 3    psyllium (METAMUCIL/KONSYL) 58.6 % powder Take 6 g (1 teaspoonful) by mouth daily Take in the evening 283 g 3       SOCIAL HISTORY:  Social History     Socioeconomic History    Marital status:      Spouse name: Not on file    Number of children: Not on file    Years of education: Not on file    Highest education level: Not on file   Occupational History    Not on file   Tobacco Use    Smoking status: Former     Packs/day: 0.50     Years: 3.00     Additional pack years: 0.00     Total pack years: 1.50     Types: Cigarettes    Smokeless tobacco: Never    Tobacco comments:     smoked for 3 years when she was around 19   Vaping Use    Vaping Use: Never used   Substance and Sexual Activity    Alcohol use: Yes     Comment: RARELY    Drug use: No    Sexual activity: Not Currently   Other Topics Concern    Parent/sibling w/ CABG, MI or angioplasty before 65F 55M? Yes     Comment: mother- valve problem   Social History Narrative    Not on file     Social Determinants of Health     Financial  Resource Strain: Unknown (10/25/2023)    Financial Resource Strain     Within the past 12 months, have you or your family members you live with been unable to get utilities (heat, electricity) when it was really needed?: Patient refused   Food Insecurity: Unknown (10/25/2023)    Food Insecurity     Within the past 12 months, did you worry that your food would run out before you got money to buy more?: Patient refused     Within the past 12 months, did the food you bought just not last and you didn t have money to get more?: Patient refused   Transportation Needs: Unknown (10/25/2023)    Transportation Needs     Within the past 12 months, has lack of transportation kept you from medical appointments, getting your medicines, non-medical meetings or appointments, work, or from getting things that you need?: Patient refused   Physical Activity: Not on file   Stress: Not on file   Social Connections: Not on file   Interpersonal Safety: Not on file   Housing Stability: Unknown (10/25/2023)    Housing Stability     Do you have housing? : Patient refused     Are you worried about losing your housing?: Patient refused       FAMILY HISTORY:  Denies colon/panc/esophageal/other GI CA, no other [place] or other HPS-related Barbara. No IBD/celiac, no other AI/liver/thyroid disease.    Family History   Problem Relation Age of Onset    Heart Disease Mother         valve problem    Hypertension Mother     Diabetes Mother         type 2    Cerebrovascular Disease Mother     Allergies Mother     Eye Disorder Mother         Macular degeneration    Osteoporosis Mother     Respiratory Mother     Migraines Mother     Cardiovascular Father         MI at age 80    Cancer - colorectal Father 87    Diabetes Father     Hypertension Father         type 2    Eye Disorder Father         macular degeneration    Lipids Father     C.A.D. Maternal Grandmother     Diabetes Paternal Grandmother         type 2    Cardiovascular Paternal Grandmother         MI     Diabetes Sister         type 2    Allergies Sister     Gastrointestinal Disease Sister     Depression Sister     Gastrointestinal Disease Daughter     Migraines Daughter     Gastrointestinal Disease Daughter     Migraines Daughter     Migraines Son     Aneurysm No family hx of     Brain Hemorrhage No family hx of     Brain Tumor No family hx of     Cancer No family hx of     Chiari Malformation No family hx of     LUNG DISEASE No family hx of     Seizure Disorder No family hx of        PHYSICAL EXAMINATION:  Vitals reviewed  /82 (BP Location: Right arm, Patient Position: Sitting, Cuff Size: Adult Regular)   Pulse 60   Ht 1.524 m (5')   Wt 46.7 kg (103 lb)   LMP  (LMP Unknown)   Breastfeeding No   BMI 20.12 kg/m      General: Patient appears well in no acute distress.    Skin: No visualized rash or lesions on visualized skin  HEENT:    EOMI, no erythema, sclera icterus or discharge noted.  Mouth mucosa intact, pink, moist  No cervical or supraclavicular lymphadenopathy. Thyroid gland not enlarged.  Resp: breathing comfortably without accessory muscle usage, speaking in full sentences, no cough. Lung sounds clear  Card: Regular and rhythmic S1 and S2. No gallop or rub. No murmur.  No LE edema.  Abdomen: Active bowel sounds X 4 quadrants. Soft to palpation.  No guarding or rebound tenderness. Brian's sign negative.  MSK: Appears to have normal range of motion based on visualized movements  Neurologic: No apparent tremors, facial movements symmetric  Psych: affect normal, alert and oriented      ASSESSMENT/PLAN:    ICD-10-CM    1. Lactose intolerance  E73.9       2. Abdominal bloating  R14.0 Adult GI Clinic Follow-Up Order (Blank)      3. Chronic diarrhea  K52.9          78 year old female  presented  to GI clinic for a follow up. Reported resolution of diarrhea. Has been taking one tablet of fiber a day. I suggested to increase the dose to three times a day.  -Reminded the patient and her  on  "diagnosis of pancreatic insufficiency and common symptoms. Strongly encouraged to abstain alcohol. Reduce fat intake. Do not skip Creon. Monitor intake of snacks. May need an additional dose of creon for coverage of larger snacks. In the past, the patient was on yearly MRCP surveillance schedule for enlarged pancreatic duct. It was discontinued in 2015, after she had normal MRCP.  -I do not appreciate abdominal distention on exam. Suggested to continue taking simethicone as needed. Patient's stated that he was giving Ingrid benadryl for bloating as it said \"for swelling\" on label. Later, he mentioned that he is not certain if it was a label on benadryl label. I suggested to check the medication at home and call our office for clarification. Remove benadryl tablets from the patient weekly med box.  - Patient and her  are aware that she has a large hiatal hernia, but they don't believe she would benefit from PPI therapy at this time. Patient has rare acid reflux events. Takes TUMs as needed. Denies any recent symptoms. Had normal swallowing evaluation by speech therapist in 2018.  - Follow up with MH provider or PCP on visual hallucination and nightmares. Side effect of trazodone? ( Miromedex:dream disorder in up to 5.1% of patients).  Patient and her  deny any suicidal thoughts or ideations. No history of self-harm.  Continue prescribed antidepressants.  Patient and her spouse verbalized understanding and appreciation of care provided. Stated that all of the questions were answered to her/his satisfaction.  RTC in 6 months    Thank you for this consultation. It was a pleasure to participate in the care of this patient; please contact us with any further questions.    MARVIN Mauro, FNP-C  [facility]  Gastroenterology Department  [place], [place]    This note was created with Dragon voice recognition software, and while reviewed for accuracy, inadvertent minor typographic errors may occur. " Please contact the provider if you have any questions.

## 2023-11-03 ENCOUNTER — OFFICE VISIT (OUTPATIENT)
Dept: GASTROENTEROLOGY | Facility: CLINIC | Age: 78
End: 2023-11-03
Payer: COMMERCIAL

## 2023-11-03 VITALS
WEIGHT: 103 LBS | BODY MASS INDEX: 20.22 KG/M2 | HEART RATE: 60 BPM | DIASTOLIC BLOOD PRESSURE: 82 MMHG | HEIGHT: 60 IN | SYSTOLIC BLOOD PRESSURE: 134 MMHG

## 2023-11-03 DIAGNOSIS — K52.9 CHRONIC DIARRHEA: ICD-10-CM

## 2023-11-03 DIAGNOSIS — R14.0 ABDOMINAL BLOATING: ICD-10-CM

## 2023-11-03 DIAGNOSIS — E73.9 LACTOSE INTOLERANCE: Primary | ICD-10-CM

## 2023-11-03 PROCEDURE — 99214 OFFICE O/P EST MOD 30 MIN: CPT | Performed by: NURSE PRACTITIONER

## 2023-11-03 ASSESSMENT — PAIN SCALES - GENERAL: PAINLEVEL: NO PAIN (0)

## 2023-11-03 NOTE — PATIENT INSTRUCTIONS
It was a pleasure taking care of you today.  I've included a brief summary of our discussion and care plan from today's visit below.  Please review this information with your primary care provider.  ______________________________________________________________________    My recommendations are summarized as follows:    Please continue to take fiber supplement, but change administration to 3 times a day with meals.  Remember that excess of fiber can cause bloating.  Take Creon    2.  3 times a day with meals.  Please let me know if you consuming large snacks so I can offer Creon coverage for snacks as well.    3.  Abstain alcohol if possible.  Limit intake of potato chips.  Avoid pop consumption.  All of these can cause bloating.  Take simethicone, Gas-X, or similar over-the-counter anti-gas drugs as needed.    4.  Use Lactaid tablet before consumption of dairy products.    Return to GI Clinic in 6 months  to review your progress.    ______________________________________________________________________    BLOATING AND GAS  Some people feel that they pass too much gas or burp too frequently, both of which can be a source of embarrassment and discomfort. The average adult produces about one to three pints of gas each day, which is passed through the anus 14 to 23 times per day. Burping occasionally before or after meals is also normal.  The amount of gas produced by the body depends upon your diet and other individual factors. However, most people who complain of excessive gas do not produce more gas than the average person. Instead, they are more aware of normal amounts of gas. On the other hand, certain foods and medical conditions can cause you to make excessive amounts of gas.    There are two primary sources of intestinal gas: gas that is ingested (mostly swallowed air) and gas that is produced by bacteria in the colon.   Air swallowing is the major source of gas in the stomach. It is normal to swallow a small  amount of air when eating and drinking and when swallowing saliva. You may swallow larger amounts of air when eating food rapidly, gulping liquids, chewing gum, or smoking.     Bacterial production -- The colon normally provides a home for billions of harmless bacteria, some of which support the health of the bowel. Certain carbohydrates are incompletely digested by enzymes in the stomach and intestines, allowing bacteria to digest them. For example, cabbage, [place] sprouts, and broccoli contain raffinose, a carbohydrate that is poorly digested. These foods tend to cause more gas and flatulence because the raffinose is digested by bacteria once it reaches the colon. The by-products of this process include odorless gases, such as carbon dioxide, hydrogen, and methane. Minor components of gas have an unpleasant odor, including trace amounts of sulfur.  Some people are not able to digest certain carbohydrates. A classic example is lactose, the major sugar contained in dairy products . Thus, consuming large amounts of lactose may lead to increased gas production, along with cramping and diarrhea.  Starch and soluble fiber can also contribute increase gas. Potatoes, corn, noodles, and wheat produce gas, while rice does not. Soluble fiber (found in oat bran, peas and other legumes, beans, and most fruit) also causes gas. Some laxatives contain soluble fiber and may cause gas, particularly during the first few weeks of use.   Certain diseases can also cause excessive bloating and gas. For example, people with diabetes or scleroderma may, over time, have slowing in the activity of the small intestine. This can lead to bacterial overgrowth within the bowel, with poor digestion of carbohydrates and other nutrients. However, even in the absence of apparent disease, some people tend to harbor large numbers of bacteria in their small bowel and are prone to develop excessive gas.   Most people with gas and bloating do not need  to have any testing. However, symptoms such as diarrhea, weight loss, abdominal pain, anemia, blood in the stool, lack of appetite, fever, or vomiting can be warning signs of a more serious problem; people with one or more of these symptoms usually require testing.   Medical conditions  Medical conditions that may increase intestinal gas, bloating or gas pain include the following:  Chronic intestinal disease. Excess gas is often a symptom of chronic intestinal conditions, such as diverticulitis, ulcerative colitis or Crohn's disease.  Small bowel bacterial overgrowth. An increase or change in the bacteria in the small intestine can cause excess gas, diarrhea and weight loss.  Food intolerances. Gas or bloating may occur if your digestive system can't break down and absorb certain foods, such as the sugar in dairy products (lactose) or proteins such as gluten in wheat and other grains.  Constipation. Constipation may make it difficult to pass gas.    Several measures can help to reduce bothersome gas.   Chronic, repeated belching can occur if you swallow large amounts of air (ie, aerophagia). Aerophagia is typically an unconscious process, and is often associated with emotional stress. Treatment focuses on decreasing air swallowing by reducing anxiety, when it is considered to be a cause, as well as on eating slowly without gulping and avoiding carbonated beverages. Avoid chewing gum, sucking on hard candies and drinking through a straw. These activities can cause you to swallow more air.  Diet recommendations --     High-fiber foods. High-fiber foods that can cause gas include beans, onions, broccoli, [place] sprouts, cabbage, cauliflower, artichokes, asparagus, pears, apples, peaches, prunes, whole wheat and bran. You can experiment with which foods affect you most. You may avoid high-fiber foods for a couple of weeks and gradually add them back. Talk to your doctor to ensure you maintain a healthy intake of  "dietary fiber.  Dairy. Reducing dairy products from your diet can lessen symptoms. You also may try dairy products that are lactose-free or take milk products supplemented with lactase to help with digestion.  Sugar substitutes. Eliminate or reduce sugar substitutes, or try a different substitute.  Fried or fatty foods. Dietary fat delays the clearance of gas from the intestines. Cutting back on fried or fatty foods may reduce symptoms.  Carbonated beverages. Avoid or reduce your intake of carbonated beverages.  Fiber supplements. If you use a fiber supplement, talk to your doctor about the amount and type of supplement that is best for you.  Water. To help prevent constipation, drink water with your meals, throughout the day and with fiber supplements.  Certain foods contain specific carbohydrates called \"FODMAPs\" (fermentable oligo-, di-, and monosaccharides and polyols). FODMAPs are poorly absorbed and can result in bloating and gas production in some people.    Over-the-counter medications -- Try an over-the-counter product that contains Simethicone, such as certain antacids (eg, Maalox Anti-Gas, Mylanta Gas, Gas-X, Phazyme). Also, you can try an over-the-counter product that contains activated charcoal (eg, CharcoCaps, CharcoAid) or Beano, which is an over-the-counter preparation that helps to breakdown certain complex carbohydrates. This treatment may be effective in reducing gas after eating beans or other vegetables that contain raffinose. Another option is  Pepto-Bismol to reduce the odor of unpleasant-smelling gas.If the odor from passing gas concerns you, limiting foods high in sulfur-containing compounds -- such as broccoli, [place] sprouts, cabbage, cauliflower, beer and foods high in protein -- may reduce distinctive odors.  Lactase supplements (Lactaid, Digest Dairy Plus, others) help you digest the sugar in dairy products (lactose). These reduce gas symptoms if you're lactose intolerant.          "          ______________________________________________________________________    Who do I call with any questions after my visit?  Please be in touch if there are any further questions that arise following today's visit.  There are multiple ways to contact your gastroenterology care team.      During business hours, you may reach a Gastroenterology nurse at [phone], option 3.     To schedule or reschedule an appointment, please call [phone].   To schedule your imaging studies (CT, MRI, ultrasound)  call [phone] (or toll-free # [phone])  To schedule your lab work at [facility], please call [phone]    You can always send a secure message through [employer].  [employer] messages are answered by your nurse or doctor typically within 24 hours.  Please allow extra time on weekends and holidays.      For urgent/emergent questions after business hours, you may reach the on-call GI Fellow by contacting the [facility]  at [phone].    In order for your refill to be processed in a timely fashion, it is your responsibility to ensure you follow the recommendations from your provider regarding your laboratory studies and follow up appointments.       How will I get the results of any tests ordered?    You will receive all of your results.  If you have signed up for [employer], any tests ordered at your visit will be available to you after your physician reviews them.  Typically this takes 1-2 weeks.  If there are urgent results that require a change in your care plan, your physician or nurse will call you to discuss the next steps.   What is [employer]?  [employer] is a secure way for you to access all of your healthcare records from the [facility].  It is a web based computer program, so you can sign on to it from any location.  It also allows you to send secure messages to your care team.  I recommend signing up for [employer] access if you have not  already done so and are comfortable with using a computer.    How to I schedule a follow-up visit?  If you did not schedule a follow-up visit today, please call [phone] to schedule a follow-up office visit.      Sincerely,  MIKE Mauro  [facility],  Division of Gastroenterology   ([facility])

## 2023-11-03 NOTE — LETTER
11/3/2023         RE: Ingrid Amaral  [address] [facility] [address]        Dear Colleague,    Thank you for referring your patient, Ingrid Amaral, to the [facility]. Please see a copy of my visit note below.    Gastroenterology CLINIC VISIT, RETURN PATIENT    CC/REFERRING PROVIDER: Referred Self  REASON FOR CONSULTATION: follow up    HPI: 78 year old female presented to GI clinic for follow up on diarrhea, abdominal bloating, and anal discomfort. PMH of Lewy body dementia, asthma, HTN, hiatal hernia, exocrine pancreatic insufficiency, and GERD. Patient is here with her , who provides history and answers questions. Patient also participates in the conversation.  Reported improvement in her compliance with Creon and fiber supplement. She switched from metamucil powder to tablets; take one tablet a day. Noted that her stools are more soft and formed. No longer has nocturnal incontinence episodes. Trying to limit dairy intake and uses Lactaid tablets as needed.  Patient's  verbalized concerns of bloating. At times, the patient is unable to wear jeans or bra due to discomfort from abdominal distention. Patient's  worries that she is consuming lots of potato chips and has abdominal symptoms after that. He also mention that drinking beer or pop causes bloating. He was giving simethicone before and after consuming foods/beverages that usually cause bloating.    Patient and her  shared some issues with anxiety and restlessness. Stated that hydroxyzine that I prescribed her on the last visit, helps her to calm down but sometimes, she becomes very sleepy.Complains of nightmares and seeing things that are not present.  Suggested to discuss with her PCP or psychiatrist management of her symptoms.    PERTINENT STUDIES Reviewed in EMR    ROS: 10pt ROS performed and otherwise negative.    PAST MEDICAL HISTORY:  Past Medical History:   Diagnosis Date      Asthma      GERD (gastroesophageal reflux disease)      Radial head fracture 03/18/2010       PREVIOUS ABDOMINAL/GYNECOLOGIC SURGERIES:    Past Surgical History:   Procedure Laterality Date     ANKLE SURGERY      bilateral     COLONOSCOPY       COLONOSCOPY N/A 3/20/2015    Procedure: COLONOSCOPY;  Surgeon: Britney Martinez MD;  Location: [facility]     COLONOSCOPY N/A 5/22/2017    Procedure: COLONOSCOPY;  Colonoscopy;  Surgeon: Tristen Rangel MD;  Location: [facility]     ESOPHAGOSCOPY, GASTROSCOPY, DUODENOSCOPY (EGD), COMBINED N/A 6/7/2018    Procedure: COMBINED ESOPHAGOSCOPY, GASTROSCOPY, DUODENOSCOPY (EGD);  gastroscopy;  Surgeon: Tristen Rangel MD;  Location: [facility]     HC ESOPH/GAS REFLUX TEST W NASAL IMPED >1 HR  4/19/2012    Procedure:ESOPHAGEAL IMPEDENCE FUNCTION TEST WITH 24 HOUR PH GREATER THAN 1 HOUR; Surgeon:VALDO UMANZOR; Location: GI     HERNIA REPAIR      umbilcal     HYSTERECTOMY, PAP NO LONGER INDICATED  1991     TONSILLECTOMY       UPPER GI ENDOSCOPY         PERTINENT MEDICATIONS:  Current Outpatient Medications   Medication Sig Dispense Refill     alpha-d-galactosidase (BEANO) tablet Take 1 tablet by mouth daily as needed for other (bloating)       amLODIPine (NORVASC) 2.5 MG tablet TAKE 1 TABLET (2.5 MG) BY MOUTH DAILY FOR BLOOD PRESSURE 90 tablet 3     ascorbic acid 1000 MG TABS tablet Take 1,000 mg by mouth daily       Biotin w/ Vitamins C & E (HAIR/SKIN/NAILS PO) Take 1 capsule by mouth daily       calcium carbonate-vitamin D (OSCAL) 500-5 MG-MCG tablet TAKE 1 TABLET BY MOUTH 2 TIMES DAILY (WITH MEALS) FOR BONE HEALTH 180 tablet 1     cetirizine (ZYRTEC) 10 MG tablet TAKE ONE TABLET BY MOUTH ONCE DAILY IN THE MORNING FOR ALLERGIES 90 tablet 3     cyanocobalamin (VITAMIN B-12) 100 MCG tablet TAKE TWO TABLETS BY MOUTH ONCE DAILY FOR B12 DEFICIENCY 60 tablet 11     donepezil (ARICEPT) 10 MG tablet TAKE ONE TABLET BY MOUTH EVERY NIGHT AT BEDTIME FOR MEMORY 90 tablet 2     DULoxetine  (CYMBALTA) 30 MG capsule Take 1 capsule (30 mg) by mouth 2 times daily 60 capsule 0     gabapentin (NEURONTIN) 300 MG capsule Take 1 capsule (300 mg) by mouth 3 times daily (Patient taking differently: Take 300 mg by mouth 3 times daily Take 1 capsule by mouth twice daily (morning and evening). May take an additional 1 capsule if needed in the afternoon. Maximum of 3 capsules per day.) 180 capsule 5     hydrOXYzine (ATARAX) 10 MG tablet Take 1 tablet (10 mg) by mouth 2 times daily as needed for anxiety (as needed only, for restlessness and axiety) 30 tablet 0     lactase (LACTAID) 9000 units TABS tablet Take 1 tablet (9,000 Units) by mouth 3 times daily (with meals) For lactose intolerance, when eating milk-related foods. 90 tablet 11     lipase-protease-amylase (CREON) 7160-71157-65608 units CPEP TAKE TWO CAPSULES BY MOUTH THREE TIMES A DAY WITH MEALS FOR DIGESTION 540 capsule 3     omeprazole (PRILOSEC) 10 MG DR capsule TAKE ONE CAPSULE BY MOUTH ONCE DAILY FOR HEARTBURN 90 capsule 1     traZODone (DESYREL) 100 MG tablet TAKE ONE TABLET BY MOUTH EVERY NIGHT AT BEDTIME FOR SLEEP 30 tablet 11     Vitamin D3 (VITAMIN D) 10 MCG (400 UNIT) tablet TAKE ONE TABLET BY MOUTH ONCE DAILY FOR BONE HEALTH 100 tablet 3     psyllium (METAMUCIL/KONSYL) 58.6 % powder Take 6 g (1 teaspoonful) by mouth daily Take in the evening 283 g 3       SOCIAL HISTORY:  Social History     Socioeconomic History     Marital status:      Spouse name: Not on file     Number of children: Not on file     Years of education: Not on file     Highest education level: Not on file   Occupational History     Not on file   Tobacco Use     Smoking status: Former     Packs/day: 0.50     Years: 3.00     Additional pack years: 0.00     Total pack years: 1.50     Types: Cigarettes     Smokeless tobacco: Never     Tobacco comments:     smoked for 3 years when she was around 19   Vaping Use     Vaping Use: Never used   Substance and Sexual Activity      Alcohol use: Yes     Comment: RARELY     Drug use: No     Sexual activity: Not Currently   Other Topics Concern     Parent/sibling w/ CABG, MI or angioplasty before 65F 55M? Yes     Comment: mother- valve problem   Social History Narrative     Not on file     Social Determinants of Health     Financial Resource Strain: Unknown (10/25/2023)    Financial Resource Strain      Within the past 12 months, have you or your family members you live with been unable to get utilities (heat, electricity) when it was really needed?: Patient refused   Food Insecurity: Unknown (10/25/2023)    Food Insecurity      Within the past 12 months, did you worry that your food would run out before you got money to buy more?: Patient refused      Within the past 12 months, did the food you bought just not last and you didn t have money to get more?: Patient refused   Transportation Needs: Unknown (10/25/2023)    Transportation Needs      Within the past 12 months, has lack of transportation kept you from medical appointments, getting your medicines, non-medical meetings or appointments, work, or from getting things that you need?: Patient refused   Physical Activity: Not on file   Stress: Not on file   Social Connections: Not on file   Interpersonal Safety: Not on file   Housing Stability: Unknown (10/25/2023)    Housing Stability      Do you have housing? : Patient refused      Are you worried about losing your housing?: Patient refused       FAMILY HISTORY:  Denies colon/panc/esophageal/other GI CA, no other Parker or other HPS-related Barbara. No IBD/celiac, no other AI/liver/thyroid disease.    Family History   Problem Relation Age of Onset     Heart Disease Mother         valve problem     Hypertension Mother      Diabetes Mother         type 2     Cerebrovascular Disease Mother      Allergies Mother      Eye Disorder Mother         Macular degeneration     Osteoporosis Mother      Respiratory Mother      Migraines Mother       Cardiovascular Father         MI at age 80     Cancer - colorectal Father 87     Diabetes Father      Hypertension Father         type 2     Eye Disorder Father         macular degeneration     Lipids Father      C.A.D. Maternal Grandmother      Diabetes Paternal Grandmother         type 2     Cardiovascular Paternal Grandmother         MI     Diabetes Sister         type 2     Allergies Sister      Gastrointestinal Disease Sister      Depression Sister      Gastrointestinal Disease Daughter      Migraines Daughter      Gastrointestinal Disease Daughter      Migraines Daughter      Migraines Son      Aneurysm No family hx of      Brain Hemorrhage No family hx of      Brain Tumor No family hx of      Cancer No family hx of      Chiari Malformation No family hx of      LUNG DISEASE No family hx of      Seizure Disorder No family hx of        PHYSICAL EXAMINATION:  Vitals reviewed  /82 (BP Location: Right arm, Patient Position: Sitting, Cuff Size: Adult Regular)   Pulse 60   Ht 1.524 m (5')   Wt 46.7 kg (103 lb)   LMP  (LMP Unknown)   Breastfeeding No   BMI 20.12 kg/m      General: Patient appears well in no acute distress.    Skin: No visualized rash or lesions on visualized skin  HEENT:    EOMI, no erythema, sclera icterus or discharge noted.  Mouth mucosa intact, pink, moist  No cervical or supraclavicular lymphadenopathy. Thyroid gland not enlarged.  Resp: breathing comfortably without accessory muscle usage, speaking in full sentences, no cough. Lung sounds clear  Card: Regular and rhythmic S1 and S2. No gallop or rub. No murmur.  No LE edema.  Abdomen: Active bowel sounds X 4 quadrants. Soft to palpation.  No guarding or rebound tenderness. Brian's sign negative.  MSK: Appears to have normal range of motion based on visualized movements  Neurologic: No apparent tremors, facial movements symmetric  Psych: affect normal, alert and oriented      ASSESSMENT/PLAN:    ICD-10-CM    1. Lactose intolerance   "E73.9       2. Abdominal bloating  R14.0 Adult GI Clinic Follow-Up Order (Blank)      3. Chronic diarrhea  K52.9          78 year old female  presented  to GI clinic for a follow up. Reported resolution of diarrhea. Has been taking one tablet of fiber a day. I suggested to increase the dose to three times a day.  -Reminded the patient and her  on diagnosis of pancreatic insufficiency and common symptoms. Strongly encouraged to abstain alcohol. Reduce fat intake. Do not skip Creon. Monitor intake of snacks. May need an additional dose of creon for coverage of larger snacks. In the past, the patient was on yearly MRCP surveillance schedule for enlarged pancreatic duct. It was discontinued in 2015, after she had normal MRCP.  -I do not appreciate abdominal distention on exam. Suggested to continue taking simethicone as needed. Patient's stated that he was giving Ingrid benadryl for bloating as it said \"for swelling\" on label. Later, he mentioned that he is not certain if it was a label on benadryl label. I suggested to check the medication at home and call our office for clarification. Remove benadryl tablets from the patient weekly med box.  - Patient and her  are aware that she has a large hiatal hernia, but they don't believe she would benefit from PPI therapy at this time. Patient has rare acid reflux events. Takes TUMs as needed. Denies any recent symptoms. Had normal swallowing evaluation by speech therapist in 2018.  - Follow up with MH provider or PCP on visual hallucination and nightmares. Side effect of trazodone? ( Miromedex:dream disorder in up to 5.1% of patients).  Patient and her  deny any suicidal thoughts or ideations. No history of self-harm.  Continue prescribed antidepressants.  Patient and her spouse verbalized understanding and appreciation of care provided. Stated that all of the questions were answered to her/his satisfaction.  RTC in 6 months    Thank you for this " consultation. It was a pleasure to participate in the care of this patient; please contact us with any further questions.    MARVIN Mauro, BRADYP-C  [facility]  Gastroenterology Department  [place], [place]    This note was created with Dragon voice recognition software, and while reviewed for accuracy, inadvertent minor typographic errors may occur. Please contact the provider if you have any questions.       Again, thank you for allowing me to participate in the care of your patient.        Sincerely,        MARVIN MAURO CNP

## 2023-11-05 DIAGNOSIS — E53.8 VITAMIN B12 DEFICIENCY (NON ANEMIC): ICD-10-CM

## 2023-11-06 RX ORDER — UBIDECARENONE 75 MG
CAPSULE ORAL
Qty: 180 TABLET | Refills: 1 | Status: SHIPPED | OUTPATIENT
Start: 2023-11-06 | End: 2024-06-10

## 2023-11-27 ENCOUNTER — TELEPHONE (OUTPATIENT)
Dept: PHARMACY | Facility: CLINIC | Age: 78
End: 2023-11-27
Payer: COMMERCIAL

## 2023-11-27 NOTE — TELEPHONE ENCOUNTER
This patient is due for MTM follow-up. I called the patient to schedule an appointment. Appointment scheduled for Monday January 8th.    Stuart CruzD, [facility] Hospital[facility]  Medication Therapy Management Pharmacist  Pager: [phone]

## 2023-12-01 DIAGNOSIS — F41.0 PANIC DISORDER WITHOUT AGORAPHOBIA: ICD-10-CM

## 2023-12-01 DIAGNOSIS — F32.0 MILD MAJOR DEPRESSION (H): ICD-10-CM

## 2023-12-01 DIAGNOSIS — F41.9 ANXIETY: ICD-10-CM

## 2023-12-01 RX ORDER — DULOXETIN HYDROCHLORIDE 30 MG/1
30 CAPSULE, DELAYED RELEASE ORAL 2 TIMES DAILY
Qty: 60 CAPSULE | Refills: 0 | Status: SHIPPED | OUTPATIENT
Start: 2023-12-01 | End: 2024-01-03

## 2023-12-01 NOTE — TELEPHONE ENCOUNTER
Routing refill request to provider for review/approval because:  Labs out of range:        8/27/2021     2:01 PM 9/21/2022     3:09 PM 10/25/2023     2:14 PM   PHQ   PHQ-9 Total Score 8 8 10   Q9: Thoughts of better off dead/self-harm past 2 weeks Not at all Not at all Not at all     Last Written Prescription Date:  10/25/23  Last Fill Quantity: 60,  # refills: 0   Last office visit: 10/25/2023 ; last virtual visit: Visit date not found with prescribing provider:     Future Office Visit:   Next 5 appointments (look out 90 days)      Dec 12, 2023  1:00 PM  (Arrive by 12:45 PM)  Return Visit with Juan Hendrickson MD  [facility] ([facility] ) [address] [place] [id number]  [phone]     Jan 08, 2024  1:00 PM  MT New with Vonnie Peña RPH  [facility] ([facility] ) [address] [place] [id number]  [phone]         Julie Behrendt RN

## 2023-12-12 ENCOUNTER — OFFICE VISIT (OUTPATIENT)
Dept: DERMATOLOGY | Facility: CLINIC | Age: 78
End: 2023-12-12
Payer: COMMERCIAL

## 2023-12-12 DIAGNOSIS — D18.01 CHERRY ANGIOMA: ICD-10-CM

## 2023-12-12 DIAGNOSIS — L40.9 PSORIASIS: Primary | ICD-10-CM

## 2023-12-12 DIAGNOSIS — L82.0 INFLAMED SEBORRHEIC KERATOSIS: ICD-10-CM

## 2023-12-12 DIAGNOSIS — L81.4 LENTIGO: ICD-10-CM

## 2023-12-12 DIAGNOSIS — L82.1 SEBORRHEIC KERATOSIS: ICD-10-CM

## 2023-12-12 DIAGNOSIS — D22.9 MULTIPLE BENIGN NEVI: ICD-10-CM

## 2023-12-12 DIAGNOSIS — D23.9 DERMAL NEVUS: ICD-10-CM

## 2023-12-12 PROCEDURE — 17110 DESTRUCTION B9 LES UP TO 14: CPT | Performed by: DERMATOLOGY

## 2023-12-12 PROCEDURE — 99213 OFFICE O/P EST LOW 20 MIN: CPT | Mod: 25 | Performed by: DERMATOLOGY

## 2023-12-12 RX ORDER — CLOBETASOL PROPIONATE 0.5 MG/ML
SOLUTION TOPICAL 2 TIMES DAILY
Qty: 100 ML | Refills: 6 | Status: SHIPPED | OUTPATIENT
Start: 2023-12-12

## 2023-12-12 NOTE — LETTER
12/12/2023         RE: Ingrid Amaral  [address] [facility] [address]        Dear Colleague,    Thank you for referring your patient, Ingrid Amaral, to the [facility]. Please see a copy of my visit note below.    Ingrid Amaral is an extremely pleasant 78 year old year old female patient here today for hx of psoriasis and spots on skin. Patient has no other skin complaints today.  Remainder of the HPI, Meds, PMH, Allergies, FH, and SH was reviewed in chart.      Past Medical History:   Diagnosis Date     Asthma      GERD (gastroesophageal reflux disease)      Radial head fracture 03/18/2010       Past Surgical History:   Procedure Laterality Date     ANKLE SURGERY      bilateral     COLONOSCOPY       COLONOSCOPY N/A 3/20/2015    Procedure: COLONOSCOPY;  Surgeon: Britney Martinez MD;  Location: [facility]     COLONOSCOPY N/A 5/22/2017    Procedure: COLONOSCOPY;  Colonoscopy;  Surgeon: Tristen Rangel MD;  Location: [facility]     ESOPHAGOSCOPY, GASTROSCOPY, DUODENOSCOPY (EGD), COMBINED N/A 6/7/2018    Procedure: COMBINED ESOPHAGOSCOPY, GASTROSCOPY, DUODENOSCOPY (EGD);  gastroscopy;  Surgeon: Tristen Rangel MD;  Location: [facility]     HC ESOPH/GAS REFLUX TEST W NASAL IMPED >1 HR  4/19/2012    Procedure:ESOPHAGEAL IMPEDENCE FUNCTION TEST WITH 24 HOUR PH GREATER THAN 1 HOUR; Surgeon:VALDO UMANZOR; Location: GI     HERNIA REPAIR      umbilcal     HYSTERECTOMY, PAP NO LONGER INDICATED  1991     TONSILLECTOMY       UPPER GI ENDOSCOPY          Family History   Problem Relation Age of Onset     Heart Disease Mother         valve problem     Hypertension Mother      Diabetes Mother         type 2     Cerebrovascular Disease Mother      Allergies Mother      Eye Disorder Mother         Macular degeneration     Osteoporosis Mother      Respiratory Mother      Migraines Mother      Cardiovascular Father         MI at age 80     Cancer - colorectal Father 87     Diabetes  Father      Hypertension Father         type 2     Eye Disorder Father         macular degeneration     Lipids Father      C.A.D. Maternal Grandmother      Diabetes Paternal Grandmother         type 2     Cardiovascular Paternal Grandmother         MI     Diabetes Sister         type 2     Allergies Sister      Gastrointestinal Disease Sister      Depression Sister      Gastrointestinal Disease Daughter      Migraines Daughter      Gastrointestinal Disease Daughter      Migraines Daughter      Migraines Son      Aneurysm No family hx of      Brain Hemorrhage No family hx of      Brain Tumor No family hx of      Cancer No family hx of      Chiari Malformation No family hx of      LUNG DISEASE No family hx of      Seizure Disorder No family hx of        Social History     Socioeconomic History     Marital status:      Spouse name: Not on file     Number of children: Not on file     Years of education: Not on file     Highest education level: Not on file   Occupational History     Not on file   Tobacco Use     Smoking status: Former     Packs/day: 0.50     Years: 3.00     Additional pack years: 0.00     Total pack years: 1.50     Types: Cigarettes     Smokeless tobacco: Never     Tobacco comments:     smoked for 3 years when she was around 19   Vaping Use     Vaping Use: Never used   Substance and Sexual Activity     Alcohol use: Yes     Comment: RARELY     Drug use: No     Sexual activity: Not Currently   Other Topics Concern     Parent/sibling w/ CABG, MI or angioplasty before 65F 55M? Yes     Comment: mother- valve problem   Social History Narrative     Not on file     Social Determinants of Health     Financial Resource Strain: Unknown (10/25/2023)    Financial Resource Strain      Within the past 12 months, have you or your family members you live with been unable to get utilities (heat, electricity) when it was really needed?: Patient refused   Food Insecurity: Unknown (10/25/2023)    Food Insecurity       Within the past 12 months, did you worry that your food would run out before you got money to buy more?: Patient refused      Within the past 12 months, did the food you bought just not last and you didn t have money to get more?: Patient refused   Transportation Needs: Unknown (10/25/2023)    Transportation Needs      Within the past 12 months, has lack of transportation kept you from medical appointments, getting your medicines, non-medical meetings or appointments, work, or from getting things that you need?: Patient refused   Physical Activity: Not on file   Stress: Not on file   Social Connections: Not on file   Interpersonal Safety: Not on file   Housing Stability: Unknown (10/25/2023)    Housing Stability      Do you have housing? : Patient refused      Are you worried about losing your housing?: Patient refused       Outpatient Encounter Medications as of 12/12/2023   Medication Sig Dispense Refill     alpha-d-galactosidase (BEANO) tablet Take 1 tablet by mouth daily as needed for other (bloating)       amLODIPine (NORVASC) 2.5 MG tablet TAKE 1 TABLET (2.5 MG) BY MOUTH DAILY FOR BLOOD PRESSURE 90 tablet 3     ascorbic acid 1000 MG TABS tablet Take 1,000 mg by mouth daily       Biotin w/ Vitamins C & E (HAIR/SKIN/NAILS PO) Take 1 capsule by mouth daily       calcium carbonate-vitamin D (OSCAL) 500-5 MG-MCG tablet TAKE 1 TABLET BY MOUTH 2 TIMES DAILY (WITH MEALS) FOR BONE HEALTH 180 tablet 1     cetirizine (ZYRTEC) 10 MG tablet TAKE ONE TABLET BY MOUTH ONCE DAILY IN THE MORNING FOR ALLERGIES 90 tablet 3     cyanocobalamin (VITAMIN B-12) 100 MCG tablet TAKE TWO TABLETS BY MOUTH ONCE DAILY FOR B12 DEFICIENCY 180 tablet 1     donepezil (ARICEPT) 10 MG tablet TAKE ONE TABLET BY MOUTH EVERY NIGHT AT BEDTIME FOR MEMORY 90 tablet 2     DULoxetine (CYMBALTA) 30 MG capsule TAKE ONE CAPSULE BY MOUTH TWICE A DAY 60 capsule 0     gabapentin (NEURONTIN) 300 MG capsule Take 1 capsule (300 mg) by mouth 3 times daily (Patient  taking differently: Take 300 mg by mouth 3 times daily Take 1 capsule by mouth twice daily (morning and evening). May take an additional 1 capsule if needed in the afternoon. Maximum of 3 capsules per day.) 180 capsule 5     hydrOXYzine (ATARAX) 10 MG tablet Take 1 tablet (10 mg) by mouth 2 times daily as needed for anxiety (as needed only, for restlessness and axiety) 30 tablet 0     lactase (LACTAID) 9000 units TABS tablet Take 1 tablet (9,000 Units) by mouth 3 times daily (with meals) For lactose intolerance, when eating milk-related foods. 90 tablet 11     lipase-protease-amylase (CREON) 2711-43190-65952 units CPEP TAKE TWO CAPSULES BY MOUTH THREE TIMES A DAY WITH MEALS FOR DIGESTION 540 capsule 3     omeprazole (PRILOSEC) 10 MG DR capsule TAKE ONE CAPSULE BY MOUTH ONCE DAILY FOR HEARTBURN 90 capsule 1     psyllium (METAMUCIL/KONSYL) 58.6 % powder Take 6 g (1 teaspoonful) by mouth daily Take in the evening 283 g 3     traZODone (DESYREL) 100 MG tablet TAKE ONE TABLET BY MOUTH EVERY NIGHT AT BEDTIME FOR SLEEP 30 tablet 11     Vitamin D3 (VITAMIN D) 10 MCG (400 UNIT) tablet TAKE ONE TABLET BY MOUTH ONCE DAILY FOR BONE HEALTH 100 tablet 3     No facility-administered encounter medications on file as of 12/12/2023.             O:   NAD, WDWN, Alert & Oriented, Mood & Affect wnl, Vitals stable   General appearance normal   Vitals stable   Alert, oriented and in no acute distress      Following lymph nodes palpated: Occipital, Cervical, Supraclavicular no lad   pigmented macules on trunk and ext with regular borders and pigment networks      Stuck on papules and brown macules on trunk and ext   Red papules on trunk  Flesh colored papules on trunk   Scaly plaques on scalp  L flank inflamed seborrheic keratosis   The remainder of the full exam was normal; the following areas were examined:  conjunctiva/lids, , neck, peripheral vascular system, abdomen, lymph nodes, digits/nails, eccrine and apocrine glands, scalp/hair,  face, neck, chest, abdomen, buttocks, back, RUE, LUE, RLE, LLE       Eyes: Conjunctivae/lids:Normal     ENT: Lips, buccal mucosa, tongue: normal    MSK:Normal    Cardiovascular: peripheral edema none    Pulm: Breathing Normal    Lymph Nodes: No Head and Neck Lymphadenopathy     Neuro/Psych: Orientation:Alert and Orientedx3 ; Mood/Affect:normal       A/P:  1. Seborrheic keratosis, lentigo, angioma, dermal nevus, nevi  2. Psoriasis   Cloebtasol twice daily  3. L flank inflamed seborrheic keratosis   LN2:  Treated with LN2 for 5s for 1-2 cycles. Warned risks of blistering, pain, pigment change, scarring, and incomplete resolution.  Advised patient to return if lesions do not completely resolve.  Wound care sheet given.    It was a pleasure speaking to Ingrid Amaral today.  Previous clinic notes and pertinent laboratory tests were reviewed prior to Ingrid Amaral's visit.  Nature and genetics of benign skin lesions dicussed with patient.  Signs and Symptoms of skin cancer discussed with patient.  Patient encouraged to perform monthly skin exams.  UV precautions reviewed with patient.  Risks of non-melanoma skin cancer discussed with patient   Return to clinic 12 months      Again, thank you for allowing me to participate in the care of your patient.        Sincerely,        Juan Hendrickson MD

## 2023-12-12 NOTE — PROGRESS NOTES
Ingrid Amaral is an extremely pleasant 78 year old year old female patient here today for hx of psoriasis and spots on skin. Patient has no other skin complaints today.  Remainder of the HPI, Meds, PMH, Allergies, FH, and SH was reviewed in chart.      Past Medical History:   Diagnosis Date    Asthma     GERD (gastroesophageal reflux disease)     Radial head fracture 03/18/2010       Past Surgical History:   Procedure Laterality Date    ANKLE SURGERY      bilateral    COLONOSCOPY      COLONOSCOPY N/A 3/20/2015    Procedure: COLONOSCOPY;  Surgeon: Britney Martinez MD;  Location: [facility]    COLONOSCOPY N/A 5/22/2017    Procedure: COLONOSCOPY;  Colonoscopy;  Surgeon: Tristen Rangel MD;  Location: [facility]    ESOPHAGOSCOPY, GASTROSCOPY, DUODENOSCOPY (EGD), COMBINED N/A 6/7/2018    Procedure: COMBINED ESOPHAGOSCOPY, GASTROSCOPY, DUODENOSCOPY (EGD);  gastroscopy;  Surgeon: Tristen Rangel MD;  Location: [facility]    HC ESOPH/GAS REFLUX TEST W NASAL IMPED >1 HR  4/19/2012    Procedure:ESOPHAGEAL IMPEDENCE FUNCTION TEST WITH 24 HOUR PH GREATER THAN 1 HOUR; Surgeon:VALDO UMANZOR; Location: GI    HERNIA REPAIR      umbilcal    HYSTERECTOMY, PAP NO LONGER INDICATED  1991    TONSILLECTOMY      UPPER GI ENDOSCOPY          Family History   Problem Relation Age of Onset    Heart Disease Mother         valve problem    Hypertension Mother     Diabetes Mother         type 2    Cerebrovascular Disease Mother     Allergies Mother     Eye Disorder Mother         Macular degeneration    Osteoporosis Mother     Respiratory Mother     Migraines Mother     Cardiovascular Father         MI at age 80    Cancer - colorectal Father 87    Diabetes Father     Hypertension Father         type 2    Eye Disorder Father         macular degeneration    Lipids Father     C.A.D. Maternal Grandmother     Diabetes Paternal Grandmother         type 2    Cardiovascular Paternal Grandmother         MI    Diabetes Sister         type 2     Allergies Sister     Gastrointestinal Disease Sister     Depression Sister     Gastrointestinal Disease Daughter     Migraines Daughter     Gastrointestinal Disease Daughter     Migraines Daughter     Migraines Son     Aneurysm No family hx of     Brain Hemorrhage No family hx of     Brain Tumor No family hx of     Cancer No family hx of     Chiari Malformation No family hx of     LUNG DISEASE No family hx of     Seizure Disorder No family hx of        Social History     Socioeconomic History    Marital status:      Spouse name: Not on file    Number of children: Not on file    Years of education: Not on file    Highest education level: Not on file   Occupational History    Not on file   Tobacco Use    Smoking status: Former     Packs/day: 0.50     Years: 3.00     Additional pack years: 0.00     Total pack years: 1.50     Types: Cigarettes    Smokeless tobacco: Never    Tobacco comments:     smoked for 3 years when she was around 19   Vaping Use    Vaping Use: Never used   Substance and Sexual Activity    Alcohol use: Yes     Comment: RARELY    Drug use: No    Sexual activity: Not Currently   Other Topics Concern    Parent/sibling w/ CABG, MI or angioplasty before 65F 55M? Yes     Comment: mother- valve problem   Social History Narrative    Not on file     Social Determinants of Health     Financial Resource Strain: Unknown (10/25/2023)    Financial Resource Strain     Within the past 12 months, have you or your family members you live with been unable to get utilities (heat, electricity) when it was really needed?: Patient refused   Food Insecurity: Unknown (10/25/2023)    Food Insecurity     Within the past 12 months, did you worry that your food would run out before you got money to buy more?: Patient refused     Within the past 12 months, did the food you bought just not last and you didn t have money to get more?: Patient refused   Transportation Needs: Unknown (10/25/2023)    Transportation Needs      Within the past 12 months, has lack of transportation kept you from medical appointments, getting your medicines, non-medical meetings or appointments, work, or from getting things that you need?: Patient refused   Physical Activity: Not on file   Stress: Not on file   Social Connections: Not on file   Interpersonal Safety: Not on file   Housing Stability: Unknown (10/25/2023)    Housing Stability     Do you have housing? : Patient refused     Are you worried about losing your housing?: Patient refused       Outpatient Encounter Medications as of 12/12/2023   Medication Sig Dispense Refill    alpha-d-galactosidase (BEANO) tablet Take 1 tablet by mouth daily as needed for other (bloating)      amLODIPine (NORVASC) 2.5 MG tablet TAKE 1 TABLET (2.5 MG) BY MOUTH DAILY FOR BLOOD PRESSURE 90 tablet 3    ascorbic acid 1000 MG TABS tablet Take 1,000 mg by mouth daily      Biotin w/ Vitamins C & E (HAIR/SKIN/NAILS PO) Take 1 capsule by mouth daily      calcium carbonate-vitamin D (OSCAL) 500-5 MG-MCG tablet TAKE 1 TABLET BY MOUTH 2 TIMES DAILY (WITH MEALS) FOR BONE HEALTH 180 tablet 1    cetirizine (ZYRTEC) 10 MG tablet TAKE ONE TABLET BY MOUTH ONCE DAILY IN THE MORNING FOR ALLERGIES 90 tablet 3    cyanocobalamin (VITAMIN B-12) 100 MCG tablet TAKE TWO TABLETS BY MOUTH ONCE DAILY FOR B12 DEFICIENCY 180 tablet 1    donepezil (ARICEPT) 10 MG tablet TAKE ONE TABLET BY MOUTH EVERY NIGHT AT BEDTIME FOR MEMORY 90 tablet 2    DULoxetine (CYMBALTA) 30 MG capsule TAKE ONE CAPSULE BY MOUTH TWICE A DAY 60 capsule 0    gabapentin (NEURONTIN) 300 MG capsule Take 1 capsule (300 mg) by mouth 3 times daily (Patient taking differently: Take 300 mg by mouth 3 times daily Take 1 capsule by mouth twice daily (morning and evening). May take an additional 1 capsule if needed in the afternoon. Maximum of 3 capsules per day.) 180 capsule 5    hydrOXYzine (ATARAX) 10 MG tablet Take 1 tablet (10 mg) by mouth 2 times daily as needed for anxiety (as  needed only, for restlessness and axiety) 30 tablet 0    lactase (LACTAID) 9000 units TABS tablet Take 1 tablet (9,000 Units) by mouth 3 times daily (with meals) For lactose intolerance, when eating milk-related foods. 90 tablet 11    lipase-protease-amylase (CREON) 5409-17701-50944 units CPEP TAKE TWO CAPSULES BY MOUTH THREE TIMES A DAY WITH MEALS FOR DIGESTION 540 capsule 3    omeprazole (PRILOSEC) 10 MG DR capsule TAKE ONE CAPSULE BY MOUTH ONCE DAILY FOR HEARTBURN 90 capsule 1    psyllium (METAMUCIL/KONSYL) 58.6 % powder Take 6 g (1 teaspoonful) by mouth daily Take in the evening 283 g 3    traZODone (DESYREL) 100 MG tablet TAKE ONE TABLET BY MOUTH EVERY NIGHT AT BEDTIME FOR SLEEP 30 tablet 11    Vitamin D3 (VITAMIN D) 10 MCG (400 UNIT) tablet TAKE ONE TABLET BY MOUTH ONCE DAILY FOR BONE HEALTH 100 tablet 3     No facility-administered encounter medications on file as of 12/12/2023.             O:   NAD, WDWN, Alert & Oriented, Mood & Affect wnl, Vitals stable   General appearance normal   Vitals stable   Alert, oriented and in no acute distress      Following lymph nodes palpated: Occipital, Cervical, Supraclavicular no lad   pigmented macules on trunk and ext with regular borders and pigment networks      Stuck on papules and brown macules on trunk and ext   Red papules on trunk  Flesh colored papules on trunk   Scaly plaques on scalp  L flank inflamed seborrheic keratosis   The remainder of the full exam was normal; the following areas were examined:  conjunctiva/lids, , neck, peripheral vascular system, abdomen, lymph nodes, digits/nails, eccrine and apocrine glands, scalp/hair, face, neck, chest, abdomen, buttocks, back, RUE, LUE, RLE, LLE       Eyes: Conjunctivae/lids:Normal     ENT: Lips, buccal mucosa, tongue: normal    MSK:Normal    Cardiovascular: peripheral edema none    Pulm: Breathing Normal    Lymph Nodes: No Head and Neck Lymphadenopathy     Neuro/Psych: Orientation:Alert and Orientedx3 ;  Mood/Affect:normal       A/P:  1. Seborrheic keratosis, lentigo, angioma, dermal nevus, nevi  2. Psoriasis   Cloebtasol twice daily  3. L flank inflamed seborrheic keratosis   LN2:  Treated with LN2 for 5s for 1-2 cycles. Warned risks of blistering, pain, pigment change, scarring, and incomplete resolution.  Advised patient to return if lesions do not completely resolve.  Wound care sheet given.    It was a pleasure speaking to Ingrid Amaral today.  Previous clinic notes and pertinent laboratory tests were reviewed prior to Ingrid Amaral's visit.  Nature and genetics of benign skin lesions dicussed with patient.  Signs and Symptoms of skin cancer discussed with patient.  Patient encouraged to perform monthly skin exams.  UV precautions reviewed with patient.  Risks of non-melanoma skin cancer discussed with patient   Return to clinic 12 months

## 2023-12-31 DIAGNOSIS — F41.9 ANXIETY: ICD-10-CM

## 2023-12-31 DIAGNOSIS — F32.0 MILD MAJOR DEPRESSION (H): ICD-10-CM

## 2023-12-31 DIAGNOSIS — F41.0 PANIC DISORDER WITHOUT AGORAPHOBIA: ICD-10-CM

## 2024-01-02 ENCOUNTER — DOCUMENTATION ONLY (OUTPATIENT)
Dept: NEUROSURGERY | Facility: CLINIC | Age: 79
End: 2024-01-02
Payer: COMMERCIAL

## 2024-01-02 DIAGNOSIS — D32.9 MENINGIOMA (H): Primary | ICD-10-CM

## 2024-01-02 NOTE — PROGRESS NOTES
Pt due for repeat Brain MRI to be done 1 year from Feb 2023 -    Order placed. Routed to scheduling.

## 2024-01-03 ENCOUNTER — TRANSFERRED RECORDS (OUTPATIENT)
Dept: HEALTH INFORMATION MANAGEMENT | Facility: CLINIC | Age: 79
End: 2024-01-03
Payer: COMMERCIAL

## 2024-01-03 RX ORDER — DULOXETIN HYDROCHLORIDE 30 MG/1
30 CAPSULE, DELAYED RELEASE ORAL 2 TIMES DAILY
Qty: 180 CAPSULE | Refills: 0 | Status: SHIPPED | OUTPATIENT
Start: 2024-01-03 | End: 2024-04-12

## 2024-01-10 NOTE — PROGRESS NOTES
Medication Therapy Management (MTM) Encounter    ASSESSMENT:                            Medication Adherence/Access: No issues identified.     Dementia: Recently started memantine. Follow-up plan in place with neurology.    Depression/Anxiety/Insomnia: Likely related to dementia. May consider increasing SNRI dose in the future if necessary. Will continue monitoring closely.    Asthma/Allergic Rhinitis: Breathing remains well-controlled off Advair and can use Zyrtec if needed for seasonal allergies.    Pancreatic Insufficiency/Diarrhea/Constipation: Patient may benefit from increasing fiber supplement as recommended by GI provider.    GERD: Stable off omeprazole.    Psoriasis: Stable.    Back Pain: Stable off gabapentin.    Hypertension: Stable. Patient is meeting blood pressure goal of < 150/90mmHg.     Supplements: Stable.    PLAN:                            May try increasing Metamucil fiber to 1 capsule twice daily and see if that helps with bowel movements.    Follow-up: Return in about 1 year (around 1/15/2025) for Medication Therapy Management.    SUBJECTIVE/OBJECTIVE:                          Ingrid Amaral is a 78 year old female coming in for a follow-up visit from 1/30/23. Patient was accompanied by her  Harsha.      Reason for visit: Comprehensive medication review. Brings in all her medication bottles. No specific concerns today.    Allergies/ADRs: Reviewed in chart  Past Medical History: Reviewed in chart  Tobacco: She reports that she has quit smoking. Her smoking use included cigarettes. She has a 1.5 pack-year smoking history. She has never used smokeless tobacco.  Alcohol: Less than 1 beverages / week    Medication Adherence/Access:   Patient is a poor historian due to dementia.  administers prescription medications and she takes her own OTC medications.     Dementia:  Donepezil 10mg at bedtime.   Recently started memantine 5mg every morning for 1 more week, then 5mg twice daily  for 2  weeks, then 10mg twice daily thereafter.  Focus Factor 1 tablet daily - herbal for memory, wants to continue taking.  Patient needs help with daily activities.  Follows with Tati Bradford NP at [facility] in [place].    Depression/Anxiety/Insomnia:   Duloxetine 30mg twice daily.  Trazodone 100mg at bedtime - can't sleep if misses a dose.  Hydroxyzine HCl 10mg twice daily as needed for anxiety/restlessness - uses about once weekly and makes her sleepy.  Patient reports no current medication side effects.  Patient states mood hasn't been good lately - gets frustrated with  telling her what to do and being unable to perform activities she used to be able to do on her own. Denies any aggressive behaviors.        8/27/2021     2:01 PM 9/21/2022     3:09 PM 10/25/2023     2:14 PM   PHQ   PHQ-9 Total Score 8 8 10   Q9: Thoughts of better off dead/self-harm past 2 weeks Not at all Not at all Not at all         4/19/2021     2:16 PM 8/27/2021     2:01 PM 10/25/2023     2:17 PM   DELMIS-7 SCORE   Total Score   13 (moderate anxiety)   Total Score 12 12 13     Asthma/Allergic Rhinitis:   Stopped taking Advair and cetirizine - no issues with breathing or allergies lately.  wants to keep cetirizine on the med  list in case she needs it during allergy season.        9/21/2022     3:22 PM 1/30/2023     2:26 PM 10/25/2023     2:25 PM   ACT Total Scores   ACT TOTAL SCORE (Goal Greater than or Equal to 20) 25 25 24   In the past 12 months, how many times did you visit the emergency room for your asthma without being admitted to the hospital? 0 0 0   In the past 12 months, how many times were you hospitalized overnight because of your asthma? 0 0 0     Pancreatic Insufficiency/Diarrhea/Constipation:   Creon 2 capsules 3 times daily with meals.  Metamucil fiber 1 capsule at bedtime - GI suggested to try increasing to 3 times daily.  Gas-X chewable as needed for bloating - multiple times daily and finds very  helpful.  Lactaid as needed for dairy consumption.  Patient endorses more constipation lately and tells me how Velveeta cheese upset her stomach. She reports having a BM nearly every day, but stools are variable in terms of size and consistency. No longer having accidents overnight. Previously stool softener caused diarrhea.  Follows with SUSAN Marie NP - reviewed last note from 11/3/23.    GERD:   No longer taking omeprazole and denies any heartburn issues.  feels ok to remove omeprazole from the med list.     Psoriasis:   Clobetasol 0.05% solution on scalp/neck every day. Also uses vitamin E cream on hands/feet in the wintertime. Current topicals work well and no concerns at this time. Saw dermatology last month.     Back Pain:   No longer taking gabapentin and denies any nerve pain lately.  feels ok to remove gabapentin from the med list.     Hypertension:   Amlodipine 2.5mg daily.   Patient does not self-monitor blood pressure.   Patient reports no current medication side effects.    BP Readings from Last 3 Encounters:   01/15/24 137/86   11/03/23 134/82   10/25/23 136/88     Pulse Readings from Last 3 Encounters:   01/15/24 62   11/03/23 60   10/25/23 77     Supplements:   Hair/skin/nails 1 capsule daily.  Vitamin C 1000mg daily.  Vitamin B12 100mcg twice daily.  Vitamin D 400 units daily.  Calcium/vitamin D 500mg-200 unit twice daily.   DEXA History: 10/4/21 showed osteopenia.  No concerns today.    Lab Results   Component Value Date    VITDT 47 09/29/2015     Lab Results   Component Value Date    B12 1,054 02/27/2023     Today's Vitals: /86   Pulse 62   Wt 103 lb 6.4 oz (46.9 kg)   BMI 20.19 kg/m      Last Comprehensive Metabolic Panel:  Lab Results   Component Value Date     09/21/2022    POTASSIUM 3.9 09/21/2022    CHLORIDE 100 09/21/2022    CO2 27 09/21/2022    ANIONGAP 11 09/21/2022    GLC 91 09/21/2022    BUN 11.1 09/21/2022    CR 0.74 09/21/2022    GFRESTIMATED 82  09/21/2022    LARS 9.2 09/21/2022     ----------------    I spent 45 minutes with this patient today (an extra 15 minutes was spent creating the Medication Action Plan). All changes were made via collaborative practice agreement with MARVIN Tabares CNP. A copy of the visit note was provided to the patient's provider(s).    A summary of these recommendations was given to the patient.    Vonnie Peña, PharmD, BCACP  Medication Therapy Management Pharmacist  [facility]     Medication Therapy Recommendations  Constipation, unspecified constipation type    Current Medication: psyllium (METAMUCIL/KONSYL) capsule   Rationale: Dose too low - Dosage too low - Effectiveness   Recommendation: Increase Dose   Status: Accepted - no CPA Needed

## 2024-01-15 ENCOUNTER — OFFICE VISIT (OUTPATIENT)
Dept: PHARMACY | Facility: CLINIC | Age: 79
End: 2024-01-15
Payer: COMMERCIAL

## 2024-01-15 VITALS
BODY MASS INDEX: 20.19 KG/M2 | HEART RATE: 62 BPM | WEIGHT: 103.4 LBS | SYSTOLIC BLOOD PRESSURE: 137 MMHG | DIASTOLIC BLOOD PRESSURE: 86 MMHG

## 2024-01-15 DIAGNOSIS — G47.00 INSOMNIA, UNSPECIFIED TYPE: ICD-10-CM

## 2024-01-15 DIAGNOSIS — K21.9 GASTROESOPHAGEAL REFLUX DISEASE WITHOUT ESOPHAGITIS: ICD-10-CM

## 2024-01-15 DIAGNOSIS — K86.89 PANCREATIC INSUFFICIENCY: ICD-10-CM

## 2024-01-15 DIAGNOSIS — L40.9 PSORIASIS: ICD-10-CM

## 2024-01-15 DIAGNOSIS — M85.89 OSTEOPENIA OF MULTIPLE SITES: ICD-10-CM

## 2024-01-15 DIAGNOSIS — F02.80 LEWY BODY DEMENTIA WITHOUT BEHAVIORAL DISTURBANCE (H): ICD-10-CM

## 2024-01-15 DIAGNOSIS — I10 HYPERTENSION GOAL BP (BLOOD PRESSURE) < 140/90: Primary | ICD-10-CM

## 2024-01-15 DIAGNOSIS — J45.40 MODERATE PERSISTENT ASTHMA WITHOUT COMPLICATION: ICD-10-CM

## 2024-01-15 DIAGNOSIS — R19.7 DIARRHEA, UNSPECIFIED TYPE: ICD-10-CM

## 2024-01-15 DIAGNOSIS — G89.29 CHRONIC RIGHT-SIDED LOW BACK PAIN WITH RIGHT-SIDED SCIATICA: ICD-10-CM

## 2024-01-15 DIAGNOSIS — F32.0 MILD MAJOR DEPRESSION (H): ICD-10-CM

## 2024-01-15 DIAGNOSIS — K59.00 CONSTIPATION, UNSPECIFIED CONSTIPATION TYPE: ICD-10-CM

## 2024-01-15 DIAGNOSIS — M54.41 CHRONIC RIGHT-SIDED LOW BACK PAIN WITH RIGHT-SIDED SCIATICA: ICD-10-CM

## 2024-01-15 DIAGNOSIS — G31.83 LEWY BODY DEMENTIA WITHOUT BEHAVIORAL DISTURBANCE (H): ICD-10-CM

## 2024-01-15 DIAGNOSIS — F41.1 GENERALIZED ANXIETY DISORDER: ICD-10-CM

## 2024-01-15 DIAGNOSIS — Z78.9 TAKES DIETARY SUPPLEMENTS: ICD-10-CM

## 2024-01-15 DIAGNOSIS — J30.9 ALLERGIC RHINITIS, UNSPECIFIED SEASONALITY, UNSPECIFIED TRIGGER: ICD-10-CM

## 2024-01-15 PROCEDURE — 99605 MTMS BY PHARM NP 15 MIN: CPT | Performed by: PHARMACIST

## 2024-01-15 PROCEDURE — 99607 MTMS BY PHARM ADDL 15 MIN: CPT | Performed by: PHARMACIST

## 2024-01-15 NOTE — PATIENT INSTRUCTIONS
"Recommendations from today's MTM visit:                                                    MTM (medication therapy management) is a service provided by a clinical pharmacist designed to help you get the most of out of your medicines.   Today we reviewed what your medicines are for, how to know if they are working, that your medicines are safe and how to make your medicine regimen as easy as possible.      May try increasing Metamucil fiber to 1 capsule twice daily and see if that helps with bowel movements.    Follow-up: Return in about 1 year (around 1/15/2025) for Medication Therapy Management.    It was great speaking with you today.  I value your experience and would be very thankful for your time in providing feedback in our clinic survey. In the next few days, you may receive an email or text message from [employer] with a link to a survey related to your  clinical pharmacist.\"     To schedule another MTM appointment, please call the clinic directly or you may call the MTM scheduling line at [phone] or toll-free at [phone].     My Clinical Pharmacist's contact information:                                                      Please feel free to contact me with any questions or concerns you have.      Vonnie Peña, PharmD, BCACP  Medication Therapy Management Pharmacist  Voicemail: [phone] (Mon/Wed only)     "

## 2024-01-15 NOTE — LETTER
_  Medication List        Prepared on: 01/16/2024     Bring your Medication List when you go to the doctor, hospital, or   emergency room. And, share it with your family or caregivers.     Note any changes to how you take your medications.  Cross out medications when you no longer use them.    Medication How I take it Why I use it Prescriber   amLODIPine (NORVASC) 2.5 MG tablet TAKE 1 TABLET (2.5 MG) BY MOUTH DAILY FOR BLOOD PRESSURE Blood pressure MARVIN Driscoll CNP   ascorbic acid 1000 MG TABS tablet Take 1 tablet by mouth daily General health MARVIN Driscoll CNP   Biotin w/ Vitamins C & E (HAIR/SKIN/NAILS PO) Take 1 capsule by mouth daily General health MARVIN Driscoll CNP   calcium carbonate-vitamin D (OSCAL) 500-5 MG-MCG tablet TAKE 1 TABLET BY MOUTH 2 TIMES DAILY (WITH MEALS) FOR BONE HEALTH Bone health MARVIN Driscoll CNP   cetirizine (ZYRTEC) 10 MG tablet TAKE ONE TABLET (10 MG) BY MOUTH ONCE DAILY IN THE MORNING FOR ALLERGIES Allergies MARVIN Driscoll CNP   clobetasol (TEMOVATE) 0.05 % external solution Apply topically 2 times daily as directed Psoriasis Juan Hendrickson MD   cyanocobalamin (VITAMIN B-12) 100 MCG tablet TAKE TWO TABLETS BY MOUTH ONCE DAILY FOR B12 DEFICIENCY B12 Deficiency MARVIN Driscoll CNP   donepezil (ARICEPT) 10 MG tablet TAKE ONE TABLET (10 MG) BY MOUTH EVERY NIGHT AT BEDTIME FOR MEMORY Memory MARVIN Driscoll CNP   DULoxetine (CYMBALTA) 30 MG capsule TAKE ONE CAPSULE (30 MG) BY MOUTH TWICE A DAY Depression, anxiety MARVIN Driscoll CNP   HERBALS - Focus Factor Take 1 tablet by mouth daily General health MARVIN Driscoll CNP   hydrOXYzine (ATARAX) 10 MG tablet Take 1 tablet (10 mg) by mouth 2 times daily as needed for restlessness and anxiety Anxiety MARVIN Mauro CNP   lactase (LACTAID) 9000 units TABS tablet Take 1 tablet by mouth 3 times daily (with meals) For lactose intolerance,  when eating milk-related foods. Lactose Intolerance MARVIN Driscoll CNP   lipase-protease-amylase (CREON) 5575-64497-19045 units CPEP TAKE TWO CAPSULES BY MOUTH THREE TIMES A DAY WITH MEALS FOR DIGESTION Pancreatic Insufficiency MARVIN Driscoll CNP   memantine (NAMENDA) 5 MG tablet Take 1 tablet by mouth every morning for 2 weeks, then 1 tablet twice daily for 2 weeks, then 2 tablets twice daily. Memory Denijal Gluhic, DNP   psyllium (METAMUCIL/KONSYL) capsule Take 1 capsule by mouth 2 times daily Regular bowel movements MARVIN Driscoll CNP   simethicone (MYLICON) 125 MG chewable tablet Take 1 tablet by mouth 4 times daily as needed for bloating Bloating MARVIN Driscoll CNP   traZODone (DESYREL) 100 MG tablet TAKE ONE TABLET (100 MG) BY MOUTH EVERY NIGHT AT BEDTIME FOR SLEEP Depression, insomnia MARVIN Driscoll CNP   Vitamin D3 (VITAMIN D) 10 MCG (400 UNIT) tablet TAKE ONE TABLET BY MOUTH ONCE DAILY FOR BONE HEALTH Bone health MARVIN Driscoll CNP         Add new medications, over-the-counter drugs, herbals, vitamins, or  minerals in the blank rows below.    Medication How I take it Why I use it Prescriber                                      Allergies:      sulfa antibiotics        Side effects I have had:               Other Information:              My notes and questions:

## 2024-01-15 NOTE — LETTER
"Recommended To-Do List      Prepared on: 01/16/2024       You can get the best results from your medications by completing the items on this \"To-Do List.\"      Bring your To-Do List when you go to your doctor. And, share it with your family or caregivers.    My To-Do List:  What we talked about: What I should do:   Metamucil fiber dosing    May try increasing Metamucil fiber to 1 capsule twice daily and see if that helps with bowel movements.          What we talked about: What I should do:                     "

## 2024-01-15 NOTE — LETTER
January 16, 2024  Ingrid Amaral  [address] [address]    Dear Ms. Amaral, [facility]     Thank you for talking with me on Bhavin 15, 2024 about your health and medications. As a follow-up to our conversation, I have included two documents:      Your Recommended To-Do List has steps you should take to get the best results from your medications.  Your Medication List will help you keep track of your medications and how to take them.    If you want to talk about these documents, please call Vonnie Peña RPH at phone: [phone], Monday-Friday 8-4:30pm.    I look forward to working with you and your doctors to make sure your medications work well for you.    Sincerely,  Vonnie Peña RPH  [facility] Pharmacist, [facility]

## 2024-01-16 RX ORDER — SIMETHICONE 125 MG
125 TABLET,CHEWABLE ORAL 4 TIMES DAILY PRN
COMMUNITY

## 2024-01-16 RX ORDER — MEMANTINE HYDROCHLORIDE 5 MG/1
5 TABLET ORAL EVERY MORNING
COMMUNITY

## 2024-02-02 ENCOUNTER — HOSPITAL ENCOUNTER (OUTPATIENT)
Dept: MRI IMAGING | Facility: CLINIC | Age: 79
Discharge: HOME | End: 2024-02-02
Attending: NEUROLOGICAL SURGERY | Admitting: NEUROLOGICAL SURGERY
Payer: COMMERCIAL

## 2024-02-02 ENCOUNTER — TELEPHONE (OUTPATIENT)
Dept: NEUROSURGERY | Facility: CLINIC | Age: 79
End: 2024-02-02
Payer: COMMERCIAL

## 2024-02-02 DIAGNOSIS — D32.9 MENINGIOMA (H): ICD-10-CM

## 2024-02-02 PROCEDURE — A9585 GADOBUTROL INJECTION: HCPCS | Performed by: NEUROLOGICAL SURGERY

## 2024-02-02 PROCEDURE — 70553 MRI BRAIN STEM W/O & W/DYE: CPT

## 2024-02-02 PROCEDURE — 255N000002 HC RX 255 OP 636: Performed by: NEUROLOGICAL SURGERY

## 2024-02-02 RX ORDER — GADOBUTROL 604.72 MG/ML
4.5 INJECTION INTRAVENOUS ONCE
Status: COMPLETED | OUTPATIENT
Start: 2024-02-02 | End: 2024-02-02

## 2024-02-02 RX ADMIN — GADOBUTROL 4.5 ML: 604.72 INJECTION INTRAVENOUS at 12:41

## 2024-02-02 NOTE — TELEPHONE ENCOUNTER
Dr. Ewing has reviewed patients updated brain MRI and stated it is stable. Dr. Ewing recommends repeating MRI in 2 years.    Attempted to call patient to provide update, left detailed VM.     Reminder sent to nursing pool to contact patient closer to 2 year augusto.

## 2024-02-15 ENCOUNTER — ANESTHESIA EVENT (OUTPATIENT)
Dept: SURGERY | Facility: CLINIC | Age: 79
End: 2024-02-15
Payer: COMMERCIAL

## 2024-02-15 ASSESSMENT — LIFESTYLE VARIABLES: TOBACCO_USE: 1

## 2024-02-15 NOTE — ANESTHESIA PREPROCEDURE EVALUATION
Anesthesia Pre-Procedure Evaluation    Patient: Ingrid Amaral   MRN: [id number] : 1945        Procedure : Procedure(s):  Cataract Extraction with Imtraocular Lens Placement          Past Medical History:   Diagnosis Date    Asthma     GERD (gastroesophageal reflux disease)     Radial head fracture 2010      Past Surgical History:   Procedure Laterality Date    ANKLE SURGERY      bilateral    COLONOSCOPY      COLONOSCOPY N/A 3/20/2015    Procedure: COLONOSCOPY;  Surgeon: Britney Martinez MD;  Location: [facility]    COLONOSCOPY N/A 2017    Procedure: COLONOSCOPY;  Colonoscopy;  Surgeon: Tristen Rangel MD;  Location: [facility]    ESOPHAGOSCOPY, GASTROSCOPY, DUODENOSCOPY (EGD), COMBINED N/A 2018    Procedure: COMBINED ESOPHAGOSCOPY, GASTROSCOPY, DUODENOSCOPY (EGD);  gastroscopy;  Surgeon: Tristen Rangel MD;  Location: [facility]    HC ESOPH/GAS REFLUX TEST W NASAL IMPED >1 HR  2012    Procedure:ESOPHAGEAL IMPEDENCE FUNCTION TEST WITH 24 HOUR PH GREATER THAN 1 HOUR; Surgeon:VALDO UMANZOR; Location: GI    HERNIA REPAIR      umbilcal    HYSTERECTOMY, PAP NO LONGER INDICATED      TONSILLECTOMY      UPPER GI ENDOSCOPY        Allergies   Allergen Reactions    Sulfa Antibiotics Other (See Comments)     Reaction unknown as a child      Social History     Tobacco Use    Smoking status: Former     Packs/day: 0.50     Years: 3.00     Additional pack years: 0.00     Total pack years: 1.50     Types: Cigarettes    Smokeless tobacco: Never    Tobacco comments:     smoked for 3 years when she was around 19   Substance Use Topics    Alcohol use: Yes     Comment: RARELY      Wt Readings from Last 1 Encounters:   01/15/24 46.9 kg (103 lb 6.4 oz)        Anesthesia Evaluation   Pt has had prior anesthetic. Type: MAC and General.        ROS/MED HX  ENT/Pulmonary:     (+)                tobacco use, Past use,     asthma                  Neurologic: Comment: Lewy body dementia without behavioral  "disturbance (H)  Meningioma        Cardiovascular:     (+) Dyslipidemia hypertension- -   -  - -                                      METS/Exercise Tolerance:     Hematologic:       Musculoskeletal:       GI/Hepatic: Comment: Dysphagia      (+) GERD,            liver disease,       Renal/Genitourinary:       Endo:       Psychiatric/Substance Use:     (+) psychiatric history anxiety and depression       Infectious Disease:       Malignancy: Comment: Meningioma   (+) Malignancy, History of GI.    Other:            Physical Exam    Airway        Mallampati: I   TM distance: > 3 FB   Neck ROM: full   Mouth opening: > 3 cm    Respiratory Devices and Support         Dental       (+) Minor Abnormalities - some fillings, tiny chips      Cardiovascular   cardiovascular exam normal          Pulmonary   pulmonary exam normal                OUTSIDE LABS:  CBC:   Lab Results   Component Value Date    WBC 7.1 02/27/2023    WBC 4.8 06/18/2020    HGB 11.6 (L) 02/27/2023    HGB 12.1 06/18/2020    HCT 35.9 02/27/2023    HCT 37.5 06/18/2020     02/27/2023     06/18/2020     BMP:   Lab Results   Component Value Date     09/21/2022     06/18/2020    POTASSIUM 3.9 09/21/2022    POTASSIUM 3.8 06/18/2020    CHLORIDE 100 09/21/2022    CHLORIDE 106 06/18/2020    CO2 27 09/21/2022    CO2 29 06/18/2020    BUN 11.1 09/21/2022    BUN 13 06/18/2020    CR 0.74 09/21/2022    CR 0.62 06/18/2020    GLC 91 09/21/2022     (H) 06/18/2020     COAGS: No results found for: \"PTT\", \"INR\", \"FIBR\"  POC: No results found for: \"BGM\", \"HCG\", \"HCGS\"  HEPATIC:   Lab Results   Component Value Date    ALBUMIN 4.2 09/21/2022    PROTTOTAL 6.4 09/21/2022    ALT 15 09/21/2022    AST 23 09/21/2022    ALKPHOS 77 09/21/2022    BILITOTAL 0.2 09/21/2022     OTHER:   Lab Results   Component Value Date    LARS 9.2 09/21/2022    LIPASE 179 09/27/2017    AMYLASE 37 02/27/2012    TSH 1.41 06/18/2020       Anesthesia Plan    ASA Status:  3     "   Anesthesia Type: MAC.     - Reason for MAC: straight local not clinically adequate   Induction: Intravenous, Propofol.   Maintenance: TIVA.        Consents    Anesthesia Plan(s) and associated risks, benefits, and realistic alternatives discussed. Questions answered and patient/representative(s) expressed understanding.     - Discussed: Risks, Benefits and Alternatives for BOTH SEDATION and the PROCEDURE were discussed     - Discussed with:  Patient            Postoperative Care    Pain management: Multi-modal analgesia.        Comments:               MARVIN Tran CRNA    I have reviewed the pertinent notes and labs in the chart from the past 30 days and (re)examined the patient.  Any updates or changes from those notes are reflected in this note.

## 2024-02-16 ENCOUNTER — HOSPITAL ENCOUNTER (OUTPATIENT)
Facility: CLINIC | Age: 79
Discharge: HOME | End: 2024-02-16
Attending: OPHTHALMOLOGY | Admitting: OPHTHALMOLOGY
Payer: COMMERCIAL

## 2024-02-16 ENCOUNTER — ANESTHESIA (OUTPATIENT)
Dept: SURGERY | Facility: CLINIC | Age: 79
End: 2024-02-16
Payer: COMMERCIAL

## 2024-02-16 VITALS
WEIGHT: 103 LBS | HEIGHT: 60 IN | DIASTOLIC BLOOD PRESSURE: 79 MMHG | SYSTOLIC BLOOD PRESSURE: 140 MMHG | TEMPERATURE: 97.4 F | OXYGEN SATURATION: 99 % | HEART RATE: 77 BPM | RESPIRATION RATE: 16 BRPM | BODY MASS INDEX: 20.22 KG/M2

## 2024-02-16 PROCEDURE — 761N000008 HC RECOVERY CATRACT PACKAGE: Performed by: OPHTHALMOLOGY

## 2024-02-16 PROCEDURE — V2632 POST CHMBR INTRAOCULAR LENS: HCPCS | Performed by: OPHTHALMOLOGY

## 2024-02-16 PROCEDURE — 360N000007 HC CATARACT SURGICAL PACKAGE: Performed by: OPHTHALMOLOGY

## 2024-02-16 PROCEDURE — 250N000011 HC RX IP 250 OP 636

## 2024-02-16 PROCEDURE — 250N000009 HC RX 250: Performed by: OPHTHALMOLOGY

## 2024-02-16 PROCEDURE — 370N000004 HC ANESTHESIA CATARACT PACKAGE: Performed by: OPHTHALMOLOGY

## 2024-02-16 PROCEDURE — 250N000011 HC RX IP 250 OP 636: Performed by: OPHTHALMOLOGY

## 2024-02-16 PROCEDURE — 272N000001 HC OR GENERAL SUPPLY STERILE: Performed by: OPHTHALMOLOGY

## 2024-02-16 PROCEDURE — 258N000003 HC RX IP 258 OP 636

## 2024-02-16 DEVICE — TECNIS 1-PC CLEAR MONO 6.0MM 20.0D
Type: IMPLANTABLE DEVICE | Site: EYE | Status: FUNCTIONAL
Brand: TECNIS IOL

## 2024-02-16 RX ORDER — PROPARACAINE HYDROCHLORIDE 5 MG/ML
SOLUTION/ DROPS OPHTHALMIC PRN
Status: DISCONTINUED | OUTPATIENT
Start: 2024-02-16 | End: 2024-02-16 | Stop reason: HOSPADM

## 2024-02-16 RX ORDER — LIDOCAINE HYDROCHLORIDE 20 MG/ML
JELLY TOPICAL PRN
Status: DISCONTINUED | OUTPATIENT
Start: 2024-02-16 | End: 2024-02-16 | Stop reason: HOSPADM

## 2024-02-16 RX ORDER — BALANCED SALT SOLUTION 6.4; .75; .48; .3; 3.9; 1.7 MG/ML; MG/ML; MG/ML; MG/ML; MG/ML; MG/ML
SOLUTION OPHTHALMIC PRN
Status: DISCONTINUED | OUTPATIENT
Start: 2024-02-16 | End: 2024-02-16 | Stop reason: HOSPADM

## 2024-02-16 RX ORDER — SODIUM CHLORIDE, SODIUM LACTATE, POTASSIUM CHLORIDE, CALCIUM CHLORIDE 600; 310; 30; 20 MG/100ML; MG/100ML; MG/100ML; MG/100ML
INJECTION, SOLUTION INTRAVENOUS CONTINUOUS
Status: CANCELLED | OUTPATIENT
Start: 2024-02-16

## 2024-02-16 RX ORDER — TROPICAMIDE 10 MG/ML
1 SOLUTION/ DROPS OPHTHALMIC
Status: COMPLETED | OUTPATIENT
Start: 2024-02-16 | End: 2024-02-16

## 2024-02-16 RX ORDER — SODIUM CHLORIDE, SODIUM LACTATE, POTASSIUM CHLORIDE, CALCIUM CHLORIDE 600; 310; 30; 20 MG/100ML; MG/100ML; MG/100ML; MG/100ML
INJECTION, SOLUTION INTRAVENOUS CONTINUOUS PRN
Status: DISCONTINUED | OUTPATIENT
Start: 2024-02-16 | End: 2024-02-16

## 2024-02-16 RX ADMIN — CYCLOPENTOLATE HYDROCHLORIDE AND PHENYLEPHRINE HYDROCHLORIDE 1 DROP: 2; 10 SOLUTION/ DROPS OPHTHALMIC at 12:03

## 2024-02-16 RX ADMIN — CYCLOPENTOLATE HYDROCHLORIDE AND PHENYLEPHRINE HYDROCHLORIDE 1 DROP: 2; 10 SOLUTION/ DROPS OPHTHALMIC at 11:40

## 2024-02-16 RX ADMIN — TROPICAMIDE 1 DROP: 10 SOLUTION/ DROPS OPHTHALMIC at 11:50

## 2024-02-16 RX ADMIN — TROPICAMIDE 1 DROP: 10 SOLUTION/ DROPS OPHTHALMIC at 11:40

## 2024-02-16 RX ADMIN — CYCLOPENTOLATE HYDROCHLORIDE AND PHENYLEPHRINE HYDROCHLORIDE 1 DROP: 2; 10 SOLUTION/ DROPS OPHTHALMIC at 11:50

## 2024-02-16 RX ADMIN — MIDAZOLAM 2 MG: 1 INJECTION INTRAMUSCULAR; INTRAVENOUS at 12:15

## 2024-02-16 RX ADMIN — SODIUM CHLORIDE, POTASSIUM CHLORIDE, SODIUM LACTATE AND CALCIUM CHLORIDE: 600; 310; 30; 20 INJECTION, SOLUTION INTRAVENOUS at 12:14

## 2024-02-16 RX ADMIN — TROPICAMIDE 1 DROP: 10 SOLUTION/ DROPS OPHTHALMIC at 12:03

## 2024-02-16 ASSESSMENT — ACTIVITIES OF DAILY LIVING (ADL)
ADLS_ACUITY_SCORE: 20
ADLS_ACUITY_SCORE: 18

## 2024-02-16 NOTE — ANESTHESIA CARE TRANSFER NOTE
Patient: Ingrid Amaral    Procedure: Procedure(s):  Cataract Extraction with Imtraocular Lens Placement       Diagnosis: Nuclear sclerosis of right eye [H25.11]  Diagnosis Additional Information: No value filed.    Anesthesia Type:   MAC     Note:    Oropharynx: oropharynx clear of all foreign objects and spontaneously breathing  Level of Consciousness: awake  Oxygen Supplementation: room air    Independent Airway: airway patency satisfactory and stable  Dentition: dentition unchanged  Vital Signs Stable: post-procedure vital signs reviewed and stable  Report to RN Given: handoff report given  Patient transferred to: Phase II    Handoff Report: Identifed the Patient, Identified the Reponsible Provider, Reviewed the pertinent medical history, Discussed the surgical course, Reviewed Intra-OP anesthesia mangement and issues during anesthesia, Set expectations for post-procedure period and Allowed opportunity for questions and acknowledgement of understanding      Vitals:  Vitals Value Taken Time   /76 02/16/24 1240   Temp 36.3  C (97.4  F) 02/16/24 1240   Pulse 75 02/16/24 1240   Resp 14 02/16/24 1240   SpO2 100 % 02/16/24 1242   Vitals shown include unfiled device data.    Electronically Signed By: MARVIN Tran CRNA  February 16, 2024  12:44 PM

## 2024-02-16 NOTE — ANESTHESIA POSTPROCEDURE EVALUATION
Patient: Ingrid Amaral    Procedure: Procedure(s):  Cataract Extraction with Imtraocular Lens Placement       Anesthesia Type:  MAC    Note:  Disposition: Outpatient   Postop Pain Control: Uneventful            Sign Out: Well controlled pain   PONV: No   Neuro/Psych: Uneventful            Sign Out: Acceptable/Baseline neuro status   Airway/Respiratory: Uneventful            Sign Out: Acceptable/Baseline resp. status   CV/Hemodynamics: Uneventful            Sign Out: Acceptable CV status; No obvious hypovolemia; No obvious fluid overload   Other NRE: NONE   DID A NON-ROUTINE EVENT OCCUR? No           Last vitals:  Vitals Value Taken Time   /76 02/16/24 1240   Temp 36.3  C (97.4  F) 02/16/24 1240   Pulse 75 02/16/24 1240   Resp 14 02/16/24 1240   SpO2 100 % 02/16/24 1244   Vitals shown include unfiled device data.    Electronically Signed By: MARVIN Tran CRNA  February 16, 2024  12:44 PM

## 2024-02-16 NOTE — OP NOTE
OPHTHALMOLOGY OPERATIVE NOTE    PATIENT: Ingrid Amaral  DATE OF SURGERY: 2/16/2024  PREOPERATIVE DIAGNOSIS:  Senile Nuclear Cataract, Right eye  POSTOPERATIVE DIAGNOSIS:  Senile Nuclear Cataract, Right eye  OPERATIVE PROCEDURE:  Phacoemulsification with placement of intraocular lens  SURGEON:  Juan Benavides MD  ANESTHESIA:  Topical / MAC  EBL:  None  SPECIMENS:  None  COMPLICATIONS:  None    PROCEDURE:  The patient was brought to the operating room at [facility].  The right eye was prepped and draped in the usual fashion for cataract surgery.  A wire lid speculum was inserted.  A super sharp blade was used to make a paracentesis at the 11 O'clock position.  The super sharp blade was used to make a partial thickness temporal groove, which was 3 mm in length.  0.8 mL of non-preserved epi-Shugarcaine was injected into the anterior chamber.  Viscoelastic was used to inflate the anterior chamber through a cannula.  A 2.5 mm microkeratome was used to make a temporal clear corneal incision in a two-plane fashion.  A cystotome needle and forceps were used to make a capsulorrhexis.  Hydrodissection and hydrodelineation were performed with Balance Salt Solution.  The lens was then phacoemulsified and removed without complications.  The cortical material was removed with bimanual irrigation and aspiration.  The capsular bag was filled with viscoelastic.  A posterior chamber intraocular lens, preselected and recorded, was folded and inserted into the capsular bag.  The viscoelastic was removed with the irrigation and aspiration tip.  Balanced Salt Solution with Vigamox, 150mg/0.1mL, was used to refill the anterior chamber.  The wounds were checked for water tightness and required no suture.  The wire lid speculum was removed.  The patient's right eye was cleaned and a drop of each post-operative drop was placed, followed by a jesus shield.  The patient tolerated the procedure well, and there  were no complications.      Juan Benavides MD

## 2024-03-18 ENCOUNTER — ANESTHESIA EVENT (OUTPATIENT)
Dept: SURGERY | Facility: CLINIC | Age: 79
End: 2024-03-18
Payer: COMMERCIAL

## 2024-03-18 ASSESSMENT — LIFESTYLE VARIABLES: TOBACCO_USE: 1

## 2024-03-18 NOTE — ANESTHESIA PREPROCEDURE EVALUATION
Anesthesia Pre-Procedure Evaluation    Patient: Ingrid Amaral   MRN: [id number] : 1945        Procedure : Procedure(s):  Cataract Extraction with Intraocular Lens Placement          Past Medical History:   Diagnosis Date    Asthma     GERD (gastroesophageal reflux disease)     Radial head fracture 2010      Past Surgical History:   Procedure Laterality Date    ANKLE SURGERY      bilateral    COLONOSCOPY      COLONOSCOPY N/A 3/20/2015    Procedure: COLONOSCOPY;  Surgeon: Britney Martinez MD;  Location: [facility]    COLONOSCOPY N/A 2017    Procedure: COLONOSCOPY;  Colonoscopy;  Surgeon: Tristen Rangel MD;  Location: [facility]    ESOPHAGOSCOPY, GASTROSCOPY, DUODENOSCOPY (EGD), COMBINED N/A 2018    Procedure: COMBINED ESOPHAGOSCOPY, GASTROSCOPY, DUODENOSCOPY (EGD);  gastroscopy;  Surgeon: Tristen Rangel MD;  Location: [facility]    HC ESOPH/GAS REFLUX TEST W NASAL IMPED >1 HR  2012    Procedure:ESOPHAGEAL IMPEDENCE FUNCTION TEST WITH 24 HOUR PH GREATER THAN 1 HOUR; Surgeon:VALDO UMANZOR; Location: [facility]    HERNIA REPAIR      umbilcal    HYSTERECTOMY, PAP NO LONGER INDICATED      PHACOEMULSIFICATION WITH STANDARD INTRAOCULAR LENS IMPLANT Right 2024    Procedure: Cataract Extraction with Imtraocular Lens Placement;  Surgeon: Juan Benavides MD;  Location: [facility]    TONSILLECTOMY      UPPER GI ENDOSCOPY        Allergies   Allergen Reactions    Sulfa Antibiotics Other (See Comments)     Reaction unknown as a child      Social History     Tobacco Use    Smoking status: Former     Packs/day: 0.50     Years: 3.00     Additional pack years: 0.00     Total pack years: 1.50     Types: Cigarettes    Smokeless tobacco: Never    Tobacco comments:     smoked for 3 years when she was around 19   Substance Use Topics    Alcohol use: Yes     Comment: RARELY      Wt Readings from Last 1 Encounters:   24 46.7 kg (103 lb)        Anesthesia Evaluation   Pt has had prior anesthetic.  "Type: MAC and General.        ROS/MED HX  ENT/Pulmonary:     (+)                tobacco use, Past use,     asthma                  Neurologic: Comment: Lewy body dementia without behavioral disturbance (H)  Meningioma        Cardiovascular:     (+) Dyslipidemia hypertension- -   -  - -                                      METS/Exercise Tolerance:     Hematologic:       Musculoskeletal:       GI/Hepatic: Comment: Dysphagia      (+) GERD,            liver disease,       Renal/Genitourinary:       Endo:       Psychiatric/Substance Use:     (+) psychiatric history anxiety and depression       Infectious Disease:       Malignancy: Comment: Meningioma   (+) Malignancy, History of GI.    Other:            Physical Exam    Airway  airway exam normal           Respiratory Devices and Support         Dental       (+) Modest Abnormalities - crowns, retainers, 1 or 2 missing teeth      Cardiovascular             Pulmonary                   OUTSIDE LABS:  CBC:   Lab Results   Component Value Date    WBC 7.1 02/27/2023    WBC 4.8 06/18/2020    HGB 11.6 (L) 02/27/2023    HGB 12.1 06/18/2020    HCT 35.9 02/27/2023    HCT 37.5 06/18/2020     02/27/2023     06/18/2020     BMP:   Lab Results   Component Value Date     09/21/2022     06/18/2020    POTASSIUM 3.9 09/21/2022    POTASSIUM 3.8 06/18/2020    CHLORIDE 100 09/21/2022    CHLORIDE 106 06/18/2020    CO2 27 09/21/2022    CO2 29 06/18/2020    BUN 11.1 09/21/2022    BUN 13 06/18/2020    CR 0.74 09/21/2022    CR 0.62 06/18/2020    GLC 91 09/21/2022     (H) 06/18/2020     COAGS: No results found for: \"PTT\", \"INR\", \"FIBR\"  POC: No results found for: \"BGM\", \"HCG\", \"HCGS\"  HEPATIC:   Lab Results   Component Value Date    ALBUMIN 4.2 09/21/2022    PROTTOTAL 6.4 09/21/2022    ALT 15 09/21/2022    AST 23 09/21/2022    ALKPHOS 77 09/21/2022    BILITOTAL 0.2 09/21/2022     OTHER:   Lab Results   Component Value Date    LARS 9.2 09/21/2022    LIPASE 179 " 09/27/2017    AMYLASE 37 02/27/2012    TSH 1.41 06/18/2020       Anesthesia Plan    ASA Status:  3       Anesthesia Type: MAC.              Consents    Anesthesia Plan(s) and associated risks, benefits, and realistic alternatives discussed. Questions answered and patient/representative(s) expressed understanding.     - Discussed:     - Discussed with:  Patient            Postoperative Care            Comments:               MARVIN Colunga CRNA    I have reviewed the pertinent notes and labs in the chart from the past 30 days and (re)examined the patient.  Any updates or changes from those notes are reflected in this note.

## 2024-03-19 NOTE — ADDENDUM NOTE
[unfilled] [facility]  History & Physical    Shyam Reaves Patient Status:      3/18/2025 MRN [id number]   Location [facility] 1SW-N Attending Gaviota Madden MD   Hosp Day # 1 PCP No primary care provider on file.     HPI:  Shyam Reaves is a(n) Weight: 8 lb 2.9 oz (3.71 kg) (Filed from Delivery Summary) male infant.    Date of Delivery: 3/18/2024  Time of Delivery: 6:39 PM  Delivery Type: Normal spontaneous vaginal delivery    Information for the patient's mother:  Sofia Reaves [[id number]]   39 year old   Information for the patient's mother:  Sofia Reaves [[id number]]        Prenatal Labs:    Prenatal Results  Mother: Sofia Reaves #[id number]     Start of Mother's Information      Prenatal Results      1st Trimester Labs (GA 0-24w)       Test Value Reference Range Date Time    ABO Grouping OB  B   24 1045    RH Factor OB  Positive   24 1045    Antibody Screen OB        HCT        HGB        MCV        Platelets        Rubella Titer OB ^ Immune  Immune 23     Serology (RPR) OB        TREP        Urine Culture        Hep B Surf Ag OB ^ Negative  Negative, Unknown 23     HIV Result OB        HIV Combo        5th Gen HIV - DMG ^ Nonreactive  Nonreactive 23     HCV (Hep C)              3rd Trimester Labs (GA 24-41w)       Test Value Reference Range Date Time    HCT  32.9 % 35.0 - 48.0 24 0611       37.5 % 35.0 - 48.0 24 220       37.3 % 35.0 - 48.0 24 1908       35.8 % 35.0 - 48.0 24 1045    HGB  11.4 g/dL 12.0 - 16.0 24 0611       12.5 g/dL 12.0 - 16.0 24       12.8 g/dL 12.0 - 16.0 24 190       12.2 g/dL 12.0 - 16.0 24 1045    Platelets  318.0 10(3)uL 150.0 - 450.0 24 0611       333.0 10(3)uL 150.0 - 450.0 24 2206       320.0 10(3)uL 150.0 - 450.0 24 1908       353.0 10(3)uL 150.0 - 450.0 24 1045    TREP  Nonreactive  Nonreactive  24 1045    Group B Strep Culture     Addended by: BIPIN STONE on: 5/18/2017 10:36 PM     Modules accepted: Orders         Group B Strep OB ^ Negative  Negative, Unknown 02/23/24     GBS-DMG        HIV Result OB        HIV Combo Result        5th Gen HIV - DMG ^ Nonreactive  Nonreactive 01/06/24     HCV (Hep C)        TSH        COVID19 Infection              Genetic Screening (0-45w)       Test Value Reference Range Date Time    1st Trimester Aneuploidy Risk Assessment        Quad - Down Screen Risk Estimate (Required questions in OE to answer)        Quad - Down Maternal Age Risk (Required questions in OE to answer)        Quad - Trisomy 18 screen Risk Estimate (Required questions in OE to answer)        AFP Spina Bifida (Required questions in OE to answer )        Genetic testing        Genetic testing        Genetic testing              Legend    ^: Historical                      End of Mother's Information  Mother: Sofia Reaves #[id number]                 Rupture Date: 3/18/2024  Rupture Time: 5:29 PM  Rupture Type: SROM  Fluid Color: Clear  Induction: Oxytocin;AROM  Augmentation:    Complications:          Resuscitation:     Infant admitted to nursery via crib. Placed under warmer with temperature probe attached. Hugs tag attached to infant lower extremity.    Physical Exam:  Birth Weight: Weight: 8 lb 2.9 oz (3.71 kg) (Filed from Delivery Summary)  Weight Change Since Birth: -1%    Pulse 136   Temp 98.6 °F (37 °C) (Axillary)   Resp 56   Ht 50.8 cm (1' 8\")   Wt 8 lb 1.6 oz (3.674 kg)   HC 35.5 cm   BMI 14.24 kg/m²   Eyes: + RR bilaterally  HEENT: Head: sutures mobile, fontanelles normal size, Ears: well-positioned, well-formed pinnae.  Mouth: Normal tongue, palate intact, Neck: normal structure  Neck: Nl CLavicles Bilaterally  Lungs: Normal respiratory effort. Lungs clear to auscultation  Heart: Heart: Normal PMI. regular rate and rhythm, normal S1, S2, no murmurs or gallops., Peripheral arterial pulses:Right femoral artery has 2+ (normal)  and Left femoral artery has 2+ (normal)   Abdomen/Rectum: Normal scaphoid  appearance, soft, non-tender, without organ enlargement or masses.  Genitourinary: nl male genitals, testes down  Musculoskeletal: Normal symmetric bulk and strength, No hip clicks bilateterally  Skin/Hair/Nails: nl  Neurologic: Motor exam: normal strength and muscle mass., + suck, + symmetry of Serina    Labs:    Admission on 2024   Component Date Value Ref Range Status    Right ear 1st attempt 2024 Pass - AABR   Final    Left ear 1st attempt 2024 Pass - AABR   Final    TCB 2024 3.20   Final    Infant Age 2024 11   Final    Neurotoxicity Risk Factors 2024 No   Final    Phototherapy guide 2024 No   Final       Assessment:  ELISSA: Gestational Age: 40w1d   Weight: Weight: 8 lb 2.9 oz (3.71 kg) (Filed from Delivery Summary)  Sex: male  Normal male     Plan:  Routine  nursery care.  Feeding: Breast and bottle          Robert Durbin MD  3/19/2024  7:59 AM

## 2024-03-22 ENCOUNTER — ANESTHESIA (OUTPATIENT)
Dept: SURGERY | Facility: CLINIC | Age: 79
End: 2024-03-22
Payer: COMMERCIAL

## 2024-03-22 ENCOUNTER — HOSPITAL ENCOUNTER (OUTPATIENT)
Facility: CLINIC | Age: 79
Discharge: HOME | End: 2024-03-22
Attending: OPHTHALMOLOGY | Admitting: OPHTHALMOLOGY
Payer: COMMERCIAL

## 2024-03-22 VITALS
BODY MASS INDEX: 20.22 KG/M2 | HEIGHT: 60 IN | DIASTOLIC BLOOD PRESSURE: 72 MMHG | HEART RATE: 67 BPM | WEIGHT: 103 LBS | OXYGEN SATURATION: 91 % | SYSTOLIC BLOOD PRESSURE: 134 MMHG | TEMPERATURE: 98.2 F | RESPIRATION RATE: 15 BRPM

## 2024-03-22 PROCEDURE — 272N000001 HC OR GENERAL SUPPLY STERILE: Performed by: OPHTHALMOLOGY

## 2024-03-22 PROCEDURE — 370N000004 HC ANESTHESIA CATARACT PACKAGE: Performed by: OPHTHALMOLOGY

## 2024-03-22 PROCEDURE — 258N000003 HC RX IP 258 OP 636: Performed by: NURSE ANESTHETIST, CERTIFIED REGISTERED

## 2024-03-22 PROCEDURE — 360N000007 HC CATARACT SURGICAL PACKAGE: Performed by: OPHTHALMOLOGY

## 2024-03-22 PROCEDURE — 761N000008 HC RECOVERY CATRACT PACKAGE: Performed by: OPHTHALMOLOGY

## 2024-03-22 PROCEDURE — 250N000011 HC RX IP 250 OP 636: Performed by: NURSE ANESTHETIST, CERTIFIED REGISTERED

## 2024-03-22 PROCEDURE — 250N000011 HC RX IP 250 OP 636: Performed by: OPHTHALMOLOGY

## 2024-03-22 PROCEDURE — 250N000009 HC RX 250: Performed by: OPHTHALMOLOGY

## 2024-03-22 PROCEDURE — V2632 POST CHMBR INTRAOCULAR LENS: HCPCS | Performed by: OPHTHALMOLOGY

## 2024-03-22 DEVICE — EYE IMP IOL AMO PCL TECNIS ZCB00 20.0: Type: IMPLANTABLE DEVICE | Site: EYE | Status: FUNCTIONAL

## 2024-03-22 RX ORDER — SODIUM CHLORIDE, SODIUM LACTATE, POTASSIUM CHLORIDE, CALCIUM CHLORIDE 600; 310; 30; 20 MG/100ML; MG/100ML; MG/100ML; MG/100ML
INJECTION, SOLUTION INTRAVENOUS CONTINUOUS
Status: DISCONTINUED | OUTPATIENT
Start: 2024-03-22 | End: 2024-03-26 | Stop reason: HOSPADM

## 2024-03-22 RX ORDER — LIDOCAINE HYDROCHLORIDE 20 MG/ML
JELLY TOPICAL PRN
Status: DISCONTINUED | OUTPATIENT
Start: 2024-03-22 | End: 2024-03-26 | Stop reason: HOSPADM

## 2024-03-22 RX ORDER — ONDANSETRON 4 MG/1
4 TABLET, ORALLY DISINTEGRATING ORAL EVERY 30 MIN PRN
Status: DISCONTINUED | OUTPATIENT
Start: 2024-03-22 | End: 2024-03-26 | Stop reason: HOSPADM

## 2024-03-22 RX ORDER — TROPICAMIDE 10 MG/ML
1 SOLUTION/ DROPS OPHTHALMIC
Status: COMPLETED | OUTPATIENT
Start: 2024-03-22 | End: 2024-03-22

## 2024-03-22 RX ORDER — LIDOCAINE 40 MG/G
CREAM TOPICAL
Status: DISCONTINUED | OUTPATIENT
Start: 2024-03-22 | End: 2024-03-26 | Stop reason: HOSPADM

## 2024-03-22 RX ORDER — BALANCED SALT SOLUTION 6.4; .75; .48; .3; 3.9; 1.7 MG/ML; MG/ML; MG/ML; MG/ML; MG/ML; MG/ML
SOLUTION OPHTHALMIC PRN
Status: DISCONTINUED | OUTPATIENT
Start: 2024-03-22 | End: 2024-03-26 | Stop reason: HOSPADM

## 2024-03-22 RX ORDER — ONDANSETRON 2 MG/ML
4 INJECTION INTRAMUSCULAR; INTRAVENOUS EVERY 30 MIN PRN
Status: DISCONTINUED | OUTPATIENT
Start: 2024-03-22 | End: 2024-03-26 | Stop reason: HOSPADM

## 2024-03-22 RX ORDER — PROPOFOL 10 MG/ML
INJECTION, EMULSION INTRAVENOUS PRN
Status: DISCONTINUED | OUTPATIENT
Start: 2024-03-22 | End: 2024-03-22

## 2024-03-22 RX ORDER — PROPARACAINE HYDROCHLORIDE 5 MG/ML
SOLUTION/ DROPS OPHTHALMIC PRN
Status: DISCONTINUED | OUTPATIENT
Start: 2024-03-22 | End: 2024-03-26 | Stop reason: HOSPADM

## 2024-03-22 RX ADMIN — CYCLOPENTOLATE HYDROCHLORIDE AND PHENYLEPHRINE HYDROCHLORIDE 1 DROP: 2; 10 SOLUTION/ DROPS OPHTHALMIC at 09:59

## 2024-03-22 RX ADMIN — TROPICAMIDE 1 DROP: 10 SOLUTION/ DROPS OPHTHALMIC at 09:54

## 2024-03-22 RX ADMIN — SODIUM CHLORIDE, POTASSIUM CHLORIDE, SODIUM LACTATE AND CALCIUM CHLORIDE: 600; 310; 30; 20 INJECTION, SOLUTION INTRAVENOUS at 09:59

## 2024-03-22 RX ADMIN — CYCLOPENTOLATE HYDROCHLORIDE AND PHENYLEPHRINE HYDROCHLORIDE 1 DROP: 2; 10 SOLUTION/ DROPS OPHTHALMIC at 09:54

## 2024-03-22 RX ADMIN — TROPICAMIDE 1 DROP: 10 SOLUTION/ DROPS OPHTHALMIC at 10:03

## 2024-03-22 RX ADMIN — TROPICAMIDE 1 DROP: 10 SOLUTION/ DROPS OPHTHALMIC at 09:59

## 2024-03-22 RX ADMIN — CYCLOPENTOLATE HYDROCHLORIDE AND PHENYLEPHRINE HYDROCHLORIDE 1 DROP: 2; 10 SOLUTION/ DROPS OPHTHALMIC at 10:03

## 2024-03-22 RX ADMIN — PROPOFOL 20 MG: 10 INJECTION, EMULSION INTRAVENOUS at 10:50

## 2024-03-22 ASSESSMENT — ACTIVITIES OF DAILY LIVING (ADL)
ADLS_ACUITY_SCORE: 29

## 2024-03-22 NOTE — ANESTHESIA POSTPROCEDURE EVALUATION
Patient: Ingrid Amaral    Procedure: Procedure(s):  Cataract Extraction with Intraocular Lens Placement       Anesthesia Type:  MAC    Note:  Disposition: Outpatient   Postop Pain Control: Uneventful            Sign Out: Well controlled pain   PONV: No   Neuro/Psych: Uneventful            Sign Out: Acceptable/Baseline neuro status   Airway/Respiratory: Uneventful            Sign Out: Acceptable/Baseline resp. status   CV/Hemodynamics: Uneventful            Sign Out: Acceptable CV status; No obvious hypovolemia; No obvious fluid overload   Other NRE: NONE   DID A NON-ROUTINE EVENT OCCUR? No           Last vitals:  Vitals:    03/22/24 0942   BP: 118/80   Pulse: 77   Resp: 16   Temp: 36.7  C (98  F)   SpO2: 98%       Electronically Signed By: MARVIN Holly CRNA  March 22, 2024  11:12 AM

## 2024-03-22 NOTE — OP NOTE
CATARACT OPERATIVE NOTE    PATIENT: Ingrid Amaral  DATE OF SURGERY: 3/22/2024  PREOPERATIVE DIAGNOSIS:  Senile Nuclear Cataract, Left eye  POSTOPERATIVE DIAGNOSIS:  Senile Nuclear Cataract, Left eye  OPERATIVE PROCEDURE:  Phacoemulsification and placement of intraocular lens  SURGEON:  Juan Benavides MD  ANESTHESIA:  Topical / MAC  EBL:  None  SPECIMENS:  None  COMPLICATIONS:  None    PROCEDURE:  The patient was brought to the operating room at [facility].  The left eye was prepped and draped in the usual fashion for cataract surgery.  A wire lid speculum was inserted.  A super sharp blade was used to make a paracentesis at the 5 O'clock position.  The super sharp blade was used to make a partial thickness temporal groove, which was 3 mm in length.  0.8 mL of non-preserved epi-Shugarcaine was injected into the anterior chamber.  Viscoelastic was used to inflate the anterior chamber through a cannula.  A 2.5 mm microkeratome was used to make a temporal clear corneal incision in a two-plane fashion.  A cystotome needle and forceps were used to make a capsulorrhexis.  Hydrodissection and hydrodelineation were performed with Balance Salt Solution.  The lens was then phacoemulsified and removed without complications.  The cortical material was removed with bimanual irrigation and aspiration.  The capsular bag was filled with viscoelastic.  A posterior chamber intraocular lens, preselected and recorded, was folded and inserted into the capsular bag.  The viscoelastic was removed with the irrigation and aspiration tip.  Balanced Salt Solution with Vigamox, 150mg/0.1mL, was used to refill the anterior chamber.  The wounds were checked for water tightness and required no suture.  The wire lid speculum was removed.  The patient's left eye was cleaned and a drop of each post-operative drop was placed, followed by a jesus shield.  The patient tolerated the procedure well, and there were no  complications.      Juan Benavides MD

## 2024-03-22 NOTE — PROGRESS NOTES
[facility] DISCHARGE NOTE    Patient discharged to home at 11:43 AM via ambulation. Accompanied by spouse and staff. Discharge instructions reviewed with patient and spouse, opportunity offered to ask questions. Prescriptions - None ordered for discharge. All belongings sent with patient.    Marilou Velasquez RN

## 2024-03-22 NOTE — ANESTHESIA CARE TRANSFER NOTE
Patient: Ingrid Amaral    Procedure: Procedure(s):  Cataract Extraction with Intraocular Lens Placement       Diagnosis: Nuclear sclerosis [H25.10]  Diagnosis Additional Information: No value filed.    Anesthesia Type:   MAC     Note:    Oropharynx: oropharynx clear of all foreign objects and spontaneously breathing  Level of Consciousness: awake  Oxygen Supplementation: room air    Independent Airway: airway patency satisfactory and stable  Dentition: dentition unchanged  Vital Signs Stable: post-procedure vital signs reviewed and stable  Report to RN Given: handoff report given  Patient transferred to: Phase II    Handoff Report: Identifed the Patient, Identified the Reponsible Provider, Reviewed the pertinent medical history, Discussed the surgical course, Reviewed Intra-OP anesthesia mangement and issues during anesthesia, Set expectations for post-procedure period and Allowed opportunity for questions and acknowledgement of understanding    Vitals:  Vitals Value Taken Time   BP     Temp     Pulse     Resp     SpO2         Electronically Signed By: MARVIN Holly CRNA  March 22, 2024  11:11 AM

## 2024-03-23 ASSESSMENT — ACTIVITIES OF DAILY LIVING (ADL)
ADLS_ACUITY_SCORE: 29

## 2024-03-24 ASSESSMENT — ACTIVITIES OF DAILY LIVING (ADL)
ADLS_ACUITY_SCORE: 29

## 2024-03-25 ASSESSMENT — ACTIVITIES OF DAILY LIVING (ADL)
ADLS_ACUITY_SCORE: 29

## 2024-03-26 ASSESSMENT — ACTIVITIES OF DAILY LIVING (ADL)
ADLS_ACUITY_SCORE: 29

## 2024-03-28 DIAGNOSIS — I10 HYPERTENSION GOAL BP (BLOOD PRESSURE) < 140/90: ICD-10-CM

## 2024-03-29 RX ORDER — AMLODIPINE BESYLATE 2.5 MG/1
2.5 TABLET ORAL DAILY
Qty: 90 TABLET | Refills: 1 | Status: SHIPPED | OUTPATIENT
Start: 2024-03-29

## 2024-04-03 ENCOUNTER — TRANSFERRED RECORDS (OUTPATIENT)
Dept: HEALTH INFORMATION MANAGEMENT | Facility: CLINIC | Age: 79
End: 2024-04-03
Payer: COMMERCIAL

## 2024-04-11 DIAGNOSIS — F41.0 PANIC DISORDER WITHOUT AGORAPHOBIA: ICD-10-CM

## 2024-04-11 DIAGNOSIS — F41.9 ANXIETY: ICD-10-CM

## 2024-04-11 DIAGNOSIS — F32.0 MILD MAJOR DEPRESSION (H): ICD-10-CM

## 2024-04-12 RX ORDER — DULOXETIN HYDROCHLORIDE 30 MG/1
30 CAPSULE, DELAYED RELEASE ORAL 2 TIMES DAILY
Qty: 180 CAPSULE | Refills: 1 | Status: SHIPPED | OUTPATIENT
Start: 2024-04-12

## 2024-04-12 NOTE — TELEPHONE ENCOUNTER
Pending Prescriptions:                       Disp   Refills    DULoxetine (CYMBALTA) 30 MG capsule [Pharm*180 ca*0        Sig: TAKE ONE CAPSULE BY MOUTH TWICE A DAY    Routing refill request to provider for review/approval because:  PHQ-9 and DELMIS-7 not in goal range        8/27/2021     2:01 PM 9/21/2022     3:09 PM 10/25/2023     2:14 PM   PHQ   PHQ-9 Total Score 8 8 10   Q9: Thoughts of better off dead/self-harm past 2 weeks Not at all Not at all Not at all         4/19/2021     2:16 PM 8/27/2021     2:01 PM 10/25/2023     2:17 PM   DELMIS-7 SCORE   Total Score   13 (moderate anxiety)   Total Score 12 12 13     Laquita Rahman RN  [facility]

## 2024-05-03 ENCOUNTER — OFFICE VISIT (OUTPATIENT)
Dept: GASTROENTEROLOGY | Facility: CLINIC | Age: 79
End: 2024-05-03
Attending: NURSE PRACTITIONER
Payer: COMMERCIAL

## 2024-05-03 VITALS
SYSTOLIC BLOOD PRESSURE: 120 MMHG | DIASTOLIC BLOOD PRESSURE: 72 MMHG | WEIGHT: 104.6 LBS | HEIGHT: 60 IN | BODY MASS INDEX: 20.54 KG/M2 | TEMPERATURE: 98 F

## 2024-05-03 DIAGNOSIS — K64.4 EXTERNAL HEMORRHOIDS: ICD-10-CM

## 2024-05-03 DIAGNOSIS — E73.9 LACTOSE INTOLERANCE: ICD-10-CM

## 2024-05-03 DIAGNOSIS — K64.8 INTERNAL HEMORRHOIDS: ICD-10-CM

## 2024-05-03 DIAGNOSIS — K52.9 CHRONIC DIARRHEA: Primary | ICD-10-CM

## 2024-05-03 DIAGNOSIS — R14.0 ABDOMINAL BLOATING: ICD-10-CM

## 2024-05-03 PROCEDURE — 99214 OFFICE O/P EST MOD 30 MIN: CPT | Performed by: NURSE PRACTITIONER

## 2024-05-03 RX ORDER — HYDROCORTISONE ACETATE 25 MG/1
25 SUPPOSITORY RECTAL AT BEDTIME
Qty: 30 SUPPOSITORY | Refills: 0 | Status: SHIPPED | OUTPATIENT
Start: 2024-05-03 | End: 2024-06-19

## 2024-05-03 NOTE — PATIENT INSTRUCTIONS
It was a pleasure taking care of you today.  I've included a brief summary of our discussion and care plan from today's visit below.  Please review this information with your primary care provider.  ______________________________________________________________________    My recommendations are summarized as follows:    Continue to take Creon (digestive pill) with first bite of a meal.    2.  Start using Anusol suppository before bed for hemorrhoids for one month.    3. If no improvement in rectal discomfort and lower abdominal pain, we may refer you to colonoscopy.    4. Start taking Miralax daily. You can use other stool softeners as well (Colace).    Return to GI Clinic in 4-6 weeks to review your progress.    ______________________________________________________________________       What are hemorrhoids?  Hemorrhoids are swollen veins in the rectum. They can cause itching, bleeding, and pain. Hemorrhoids are very common.  In some cases, you can see or feel hemorrhoids around the outside of the rectum. In other cases, you cannot see them because they are hidden inside the rectum .  What are the symptoms of hemorrhoids?  Hemorrhoids do not always cause symptoms. But when they do, symptoms can include:  ?Itching of the skin around the anus  ?Bleeding - Bleeding is usually painless. You might see bright red blood after using the toilet.  ?Pain - If a blood clot forms inside a hemorrhoid, this can cause pain. It can also cause a lump that you might be able to feel.  ?Swelling - Hemorrhoids can swell or dangle outside of the rectum during a bowel movement.  If you notice bleeding when you have a bowel movement, or if your bowel movements look like tar, see a doctor or nurse. Bleeding could be caused by something other than hemorrhoids, so you should have it checked out.  How can I care for myself at home?  The most important thing that you can do is try to prevent constipation and keep your bowel movements soft. You  "should have a bowel movement at least a few times a week. Here are some steps that you can take:  ?Eat lots of fruits, vegetables, and other foods with fiber . Fiber helps to increase bowel movements.  You need 20 to 35 grams of fiber a day to keep your bowel movements regular. If you do not get enough fiber from your diet, you can take fiber supplements. These come in the form of powders, wafers, or pills. They include  psyllium seed (sample brand names: Metamucil, Konsyl), methylcellulose (sample brand name: Citrucel), polycarbophil (sample brand name: FiberCon), and wheat dextrin (sample brand name: Benefiber). If you take a fiber supplement, be sure to read the label so you know how much to take.   Drink plenty of water and other fluids. This is especially important if you take a fiber supplement.  ?Limit fatty foods and alcohol. These can make constipation worse.  ?Take medicines called \"stool softeners\" such as docusate sodium (sample brand names: Colace, Dulcolax). These medicines increase the number of bowel movements you have. They are safe to take and they can prevent problems later.  ?Take your time when having a bowel movement. But do not spend too much time on the toilet (for example, reading). Also, try not to push hard or strain when having a bowel movement.  ?Get regular physical activity. Even gentle forms of exercise, like walking, are good for your health.  To relieve symptoms of hemorrhoids, you can:  ?Soak your buttocks in 2 or 3 inches of warm water - You can do this up to 2 to 3 times a day for 10 to 15 minutes. Do not add soap, bubble bath, or anything to the water.  ?Try non-prescription medicines - They include creams or ointments that you rub on your anus to relieve pain, itching, and swelling. Some hemorrhoid medicines come in a capsule (called a suppository) that you put inside your rectum. Others come in a cream that comes in a bottle with a nozzle that you put inside your rectum.  Do " "not use medicines that have hydrocortisone (a steroid medicine) for more than a week, unless your doctor or nurse approves.  Are there other treatments for hemorrhoids?  Yes. If you still have symptoms after trying the steps listed above, you might need treatments to destroy or remove the hemorrhoids.  One popular treatment for hemorrhoids inside the rectum is called \"rubber band ligation.\" For this treatment, the doctor ties tiny rubber bands around the hemorrhoids. A few days later the hemorrhoids shrink and stop bleeding. Doctors can also use lasers, heat, or chemicals to destroy hemorrhoids.  If none of these options works, your doctor might suggest surgery to remove or tie off the blood vessels of the hemorrhoids. Hemorrhoids on the outside of the rectum can only be removed with surgery.  When should I call my doctor or nurse?  Call for advice if:  ?You have new or increased bleeding from your rectum.  ?You have tissue sticking out from your rectum that you can't push back in.  ?You are not able to pass bowel movements because of pain.  ?Your symptoms are getting worse even with home care.  ?You have a fever of 100.4 F (38 C) or higher.    Common at-home treatments for hemorrhoids  Type of medicine Examples Notes   Stool softener Docusate sodium (sample brand names: Colace, Docu, Stool Softener) Softens bowel movements  Helps avoid straining while sitting on toilet   Bulk-forming laxatives and fiber supplements Methylcellulose (sample brand names: Citrucel, Soluble Fiber Therapy)  Polycarbophil (sample brand names: FiberCon, Konsyl Fiber)  Psyllium (sample brand names: Metamucil, Konsyl)  Wheat dextrin (sample brand name: Benefiber) Prevent hard dry stools which make hemorrhoids worse  Might reduce bleeding and other hemorrhoid symptoms  Needs to be taken with plenty of water (8 glasses of water a day)  Your doctor might suggest starting with a small dose and then increasing over time   Pain relief Pramoxine " rectal foam, ointment, or wipes (sample brand names: Proctofoam, Pramox) Can help with pain and itching  Area must be gently cleaned and allowed to dry before using   Medicines to dry or protect skin Witch hazel (sample brand names: Tucks, Preparation H pads, Preparation H wipes)  Zinc oxide topical paste (sample brand names: Oj's Butt Paste, Desitin) May dry or tighten skin around the anus  Zinc oxide also protects skin from irritation  Area must be gently cleaned and allowed to dry before using  Can apply after a sitz bath (soaking in warm, shallow water)  Witch hazel wipes or unscented baby wipes can be used to clean the anus after a bowel movement   Steroid cream Hydrocortisone rectal cream (sample brand name: Preparation H hydrocortisone) Reduces swelling, and pain caused by hemorrhoids  Do not use for longer than a week  Do not use at all if you are pregnant unless your doctor or nurse tells you to   Medicines to shrink hemorrhoids Phenylephrine ointment or suppository (sample brand names: Preparation H, Rectacaine) Phenylephrine shrinks swollen hemorrhoids, and relieves itching and discomfort for a few hours  Area must be gently cleaned and allowed to dry before applying   Numbing ointments Benzocaine rectal ointment (sample brand name: Americaine)  Dibucaine rectal ointment (sample brand name: Nupercainal)  Lidocaine rectal cream (sample brand name: RectiCare) Benzocaine, dibucaine, and lidocaine can help with pain and itching, but should not be used without talking to a doctor first. They should only be used once in a while, in small amounts.             ______________________________________________________________________    Who do I call with any questions after my visit?  Please be in touch if there are any further questions that arise following today's visit.  There are multiple ways to contact your gastroenterology care team.      During business hours, you may reach a Gastroenterology nurse at  [phone], option 3.     To schedule or reschedule an appointment, please call [phone].   To schedule your imaging studies (CT, MRI, ultrasound)  call [phone] (or toll-free # [phone])  To schedule your lab work at [facility], please call [phone]    You can always send a secure message through [employer].  [employer] messages are answered by your nurse or doctor typically within 24 hours.  Please allow extra time on weekends and holidays.      For urgent/emergent questions after business hours, you may reach the on-call GI Fellow by contacting the [facility]  at [phone].    In order for your refill to be processed in a timely fashion, it is your responsibility to ensure you follow the recommendations from your provider regarding your laboratory studies and follow up appointments.       How will I get the results of any tests ordered?    You will receive all of your results.  If you have signed up for [employer], any tests ordered at your visit will be available to you after your physician reviews them.  Typically this takes 1-2 weeks.  If there are urgent results that require a change in your care plan, your physician or nurse will call you to discuss the next steps.   What is [employer]?  [employer] is a secure way for you to access all of your healthcare records from the [facility].  It is a web based computer program, so you can sign on to it from any location.  It also allows you to send secure messages to your care team.  I recommend signing up for [employer] access if you have not already done so and are comfortable with using a computer.    How to I schedule a follow-up visit?  If you did not schedule a follow-up visit today, please call [phone] to schedule a follow-up office visit.      Sincerely,  MIKE Mauro,  [facility],  Division of Gastroenterology   ([facility])

## 2024-05-03 NOTE — PROGRESS NOTES
Gastroenterology CLINIC VISIT, RETURN PATIENT    CC/REFERRING PROVIDER: Referred Self   REASON FOR CONSULTATION: follow up    HPI: 78 year old female presented to GI clinic for a follow up. Patient was last seen in November for diarrhea, abdominal bloating, and anal discomfort. PMH of Lewy body dementia, asthma, HTN, hiatal hernia, exocrine pancreatic insufficiency, lactose intolerance, and GERD. Patient is here with her  Harsha, who provides history. Patient actively participates in conversation but looks to her  to answer some questions.   Said that she has been having bowel movement every day and her stools are soft and large.  She no longer has nocturnal incontinence episodes. She complains of  some straining on defecation and rectal discomfort. Has been noticing small amount of red blood on wipes. Her  stated that patient said that her buttocks hurt when she sits on hard surfaces so he bought her a pillow. He believes that the pain is related to loss of fat tissue. At the same time, he mentioned that the patient has been spending more time on the toilet.   She takes fiber supplement inconsistently. Reported good compliance with Creon, rarely misses a dose or two in a week. Trying to limit dairy intake and uses Lactaid tablets as needed. She reduced intake of potato chips, beep, and pop.  Patient's  brought up a concerns of bloating again. She ate spaghetti for dinner yesterday and  ended up with bloating a few hours later. At times, the patient is unable to wear jeans or bra due to discomfort from abdominal distention. Has been taking Gas-X several times a day, not always work. Patient reported some lower abdominal discomfort.       PERTINENT STUDIES Reviewed in EMR  Previous studies:  Negative recent celiac serology.  Had last EGD in 2018 and x-ray esophagram in 2019, suggestive for moderate to large size hiatal hernia. Normal swallowing evaluation by speech therapist in 2018.  Patient had normal colonoscopy in 2017. Family history significant for colon cancer in father. Noted that for some time, the patient was on yearly MRCP surveillance schedule for enlarged pancreatic duct. It was discontinued in 2015, when she had normal MRCP.       ROS: 10pt ROS performed and otherwise negative.    PAST MEDICAL HISTORY:  Past Medical History:   Diagnosis Date    Asthma     GERD (gastroesophageal reflux disease)     Radial head fracture 03/18/2010       PREVIOUS ABDOMINAL/GYNECOLOGIC SURGERIES:  Past Surgical History:   Procedure Laterality Date    ANKLE SURGERY      bilateral    COLONOSCOPY      COLONOSCOPY N/A 3/20/2015    Procedure: COLONOSCOPY;  Surgeon: Britney Martinez MD;  Location: [facility]    COLONOSCOPY N/A 5/22/2017    Procedure: COLONOSCOPY;  Colonoscopy;  Surgeon: Tristen Rangel MD;  Location: [facility]    ESOPHAGOSCOPY, GASTROSCOPY, DUODENOSCOPY (EGD), COMBINED N/A 6/7/2018    Procedure: COMBINED ESOPHAGOSCOPY, GASTROSCOPY, DUODENOSCOPY (EGD);  gastroscopy;  Surgeon: Tristen Rangel MD;  Location: [facility]    HC ESOPH/GAS REFLUX TEST W NASAL IMPED >1 HR  4/19/2012    Procedure:ESOPHAGEAL IMPEDENCE FUNCTION TEST WITH 24 HOUR PH GREATER THAN 1 HOUR; Surgeon:VALDO UMANZOR; Location: GI    HERNIA REPAIR      umbilcal    HYSTERECTOMY, PAP NO LONGER INDICATED  1991    PHACOEMULSIFICATION WITH STANDARD INTRAOCULAR LENS IMPLANT Right 2/16/2024    Procedure: Cataract Extraction with Imtraocular Lens Placement;  Surgeon: Juan Benavides MD;  Location: [facility]    PHACOEMULSIFICATION WITH STANDARD INTRAOCULAR LENS IMPLANT Left 3/22/2024    Procedure: Cataract Extraction with Intraocular Lens Placement left;  Surgeon: Juan Benavides MD;  Location: [facility]    TONSILLECTOMY      UPPER GI ENDOSCOPY           PERTINENT MEDICATIONS:  Current Outpatient Medications   Medication Sig Dispense Refill    amLODIPine (NORVASC) 2.5 MG tablet TAKE 1 TABLET (2.5 MG) BY MOUTH DAILY FOR BLOOD  PRESSURE 90 tablet 1    ascorbic acid 1000 MG TABS tablet Take 1,000 mg by mouth daily      Biotin w/ Vitamins C & E (HAIR/SKIN/NAILS PO) Take 1 capsule by mouth daily      calcium carbonate-vitamin D (OSCAL) 500-5 MG-MCG tablet TAKE 1 TABLET BY MOUTH 2 TIMES DAILY (WITH MEALS) FOR BONE HEALTH 180 tablet 1    cetirizine (ZYRTEC) 10 MG tablet TAKE ONE TABLET BY MOUTH ONCE DAILY IN THE MORNING FOR ALLERGIES 90 tablet 3    clobetasol (TEMOVATE) 0.05 % external solution Apply topically 2 times daily 100 mL 6    cyanocobalamin (VITAMIN B-12) 100 MCG tablet TAKE TWO TABLETS BY MOUTH ONCE DAILY FOR B12 DEFICIENCY 180 tablet 1    donepezil (ARICEPT) 10 MG tablet TAKE ONE TABLET BY MOUTH EVERY NIGHT AT BEDTIME FOR MEMORY 90 tablet 2    DULoxetine (CYMBALTA) 30 MG capsule TAKE ONE CAPSULE BY MOUTH TWICE A  capsule 1    hydrocortisone (ANUSOL-HC) 25 MG suppository Place 1 suppository (25 mg) rectally at bedtime 30 suppository 0    hydrOXYzine (ATARAX) 10 MG tablet Take 1 tablet (10 mg) by mouth 2 times daily as needed for anxiety (as needed only, for restlessness and axiety) 30 tablet 0    lactase (LACTAID) 9000 units TABS tablet Take 1 tablet (9,000 Units) by mouth 3 times daily (with meals) For lactose intolerance, when eating milk-related foods. 90 tablet 11    lipase-protease-amylase (CREON) 8534-25673-36478 units CPEP TAKE TWO CAPSULES BY MOUTH THREE TIMES A DAY WITH MEALS FOR DIGESTION 540 capsule 3    memantine (NAMENDA) 5 MG tablet Take 5 mg by mouth every morning For 2 weeks, then 5mg twice daily for 2 weeks, then increase to 10mg twice daily thereafter.      psyllium (METAMUCIL/KONSYL) capsule Take 1 capsule by mouth 2 times daily      simethicone (MYLICON) 125 MG chewable tablet Take 125 mg by mouth 4 times daily as needed for other (bloating)      traZODone (DESYREL) 100 MG tablet TAKE ONE TABLET BY MOUTH EVERY NIGHT AT BEDTIME FOR SLEEP 30 tablet 11    Vitamin D3 (VITAMIN D) 10 MCG (400 UNIT) tablet TAKE  ONE TABLET BY MOUTH ONCE DAILY FOR BONE HEALTH 100 tablet 3    HERBALS Take 1 each by mouth daily (Focus Factor for memory)           SOCIAL HISTORY:  Social History     Socioeconomic History    Marital status:      Spouse name: Not on file    Number of children: Not on file    Years of education: Not on file    Highest education level: Not on file   Occupational History    Not on file   Tobacco Use    Smoking status: Former     Current packs/day: 0.50     Average packs/day: 0.5 packs/day for 3.0 years (1.5 ttl pk-yrs)     Types: Cigarettes    Smokeless tobacco: Never    Tobacco comments:     smoked for 3 years when she was around 19   Vaping Use    Vaping status: Never Used   Substance and Sexual Activity    Alcohol use: Yes     Comment: RARELY    Drug use: No    Sexual activity: Not Currently   Other Topics Concern    Parent/sibling w/ CABG, MI or angioplasty before 65F 55M? Yes     Comment: mother- valve problem   Social History Narrative    Not on file     Social Determinants of Health     Financial Resource Strain: Unknown (10/25/2023)    Financial Resource Strain     Within the past 12 months, have you or your family members you live with been unable to get utilities (heat, electricity) when it was really needed?: Patient refused   Food Insecurity: Unknown (10/25/2023)    Food Insecurity     Within the past 12 months, did you worry that your food would run out before you got money to buy more?: Patient refused     Within the past 12 months, did the food you bought just not last and you didn t have money to get more?: Patient refused   Transportation Needs: Unknown (10/25/2023)    Transportation Needs     Within the past 12 months, has lack of transportation kept you from medical appointments, getting your medicines, non-medical meetings or appointments, work, or from getting things that you need?: Patient refused   Physical Activity: Not on file   Stress: Not on file   Social Connections: Not on file    Interpersonal Safety: Not on file   Housing Stability: Unknown (10/25/2023)    Housing Stability     Do you have housing? : Patient refused     Are you worried about losing your housing?: Patient refused       FAMILY HISTORY:  Denies colon/panc/esophageal/other GI CA, no other Parker or other HPS-related Barbara. No IBD/celiac, no other AI/liver/thyroid disease.    Family History   Problem Relation Age of Onset    Heart Disease Mother         valve problem    Hypertension Mother     Diabetes Mother         type 2    Cerebrovascular Disease Mother     Allergies Mother     Eye Disorder Mother         Macular degeneration    Osteoporosis Mother     Respiratory Mother     Migraines Mother     Cardiovascular Father         MI at age 80    Cancer - colorectal Father 87    Diabetes Father     Hypertension Father         type 2    Eye Disorder Father         macular degeneration    Lipids Father     C.A.D. Maternal Grandmother     Diabetes Paternal Grandmother         type 2    Cardiovascular Paternal Grandmother         MI    Diabetes Sister         type 2    Allergies Sister     Gastrointestinal Disease Sister     Depression Sister     Gastrointestinal Disease Daughter     Migraines Daughter     Gastrointestinal Disease Daughter     Migraines Daughter     Migraines Son     Aneurysm No family hx of     Brain Hemorrhage No family hx of     Brain Tumor No family hx of     Cancer No family hx of     Chiari Malformation No family hx of     LUNG DISEASE No family hx of     Seizure Disorder No family hx of        PHYSICAL EXAMINATION:  Vitals reviewed  /72   Temp 98  F (36.7  C) (Temporal)   Ht 1.524 m (5')   Wt 47.4 kg (104 lb 9.6 oz)   LMP  (LMP Unknown)   BMI 20.43 kg/m      General: Patient appears well in no acute distress.    Skin: No visualized rash or lesions on visualized skin  HEENT:    EOMI, no erythema, sclera icterus or discharge noted.  Mouth mucosa intact, pink, moist  No cervical or supraclavicular  lymphadenopathy. Thyroid gland not enlarged.  Resp: breathing comfortably without accessory muscle usage, speaking in full sentences, no cough. Lung sounds clear  Card: Regular and rhythmic S1 and S2. No gallop or rub. No murmur.  No LE edema.  Abdomen: Active bowel sounds X 4 quadrants. Soft to palpation. Tenderness in the epigastrium and over left abdomen.  No guarding or rebound tenderness. Brian's sign negative.  Rectal exam: verbal consent was obtained from the  and patient. Chaperon present (DORA Bunn).  Noted erythema of perianal area, skin intact. Normal sphincter tone. Palpable slightly tender internal hemorrhoids at  6 and 9 o'clock, no blood on glove.  MSK: Appears to have normal range of motion based on visualized movements  Neurologic: No apparent tremors, facial movements symmetric  Psych: affect normal, alert and oriented      ASSESSMENT/PLAN:    ICD-10-CM    1. Chronic diarrhea  K52.9       2. Abdominal bloating  R14.0 Adult GI Clinic Follow-Up Order (Blank)      3. External hemorrhoids  K64.4       4. Internal hemorrhoids  K64.8 hydrocortisone (ANUSOL-HC) 25 MG suppository      5. Lactose intolerance  E73.9          78 year old female  presented to GI clinic for follow up on diarrhea, abdominal bloating, and anal discomfort. PMH of Lewy body dementia, asthma, HTN, hiatal hernia, exocrine pancreatic insufficiency, and GERD. Patient is here with her , who provides history and answers questions.  Patient reported resolution of diarrhea and nocturnal fecal incontinence since initiation of therapy with Creon, takes 95222 three times a day with meals . Has been taking one tablet of fiber a day. Complains of large but soft stools. Reported some straining and rectal discomfort. Noted internal hemorrhoids on exam. No signs of active bleeding.  Will send a prescription for rectal hydrocortisone suppository to use nightly for one month.  If no improvement in abdominal discomfort and rectal pain ,  may consider scheduling colonoscopy or abdominal CT scan. Family hx of colon cancer in father. Patient's last colonoscopy was in 2017, unremarkable except of hemorrhoids.  I do not appreciate any abdominal distention on exam, but there was tenderness in the left lower quadrant and epigastrium on exam. Noted that for some time, the patient was on yearly MRCP surveillance schedule for enlarged pancreatic duct. It was discontinued in 2015, when she had normal MRCP.  Encouraged to continue Creon, simethicone or Gas-X , and a fiber supplement. Increase fluid intake.   Abstain dairy and limit gluten and simple carbohydrates intake to see if this helps bloating.    Patient verbalized understanding and appreciation of care provided. Stated that all of the questions were answered to her/his satisfaction.  RTC in one month. Telephone visit was requested by patient's .    Thank you for this consultation. It was a pleasure to participate in the care of this patient; please contact us with any further questions.    MARVIN Mauro, FNP-C  Division of Gastroenterology  [facility], [place], [place]    This note was created with Dragon voice recognition software, and while reviewed for accuracy, inadvertent minor typographic errors may occur. Please contact the provider if you have any questions.

## 2024-05-03 NOTE — LETTER
5/3/2024         RE: Ingrid Amaral  [address] [facility] [address]        Dear Colleague,    Thank you for referring your patient, Ingrid Amaral, to the [facility]. Please see a copy of my visit note below.    Gastroenterology CLINIC VISIT, RETURN PATIENT    CC/REFERRING PROVIDER: Referred Self   REASON FOR CONSULTATION: follow up    HPI: 78 year old female presented to GI clinic for a follow up. Patient was last seen in November for diarrhea, abdominal bloating, and anal discomfort. PMH of Lewy body dementia, asthma, HTN, hiatal hernia, exocrine pancreatic insufficiency, lactose intolerance, and GERD. Patient is here with her  Harsha, who provides history. Patient actively participates in conversation but looks to her  to answer some questions.   Said that she has been having bowel movement every day and her stools are soft and large.  She no longer has nocturnal incontinence episodes. She complains of  some straining on defecation and rectal discomfort. Has been noticing small amount of red blood on wipes. Her  stated that patient said that her buttocks hurt when she sits on hard surfaces so he bought her a pillow. He believes that the pain is related to loss of fat tissue. At the same time, he mentioned that the patient has been spending more time on the toilet.   She takes fiber supplement inconsistently. Reported good compliance with Creon, rarely misses a dose or two in a week. Trying to limit dairy intake and uses Lactaid tablets as needed. She reduced intake of potato chips, beep, and pop.  Patient's  brought up a concerns of bloating again. She ate spaghetti for dinner yesterday and  ended up with bloating a few hours later. At times, the patient is unable to wear jeans or bra due to discomfort from abdominal distention. Has been taking Gas-X several times a day, not always work. Patient reported some lower abdominal discomfort.        PERTINENT STUDIES Reviewed in EMR  Previous studies:  Negative recent celiac serology.  Had last EGD in 2018 and x-ray esophagram in 2019, suggestive for moderate to large size hiatal hernia. Normal swallowing evaluation by speech therapist in 2018. Patient had normal colonoscopy in 2017. Family history significant for colon cancer in father. Noted that for some time, the patient was on yearly MRCP surveillance schedule for enlarged pancreatic duct. It was discontinued in 2015, when she had normal MRCP.       ROS: 10pt ROS performed and otherwise negative.    PAST MEDICAL HISTORY:  Past Medical History:   Diagnosis Date     Asthma      GERD (gastroesophageal reflux disease)      Radial head fracture 03/18/2010       PREVIOUS ABDOMINAL/GYNECOLOGIC SURGERIES:  Past Surgical History:   Procedure Laterality Date     ANKLE SURGERY      bilateral     COLONOSCOPY       COLONOSCOPY N/A 3/20/2015    Procedure: COLONOSCOPY;  Surgeon: Britney Martinez MD;  Location: [facility]     COLONOSCOPY N/A 5/22/2017    Procedure: COLONOSCOPY;  Colonoscopy;  Surgeon: Tristen Rangel MD;  Location: [facility]     ESOPHAGOSCOPY, GASTROSCOPY, DUODENOSCOPY (EGD), COMBINED N/A 6/7/2018    Procedure: COMBINED ESOPHAGOSCOPY, GASTROSCOPY, DUODENOSCOPY (EGD);  gastroscopy;  Surgeon: Tristen Rangel MD;  Location: [facility]     HC ESOPH/GAS REFLUX TEST W NASAL IMPED >1 HR  4/19/2012    Procedure:ESOPHAGEAL IMPEDENCE FUNCTION TEST WITH 24 HOUR PH GREATER THAN 1 HOUR; Surgeon:VALDO UMANZOR; Location: GI     HERNIA REPAIR      umbilcal     HYSTERECTOMY, PAP NO LONGER INDICATED  1991     PHACOEMULSIFICATION WITH STANDARD INTRAOCULAR LENS IMPLANT Right 2/16/2024    Procedure: Cataract Extraction with Imtraocular Lens Placement;  Surgeon: Juan Benavides MD;  Location: [facility]     PHACOEMULSIFICATION WITH STANDARD INTRAOCULAR LENS IMPLANT Left 3/22/2024    Procedure: Cataract Extraction with Intraocular Lens Placement left;  Surgeon:  Juan Benavides MD;  Location: [facility]     TONSILLECTOMY       UPPER GI ENDOSCOPY           PERTINENT MEDICATIONS:  Current Outpatient Medications   Medication Sig Dispense Refill     amLODIPine (NORVASC) 2.5 MG tablet TAKE 1 TABLET (2.5 MG) BY MOUTH DAILY FOR BLOOD PRESSURE 90 tablet 1     ascorbic acid 1000 MG TABS tablet Take 1,000 mg by mouth daily       Biotin w/ Vitamins C & E (HAIR/SKIN/NAILS PO) Take 1 capsule by mouth daily       calcium carbonate-vitamin D (OSCAL) 500-5 MG-MCG tablet TAKE 1 TABLET BY MOUTH 2 TIMES DAILY (WITH MEALS) FOR BONE HEALTH 180 tablet 1     cetirizine (ZYRTEC) 10 MG tablet TAKE ONE TABLET BY MOUTH ONCE DAILY IN THE MORNING FOR ALLERGIES 90 tablet 3     clobetasol (TEMOVATE) 0.05 % external solution Apply topically 2 times daily 100 mL 6     cyanocobalamin (VITAMIN B-12) 100 MCG tablet TAKE TWO TABLETS BY MOUTH ONCE DAILY FOR B12 DEFICIENCY 180 tablet 1     donepezil (ARICEPT) 10 MG tablet TAKE ONE TABLET BY MOUTH EVERY NIGHT AT BEDTIME FOR MEMORY 90 tablet 2     DULoxetine (CYMBALTA) 30 MG capsule TAKE ONE CAPSULE BY MOUTH TWICE A  capsule 1     hydrocortisone (ANUSOL-HC) 25 MG suppository Place 1 suppository (25 mg) rectally at bedtime 30 suppository 0     hydrOXYzine (ATARAX) 10 MG tablet Take 1 tablet (10 mg) by mouth 2 times daily as needed for anxiety (as needed only, for restlessness and axiety) 30 tablet 0     lactase (LACTAID) 9000 units TABS tablet Take 1 tablet (9,000 Units) by mouth 3 times daily (with meals) For lactose intolerance, when eating milk-related foods. 90 tablet 11     lipase-protease-amylase (CREON) 3485-83794-95092 units CPEP TAKE TWO CAPSULES BY MOUTH THREE TIMES A DAY WITH MEALS FOR DIGESTION 540 capsule 3     memantine (NAMENDA) 5 MG tablet Take 5 mg by mouth every morning For 2 weeks, then 5mg twice daily for 2 weeks, then increase to 10mg twice daily thereafter.       psyllium (METAMUCIL/KONSYL) capsule Take 1 capsule by mouth 2 times  daily       simethicone (MYLICON) 125 MG chewable tablet Take 125 mg by mouth 4 times daily as needed for other (bloating)       traZODone (DESYREL) 100 MG tablet TAKE ONE TABLET BY MOUTH EVERY NIGHT AT BEDTIME FOR SLEEP 30 tablet 11     Vitamin D3 (VITAMIN D) 10 MCG (400 UNIT) tablet TAKE ONE TABLET BY MOUTH ONCE DAILY FOR BONE HEALTH 100 tablet 3     HERBALS Take 1 each by mouth daily (Focus Factor for memory)           SOCIAL HISTORY:  Social History     Socioeconomic History     Marital status:      Spouse name: Not on file     Number of children: Not on file     Years of education: Not on file     Highest education level: Not on file   Occupational History     Not on file   Tobacco Use     Smoking status: Former     Current packs/day: 0.50     Average packs/day: 0.5 packs/day for 3.0 years (1.5 ttl pk-yrs)     Types: Cigarettes     Smokeless tobacco: Never     Tobacco comments:     smoked for 3 years when she was around 19   Vaping Use     Vaping status: Never Used   Substance and Sexual Activity     Alcohol use: Yes     Comment: RARELY     Drug use: No     Sexual activity: Not Currently   Other Topics Concern     Parent/sibling w/ CABG, MI or angioplasty before 65F 55M? Yes     Comment: mother- valve problem   Social History Narrative     Not on file     Social Determinants of Health     Financial Resource Strain: Unknown (10/25/2023)    Financial Resource Strain      Within the past 12 months, have you or your family members you live with been unable to get utilities (heat, electricity) when it was really needed?: Patient refused   Food Insecurity: Unknown (10/25/2023)    Food Insecurity      Within the past 12 months, did you worry that your food would run out before you got money to buy more?: Patient refused      Within the past 12 months, did the food you bought just not last and you didn t have money to get more?: Patient refused   Transportation Needs: Unknown (10/25/2023)    Transportation Needs       Within the past 12 months, has lack of transportation kept you from medical appointments, getting your medicines, non-medical meetings or appointments, work, or from getting things that you need?: Patient refused   Physical Activity: Not on file   Stress: Not on file   Social Connections: Not on file   Interpersonal Safety: Not on file   Housing Stability: Unknown (10/25/2023)    Housing Stability      Do you have housing? : Patient refused      Are you worried about losing your housing?: Patient refused       FAMILY HISTORY:  Denies colon/panc/esophageal/other GI CA, no other Parker or other HPS-related Barbara. No IBD/celiac, no other AI/liver/thyroid disease.    Family History   Problem Relation Age of Onset     Heart Disease Mother         valve problem     Hypertension Mother      Diabetes Mother         type 2     Cerebrovascular Disease Mother      Allergies Mother      Eye Disorder Mother         Macular degeneration     Osteoporosis Mother      Respiratory Mother      Migraines Mother      Cardiovascular Father         MI at age 80     Cancer - colorectal Father 87     Diabetes Father      Hypertension Father         type 2     Eye Disorder Father         macular degeneration     Lipids Father      C.A.D. Maternal Grandmother      Diabetes Paternal Grandmother         type 2     Cardiovascular Paternal Grandmother         MI     Diabetes Sister         type 2     Allergies Sister      Gastrointestinal Disease Sister      Depression Sister      Gastrointestinal Disease Daughter      Migraines Daughter      Gastrointestinal Disease Daughter      Migraines Daughter      Migraines Son      Aneurysm No family hx of      Brain Hemorrhage No family hx of      Brain Tumor No family hx of      Cancer No family hx of      Chiari Malformation No family hx of      LUNG DISEASE No family hx of      Seizure Disorder No family hx of        PHYSICAL EXAMINATION:  Vitals reviewed  /72   Temp 98  F (36.7  C)  (Temporal)   Ht 1.524 m (5')   Wt 47.4 kg (104 lb 9.6 oz)   LMP  (LMP Unknown)   BMI 20.43 kg/m      General: Patient appears well in no acute distress.    Skin: No visualized rash or lesions on visualized skin  HEENT:    EOMI, no erythema, sclera icterus or discharge noted.  Mouth mucosa intact, pink, moist  No cervical or supraclavicular lymphadenopathy. Thyroid gland not enlarged.  Resp: breathing comfortably without accessory muscle usage, speaking in full sentences, no cough. Lung sounds clear  Card: Regular and rhythmic S1 and S2. No gallop or rub. No murmur.  No LE edema.  Abdomen: Active bowel sounds X 4 quadrants. Soft to palpation. Tenderness in the epigastrium and over left abdomen.  No guarding or rebound tenderness. Brian's sign negative.  Rectal exam: verbal consent was obtained from the  and patient. Chaperon present (DORA Bunn).  Noted erythema of perianal area, skin intact. Normal sphincter tone. Palpable slightly tender internal hemorrhoids at  6 and 9 o'clock, no blood on glove.  MSK: Appears to have normal range of motion based on visualized movements  Neurologic: No apparent tremors, facial movements symmetric  Psych: affect normal, alert and oriented      ASSESSMENT/PLAN:    ICD-10-CM    1. Chronic diarrhea  K52.9       2. Abdominal bloating  R14.0 Adult GI Clinic Follow-Up Order (Blank)      3. External hemorrhoids  K64.4       4. Internal hemorrhoids  K64.8 hydrocortisone (ANUSOL-HC) 25 MG suppository      5. Lactose intolerance  E73.9          78 year old female  presented to GI clinic for follow up on diarrhea, abdominal bloating, and anal discomfort. PMH of Lewy body dementia, asthma, HTN, hiatal hernia, exocrine pancreatic insufficiency, and GERD. Patient is here with her , who provides history and answers questions.  Patient reported resolution of diarrhea and nocturnal fecal incontinence since initiation of therapy with Creon, takes 82749 three times a day with meals .  Has been taking one tablet of fiber a day. Complains of large but soft stools. Reported some straining and rectal discomfort. Noted internal hemorrhoids on exam. No signs of active bleeding.  Will send a prescription for rectal hydrocortisone suppository to use nightly for one month.  If no improvement in abdominal discomfort and rectal pain , may consider scheduling colonoscopy or abdominal CT scan. Family hx of colon cancer in father. Patient's last colonoscopy was in 2017, unremarkable except of hemorrhoids.  I do not appreciate any abdominal distention on exam, but there was tenderness in the left lower quadrant and epigastrium on exam. Noted that for some time, the patient was on yearly MRCP surveillance schedule for enlarged pancreatic duct. It was discontinued in 2015, when she had normal MRCP.  Encouraged to continue Creon, simethicone or Gas-X , and a fiber supplement. Increase fluid intake.   Abstain dairy and limit gluten and simple carbohydrates intake to see if this helps bloating.    Patient verbalized understanding and appreciation of care provided. Stated that all of the questions were answered to her/his satisfaction.  RTC in one month. Telephone visit was requested by patient's .    Thank you for this consultation. It was a pleasure to participate in the care of this patient; please contact us with any further questions.    MARVIN Mauro, FNP-C  Division of Gastroenterology  [facility], [place], [place]    This note was created with Dragon voice recognition software, and while reviewed for accuracy, inadvertent minor typographic errors may occur. Please contact the provider if you have any questions.       Again, thank you for allowing me to participate in the care of your patient.        Sincerely,        MARVIN MAURO CNP

## 2024-05-09 DIAGNOSIS — K52.9 CHRONIC DIARRHEA: ICD-10-CM

## 2024-06-09 DIAGNOSIS — E53.8 VITAMIN B12 DEFICIENCY (NON ANEMIC): ICD-10-CM

## 2024-06-10 RX ORDER — UBIDECARENONE 75 MG
CAPSULE ORAL
Qty: 200 TABLET | Refills: 0 | Status: SHIPPED | OUTPATIENT
Start: 2024-06-10 | End: 2024-09-26

## 2024-06-19 ENCOUNTER — TELEPHONE (OUTPATIENT)
Dept: GASTROENTEROLOGY | Facility: CLINIC | Age: 79
End: 2024-06-19

## 2024-06-19 NOTE — TELEPHONE ENCOUNTER
Prior Authorization Retail Medication Request    Medication/Dose: HYDROCORTISONE ACETATE 25MG SUPP  Diagnosis and ICD code (if different than what is on RX):    New/renewal/insurance change PA/secondary ins. PA:  Previously Tried and Failed:    Rationale:      Insurance   Primary: [employer] PART D   Insurance ID: [id number]         Pharmacy Information (if different than what is on RX)  Name: [facility]    Phone: [phone]          Thank you,  Roro Kay, Pharm Tech  [facility]

## 2024-06-19 NOTE — LETTER
2024    INSURER: Payor: [employer] / Plan: [employer] MEDICARE ADVANTAGE / Product Type: HMO /   Re: Prior Authorization Request  Patient: Ingrid Amaral  Policy ID#:  [id number]  : [id number]      To Whom it May Concern:    I am writing to formally request a prior authorization of coverage for my patient,  Ingrid Amaral, for treatment using Hydrocortisone Acetate Suppositories. I am requesting authorization for applicable provider professional and facility services associated with this therapy.    The therapy involves intermittent monthly use of suppositories.  Therapy will most likely be life long.     The benefits of the therapy include decreased rectal pain and decreased rectal bleeding due to hemorrhoids.     I have treated Ingrid Amaral since 2023 and I have determined that it is medically appropriate for  this patient to receive be treated with Hydrocortisone Acetate Suppositories for the reason(s) stated below:    Patient is currently suffering from internal and external hemorrhoids.   Patient is currently having pain associated to internal and external hemorrhoids.   Patient is experiencing rectal bleeding related to internal and external hemorrhoids.   Surgery is not recommended as patient is high risk.      I have included medical records pertaining to the patient s medical history, current condition and treatment plan.     I firmly believe that this therapy is clinically appropriate and that Ingrid Amaral would benefit from improved quality of life if allowed the opportunity to receive this treatment.  Please contact me at Dept: [phone] if you require additional information to ensure the prompt approval for coverage.    Please send your written decision to me at this address:  [address] [address] [phone]  Dept: [phone]    Sincerely,      Sheryl Marie NP

## 2024-06-25 NOTE — TELEPHONE ENCOUNTER
Central Prior Authorization Team  Phone: [phone]    PA Initiation    Medication: HYDROCORTISONE ACETATE 25 MG RE SUPP  Insurance Company: [employer] - Phone [phone] Fax [phone]  Pharmacy Filling the Rx: [place], [place] - [address] [place] [address]  Filling Pharmacy Phone: [phone]  Filling Pharmacy Fax:    Start Date: 6/25/2024

## 2024-06-27 NOTE — TELEPHONE ENCOUNTER
Central Prior Authorization Team  Phone: [phone]    PRIOR AUTHORIZATION DENIED    Medication: HYDROCORTISONE ACETATE 25 MG RE [facility]  Insurance Company: [employer] - Phone [phone] Fax [phone]  Denial Date: 6/25/2024  Denial Reason(s): benefit exclusion  Appeal Information: none  Patient Notified: no

## 2024-06-28 NOTE — TELEPHONE ENCOUNTER
Provider would like to proceed with Appeal.  LMN pended in communication tab.      Thank You  Danielle Delacruz Rn

## 2024-07-01 NOTE — TELEPHONE ENCOUNTER
Spoke with  Harsha, message given.  Harsha was wondering cost of suppositories, cost for 24 would be about $500.  Harsha will  OTC cream and update on how this is working.     Danielle Delacruz RN

## 2024-08-09 DIAGNOSIS — M85.89 OSTEOPENIA OF MULTIPLE SITES: ICD-10-CM

## 2024-08-09 NOTE — TELEPHONE ENCOUNTER
Prescription approved per [facility] Refill Protocol     Carmina Ley     RN MSN

## 2024-08-21 NOTE — H&P (VIEW-ONLY)
Ingrid Amaral is a 78 year old patient who is being evaluated via a billable telephone visit.       How would you like to obtain your AVS? Mail a copy  If the telephone visit is disconnected, what is a good phone number to reach you at? [phone] Telephone call  Will anyone else be joining your telephone visit? Yes:  Harsha is with patient for phone visit.    Telephone-Visit Details     Type of service:  Telephone Visit     Telephone Call Duration: 16 min    Originating Location (pt. Location): Home    Distant Location (provider location):  Off-site    Mode of Communication:  Telephone Call    Physician has received verbal consent for a Telephone from the patient? Yes      [address] [address]  Phone: [phone]    Patient:  Ingrid Amaral, Date of birth 1945  Date of Visit:  8/27/24     GASTROENTEROLOGY RETURN PATIENT TELEPHONE VISIT    CC/REFERRING MD:    Soha Sanchez    REASON FOR CONSULTATION:   Referred for Follow Up (Chronic diarrhea /Abdominal bloating /External hemorrhoids /Internal hemorrhoids /Lactose intolerance)      HISTORY OF PRESENT ILLNESS:  Ingrid Amaral is a 78 year old female who presents to GI clinic for a follow up. Patient was seen for  diarrhea, abdominal bloating, and anal discomfort. PMH of Lewy body dementia, asthma, HTN, hiatal hernia, exocrine pancreatic insufficiency, lactose intolerance, and GERD. Patient is here with her  Harsha, who provides majority of history. Patient provides minimal participation in conversation.   Patient was started on Creon for exocrine pancreatic insufficiency, which helped her diarrhea.  She no longer has nocturnal fecal incontinence episodes.  She continues experiencing abdominal bloating when skipping a dose of Creon.  stated that they do not have a schedule for their meal so he has hard time remembering to give Ingrid her Creon before she starts eating.  On average, she takes 4-6 pills a day.  They are trying to keep her off dairy and to use Lactaid tablets.  Patient has been taking Gas-X tablets several times a day but it does not always work. Unable to complete hydrogen breath test for SIBO due to problems with transportation.  Was empirically treated for presumptive bacterial overgrowth with doxycycline 100 mg a day for 10 days. Harsha stated that he did not notice significant difference in Ingrid's symptoms.   Reported some improvement in rectal discomfort. Did not noticed bleeding recently.  Not able to fill Anusol suppository as they are not covered by her insurance. Patient suffers external and internal hemorrhoids.    PREVIOUS ENDOSCOPY:  5/22/2017 Colonoscopy  Findings:       The perianal and digital rectal examinations were normal.        There is no endoscopic evidence of bleeding, diverticula, mass or polyps        in the entire colon.        Non-bleeding internal hemorrhoids were found during retroflexion. The        hemorrhoids were moderate and Grade II (internal hemorrhoids that        prolapse but reduce spontaneously).     6/7/2018 EGD  Findings:       The nasopharynx and oropharynx were normal.        The examined esophagus was normal.        There is no endoscopic evidence of Ugarte's esophagus, esophagitis or        z-line abnormalities in the entire esophagus.        A large hiatal hernia was present.        The exam of the stomach was otherwise normal.        There is no endoscopic evidence of erythema or inflammatory changes        suggestive of gastritis, inflammation or ulceration in the stomach.        The examined duodenum was normal.       PERTINENT IMAGING STUDIES WERE REVIEWED IN EMR  Previous studies:  Negative recent celiac serology.  Had last EGD in 2018 and x-ray esophagram in 2019, suggestive for moderate to large size hiatal hernia. Normal swallowing evaluation by speech therapist in 2018. Patient had normal colonoscopy in 2017.  Family history significant for colon cancer in father. Noted that for some time, the patient was on yearly MRCP surveillance schedule for enlarged pancreatic duct. It was discontinued in 2015, when she had normal MRCP.    HISTORY:   has a past medical history of Asthma, GERD (gastroesophageal reflux disease), and Radial head fracture (03/18/2010).     has a past surgical history that includes hysterectomy, pap no longer indicated (1991); tonsillectomy; hernia repair; Ankle surgery; colonoscopy; upper gi endoscopy; ESOPH/GAS REFLUX TEST W NASAL IMPED >1 HR (4/19/2012); Colonoscopy (N/A, 3/20/2015); Colonoscopy (N/A, 5/22/2017); Esophagoscopy, gastroscopy, duodenoscopy (EGD), combined (N/A, 6/7/2018); Phacoemulsification with standard intraocular lens implant (Right, 2/16/2024); and Phacoemulsification with standard intraocular lens implant (Left, 3/22/2024).     reports that she has quit smoking. Her smoking use included cigarettes. She has a 1.5 pack-year smoking history. She has never used smokeless tobacco. She reports current alcohol use. She reports that she does not use drugs.    family history includes Allergies in her mother and sister; C.A.D. in her maternal grandmother; Cancer - colorectal (age of onset: 87) in her father; Cardiovascular in her father and paternal grandmother; Cerebrovascular Disease in her mother; Depression in her sister; Diabetes in her father, mother, paternal grandmother, and sister; Eye Disorder in her father and mother; Gastrointestinal Disease in her daughter, daughter, and sister; Heart Disease in her mother; Hypertension in her father and mother; Lipids in her father; Migraines in her daughter, daughter, mother, and son; Osteoporosis in her mother; Respiratory in her mother.    ALLERGIES:     Allergies   Allergen Reactions    Sulfa Antibiotics Other (See Comments)     Reaction unknown as a child       PERTINENT MEDICATIONS:    Current Outpatient Medications:     amLODIPine  (NORVASC) 2.5 MG tablet, TAKE 1 TABLET (2.5 MG) BY MOUTH DAILY FOR BLOOD PRESSURE, Disp: 90 tablet, Rfl: 1    ascorbic acid 1000 MG TABS tablet, Take 1,000 mg by mouth daily, Disp: , Rfl:     Biotin w/ Vitamins C & E (HAIR/SKIN/NAILS PO), Take 1 capsule by mouth daily, Disp: , Rfl:     calcium carbonate-vitamin D (OSCAL) 500-5 MG-MCG tablet, TAKE 1 TABLET BY MOUTH 2 TIMES DAILY (WITH MEALS) FOR BONE HEALTH, Disp: 180 tablet, Rfl: 0    cetirizine (ZYRTEC) 10 MG tablet, TAKE ONE TABLET BY MOUTH ONCE DAILY IN THE MORNING FOR ALLERGIES, Disp: 90 tablet, Rfl: 3    clobetasol (TEMOVATE) 0.05 % external solution, Apply topically 2 times daily, Disp: 100 mL, Rfl: 6    cyanocobalamin (VITAMIN B-12) 100 MCG tablet, TAKE TWO TABLETS BY MOUTH ONCE DAILY FOR B12 DEFICIENCY, Disp: 200 tablet, Rfl: 0    donepezil (ARICEPT) 10 MG tablet, TAKE ONE TABLET BY MOUTH EVERY NIGHT AT BEDTIME FOR MEMORY, Disp: 90 tablet, Rfl: 2    doxycycline hyclate (VIBRAMYCIN) 100 MG capsule, Take 1 capsule (100 mg) by mouth daily Antibiotic for intestinal bacterial overgrowth, Disp: 10 capsule, Rfl: 0    DULoxetine (CYMBALTA) 30 MG capsule, TAKE ONE CAPSULE BY MOUTH TWICE A DAY, Disp: 180 capsule, Rfl: 1    HERBALS, Take 1 each by mouth daily (Focus Factor for memory), Disp: , Rfl:     hydrocortisone (ANUSOL-HC) 25 MG suppository, Place 1 suppository (25 mg) rectally at bedtime For hemorrhoids, Disp: 30 suppository, Rfl: 0    hydrOXYzine (ATARAX) 10 MG tablet, Take 1 tablet (10 mg) by mouth 2 times daily as needed for anxiety (as needed only, for restlessness and axiety), Disp: 30 tablet, Rfl: 0    lactase (LACTAID) 9000 units TABS tablet, Take 1 tablet (9,000 Units) by mouth 3 times daily (with meals) For lactose intolerance, when eating milk-related foods., Disp: 90 tablet, Rfl: 11    lipase-protease-amylase (CREON) 8289-47922-01471 units CPEP, TAKE TWO CAPSULES BY MOUTH THREE TIMES A DAY WITH MEALS FOR DIGESTION, Disp: 540 capsule, Rfl: 1     memantine (NAMENDA) 5 MG tablet, Take 5 mg by mouth every morning For 2 weeks, then 5mg twice daily for 2 weeks, then increase to 10mg twice daily thereafter., Disp: , Rfl:     psyllium (METAMUCIL/KONSYL) capsule, Take 1 capsule by mouth 2 times daily, Disp: , Rfl:     simethicone (MYLICON) 125 MG chewable tablet, Take 125 mg by mouth 4 times daily as needed for other (bloating), Disp: , Rfl:     traZODone (DESYREL) 100 MG tablet, TAKE ONE TABLET BY MOUTH EVERY NIGHT AT BEDTIME FOR SLEEP, Disp: 30 tablet, Rfl: 11    Vitamin D3 (VITAMIN D) 10 MCG (400 UNIT) tablet, TAKE ONE TABLET BY MOUTH ONCE DAILY FOR BONE HEALTH, Disp: 100 tablet, Rfl: 3      ROS: 10pt ROS performed and otherwise negative.    PHYSICAL EXAMINATION:  Wt:   Wt Readings from Last 2 Encounters:   05/03/24 47.4 kg (104 lb 9.6 oz)   03/22/24 46.7 kg (103 lb)      Physical Exam  Vitals reviewed: LMP  (LMP Unknown)    Constitutional: aaox3, cooperative, pleasant,   Respiratory: Unlabored breathing, speaking in full sentences.   Psych: Patient suffers dementia. Orientated to self and place. speech is clear and appropriate.    RECENT LABS:   Recent Labs   Lab Test 02/27/23  1454 06/18/20  1623   WBC 7.1 4.8   HGB 11.6* 12.1   HCT 35.9 37.5    266     Recent Labs   Lab Test 09/21/22  1640 06/18/20  1623   ALT 15 23   AST 23 17     Recent Labs   Lab Test 09/21/22  1640 06/18/20  1623   CR 0.74 0.62     TSH   Date Value Ref Range Status   06/18/2020 1.41 0.40 - 4.00 mU/L Final         ASSESSMENT/PLAN:    ICD-10-CM    1. Chronic diarrhea  K52.9       2. Lactose intolerance  E73.9       3. External hemorrhoids  K64.4       4. Internal hemorrhoids  K64.8       5. Abdominal bloating  R14.0       6. Exocrine pancreatic insufficiency  K86.81       7. Hiatal hernia  K44.9              Ingrid Amaral is a 78 year old female who presents to GI clinic for a follow up.  The patient was seen for ongoing abdominal bloating, irregular stool pattern with diarrhea  predominant, and generalized abdominal discomfort. Patient was started on Creon for exocrine pancreatic insufficiency, which helped her diarrhea.  She no longer has nocturnal fecal incontinence episodes.   Harsha admits to skipping a few doses of Creon on day as he does not remember given them to the patient before she started eating.  On average, patient takes 4 to 6 capsules a day.  Reported having diarrhea when she skips a dose or two of the medication.  They did not notice any significant improvement in her bloating after empiric treatment with doxycycline for presumptive SIBO.  Creon and lactose-free diet help to some extent.  Patient has been using Gas-X tablets as needed.    Patient has internal and external hemorrhoids, which flareup at times. They reported some improvement in rectal discomfort.  No recent bleeding.   Unable to afford Anusol suppository as it was not covered by her insurance.  Did not use any other over-the-counter preparations.  I placed information on available OTC drugs in their after visit summary to review and use when necessary.  Patient had unremarkable colonoscopy in 2017.  Reported family history of colon cancer in father.  Due to persistent symptoms of diarrhea and bloating with occasional rectal bleeding, will order colonoscopy.  If comes back unremarkable, no further surveillance will be recommended.    Patient's  verbalized understanding and appreciation of care provided. Stated that all of their questions were answered to her/his satisfaction.  Follow up in 4 months  This note was created with Dragon voice recognition software. Inadvertent minor typographic errors may occur in transcription. Feel free to contact the provider if you have any questions.  I sincerely appreciate an opportunity to provide consultation for this pleasant patient.    MIKE Mauro  [facility],  Gastroenterology,  [place], [place]

## 2024-08-21 NOTE — PROGRESS NOTES
Ingrid Amaral is a 78 year old patient who is being evaluated via a billable telephone visit.       How would you like to obtain your AVS? Mail a copy  If the telephone visit is disconnected, what is a good phone number to reach you at? [phone] Telephone call  Will anyone else be joining your telephone visit? Yes:  Harsha is with patient for phone visit.    Telephone-Visit Details     Type of service:  Telephone Visit     Telephone Call Duration: 16 min    Originating Location (pt. Location): Home    Distant Location (provider location):  Off-site    Mode of Communication:  Telephone Call    Physician has received verbal consent for a Telephone from the patient? Yes      [address] [address]  Phone: [phone]    Patient:  Ingrid Amaral, Date of birth 1945  Date of Visit:  8/27/24     GASTROENTEROLOGY RETURN PATIENT TELEPHONE VISIT    CC/REFERRING MD:    Soha Sanchez    REASON FOR CONSULTATION:   Referred for Follow Up (Chronic diarrhea /Abdominal bloating /External hemorrhoids /Internal hemorrhoids /Lactose intolerance)      HISTORY OF PRESENT ILLNESS:  Ingrid Amaral is a 78 year old female who presents to GI clinic for a follow up. Patient was seen for  diarrhea, abdominal bloating, and anal discomfort. PMH of Lewy body dementia, asthma, HTN, hiatal hernia, exocrine pancreatic insufficiency, lactose intolerance, and GERD. Patient is here with her  Harsha, who provides majority of history. Patient provides minimal participation in conversation.   Patient was started on Creon for exocrine pancreatic insufficiency, which helped her diarrhea.  She no longer has nocturnal fecal incontinence episodes.  She continues experiencing abdominal bloating when skipping a dose of Creon.  stated that they do not have a schedule for their meal so he has hard time remembering to give Ingrid her Creon before she starts eating.  On average, she takes 4-6 pills a day.  They are trying to keep her off dairy and to use Lactaid tablets.  Patient has been taking Gas-X tablets several times a day but it does not always work. Unable to complete hydrogen breath test for SIBO due to problems with transportation.  Was empirically treated for presumptive bacterial overgrowth with doxycycline 100 mg a day for 10 days. Harsha stated that he did not notice significant difference in Ingrid's symptoms.   Reported some improvement in rectal discomfort. Did not noticed bleeding recently.  Not able to fill Anusol suppository as they are not covered by her insurance. Patient suffers external and internal hemorrhoids.    PREVIOUS ENDOSCOPY:  5/22/2017 Colonoscopy  Findings:       The perianal and digital rectal examinations were normal.        There is no endoscopic evidence of bleeding, diverticula, mass or polyps        in the entire colon.        Non-bleeding internal hemorrhoids were found during retroflexion. The        hemorrhoids were moderate and Grade II (internal hemorrhoids that        prolapse but reduce spontaneously).     6/7/2018 EGD  Findings:       The nasopharynx and oropharynx were normal.        The examined esophagus was normal.        There is no endoscopic evidence of Ugarte's esophagus, esophagitis or        z-line abnormalities in the entire esophagus.        A large hiatal hernia was present.        The exam of the stomach was otherwise normal.        There is no endoscopic evidence of erythema or inflammatory changes        suggestive of gastritis, inflammation or ulceration in the stomach.        The examined duodenum was normal.       PERTINENT IMAGING STUDIES WERE REVIEWED IN EMR  Previous studies:  Negative recent celiac serology.  Had last EGD in 2018 and x-ray esophagram in 2019, suggestive for moderate to large size hiatal hernia. Normal swallowing evaluation by speech therapist in 2018. Patient had normal colonoscopy in 2017.  Family history significant for colon cancer in father. Noted that for some time, the patient was on yearly MRCP surveillance schedule for enlarged pancreatic duct. It was discontinued in 2015, when she had normal MRCP.    HISTORY:   has a past medical history of Asthma, GERD (gastroesophageal reflux disease), and Radial head fracture (03/18/2010).     has a past surgical history that includes hysterectomy, pap no longer indicated (1991); tonsillectomy; hernia repair; Ankle surgery; colonoscopy; upper gi endoscopy; ESOPH/GAS REFLUX TEST W NASAL IMPED >1 HR (4/19/2012); Colonoscopy (N/A, 3/20/2015); Colonoscopy (N/A, 5/22/2017); Esophagoscopy, gastroscopy, duodenoscopy (EGD), combined (N/A, 6/7/2018); Phacoemulsification with standard intraocular lens implant (Right, 2/16/2024); and Phacoemulsification with standard intraocular lens implant (Left, 3/22/2024).     reports that she has quit smoking. Her smoking use included cigarettes. She has a 1.5 pack-year smoking history. She has never used smokeless tobacco. She reports current alcohol use. She reports that she does not use drugs.    family history includes Allergies in her mother and sister; C.A.D. in her maternal grandmother; Cancer - colorectal (age of onset: 87) in her father; Cardiovascular in her father and paternal grandmother; Cerebrovascular Disease in her mother; Depression in her sister; Diabetes in her father, mother, paternal grandmother, and sister; Eye Disorder in her father and mother; Gastrointestinal Disease in her daughter, daughter, and sister; Heart Disease in her mother; Hypertension in her father and mother; Lipids in her father; Migraines in her daughter, daughter, mother, and son; Osteoporosis in her mother; Respiratory in her mother.    ALLERGIES:     Allergies   Allergen Reactions    Sulfa Antibiotics Other (See Comments)     Reaction unknown as a child       PERTINENT MEDICATIONS:    Current Outpatient Medications:     amLODIPine  (NORVASC) 2.5 MG tablet, TAKE 1 TABLET (2.5 MG) BY MOUTH DAILY FOR BLOOD PRESSURE, Disp: 90 tablet, Rfl: 1    ascorbic acid 1000 MG TABS tablet, Take 1,000 mg by mouth daily, Disp: , Rfl:     Biotin w/ Vitamins C & E (HAIR/SKIN/NAILS PO), Take 1 capsule by mouth daily, Disp: , Rfl:     calcium carbonate-vitamin D (OSCAL) 500-5 MG-MCG tablet, TAKE 1 TABLET BY MOUTH 2 TIMES DAILY (WITH MEALS) FOR BONE HEALTH, Disp: 180 tablet, Rfl: 0    cetirizine (ZYRTEC) 10 MG tablet, TAKE ONE TABLET BY MOUTH ONCE DAILY IN THE MORNING FOR ALLERGIES, Disp: 90 tablet, Rfl: 3    clobetasol (TEMOVATE) 0.05 % external solution, Apply topically 2 times daily, Disp: 100 mL, Rfl: 6    cyanocobalamin (VITAMIN B-12) 100 MCG tablet, TAKE TWO TABLETS BY MOUTH ONCE DAILY FOR B12 DEFICIENCY, Disp: 200 tablet, Rfl: 0    donepezil (ARICEPT) 10 MG tablet, TAKE ONE TABLET BY MOUTH EVERY NIGHT AT BEDTIME FOR MEMORY, Disp: 90 tablet, Rfl: 2    doxycycline hyclate (VIBRAMYCIN) 100 MG capsule, Take 1 capsule (100 mg) by mouth daily Antibiotic for intestinal bacterial overgrowth, Disp: 10 capsule, Rfl: 0    DULoxetine (CYMBALTA) 30 MG capsule, TAKE ONE CAPSULE BY MOUTH TWICE A DAY, Disp: 180 capsule, Rfl: 1    HERBALS, Take 1 each by mouth daily (Focus Factor for memory), Disp: , Rfl:     hydrocortisone (ANUSOL-HC) 25 MG suppository, Place 1 suppository (25 mg) rectally at bedtime For hemorrhoids, Disp: 30 suppository, Rfl: 0    hydrOXYzine (ATARAX) 10 MG tablet, Take 1 tablet (10 mg) by mouth 2 times daily as needed for anxiety (as needed only, for restlessness and axiety), Disp: 30 tablet, Rfl: 0    lactase (LACTAID) 9000 units TABS tablet, Take 1 tablet (9,000 Units) by mouth 3 times daily (with meals) For lactose intolerance, when eating milk-related foods., Disp: 90 tablet, Rfl: 11    lipase-protease-amylase (CREON) [id number] units CPEP, TAKE TWO CAPSULES BY MOUTH THREE TIMES A DAY WITH MEALS FOR DIGESTION, Disp: 540 capsule, Rfl: 1     memantine (NAMENDA) 5 MG tablet, Take 5 mg by mouth every morning For 2 weeks, then 5mg twice daily for 2 weeks, then increase to 10mg twice daily thereafter., Disp: , Rfl:     psyllium (METAMUCIL/KONSYL) capsule, Take 1 capsule by mouth 2 times daily, Disp: , Rfl:     simethicone (MYLICON) 125 MG chewable tablet, Take 125 mg by mouth 4 times daily as needed for other (bloating), Disp: , Rfl:     traZODone (DESYREL) 100 MG tablet, TAKE ONE TABLET BY MOUTH EVERY NIGHT AT BEDTIME FOR SLEEP, Disp: 30 tablet, Rfl: 11    Vitamin D3 (VITAMIN D) 10 MCG (400 UNIT) tablet, TAKE ONE TABLET BY MOUTH ONCE DAILY FOR BONE HEALTH, Disp: 100 tablet, Rfl: 3      ROS: 10pt ROS performed and otherwise negative.    PHYSICAL EXAMINATION:  Wt:   Wt Readings from Last 2 Encounters:   05/03/24 47.4 kg (104 lb 9.6 oz)   03/22/24 46.7 kg (103 lb)      Physical Exam  Vitals reviewed: LMP  (LMP Unknown)    Constitutional: aaox3, cooperative, pleasant,   Respiratory: Unlabored breathing, speaking in full sentences.   Psych: Patient suffers dementia. Orientated to self and place. speech is clear and appropriate.    RECENT LABS:   Recent Labs   Lab Test 02/27/23  1454 06/18/20  1623   WBC 7.1 4.8   HGB 11.6* 12.1   HCT 35.9 37.5    266     Recent Labs   Lab Test 09/21/22  1640 06/18/20  1623   ALT 15 23   AST 23 17     Recent Labs   Lab Test 09/21/22  1640 06/18/20  1623   CR 0.74 0.62     TSH   Date Value Ref Range Status   06/18/2020 1.41 0.40 - 4.00 mU/L Final         ASSESSMENT/PLAN:    ICD-10-CM    1. Chronic diarrhea  K52.9       2. Lactose intolerance  E73.9       3. External hemorrhoids  K64.4       4. Internal hemorrhoids  K64.8       5. Abdominal bloating  R14.0       6. Exocrine pancreatic insufficiency  K86.81       7. Hiatal hernia  K44.9              Ingrid Amaral is a 78 year old female who presents to GI clinic for a follow up.  The patient was seen for ongoing abdominal bloating, irregular stool pattern with diarrhea  predominant, and generalized abdominal discomfort. Patient was started on Creon for exocrine pancreatic insufficiency, which helped her diarrhea.  She no longer has nocturnal fecal incontinence episodes.   Harsha admits to skipping a few doses of Creon on day as he does not remember given them to the patient before she started eating.  On average, patient takes 4 to 6 capsules a day.  Reported having diarrhea when she skips a dose or two of the medication.  They did not notice any significant improvement in her bloating after empiric treatment with doxycycline for presumptive SIBO.  Creon and lactose-free diet help to some extent.  Patient has been using Gas-X tablets as needed.    Patient has internal and external hemorrhoids, which flareup at times. They reported some improvement in rectal discomfort.  No recent bleeding.   Unable to afford Anusol suppository as it was not covered by her insurance.  Did not use any other over-the-counter preparations.  I placed information on available OTC drugs in their after visit summary to review and use when necessary.  Patient had unremarkable colonoscopy in 2017.  Reported family history of colon cancer in father.  Due to persistent symptoms of diarrhea and bloating with occasional rectal bleeding, will order colonoscopy.  If comes back unremarkable, no further surveillance will be recommended.    Patient's  verbalized understanding and appreciation of care provided. Stated that all of their questions were answered to her/his satisfaction.  Follow up in 4 months  This note was created with Dragon voice recognition software. Inadvertent minor typographic errors may occur in transcription. Feel free to contact the provider if you have any questions.  I sincerely appreciate an opportunity to provide consultation for this pleasant patient.    MIKE Mauro  [facility],  Gastroenterology,  [place], [place]

## 2024-08-27 ENCOUNTER — VIRTUAL VISIT (OUTPATIENT)
Dept: GASTROENTEROLOGY | Facility: CLINIC | Age: 79
End: 2024-08-27
Attending: NURSE PRACTITIONER
Payer: COMMERCIAL

## 2024-08-27 DIAGNOSIS — K64.8 INTERNAL HEMORRHOIDS: ICD-10-CM

## 2024-08-27 DIAGNOSIS — K86.81 EXOCRINE PANCREATIC INSUFFICIENCY: ICD-10-CM

## 2024-08-27 DIAGNOSIS — K52.9 CHRONIC DIARRHEA: Primary | ICD-10-CM

## 2024-08-27 DIAGNOSIS — R14.0 ABDOMINAL BLOATING: ICD-10-CM

## 2024-08-27 DIAGNOSIS — K44.9 HIATAL HERNIA: ICD-10-CM

## 2024-08-27 DIAGNOSIS — R14.0 ABDOMINAL BLOATING: Primary | ICD-10-CM

## 2024-08-27 DIAGNOSIS — E73.9 LACTOSE INTOLERANCE: ICD-10-CM

## 2024-08-27 DIAGNOSIS — K64.4 EXTERNAL HEMORRHOIDS: ICD-10-CM

## 2024-08-27 DIAGNOSIS — K52.9 CHRONIC DIARRHEA: ICD-10-CM

## 2024-08-27 PROCEDURE — 99214 OFFICE O/P EST MOD 30 MIN: CPT | Mod: 95 | Performed by: NURSE PRACTITIONER

## 2024-08-27 NOTE — PATIENT INSTRUCTIONS
It was a pleasure taking care of you today.  I've included a brief summary of our discussion and care plan from today's visit below.  Please review this information with your primary care provider.  ______________________________________________________________________    My recommendations are summarized as follows:    1.  As we discussed today, I placed an order for a colonoscopy .  You will be contacted to schedule the procedure.    2.  Please work on compliance with your digestive pill (Creon).  Creon helps to digest fats and starches.  Without the medication, you may experience diarrhea and abdominal bloating after eating.    3.  Take over-the-counter anti-gas medications such as Gas-X, simethicone, or charcoal as needed.    4.  Below, I placed more information on over-the-counter preparations for hemorrhoids.  Please review at your convenience.    Return to GI Clinic in 4 months to review your progress.    ______________________________________________________________________       What are hemorrhoids?  Hemorrhoids are swollen veins in the rectum. They can cause itching, bleeding, and pain. Hemorrhoids are very common.  In some cases, you can see or feel hemorrhoids around the outside of the rectum. In other cases, you cannot see them because they are hidden inside the rectum .  What are the symptoms of hemorrhoids?  Hemorrhoids do not always cause symptoms. But when they do, symptoms can include:  ?Itching of the skin around the anus  ?Bleeding - Bleeding is usually painless. You might see bright red blood after using the toilet.  ?Pain - If a blood clot forms inside a hemorrhoid, this can cause pain. It can also cause a lump that you might be able to feel.  ?Swelling - Hemorrhoids can swell or dangle outside of the rectum during a bowel movement.  If you notice bleeding when you have a bowel movement, or if your bowel movements look like tar, see a doctor or nurse. Bleeding could be caused by something  "other than hemorrhoids, so you should have it checked out.  How can I care for myself at home?  The most important thing that you can do is try to prevent constipation and keep your bowel movements soft. You should have a bowel movement at least a few times a week. Here are some steps that you can take:  ?Eat lots of fruits, vegetables, and other foods with fiber . Fiber helps to increase bowel movements.  You need 20 to 35 grams of fiber a day to keep your bowel movements regular. If you do not get enough fiber from your diet, you can take fiber supplements. These come in the form of powders, wafers, or pills. They include  psyllium seed (sample brand names: Metamucil, Konsyl), methylcellulose (sample brand name: Citrucel), polycarbophil (sample brand name: FiberCon), and wheat dextrin (sample brand name: Benefiber). If you take a fiber supplement, be sure to read the label so you know how much to take.   Drink plenty of water and other fluids. This is especially important if you take a fiber supplement.  ?Limit fatty foods and alcohol. These can make constipation worse.  ?Take medicines called \"stool softeners\" such as docusate sodium (sample brand names: Colace, Dulcolax). These medicines increase the number of bowel movements you have. They are safe to take and they can prevent problems later.  ?Take your time when having a bowel movement. But do not spend too much time on the toilet (for example, reading). Also, try not to push hard or strain when having a bowel movement.  ?Get regular physical activity. Even gentle forms of exercise, like walking, are good for your health.  To relieve symptoms of hemorrhoids, you can:  ?Soak your buttocks in 2 or 3 inches of warm water - You can do this up to 2 to 3 times a day for 10 to 15 minutes. Do not add soap, bubble bath, or anything to the water.  ?Try non-prescription medicines - They include creams or ointments that you rub on your anus to relieve pain, itching, and " "swelling. Some hemorrhoid medicines come in a capsule (called a suppository) that you put inside your rectum. Others come in a cream that comes in a bottle with a nozzle that you put inside your rectum.  Do not use medicines that have hydrocortisone (a steroid medicine) for more than a week, unless your doctor or nurse approves.  Are there other treatments for hemorrhoids?  Yes. If you still have symptoms after trying the steps listed above, you might need treatments to destroy or remove the hemorrhoids.  One popular treatment for hemorrhoids inside the rectum is called \"rubber band ligation.\" For this treatment, the doctor ties tiny rubber bands around the hemorrhoids. A few days later the hemorrhoids shrink and stop bleeding. Doctors can also use lasers, heat, or chemicals to destroy hemorrhoids.  If none of these options works, your doctor might suggest surgery to remove or tie off the blood vessels of the hemorrhoids. Hemorrhoids on the outside of the rectum can only be removed with surgery.  When should I call my doctor or nurse?  Call for advice if:  ?You have new or increased bleeding from your rectum.  ?You have tissue sticking out from your rectum that you can't push back in.  ?You are not able to pass bowel movements because of pain.  ?Your symptoms are getting worse even with home care.  ?You have a fever of 100.4 F (38 C) or higher.    Common at-home treatments for hemorrhoids  Type of medicine Examples Notes   Stool softener Docusate sodium (sample brand names: Colace, Docu, Stool Softener) Softens bowel movements  Helps avoid straining while sitting on toilet   Bulk-forming laxatives and fiber supplements Methylcellulose (sample brand names: Citrucel, Soluble Fiber Therapy)  Polycarbophil (sample brand names: FiberCon, Konsyl Fiber)  Psyllium (sample brand names: Metamucil, Konsyl)  Wheat dextrin (sample brand name: Benefiber) Prevent hard dry stools which make hemorrhoids worse  Might reduce bleeding " and other hemorrhoid symptoms  Needs to be taken with plenty of water (8 glasses of water a day)  Your doctor might suggest starting with a small dose and then increasing over time   Pain relief Pramoxine rectal foam, ointment, or wipes (sample brand names: Proctofoam, Pramox) Can help with pain and itching  Area must be gently cleaned and allowed to dry before using   Medicines to dry or protect skin Witch hazel (sample brand names: Tucks, Preparation H pads, Preparation H wipes)  Zinc oxide topical paste (sample brand names: Oj's Butt Paste, Desitin) May dry or tighten skin around the anus  Zinc oxide also protects skin from irritation  Area must be gently cleaned and allowed to dry before using  Can apply after a sitz bath (soaking in warm, shallow water)  Witch hazel wipes or unscented baby wipes can be used to clean the anus after a bowel movement   Steroid cream Hydrocortisone rectal cream (sample brand name: Preparation H hydrocortisone) Reduces swelling, and pain caused by hemorrhoids  Do not use for longer than a week  Do not use at all if you are pregnant unless your doctor or nurse tells you to   Medicines to shrink hemorrhoids Phenylephrine ointment or suppository (sample brand names: Preparation H, Rectacaine) Phenylephrine shrinks swollen hemorrhoids, and relieves itching and discomfort for a few hours  Area must be gently cleaned and allowed to dry before applying   Numbing ointments Benzocaine rectal ointment (sample brand name: Americaine)  Dibucaine rectal ointment (sample brand name: Nupercainal)  Lidocaine rectal cream (sample brand name: RectiCare) Benzocaine, dibucaine, and lidocaine can help with pain and itching, but should not be used without talking to a doctor first. They should only be used once in a while, in small amounts.               ____________________________________________________________________        Who do I call with any questions after my visit?  Please be in touch  if there are any further questions that arise following today's visit.  There are multiple ways to contact your gastroenterology care team.      During business hours, you may reach a Gastroenterology nurse at [phone], option 3.     To schedule or reschedule an appointment, please call [phone].   To schedule your imaging studies (CT, MRI, ultrasound)  call [phone] (or toll-free # [phone])  To schedule your lab work at [facility], please call [phone]    You can always send a secure message through Takeacoder.  Takeacoder messages are answered by your nurse or doctor typically within 24 hours.  Please allow extra time on weekends and holidays.      For urgent/emergent questions after business hours, you may reach the on-call GI Fellow by contacting the [facility]  at [phone].    In order for your refill to be processed in a timely fashion, it is your responsibility to ensure you follow the recommendations from your provider regarding your laboratory studies and follow up appointments.       How will I get the results of any tests ordered?    You will receive all of your results.  If you have signed up for Ak[employer], any tests ordered at your visit will be available to you after your physician reviews them.  Typically this takes 1-2 weeks.  If there are urgent results that require a change in your care plan, your physician or nurse will call you to discuss the next steps.   What is Takeacoder?  Takeacoder is a secure way for you to access all of your healthcare records from the [facility].  It is a web based computer program, so you can sign on to it from any location.  It also allows you to send secure messages to your care team.  I recommend signing up for Takeacoder access if you have not already done so and are comfortable with using a computer.    How to I schedule a follow-up visit?  If you did not schedule a follow-up visit today, please  call [phone] to schedule a follow-up office visit.      Sincerely,  MIKE Mauro,  [facility],  Division of Gastroenterology   ([facility])

## 2024-08-28 ENCOUNTER — TELEPHONE (OUTPATIENT)
Dept: GASTROENTEROLOGY | Facility: CLINIC | Age: 79
End: 2024-08-28
Payer: COMMERCIAL

## 2024-09-06 ENCOUNTER — ANESTHESIA EVENT (OUTPATIENT)
Dept: GASTROENTEROLOGY | Facility: CLINIC | Age: 79
End: 2024-09-06
Payer: COMMERCIAL

## 2024-09-06 ASSESSMENT — LIFESTYLE VARIABLES: TOBACCO_USE: 1

## 2024-09-06 NOTE — ANESTHESIA PREPROCEDURE EVALUATION
Anesthesia Pre-Procedure Evaluation    Patient: Ingrid Amaral   MRN: [id number] : 1945        Procedure : Procedure(s):  Colonoscopy          Past Medical History:   Diagnosis Date    Asthma     GERD (gastroesophageal reflux disease)     Radial head fracture 2010      Past Surgical History:   Procedure Laterality Date    ANKLE SURGERY      bilateral    COLONOSCOPY      COLONOSCOPY N/A 3/20/2015    Procedure: COLONOSCOPY;  Surgeon: Britney Martinez MD;  Location: [facility]    COLONOSCOPY N/A 2017    Procedure: COLONOSCOPY;  Colonoscopy;  Surgeon: Tristen Rangel MD;  Location: [facility]    ESOPHAGOSCOPY, GASTROSCOPY, DUODENOSCOPY (EGD), COMBINED N/A 2018    Procedure: COMBINED ESOPHAGOSCOPY, GASTROSCOPY, DUODENOSCOPY (EGD);  gastroscopy;  Surgeon: Tristen Rangel MD;  Location: [facility]    HC ESOPH/GAS REFLUX TEST W NASAL IMPED >1 HR  2012    Procedure:ESOPHAGEAL IMPEDENCE FUNCTION TEST WITH 24 HOUR PH GREATER THAN 1 HOUR; Surgeon:VALDO UMANZOR; Location: GI    HERNIA REPAIR      umbilcal    HYSTERECTOMY, PAP NO LONGER INDICATED      PHACOEMULSIFICATION WITH STANDARD INTRAOCULAR LENS IMPLANT Right 2024    Procedure: Cataract Extraction with Imtraocular Lens Placement;  Surgeon: Juan Benavides MD;  Location: [facility]    PHACOEMULSIFICATION WITH STANDARD INTRAOCULAR LENS IMPLANT Left 3/22/2024    Procedure: Cataract Extraction with Intraocular Lens Placement left;  Surgeon: Juan Benavides MD;  Location: [facility]    TONSILLECTOMY      UPPER GI ENDOSCOPY        Allergies   Allergen Reactions    Sulfa Antibiotics Other (See Comments)     Reaction unknown as a child      Social History     Tobacco Use    Smoking status: Former     Current packs/day: 0.50     Average packs/day: 0.5 packs/day for 3.0 years (1.5 ttl pk-yrs)     Types: Cigarettes    Smokeless tobacco: Never    Tobacco comments:     smoked for 3 years when she was around 19   Substance Use Topics     "Alcohol use: Yes     Comment: RARELY      Wt Readings from Last 1 Encounters:   05/03/24 47.4 kg (104 lb 9.6 oz)        Anesthesia Evaluation   Pt has had prior anesthetic. Type: MAC and General.        ROS/MED HX  ENT/Pulmonary:     (+)                tobacco use, Past use,     asthma                  Neurologic: Comment: Lewy body dementia without behavioral disturbance (H)  Meningioma        Cardiovascular:     (+) Dyslipidemia hypertension- -   -  - -                                      METS/Exercise Tolerance:     Hematologic:       Musculoskeletal:       GI/Hepatic: Comment: Dysphagia      (+) GERD,            liver disease,       Renal/Genitourinary:       Endo:       Psychiatric/Substance Use:     (+) psychiatric history anxiety and depression       Infectious Disease:       Malignancy: Comment: Meningioma   (+) Malignancy, History of GI.    Other:            Physical Exam    Airway        Mallampati: I   TM distance: > 3 FB   Neck ROM: full   Mouth opening: > 3 cm    Respiratory Devices and Support         Dental       (+) Minor Abnormalities - some fillings, tiny chips      Cardiovascular   cardiovascular exam normal          Pulmonary   pulmonary exam normal                OUTSIDE LABS:  CBC:   Lab Results   Component Value Date    WBC 7.1 02/27/2023    WBC 4.8 06/18/2020    HGB 11.6 (L) 02/27/2023    HGB 12.1 06/18/2020    HCT 35.9 02/27/2023    HCT 37.5 06/18/2020     02/27/2023     06/18/2020     BMP:   Lab Results   Component Value Date     09/21/2022     06/18/2020    POTASSIUM 3.9 09/21/2022    POTASSIUM 3.8 06/18/2020    CHLORIDE 100 09/21/2022    CHLORIDE 106 06/18/2020    CO2 27 09/21/2022    CO2 29 06/18/2020    BUN 11.1 09/21/2022    BUN 13 06/18/2020    CR 0.74 09/21/2022    CR 0.62 06/18/2020    GLC 91 09/21/2022     (H) 06/18/2020     COAGS: No results found for: \"PTT\", \"INR\", \"FIBR\"  POC: No results found for: \"BGM\", \"HCG\", \"HCGS\"  HEPATIC:   Lab Results "   Component Value Date    ALBUMIN 4.2 09/21/2022    PROTTOTAL 6.4 09/21/2022    ALT 15 09/21/2022    AST 23 09/21/2022    ALKPHOS 77 09/21/2022    BILITOTAL 0.2 09/21/2022     OTHER:   Lab Results   Component Value Date    LARS 9.2 09/21/2022    LIPASE 179 09/27/2017    AMYLASE 37 02/27/2012    TSH 1.41 06/18/2020       Anesthesia Plan    ASA Status:  3       Anesthesia Type: General.              Consents    Anesthesia Plan(s) and associated risks, benefits, and realistic alternatives discussed. Questions answered and patient/representative(s) expressed understanding.     - Discussed: Risks, Benefits and Alternatives for BOTH SEDATION and the PROCEDURE were discussed     - Discussed with:  Patient            Postoperative Care       PONV prophylaxis: Ondansetron (or other 5HT-3), Dexamethasone or Solumedrol     Comments:               MARVIN Colunga CRNA    I have reviewed the pertinent notes and labs in the chart from the past 30 days and (re)examined the patient.  Any updates or changes from those notes are reflected in this note.

## 2024-09-13 ENCOUNTER — ANESTHESIA (OUTPATIENT)
Dept: GASTROENTEROLOGY | Facility: CLINIC | Age: 79
End: 2024-09-13
Payer: COMMERCIAL

## 2024-09-25 DIAGNOSIS — K52.9 CHRONIC DIARRHEA: ICD-10-CM

## 2024-09-25 DIAGNOSIS — E53.8 VITAMIN B12 DEFICIENCY (NON ANEMIC): ICD-10-CM

## 2024-09-26 ENCOUNTER — HOSPITAL ENCOUNTER (OUTPATIENT)
Facility: CLINIC | Age: 79
Discharge: HOME | End: 2024-09-26
Attending: STUDENT IN AN ORGANIZED HEALTH CARE EDUCATION/TRAINING PROGRAM | Admitting: STUDENT IN AN ORGANIZED HEALTH CARE EDUCATION/TRAINING PROGRAM
Payer: COMMERCIAL

## 2024-09-26 VITALS
OXYGEN SATURATION: 99 % | RESPIRATION RATE: 14 BRPM | HEIGHT: 60 IN | DIASTOLIC BLOOD PRESSURE: 81 MMHG | BODY MASS INDEX: 20.42 KG/M2 | TEMPERATURE: 97.8 F | SYSTOLIC BLOOD PRESSURE: 169 MMHG | HEART RATE: 88 BPM | WEIGHT: 104 LBS

## 2024-09-26 LAB — COLONOSCOPY: NORMAL

## 2024-09-26 PROCEDURE — 45382 COLONOSCOPY W/CONTROL BLEED: CPT | Performed by: STUDENT IN AN ORGANIZED HEALTH CARE EDUCATION/TRAINING PROGRAM

## 2024-09-26 PROCEDURE — 250N000011 HC RX IP 250 OP 636: Performed by: NURSE ANESTHETIST, CERTIFIED REGISTERED

## 2024-09-26 PROCEDURE — 88305 TISSUE EXAM BY PATHOLOGIST: CPT | Mod: TC | Performed by: STUDENT IN AN ORGANIZED HEALTH CARE EDUCATION/TRAINING PROGRAM

## 2024-09-26 PROCEDURE — 45380 COLONOSCOPY AND BIOPSY: CPT | Performed by: STUDENT IN AN ORGANIZED HEALTH CARE EDUCATION/TRAINING PROGRAM

## 2024-09-26 PROCEDURE — 258N000003 HC RX IP 258 OP 636: Performed by: PHYSICIAN ASSISTANT

## 2024-09-26 PROCEDURE — 93005 ELECTROCARDIOGRAM TRACING: CPT

## 2024-09-26 PROCEDURE — 370N000017 HC ANESTHESIA TECHNICAL FEE, PER MIN: Performed by: STUDENT IN AN ORGANIZED HEALTH CARE EDUCATION/TRAINING PROGRAM

## 2024-09-26 RX ORDER — UBIDECARENONE 75 MG
CAPSULE ORAL
Qty: 200 TABLET | Refills: 0 | Status: SHIPPED | OUTPATIENT
Start: 2024-09-26

## 2024-09-26 RX ORDER — PROPOFOL 10 MG/ML
INJECTION, EMULSION INTRAVENOUS PRN
Status: DISCONTINUED | OUTPATIENT
Start: 2024-09-26 | End: 2024-09-26

## 2024-09-26 RX ORDER — GLYCOPYRROLATE 0.2 MG/ML
INJECTION, SOLUTION INTRAMUSCULAR; INTRAVENOUS PRN
Status: DISCONTINUED | OUTPATIENT
Start: 2024-09-26 | End: 2024-09-26

## 2024-09-26 RX ORDER — NALOXONE HYDROCHLORIDE 0.4 MG/ML
0.2 INJECTION, SOLUTION INTRAMUSCULAR; INTRAVENOUS; SUBCUTANEOUS
Status: DISCONTINUED | OUTPATIENT
Start: 2024-09-26 | End: 2024-09-26 | Stop reason: HOSPADM

## 2024-09-26 RX ORDER — NALOXONE HYDROCHLORIDE 0.4 MG/ML
0.4 INJECTION, SOLUTION INTRAMUSCULAR; INTRAVENOUS; SUBCUTANEOUS
Status: DISCONTINUED | OUTPATIENT
Start: 2024-09-26 | End: 2024-09-26 | Stop reason: HOSPADM

## 2024-09-26 RX ORDER — LIDOCAINE 40 MG/G
CREAM TOPICAL
Status: DISCONTINUED | OUTPATIENT
Start: 2024-09-26 | End: 2024-09-26 | Stop reason: HOSPADM

## 2024-09-26 RX ORDER — SODIUM CHLORIDE, SODIUM LACTATE, POTASSIUM CHLORIDE, CALCIUM CHLORIDE 600; 310; 30; 20 MG/100ML; MG/100ML; MG/100ML; MG/100ML
INJECTION, SOLUTION INTRAVENOUS CONTINUOUS
Status: DISCONTINUED | OUTPATIENT
Start: 2024-09-26 | End: 2024-09-26 | Stop reason: HOSPADM

## 2024-09-26 RX ORDER — FLUMAZENIL 0.1 MG/ML
0.2 INJECTION, SOLUTION INTRAVENOUS
Status: DISCONTINUED | OUTPATIENT
Start: 2024-09-26 | End: 2024-09-26 | Stop reason: HOSPADM

## 2024-09-26 RX ADMIN — PROPOFOL 25 MG: 10 INJECTION, EMULSION INTRAVENOUS at 13:25

## 2024-09-26 RX ADMIN — PROPOFOL 25 MG: 10 INJECTION, EMULSION INTRAVENOUS at 13:22

## 2024-09-26 RX ADMIN — PROPOFOL 25 MG: 10 INJECTION, EMULSION INTRAVENOUS at 13:20

## 2024-09-26 RX ADMIN — GLYCOPYRROLATE 0.2 MG: 0.2 INJECTION, SOLUTION INTRAMUSCULAR; INTRAVENOUS at 13:18

## 2024-09-26 RX ADMIN — PROPOFOL 25 MG: 10 INJECTION, EMULSION INTRAVENOUS at 13:21

## 2024-09-26 RX ADMIN — SODIUM CHLORIDE, POTASSIUM CHLORIDE, SODIUM LACTATE AND CALCIUM CHLORIDE: 600; 310; 30; 20 INJECTION, SOLUTION INTRAVENOUS at 13:10

## 2024-09-26 ASSESSMENT — ACTIVITIES OF DAILY LIVING (ADL)
ADLS_ACUITY_SCORE: 35
ADLS_ACUITY_SCORE: 35

## 2024-09-26 NOTE — ANESTHESIA CARE TRANSFER NOTE
Patient: Ingrid Amaral    Procedure: Procedure(s):  COLONOSCOPY, WITH POLYPECTOMY AND BIOPSY & CLIP APPLICATION       Diagnosis: Abdominal bloating [R14.0]  Diagnosis Additional Information: No value filed.    Anesthesia Type:   No value filed.     Note:    Oropharynx: oropharynx clear of all foreign objects and spontaneously breathing  Level of Consciousness: awake  Oxygen Supplementation: room air    Independent Airway: airway patency satisfactory and stable  Dentition: dentition unchanged  Vital Signs Stable: post-procedure vital signs reviewed and stable  Report to RN Given: handoff report given  Patient transferred to: Phase II    Handoff Report: Identifed the Patient, Identified the Reponsible Provider, Reviewed the pertinent medical history, Discussed the surgical course, Reviewed Intra-OP anesthesia mangement and issues during anesthesia, Set expectations for post-procedure period and Allowed opportunity for questions and acknowledgement of understanding      Vitals:  Vitals Value Taken Time   BP     Temp     Pulse     Resp     SpO2         Electronically Signed By: MARVIN Holly CRNA  September 26, 2024  1:43 PM

## 2024-09-26 NOTE — LETTER
Ingrid Amaral  [address] [address]      September 30, 2024    Dear Ingrid,  This letter is written to inform you of the results of your recent colonoscopy.  Your examination showed no abnormalities.     Given these findings, I recommend that you discuss further colonoscopies with your primary care provider. Given your age, you may not require any more screening colonoscopies    Please remember that this recommendation is made with the understanding that you are not experiencing persistent changes in bowel function, bleeding per rectum, and/or significant abdominal pain. If you experience these symptoms, please contact your primary care provider for a further evaluation.     If you have any questions or concerns about the results of your colonoscopy or the appropriate follow-up, please contact my assistant at [phone]    Sincerely,      Blaine Ramirez MD   [facility]  ___                    Resulted Orders   Surgical Pathology Exam   Result Value Ref Range    Case Report       Surgical Pathology Report                         Case: [id number]                                  Authorizing Provider:  Blaine Ramirez MD       Collected:           09/26/2024 01:29 PM          Ordering Location:     [facility]   Received:            09/26/2024 01:54 PM                                 [place]                                                                      Pathologist:           Nikos Maldonado MD                                                                           Specimen:    Large Intestine, Colon, Random Colon Biopsies                                              Final Diagnosis       Large intestine, random, biopsy:  - Colonic mucosa without diagnostic abnormality.  - No sufficient features of colitis (microscopic or otherwise), polyp, dysplasia, or  malignancy.       Clinical Information       Procedure:  COLONOSCOPY, WITH POLYPECTOMY AND BIOPSY & CLIP APPLICATION      Gross Description       A(1). Large Intestine, Colon, Random Colon Biopsies:  The specimen is received in formalin, labeled with the patient's name, medical record number and other identifying information and designated  random colon biopsies . It consists of 7 tan soft tissue fragments ranging from 0.2-0.3 cm. Entirely submitted in one cassette.   (PAULA Macedo) 9/27/2024 8:18 AM       Microscopic Description       A microscopic examination is performed.       Performing Labs       The technical component of this testing was completed at [facility].    Stain controls for all stains resulted within this report have been reviewed and show appropriate reactivity.       Case Images

## 2024-09-26 NOTE — ANESTHESIA POSTPROCEDURE EVALUATION
Patient: Ingrid Amaral    Procedure: Procedure(s):  COLONOSCOPY, WITH POLYPECTOMY AND BIOPSY & CLIP APPLICATION       Anesthesia Type:  No value filed.    Note:  Disposition: Outpatient   Postop Pain Control: Uneventful            Sign Out: Well controlled pain   PONV: No   Neuro/Psych: Uneventful            Sign Out: Acceptable/Baseline neuro status   Airway/Respiratory: Uneventful            Sign Out: Acceptable/Baseline resp. status   CV/Hemodynamics: Uneventful            Sign Out: Acceptable CV status; No obvious hypovolemia; No obvious fluid overload   Other NRE: NONE   DID A NON-ROUTINE EVENT OCCUR? No       Last vitals:  Vitals Value Taken Time   /81 09/26/24 1400   Temp 36.6  C (97.8  F) 09/26/24 1346   Pulse 88 09/26/24 1400   Resp 14 09/26/24 1400   SpO2 99 % 09/26/24 1400       Electronically Signed By: MARVIN Florez CRNA  September 26, 2024  4:38 PM

## 2024-09-27 DIAGNOSIS — K52.9 CHRONIC DIARRHEA: ICD-10-CM

## 2024-09-30 ENCOUNTER — TELEPHONE (OUTPATIENT)
Dept: FAMILY MEDICINE | Facility: CLINIC | Age: 79
End: 2024-09-30
Payer: COMMERCIAL

## 2024-09-30 LAB
PATH REPORT.COMMENTS IMP SPEC: NORMAL
PATH REPORT.COMMENTS IMP SPEC: NORMAL
PATH REPORT.FINAL DX SPEC: NORMAL
PATH REPORT.GROSS SPEC: NORMAL
PATH REPORT.MICROSCOPIC SPEC OTHER STN: NORMAL
PATH REPORT.RELEVANT HX SPEC: NORMAL
PHOTO IMAGE: NORMAL

## 2024-09-30 PROCEDURE — 88305 TISSUE EXAM BY PATHOLOGIST: CPT | Mod: 26 | Performed by: PATHOLOGY

## 2024-09-30 NOTE — TELEPHONE ENCOUNTER
called and said they didn't get a refill of Creon when they picked up patient's meds at [facility].  Pharm says patient picked up 500 tablets on 9/4.  Pharmacist will call the patient and , to get it straightened out.

## 2024-10-23 DIAGNOSIS — F41.0 PANIC DISORDER WITHOUT AGORAPHOBIA: ICD-10-CM

## 2024-10-23 DIAGNOSIS — I10 HYPERTENSION GOAL BP (BLOOD PRESSURE) < 140/90: ICD-10-CM

## 2024-10-23 DIAGNOSIS — F41.9 ANXIETY: ICD-10-CM

## 2024-10-23 DIAGNOSIS — F32.0 MILD MAJOR DEPRESSION (H): ICD-10-CM

## 2024-10-25 RX ORDER — AMLODIPINE BESYLATE 2.5 MG/1
2.5 TABLET ORAL DAILY
Qty: 90 TABLET | Refills: 1 | Status: SHIPPED | OUTPATIENT
Start: 2024-10-25

## 2024-10-25 RX ORDER — DULOXETIN HYDROCHLORIDE 30 MG/1
30 CAPSULE, DELAYED RELEASE ORAL 2 TIMES DAILY
Qty: 180 CAPSULE | Refills: 1 | Status: SHIPPED | OUTPATIENT
Start: 2024-10-25

## 2024-10-25 NOTE — TELEPHONE ENCOUNTER
Requested Prescriptions   Pending Prescriptions Disp Refills    amLODIPine (NORVASC) 2.5 MG tablet [Pharmacy Med Name: AMLODIPINE BESYLATE 2.5MG TABS] 90 tablet 1     Sig: TAKE 1 TABLET (2.5 MG) BY MOUTH DAILY FOR BLOOD PRESSURE       Calcium Channel Blockers Protocol  Failed - 10/25/2024  8:16 AM        Failed - Most recent blood pressure under 140/90 in past 12 months     BP Readings from Last 3 Encounters:   09/26/24 (!) 169/81 05/03/24 120/72   03/22/24 134/72       No data recorded            Failed - GFR is on file in the past 12 months and most recent GFR is normal        Failed - Recent (12 mo) or future (90 days) visit within the authorizing provider's specialty     The patient must have completed an in-person or virtual visit within the past 12 months or has a future visit scheduled within the next 90 days with the authorizing provider s specialty.  Urgent care and e-visits do not quality as an office visit for this protocol.          Passed - Medication is active on med list        Passed - Patient is age 18 or older        Passed - No active pregnancy on record        Passed - No positive pregnancy test in past 12 months          DULoxetine (CYMBALTA) 30 MG capsule [Pharmacy Med Name: DULOXETINE HCL 30MG CPEP] 180 capsule 1     Sig: TAKE ONE CAPSULE BY MOUTH TWICE A DAY       Serotonin-Norepinephrine Reuptake Inhibitors  Failed - 10/25/2024  8:16 AM        Failed - Most recent blood pressure under 140/90 in past 12 months     BP Readings from Last 3 Encounters:   09/26/24 (!) 169/81 05/03/24 120/72   03/22/24 134/72       No data recorded            Failed - PHQ-9 score of less than 5 in past 6 months     Please review last PHQ-9 score.           Failed - Recent (12 mo) or future (90 days) visit within the authorizing provider's specialty     The patient must have completed an in-person or virtual visit within the past 12 months or has a future visit scheduled within the next 90 days with the  authorizing provider s specialty.  Urgent care and e-visits do not quality as an office visit for this protocol.          Failed - DELMIS-7 score on file during last 12 months        Passed - Medication is active on med list        Passed - Medication indicated for associated diagnosis     Medication is associated with one or more of the following diagnoses:  Anxiety  Bipolar  Chronic musculoskeletal pain  Depression  Fibromyalgia  Headache  Migraine  Neuropathy  Obsessive compulsive disorder  Panic disorder  Social phobia  Mood disorder  Menopause  Hot flashes/Menopausal flushing  Fibromyitis          Passed - Patient is age 18 or older        Passed - No active pregnancy on record        Passed - No positive pregnancy test in past 12 months

## 2024-11-06 DIAGNOSIS — M85.89 OSTEOPENIA OF MULTIPLE SITES: ICD-10-CM

## 2024-11-07 RX ORDER — OMEGA-3S/DHA/EPA/FISH OIL/D3 300MG-1000
CAPSULE ORAL
Qty: 100 TABLET | Refills: 0 | Status: SHIPPED | OUTPATIENT
Start: 2024-11-07

## 2024-11-07 NOTE — TELEPHONE ENCOUNTER
Pending Prescriptions:                       Disp   Refills    calcium carbonate-vitamin D (OSCAL) 500-5*180 ta*0            Sig: TAKE 1 TABLET BY MOUTH 2 TIMES DAILY (WITH MEALS)           FOR BONE HEALTH    Signed Prescriptions:                        Disp   Refills    Vitamin D3 (VITAMIN D) 10 MCG (400 UNIT) t*100 ta*0        Sig: TAKE ONE TABLET BY MOUTH ONCE DAILY FOR BONE HEALTH  Authorizing Provider: BIPIN STONE  Ordering User: JULIA REYES    Routing refill request to provider for review/approval because:  Drug interaction warning      Julia Reyes RN

## 2024-11-27 NOTE — PROGRESS NOTES
Ingrid Amaral is a 79 year old patient who is being evaluated via a billable telephone visit.       How would you like to obtain your AVS? Mail a copy  If the telephone visit is disconnected, what is a good phone number to reach you at?Text to cell phone:   Will anyone else be joining your telephone visit? Yes:  Harsha . How would they like to receive their invitation? Text to cell phone:     Telephone-Visit Details     Type of service:  Telephone Visit     Telephone Call Duration: 16 minutes    Originating Location (pt. Location): Home    Distant Location (provider location):  Off-site    Mode of Communication:  Telephone Call    Physician has received verbal consent for a Telephone from the patient? Yes         GASTROENTEROLOGY TELEPHONE VISIT    CC/REFERRING MD:    Soha Sanchez    REASON FOR CONSULTATION:   Referred by Sheryl Marie for Follow Up (Chronic diarrhea/Lactose intolerance/External hemorrhoids/Internal hemorrhoids/Abdominal bloating/Exocrine pancreatic insufficiency/Hiatal hernia /)        HISTORY OF PRESENT ILLNESS:    Ingrid Amaral is 79 year old female who presents for a follow up. Patient was seen for  ongoing diarrhea, abdominal bloating, and anal discomfort. PMH of Lewy body dementia, asthma, HTN, hiatal hernia, exocrine pancreatic insufficiency, lactose intolerance, and GERD. Patient is here with her  Harsha, who provides majority of history. Patient provides minimal participation in conversation.     Patient underwent extensive evaluation of her symptoms.  She was empirically treated for presumptive SIBO with oral doxycycline.  They denied significant changes in her bloating and loose stools.  Stool studies came back positive for exocrine pancreatic insufficiency.  Patient was started on Creon.  She takes 2 capsules with each meal faithfully.  Patient said that her appetite had increased.  Denies any changes in her diet. She started gaining weight.  Reported resolution of  her diarrhea.  She no longer has nocturnal fecal incontinence episodes.  Moreover, patient noted having solid stools that require straining at times.  Has been using a small dose of MiraLAX as needed.  Once in a while, she takes psyllium fiber supplement.  Continues having intermittent abdominal bloating.  At times, experiences generalized abdominal pain.  Patient has been using Gas-X tablets as needed.  Found to have symptoms of lactose intolerance.  Takes Lactaid tablets as needed.    Reported family history of colon cancer in father. Due to persistent symptoms of diarrhea and bloating with occasional rectal bleeding,  ordered colonoscopy.     PMHx, PSHx, Social Hx, Family Hx have been reviewed.     PREVIOUS ENDOSCOPY:  9/26/2024 Colonoscopy  Findings:       The perianal and digital rectal examinations were normal. Pertinent        negatives include normal sphincter tone, no palpable rectal lesions and        no anal lesion or abnormality.        The terminal ileum appeared normal.        Normal mucosa was found in the entire colon. Random biopsies were        obtained with cold forceps for histology randomly in the entire colon.        To prevent bleeding after the biopsy, two hemostatic clips were        successfully placed (MR conditional). There was no bleeding at the end        of the procedure. Estimated blood loss was minimal.        Non-bleeding internal hemorrhoids were found during retroflexion. The        hemorrhoids were Grade I (internal hemorrhoids that do not prolapse).        The exam was otherwise without abnormality on direct and retroflexion        views.   Final Diagnosis   Large intestine, random, biopsy:  - Colonic mucosa without diagnostic abnormality.  - No sufficient features of colitis (microscopic or otherwise), polyp, dysplasia, or malignancy.        5/22/2017 Colonoscopy  Findings:       The perianal and digital rectal examinations were normal.        There is no endoscopic evidence of  bleeding, diverticula, mass or polyps        in the entire colon.        Non-bleeding internal hemorrhoids were found during retroflexion. The        hemorrhoids were moderate and Grade II (internal hemorrhoids that        prolapse but reduce spontaneously).      6/7/2018 EGD  Findings:       The nasopharynx and oropharynx were normal.        The examined esophagus was normal.        There is no endoscopic evidence of Ugarte's esophagus, esophagitis or        z-line abnormalities in the entire esophagus.        A large hiatal hernia was present.        The exam of the stomach was otherwise normal.        There is no endoscopic evidence of erythema or inflammatory changes        suggestive of gastritis, inflammation or ulceration in the stomach.        The examined duodenum was normal.         PERTINENT IMAGING STUDIES WERE REVIEWED IN EMR  Previous studies:  Negative recent celiac serology.  Had last EGD in 2018 and x-ray esophagram in 2019, suggestive for moderate to large size hiatal hernia. Normal swallowing evaluation by speech therapist in 2018. Patient had normal colonoscopy in 2017. Family history significant for colon cancer in father. Noted that for some time, the patient was on yearly MRCP surveillance schedule for enlarged pancreatic duct. It was discontinued in 2015, when she had normal MRCP.    Physical Exam   healthy, alert, and no distress  PSYCH: Alert and oriented times 3; coherent speech, normal   rate and volume, able to articulate logical thoughts, able   to abstract reason, no tangential thoughts, no hallucinations   or delusions, her affect is normal  RESP: No cough, no audible wheezing, able to talk in full sentences  Remainder of exam unable to be completed due to telephone visits    ASSESSMENT/PLAN:    ICD-10-CM    1. Exocrine pancreatic insufficiency  K86.81       2. Lactose intolerance  E73.9 lactase (LACTAID) 9000 units TABS tablet      3. Abdominal bloating  R14.0 simethicone (MYLICON)  125 MG chewable tablet      4. Internal hemorrhoids  K64.8       5. At risk for alteration in fecal elimination  Z91.89       6. Chronic diarrhea  K52.9 lipase-protease-amylase (CREON) [id number] units CPEP     psyllium (METAMUCIL/KONSYL) capsule         Ingrid Amaral is a 79 year old female who presents for a follow-up. PMH of Lewy body dementia, asthma, HTN, hiatal hernia, exocrine pancreatic insufficiency, lactose intolerance, and GERD. Patient is here with her  Harsha, who provides majority of history.   Patient was tested and diagnosed with exocrine pancreatic insufficiency, started on Creon.  Reported resolution of her fecal urge incontinence and diarrhea.  Having 1 formed stool a day.  Stated that at times, her stools are hard and she is having difficulty with evacuation.  Has been using small dose of MiraLAX as needed and it helps.  Suggested to continue Creon at the current dose.  Renewed the prescription.    Patient complains of ongoing abdominal bloating.  Not aware of any correlation with certain type of food in his symptoms.  Stated that they eliminated onion, garlic, beans, and broccoli from her diet.  She is using lactose supplement before consumption of dairy.  Has been taking Gas-X 1-2 times a day as needed and it is effective.    Patient has internal and external hemorrhoids, which flareup at times. In the past, unable to afford Anusol suppository as it was not covered by her insurance.  Did not use any other over-the-counter preparations. They reported  no recent issue with rectal discomfort.  No recent bleeding.    Patient underwent colonoscopy which came back unremarkable except of nonbleeding internal hemorrhoids.  Colonic biopsy was performed.  Noted that 2 clips were placed for postbiopsy hemorrhage.  Patient denies any black stools.    Thank you for this consultation.  It was a pleasure to participate in the care of this patient; please contact us with any further  questions.  Follow-up in 6 months  This note was created with Dragon voice recognition software, and while reviewed for accuracy, inadvertent minor typographic errors may remain. Please contact the provider if you have any questions.    MIKE Mauro  [facility]  Gastroenterology  [place], [place]

## 2024-12-10 ENCOUNTER — VIRTUAL VISIT (OUTPATIENT)
Dept: GASTROENTEROLOGY | Facility: CLINIC | Age: 79
End: 2024-12-10
Attending: NURSE PRACTITIONER
Payer: COMMERCIAL

## 2024-12-10 DIAGNOSIS — Z91.89: ICD-10-CM

## 2024-12-10 DIAGNOSIS — R14.0 ABDOMINAL BLOATING: ICD-10-CM

## 2024-12-10 DIAGNOSIS — K86.81 EXOCRINE PANCREATIC INSUFFICIENCY: Primary | ICD-10-CM

## 2024-12-10 DIAGNOSIS — K52.9 CHRONIC DIARRHEA: ICD-10-CM

## 2024-12-10 DIAGNOSIS — E73.9 LACTOSE INTOLERANCE: ICD-10-CM

## 2024-12-10 DIAGNOSIS — K64.8 INTERNAL HEMORRHOIDS: ICD-10-CM

## 2024-12-10 RX ORDER — SIMETHICONE 125 MG
125 TABLET,CHEWABLE ORAL 4 TIMES DAILY PRN
Qty: 100 TABLET | Refills: 5 | Status: SHIPPED | OUTPATIENT
Start: 2024-12-10

## 2024-12-10 RX ORDER — POLYETHYLENE GLYCOL 3350 17 G/17G
1 POWDER, FOR SOLUTION ORAL DAILY PRN
Qty: 578 G | Refills: 5 | Status: SHIPPED | OUTPATIENT
Start: 2024-12-10

## 2024-12-10 NOTE — NURSING NOTE
Patient not alone for PHQ-9. She has  do most of the speaking for the rooming process for her telephone visit.   Fiona CARPENTER cma

## 2024-12-10 NOTE — PATIENT INSTRUCTIONS
"It was a pleasure taking care of you today.  I've included a brief summary of our discussion and care plan from today's visit below.  Please review this information with your primary care provider.  ______________________________________________________________________    My recommendations are summarized as follows:    As we discussed today, your colonoscopy showed internal hemorrhoids.  No signs of cancer, masses, or inflammation of the colon.  No follow-up studies needed.    2.  Please continue taking Creon 2 capsules before every meal.    3.  Take simethicone 1 to 2 tablets as needed for abdominal bloating and gas.    4.  Use of lactose supplements (Lactaid) before consumption of dairy.  You have lactose intolerance.    Return to GI Clinic in 6 months to review your progress.    ______________________________________________________________________  What is exocrine pancreatic insufficiency?  Exocrine pancreatic insufficiency, or \"EPI,\" is when the pancreas is not working as well as it should.  The pancreas makes hormones and juices that help break down food.  The juices contain proteins called \"enzymes.\" These flow into the small intestine to help the body digest food. In EPI, 1 of the following problems happens:  ?The pancreas does not make enough enzymes.  ?Not enough enzymes get from the pancreas to the small intestine.  ?The enzymes don't work as well as they should.  As a result, the body cannot absorb the nutrients it needs from food.  What causes EPI?  Different health conditions can cause EPI. They include:  ?Chronic pancreatitis - Pancreatitis is when the pancreas gets irritated or swollen. \"Chronic\" means that the pancreas get damaged by irritation over time. Often, this is related to heavy alcohol use or smoking.  ?Cystic fibrosis - This is a disease that some children are born with. It causes thick mucus and other fluids to build up in the body. It can affect different organs, including the " "pancreas.  ?Surgery to remove part of the pancreas, stomach, or small intestine  ?A blockage in the \"duct\" or tube that drains fluid from the pancreas into the small intestine  What are the symptoms of EPI?  Some people have no symptoms. Often, EPI gets worse over time, and eventually does cause symptoms. These can include:  ?Belly pain or cramping  ?Bloating and gas  ?Weight loss  ?Diarrhea that is greasy, bad smelling, and floats in the toilet  These symptoms happen because the body cannot digest food properly. If your body cannot absorb enough vitamins from the foods you eat, this can lead to other problems, like weak bones.  Is there a test for EPI?  Yes. First, your doctor or nurse will ask about your symptoms and health. If they suspect that you have EPI, they can confirm it by testing a sample of your bowel movement. This test measures the level of an enzyme called \"elastase.\"  If your doctor thinks that something else might be causing your symptoms, they might do other tests, such as:  ?Different tests on a sample of bowel movement  ?Blood tests  ?Imaging tests, such as a CT scan or ultrasound - These create pictures of the inside of the body.    How is EPI treated?   - Pancreatic Enzyme Replacement Therapy (PERT):  The cornerstone of EPI treatment is PERT, which involves taking pancreatic enzyme supplements with meals to aid digestion. These replace the enzymes that the pancreas normally makes. They come as pills you take during each meal and with snacks. Most people continue taking enzymes for the rest of their life.These supplements help break down fats, proteins, and carbohydrates.   -  Dietary Modifications:  Focus on a balanced diet rich in fruits, vegetables, whole grains, lean proteins, and healthy fats.  While fat should not be eliminated, it may need to be moderated and balanced with enzyme supplementation.  Eating smaller, more frequent meals can help manage symptoms and improve nutrient " absorption.  Avoid Alcohol and Smoking: Both can exacerbate pancreatic problems and should be avoided.   - Monitoring Nutritional Deficiencies:  EPI can lead to deficiencies in fat-soluble vitamins (A, D, E, and K), so supplements may be necessary.  Regular monitoring of nutritional status is important to prevent deficiencies. We will check your bone health and vitamin levels every 1 to 2 years. You might also need to take extra vitamins if your levels are low.     Regular check-ups with your healthcare provider are crucial to monitor your condition and adjust treatment as necessary.   ____________________________________________________________________  Please see below for any additional questions and scheduling guidelines.    Sign up for [facility]: [facility] patient portal serves as a secure platform for accessing your medical records from the [facility]. Additionally, [employer] facilitates easy, timely, and secure messaging with your care team. If you have not signed up, you may do so by using the provided code or calling [phone].    Coordinating your care after your visit:  There are multiple options for scheduling your follow-up care based on your provider's recommendation.    How do I schedule a follow-up clinic appointment:   After your appointment, you may receive scheduling assistance with the Clinic Coordinators by having a seat in the waiting room and a Clinic Coordinator will call you up to schedule.  Virtual visits or after you leave the clinic:  Your provider has placed a follow-up order in the [employer] portal for scheduling your return appointment. A member of the scheduling team will contact you to schedule.  [facility] Scheduling: Timely scheduling through [employer] is advised to ensure appointment availability.   Call to schedule: You may schedule your follow-up appointment(s) by calling [phone], option 1.    How do I schedule my endoscopy or colonoscopy procedure:  If a procedure, such  as a colonoscopy or upper endoscopy was ordered by your provider, the scheduling team will contact you to schedule this procedure. Or you may choose to call to schedule at   [phone], option 2.  Please allow 20-30 minutes when scheduling a procedure.    How do I get my blood work done? To get your blood work done, you need to schedule a lab appointment at an [facility]. There are multiple ways to schedule:   At the clinic: The Clinic Coordinator you meet after your visit can help you schedule a lab appointment.   [facility] scheduling: [facility] offers online lab scheduling at all [facility] laboratory locations.   Call to schedule: You can call [phone] to schedule your lab appointment.    How do I schedule my imaging study: To schedule imaging studies, such as CT scans, ultrasounds, MRIs, or X-rays, contact Imaging Services at [phone].    How do I schedule a referral to another doctor: If your provider recommended a referral to another specialist(s), the referral order was placed by your provider. You will receive a phone call to schedule this referral, or you may choose to call the number attached to the referral to self-schedule.    For Post-Visit Question(s):  For any inquiries following today's visit:  Please utilize [facility] messaging and allow 48 hours for reply or contact the Call Center during normal business hours at [phone], option 3.  For Emergent After-hours questions, contact the On-Call GI Fellow through the [facility] at [phone].  In addition, you may contact your Nurse directly using the provided contact information.    Test Results:  Test results will be accessible via [facility] in compliance with the 21st Century Cures Act. This means that your results will be available to you at the same time as your provider. Often you may see your results before your provider does. Results are reviewed by staff within two weeks with communication  follow-up. Results may be released in the patient portal prior to your care team review.    Prescription Refill(s):  Medication prescribed by your provider will be addressed during your visit. For future refills, please coordinate with your pharmacy. If you have not had a recent clinic visit or routine labs, for your safety, your provider may not be able to refill your prescription.     Sincerely,  MIKE Mauro,  [facility],  Division of Gastroenterology   ([facility])

## 2025-01-15 ENCOUNTER — TELEPHONE (OUTPATIENT)
Dept: FAMILY MEDICINE | Facility: CLINIC | Age: 80
End: 2025-01-15
Payer: COMMERCIAL

## 2025-01-15 DIAGNOSIS — M85.89 OSTEOPENIA OF MULTIPLE SITES: ICD-10-CM

## 2025-01-15 DIAGNOSIS — E53.8 VITAMIN B12 DEFICIENCY (NON ANEMIC): ICD-10-CM

## 2025-01-15 RX ORDER — UBIDECARENONE 75 MG
CAPSULE ORAL
Qty: 200 TABLET | Refills: 0 | OUTPATIENT
Start: 2025-01-15

## 2025-01-15 NOTE — TELEPHONE ENCOUNTER
Called and spoke with pt's spouse, C2C on file, appt scheduled with Soha. Pt will be out of meds before appt, wondering if bridge refill can be sent.    Lakeisha Reinoso Patient

## 2025-01-16 RX ORDER — UBIDECARENONE 75 MG
CAPSULE ORAL
Qty: 200 TABLET | Refills: 0 | Status: SHIPPED | OUTPATIENT
Start: 2025-01-16

## 2025-02-03 DIAGNOSIS — F41.9 ANXIETY WITH RESTLESSNESS: ICD-10-CM

## 2025-02-03 DIAGNOSIS — R45.1 ANXIETY WITH RESTLESSNESS: ICD-10-CM

## 2025-02-03 DIAGNOSIS — R14.0 ABDOMINAL BLOATING: ICD-10-CM

## 2025-02-04 RX ORDER — HYDROXYZINE HYDROCHLORIDE 10 MG/1
10 TABLET, FILM COATED ORAL 2 TIMES DAILY PRN
Qty: 30 TABLET | Refills: 0 | Status: SHIPPED | OUTPATIENT
Start: 2025-02-04

## 2025-02-12 ENCOUNTER — OFFICE VISIT (OUTPATIENT)
Dept: FAMILY MEDICINE | Facility: CLINIC | Age: 80
End: 2025-02-12
Payer: COMMERCIAL

## 2025-02-12 VITALS
SYSTOLIC BLOOD PRESSURE: 126 MMHG | BODY MASS INDEX: 19.75 KG/M2 | HEART RATE: 69 BPM | DIASTOLIC BLOOD PRESSURE: 80 MMHG | WEIGHT: 100.6 LBS | RESPIRATION RATE: 20 BRPM | HEIGHT: 60 IN | OXYGEN SATURATION: 98 % | TEMPERATURE: 98.2 F

## 2025-02-12 DIAGNOSIS — F41.9 ANXIETY WITH RESTLESSNESS: ICD-10-CM

## 2025-02-12 DIAGNOSIS — F41.0 PANIC DISORDER WITHOUT AGORAPHOBIA: ICD-10-CM

## 2025-02-12 DIAGNOSIS — Z00.00 ENCOUNTER FOR MEDICARE ANNUAL WELLNESS EXAM: Primary | ICD-10-CM

## 2025-02-12 DIAGNOSIS — R45.1 ANXIETY WITH RESTLESSNESS: ICD-10-CM

## 2025-02-12 DIAGNOSIS — E53.8 VITAMIN B12 DEFICIENCY (NON ANEMIC): ICD-10-CM

## 2025-02-12 DIAGNOSIS — Z79.899 MEDICATION MANAGEMENT: ICD-10-CM

## 2025-02-12 DIAGNOSIS — F41.9 ANXIETY: ICD-10-CM

## 2025-02-12 DIAGNOSIS — Z12.31 VISIT FOR SCREENING MAMMOGRAM: ICD-10-CM

## 2025-02-12 DIAGNOSIS — Z00.00 ENCOUNTER FOR MEDICARE ANNUAL WELLNESS EXAM: ICD-10-CM

## 2025-02-12 DIAGNOSIS — G47.00 INSOMNIA, UNSPECIFIED TYPE: ICD-10-CM

## 2025-02-12 DIAGNOSIS — I10 HYPERTENSION GOAL BP (BLOOD PRESSURE) < 140/90: ICD-10-CM

## 2025-02-12 DIAGNOSIS — F02.80 LEWY BODY DEMENTIA WITHOUT BEHAVIORAL DISTURBANCE (H): ICD-10-CM

## 2025-02-12 DIAGNOSIS — R14.0 ABDOMINAL BLOATING: ICD-10-CM

## 2025-02-12 DIAGNOSIS — M85.89 OSTEOPENIA OF MULTIPLE SITES: ICD-10-CM

## 2025-02-12 DIAGNOSIS — D32.9 MENINGIOMA (H): ICD-10-CM

## 2025-02-12 DIAGNOSIS — G47.52 RBD (REM BEHAVIORAL DISORDER): ICD-10-CM

## 2025-02-12 DIAGNOSIS — G31.83 LEWY BODY DEMENTIA WITHOUT BEHAVIORAL DISTURBANCE (H): ICD-10-CM

## 2025-02-12 DIAGNOSIS — F32.0 MILD MAJOR DEPRESSION: ICD-10-CM

## 2025-02-12 DIAGNOSIS — E78.00 PURE HYPERCHOLESTEROLEMIA: ICD-10-CM

## 2025-02-12 DIAGNOSIS — E78.00 PURE HYPERCHOLESTEROLEMIA: Primary | ICD-10-CM

## 2025-02-12 LAB
ANION GAP SERPL CALCULATED.3IONS-SCNC: 13 MMOL/L (ref 7–15)
BUN SERPL-MCNC: 11 MG/DL (ref 8–23)
CALCIUM SERPL-MCNC: 9.7 MG/DL (ref 8.8–10.4)
CHLORIDE SERPL-SCNC: 104 MMOL/L (ref 98–107)
CHOLEST SERPL-MCNC: 206 MG/DL
CREAT SERPL-MCNC: 0.81 MG/DL (ref 0.51–0.95)
EGFRCR SERPLBLD CKD-EPI 2021: 73 ML/MIN/1.73M2
ERYTHROCYTE [DISTWIDTH] IN BLOOD BY AUTOMATED COUNT: 12.6 % (ref 10–15)
FASTING STATUS PATIENT QL REPORTED: NO
FASTING STATUS PATIENT QL REPORTED: NO
GLUCOSE SERPL-MCNC: 83 MG/DL (ref 70–99)
HCO3 SERPL-SCNC: 26 MMOL/L (ref 22–29)
HCT VFR BLD AUTO: 39 % (ref 35–47)
HDLC SERPL-MCNC: 88 MG/DL
HGB BLD-MCNC: 12.7 G/DL (ref 11.7–15.7)
LDLC SERPL CALC-MCNC: 96 MG/DL
MCH RBC QN AUTO: 31.4 PG (ref 26.5–33)
MCHC RBC AUTO-ENTMCNC: 32.6 G/DL (ref 31.5–36.5)
MCV RBC AUTO: 96 FL (ref 78–100)
NONHDLC SERPL-MCNC: 118 MG/DL
PLATELET # BLD AUTO: 264 10E3/UL (ref 150–450)
POTASSIUM SERPL-SCNC: 3.9 MMOL/L (ref 3.4–5.3)
RBC # BLD AUTO: 4.05 10E6/UL (ref 3.8–5.2)
SODIUM SERPL-SCNC: 143 MMOL/L (ref 135–145)
TRIGL SERPL-MCNC: 111 MG/DL
WBC # BLD AUTO: 4.9 10E3/UL (ref 4–11)

## 2025-02-12 PROCEDURE — G0439 PPPS, SUBSEQ VISIT: HCPCS | Performed by: NURSE PRACTITIONER

## 2025-02-12 PROCEDURE — G2211 COMPLEX E/M VISIT ADD ON: HCPCS | Performed by: NURSE PRACTITIONER

## 2025-02-12 PROCEDURE — 80048 BASIC METABOLIC PNL TOTAL CA: CPT | Performed by: NURSE PRACTITIONER

## 2025-02-12 PROCEDURE — 99214 OFFICE O/P EST MOD 30 MIN: CPT | Mod: 25 | Performed by: NURSE PRACTITIONER

## 2025-02-12 PROCEDURE — 85027 COMPLETE CBC AUTOMATED: CPT | Performed by: NURSE PRACTITIONER

## 2025-02-12 PROCEDURE — 80061 LIPID PANEL: CPT | Performed by: NURSE PRACTITIONER

## 2025-02-12 PROCEDURE — 36415 COLL VENOUS BLD VENIPUNCTURE: CPT | Performed by: NURSE PRACTITIONER

## 2025-02-12 RX ORDER — DULOXETIN HYDROCHLORIDE 30 MG/1
30 CAPSULE, DELAYED RELEASE ORAL 2 TIMES DAILY
Qty: 180 CAPSULE | Refills: 3 | Status: SHIPPED | OUTPATIENT
Start: 2025-02-12

## 2025-02-12 RX ORDER — UBIDECARENONE 75 MG
CAPSULE ORAL
Qty: 200 TABLET | Refills: 3 | Status: SHIPPED | OUTPATIENT
Start: 2025-02-12

## 2025-02-12 RX ORDER — HYDROXYZINE HYDROCHLORIDE 10 MG/1
10 TABLET, FILM COATED ORAL 2 TIMES DAILY PRN
Qty: 30 TABLET | Refills: 0 | Status: SHIPPED | OUTPATIENT
Start: 2025-02-12

## 2025-02-12 RX ORDER — OMEGA-3S/DHA/EPA/FISH OIL/D3 300MG-1000
1 CAPSULE ORAL DAILY
Qty: 100 TABLET | Refills: 3 | Status: SHIPPED | OUTPATIENT
Start: 2025-02-12

## 2025-02-12 RX ORDER — AMLODIPINE BESYLATE 2.5 MG/1
2.5 TABLET ORAL DAILY
Qty: 90 TABLET | Refills: 1 | Status: SHIPPED | OUTPATIENT
Start: 2025-02-12

## 2025-02-12 RX ORDER — TRAZODONE HYDROCHLORIDE 100 MG/1
TABLET ORAL
Qty: 30 TABLET | Refills: 11 | Status: SHIPPED | OUTPATIENT
Start: 2025-02-12

## 2025-02-12 SDOH — HEALTH STABILITY: PHYSICAL HEALTH: ON AVERAGE, HOW MANY DAYS PER WEEK DO YOU ENGAGE IN MODERATE TO STRENUOUS EXERCISE (LIKE A BRISK WALK)?: 0 DAYS

## 2025-02-12 SDOH — HEALTH STABILITY: PHYSICAL HEALTH: ON AVERAGE, HOW MANY MINUTES DO YOU ENGAGE IN EXERCISE AT THIS LEVEL?: 0 MIN

## 2025-02-12 ASSESSMENT — ANXIETY QUESTIONNAIRES
2. NOT BEING ABLE TO STOP OR CONTROL WORRYING: NEARLY EVERY DAY
4. TROUBLE RELAXING: NEARLY EVERY DAY
8. IF YOU CHECKED OFF ANY PROBLEMS, HOW DIFFICULT HAVE THESE MADE IT FOR YOU TO DO YOUR WORK, TAKE CARE OF THINGS AT HOME, OR GET ALONG WITH OTHER PEOPLE?: SOMEWHAT DIFFICULT
GAD7 TOTAL SCORE: 17
3. WORRYING TOO MUCH ABOUT DIFFERENT THINGS: SEVERAL DAYS
5. BEING SO RESTLESS THAT IT IS HARD TO SIT STILL: NEARLY EVERY DAY
6. BECOMING EASILY ANNOYED OR IRRITABLE: SEVERAL DAYS
GAD7 TOTAL SCORE: 17
IF YOU CHECKED OFF ANY PROBLEMS ON THIS QUESTIONNAIRE, HOW DIFFICULT HAVE THESE PROBLEMS MADE IT FOR YOU TO DO YOUR WORK, TAKE CARE OF THINGS AT HOME, OR GET ALONG WITH OTHER PEOPLE: SOMEWHAT DIFFICULT
7. FEELING AFRAID AS IF SOMETHING AWFUL MIGHT HAPPEN: NEARLY EVERY DAY
1. FEELING NERVOUS, ANXIOUS, OR ON EDGE: NEARLY EVERY DAY
GAD7 TOTAL SCORE: 17
7. FEELING AFRAID AS IF SOMETHING AWFUL MIGHT HAPPEN: NEARLY EVERY DAY

## 2025-02-12 ASSESSMENT — ASTHMA QUESTIONNAIRES
QUESTION_4 LAST FOUR WEEKS HOW OFTEN HAVE YOU USED YOUR RESCUE INHALER OR NEBULIZER MEDICATION (SUCH AS ALBUTEROL): NOT AT ALL
ACT_TOTALSCORE: 24
QUESTION_2 LAST FOUR WEEKS HOW OFTEN HAVE YOU HAD SHORTNESS OF BREATH: NOT AT ALL
QUESTION_1 LAST FOUR WEEKS HOW MUCH OF THE TIME DID YOUR ASTHMA KEEP YOU FROM GETTING AS MUCH DONE AT WORK, SCHOOL OR AT HOME: NONE OF THE TIME
QUESTION_3 LAST FOUR WEEKS HOW OFTEN DID YOUR ASTHMA SYMPTOMS (WHEEZING, COUGHING, SHORTNESS OF BREATH, CHEST TIGHTNESS OR PAIN) WAKE YOU UP AT NIGHT OR EARLIER THAN USUAL IN THE MORNING: NOT AT ALL
ACT_TOTALSCORE: 24
QUESTION_5 LAST FOUR WEEKS HOW WOULD YOU RATE YOUR ASTHMA CONTROL: WELL CONTROLLED

## 2025-02-12 ASSESSMENT — SOCIAL DETERMINANTS OF HEALTH (SDOH): HOW OFTEN DO YOU GET TOGETHER WITH FRIENDS OR RELATIVES?: ONCE A WEEK

## 2025-02-12 NOTE — NURSING NOTE
Chief Complaint   Patient presents with    Physical    Hypertension    B12 refill        Initial LMP  (LMP Unknown)  Estimated body mass index is 20.31 kg/m  as calculated from the following:    Height as of 9/26/24: 1.524 m (5').    Weight as of 9/26/24: 47.2 kg (104 lb).    Patient presents to the clinic using No DME    Is there anyone who you would like to be able to receive your results? No  If yes have patient fill out [facility]OOKE

## 2025-02-12 NOTE — PATIENT INSTRUCTIONS
Call and schedule with Gastroenterology again if continuing to have gas problems ~ [phone]    Call [facility] to be sure Ingrid has a follow up appointment in April ~ [phone]      Patient Education   Preventive Care Advice   This is general advice given by our system to help you stay healthy. However, your care team may have specific advice just for you. Please talk to your care team about your preventive care needs.  Nutrition  Eat 5 or more servings of fruits and vegetables each day.  Try wheat bread, brown rice and whole grain pasta (instead of white bread, rice, and pasta).  Get enough calcium and vitamin D. Check the label on foods and aim for 100% of the RDA (recommended daily allowance).  Lifestyle  Exercise at least 150 minutes each week  (30 minutes a day, 5 days a week).  Do muscle strengthening activities 2 days a week. These help control your weight and prevent disease.  No smoking.  Wear sunscreen to prevent skin cancer.  Have a dental exam and cleaning every 6 months.  Yearly exams  See your health care team every year to talk about:  Any changes in your health.  Any medicines your care team has prescribed.  Preventive care, family planning, and ways to prevent chronic diseases.  Shots (vaccines)   HPV shots (up to age 26), if you've never had them before.  Hepatitis B shots (up to age 59), if you've never had them before.  COVID-19 shot: Get this shot when it's due.  Flu shot: Get a flu shot every year.  Tetanus shot: Get a tetanus shot every 10 years.  Pneumococcal, hepatitis A, and RSV shots: Ask your care team if you need these based on your risk.  Shingles shot (for age 50 and up)  General health tests  Diabetes screening:  Starting at age 35, Get screened for diabetes at least every 3 years.  If you are younger than age 35, ask your care team if you should be screened for diabetes.  Cholesterol test: At age 39, start having a cholesterol test every 5 years, or  more often if advised.  Bone density scan (DEXA): At age 50, ask your care team if you should have this scan for osteoporosis (brittle bones).  Hepatitis C: Get tested at least once in your life.  STIs (sexually transmitted infections)  Before age 24: Ask your care team if you should be screened for STIs.  After age 24: Get screened for STIs if you're at risk. You are at risk for STIs (including HIV) if:  You are sexually active with more than one person.  You don't use condoms every time.  You or a partner was diagnosed with a sexually transmitted infection.  If you are at risk for HIV, ask about PrEP medicine to prevent HIV.  Get tested for HIV at least once in your life, whether you are at risk for HIV or not.  Cancer screening tests  Cervical cancer screening: If you have a cervix, begin getting regular cervical cancer screening tests starting at age 21.  Breast cancer scan (mammogram): If you've ever had breasts, begin having regular mammograms starting at age 40. This is a scan to check for breast cancer.  Colon cancer screening: It is important to start screening for colon cancer at age 45.  Have a colonoscopy test every 10 years (or more often if you're at risk) Or, ask your provider about stool tests like a FIT test every year or Cologuard test every 3 years.  To learn more about your testing options, visit:   .  For help making a decision, visit:   https://bit.ly/bp12896.  Prostate cancer screening test: If you have a prostate, ask your care team if a prostate cancer screening test (PSA) at age 55 is right for you.  Lung cancer screening: If you are a current or former smoker ages 50 to 80, ask your care team if ongoing lung cancer screenings are right for you.  For informational purposes only. Not to replace the advice of your health care provider. Copyright   2023 [employer]. All rights reserved. Clinically reviewed by the [facility] Transitions Program. [employer] [id number] - REV  01/24.  Learning About Activities of Daily Living  What are activities of daily living?     Activities of daily living (ADLs) are the basic self-care tasks you do every day. These include eating, bathing, dressing, and moving around.  As you age, and if you have health problems, you may find that it's harder to do some of these tasks. If so, your doctor can suggest ideas that may help.  To measure what kind of help you may need, your doctor will ask how well you are able to do ADLs. Let your doctor know if there are any tasks that you are having trouble doing. This is an important first step to getting help. And when you have the help you need, you can stay as independent as possible.  How will a doctor assess your ADLs?  Asking about ADLs is part of a routine health checkup your doctor will likely do as you age. Your health check might be done in a doctor's office, in your home, or at a hospital. The goal is to find out if you are having any problems that could make it hard to care for yourself or that make it unsafe for you to be on your own.  To measure your ADLs, your doctor will ask how hard it is for you to do routine tasks. Your doctor may also want to know if you have changed the way you do a task because of a health problem. Your doctor may watch how you:  Walk back and forth.  Keep your balance while you stand or walk.  Move from sitting to standing or from a bed to a chair.  Button or unbutton a shirt or sweater.  Remove and put on your shoes.  It's common to feel a little worried or anxious if you find you can't do all the things you used to be able to do. Talking with your doctor about ADLs is a way to make sure you're as safe as possible and able to care for yourself as well as you can. You may want to bring a caregiver, friend, or family member to your checkup. They can help you talk to your doctor.  Follow-up care is a key part of your treatment and safety. Be sure to make and go to all appointments,  and call your doctor if you are having problems. It's also a good idea to know your test results and keep a list of the medicines you take.  Current as of: October 24, 2023  Content Version: 14.3    2024 [employer].   Care instructions adapted under license by your healthcare professional. If you have questions about a medical condition or this instruction, always ask your healthcare professional. [employer] disclaims any warranty or liability for your use of this information.    Preventing Falls: Care Instructions  Injuries and health problems such as trouble walking or poor eyesight can increase your risk of falling. So can some medicines. But there are things you can do to help prevent falls. You can exercise to get stronger. You can also arrange your home to make it safer.    Talk to your doctor about the medicines you take. Ask if any of them increase the risk of falls and whether they can be changed or stopped.   Try to exercise regularly. It can help improve your strength and balance. This can help lower your risk of falling.         Practice fall safety and prevention.   Wear low-heeled shoes that fit well and give your feet good support. Talk to your doctor if you have foot problems that make this hard.  Carry a cellphone or wear a medical alert device that you can use to call for help.  Use stepladders instead of chairs to reach high objects. Don't climb if you're at risk for falls. Ask for help, if needed.  Wear the correct eyeglasses, if you need them.        Make your home safer.   Remove rugs, cords, clutter, and furniture from walkways.  Keep your house well lit. Use night-lights in hallways and bathrooms.  Install and use sturdy handrails on stairways.  Wear nonskid footwear, even inside. Don't walk barefoot or in socks without shoes.        Be safe outside.   Use handrails, curb cuts, and ramps whenever possible.  Keep your hands free by using a shoulder bag or backpack.  Try  "to walk in well-lit areas. Watch out for uneven ground, changes in pavement, and debris.  Be careful in the winter. Walk on the grass or gravel when sidewalks are slippery. Use de-icer on steps and walkways. Add non-slip devices to shoes.    Put grab bars and nonskid mats in your shower or tub and near the toilet. Try to use a shower chair or bath bench when bathing.   Get into a tub or shower by putting in your weaker leg first. Get out with your strong side first. Have a phone or medical alert device in the bathroom with you.   Where can you learn more?  Go to https://www.[employer].net/patiented  Enter G117 in the search box to learn more about \"Preventing Falls: Care Instructions.\"  Current as of: July 31, 2024  Content Version: 14.3    2024 [employer].   Care instructions adapted under license by your healthcare professional. If you have questions about a medical condition or this instruction, always ask your healthcare professional. [employer] disclaims any warranty or liability for your use of this information.    Learning About Stress  What is stress?     Stress is your body's response to a hard situation. Your body can have a physical, emotional, or mental response. Stress is a fact of life for most people, and it affects everyone differently. What causes stress for you may not be stressful for someone else.  A lot of things can cause stress. You may feel stress when you go on a job interview, take a test, or run a race. This kind of short-term stress is normal and even useful. It can help you if you need to work hard or react quickly. For example, stress can help you finish an important job on time.  Long-term stress is caused by ongoing stressful situations or events. Examples of long-term stress include long-term health problems, ongoing problems at work, or conflicts in your family. Long-term stress can harm your health.  How does stress affect your health?  When you are " stressed, your body responds as though you are in danger. It makes hormones that speed up your heart, make you breathe faster, and give you a burst of energy. This is called the fight-or-flight stress response. If the stress is over quickly, your body goes back to normal and no harm is done.  But if stress happens too often or lasts too long, it can have bad effects. Long-term stress can make you more likely to get sick, and it can make symptoms of some diseases worse. If you tense up when you are stressed, you may develop neck, shoulder, or low back pain. Stress is linked to high blood pressure and heart disease.  Stress also harms your emotional health. It can make you shanks, tense, or depressed. Your relationships may suffer, and you may not do well at work or school.  What can you do to manage stress?  You can try these things to help manage stress:   Do something active. Exercise or activity can help reduce stress. Walking is a great way to get started. Even everyday activities such as housecleaning or yard work can help.  Try yoga or ross chi. These techniques combine exercise and meditation. You may need some training at first to learn them.  Do something you enjoy. For example, listen to music or go to a movie. Practice your hobby or do volunteer work.  Meditate. This can help you relax, because you are not worrying about what happened before or what may happen in the future.  Do guided imagery. Imagine yourself in any setting that helps you feel calm. You can use online videos, books, or a teacher to guide you.  Do breathing exercises. For example:  From a standing position, bend forward from the waist with your knees slightly bent. Let your arms dangle close to the floor.  Breathe in slowly and deeply as you return to a standing position. Roll up slowly and lift your head last.  Hold your breath for just a few seconds in the standing position.  Breathe out slowly and bend forward from the waist.  Let your  "feelings out. Talk, laugh, cry, and express anger when you need to. Talking with supportive friends or family, a counselor, or a kirti leader about your feelings is a healthy way to relieve stress. Avoid discussing your feelings with people who make you feel worse.  Write. It may help to write about things that are bothering you. This helps you find out how much stress you feel and what is causing it. When you know this, you can find better ways to cope.  What can you do to prevent stress?  You might try some of these things to help prevent stress:  Manage your time. This helps you find time to do the things you want and need to do.  Get enough sleep. Your body recovers from the stresses of the day while you are sleeping.  Get support. Your family, friends, and community can make a difference in how you experience stress.  Limit your news feed. Avoid or limit time on social media or news that may make you feel stressed.  Do something active. Exercise or activity can help reduce stress. Walking is a great way to get started.  Where can you learn more?  Go to https://www.[employer].net/patiented  Enter N032 in the search box to learn more about \"Learning About Stress.\"  Current as of: October 24, 2023  Content Version: 14.3    2024 [employer].   Care instructions adapted under license by your healthcare professional. If you have questions about a medical condition or this instruction, always ask your healthcare professional. [employer] disclaims any warranty or liability for your use of this information.    Learning About Sleeping Well  What does sleeping well mean?     Sleeping well means getting enough sleep to feel good and stay healthy. How much sleep is enough varies among people.  The number of hours you sleep and how you feel when you wake up are both important. If you do not feel refreshed, you probably need more sleep. Another sign of not getting enough sleep is feeling tired during " "the day.  Experts recommend that adults get at least 7 or more hours of sleep per day. Children and older adults need more sleep.  Why is getting enough sleep important?  Getting enough quality sleep is a basic part of good health. When your sleep suffers, your physical health, mood, and your thoughts can suffer too. You may find yourself feeling more grumpy or stressed. Not getting enough sleep also can lead to serious problems, including injury, accidents, anxiety, and depression.  What might cause poor sleeping?  Many things can cause sleep problems, including:  Changes to your sleep schedule.  Stress. Stress can be caused by fear about a single event, such as giving a speech. Or you may have ongoing stress, such as worry about work or school.  Depression, anxiety, and other mental or emotional conditions.  Changes in your sleep habits or surroundings. This includes changes that happen where you sleep, such as noise, light, or sleeping in a different bed. It also includes changes in your sleep pattern, such as having jet lag or working a late shift.  Health problems, such as pain, breathing problems, and restless legs syndrome.  Lack of regular exercise.  Using alcohol, nicotine, or caffeine before bed.  How can you help yourself?  Here are some tips that may help you sleep more soundly and wake up feeling more refreshed.  Your sleeping area   Use your bedroom only for sleeping and sex. A bit of light reading may help you fall asleep. But if it doesn't, do your reading elsewhere in the house. Try not to use your TV, computer, smartphone, or tablet while you are in bed.  Be sure your bed is big enough to stretch out comfortably, especially if you have a sleep partner.  Keep your bedroom quiet, dark, and cool. Use curtains, blinds, or a sleep mask to block out light. To block out noise, use earplugs, soothing music, or a \"white noise\" machine.  Your evening and bedtime routine   Create a relaxing bedtime routine. " "You might want to take a warm shower or bath, or listen to soothing music.  Go to bed at the same time every night. And get up at the same time every morning, even if you feel tired.  What to avoid   Limit caffeine (coffee, tea, caffeinated sodas) during the day, and don't have any for at least 6 hours before bedtime.  Avoid drinking alcohol before bedtime. Alcohol can cause you to wake up more often during the night.  Try not to smoke or use tobacco, especially in the evening. Nicotine can keep you awake.  Limit naps during the day, especially close to bedtime.  Avoid lying in bed awake for too long. If you can't fall asleep or if you wake up in the middle of the night and can't get back to sleep within about 20 minutes, get out of bed and go to another room until you feel sleepy.  Avoid taking medicine right before bed that may keep you awake or make you feel hyper or energized. Your doctor can tell you if your medicine may do this and if you can take it earlier in the day.  If you can't sleep   Imagine yourself in a peaceful, pleasant scene. Focus on the details and feelings of being in a place that is relaxing.  Get up and do a quiet or boring activity until you feel sleepy.  Avoid drinking any liquids before going to bed to help prevent waking up often to use the bathroom.  Where can you learn more?  Go to https://www.[employer].net/patiented  Enter J942 in the search box to learn more about \"Learning About Sleeping Well.\"  Current as of: July 31, 2024  Content Version: 14.3    2024 [employer].   Care instructions adapted under license by your healthcare professional. If you have questions about a medical condition or this instruction, always ask your healthcare professional. [employer] disclaims any warranty or liability for your use of this information.       "

## 2025-02-12 NOTE — PROGRESS NOTES
Preventive Care Visit  [facility]  Soha Foley DNP, Family Medicine  Feb 12, 2025  {Provider  Link to SmartSet :159908}    {PROVIDER CHARTING PREFERENCE:614467}    Heena Quintero is a 79 year old, presenting for the following:  Physical, Hypertension, B12 refill , and Ear Problem        2/12/2025     3:07 PM   Additional Questions   Roomed by Antonieta Bland   {ROOMER positive Fall Risk- Gait Speed Test is required click here to document the Gait Speed Test and then refresh the note to pull in results  :856319}  {ROOMER if patient is in their first year of Medicare a vision screen is required click here to document the Vison screen and then refresh the note to pull in results  :704302}      History of Present Illness       Hypertension: She presents for follow up of hypertension.  She does not check blood pressure  regularly outside of the clinic. Outpatient blood pressures have not been over 140/90. She does not follow a low salt diet.        BP Readings from Last 6 Encounters:   02/12/25 126/80   09/26/24 (!) 169/81   05/03/24 120/72   03/22/24 134/72   02/16/24 (!) 140/79   01/15/24 137/86     May have something stuck on her left ear   Declined Cognitive test today     Had cataracts taken out and got new glasses   Feels eyes hurt     Still has a lot of bloat  Using Creon and Gas x ~ occasionally doing pepto  Sometimes forgets to take right before and takes during meal.     Having problems with family members names ~ newer and worsening   Forgetting things when goes to get them and comes back with several other things  Does better with being told like orders           Health Care Directive  Patient does not have a Health Care Directive: Discussed advance care planning with patient; information given to patient to review.      2/12/2025   General Health   How would you rate your overall physical health? Good   Feel stress (tense, anxious, or unable to sleep) To some extent   (!)  STRESS CONCERN      2/12/2025   Nutrition   Diet: Regular (no restrictions)         2/12/2025   Exercise   Days per week of moderate/strenous exercise 0 days   Average minutes spent exercising at this level 0 min   (!) EXERCISE CONCERN      2/12/2025   Social Factors   Frequency of gathering with friends or relatives Once a week   Worry food won't last until get money to buy more No   Food not last or not have enough money for food? No   Do you have housing? (Housing is defined as stable permanent housing and does not include staying ouside in a car, in a tent, in an abandoned building, in an overnight shelter, or couch-surfing.) Yes   Are you worried about losing your housing? No   Lack of transportation? No   Unable to get utilities (heat,electricity)? No         2/12/2025   Fall Risk   Fallen 2 or more times in the past year? No    Trouble with walking or balance? Yes        Proxy-reported     {Positive Fall Risk- Gait Speed Test is required and was not documented before note was started.  If results do not appear, ask staff to complete.  Once completed, refresh note to pull in results Click here to link Gait Speed Test  :798807}      2/12/2025   Activities of Daily Living- Home Safety   Needs help with the following daily activites Telephone use    Transportation    Housework   Safety concerns in the home Throw rugs in the hallway       Multiple values from one day are sorted in reverse-chronological order         2/12/2025   Dental   Dentist two times every year? (!) NO         2/12/2025   Hearing Screening   Hearing concerns? None of the above         2/12/2025   Driving Risk Screening   Patient/family members have concerns about driving (!) YES ***         2/12/2025   General Alertness/Fatigue Screening   Have you been more tired than usual lately? (!) YES         2/12/2025   Urinary Incontinence Screening   Bothered by leaking urine in past 6 months No          Today's PHQ-9 Score:       2/12/2025     3:09  PM   PHQ-9 SCORE   PHQ-9 Total Score MyChart Incomplete         2/12/2025   Substance Use   Alcohol more than 3/day or more than 7/wk No   Do you have a current opioid prescription? No   How severe/bad is pain from 1 to 10? 0/10 (No Pain)   Do you use any other substances recreationally? No     Social History     Tobacco Use    Smoking status: Former     Current packs/day: 0.50     Average packs/day: 0.5 packs/day for 3.0 years (1.5 ttl pk-yrs)     Types: Cigarettes    Smokeless tobacco: Never    Tobacco comments:     smoked for 3 years when she was around 19   Vaping Use    Vaping status: Never Used   Substance Use Topics    Alcohol use: Yes     Comment: RARELY    Drug use: No     {Provider  If there are gaps in the social history shown above, please follow the link to update and then refresh the note Link to Social and Substance History :728917}      6/1/2022   LAST FHS-7 RESULTS   1st degree relative breast or ovarian cancer No   Any relative bilateral breast cancer No   Any male have breast cancer No   Any ONE woman have BOTH breast AND ovarian cancer No   Any woman with breast cancer before 50yrs No   2 or more relatives with breast AND/OR ovarian cancer No   2 or more relatives with breast AND/OR bowel cancer No     {If any of the questions to the FHS7 are answered yes, consider referral for genetic counseling.    Additional indications for genetic referral include personal history of breast or ovarian cancer, genetic mutation in 1st degree relative which increases risk of breast cancer including BRCA1, BRCA2, MATILDE, PALB 2, TP53, CHEK2, PTEN, CDH1, STK11 (per ACS) and/or 1st degree relative with history of pancreatic or high-risk prostate cancer (per NCCN):026733}   {Mammogram Decision Support (Optional):044588}      {Link to Fracture Risk Assessment Tool (Optional):900127}    {Provider  REQUIRED FOR AWV Use the storyboard to review patient history, after sections have been marked as reviewed, refresh note to  "capture documentation:155111}  {Provider   REQUIRED AWV use this link to review and update sexual activity history  after section has been marked as reviewed, refresh note to capture documentation:521835}  Reviewed and updated as needed this visit by Provider                    {HISTORY OPTIONS (Optional):868968}  Current providers sharing in care for this patient include:  Patient Care Team:  Soha Sanchez DNP as PCP - General (Family Medicine)  Vonnie Peña RPH as Pharmacist (Pharmacist)  Soha Sanchez DNP as Assigned PCP  Vonnie Peña RP as Assigned MTM Pharmacist  Sheryl Marie APRN CNP as Nurse Practitioner  Juan Hendrickson MD as MD (Dermatology)  Sheryl Marie APRN CNP as Assigned Surgical Provider    The following health maintenance items are reviewed in Epic and correct as of today:  Health Maintenance   Topic Date Due    RSV VACCINE (1 - 1-dose 75+ series) Never done    DTAP/TDAP/TD IMMUNIZATION (2 - Td or Tdap) 12/23/2021    LIPID  09/27/2022    MAMMO SCREENING  06/01/2023    MEDICARE ANNUAL WELLNESS VISIT  09/21/2023    BMP  09/21/2023    ASTHMA ACTION PLAN  02/20/2024    PHQ-9  04/25/2024    ANNUAL REVIEW OF HM ORDERS  10/25/2024    Medicare Annual MTM Pharmacist Visit (once per calendar year)  01/01/2025    COVID-19 Vaccine (7 - 2024-25 season) 05/26/2025    ASTHMA CONTROL TEST  08/12/2025    GLUCOSE  09/21/2025    FALL RISK ASSESSMENT  02/12/2026    ADVANCE CARE PLANNING  10/05/2027    DEXA  10/04/2036    DEPRESSION ACTION PLAN  Completed    INFLUENZA VACCINE  Completed    Pneumococcal Vaccine: 50+ Years  Completed    ZOSTER IMMUNIZATION  Completed    HPV IMMUNIZATION  Aged Out    MENINGITIS IMMUNIZATION  Aged Out    COLORECTAL CANCER SCREENING  Discontinued       {ROS Picklists (Optional):925201}     Objective    Exam  /80   Pulse 69   Temp 98.2  F (36.8  C) (Tympanic)   Resp 20   Ht 1.52 m (4' 11.84\")   Wt 45.6 kg (100 lb 9.6 oz)   LMP  (LMP " "Unknown)   SpO2 98%   BMI 19.75 kg/m     Estimated body mass index is 19.75 kg/m  as calculated from the following:    Height as of this encounter: 1.52 m (4' 11.84\").    Weight as of this encounter: 45.6 kg (100 lb 9.6 oz).    Physical Exam  {Exam Choices (Optional):273092}  {Provider  The Mini-Cog is incomplete, use link to complete and refresh note Link to Mini-Cog :965220}      2/12/2025   Mini Cog   Mini-Cog Not Completed (choose reason) --     {A Mini-Cog total score of 0-2 suggests the possibility of dementia, score of 3-5 suggests no dementia:142965}         Signed Electronically by: Soha Foley DNP  {Email feedback regarding this note to [email or web address]   :834090}  " LENS IMPLANT Left 3/22/2024    Procedure: Cataract Extraction with Intraocular Lens Placement left;  Surgeon: Juan Benavides MD;  Location: [facility] OR    TONSILLECTOMY      UPPER GI ENDOSCOPY       OB History    Para Term  AB Living   3 3 3 0 0 0   SAB IAB Ectopic Multiple Live Births   0 0 0 0 0      # Outcome Date GA Lbr Bryant/2nd Weight Sex Type Anes PTL Lv   3 Term            2 Term            1 Term              Current providers sharing in care for this patient include:  Patient Care Team:  Soha Sanchez DNP as PCP - General (Family Medicine)  Vonnie Peña RPH as Pharmacist (Pharmacist)  Soha Sanchez DNP as Assigned PCP  Vonnie Peña RPH as Assigned MTM Pharmacist  Sheryl Marie APRN CNP as Nurse Practitioner  Juan Hendrickson MD as MD (Dermatology)  Sheryl Marie APRN CNP as Assigned Surgical Provider    The following health maintenance items are reviewed in Epic and correct as of today:  Health Maintenance   Topic Date Due    RSV VACCINE (1 - 1-dose 75+ series) Never done    DTAP/TDAP/TD IMMUNIZATION (2 - Td or Tdap) 2021    LIPID  2022    MAMMO SCREENING  2023    MEDICARE ANNUAL WELLNESS VISIT  2023    BMP  2023    ASTHMA ACTION PLAN  2024    PHQ-9  2024    ANNUAL REVIEW OF HM ORDERS  10/25/2024    Medicare Annual MTM Pharmacist Visit (once per calendar year)  2025    COVID-19 Vaccine ( season) 2025    ASTHMA CONTROL TEST  2025    GLUCOSE  2025    FALL RISK ASSESSMENT  2026    ADVANCE CARE PLANNING  10/05/2027    DEXA  10/04/2036    DEPRESSION ACTION PLAN  Completed    INFLUENZA VACCINE  Completed    Pneumococcal Vaccine: 50+ Years  Completed    ZOSTER IMMUNIZATION  Completed    HPV IMMUNIZATION  Aged Out    MENINGITIS IMMUNIZATION  Aged Out    COLORECTAL CANCER SCREENING  Discontinued         Review of Systems  Constitutional, neuro, ENT, endocrine,  "pulmonary, cardiac, gastrointestinal, genitourinary, musculoskeletal, integument and psychiatric systems are negative, except as otherwise noted.     Objective    Exam  /80   Pulse 69   Temp 98.2  F (36.8  C) (Tympanic)   Resp 20   Ht 1.52 m (4' 11.84\")   Wt 45.6 kg (100 lb 9.6 oz)   LMP  (LMP Unknown)   SpO2 98%   BMI 19.75 kg/m     Estimated body mass index is 19.75 kg/m  as calculated from the following:    Height as of this encounter: 1.52 m (4' 11.84\").    Weight as of this encounter: 45.6 kg (100 lb 9.6 oz).    Physical Exam  GENERAL: alert and no distress  EYES: Eyes grossly normal to inspection, PERRL and conjunctivae and sclerae normal  HENT: ear canals and TM's normal, nose and mouth without ulcers or lesions  NECK: no adenopathy, no asymmetry, masses, or scars  RESP: lungs clear to auscultation - no rales, rhonchi or wheezes  CV: regular rate and rhythm, normal S1 S2, no S3 or S4, no murmur, click or rub, no peripheral edema  ABDOMEN: soft, nontender, no hepatosplenomegaly, no masses and bowel sounds normal  MS: no gross musculoskeletal defects noted, no edema  SKIN: no suspicious lesions or rashes  NEURO: Normal strength and tone, mentation intact and speech normal  PSYCH: mentation appears normal, affect normal/bright        2/12/2025   Mini Cog   Mini-Cog Not Completed (choose reason) --              Signed Electronically by: Soha Foley DNP      Chart documentation with Dragon Voice recognition Software. Although reviewed after completion, some words and grammatical errors may remain.   "

## 2025-05-27 NOTE — PROGRESS NOTES
Ingrid Amaral is a 79 year old patient who is being evaluated via a billable telephone visit.       How would you like to obtain your AVS? MyChart  If the telephone visit is disconnected, what is a good phone number to reach you at?Text to cell phone:   Will anyone else be joining your telephone visit?Yes,  Harsha    Telephone-Visit Details     Type of service:  Telephone Visit     Telephone Call Duration: 23 min    Originating Location (pt. Location): Home    Distant Location (provider location):  Off-site    Mode of Communication:  Telephone Call    Physician has received verbal consent for a Telephone from the patient? Yes      [address] [address]  Phone: [phone]    Patient:  Ingrid Amaral, Date of birth 1945  Date of Visit:  6/11/25     GASTROENTEROLOGY TELEPHONE VISIT  The patient did not have access to video, while the distant provider did.    CC/REFERRING MD:    Soha Sanchez      REASON FOR CONSULTATION:   Follow Up (Chronic diarrhea/Lactose intolerance/External hemorrhoids/Internal hemorrhoids/Abdominal bloating/Exocrine pancreatic insufficiency/Hiatal hernia /)        HISTORY OF PRESENT ILLNESS:  Ingrid Amaral is 79 year old female who presents for a 6 months follow up. PMH of Lewy body dementia, asthma, HTN, hiatal hernia, exocrine pancreatic insufficiency, lactose intolerance, and GERD. Patient is here with her  Harsha, who provides majority of history. Patient provides minimal participation in conversation.       Patient was seen for ongoing diarrhea and uncomfortable postprandial bloating. Patient underwent extensive evaluation of her symptoms. Colonoscopy was unremarkable except of nonbleeding internal hemorrhoids. No signs of microscopic colitis. She was empirically treated for presumptive SIBO with oral doxycycline. They denied significant changes in her bloating and loose stools. Stool studies came back  positive for exocrine pancreatic insufficiency. Patient was started on Creon that helped diarrhea but no marked improvement in bloating.  She rarely has nocturnal fecal incontinence episodes.   Pt's  is her primary caregiver. Said that he does not cook. Said that they are eating pre-packed frozen meals. Their daughter lives with them and helps with meal preparation, but she is currently out the state. Patient has a habit to eat fast and not to chew her food well. Often,she complains of belching and bloating after a meal. Patient has been using Gas-X tablets and pepto-bismol as needed.  She is maintaining her weight.     Found to have symptoms of lactose intolerance.  Takes Lactaid tablets as needed.  At times, she experiences generalized abdominal pain, usually before and during defecation.      Wt Readings from Last 10 Encounters:   04/16/25 46.8 kg (103 lb 3.2 oz)   02/12/25 45.6 kg (100 lb 9.6 oz)   09/26/24 47.2 kg (104 lb)   05/03/24 47.4 kg (104 lb 9.6 oz)   03/22/24 46.7 kg (103 lb)   02/16/24 46.7 kg (103 lb)   01/15/24 46.9 kg (103 lb 6.4 oz)   11/03/23 46.7 kg (103 lb)   10/25/23 47.2 kg (104 lb)   08/17/23 47.2 kg (104 lb)        PMHx, PSHx, Social Hx, Family Hx have been reviewed.     PREVIOUS ENDOSCOPY:   9/26/2024 Colonoscopy  Findings:       The perianal and digital rectal examinations were normal. Pertinent        negatives include normal sphincter tone, no palpable rectal lesions and        no anal lesion or abnormality.        The terminal ileum appeared normal.        Normal mucosa was found in the entire colon. Random biopsies were        obtained with cold forceps for histology randomly in the entire colon.        To prevent bleeding after the biopsy, two hemostatic clips were        successfully placed (MR conditional). There was no bleeding at the end        of the procedure. Estimated blood loss was minimal.        Non-bleeding internal hemorrhoids were found during retroflexion. The         hemorrhoids were Grade I (internal hemorrhoids that do not prolapse).        The exam was otherwise without abnormality on direct and retroflexion        views.   Final Diagnosis   Large intestine, random, biopsy:  - Colonic mucosa without diagnostic abnormality.  - No sufficient features of colitis (microscopic or otherwise), polyp, dysplasia, or malignancy.      6/7/2018 EGD  Findings:       The nasopharynx and oropharynx were normal.        The examined esophagus was normal.        There is no endoscopic evidence of Ugarte's esophagus, esophagitis or        z-line abnormalities in the entire esophagus.        A large hiatal hernia was present.        The exam of the stomach was otherwise normal.        There is no endoscopic evidence of erythema or inflammatory changes        suggestive of gastritis, inflammation or ulceration in the stomach.        The examined duodenum was normal.     RADIOLOGY:  Noted that for some time, the patient was on yearly MRCP surveillance schedule for enlarged pancreatic duct. It was discontinued in 2015, when she had normal MRCP.     Physical Exam   healthy, alert, and no distress  PSYCH: Alert and oriented times 3; coherent speech, normal   rate and volume, able to articulate logical thoughts, able   to abstract reason, no tangential thoughts, no hallucinations   or delusions, her affect is normal  RESP: No cough, no audible wheezing, able to talk in full sentences  Remainder of exam unable to be completed due to telephone visits    ASSESSMENT/PLAN:    ICD-10-CM    1. Exocrine pancreatic insufficiency  K86.81       2. Lactose intolerance  E73.9       3. Abdominal bloating  R14.0 simethicone (MYLICON) 125 MG chewable tablet      4. Internal hemorrhoids  K64.8       5. At risk for alteration in fecal elimination  Z91.89       6. Chronic diarrhea  K52.9 lipase-protease-amylase (CREON) [phone] units CPEP         Ingrid Amaral is a 79 year old female who presents for  a follow up. PMH of Lewy body dementia, asthma, HTN, hiatal hernia, exocrine pancreatic insufficiency, lactose intolerance, and GERD. Patient is here with her  Harsha, who provides majority of history.   Patient and her  reported improvement in loose stools after she was started on Creon. She still occasionally experience fecal incontinence at night (loose stools) but usually, she has 1 formed stool a day. Stated that at times, her stools are hard and she is having difficulty with evacuation and some rectal pain. Has been using small dose of MiraLAX as needed and it helps.    Patient complains of ongoing abdominal bloating. Not aware of any correlation with certain type of food and her symptoms.  stated that patient takes her cloths off or changes to pajamas when she feels bloated (I am questioning if this could be a symptom of dementia rather than GI issue?) . Stated that they eliminated onion, garlic, beans, and broccoli from her diet. She is using lactose supplement before consumption of dairy. Has been taking Gas-X multiple times a day and sometimes, pepto-bismol. Said that in 20-40 min her bloating subsides.  They shared that the patient mainly eats pre-packed frozen meals. Educated to check labels for presence of dairy and amount of fat she is consuming. Advised to consume low- to moderate-fat meals, high protein foods, and to avoid dehydration. Eat small portion meals every 3-4 hours. Take Creon as prescribed.    Follow up: Return to clinic in 6  months    This note was created with Dragon voice recognition software, and while reviewed for accuracy, inadvertent minor typographic errors may remain. Please contact the provider if you have any questions.    MIKE Mauro  [facility]  Gastroenterology  [place], [place]

## 2025-06-10 ENCOUNTER — TELEPHONE (OUTPATIENT)
Dept: FAMILY MEDICINE | Facility: CLINIC | Age: 80
End: 2025-06-10
Payer: COMMERCIAL

## 2025-06-10 ASSESSMENT — PATIENT HEALTH QUESTIONNAIRE - PHQ9: SUM OF ALL RESPONSES TO PHQ QUESTIONS 1-9: 3

## 2025-06-10 NOTE — TELEPHONE ENCOUNTER
Patient Quality Outreach    Patient is due for the following:   Depression  -  PHQ-9 needed    Action(s) Taken:   No follow up needed at this time.    Type of outreach:    Phone, spoke to patient/parent. Performed PHQ9    Questions for provider review:    None         Laurie Bland CMA  Chart routed to None.

## 2025-06-11 ENCOUNTER — VIRTUAL VISIT (OUTPATIENT)
Dept: GASTROENTEROLOGY | Facility: CLINIC | Age: 80
End: 2025-06-11
Attending: NURSE PRACTITIONER
Payer: COMMERCIAL

## 2025-06-11 VITALS — WEIGHT: 100 LBS | BODY MASS INDEX: 19.63 KG/M2

## 2025-06-11 DIAGNOSIS — K86.81 EXOCRINE PANCREATIC INSUFFICIENCY: Primary | ICD-10-CM

## 2025-06-11 DIAGNOSIS — Z91.89: ICD-10-CM

## 2025-06-11 DIAGNOSIS — K64.8 INTERNAL HEMORRHOIDS: ICD-10-CM

## 2025-06-11 DIAGNOSIS — K52.9 CHRONIC DIARRHEA: ICD-10-CM

## 2025-06-11 DIAGNOSIS — E73.9 LACTOSE INTOLERANCE: ICD-10-CM

## 2025-06-11 DIAGNOSIS — R14.0 ABDOMINAL BLOATING: ICD-10-CM

## 2025-06-11 PROCEDURE — 98014 SYNCH AUDIO-ONLY EST MOD 30: CPT | Performed by: NURSE PRACTITIONER

## 2025-06-11 PROCEDURE — 1126F AMNT PAIN NOTED NONE PRSNT: CPT | Mod: 93 | Performed by: NURSE PRACTITIONER

## 2025-06-11 RX ORDER — SIMETHICONE 125 MG
125 TABLET,CHEWABLE ORAL 4 TIMES DAILY PRN
Qty: 100 TABLET | Refills: 11 | Status: SHIPPED | OUTPATIENT
Start: 2025-06-11

## 2025-06-11 ASSESSMENT — PAIN SCALES - GENERAL: PAINLEVEL_OUTOF10: NO PAIN (0)

## 2025-06-11 NOTE — PATIENT INSTRUCTIONS
It was a pleasure taking care of you today.  I've included a brief summary of our discussion and care plan from today's visit below.  Please review this information with your primary care provider.  ______________________________________________________________________    My recommendations are summarized as follows:    Continue Creon with meals and larger snacks.    2. Try to reduce anti-gas medications to prevent constipation. Eat slow, chew your food well.    3. Stay on lactose free diet or take Lactaid tablets before consumption of dairy (milk, sour cream, cottage cheese, ice cream).    Return to GI Clinic in 6 month to review your progress.    ______________________________________________________________________  BLOATING AND GAS  Some people feel that they pass too much gas or burp too frequently, both of which can be a source of embarrassment and discomfort. The average adult produces about one to three pints of gas each day, which is passed through the anus 14 to 23 times per day. Burping occasionally before or after meals is also normal.  The amount of gas produced by the body depends upon your diet and other individual factors. However, most people who complain of excessive gas do not produce more gas than the average person. Instead, they are more aware of normal amounts of gas. On the other hand, certain foods and medical conditions can cause you to make excessive amounts of gas.    There are two primary sources of intestinal gas: gas that is ingested (mostly swallowed air) and gas that is produced by bacteria in the colon.   Air swallowing is the major source of gas in the stomach. It is normal to swallow a small amount of air when eating and drinking and when swallowing saliva. You may swallow larger amounts of air when eating food rapidly, gulping liquids, chewing gum, or smoking.     Bacterial production -- The colon normally provides a home for billions of harmless bacteria, some of which support  the health of the bowel. Certain carbohydrates are incompletely digested by enzymes in the stomach and intestines, allowing bacteria to digest them. For example, cabbage, [place] sprouts, and broccoli contain raffinose, a carbohydrate that is poorly digested. These foods tend to cause more gas and flatulence because the raffinose is digested by bacteria once it reaches the colon. The by-products of this process include odorless gases, such as carbon dioxide, hydrogen, and methane. Minor components of gas have an unpleasant odor, including trace amounts of sulfur.  Some people are not able to digest certain carbohydrates. A classic example is lactose, the major sugar contained in dairy products . Thus, consuming large amounts of lactose may lead to increased gas production, along with cramping and diarrhea.  Starch and soluble fiber can also contribute increase gas. Potatoes, corn, noodles, and wheat produce gas, while rice does not. Soluble fiber (found in oat bran, peas and other legumes, beans, and most fruit) also causes gas. Some laxatives contain soluble fiber and may cause gas, particularly during the first few weeks of use.   Certain diseases can also cause excessive bloating and gas. For example, people with diabetes or scleroderma may, over time, have slowing in the activity of the small intestine. This can lead to bacterial overgrowth within the bowel, with poor digestion of carbohydrates and other nutrients. However, even in the absence of apparent disease, some people tend to harbor large numbers of bacteria in their small bowel and are prone to develop excessive gas.   Most people with gas and bloating do not need to have any testing. However, symptoms such as diarrhea, weight loss, abdominal pain, anemia, blood in the stool, lack of appetite, fever, or vomiting can be warning signs of a more serious problem; people with one or more of these symptoms usually require testing.   Medical  conditions  Medical conditions that may increase intestinal gas, bloating or gas pain include the following:  Chronic intestinal disease. Excess gas is often a symptom of chronic intestinal conditions, such as diverticulitis, ulcerative colitis or Crohn's disease.  Small bowel bacterial overgrowth. An increase or change in the bacteria in the small intestine can cause excess gas, diarrhea and weight loss.  Food intolerances. Gas or bloating may occur if your digestive system can't break down and absorb certain foods, such as the sugar in dairy products (lactose) or proteins such as gluten in wheat and other grains.  Constipation. Constipation may make it difficult to pass gas.    Several measures can help to reduce bothersome gas.   Chronic, repeated belching can occur if you swallow large amounts of air (ie, aerophagia). Aerophagia is typically an unconscious process, and is often associated with emotional stress. Treatment focuses on decreasing air swallowing by reducing anxiety, when it is considered to be a cause, as well as on eating slowly without gulping and avoiding carbonated beverages. Avoid chewing gum, sucking on hard candies and drinking through a straw. These activities can cause you to swallow more air.  Diet recommendations --     High-fiber foods. High-fiber foods that can cause gas include beans, onions, broccoli, [place] sprouts, cabbage, cauliflower, artichokes, asparagus, pears, bananas, melons, apples, peaches, prunes, whole wheat and bran. Carbonated drinks, fruit drinks, beer, and red wine also increase gas production. You can experiment with which foods affect you most. You may avoid high-fiber foods for a couple of weeks and gradually add them back.   Dairy. Reducing dairy products from your diet can lessen symptoms. You also may try dairy products that are lactose-free or take milk products supplemented with lactase to help with digestion.  Sugar substitutes. Eliminate or reduce sugar  "substitutes, or try a different substitute.  Fried or fatty foods. Dietary fat delays the clearance of gas from the intestines. Cutting back on fried or fatty foods may reduce symptoms.  Carbonated beverages. Avoid or reduce your intake of carbonated beverages.  Fiber supplements. If you use a fiber supplement, talk to your doctor about the amount and type of supplement that is best for you.  Water. To help prevent constipation, drink water with your meals, throughout the day and with fiber supplements.  Certain foods contain specific carbohydrates called \"FODMAPs\" (fermentable oligo-, di-, and monosaccharides and polyols). FODMAPs are poorly absorbed and can result in bloating and gas production in some people.    Over-the-counter medications -- Try an over-the-counter product that contains Simethicone, such as certain antacids (eg, Maalox Anti-Gas, Mylanta Gas, Gas-X, Phazyme). Also, you can try an over-the-counter product that contains activated charcoal (eg, CharcoCaps, CharcoAid) or Beano, which is an over-the-counter preparation that helps to breakdown certain complex carbohydrates. This treatment may be effective in reducing gas after eating beans or other vegetables that contain raffinose. Another option is  Pepto-Bismol to reduce the odor of unpleasant-smelling gas.If the odor from passing gas concerns you, limiting foods high in sulfur-containing compounds -- such as broccoli, [place] sprouts, cabbage, cauliflower, beer and foods high in protein -- may reduce distinctive odors.  Lactase supplements (Lactaid, Digest Dairy Plus, others) help you digest the sugar in dairy products (lactose). These reduce gas symptoms if you're lactose intolerant.     Diaphragmatic breathing found to be effective in some patients with gas problems.  Practice these breathing exercises 10 times each. Try to do your exercises 3 to 4 times each day. You can lie on your back or sit up straight in a chair to do these " exercises.  ?Diaphragmatic breathing :  Place 1 hand over your abdomen and the other on your chest.  Slowly take a deep breath in through your nose. When you do this, think about your breath moving the hand on your abdomen out. This pulls more air into your lungs. The hand on your chest should not move very much if you are breathing the right way.  Breathe out slowly through pursed lips. Gently press on your belly as you breathe out. This will push up on your diaphragm to help get your air out.  Repeat.      Foods commonly associated with gas and bloating  Milk and dairy products Cheese (may/may not relate to lactose), ice cream, milk   Vegetables Asparagus, broccoli, brussel sprouts, cabbage, cauliflower, celery, corn, cucumber, kohlrabi, leeks, onions, parsnips, potatoes, radishes, rutabaga, turnips   Fruits Apples, apricots, bananas, peaches, pears, prunes, raisins   Whole grains Bagels, bran/bran cereal, pretzels, wheat and oats, wheat germ   Legumes Baked beans, beans, lima beans, peas, soybeans   Fatty foods Fried foods, pork*   Liquids Beer, carbonated beverages, carbonated medications   Miscellaneous Artificial sweeteners, chewing gum          ____________________________________________________________________  Please see below for any additional questions and scheduling guidelines.    Sign up for [employer]: [employer] patient portal serves as a secure platform for accessing your medical records from the [facility]. Additionally, [employer] facilitates easy, timely, and secure messaging with your care team. If you have not signed up, you may do so by using the provided code or calling [phone].    Coordinating your care after your visit:  There are multiple options for scheduling your follow-up care based on your provider's recommendation.    How do I schedule a follow-up clinic appointment:   After your appointment, you may receive scheduling assistance with the Clinic Coordinators by having a  seat in the waiting room and a Clinic Coordinator will call you up to schedule.  Virtual visits or after you leave the clinic:  Your provider has placed a follow-up order in the [employer] portal for scheduling your return appointment. A member of the scheduling team will contact you to schedule.  [employer] Scheduling: Timely scheduling through [employer] is advised to ensure appointment availability.   Call to schedule: You may schedule your follow-up appointment(s) by calling [phone], option 1.    How do I schedule my endoscopy or colonoscopy procedure:  If a procedure, such as a colonoscopy or upper endoscopy was ordered by your provider, the scheduling team will contact you to schedule this procedure. Or you may choose to call to schedule at   [phone], option 2.  Please allow 20-30 minutes when scheduling a procedure.    How do I get my blood work done? To get your blood work done, you need to schedule a lab appointment at an [facility]. There are multiple ways to schedule:   At the clinic: The Clinic Coordinator you meet after your visit can help you schedule a lab appointment.   [employer] scheduling: [employer] offers online lab scheduling at all [facility] laboratory locations.   Call to schedule: You can call [phone] to schedule your lab appointment.    How do I schedule my imaging study: To schedule imaging studies, such as CT scans, ultrasounds, MRIs, or X-rays, contact Imaging Services at [phone].    How do I schedule a referral to another doctor: If your provider recommended a referral to another specialist(s), the referral order was placed by your provider. You will receive a phone call to schedule this referral, or you may choose to call the number attached to the referral to self-schedule.    For Post-Visit Question(s):  For any inquiries following today's visit:  Please utilize [employer] messaging and allow 48 hours for reply or contact the Call Center during normal  business hours at [phone], option 3.  For Emergent After-hours questions, contact the On-Call GI Fellow through the [facility]  at [phone].  In addition, you may contact your Nurse directly using the provided contact information.    Test Results:  Test results will be accessible via [employer] in compliance with the 21st Century Cures Act. This means that your results will be available to you at the same time as your provider. Often you may see your results before your provider does. Results are reviewed by staff within two weeks with communication follow-up. Results may be released in the patient portal prior to your care team review.    Prescription Refill(s):  Medication prescribed by your provider will be addressed during your visit. For future refills, please coordinate with your pharmacy. If you have not had a recent clinic visit or routine labs, for your safety, your provider may not be able to refill your prescription.     Sincerely,  MIKE Mauro,  [facility],  Division of Gastroenterology   ([facility])

## 2025-07-21 DIAGNOSIS — R14.0 ABDOMINAL BLOATING: ICD-10-CM

## 2025-07-21 DIAGNOSIS — R45.1 ANXIETY WITH RESTLESSNESS: ICD-10-CM

## 2025-07-21 DIAGNOSIS — F41.9 ANXIETY WITH RESTLESSNESS: ICD-10-CM

## 2025-07-21 RX ORDER — HYDROXYZINE HYDROCHLORIDE 10 MG/1
TABLET, FILM COATED ORAL
Qty: 30 TABLET | Refills: 3 | Status: SHIPPED | OUTPATIENT
Start: 2025-07-21

## (undated) DEVICE — EYE PREP BETADINE 5% SOLUTION 30ML 0065-0411-30

## (undated) DEVICE — ENDO FORCEP ENDOJAW BIOPSY 2.8MMX230CM FB-220U

## (undated) DEVICE — EYE SOL BSS 500ML

## (undated) DEVICE — DEVICE ENDO CLIP INSTINCT PLUS INSC-P-7-230-S  G58010

## (undated) RX ORDER — GLYCOPYRROLATE 0.2 MG/ML
INJECTION, SOLUTION INTRAMUSCULAR; INTRAVENOUS
Status: DISPENSED
Start: 2024-09-26

## (undated) RX ORDER — BUPIVACAINE HYDROCHLORIDE 5 MG/ML
INJECTION, SOLUTION EPIDURAL; INTRACAUDAL
Status: DISPENSED
Start: 2019-11-13

## (undated) RX ORDER — METHYLPREDNISOLONE ACETATE 40 MG/ML
INJECTION, SUSPENSION INTRA-ARTICULAR; INTRALESIONAL; INTRAMUSCULAR; SOFT TISSUE
Status: DISPENSED
Start: 2019-11-13

## (undated) RX ORDER — BUPIVACAINE HYDROCHLORIDE 2.5 MG/ML
INJECTION, SOLUTION EPIDURAL; INFILTRATION; INTRACAUDAL
Status: DISPENSED
Start: 2021-09-23

## (undated) RX ORDER — PROPOFOL 10 MG/ML
INJECTION, EMULSION INTRAVENOUS
Status: DISPENSED
Start: 2024-09-26

## (undated) RX ORDER — DEXAMETHASONE SODIUM PHOSPHATE 10 MG/ML
INJECTION, SOLUTION INTRAMUSCULAR; INTRAVENOUS
Status: DISPENSED
Start: 2019-11-13

## (undated) RX ORDER — GLYCOPYRROLATE 0.2 MG/ML
INJECTION, SOLUTION INTRAMUSCULAR; INTRAVENOUS
Status: DISPENSED
Start: 2018-06-07

## (undated) RX ORDER — DEXAMETHASONE SODIUM PHOSPHATE 10 MG/ML
INJECTION, SOLUTION INTRAMUSCULAR; INTRAVENOUS
Status: DISPENSED
Start: 2021-09-23

## (undated) RX ORDER — GLYCOPYRROLATE 0.2 MG/ML
INJECTION, SOLUTION INTRAMUSCULAR; INTRAVENOUS
Status: DISPENSED
Start: 2017-05-22

## (undated) RX ORDER — LIDOCAINE HYDROCHLORIDE 10 MG/ML
INJECTION, SOLUTION EPIDURAL; INFILTRATION; INTRACAUDAL; PERINEURAL
Status: DISPENSED
Start: 2021-09-23